# Patient Record
Sex: FEMALE | Race: WHITE | NOT HISPANIC OR LATINO | Employment: OTHER | URBAN - METROPOLITAN AREA
[De-identification: names, ages, dates, MRNs, and addresses within clinical notes are randomized per-mention and may not be internally consistent; named-entity substitution may affect disease eponyms.]

---

## 2017-02-03 ENCOUNTER — TELEPHONE (OUTPATIENT)
Dept: GASTROENTEROLOGY | Facility: CLINIC | Age: 82
End: 2017-02-03

## 2017-02-03 NOTE — TELEPHONE ENCOUNTER
Referral received from [facility] for Dr. Phillip.  Call placed to them to request records to be faxed to me.  [facility] representative verified that they do have 2 documented C Diff tests.  She will fax these and all other pertinent records for C Diff to me.  Will watch for these records.     Carol Westholter

## 2017-02-06 NOTE — TELEPHONE ENCOUNTER
"Records received from [facility].  Two documented positive C Diff results included.  Contacted to patient to schedule an appointment.  She wasn't aware that the referral had been sent to us.  Patient states that the medicine \"seems to be helping\".  Patient states that  recently had a stroke and she would not be able to travel this far to be seen.  She will contact her referring provider from [facility] to discuss.  Advised patient that I will hang on to her records for approx. One year in the case that she should change her mind or her situation should change.  Patient stated understanding.      Carol Westholter        "

## 2017-10-18 ENCOUNTER — RECORDS - HEALTHEAST (OUTPATIENT)
Dept: LAB | Facility: CLINIC | Age: 82
End: 2017-10-18

## 2017-10-18 LAB
CHOLEST SERPL-MCNC: 250 MG/DL
FASTING STATUS PATIENT QL REPORTED: ABNORMAL
HDLC SERPL-MCNC: 66 MG/DL
LDLC SERPL CALC-MCNC: 151 MG/DL
TRIGL SERPL-MCNC: 165 MG/DL

## 2018-06-04 ENCOUNTER — RECORDS - HEALTHEAST (OUTPATIENT)
Dept: LAB | Facility: CLINIC | Age: 83
End: 2018-06-04

## 2018-06-04 LAB
ANION GAP SERPL CALCULATED.3IONS-SCNC: 10 MMOL/L (ref 5–18)
BUN SERPL-MCNC: 21 MG/DL (ref 8–28)
CALCIUM SERPL-MCNC: 10 MG/DL (ref 8.5–10.5)
CHLORIDE BLD-SCNC: 100 MMOL/L (ref 98–107)
CO2 SERPL-SCNC: 24 MMOL/L (ref 22–31)
CREAT SERPL-MCNC: 0.98 MG/DL (ref 0.6–1.1)
ERYTHROCYTE [DISTWIDTH] IN BLOOD BY AUTOMATED COUNT: 12.7 % (ref 11–14.5)
GFR SERPL CREATININE-BSD FRML MDRD: 54 ML/MIN/1.73M2
GLUCOSE BLD-MCNC: 80 MG/DL (ref 70–125)
HCT VFR BLD AUTO: 35.4 % (ref 35–47)
HGB BLD-MCNC: 11.7 G/DL (ref 12–16)
MCH RBC QN AUTO: 29.3 PG (ref 27–34)
MCHC RBC AUTO-ENTMCNC: 33.1 G/DL (ref 32–36)
MCV RBC AUTO: 89 FL (ref 80–100)
PLATELET # BLD AUTO: 324 THOU/UL (ref 140–440)
PMV BLD AUTO: 9.7 FL (ref 8.5–12.5)
POTASSIUM BLD-SCNC: 5.5 MMOL/L (ref 3.5–5)
RBC # BLD AUTO: 4 MILL/UL (ref 3.8–5.4)
SODIUM SERPL-SCNC: 134 MMOL/L (ref 136–145)
WBC: 5.6 THOU/UL (ref 4–11)

## 2018-07-02 ENCOUNTER — RECORDS - HEALTHEAST (OUTPATIENT)
Dept: LAB | Facility: CLINIC | Age: 83
End: 2018-07-02

## 2018-07-02 LAB — POTASSIUM BLD-SCNC: 4.7 MMOL/L (ref 3.5–5)

## 2018-07-30 ENCOUNTER — RECORDS - HEALTHEAST (OUTPATIENT)
Dept: LAB | Facility: CLINIC | Age: 83
End: 2018-07-30

## 2018-07-30 LAB
T4 FREE SERPL-MCNC: 1 NG/DL (ref 0.7–1.8)
TSH SERPL DL<=0.005 MIU/L-ACNC: 2.17 UIU/ML (ref 0.3–5)

## 2018-08-24 ENCOUNTER — AMBULATORY - HEALTHEAST (OUTPATIENT)
Dept: CARDIOLOGY | Facility: CLINIC | Age: 83
End: 2018-08-24

## 2018-08-24 ENCOUNTER — RECORDS - HEALTHEAST (OUTPATIENT)
Dept: ADMINISTRATIVE | Facility: OTHER | Age: 83
End: 2018-08-24

## 2018-08-30 ENCOUNTER — OFFICE VISIT - HEALTHEAST (OUTPATIENT)
Dept: CARDIOLOGY | Facility: CLINIC | Age: 83
End: 2018-08-30

## 2018-08-30 DIAGNOSIS — R00.2 PALPITATIONS: ICD-10-CM

## 2019-05-02 ENCOUNTER — RECORDS - HEALTHEAST (OUTPATIENT)
Dept: LAB | Facility: CLINIC | Age: 84
End: 2019-05-02

## 2019-05-02 LAB
ALBUMIN SERPL-MCNC: 3.9 G/DL (ref 3.5–5)
ALP SERPL-CCNC: 56 U/L (ref 45–120)
ALT SERPL W P-5'-P-CCNC: 12 U/L (ref 0–45)
ANION GAP SERPL CALCULATED.3IONS-SCNC: 9 MMOL/L (ref 5–18)
AST SERPL W P-5'-P-CCNC: 21 U/L (ref 0–40)
BILIRUB SERPL-MCNC: 0.4 MG/DL (ref 0–1)
BUN SERPL-MCNC: 30 MG/DL (ref 8–28)
CALCIUM SERPL-MCNC: 10.3 MG/DL (ref 8.5–10.5)
CHLORIDE BLD-SCNC: 101 MMOL/L (ref 98–107)
CHOLEST SERPL-MCNC: 266 MG/DL
CO2 SERPL-SCNC: 25 MMOL/L (ref 22–31)
CREAT SERPL-MCNC: 1.15 MG/DL (ref 0.6–1.1)
ERYTHROCYTE [DISTWIDTH] IN BLOOD BY AUTOMATED COUNT: 12.8 % (ref 11–14.5)
FASTING STATUS PATIENT QL REPORTED: ABNORMAL
GFR SERPL CREATININE-BSD FRML MDRD: 45 ML/MIN/1.73M2
GLUCOSE BLD-MCNC: 83 MG/DL (ref 70–125)
HCT VFR BLD AUTO: 36.1 % (ref 35–47)
HDLC SERPL-MCNC: 66 MG/DL
HGB BLD-MCNC: 11.6 G/DL (ref 12–16)
LDLC SERPL CALC-MCNC: 157 MG/DL
LIPASE SERPL-CCNC: 34 U/L (ref 0–52)
MCH RBC QN AUTO: 28.7 PG (ref 27–34)
MCHC RBC AUTO-ENTMCNC: 32.1 G/DL (ref 32–36)
MCV RBC AUTO: 89 FL (ref 80–100)
PLATELET # BLD AUTO: 323 THOU/UL (ref 140–440)
PMV BLD AUTO: 9.7 FL (ref 8.5–12.5)
POTASSIUM BLD-SCNC: 5.4 MMOL/L (ref 3.5–5)
PROT SERPL-MCNC: 7.4 G/DL (ref 6–8)
RBC # BLD AUTO: 4.04 MILL/UL (ref 3.8–5.4)
SODIUM SERPL-SCNC: 135 MMOL/L (ref 136–145)
TRIGL SERPL-MCNC: 215 MG/DL
WBC: 5.7 THOU/UL (ref 4–11)

## 2019-11-16 ENCOUNTER — RECORDS - HEALTHEAST (OUTPATIENT)
Dept: LAB | Facility: CLINIC | Age: 84
End: 2019-11-16

## 2019-11-16 LAB — BNP SERPL-MCNC: 123 PG/ML (ref 0–163)

## 2019-12-06 ENCOUNTER — RECORDS - HEALTHEAST (OUTPATIENT)
Dept: LAB | Facility: CLINIC | Age: 84
End: 2019-12-06

## 2019-12-06 LAB
ALBUMIN SERPL-MCNC: 3.6 G/DL (ref 3.5–5)
ALP SERPL-CCNC: 86 U/L (ref 45–120)
ALT SERPL W P-5'-P-CCNC: 14 U/L (ref 0–45)
ANION GAP SERPL CALCULATED.3IONS-SCNC: 8 MMOL/L (ref 5–18)
AST SERPL W P-5'-P-CCNC: 22 U/L (ref 0–40)
BILIRUB SERPL-MCNC: 0.3 MG/DL (ref 0–1)
BUN SERPL-MCNC: 31 MG/DL (ref 8–28)
CALCIUM SERPL-MCNC: 9.9 MG/DL (ref 8.5–10.5)
CHLORIDE BLD-SCNC: 104 MMOL/L (ref 98–107)
CHOLEST SERPL-MCNC: 272 MG/DL
CO2 SERPL-SCNC: 26 MMOL/L (ref 22–31)
CREAT SERPL-MCNC: 1.12 MG/DL (ref 0.6–1.1)
FASTING STATUS PATIENT QL REPORTED: YES
GFR SERPL CREATININE-BSD FRML MDRD: 47 ML/MIN/1.73M2
GLUCOSE BLD-MCNC: 97 MG/DL (ref 70–125)
HDLC SERPL-MCNC: 63 MG/DL
LDLC SERPL CALC-MCNC: 174 MG/DL
POTASSIUM BLD-SCNC: 5.1 MMOL/L (ref 3.5–5)
PROT SERPL-MCNC: 7.2 G/DL (ref 6–8)
SODIUM SERPL-SCNC: 138 MMOL/L (ref 136–145)
T4 FREE SERPL-MCNC: 0.9 NG/DL (ref 0.7–1.8)
TRIGL SERPL-MCNC: 173 MG/DL
TSH SERPL DL<=0.005 MIU/L-ACNC: 2.01 UIU/ML (ref 0.3–5)

## 2020-05-07 ENCOUNTER — RECORDS - HEALTHEAST (OUTPATIENT)
Dept: LAB | Facility: CLINIC | Age: 85
End: 2020-05-07

## 2020-05-07 LAB
ALBUMIN SERPL-MCNC: 3.4 G/DL (ref 3.5–5)
ALP SERPL-CCNC: 65 U/L (ref 45–120)
ALT SERPL W P-5'-P-CCNC: <9 U/L (ref 0–45)
ANION GAP SERPL CALCULATED.3IONS-SCNC: 7 MMOL/L (ref 5–18)
AST SERPL W P-5'-P-CCNC: 16 U/L (ref 0–40)
BILIRUB SERPL-MCNC: 0.2 MG/DL (ref 0–1)
BUN SERPL-MCNC: 30 MG/DL (ref 8–28)
CALCIUM SERPL-MCNC: 9.2 MG/DL (ref 8.5–10.5)
CHLORIDE BLD-SCNC: 100 MMOL/L (ref 98–107)
CHOLEST SERPL-MCNC: 252 MG/DL
CO2 SERPL-SCNC: 26 MMOL/L (ref 22–31)
CREAT SERPL-MCNC: 1.2 MG/DL (ref 0.6–1.1)
FASTING STATUS PATIENT QL REPORTED: ABNORMAL
GFR SERPL CREATININE-BSD FRML MDRD: 43 ML/MIN/1.73M2
GLUCOSE BLD-MCNC: 88 MG/DL (ref 70–125)
HDLC SERPL-MCNC: 65 MG/DL
LDLC SERPL CALC-MCNC: 165 MG/DL
POTASSIUM BLD-SCNC: 5 MMOL/L (ref 3.5–5)
PROT SERPL-MCNC: 7.1 G/DL (ref 6–8)
SODIUM SERPL-SCNC: 133 MMOL/L (ref 136–145)
TRIGL SERPL-MCNC: 109 MG/DL

## 2020-05-08 LAB — 25(OH)D3 SERPL-MCNC: 20.3 NG/ML (ref 30–80)

## 2020-07-16 ENCOUNTER — RECORDS - HEALTHEAST (OUTPATIENT)
Dept: LAB | Facility: CLINIC | Age: 85
End: 2020-07-16

## 2020-07-16 LAB
ALBUMIN SERPL-MCNC: 3.5 G/DL (ref 3.5–5)
ALP SERPL-CCNC: 74 U/L (ref 45–120)
ALT SERPL W P-5'-P-CCNC: 9 U/L (ref 0–45)
ANION GAP SERPL CALCULATED.3IONS-SCNC: 10 MMOL/L (ref 5–18)
AST SERPL W P-5'-P-CCNC: 17 U/L (ref 0–40)
BILIRUB SERPL-MCNC: 0.2 MG/DL (ref 0–1)
BUN SERPL-MCNC: 35 MG/DL (ref 8–28)
CALCIUM SERPL-MCNC: 9.6 MG/DL (ref 8.5–10.5)
CHLORIDE BLD-SCNC: 99 MMOL/L (ref 98–107)
CO2 SERPL-SCNC: 24 MMOL/L (ref 22–31)
CREAT SERPL-MCNC: 1.27 MG/DL (ref 0.6–1.1)
GFR SERPL CREATININE-BSD FRML MDRD: 40 ML/MIN/1.73M2
GLUCOSE BLD-MCNC: 104 MG/DL (ref 70–125)
IRON SATN MFR SERPL: 13 % (ref 20–50)
IRON SERPL-MCNC: 47 UG/DL (ref 42–175)
POTASSIUM BLD-SCNC: 4.8 MMOL/L (ref 3.5–5)
PROT SERPL-MCNC: 7.2 G/DL (ref 6–8)
SODIUM SERPL-SCNC: 133 MMOL/L (ref 136–145)
TIBC SERPL-MCNC: 359 UG/DL (ref 313–563)
TRANSFERRIN SERPL-MCNC: 287 MG/DL (ref 212–360)
TSH SERPL DL<=0.005 MIU/L-ACNC: 2 UIU/ML (ref 0.3–5)
VIT B12 SERPL-MCNC: 547 PG/ML (ref 213–816)

## 2020-07-17 LAB
BASOPHILS # BLD AUTO: 0 THOU/UL (ref 0–0.2)
BASOPHILS NFR BLD AUTO: 1 % (ref 0–2)
EOSINOPHIL # BLD AUTO: 0.1 THOU/UL (ref 0–0.4)
EOSINOPHIL NFR BLD AUTO: 2 % (ref 0–6)
ERYTHROCYTE [DISTWIDTH] IN BLOOD BY AUTOMATED COUNT: 12.9 % (ref 11–14.5)
HCT VFR BLD AUTO: 30.6 % (ref 35–47)
HGB BLD-MCNC: 9.7 G/DL (ref 12–16)
LAB AP CHARGES (HE HISTORICAL CONVERSION): NORMAL
LYMPHOCYTES # BLD AUTO: 1.4 THOU/UL (ref 0.8–4.4)
LYMPHOCYTES NFR BLD AUTO: 26 % (ref 20–40)
MCH RBC QN AUTO: 29 PG (ref 27–34)
MCHC RBC AUTO-ENTMCNC: 31.7 G/DL (ref 32–36)
MCV RBC AUTO: 92 FL (ref 80–100)
MONOCYTES # BLD AUTO: 0.7 THOU/UL (ref 0–0.9)
MONOCYTES NFR BLD AUTO: 13 % (ref 2–10)
NEUTROPHILS # BLD AUTO: 3.1 THOU/UL (ref 2–7.7)
NEUTROPHILS NFR BLD AUTO: 58 % (ref 50–70)
PATH REPORT.COMMENTS IMP SPEC: NORMAL
PATH REPORT.COMMENTS IMP SPEC: NORMAL
PATH REPORT.FINAL DX SPEC: NORMAL
PATH REPORT.MICROSCOPIC SPEC OTHER STN: ABNORMAL
PATH REPORT.MICROSCOPIC SPEC OTHER STN: NORMAL
PATH REPORT.RELEVANT HX SPEC: NORMAL
PLATELET # BLD AUTO: 389 THOU/UL (ref 140–440)
PMV BLD AUTO: 9.3 FL (ref 8.5–12.5)
RBC # BLD AUTO: 3.34 MILL/UL (ref 3.8–5.4)
WBC: 5.4 THOU/UL (ref 4–11)

## 2020-08-31 ENCOUNTER — AMBULATORY - HEALTHEAST (OUTPATIENT)
Dept: SURGERY | Facility: HOSPITAL | Age: 85
End: 2020-08-31

## 2020-08-31 DIAGNOSIS — Z11.59 ENCOUNTER FOR SCREENING FOR OTHER VIRAL DISEASES: ICD-10-CM

## 2020-09-03 ENCOUNTER — AMBULATORY - HEALTHEAST (OUTPATIENT)
Dept: OTHER | Facility: CLINIC | Age: 85
End: 2020-09-03

## 2020-09-03 ENCOUNTER — DOCUMENTATION ONLY (OUTPATIENT)
Dept: OTHER | Facility: CLINIC | Age: 85
End: 2020-09-03

## 2020-09-04 ENCOUNTER — RECORDS - HEALTHEAST (OUTPATIENT)
Dept: LAB | Facility: CLINIC | Age: 85
End: 2020-09-04

## 2020-09-04 LAB
ANION GAP SERPL CALCULATED.3IONS-SCNC: 10 MMOL/L (ref 5–18)
BUN SERPL-MCNC: 29 MG/DL (ref 8–28)
CALCIUM SERPL-MCNC: 9.7 MG/DL (ref 8.5–10.5)
CHLORIDE BLD-SCNC: 99 MMOL/L (ref 98–107)
CO2 SERPL-SCNC: 24 MMOL/L (ref 22–31)
CREAT SERPL-MCNC: 1.08 MG/DL (ref 0.6–1.1)
GFR SERPL CREATININE-BSD FRML MDRD: 48 ML/MIN/1.73M2
GLUCOSE BLD-MCNC: 80 MG/DL (ref 70–125)
POTASSIUM BLD-SCNC: 4.8 MMOL/L (ref 3.5–5)
SODIUM SERPL-SCNC: 133 MMOL/L (ref 136–145)

## 2020-09-08 ENCOUNTER — AMBULATORY - HEALTHEAST (OUTPATIENT)
Dept: FAMILY MEDICINE | Facility: CLINIC | Age: 85
End: 2020-09-08

## 2020-09-08 DIAGNOSIS — Z11.59 ENCOUNTER FOR SCREENING FOR OTHER VIRAL DISEASES: ICD-10-CM

## 2020-09-10 ENCOUNTER — COMMUNICATION - HEALTHEAST (OUTPATIENT)
Dept: SCHEDULING | Facility: CLINIC | Age: 85
End: 2020-09-10

## 2020-09-10 ASSESSMENT — MIFFLIN-ST. JEOR: SCORE: 1199.88

## 2020-09-11 ENCOUNTER — AMBULATORY - HEALTHEAST (OUTPATIENT)
Dept: OTHER | Facility: CLINIC | Age: 85
End: 2020-09-11

## 2020-09-11 ENCOUNTER — SURGERY - HEALTHEAST (OUTPATIENT)
Dept: SURGERY | Facility: HOSPITAL | Age: 85
End: 2020-09-11

## 2020-09-11 ENCOUNTER — DOCUMENTATION ONLY (OUTPATIENT)
Dept: OTHER | Facility: CLINIC | Age: 85
End: 2020-09-11

## 2020-09-11 ENCOUNTER — ANESTHESIA - HEALTHEAST (OUTPATIENT)
Dept: SURGERY | Facility: HOSPITAL | Age: 85
End: 2020-09-11

## 2020-09-25 ENCOUNTER — RECORDS - HEALTHEAST (OUTPATIENT)
Dept: LAB | Facility: CLINIC | Age: 85
End: 2020-09-25

## 2020-09-25 LAB
ALBUMIN SERPL-MCNC: 3.8 G/DL (ref 3.5–5)
ALP SERPL-CCNC: 67 U/L (ref 45–120)
ALT SERPL W P-5'-P-CCNC: 10 U/L (ref 0–45)
ANION GAP SERPL CALCULATED.3IONS-SCNC: 8 MMOL/L (ref 5–18)
AST SERPL W P-5'-P-CCNC: 16 U/L (ref 0–40)
BILIRUB SERPL-MCNC: 0.3 MG/DL (ref 0–1)
BUN SERPL-MCNC: 26 MG/DL (ref 8–28)
CALCIUM SERPL-MCNC: 10 MG/DL (ref 8.5–10.5)
CHLORIDE BLD-SCNC: 101 MMOL/L (ref 98–107)
CHOLEST SERPL-MCNC: 279 MG/DL
CO2 SERPL-SCNC: 27 MMOL/L (ref 22–31)
CREAT SERPL-MCNC: 1.15 MG/DL (ref 0.6–1.1)
FASTING STATUS PATIENT QL REPORTED: YES
GFR SERPL CREATININE-BSD FRML MDRD: 45 ML/MIN/1.73M2
GLUCOSE BLD-MCNC: 94 MG/DL (ref 70–125)
HDLC SERPL-MCNC: 69 MG/DL
LDLC SERPL CALC-MCNC: 179 MG/DL
POTASSIUM BLD-SCNC: 4.8 MMOL/L (ref 3.5–5)
PROT SERPL-MCNC: 7.5 G/DL (ref 6–8)
SODIUM SERPL-SCNC: 136 MMOL/L (ref 136–145)
TRIGL SERPL-MCNC: 153 MG/DL

## 2020-10-05 ENCOUNTER — RECORDS - HEALTHEAST (OUTPATIENT)
Dept: ADMINISTRATIVE | Facility: OTHER | Age: 85
End: 2020-10-05

## 2020-10-05 ENCOUNTER — AMBULATORY - HEALTHEAST (OUTPATIENT)
Dept: CARDIOLOGY | Facility: CLINIC | Age: 85
End: 2020-10-05

## 2021-01-04 ENCOUNTER — RECORDS - HEALTHEAST (OUTPATIENT)
Dept: ADMINISTRATIVE | Facility: OTHER | Age: 86
End: 2021-01-04

## 2021-01-04 ENCOUNTER — TRANSFERRED RECORDS (OUTPATIENT)
Dept: HEALTH INFORMATION MANAGEMENT | Facility: CLINIC | Age: 86
End: 2021-01-04

## 2021-01-06 ENCOUNTER — RECORDS - HEALTHEAST (OUTPATIENT)
Dept: LAB | Facility: CLINIC | Age: 86
End: 2021-01-06

## 2021-01-06 LAB
ALBUMIN SERPL-MCNC: 4.1 G/DL (ref 3.5–5)
ALP SERPL-CCNC: 67 U/L (ref 45–120)
ALT SERPL W P-5'-P-CCNC: <9 U/L (ref 0–45)
ANION GAP SERPL CALCULATED.3IONS-SCNC: 12 MMOL/L (ref 5–18)
AST SERPL W P-5'-P-CCNC: 17 U/L (ref 0–40)
BILIRUB SERPL-MCNC: 0.3 MG/DL (ref 0–1)
BUN SERPL-MCNC: 32 MG/DL (ref 8–28)
CALCIUM SERPL-MCNC: 9.9 MG/DL (ref 8.5–10.5)
CHLORIDE BLD-SCNC: 102 MMOL/L (ref 98–107)
CHOLEST SERPL-MCNC: 276 MG/DL
CO2 SERPL-SCNC: 23 MMOL/L (ref 22–31)
CREAT SERPL-MCNC: 1.2 MG/DL (ref 0.6–1.1)
ERYTHROCYTE [DISTWIDTH] IN BLOOD BY AUTOMATED COUNT: 13.1 % (ref 11–14.5)
FASTING STATUS PATIENT QL REPORTED: YES
GFR SERPL CREATININE-BSD FRML MDRD: 43 ML/MIN/1.73M2
GLUCOSE BLD-MCNC: 96 MG/DL (ref 70–125)
HCT VFR BLD AUTO: 33.9 % (ref 35–47)
HDLC SERPL-MCNC: 76 MG/DL
HGB BLD-MCNC: 10.8 G/DL (ref 12–16)
LDLC SERPL CALC-MCNC: 172 MG/DL
MCH RBC QN AUTO: 28.6 PG (ref 27–34)
MCHC RBC AUTO-ENTMCNC: 31.9 G/DL (ref 32–36)
MCV RBC AUTO: 90 FL (ref 80–100)
PLATELET # BLD AUTO: 354 THOU/UL (ref 140–440)
PMV BLD AUTO: 9.8 FL (ref 8.5–12.5)
POTASSIUM BLD-SCNC: 4.8 MMOL/L (ref 3.5–5)
PROT SERPL-MCNC: 7.8 G/DL (ref 6–8)
RBC # BLD AUTO: 3.78 MILL/UL (ref 3.8–5.4)
SODIUM SERPL-SCNC: 137 MMOL/L (ref 136–145)
TRIGL SERPL-MCNC: 138 MG/DL
TSH SERPL DL<=0.005 MIU/L-ACNC: 2.07 UIU/ML (ref 0.3–5)
WBC: 5.9 THOU/UL (ref 4–11)

## 2021-01-07 LAB — 25(OH)D3 SERPL-MCNC: 19.9 NG/ML (ref 30–80)

## 2021-01-15 ENCOUNTER — AMBULATORY - HEALTHEAST (OUTPATIENT)
Dept: ADMINISTRATIVE | Facility: CLINIC | Age: 86
End: 2021-01-15

## 2021-01-15 DIAGNOSIS — H93.8X2 POUNDING NOISE IN EAR, LEFT: ICD-10-CM

## 2021-02-04 ENCOUNTER — AMBULATORY - HEALTHEAST (OUTPATIENT)
Dept: OTOLARYNGOLOGY | Facility: CLINIC | Age: 86
End: 2021-02-04

## 2021-02-05 ENCOUNTER — OFFICE VISIT - HEALTHEAST (OUTPATIENT)
Dept: OTOLARYNGOLOGY | Facility: CLINIC | Age: 86
End: 2021-02-05

## 2021-02-05 ENCOUNTER — RECORDS - HEALTHEAST (OUTPATIENT)
Dept: ADMINISTRATIVE | Facility: OTHER | Age: 86
End: 2021-02-05

## 2021-02-05 ENCOUNTER — OFFICE VISIT - HEALTHEAST (OUTPATIENT)
Dept: AUDIOLOGY | Facility: CLINIC | Age: 86
End: 2021-02-05

## 2021-02-05 DIAGNOSIS — H90.3 SENSORINEURAL HEARING LOSS (SNHL) OF BOTH EARS: ICD-10-CM

## 2021-02-05 DIAGNOSIS — H93.A3 PULSATILE TINNITUS OF BOTH EARS: ICD-10-CM

## 2021-02-05 DIAGNOSIS — H90.3 SENSORINEURAL HEARING LOSS, BILATERAL: ICD-10-CM

## 2021-02-10 ENCOUNTER — AMBULATORY - HEALTHEAST (OUTPATIENT)
Dept: OTHER | Facility: CLINIC | Age: 86
End: 2021-02-10

## 2021-02-10 ENCOUNTER — DOCUMENTATION ONLY (OUTPATIENT)
Dept: OTHER | Facility: CLINIC | Age: 86
End: 2021-02-10

## 2021-02-18 ENCOUNTER — HOSPITAL ENCOUNTER (OUTPATIENT)
Dept: MRI IMAGING | Facility: HOSPITAL | Age: 86
Discharge: HOME | End: 2021-02-18
Attending: OTOLARYNGOLOGY

## 2021-02-18 DIAGNOSIS — H93.A3 PULSATILE TINNITUS OF BOTH EARS: ICD-10-CM

## 2021-02-18 LAB
CREAT BLD-MCNC: 1.2 MG/DL (ref 0.6–1.1)
GFR SERPL CREATININE-BSD FRML MDRD: 43 ML/MIN/1.73M2

## 2021-02-19 ENCOUNTER — COMMUNICATION - HEALTHEAST (OUTPATIENT)
Dept: OTOLARYNGOLOGY | Facility: CLINIC | Age: 86
End: 2021-02-19

## 2021-02-19 ENCOUNTER — RECORDS - HEALTHEAST (OUTPATIENT)
Dept: LAB | Facility: CLINIC | Age: 86
End: 2021-02-19

## 2021-02-19 DIAGNOSIS — H93.A3 PULSATILE TINNITUS OF BOTH EARS: ICD-10-CM

## 2021-02-19 LAB
BASOPHILS # BLD AUTO: 0 THOU/UL (ref 0–0.2)
BASOPHILS NFR BLD AUTO: 1 % (ref 0–2)
EOSINOPHIL # BLD AUTO: 0.2 THOU/UL (ref 0–0.4)
EOSINOPHIL NFR BLD AUTO: 3 % (ref 0–6)
ERYTHROCYTE [DISTWIDTH] IN BLOOD BY AUTOMATED COUNT: 12.7 % (ref 11–14.5)
HCT VFR BLD AUTO: 33.5 % (ref 35–47)
HGB BLD-MCNC: 10.7 G/DL (ref 12–16)
IMM GRANULOCYTES # BLD: 0 THOU/UL
IMM GRANULOCYTES NFR BLD: 0 %
LYMPHOCYTES # BLD AUTO: 2.2 THOU/UL (ref 0.8–4.4)
LYMPHOCYTES NFR BLD AUTO: 31 % (ref 20–40)
MCH RBC QN AUTO: 28.4 PG (ref 27–34)
MCHC RBC AUTO-ENTMCNC: 31.9 G/DL (ref 32–36)
MCV RBC AUTO: 89 FL (ref 80–100)
MONOCYTES # BLD AUTO: 0.7 THOU/UL (ref 0–0.9)
MONOCYTES NFR BLD AUTO: 10 % (ref 2–10)
NEUTROPHILS # BLD AUTO: 4 THOU/UL (ref 2–7.7)
NEUTROPHILS NFR BLD AUTO: 55 % (ref 50–70)
PLATELET # BLD AUTO: 377 THOU/UL (ref 140–440)
PMV BLD AUTO: 9.7 FL (ref 8.5–12.5)
RBC # BLD AUTO: 3.77 MILL/UL (ref 3.8–5.4)
WBC: 7.2 THOU/UL (ref 4–11)

## 2021-02-22 LAB — 25(OH)D3 SERPL-MCNC: 29.2 NG/ML (ref 30–80)

## 2021-02-23 ENCOUNTER — TRANSFERRED RECORDS (OUTPATIENT)
Dept: HEALTH INFORMATION MANAGEMENT | Facility: CLINIC | Age: 86
End: 2021-02-23

## 2021-03-03 PROBLEM — R00.2 PALPITATIONS: Status: ACTIVE | Noted: 2018-08-31

## 2021-03-03 PROBLEM — M81.0 SENILE OSTEOPOROSIS: Status: ACTIVE | Noted: 2021-03-03

## 2021-03-03 PROBLEM — M19.019 LOCALIZED, PRIMARY OSTEOARTHRITIS OF SHOULDER REGION: Status: ACTIVE | Noted: 2021-03-03

## 2021-03-03 PROBLEM — F43.21 ADJUSTMENT DISORDER WITH DEPRESSED MOOD: Status: ACTIVE | Noted: 2021-03-03

## 2021-03-03 PROBLEM — N18.31 STAGE 3A CHRONIC KIDNEY DISEASE (H): Status: ACTIVE | Noted: 2021-03-03

## 2021-03-03 PROBLEM — M77.9 TENDONITIS: Status: ACTIVE | Noted: 2021-03-03

## 2021-03-03 PROBLEM — I12.9 CHRONIC KIDNEY DISEASE DUE TO HYPERTENSION: Status: ACTIVE | Noted: 2021-03-03

## 2021-03-03 PROBLEM — E78.5 HYPERLIPIDEMIA: Status: ACTIVE | Noted: 2021-03-03

## 2021-03-03 PROBLEM — H93.8X2: Status: ACTIVE | Noted: 2021-03-03

## 2021-03-03 PROBLEM — M17.9 OSTEOARTHRITIS OF KNEE: Status: ACTIVE | Noted: 2021-03-03

## 2021-03-03 PROBLEM — I51.9 HEART DISEASE: Status: ACTIVE | Noted: 2021-03-03

## 2021-03-03 PROBLEM — F41.1 GENERALIZED ANXIETY DISORDER: Status: ACTIVE | Noted: 2021-03-03

## 2021-03-03 PROBLEM — I13.0 HYPERTENSIVE HEART AND RENAL DISEASE WITH (CONGESTIVE) HEART FAILURE (H): Status: ACTIVE | Noted: 2021-03-03

## 2021-03-03 PROBLEM — D64.9 ANEMIA: Status: ACTIVE | Noted: 2021-03-03

## 2021-03-03 PROBLEM — Z86.19 HISTORY OF INFECTIOUS DISEASE: Status: ACTIVE | Noted: 2021-03-03

## 2021-03-03 PROBLEM — G56.00 CARPAL TUNNEL SYNDROME: Status: ACTIVE | Noted: 2021-03-03

## 2021-03-03 PROBLEM — E55.9 VITAMIN D DEFICIENCY: Status: ACTIVE | Noted: 2021-03-03

## 2021-03-03 PROBLEM — R60.0 PERIPHERAL EDEMA: Status: ACTIVE | Noted: 2021-03-03

## 2021-03-03 PROBLEM — K29.30 CHRONIC SUPERFICIAL GASTRITIS: Status: ACTIVE | Noted: 2021-03-03

## 2021-03-03 PROBLEM — K21.9 GASTROESOPHAGEAL REFLUX DISEASE: Status: ACTIVE | Noted: 2021-03-03

## 2021-03-03 PROBLEM — I49.1 ATRIAL PREMATURE CONTRACTIONS: Status: ACTIVE | Noted: 2021-03-03

## 2021-03-03 PROBLEM — J45.31 MILD PERSISTENT ASTHMA WITH EXACERBATION: Status: ACTIVE | Noted: 2021-03-03

## 2021-03-05 ENCOUNTER — OFFICE VISIT (OUTPATIENT)
Dept: NEUROLOGY | Facility: CLINIC | Age: 86
End: 2021-03-05
Payer: MEDICARE

## 2021-03-05 VITALS
RESPIRATION RATE: 16 BRPM | DIASTOLIC BLOOD PRESSURE: 65 MMHG | HEIGHT: 61 IN | BODY MASS INDEX: 34.08 KG/M2 | SYSTOLIC BLOOD PRESSURE: 145 MMHG | HEART RATE: 67 BPM | WEIGHT: 180.5 LBS

## 2021-03-05 DIAGNOSIS — I65.22 STENOSIS OF INTRACRANIAL PORTIONS OF LEFT INTERNAL CAROTID ARTERY: ICD-10-CM

## 2021-03-05 DIAGNOSIS — G56.01 CARPAL TUNNEL SYNDROME OF RIGHT WRIST: Primary | ICD-10-CM

## 2021-03-05 DIAGNOSIS — E78.2 MIXED HYPERLIPIDEMIA: ICD-10-CM

## 2021-03-05 PROCEDURE — 99204 OFFICE O/P NEW MOD 45 MIN: CPT | Performed by: PSYCHIATRY & NEUROLOGY

## 2021-03-05 RX ORDER — ACETAMINOPHEN 500 MG
1000 TABLET ORAL 3 TIMES DAILY
COMMUNITY

## 2021-03-05 RX ORDER — EZETIMIBE 10 MG/1
5 TABLET ORAL DAILY
Qty: 90 TABLET | Refills: 3 | Status: SHIPPED | OUTPATIENT
Start: 2021-03-05 | End: 2022-07-05

## 2021-03-05 RX ORDER — ASPIRIN 81 MG/1
81 TABLET ORAL DAILY
COMMUNITY

## 2021-03-05 RX ORDER — CALCIUM CARBONATE 750 MG/1
1 TABLET, CHEWABLE ORAL DAILY
COMMUNITY
End: 2023-08-10

## 2021-03-05 RX ORDER — LOSARTAN POTASSIUM 50 MG/1
50 TABLET ORAL 2 TIMES DAILY
COMMUNITY
Start: 2020-08-22

## 2021-03-05 RX ORDER — CLONAZEPAM 0.5 MG/1
0.5 TABLET ORAL 3 TIMES DAILY
COMMUNITY

## 2021-03-05 RX ORDER — CLOPIDOGREL BISULFATE 75 MG/1
75 TABLET ORAL DAILY
Qty: 90 TABLET | Refills: 3 | Status: SHIPPED | OUTPATIENT
Start: 2021-03-05 | End: 2022-02-22

## 2021-03-05 RX ORDER — METOPROLOL SUCCINATE 25 MG/1
1 TABLET, EXTENDED RELEASE ORAL AT BEDTIME
Status: ON HOLD | COMMUNITY
Start: 2021-01-12 | End: 2023-08-15

## 2021-03-05 ASSESSMENT — MIFFLIN-ST. JEOR: SCORE: 1207.15

## 2021-03-05 NOTE — LETTER
"    3/5/2021         RE: Kerry Hoffmann  [address]  [address]  [place] [address]        Dear Colleague,    Thank you for referring your patient, Kerry Hoffmann, to the [facility] NEUR[facility] [place]. Please see a copy of my visit note below.    [facility]  [place]    Kerry Hoffmann MRN# [id number]   Age: 85 year old YOB: 1935               Assessment and Plan:   Assessment:   Cervical and intracranial atherosclerotic stenoses    Likely carpal tunnel syndrome    Clinically she has not had symptoms from this.  We will have to see if MRI of the brain was done along with MRA, this would tell us if there have been any silent strokes attributable to these stenoses.         Plan:   Orders Placed This Encounter   Procedures     MR Brain w/o Contrast     Obtain medical records     EMG     I will see her when she comes back for the EMG. If MRI was not done we can send her back to [facility] for that.  In terms of managing risk factors, she will take clopidogrel in addition to her low-dose daily aspirin.  LDL was 172 in January of this year.  She has not tolerated statin medications in the past.  Instead we will try Zetia, starting with a low dose as she is nervous about side effects.             Chief Complaint/HPI:     This patient is an 85-year-old woman seen in neurologic consultation today in our [place] office.  She had an MRA of the neck as part of evaluation for pulsatile tinnitus.  This showed stenosis of the nondominant vertebral artery and also some stenosis intracranially, MRA of the head was then recommended for further evaluation of the the intracranial issues.  She subsequently had that done at [facility] and it showed \"extensive atherosclerotic narrowing in the anterior and posterior circulation.\"  This includes a moderate to severe stenosis in the distal left M1 segment.  The patient was very upset by th these findings and is anxious about what the future " will hold.  She has never had any strokelike symptoms.  She does complain of some carpal tunnel symptoms on the right hand.  She has not had EMG done for that.  Her   15 months ago.  He was ailing and the sold the house to move into a senior building.  She is more independent and feels like this is a intermediate, she wishes she could go back to her house but that is not possible.            Past Medical History:    has a past medical history of CKD (chronic kidney disease), CTS (carpal tunnel syndrome), Hyperlipidemia, and Hypertension.          Past Surgical History:    has no past surgical history on file.          Social History:     Social History     Tobacco Use     Smoking status: Former Smoker     Packs/day: 3.00     Years: 35.00     Pack years: 105.00     Types: Cigarettes     Smokeless tobacco: Never Used     Tobacco comment: quit in    Substance Use Topics     Alcohol use: Not Currently             Family History:     Family History   Problem Relation Age of Onset     Hypertension Mother      Cancer Mother      Myocardial Infarction Father      Hypertension Sister      Alzheimer Disease Sister      Heart Disease Sister      Cancer Brother      Brain Cancer Brother                 Allergies:     Allergies   Allergen Reactions     Buspirone Shortness Of Breath     Ciprofloxacin Hives and Shortness Of Breath     Other reaction(s): Unknown     Cortisone      Other reaction(s): Throat Swelling/Closing, Unknown  Reaction 94       Hydrocodone-Acetaminophen Shortness Of Breath     Other reaction(s): Unknown     Lansoprazole Shortness Of Breath     Metoprolol Shortness Of Breath     Nedocromil      Other reaction(s): Throat Swelling/Closing     Niacin Shortness Of Breath     Other reaction(s): Unknown     Advair Diskus Other (See Comments)     Elevated blood pressure       Albuterol Other (See Comments)     Inhaler had extreme effect on nervous system       Amlodipine      Other reaction(s): Erythema,  Unknown     Azithromycin      Other reaction(s): *Unknown, Unknown     Cefixime Diarrhea     Other reaction(s): Unknown     Cefprozil Nausea and Vomiting     Other reaction(s): Unknown     Cefuroxime      Other reaction(s): *Unknown     Cephalexin      Other reaction(s): *Unknown, Unknown     Contrast Dye      Other reaction(s): hives     Cyclobenzaprine      Other reaction(s): Sedation     Diltiazem      Other reaction(s): Erythema, Unknown  Ears face arms and chest red and on fire-body tremors       Erythromycin      Other reaction(s): Unknown     Esomeprazole      Other reaction(s): Headache     Fenofibrate Muscle Pain (Myalgia)     Other reaction(s): Unknown     Guaifenesin-Dm Cr Nausea     Other reaction(s): Headache     Levofloxacin Nausea     Other reaction(s): Headache, Unknown     Methylprednisolone      Moxifloxacin      Other reaction(s): Dizziness     Nifedipine      Other reaction(s): Edema  Took for 5-93 to 9-94 red and swollen leg       Nsaids      Other reaction(s): Unknown     Ofloxacin      Other reaction(s): *Unknown     Penicillins      Other reaction(s): *Unknown, Unknown     Pirbuterol Other (See Comments)     Immediate extreme effect on nervous system       Pravastatin      Other reaction(s): Dizziness, Unknown     Pseudoephedrine-Dm-Gg      Simvastatin Muscle Pain (Myalgia)     Spironolactone      Other reaction(s): stomach cramps, nausea     Sulfamethoxazole-Trimethoprim      Other reaction(s): Unknown     Triamterene Other (See Comments)     Greatly bothered with sun-took medication for three years  Greatly bothered with sun       Ultracet      Other reaction(s): Tachycardia, Unknown     Diatrizoate Rash     Telmisartan Palpitations             Medications:     Current Outpatient Medications:      acetaminophen (TYLENOL) 500 MG tablet, Take 500 mg by mouth as needed, Disp: , Rfl:      aspirin (ASA) 81 MG chewable tablet, Take 81 mg by mouth daily, Disp: , Rfl:      calcium carbonate 750 MG  "CHEW, Take 1 tablet by mouth daily, Disp: , Rfl:      clonazePAM (KLONOPIN) 0.5 MG tablet, Take 1 mg by mouth 3 times daily, Disp: , Rfl:      clopidogrel (PLAVIX) 75 MG tablet, Take 1 tablet (75 mg) by mouth daily, Disp: 90 tablet, Rfl: 3     ezetimibe (ZETIA) 10 MG tablet, Take 0.5 tablets (5 mg) by mouth daily, Disp: 90 tablet, Rfl: 3     losartan (COZAAR) 50 MG tablet, Take 50 mg by mouth 2 times daily, Disp: , Rfl:      metoprolol succinate ER (TOPROL-XL) 25 MG 24 hr tablet, Take 1 tablet by mouth At Bedtime, Disp: , Rfl:               Physical Exam:     BP (!) 145/65 (BP Location: Right arm, Patient Position: Sitting, Cuff Size: Adult Regular)   Pulse 67   Resp 16   Ht 1.543 m (5' 0.75\")   Wt 81.9 kg (180 lb 8 oz)   LMP  (LMP Unknown)   Breastfeeding No   BMI 34.39 kg/m     Awake, alert, no aphasia, no dysarthria  Oriented x3, attention is fine  Cranial nerves II - XII tested and intact, no nystagmus  There is no focal or generalized weakness in the extremities  Finger nose finger testing is normal  Rapid alternating movements are normal on both sides  Tone is normal on both sides  Sensation is normal  Gait is normal              Jordon Brady MD             Again, thank you for allowing me to participate in the care of your patient.        Sincerely,        Jordon Brady MD    "

## 2021-03-05 NOTE — NURSING NOTE
Chief Complaint   Patient presents with     Consult     New patient consult to discuss recent MRI's. Has a hx of cerebral vascular disease. Is supposed to follow with a neurosurgeon as well but has not heard from their scheduling dept yet      Brad Raza CMA on 3/5/2021 at 11:20 AM

## 2021-03-10 ENCOUNTER — RECORDS - HEALTHEAST (OUTPATIENT)
Dept: SCHEDULING | Facility: CLINIC | Age: 86
End: 2021-03-10

## 2021-03-10 ENCOUNTER — RECORDS - HEALTHEAST (OUTPATIENT)
Dept: ADMINISTRATIVE | Facility: OTHER | Age: 86
End: 2021-03-10

## 2021-03-10 DIAGNOSIS — E78.2 MIXED HYPERLIPIDEMIA: ICD-10-CM

## 2021-03-10 DIAGNOSIS — I65.22 STENOSIS OF INTRACRANIAL PORTIONS OF LEFT INTERNAL CAROTID ARTERY: ICD-10-CM

## 2021-03-12 ENCOUNTER — OFFICE VISIT (OUTPATIENT)
Dept: NEUROLOGY | Facility: CLINIC | Age: 86
End: 2021-03-12
Attending: PSYCHIATRY & NEUROLOGY
Payer: MEDICARE

## 2021-03-12 DIAGNOSIS — E78.2 MIXED HYPERLIPIDEMIA: ICD-10-CM

## 2021-03-12 DIAGNOSIS — I65.22 STENOSIS OF INTRACRANIAL PORTIONS OF LEFT INTERNAL CAROTID ARTERY: ICD-10-CM

## 2021-03-12 DIAGNOSIS — G56.01 CARPAL TUNNEL SYNDROME OF RIGHT WRIST: ICD-10-CM

## 2021-03-12 NOTE — PROCEDURES
[facility]     Formerly [facility]  [address] [place] [address], [address]  [place] [address]  Tel: [phone]  Fax: [phone]          Full Name: Kerry Hoffmann Gender: Female  Patient ID: [id number] YOB: 1935      Visit Date: 3/12/2021 12:32  Age: 85 Years 11 Months Old  Interpreted By: Jordon Brady M.D.   Ref Dr.: Jeb Graham MD  Tech:    Height: 5 feet 2 inch  Reason for referral: Evaluate right upper. c/o numbness in digits I-III > 7 years.       Motor NCS      Nerve / Sites Lat Amp Dist Ben    ms mV cm m/s   R Median - APB      Wrist 6.67 5.4 7       Elbow 11.51 4.7 23.5 49   R Ulnar - ADM      Wrist 2.45 11.2 7       B.Elbow 5.31 10.5 19 66      A.Elbow 6.88 10.3 10 64       F  Wave      Nerve Fmin    ms   R Ulnar - ADM 26.35       Sensory NCS      Nerve / Sites Onset Lat Pk Lat Amp.2-3 Dist Lat Diff    ms ms  V cm ms   R Median - II (Antidr)      Wrist 3.85 5.10 19.2 13    R Ulnar - V (Antidr)      Wrist 2.29 2.92 25.3 11    R Median, Ulnar - Transcarpal comparison      Median Palm NR NR NR 8       Ulnar Palm 1.56 1.93 26.4 8         NR       EMG Summary Table     Spontaneous MUAP Rcmt Note   Muscle Fib PSW Fasc IA # Amp Dur PPP Rate Type   R. Brachioradialis None None None N N N N N N N   R. Pronator teres None None None N N N N N N N   R. Biceps brachii None None None N N N N N N N   R. Triceps brachii None None None N N N N N N N   R. Flexor carpi ulnaris None None None N N N N N N N   R. First dorsal interosseous None None None N N N N N N N   R. Abductor pollicis brevis 1+ 1+ None N [employer] Incr N N N     History:  This patient is a 85-year-old woman with numbness in the arm first 3 digits on the right hand for several years.  This EMG is performed to evaluate for nerve or root impingement.    Findings:  Motor nerve conduction study of the right median nerve shows a severely prolonged distal motor latency, low  normal amplitude and slight proximal slowing.  Motor study of the right ulnar nerve is well within normal limits.    F-wave latency in the right ulnar nerve is normal.    Sensory study of the right median nerve shows slowing with borderline low amplitude.  The right ulnar sensory study is normal.  With palmar stimulation is the ulnar study is normal, but the median study showed no response.    Needle EMG of selected muscles throughout the right arm shows 1+ spontaneous activity in the abductor pollicis brevis.  There are marked chronic neurogenic changes of high amplitude long duration motor units seen also in the right abductor pollicis brevis.    Conclusion:  This study shows moderately severe  carpal tunnel syndrome on the right.

## 2021-03-12 NOTE — LETTER
3/12/2021         RE: Kerry Hoffmann  [address]  [address]  [place] [address]        Dear Colleague,    Thank you for referring your patient, Kerry Hoffmann, to the [facility]UROLOGY CL[facility] [place]. Please see a copy of my visit note below.    Epic requires a note here      Again, thank you for allowing me to participate in the care of your patient.        Sincerely,        Jordon Brady MD

## 2021-03-13 NOTE — PROGRESS NOTES
"[facility]  [place]    Kerry Hoffmann MRN# [id number]   Age: 85 year old YOB: 1935               Assessment and Plan:   Assessment:   Cervical and intracranial atherosclerotic stenoses    Likely carpal tunnel syndrome    Clinically she has not had symptoms from this.  We will have to see if MRI of the brain was done along with MRA, this would tell us if there have been any silent strokes attributable to these stenoses.         Plan:   Orders Placed This Encounter   Procedures     MR Brain w/o Contrast     Obtain medical records     EMG     I will see her when she comes back for the EMG. If MRI was not done we can send her back to [facility] for that.  In terms of managing risk factors, she will take clopidogrel in addition to her low-dose daily aspirin.  LDL was 172 in January of this year.  She has not tolerated statin medications in the past.  Instead we will try Zetia, starting with a low dose as she is nervous about side effects.             Chief Complaint/HPI:     This patient is an 85-year-old woman seen in neurologic consultation today in our [place] office.  She had an MRA of the neck as part of evaluation for pulsatile tinnitus.  This showed stenosis of the nondominant vertebral artery and also some stenosis intracranially, MRA of the head was then recommended for further evaluation of the the intracranial issues.  She subsequently had that done at [facility] and it showed \"extensive atherosclerotic narrowing in the anterior and posterior circulation.\"  This includes a moderate to severe stenosis in the distal left M1 segment.  The patient was very upset by th these findings and is anxious about what the future will hold.  She has never had any strokelike symptoms.  She does complain of some carpal tunnel symptoms on the right hand.  She has not had EMG done for that.  Her   15 months ago.  He was ailing and the sold the house to move into a senior building.  She is more " independent and feels like this is a half-way, she wishes she could go back to her house but that is not possible.            Past Medical History:    has a past medical history of CKD (chronic kidney disease), CTS (carpal tunnel syndrome), Hyperlipidemia, and Hypertension.          Past Surgical History:    has no past surgical history on file.          Social History:     Social History     Tobacco Use     Smoking status: Former Smoker     Packs/day: 3.00     Years: 35.00     Pack years: 105.00     Types: Cigarettes     Smokeless tobacco: Never Used     Tobacco comment: quit in 1968   Substance Use Topics     Alcohol use: Not Currently             Family History:     Family History   Problem Relation Age of Onset     Hypertension Mother      Cancer Mother      Myocardial Infarction Father      Hypertension Sister      Alzheimer Disease Sister      Heart Disease Sister      Cancer Brother      Brain Cancer Brother                 Allergies:     Allergies   Allergen Reactions     Buspirone Shortness Of Breath     Ciprofloxacin Hives and Shortness Of Breath     Other reaction(s): Unknown     Cortisone      Other reaction(s): Throat Swelling/Closing, Unknown  Reaction 9-5-94       Hydrocodone-Acetaminophen Shortness Of Breath     Other reaction(s): Unknown     Lansoprazole Shortness Of Breath     Metoprolol Shortness Of Breath     Nedocromil      Other reaction(s): Throat Swelling/Closing     Niacin Shortness Of Breath     Other reaction(s): Unknown     Advair Diskus Other (See Comments)     Elevated blood pressure       Albuterol Other (See Comments)     Inhaler had extreme effect on nervous system       Amlodipine      Other reaction(s): Erythema, Unknown     Azithromycin      Other reaction(s): *Unknown, Unknown     Cefixime Diarrhea     Other reaction(s): Unknown     Cefprozil Nausea and Vomiting     Other reaction(s): Unknown     Cefuroxime      Other reaction(s): *Unknown     Cephalexin      Other reaction(s):  *Unknown, Unknown     Contrast Dye      Other reaction(s): hives     Cyclobenzaprine      Other reaction(s): Sedation     Diltiazem      Other reaction(s): Erythema, Unknown  Ears face arms and chest red and on fire-body tremors       Erythromycin      Other reaction(s): Unknown     Esomeprazole      Other reaction(s): Headache     Fenofibrate Muscle Pain (Myalgia)     Other reaction(s): Unknown     Guaifenesin-Dm Cr Nausea     Other reaction(s): Headache     Levofloxacin Nausea     Other reaction(s): Headache, Unknown     Methylprednisolone      Moxifloxacin      Other reaction(s): Dizziness     Nifedipine      Other reaction(s): Edema  Took for 5-93 to 9-94 red and swollen leg       Nsaids      Other reaction(s): Unknown     Ofloxacin      Other reaction(s): *Unknown     Penicillins      Other reaction(s): *Unknown, Unknown     Pirbuterol Other (See Comments)     Immediate extreme effect on nervous system       Pravastatin      Other reaction(s): Dizziness, Unknown     Pseudoephedrine-Dm-Gg      Simvastatin Muscle Pain (Myalgia)     Spironolactone      Other reaction(s): stomach cramps, nausea     Sulfamethoxazole-Trimethoprim      Other reaction(s): Unknown     Triamterene Other (See Comments)     Greatly bothered with sun-took medication for three years  Greatly bothered with sun       Ultracet      Other reaction(s): Tachycardia, Unknown     Diatrizoate Rash     Telmisartan Palpitations             Medications:     Current Outpatient Medications:      acetaminophen (TYLENOL) 500 MG tablet, Take 500 mg by mouth as needed, Disp: , Rfl:      aspirin (ASA) 81 MG chewable tablet, Take 81 mg by mouth daily, Disp: , Rfl:      calcium carbonate 750 MG CHEW, Take 1 tablet by mouth daily, Disp: , Rfl:      clonazePAM (KLONOPIN) 0.5 MG tablet, Take 1 mg by mouth 3 times daily, Disp: , Rfl:      clopidogrel (PLAVIX) 75 MG tablet, Take 1 tablet (75 mg) by mouth daily, Disp: 90 tablet, Rfl: 3     ezetimibe (ZETIA) 10 MG  "tablet, Take 0.5 tablets (5 mg) by mouth daily, Disp: 90 tablet, Rfl: 3     losartan (COZAAR) 50 MG tablet, Take 50 mg by mouth 2 times daily, Disp: , Rfl:      metoprolol succinate ER (TOPROL-XL) 25 MG 24 hr tablet, Take 1 tablet by mouth At Bedtime, Disp: , Rfl:               Physical Exam:     BP (!) 145/65 (BP Location: Right arm, Patient Position: Sitting, Cuff Size: Adult Regular)   Pulse 67   Resp 16   Ht 1.543 m (5' 0.75\")   Wt 81.9 kg (180 lb 8 oz)   LMP  (LMP Unknown)   Breastfeeding No   BMI 34.39 kg/m     Awake, alert, no aphasia, no dysarthria  Oriented x3, attention is fine  Cranial nerves II - XII tested and intact, no nystagmus  There is no focal or generalized weakness in the extremities  Finger nose finger testing is normal  Rapid alternating movements are normal on both sides  Tone is normal on both sides  Sensation is normal  Gait is normal              Jordon Brady MD         "

## 2021-03-16 ENCOUNTER — RECORDS - HEALTHEAST (OUTPATIENT)
Dept: ADMINISTRATIVE | Facility: OTHER | Age: 86
End: 2021-03-16

## 2021-03-16 ENCOUNTER — RECORDS - HEALTHEAST (OUTPATIENT)
Dept: RADIOLOGY | Facility: CLINIC | Age: 86
End: 2021-03-16

## 2021-04-15 ENCOUNTER — OFFICE VISIT - HEALTHEAST (OUTPATIENT)
Dept: NEUROSURGERY | Facility: CLINIC | Age: 86
End: 2021-04-15

## 2021-04-15 DIAGNOSIS — I67.9 CEREBRAL ARTERY DISEASE: ICD-10-CM

## 2021-04-15 ASSESSMENT — MIFFLIN-ST. JEOR: SCORE: 1199.88

## 2021-05-25 ENCOUNTER — RECORDS - HEALTHEAST (OUTPATIENT)
Dept: ADMINISTRATIVE | Facility: CLINIC | Age: 86
End: 2021-05-25

## 2021-05-26 ENCOUNTER — RECORDS - HEALTHEAST (OUTPATIENT)
Dept: ADMINISTRATIVE | Facility: CLINIC | Age: 86
End: 2021-05-26

## 2021-05-27 ENCOUNTER — RECORDS - HEALTHEAST (OUTPATIENT)
Dept: ADMINISTRATIVE | Facility: CLINIC | Age: 86
End: 2021-05-27

## 2021-05-28 ENCOUNTER — RECORDS - HEALTHEAST (OUTPATIENT)
Dept: ADMINISTRATIVE | Facility: CLINIC | Age: 86
End: 2021-05-28

## 2021-05-29 ENCOUNTER — RECORDS - HEALTHEAST (OUTPATIENT)
Dept: ADMINISTRATIVE | Facility: CLINIC | Age: 86
End: 2021-05-29

## 2021-05-30 ENCOUNTER — RECORDS - HEALTHEAST (OUTPATIENT)
Dept: ADMINISTRATIVE | Facility: CLINIC | Age: 86
End: 2021-05-30

## 2021-05-31 ENCOUNTER — RECORDS - HEALTHEAST (OUTPATIENT)
Dept: ADMINISTRATIVE | Facility: CLINIC | Age: 86
End: 2021-05-31

## 2021-06-01 ENCOUNTER — RECORDS - HEALTHEAST (OUTPATIENT)
Dept: ADMINISTRATIVE | Facility: CLINIC | Age: 86
End: 2021-06-01

## 2021-06-01 VITALS — BODY MASS INDEX: 33.65 KG/M2 | HEIGHT: 62 IN

## 2021-06-02 ENCOUNTER — RECORDS - HEALTHEAST (OUTPATIENT)
Dept: ADMINISTRATIVE | Facility: CLINIC | Age: 86
End: 2021-06-02

## 2021-06-03 ENCOUNTER — RECORDS - HEALTHEAST (OUTPATIENT)
Dept: ADMINISTRATIVE | Facility: CLINIC | Age: 86
End: 2021-06-03

## 2021-06-04 VITALS — HEIGHT: 61 IN | BODY MASS INDEX: 33.99 KG/M2 | WEIGHT: 180 LBS

## 2021-06-05 VITALS
BODY MASS INDEX: 33.99 KG/M2 | SYSTOLIC BLOOD PRESSURE: 199 MMHG | WEIGHT: 180 LBS | DIASTOLIC BLOOD PRESSURE: 77 MMHG | HEART RATE: 74 BPM | HEIGHT: 61 IN | OXYGEN SATURATION: 98 %

## 2021-06-09 ENCOUNTER — RECORDS - HEALTHEAST (OUTPATIENT)
Dept: ADMINISTRATIVE | Facility: CLINIC | Age: 86
End: 2021-06-09

## 2021-06-11 NOTE — ANESTHESIA POSTPROCEDURE EVALUATION
Patient: Kerry Hoffmann  Procedure(s):  ESOPHAGOGASTRODUODENOSCOPY With gastric biopsies  Anesthesia type: MAC    Patient location: Phase II Recovery  Last vitals:   Vitals Value Taken Time   /81 9/11/2020  1:22 PM   Temp 37.1  C (98.7  F) 9/11/2020  1:00 PM   Pulse 71 9/11/2020  1:24 PM   Resp 18 9/11/2020  1:20 PM   SpO2 97 % 9/11/2020  1:24 PM   Vitals shown include unvalidated device data.  Post vital signs: stable  Level of consciousness: awake and responds to simple questions  Post-anesthesia pain: pain controlled  Post-anesthesia nausea and vomiting: no  Pulmonary: unassisted, return to baseline  Cardiovascular: stable and blood pressure at baseline  Hydration: adequate  Anesthetic events: no    QCDR Measures:  ASA# 11 - Wendy-op Cardiac Arrest: ASA11B - Patient did NOT experience unanticipated cardiac arrest  ASA# 12 - [place]-op Mortality Rate: ASA12B - Patient did NOT die  ASA# 13 - PACU Re-Intubation Rate: NA - No ETT / LMA used for case  ASA# 10 - Composite Anes Safety: ASA10A - No serious adverse event    Additional Notes:

## 2021-06-11 NOTE — ANESTHESIA PREPROCEDURE EVALUATION
Anesthesia Evaluation      No history of anesthetic complications     Airway    Pulmonary    (+) asthma                           Cardiovascular   Exercise tolerance: > or = 4 METS  (+) hypertension, , hypercholesterolemia,      Neuro/Psych      Comments: Spinal stenosis- minimal symptoms    Endo/Other    (+) obesity,      GI/Hepatic/Renal    (+) hiatal hernia (small, asymptomati), GERD ( s/f EGD),   chronic renal disease CRI,      Other findings: Drank a glass of apple juice @ 1030      Dental                         Anesthesia Plan  Planned anesthetic: MAC  Okay to proceed at 1230  Plan for propofol gtt  Discussed potential need to convert to GA w/ ETT vs LMA if not tolerating.  Explained that it is possible to remember certain aspects of the OR experience during MAC dependent on the depth of sedation and patient understands this.  Decadron and zofran for PONV ppx.    ASA 3     Anesthetic plan and risks discussed with: patient  Anesthesia plan special considerations: antiemetics,   Post-op plan: routine recovery

## 2021-06-11 NOTE — ANESTHESIA CARE TRANSFER NOTE
Last vitals:   Vitals:    09/11/20 1248   BP: 171/71   Pulse: 67   Resp: 14   Temp: 37.1  C (98.7  F)   SpO2: 100%     Patient's level of consciousness is drowsy  Spontaneous respirations: yes  Maintains airway independently: yes  Dentition unchanged: yes  Oropharynx: oropharynx clear of all foreign objects    QCDR Measures:  ASA# 20 - Surgical Safety Checklist: WHO surgical safety checklist completed prior to induction    PQRS# 430 - Adult PONV Prevention: 4558F - Pt received => 2 anti-emetic agents (different classes) preop & intraop  ASA# 8 - Peds PONV Prevention: NA - Not pediatric patient, not GA or 2 or more risk factors NOT present  PQRS# 424 - Wendy-op Temp Management: 4559F - At least one body temp DOCUMENTED => 35.5C or 95.9F within required timeframe  PQRS# 426 - PACU Transfer Protocol: - Transfer of care checklist used  ASA# 14 - Acute Post-op Pain: ASA14B - Patient did NOT experience pain >= 7 out of 10

## 2021-06-15 NOTE — TELEPHONE ENCOUNTER
----- Message from Cher Bey MD sent at 2/22/2021  7:57 AM CST -----  Can you contact Dr. Mckeon's office this morning to make sure they received our faxed notes and this MRI report? I did communicate these results to Kerry on Friday, but the interpretation and management falls outside the ENT scope of practice. I want to make sure the loop is closed and that Dr. Mckeon, or representative, contact this patient today. Thanks.

## 2021-06-15 NOTE — TELEPHONE ENCOUNTER
Patient of Dr. Bey calling to get her MRI results from Yesterday.  She called [facility] and they told her Dr. Bey has the results.  She would really like to get the results before the weekend she is very anxious.    Please call Kerry.  Thanks!

## 2021-06-15 NOTE — TELEPHONE ENCOUNTER
I spoke to Ignacia (Dr. Mckeon's assistant) and explained the MRA results to need go to him. Faxed notes and MRA to [phone]. Ignacia will get the results to Dr. Mckeon and they will follow up with the patient.    Tatyana Paulino RN  [facility]  [phone]

## 2021-06-15 NOTE — TELEPHONE ENCOUNTER
Called Kerry today regarding her MRA results. There does not appear to be a clear cause for her hearing her own heartbeat.     MRA NECK:  IMPRESSION:   1. Moderate to severe stenosis of the nondominant right vertebral artery origin with an additional tandem severe stenosis of the V1 segment.    2. Moderate stenosis of the dominant left vertebral artery distal V1 segment distal to its variant direct aortic arch origin.   3. No measurable carotid stenosis.  4. Apparent severe stenosis of the distal left MCA M1 segment with poststenotic dilatation of the bifurcation. Recommend MRA head  for further characterization.    It appears that she may have some vessel blockages and will need further imaging as per Radiology. Will order that MRA of the head. Will also fax my notes and the imaging results to her physician, Dr. Mckeon, to discuss these results and management further.

## 2021-06-15 NOTE — PROGRESS NOTES
HPI: This patient is an 84yo F who presents for evaluation of pulsing sensation in her ear at the request of Dr. Mckeon. There has been a gradual loss of hearing over the past several years in both ears. Denies otalgia, otorrhea, vertigo, and other major medical issues. Has been around moderate noise and has no relevant family history. What brought her here today is that she hears her heartbeat in her ears when she lays down; this does not occur when she is on her back, but will occur in the dependent ear when she lays on one side or the other. She is sure that the sound is coming from inside her head and that she is not hearing her neck vessels pulse against her sheets. Had a CT of her head done a month ago, but this was performed without contrast.    Past medical history, surgical history, social history, family history, medications, and allergies have been reviewed with the patient and are documented above.    Review of Systems: a 10-system review was performed. Pertinent positives are noted in the HPI and on a separate scanned document in the chart.    PHYSICAL EXAMINATION:  GEN: no acute distress, normocephalic  EYES: extraocular movements are intact, pupils are equal and round. Sclera clear.   EARS: auricles are normally formed. The external auditory canals are clear with minimal to no cerumen. Tympanic membranes are intact bilaterally with no signs of infection, effusion, retractions, or perforations.  NOSE: anterior nares are patent.   OC/OP: clear, dentition is in good repair. The tongue and palate are fully mobile and symmetric. No masses or lesions.  NECK: soft and supple. No lymphadenopathy or masses. Airway is midline.  NEURO: CN VII and XII symmetric. alert and oriented. No spontaneous nystagmus. In a wheelchair (due to bad knees)  PULM: breathing comfortably on room air, normal chest expansion with respiration  CARDS: no cyanosis or clubbing, normal carotid pulses    AUDIOGRAM: sloping mild to  moderate SNHL, type A tymps, symmetric WRS    CT HEAD W/O CONTRAST 1/21: small vessel ischemic changes    MEDICAL DECISION-MAKING: This patient is an 86yo F with bilateral HF sensorineural hearing loss. Discussed hearing protection. Medically clear for hearing aids should the patient desire them. As far as the pulsatile tinnitus goes, her Head CT was not done with contrast, so evaluation of her vasculature is inadequate for this symptom. She has had a rash reaction to iodinated CT contrast in the past, so will opt for an MRA.

## 2021-06-16 NOTE — PROGRESS NOTES
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 MRI was obtained due to whooshing tinitus in the left ear. She has not had a stroke. Many allergies and cannot tolerate statins. She has already seen neurology who recommend she start zetia and plavix. She also has EMG positive right carpal tunnel. No neurologic symptoms.     Subjective: whooshing noise, can hear it even when she lays on a pillow    Exam: non focal, face symmetric, pupils equal and reactive, shoulder shrug equal, moving all extremities equally, recent and remote memory intact.     A/P:     We discussed at length her inability to take medications and the steps she can take that are not medication related. Kerry expresses difficulty due to various issues in access to food and exercise.     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 Eduardo, not interested in intervention at this time       Cheli King CNP  [facility]  O: [phone]  P: [phone]      Total time 30 minutes

## 2021-06-16 NOTE — PATIENT INSTRUCTIONS - HE
No surgery needed for the atherosclerotic narrowing in your brain, the best treatment is medical management with medication, dietary and exercise modification. No reason for neurosurgery follow up apart from the carpal tunnel.     Feel free to call and make an appointment with Dr. Eduardo if you would like to discuss having surgery on your wrist.

## 2021-06-20 NOTE — PROGRESS NOTES
CARDIOLOGY CLINIC CONSULT NOTE     Assessment/Plan:   1.  Palpitations.  With one episode and no recurrence, I would not favor further evaluation at this time.  Echocardiogram revealed normal function.  Her activities are unaffected.  She was reassured as to the generally benign nature of single PACs and PVCs.  She is going to resume full activities without limitation and if symptoms are recurrent, then a 24-hour Holter monitor would be recommended.  In the interim she is going to try to limit her caffeine intake.  2.  Hypertension well-controlled on her current medical regimen.  Given her multiple allergies she is resistant to try a new medication for more consistent control and it is difficult to argue given her general good control of her blood pressure.    Follow-up as needed     History of Present Illness:     It is my pleasure to see Kerry Hoffmann at the [facility] for evaluation of palpitations.    Kerry Hoffmann is a 83 y.o. female with a past medical history of osteoarthritis, hypertension, and hyperlipidemia.  She also has numerous drug allergies which make treatment difficult.  Recently her blood pressure has been well controlled.  She had one episode of severe palpitations which was associated with significant hypertension leading to a hospitalization.  Occasional PACs and PVCs were noted but no complex ectopy was demonstrated.  Blood pressure improved and she was discharged.  Outside echocardiogram revealed a normal left ventricular ejection fraction of 62% with aortic valve sclerosis, but no stenosis.  She has had no recurrent symptoms and otherwise feels well.    Past Medical History:     Patient Active Problem List   Diagnosis     Tendonitis       Past Surgical History:   History reviewed. No pertinent surgical history.    Family History:     Family History   Problem Relation Age of Onset     Pacemaker Mother      Coronary artery disease Father      Heart failure Sister   "        Social History:    reports that she has quit smoking. Her smoking use included Cigarettes. She smoked 3.00 packs per day. She has never used smokeless tobacco.        Meds:     Current Outpatient Prescriptions   Medication Sig Dispense Refill     acetaminophen (TYLENOL) 500 MG tablet 2 tab(s)       aspirin 81 mg chewable tablet Chew 81 mg daily.       clonazePAM (KLONOPIN) 0.5 MG tablet   1     losartan (COZAAR) 25 MG tablet Take 25 mg by mouth 4 (four) times a day.  0     No current facility-administered medications for this visit.        Allergies:   Albuterol; Amlodipine; Azithromycin; Buspirone; Cefixime; Cefprozil; Cefuroxime axetil; Cephalexin; Ciprofloxacin; Cortisone acetate; Cyclobenzaprine; Dextromethorphan-guaifenesin; Diltiazem hcl; Erythromycin base; Esomeprazole magnesium; Fenofibrate nanocrystallized; Fluticasone-salmeterol; Hydrocodone-acetaminophen; Lansoprazole; Levofloxacin; Methylprednisolone; Metoprolol tartrate; Moxifloxacin; Nedocromil; Niacin; Nifedipine; Ofloxacin; Penicillins; Pirbuterol; Pravastatin; Pseudoephedrine-dm-guaifenesin; Simvastatin; Telmisartan; Tramadol-acetaminophen; and Triamterene    Review of Systems:     General: Weight Gain  Eyes: WNL  Ears/Nose/Throat: WNL  Lungs: WNL  Heart: Arm Pain, Irregular Heartbeat  Stomach: Heartburn  Bladder: Frequent Urination at Night  Muscle/Joints: Joint Pain, Muscle Pain  Skin: WNL  Nervous System: WNL  Mental Health: Anxiety     Blood: WNL        Objective:      Physical Exam     5' 1.5\" (1.562 m)  There is no height or weight on file to calculate BMI.  /56 (Patient Site: Right Arm, Patient Position: Sitting, Cuff Size: Adult Regular)  Pulse 76  Resp 16  Ht 5' 1.5\" (1.562 m)      General Appearance : Awake, Alert, No acute distress  HEENT: No Scleral icterus; the mucous membranes were pink and moist.  Conjunctivae not injected  Neck:  No cervical bruits, jugular venous distention, or thyromegaly   Chest: The spine was " straight  Lungs: Respirations unlabored; the lungs are clear to auscultation.  No wheezing   Cardiovascular:  Normal point of maximal impulse.  Auscultation reveals normal first and second heart sounds with no murmurs, rubs, or gallops.  Carotid, radial, and dorsalis pedal pulses are intact and symmetric.  Abdomen: No organomegaly, masses, bruits, or tenderness. Bowels sounds are present  Extremities: No edema  Skin: No xanthelasma. Warm, Dry.  Musculoskeletal: No tenderness.  Neurologic: Alert and oriented ×3.    Lab Review   Lab Results   Component Value Date     (L) 07/26/2018    K 4.6 07/26/2018    CL 98 07/26/2018    CO2 24 07/26/2018    BUN 25 07/26/2018    CREATININE 1.04 07/26/2018    CALCIUM 10.3 07/26/2018     Lab Results   Component Value Date    WBC 5.7 07/26/2018    HGB 11.8 (L) 07/26/2018    HCT 34.5 (L) 07/26/2018    MCV 87 07/26/2018     07/26/2018     Lab Results   Component Value Date    CHOL 250 (H) 10/18/2017    TRIG 165 (H) 10/18/2017    HDL 66 10/18/2017    LDLCALC 151 (H) 10/18/2017     Lab Results   Component Value Date    TROPONINI <0.01 07/26/2018     No results found for: BNP  Lab Results   Component Value Date    TSH 2.17 07/30/2018       Vaughn Nunez MD On license of [facility]    His note created using Dragon voice recognition software. Sound alike errors may have escaped editing.

## 2021-06-21 NOTE — LETTER
Letter by Angeli Jo RN at      Author: Angeli Jo RN Service: -- Author Type: --    Filed:  Encounter Date: 2/10/2021 Status: (Other)       [facility]  [address]   [place] [address]        Kerry Hoffmann  [address]  [facility] [address]      2/10/2021    We were recently notified that you requested removal of a person from your emergency contact list. This person is named as a health care agent in your health care directive dated 07/23/2018. Because this is a legal document, we cannot remove this person as a contact.    In order to remove someone who is named as a health care agent you will need to either provide us with a more recent health care directive or create a new one.      We greatly value the opportunity to assist you in documenting your choices and to honor your wishes. We have several options to assist you in updating your document:       Health Care Directives and Advance Care Planning resources can be viewed and printed  for free at our web site:[email or web address].       Free group classes on Advance Care Planning and completing a Health Care Directive are available at multiple locations and times. These classes are led by trained staff who will provide information and guide you through a Health Care Directive. They can also review, notarize and add your Health Care Directive to your medical record. Palmer for a class at [email or web address] or by calling [phone].      COPIES of completed Health Care Directives can be brought or mailed to any of our locations, including the address listed below. You can also email a copy to [email or web address] .       For additional assistance or questions you can email us at [email or web address] or call [phone].      Sincerely,     [facility]  [address]    [place] [address]  [email or web address]  [phone]

## 2021-06-22 ENCOUNTER — RECORDS - HEALTHEAST (OUTPATIENT)
Dept: LAB | Facility: CLINIC | Age: 86
End: 2021-06-22

## 2021-06-22 LAB
ALBUMIN SERPL-MCNC: 3.7 G/DL (ref 3.5–5)
ALP SERPL-CCNC: 60 U/L (ref 45–120)
ALT SERPL W P-5'-P-CCNC: 10 U/L (ref 0–45)
ANION GAP SERPL CALCULATED.3IONS-SCNC: 11 MMOL/L (ref 5–18)
AST SERPL W P-5'-P-CCNC: 18 U/L (ref 0–40)
BASOPHILS # BLD AUTO: 0 THOU/UL (ref 0–0.2)
BASOPHILS NFR BLD AUTO: 1 % (ref 0–2)
BILIRUB SERPL-MCNC: 0.3 MG/DL (ref 0–1)
BUN SERPL-MCNC: 23 MG/DL (ref 8–28)
CALCIUM SERPL-MCNC: 9.6 MG/DL (ref 8.5–10.5)
CHLORIDE BLD-SCNC: 97 MMOL/L (ref 98–107)
CHOLEST SERPL-MCNC: 256 MG/DL
CO2 SERPL-SCNC: 21 MMOL/L (ref 22–31)
CREAT SERPL-MCNC: 1.12 MG/DL (ref 0.6–1.1)
EOSINOPHIL # BLD AUTO: 0.1 THOU/UL (ref 0–0.4)
EOSINOPHIL NFR BLD AUTO: 2 % (ref 0–6)
ERYTHROCYTE [DISTWIDTH] IN BLOOD BY AUTOMATED COUNT: 12.5 % (ref 11–14.5)
FASTING STATUS PATIENT QL REPORTED: ABNORMAL
GFR SERPL CREATININE-BSD FRML MDRD: 46 ML/MIN/1.73M2
GLUCOSE BLD-MCNC: 92 MG/DL (ref 70–125)
HCT VFR BLD AUTO: 31.6 % (ref 35–47)
HDLC SERPL-MCNC: 61 MG/DL
HGB BLD-MCNC: 10.2 G/DL (ref 12–16)
IMM GRANULOCYTES # BLD: 0 THOU/UL
IMM GRANULOCYTES NFR BLD: 0 %
LDLC SERPL CALC-MCNC: 163 MG/DL
LYMPHOCYTES # BLD AUTO: 1.2 THOU/UL (ref 0.8–4.4)
LYMPHOCYTES NFR BLD AUTO: 23 % (ref 20–40)
MCH RBC QN AUTO: 28.7 PG (ref 27–34)
MCHC RBC AUTO-ENTMCNC: 32.3 G/DL (ref 32–36)
MCV RBC AUTO: 89 FL (ref 80–100)
MONOCYTES # BLD AUTO: 0.6 THOU/UL (ref 0–0.9)
MONOCYTES NFR BLD AUTO: 12 % (ref 2–10)
NEUTROPHILS # BLD AUTO: 3.3 THOU/UL (ref 2–7.7)
NEUTROPHILS NFR BLD AUTO: 63 % (ref 50–70)
PLATELET # BLD AUTO: 327 THOU/UL (ref 140–440)
PMV BLD AUTO: 9.2 FL (ref 8.5–12.5)
POTASSIUM BLD-SCNC: 5.1 MMOL/L (ref 3.5–5)
PROT SERPL-MCNC: 7 G/DL (ref 6–8)
RBC # BLD AUTO: 3.56 MILL/UL (ref 3.8–5.4)
SODIUM SERPL-SCNC: 129 MMOL/L (ref 136–145)
TRIGL SERPL-MCNC: 161 MG/DL
TSH SERPL DL<=0.005 MIU/L-ACNC: 1.63 UIU/ML (ref 0.3–5)
WBC: 5.3 THOU/UL (ref 4–11)

## 2021-06-23 LAB — 25(OH)D3 SERPL-MCNC: 36.9 NG/ML (ref 30–80)

## 2021-06-30 NOTE — PROGRESS NOTES
"Progress Notes by Chelsie Alejandra AuD at 2/5/2021 12:40 PM     Author: Chelsie Alejandra AuD Service: -- Author Type: Audiologist    Filed: 2/5/2021  1:04 PM Encounter Date: 2/5/2021 Status: Signed    : Chelsie Alejandra AuD (Audiologist)       Audiology Report    Summary: Audiology visit completed. Please see audiogram below or in \"media\" tab for case history and results.     Plan:  The patient is returned to ENT for follow up. Return for retesting with ENT recommendation.     Tien David, [facility]  Clinical Audiologist  [place] #[id number]           "

## 2021-07-03 NOTE — ADDENDUM NOTE
Addendum Note by Sanju Rosales, RN at 8/31/2020 10:35 AM     Author: Sanju Rosales RN Service: -- Author Type: Registered Nurse    Filed: 9/1/2020 10:19 AM Encounter Date: 8/31/2020 Status: Signed    : Sanju Rosales RN (Registered Nurse)    Addended by: SANJU ROSALES on: 9/1/2020 10:19 AM        Modules accepted: Orders

## 2021-07-23 ENCOUNTER — RECORDS - HEALTHEAST (OUTPATIENT)
Dept: ADMINISTRATIVE | Facility: CLINIC | Age: 86
End: 2021-07-23

## 2021-08-13 ENCOUNTER — OFFICE VISIT (OUTPATIENT)
Dept: CARDIOLOGY | Facility: CLINIC | Age: 86
End: 2021-08-13
Payer: MEDICARE

## 2021-08-13 VITALS — DIASTOLIC BLOOD PRESSURE: 60 MMHG | HEART RATE: 76 BPM | RESPIRATION RATE: 16 BRPM | SYSTOLIC BLOOD PRESSURE: 156 MMHG

## 2021-08-13 DIAGNOSIS — I49.1 ATRIAL PREMATURE CONTRACTIONS: Primary | ICD-10-CM

## 2021-08-13 DIAGNOSIS — E78.2 MIXED HYPERLIPIDEMIA: ICD-10-CM

## 2021-08-13 DIAGNOSIS — R60.0 PERIPHERAL EDEMA: ICD-10-CM

## 2021-08-13 DIAGNOSIS — R53.83 FATIGUE, UNSPECIFIED TYPE: ICD-10-CM

## 2021-08-13 PROCEDURE — 99215 OFFICE O/P EST HI 40 MIN: CPT | Performed by: INTERNAL MEDICINE

## 2021-08-13 RX ORDER — ACETAMINOPHEN 160 MG
1 TABLET,DISINTEGRATING ORAL DAILY
COMMUNITY
Start: 2021-01-12

## 2021-08-13 RX ORDER — FERROUS GLUCONATE 324(37.5)
1 TABLET ORAL
COMMUNITY
End: 2023-07-03

## 2021-08-13 NOTE — PATIENT INSTRUCTIONS
1. Advise cutting sodium in diet to 2000 mg daily  2. Consider a PCSK9 inhibitor to lower cholesterol. The 2 available are Repatha or Praluent.  3. Cut metoprolol succinate in half to see if fatigue improves. May note an increase in palpitations.  4. Consider checking an echocardiogram thru Dr. Mckeon's office although your exam does not suggest significant worsening of minimal valve disease noted 4 years ago

## 2021-08-13 NOTE — PROGRESS NOTES
Thank you, Dr. Demarco Mckeon, for asking the [facility] team to see Ms. Kerry Hoffmann to evaluate lower extremity swelling, fatigue.    Assessment/Recommendations   Assessment:    1. Fatigue, likely multifactorial. She admits to frequent urination at night and thus likely does not get into prolonged dream state sleep. No history of loud snoring or witnessed apneic episodes. She has noted more fatigue during the daytime since starting on metoprolol succinate 25 mg daily for suppression of PACs. I suggested we try cutting the metoprolol succinate to 12.5 mg and see if her fatigue improves. Obviously we could entirely discontinue it although told her that we have no other good options to suppress her PACs as she reports intolerance/allergy to diltiazem.  2. Lower extremity edema, likely due to venous insufficiency from prolonged sitting and obesity. No history of orthopnea, PND. Exam reveals no prominent heart murmur but I did bring up the option of repeating an echocardiogram. This would be more easily done through her primary care clinic as she would be able to get there on her own. She has been tried on diuretics and leg cramps but has had issues with each 1. I told her the only thing she could do would be to limit sodium in her diet to less than 2000 mg daily.  3. Hyperlipidemia, intolerant of statins and Zetia. I told her the only treatment option would be a trial of a PCSK9 inhibitor such as Repatha or Praluent. I told her these are injected into the abdomen under the skin every 2 weeks. She told me she would likely not be able to do this herself and feels the cost may be prohibitive.      Plan:  1. Decrease metoprolol succinate to 12.5 mg or half tablet daily. Monitor for increased symptoms of palpitations.  2. Decrease dietary sodium to no more than 2000 mg daily. This would effectively limit her meals out at fast food establishments. We also talked about the option of mom's meals  which does provide low-sodium options but again she tells me she likely would not be able to afford this.  3. Did discuss option of leg wraps through physical therapy at her center (mentioned by her nephew who was present). Again she was resistant to considering this. Also brought up the option of referral to the vascular center although again she told me she cannot handle wraps of any kind on her legs.       History of Present Illness    Ms. Kerry Hoffmann is a 86 year old female with history of hypercholesterolemia, intolerant of all statins and more recently Zetia, PACs suppressed with metoprolol, urinary urgency, cerebrovascular disease, severe osteoarthritis of the knees who is referred by her primary clinic for assessment of lower extremity edema, fatigue and treatment options for her hypercholesterolemia. With regards to her fatigue, she states it really began after starting metoprolol succinate for PACs. She states the PVCs have significantly declined but she has become more fatigued during the day. She does report frequent urinary urgency having to go the bathroom every hour at nighttime and sometimes twice in 1 hour. She does not feel that this contributes to her daytime fatigue. Unfortunately, she has reported allergies or drug intolerances to 40 medications including diltiazem which I told her would be the other option to suppress PACs. Ultimately decided to try cutting her dose of metoprolol in half to see if her fatigue improved.  With regards to the leg edema, I suspect this may be due to venous insufficiency as she spends majority of the day sitting in a chair or on a scooter.  She did have an echocardiogram several years ago that showed minimal valvular insufficiency and I did bring up the option of repeating the echo although her exam does not suggest significant worsening.  Because of difficulty coming here, I suggested it could be done through her primary care clinic as it was 4 years ago.  We  also talked about the importance of limiting salt in her diet.  It appears that she gets most of her food from grocery stores or fast food places which I told her would be very high in sodium.  She told me she would not be able to cook things from scratch because she cannot stand for prolonged periods.  Did bring up the option of mom's meals which provides low-sodium options.  She told me she likely would not be able to afford it.  With regards to her high cholesterol, the only other option would be a PCSK9 inhibitor which would be injected under the skin every 2 weeks.  Again she told me she likely would not be able to afford that and could not give herself injections.    ECG (personally reviewed): No ECG today    Cardiac Imaging Studies (personally reviewed): No recent cardiac imaging     Physical Examination Review of Systems   BP (!) 156/60 (BP Location: Right arm, Patient Position: Sitting, Cuff Size: Adult Large)   Pulse 76   Resp 16   LMP  (LMP Unknown)   There is no height or weight on file to calculate BMI.  Wt Readings from Last 3 Encounters:   04/15/21 81.6 kg (180 lb)   03/05/21 81.9 kg (180 lb 8 oz)   09/10/20 81.6 kg (180 lb)     General Appearance:   Awake, Alert, No acute distress.   HEENT:  No scleral icterus; the mucous membranes were pink and moist.   Neck: No cervical bruits or jugular venous distention    Chest: The spine was straight. The chest was symmetric.   Lungs:   Respirations unlabored; the lungs are clear to auscultation. No wheezing   Cardiovascular:    Regular rate and rhythm.  S1, S2 normal.  No murmur or gallop heard.   Abdomen:  No organomegaly, masses, bruits, or tenderness. Bowels sounds are present   Extremities:  Trace ankle edema bilaterally.  Spider veins noted over both lower extremities.   Skin: No xanthelasma. Warm, Dry.   Musculoskeletal: No tenderness.   Neurologic: Mood and affect are appropriate.    Enc Vitals  BP: (!) 156/60  Pulse: 76  Resp: 16  Weight:  (Patient  refuses)  Height:  (Patient refused)                                         Medical History  Surgical History Family History Social History   Past Medical History:   Diagnosis Date     Anxiety     takes clonazepam     Asthma     [facility]      Chronic kidney disease     stage 3 per [facility]      CKD (chronic kidney disease)      Clostridium difficile infection     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     CTS (carpal tunnel syndrome)      GERD (gastroesophageal reflux disease)     per [facility]      Hiatal hernia     small per [facility]      Hyperlipidemia      Hyperlipidemia      Hyperlipidemia      Hypertension      Hypertension      Osteoarthritis of knee     per [facility]      Spinal stenosis     per [facility]      Vitamin D deficiency     per [facility]    Past Surgical History:   Procedure Laterality Date     BREAST SURGERY  1982    breast tumor per [facility]      CATARACT EXTRACTION  07/2005    per [facility]      ORIF TIBIA & FIBULA FRACTURES  1988    per [facility]      OTHER SURGICAL HISTORY  10/2004    hole in retinaper [facility]      OTHER SURGICAL HISTORY  05/03/2015    fecal transplantper [facility]      NY ESOPHAGOGASTRODUODENOSCOPY TRANSORAL DIAGNOSTIC N/A 9/11/2020    Procedure: ESOPHAGOGASTRODUODENOSCOPY With gastric biopsies;  Surgeon: David Reyes MD;  [facility];  Service: Gastroenterology     TONSILLECTOMY       TONSILLECTOMY & ADENOIDECTOMY  1963    Family History   Problem Relation Age of Onset     Hypertension Mother      Cancer Mother         gallbladder cancer     Myocardial Infarction Father      Hypertension Sister      Alzheimer Disease Sister      Heart Disease Sister      Cancer Brother      Brain Cancer Brother      Pacemaker Mother      Heart Disease Mother      Coronary Artery Disease Father      Heart Disease Father      Heart Failure Sister     Social History     Socioeconomic History     Marital status:      Spouse name: Not on file     Number of children: Not on file     Years of education: Not on  file     Highest education level: Not on file   Occupational History     Not on file   Tobacco Use     Smoking status: Former Smoker     Packs/day: 3.00     Years: 35.00     Pack years: 105.00     Types: Cigarettes, Cigarettes, Cigarettes     Smokeless tobacco: Never Used     Tobacco comment: quit 1968   Substance and Sexual Activity     Alcohol use: Not Currently     Drug use: Never     Sexual activity: Not Currently   Other Topics Concern     Parent/sibling w/ CABG, MI or angioplasty before 65F 55M? No   Social History Narrative     Not on file     Social Determinants of Health     Financial Resource Strain:      Difficulty of Paying Living Expenses:    Food Insecurity:      Worried About Running Out of Food in the Last Year:      Ran Out of Food in the Last Year:    Transportation Needs:      Lack of Transportation (Medical):      Lack of Transportation (Non-Medical):    Physical Activity:      Days of Exercise per Week:      Minutes of Exercise per Session:    Stress:      Feeling of Stress :    Social Connections:      Frequency of Communication with Friends and Family:      Frequency of Social Gatherings with Friends and Family:      Attends Yazidism Services:      Active Member of Clubs or Organizations:      Attends Club or Organization Meetings:      Marital Status:    Intimate Partner Violence:      Fear of Current or Ex-Partner:      Emotionally Abused:      Physically Abused:      Sexually Abused:           Medications  Allergies   Current Outpatient Medications   Medication Sig Dispense Refill     acetaminophen (TYLENOL) 500 MG tablet Take 500 mg by mouth as needed       aspirin (ASA) 81 MG chewable tablet Take 81 mg by mouth daily       calcium carbonate 750 MG CHEW Take 1 tablet by mouth daily       Cholecalciferol (VITAMIN D3) 50 MCG (2000 UT) CAPS 1 tablet by Oral or Feeding Tube route daily       clonazePAM (KLONOPIN) 0.5 MG tablet Take 1 mg by mouth 3 times daily       clopidogrel (PLAVIX) 75 MG  tablet Take 1 tablet (75 mg) by mouth daily 90 tablet 3     ezetimibe (ZETIA) 10 MG tablet Take 0.5 tablets (5 mg) by mouth daily 90 tablet 3     Ferrous Gluconate 324 (37.5 Fe) MG TABS Take 1 tablet by mouth Every Mon, Wed, Fri Morning       losartan (COZAAR) 50 MG tablet Take 50 mg by mouth 2 times daily       metoprolol succinate ER (TOPROL-XL) 25 MG 24 hr tablet Take 1 tablet by mouth At Bedtime        Allergies   Allergen Reactions     Buspirone Shortness Of Breath     Ciprofloxacin Hives and Shortness Of Breath     Other reaction(s): Unknown     Cortisone      Other reaction(s): Throat Swelling/Closing, Unknown  Reaction 9-5-94       Hydrocodone-Acetaminophen Shortness Of Breath     Other reaction(s): Unknown     Lansoprazole Shortness Of Breath     Metoprolol Shortness Of Breath     Nedocromil      Other reaction(s): Throat Swelling/Closing     Niacin Shortness Of Breath     Other reaction(s): Unknown     Advair Diskus Other (See Comments)     Elevated blood pressure       Albuterol Other (See Comments)     Inhaler had extreme effect on nervous system       Amlodipine      Other reaction(s): Erythema, Unknown     Azithromycin      Other reaction(s): *Unknown, Unknown     Cefixime Diarrhea     Other reaction(s): Unknown     Cefprozil Nausea and Vomiting     Other reaction(s): Unknown     Cefuroxime      Other reaction(s): *Unknown     Cephalexin      Other reaction(s): *Unknown, Unknown     Contrast Dye      Other reaction(s): hives     Cyclobenzaprine      Other reaction(s): Sedation     Diltiazem      Other reaction(s): Erythema, Unknown  Ears face arms and chest red and on fire-body tremors       Erythromycin      Other reaction(s): Unknown     Esomeprazole      Other reaction(s): Headache     Fenofibrate Muscle Pain (Myalgia)     Other reaction(s): Unknown     Guaifenesin-Dm Cr Nausea     Other reaction(s): Headache     Levofloxacin Nausea     Other reaction(s): Headache, Unknown     Methylprednisolone       Moxifloxacin      Other reaction(s): Dizziness     Nifedipine      Other reaction(s): Edema  Took for 5-93 to 9-94 red and swollen leg       Nsaids      Other reaction(s): Unknown     Ofloxacin      Other reaction(s): *Unknown     Penicillins      Other reaction(s): *Unknown, Unknown     Pirbuterol Other (See Comments)     Immediate extreme effect on nervous system       Pravastatin      Other reaction(s): Dizziness, Unknown     Pseudoephedrine-Dm-Gg      Simvastatin Muscle Pain (Myalgia)     Spironolactone      Other reaction(s): stomach cramps, nausea     Sulfamethoxazole-Trimethoprim      Other reaction(s): Unknown     Triamterene Other (See Comments)     Greatly bothered with sun-took medication for three years  Greatly bothered with sun       Ultracet      Other reaction(s): Tachycardia, Unknown     Diatrizoate Rash     Telmisartan Palpitations         Lab Results    Chemistry/lipid CBC Cardiac Enzymes/BNP/TSH/INR   Recent Labs   Lab Test 06/22/21  1040   CHOL 256*   HDL 61   *   TRIG 161*   BUN 23   *   CO2 21*    Recent Labs   Lab Test 06/22/21  1040   WBC 5.3   HGB 10.2*   HCT 31.6*   MCV 89       Recent Labs   Lab Test 06/22/21  1040 11/16/19  1513 07/26/18  1742   TROPONINI  --   --  <0.01   BNP  --  123  --    TSH 1.63  --   --         A total of 60 minutes was spent reviewing patient's medical records, obtaining history and performing examination, as well as discussing diagnoses/ recommendations with patient and answering all questions.

## 2021-08-13 NOTE — LETTER
8/13/2021    Demarco Mckeon MD  [facility] [address]  [place] [address]    RE: Kerry Hoffmann       Dear Colleague,    I had the pleasure of seeing Kerry Hoffmann in the [facility].        Thank you, Dr. Demarco Mckeon, for asking the [facility] team to see Ms. Kerry Hoffmann to evaluate lower extremity swelling, fatigue.    Assessment/Recommendations   Assessment:    1. Fatigue, likely multifactorial. She admits to frequent urination at night and thus likely does not get into prolonged dream state sleep. No history of loud snoring or witnessed apneic episodes. She has noted more fatigue during the daytime since starting on metoprolol succinate 25 mg daily for suppression of PACs. I suggested we try cutting the metoprolol succinate to 12.5 mg and see if her fatigue improves. Obviously we could entirely discontinue it although told her that we have no other good options to suppress her PACs as she reports intolerance/allergy to diltiazem.  2. Lower extremity edema, likely due to venous insufficiency from prolonged sitting and obesity. No history of orthopnea, PND. Exam reveals no prominent heart murmur but I did bring up the option of repeating an echocardiogram. This would be more easily done through her primary care clinic as she would be able to get there on her own. She has been tried on diuretics and leg cramps but has had issues with each 1. I told her the only thing she could do would be to limit sodium in her diet to less than 2000 mg daily.  3. Hyperlipidemia, intolerant of statins and Zetia. I told her the only treatment option would be a trial of a PCSK9 inhibitor such as Repatha or Praluent. I told her these are injected into the abdomen under the skin every 2 weeks. She told me she would likely not be able to do this herself and feels the cost may be prohibitive.      Plan:  1. Decrease  metoprolol succinate to 12.5 mg or half tablet daily. Monitor for increased symptoms of palpitations.  2. Decrease dietary sodium to no more than 2000 mg daily. This would effectively limit her meals out at fast food establishments. We also talked about the option of mom's meals which does provide low-sodium options but again she tells me she likely would not be able to afford this.  3. Did discuss option of leg wraps through physical therapy at her center (mentioned by her nephew who was present). Again she was resistant to considering this. Also brought up the option of referral to the vascular center although again she told me she cannot handle wraps of any kind on her legs.       History of Present Illness    Ms. Kerry Hoffmann is a 86 year old female with history of hypercholesterolemia, intolerant of all statins and more recently Zetia, PACs suppressed with metoprolol, urinary urgency, cerebrovascular disease, severe osteoarthritis of the knees who is referred by her primary clinic for assessment of lower extremity edema, fatigue and treatment options for her hypercholesterolemia. With regards to her fatigue, she states it really began after starting metoprolol succinate for PACs. She states the PVCs have significantly declined but she has become more fatigued during the day. She does report frequent urinary urgency having to go the bathroom every hour at nighttime and sometimes twice in 1 hour. She does not feel that this contributes to her daytime fatigue. Unfortunately, she has reported allergies or drug intolerances to 40 medications including diltiazem which I told her would be the other option to suppress PACs. Ultimately decided to try cutting her dose of metoprolol in half to see if her fatigue improved.  With regards to the leg edema, I suspect this may be due to venous insufficiency as she spends majority of the day sitting in a chair or on a scooter.  She did have an echocardiogram several years  ago that showed minimal valvular insufficiency and I did bring up the option of repeating the echo although her exam does not suggest significant worsening.  Because of difficulty coming here, I suggested it could be done through her primary care clinic as it was 4 years ago.  We also talked about the importance of limiting salt in her diet.  It appears that she gets most of her food from grocery stores or fast food places which I told her would be very high in sodium.  She told me she would not be able to cook things from scratch because she cannot stand for prolonged periods.  Did bring up the option of mom's meals which provides low-sodium options.  She told me she likely would not be able to afford it.  With regards to her high cholesterol, the only other option would be a PCSK9 inhibitor which would be injected under the skin every 2 weeks.  Again she told me she likely would not be able to afford that and could not give herself injections.    ECG (personally reviewed): No ECG today    Cardiac Imaging Studies (personally reviewed): No recent cardiac imaging     Physical Examination Review of Systems   BP (!) 156/60 (BP Location: Right arm, Patient Position: Sitting, Cuff Size: Adult Large)   Pulse 76   Resp 16   LMP  (LMP Unknown)   There is no height or weight on file to calculate BMI.  Wt Readings from Last 3 Encounters:   04/15/21 81.6 kg (180 lb)   03/05/21 81.9 kg (180 lb 8 oz)   09/10/20 81.6 kg (180 lb)     General Appearance:   Awake, Alert, No acute distress.   HEENT:  No scleral icterus; the mucous membranes were pink and moist.   Neck: No cervical bruits or jugular venous distention    Chest: The spine was straight. The chest was symmetric.   Lungs:   Respirations unlabored; the lungs are clear to auscultation. No wheezing   Cardiovascular:    Regular rate and rhythm.  S1, S2 normal.  No murmur or gallop heard.   Abdomen:  No organomegaly, masses, bruits, or tenderness. Bowels sounds are present    Extremities:  Trace ankle edema bilaterally.  Spider veins noted over both lower extremities.   Skin: No xanthelasma. Warm, Dry.   Musculoskeletal: No tenderness.   Neurologic: Mood and affect are appropriate.    Enc Vitals  BP: (!) 156/60  Pulse: 76  Resp: 16  Weight:  (Patient refuses)  Height:  (Patient refused)                                         Medical History  Surgical History Family History Social History   Past Medical History:   Diagnosis Date     Anxiety     takes clonazepam     Asthma     [facility]      Chronic kidney disease     stage 3 per [facility]      CKD (chronic kidney disease)      Clostridium difficile infection     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     CTS (carpal tunnel syndrome)      GERD (gastroesophageal reflux disease)     per [facility]      Hiatal hernia     small per [facility]      Hyperlipidemia      Hyperlipidemia      Hyperlipidemia      Hypertension      Hypertension      Osteoarthritis of knee     per [facility]      Spinal stenosis     per [facility]      Vitamin D deficiency     per [facility]    Past Surgical History:   Procedure Laterality Date     BREAST SURGERY  1982    breast tumor per [facility]      CATARACT EXTRACTION  07/2005    per [facility]      ORIF TIBIA & FIBULA FRACTURES  1988    per [facility]      OTHER SURGICAL HISTORY  10/2004    hole in retinaper [facility]      OTHER SURGICAL HISTORY  05/03/2015    fecal transplantper [facility]      MT ESOPHAGOGASTRODUODENOSCOPY TRANSORAL DIAGNOSTIC N/A 9/11/2020    Procedure: ESOPHAGOGASTRODUODENOSCOPY With gastric biopsies;  Surgeon: David Reyes MD;  Location: [facility] OR;  Service: Gastroenterology     TONSILLECTOMY       TONSILLECTOMY & ADENOIDECTOMY  1963    Family History   Problem Relation Age of Onset     Hypertension Mother      Cancer Mother         gallbladder cancer     Myocardial Infarction Father      Hypertension Sister      Alzheimer Disease Sister      Heart Disease Sister      Cancer Brother      Brain Cancer Brother      Pacemaker Mother       Heart Disease Mother      Coronary Artery Disease Father      Heart Disease Father      Heart Failure Sister     Social History     Socioeconomic History     Marital status:      Spouse name: Not on file     Number of children: Not on file     Years of education: Not on file     Highest education level: Not on file   Occupational History     Not on file   Tobacco Use     Smoking status: Former Smoker     Packs/day: 3.00     Years: 35.00     Pack years: 105.00     Types: Cigarettes, Cigarettes, Cigarettes     Smokeless tobacco: Never Used     Tobacco comment: quit 1968   Substance and Sexual Activity     Alcohol use: Not Currently     Drug use: Never     Sexual activity: Not Currently   Other Topics Concern     Parent/sibling w/ CABG, MI or angioplasty before 65F 55M? No   Social History Narrative     Not on file     Social Determinants of Health     Financial Resource Strain:      Difficulty of Paying Living Expenses:    Food Insecurity:      Worried About Running Out of Food in the Last Year:      Ran Out of Food in the Last Year:    Transportation Needs:      Lack of Transportation (Medical):      Lack of Transportation (Non-Medical):    Physical Activity:      Days of Exercise per Week:      Minutes of Exercise per Session:    Stress:      Feeling of Stress :    Social Connections:      Frequency of Communication with Friends and Family:      Frequency of Social Gatherings with Friends and Family:      Attends Confucianism Services:      Active Member of Clubs or Organizations:      Attends Club or Organization Meetings:      Marital Status:    Intimate Partner Violence:      Fear of Current or Ex-Partner:      Emotionally Abused:      Physically Abused:      Sexually Abused:           Medications  Allergies   Current Outpatient Medications   Medication Sig Dispense Refill     acetaminophen (TYLENOL) 500 MG tablet Take 500 mg by mouth as needed       aspirin (ASA) 81 MG chewable tablet Take 81 mg by mouth  daily       calcium carbonate 750 MG CHEW Take 1 tablet by mouth daily       Cholecalciferol (VITAMIN D3) 50 MCG (2000 UT) CAPS 1 tablet by Oral or Feeding Tube route daily       clonazePAM (KLONOPIN) 0.5 MG tablet Take 1 mg by mouth 3 times daily       clopidogrel (PLAVIX) 75 MG tablet Take 1 tablet (75 mg) by mouth daily 90 tablet 3     ezetimibe (ZETIA) 10 MG tablet Take 0.5 tablets (5 mg) by mouth daily 90 tablet 3     Ferrous Gluconate 324 (37.5 Fe) MG TABS Take 1 tablet by mouth Every Mon, Wed, Fri Morning       losartan (COZAAR) 50 MG tablet Take 50 mg by mouth 2 times daily       metoprolol succinate ER (TOPROL-XL) 25 MG 24 hr tablet Take 1 tablet by mouth At Bedtime        Allergies   Allergen Reactions     Buspirone Shortness Of Breath     Ciprofloxacin Hives and Shortness Of Breath     Other reaction(s): Unknown     Cortisone      Other reaction(s): Throat Swelling/Closing, Unknown  Reaction 9-5-94       Hydrocodone-Acetaminophen Shortness Of Breath     Other reaction(s): Unknown     Lansoprazole Shortness Of Breath     Metoprolol Shortness Of Breath     Nedocromil      Other reaction(s): Throat Swelling/Closing     Niacin Shortness Of Breath     Other reaction(s): Unknown     Advair Diskus Other (See Comments)     Elevated blood pressure       Albuterol Other (See Comments)     Inhaler had extreme effect on nervous system       Amlodipine      Other reaction(s): Erythema, Unknown     Azithromycin      Other reaction(s): *Unknown, Unknown     Cefixime Diarrhea     Other reaction(s): Unknown     Cefprozil Nausea and Vomiting     Other reaction(s): Unknown     Cefuroxime      Other reaction(s): *Unknown     Cephalexin      Other reaction(s): *Unknown, Unknown     Contrast Dye      Other reaction(s): hives     Cyclobenzaprine      Other reaction(s): Sedation     Diltiazem      Other reaction(s): Erythema, Unknown  Ears face arms and chest red and on fire-body tremors       Erythromycin      Other  reaction(s): Unknown     Esomeprazole      Other reaction(s): Headache     Fenofibrate Muscle Pain (Myalgia)     Other reaction(s): Unknown     Guaifenesin-Dm Cr Nausea     Other reaction(s): Headache     Levofloxacin Nausea     Other reaction(s): Headache, Unknown     Methylprednisolone      Moxifloxacin      Other reaction(s): Dizziness     Nifedipine      Other reaction(s): Edema  Took for 5-93 to 9-94 red and swollen leg       Nsaids      Other reaction(s): Unknown     Ofloxacin      Other reaction(s): *Unknown     Penicillins      Other reaction(s): *Unknown, Unknown     Pirbuterol Other (See Comments)     Immediate extreme effect on nervous system       Pravastatin      Other reaction(s): Dizziness, Unknown     Pseudoephedrine-Dm-Gg      Simvastatin Muscle Pain (Myalgia)     Spironolactone      Other reaction(s): stomach cramps, nausea     Sulfamethoxazole-Trimethoprim      Other reaction(s): Unknown     Triamterene Other (See Comments)     Greatly bothered with sun-took medication for three years  Greatly bothered with sun       Ultracet      Other reaction(s): Tachycardia, Unknown     Diatrizoate Rash     Telmisartan Palpitations         Lab Results    Chemistry/lipid CBC Cardiac Enzymes/BNP/TSH/INR   Recent Labs   Lab Test 06/22/21  1040   CHOL 256*   HDL 61   *   TRIG 161*   BUN 23   *   CO2 21*    Recent Labs   Lab Test 06/22/21  1040   WBC 5.3   HGB 10.2*   HCT 31.6*   MCV 89       Recent Labs   Lab Test 06/22/21  1040 11/16/19  1513 07/26/18  1742   TROPONINI  --   --  <0.01   BNP  --  123  --    TSH 1.63  --   --         A total of 60 minutes was spent reviewing patient's medical records, obtaining history and performing examination, as well as discussing diagnoses/ recommendations with patient and answering all questions.                          Thank you for allowing me to participate in the care of your patient.      Sincerely,     Shelby Reilly MD     [facility]  [facility]  cc:   No referring provider defined for this encounter.

## 2021-12-07 ENCOUNTER — HOSPITAL ENCOUNTER (EMERGENCY)
Facility: HOSPITAL | Age: 86
Discharge: HOME | End: 2021-12-07
Attending: EMERGENCY MEDICINE | Admitting: EMERGENCY MEDICINE
Payer: MEDICARE

## 2021-12-07 ENCOUNTER — APPOINTMENT (OUTPATIENT)
Dept: CT IMAGING | Facility: HOSPITAL | Age: 86
End: 2021-12-07
Attending: EMERGENCY MEDICINE
Payer: MEDICARE

## 2021-12-07 VITALS
WEIGHT: 180 LBS | HEART RATE: 74 BPM | RESPIRATION RATE: 18 BRPM | DIASTOLIC BLOOD PRESSURE: 76 MMHG | SYSTOLIC BLOOD PRESSURE: 176 MMHG | TEMPERATURE: 98.2 F | BODY MASS INDEX: 34.29 KG/M2 | OXYGEN SATURATION: 96 %

## 2021-12-07 DIAGNOSIS — R10.84 ABDOMINAL PAIN, GENERALIZED: ICD-10-CM

## 2021-12-07 DIAGNOSIS — R19.7 DIARRHEA, UNSPECIFIED TYPE: ICD-10-CM

## 2021-12-07 PROBLEM — G89.29 CHRONIC ABDOMINAL PAIN: Status: ACTIVE | Noted: 2021-12-07

## 2021-12-07 PROBLEM — R11.0 NAUSEA: Status: ACTIVE | Noted: 2021-12-07

## 2021-12-07 PROBLEM — R10.9 CHRONIC ABDOMINAL PAIN: Status: ACTIVE | Noted: 2021-12-07

## 2021-12-07 LAB
ALBUMIN SERPL-MCNC: 3.5 G/DL (ref 3.5–5)
ALP SERPL-CCNC: 59 U/L (ref 45–120)
ALT SERPL W P-5'-P-CCNC: 13 U/L (ref 0–45)
ANION GAP SERPL CALCULATED.3IONS-SCNC: 11 MMOL/L (ref 5–18)
AST SERPL W P-5'-P-CCNC: 17 U/L (ref 0–40)
BASOPHILS # BLD AUTO: 0 10E3/UL (ref 0–0.2)
BASOPHILS NFR BLD AUTO: 0 %
BILIRUB SERPL-MCNC: 0.3 MG/DL (ref 0–1)
BUN SERPL-MCNC: 33 MG/DL (ref 8–28)
CALCIUM SERPL-MCNC: 9.5 MG/DL (ref 8.5–10.5)
CHLORIDE BLD-SCNC: 103 MMOL/L (ref 98–107)
CO2 SERPL-SCNC: 21 MMOL/L (ref 22–31)
CREAT SERPL-MCNC: 1.24 MG/DL (ref 0.6–1.1)
EOSINOPHIL # BLD AUTO: 0.1 10E3/UL (ref 0–0.7)
EOSINOPHIL NFR BLD AUTO: 2 %
ERYTHROCYTE [DISTWIDTH] IN BLOOD BY AUTOMATED COUNT: 12.7 % (ref 10–15)
GFR SERPL CREATININE-BSD FRML MDRD: 40 ML/MIN/1.73M2
GLUCOSE BLD-MCNC: 112 MG/DL (ref 70–125)
HCT VFR BLD AUTO: 32 % (ref 35–47)
HGB BLD-MCNC: 10.5 G/DL (ref 11.7–15.7)
HOLD SPECIMEN: NORMAL
HOLD SPECIMEN: NORMAL
IMM GRANULOCYTES # BLD: 0 10E3/UL
IMM GRANULOCYTES NFR BLD: 0 %
LACTATE SERPL-SCNC: 1 MMOL/L (ref 0.7–2)
LYMPHOCYTES # BLD AUTO: 1.8 10E3/UL (ref 0.8–5.3)
LYMPHOCYTES NFR BLD AUTO: 24 %
MCH RBC QN AUTO: 29.7 PG (ref 26.5–33)
MCHC RBC AUTO-ENTMCNC: 32.8 G/DL (ref 31.5–36.5)
MCV RBC AUTO: 91 FL (ref 78–100)
MONOCYTES # BLD AUTO: 0.9 10E3/UL (ref 0–1.3)
MONOCYTES NFR BLD AUTO: 12 %
NEUTROPHILS # BLD AUTO: 4.8 10E3/UL (ref 1.6–8.3)
NEUTROPHILS NFR BLD AUTO: 62 %
NRBC # BLD AUTO: 0 10E3/UL
NRBC BLD AUTO-RTO: 0 /100
PLATELET # BLD AUTO: 338 10E3/UL (ref 150–450)
POTASSIUM BLD-SCNC: 4.1 MMOL/L (ref 3.5–5)
PROT SERPL-MCNC: 7.1 G/DL (ref 6–8)
RBC # BLD AUTO: 3.53 10E6/UL (ref 3.8–5.2)
SODIUM SERPL-SCNC: 135 MMOL/L (ref 136–145)
WBC # BLD AUTO: 7.6 10E3/UL (ref 4–11)

## 2021-12-07 PROCEDURE — 74176 CT ABD & PELVIS W/O CONTRAST: CPT

## 2021-12-07 PROCEDURE — 96374 THER/PROPH/DIAG INJ IV PUSH: CPT

## 2021-12-07 PROCEDURE — 99285 EMERGENCY DEPT VISIT HI MDM: CPT | Mod: 25

## 2021-12-07 PROCEDURE — 96361 HYDRATE IV INFUSION ADD-ON: CPT

## 2021-12-07 PROCEDURE — 258N000003 HC RX IP 258 OP 636: Performed by: EMERGENCY MEDICINE

## 2021-12-07 PROCEDURE — 83605 ASSAY OF LACTIC ACID: CPT | Performed by: EMERGENCY MEDICINE

## 2021-12-07 PROCEDURE — 36415 COLL VENOUS BLD VENIPUNCTURE: CPT | Performed by: EMERGENCY MEDICINE

## 2021-12-07 PROCEDURE — 250N000011 HC RX IP 250 OP 636: Performed by: EMERGENCY MEDICINE

## 2021-12-07 PROCEDURE — 84132 ASSAY OF SERUM POTASSIUM: CPT | Performed by: EMERGENCY MEDICINE

## 2021-12-07 PROCEDURE — 80053 COMPREHEN METABOLIC PANEL: CPT | Performed by: EMERGENCY MEDICINE

## 2021-12-07 PROCEDURE — 85025 COMPLETE CBC W/AUTO DIFF WBC: CPT | Performed by: EMERGENCY MEDICINE

## 2021-12-07 RX ORDER — ONDANSETRON 2 MG/ML
4 INJECTION INTRAMUSCULAR; INTRAVENOUS EVERY 30 MIN PRN
Status: DISCONTINUED | OUTPATIENT
Start: 2021-12-07 | End: 2021-12-07 | Stop reason: HOSPADM

## 2021-12-07 RX ORDER — SODIUM CHLORIDE 9 MG/ML
INJECTION, SOLUTION INTRAVENOUS CONTINUOUS
Status: DISCONTINUED | OUTPATIENT
Start: 2021-12-07 | End: 2021-12-07 | Stop reason: HOSPADM

## 2021-12-07 RX ADMIN — ONDANSETRON 4 MG: 2 INJECTION INTRAMUSCULAR; INTRAVENOUS at 03:00

## 2021-12-07 RX ADMIN — SODIUM CHLORIDE 1000 ML: 9 INJECTION, SOLUTION INTRAVENOUS at 02:56

## 2021-12-07 ASSESSMENT — ENCOUNTER SYMPTOMS
APPETITE CHANGE: 1
ABDOMINAL PAIN: 1
ACTIVITY CHANGE: 1
VOMITING: 0
NAUSEA: 1

## 2021-12-07 NOTE — ED PROVIDER NOTES
EMERGENCY DEPARTMENT ENCOUNTER      NAME: Kerry Hoffmann  AGE: 86 year old female  YOB: 1935  MRN: [id number]  EVALUATION DATE & TIME: 12/7/2021  2:34 AM    PCP: Demarco Mckeon    ED PROVIDER: Carlos Mcfarland M.D.      Chief Complaint   Patient presents with     Diarrhea         FINAL IMPRESSION:  1. Abdominal pain, generalized    2. Diarrhea, unspecified type          ED COURSE & MEDICAL DECISION MAKING:    Pertinent Labs & Imaging studies reviewed. (See chart for details)  86 year old female presents to the Emergency Department for evaluation of abdominal pain and diarrhea.  Patient has chronic abdominal pain.  Was recently started on MiraLAX this could be causing her diarrhea.  Did get a CT scan.  This does not show any cause of her abdominal pain.  Her white count is normal.  Hemoglobin is 10.5.  Lactic acid is normal.  I see no signs of ischemic bowel.  I see no signs of other intra-abdominal pathology.  I think this is her chronic pain.  She also was recently started on famotidine for reflux which could be causing some of the symptoms as well.  I do think she safe for discharge home.  Will follow up with her primary.  Hold the MiraLAX that she is having diarrhea.  She will discuss further treatment with her gastroenterologist.    2:39 AM I met with the patient to gather history and to perform my initial exam. I discussed the plan for care while in the Emergency Department. PPE: Provider wore gloves, N95 mask, eye protection, and paper mask.      At the conclusion of the encounter I discussed the results of all of the tests and the disposition. The questions were answered. The patient or family acknowledged understanding and was agreeable with the care plan.            MEDICATIONS GIVEN IN THE EMERGENCY:  Medications   0.9% sodium chloride BOLUS (0 mLs Intravenous Stopped 12/7/21 0413)     Followed by   sodium chloride 0.9% infusion (has no administration in time range)   ondansetron  "(ZOFRAN) injection 4 mg (4 mg Intravenous Given 12/7/21 0300)       NEW PRESCRIPTIONS STARTED AT TODAY'S ER VISIT  New Prescriptions    No medications on file          =================================================================    HPI    Patient information was obtained from: The patient    Use of : N/A       Kerry Hoffmann is a 86 year old female with a pertinent history of IBS, and hyperlipidemia, Chronic Kidney Disease, reflux, and hypertension who presents to this ED via EMS for evaluation of abdominal pain and diarrhea.    The patient reports that for the past ~2 months she has had a \"terrible\" abdominal pain and she was recently evaluated for this. She began Miralax and pepsid yesterday (12/6) morning. She notes that she previously was having bowel movements every other day but she has had several in the past 24 hours. She was given pepsid because she has a history of reflux and has a burning in her chest. Additionally, the patient endorses nausea that has worsened tonight and a recent loss of appetite because of how sick she feels. The patient denies vomiting, and any other symptoms or complaints at this time.       REVIEW OF SYSTEMS   Review of Systems   Constitutional: Positive for activity change and appetite change.   Gastrointestinal: Positive for abdominal pain and nausea. Negative for vomiting.   All other systems reviewed and are negative.       PAST MEDICAL HISTORY:  Past Medical History:   Diagnosis Date     Anxiety     takes clonazepam     Asthma     [facility]      Chronic kidney disease     stage 3 per [facility]      CKD (chronic kidney disease)      Clostridium difficile infection     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     CTS (carpal tunnel syndrome)      GERD (gastroesophageal reflux disease)     per [facility]      Hiatal hernia     small per [facility]      Hyperlipidemia      Hyperlipidemia      Hyperlipidemia      Hypertension      Hypertension      Osteoarthritis of knee     per [facility]  "     Spinal stenosis     per [facility]      Vitamin D deficiency     per [facility]       PAST SURGICAL HISTORY:  Past Surgical History:   Procedure Laterality Date     BREAST SURGERY  1982    breast tumor per [facility]      CATARACT EXTRACTION  07/2005    per [facility]      ORIF TIBIA & FIBULA FRACTURES  1988    per [facility]      OTHER SURGICAL HISTORY  10/2004    hole in retinaper [facility]      OTHER SURGICAL HISTORY  05/03/2015    fecal transplantper [facility]      ND ESOPHAGOGASTRODUODENOSCOPY TRANSORAL DIAGNOSTIC N/A 9/11/2020    Procedure: ESOPHAGOGASTRODUODENOSCOPY With gastric biopsies;  Surgeon: David Reyes MD;  Location: [facility];  Service: Gastroenterology     TONSILLECTOMY       TONSILLECTOMY & ADENOIDECTOMY  1963           CURRENT MEDICATIONS:    Current Facility-Administered Medications   Medication     ondansetron (ZOFRAN) injection 4 mg     sodium chloride 0.9% infusion     Current Outpatient Medications   Medication     acetaminophen (TYLENOL) 500 MG tablet     aspirin (ASA) 81 MG chewable tablet     calcium carbonate 750 MG CHEW     Cholecalciferol (VITAMIN D3) 50 MCG (2000 UT) CAPS     clonazePAM (KLONOPIN) 0.5 MG tablet     clopidogrel (PLAVIX) 75 MG tablet     ezetimibe (ZETIA) 10 MG tablet     Ferrous Gluconate 324 (37.5 Fe) MG TABS     losartan (COZAAR) 50 MG tablet     metoprolol succinate ER (TOPROL-XL) 25 MG 24 hr tablet         ALLERGIES:  Allergies   Allergen Reactions     Buspirone Shortness Of Breath     Ciprofloxacin Hives and Shortness Of Breath     Other reaction(s): Unknown     Cortisone      Other reaction(s): Throat Swelling/Closing, Unknown  Reaction 9-5-94       Hydrocodone-Acetaminophen Shortness Of Breath     Other reaction(s): Unknown     Lansoprazole Shortness Of Breath     Metoprolol Shortness Of Breath     Nedocromil      Other reaction(s): Throat Swelling/Closing     Niacin Shortness Of Breath     Other reaction(s): Unknown     Advair Diskus Other (See Comments)     Elevated blood  pressure       Albuterol Other (See Comments)     Inhaler had extreme effect on nervous system       Amlodipine      Other reaction(s): Erythema, Unknown     Azithromycin      Other reaction(s): *Unknown, Unknown     Cefixime Diarrhea     Other reaction(s): Unknown     Cefprozil Nausea and Vomiting     Other reaction(s): Unknown     Cefuroxime      Other reaction(s): *Unknown     Cephalexin      Other reaction(s): *Unknown, Unknown     Contrast Dye      Other reaction(s): hives     Cyclobenzaprine      Other reaction(s): Sedation     Diltiazem      Other reaction(s): Erythema, Unknown  Ears face arms and chest red and on fire-body tremors       Erythromycin      Other reaction(s): Unknown     Esomeprazole      Other reaction(s): Headache     Fenofibrate Muscle Pain (Myalgia)     Other reaction(s): Unknown     Guaifenesin-Dm Cr Nausea     Other reaction(s): Headache     Levofloxacin Nausea     Other reaction(s): Headache, Unknown     Methylprednisolone      Moxifloxacin      Other reaction(s): Dizziness     Nifedipine      Other reaction(s): Edema  Took for 5-93 to 9-94 red and swollen leg       Nsaids      Other reaction(s): Unknown     Ofloxacin      Other reaction(s): *Unknown     Penicillins      Other reaction(s): *Unknown, Unknown     Pirbuterol Other (See Comments)     Immediate extreme effect on nervous system       Pravastatin      Other reaction(s): Dizziness, Unknown     Pseudoephedrine-Dm-Gg      Simvastatin Muscle Pain (Myalgia)     Spironolactone      Other reaction(s): stomach cramps, nausea     Sulfamethoxazole-Trimethoprim      Other reaction(s): Unknown     Triamterene Other (See Comments)     Greatly bothered with sun-took medication for three years  Greatly bothered with sun       Ultracet      Other reaction(s): Tachycardia, Unknown     Diatrizoate Rash     Telmisartan Palpitations       FAMILY HISTORY:  Family History   Problem Relation Age of Onset     Hypertension Mother      Cancer Mother          gallbladder cancer     Myocardial Infarction Father      Hypertension Sister      Alzheimer Disease Sister      Heart Disease Sister      Cancer Brother      Brain Cancer Brother      Pacemaker Mother      Heart Disease Mother      Coronary Artery Disease Father      Heart Disease Father      Heart Failure Sister        SOCIAL HISTORY:   Social History     Socioeconomic History     Marital status:      Spouse name: Not on file     Number of children: Not on file     Years of education: Not on file     Highest education level: Not on file   Occupational History     Not on file   Tobacco Use     Smoking status: Former Smoker     Packs/day: 3.00     Years: 35.00     Pack years: 105.00     Types: Cigarettes, Cigarettes, Cigarettes     Smokeless tobacco: Never Used     Tobacco comment: quit 1968   Substance and Sexual Activity     Alcohol use: Not Currently     Drug use: Never     Sexual activity: Not Currently   Other Topics Concern     Parent/sibling w/ CABG, MI or angioplasty before 65F 55M? No   Social History Narrative     Not on file     Social Determinants of Health     Financial Resource Strain: Not on file   Food Insecurity: Not on file   Transportation Needs: Not on file   Physical Activity: Not on file   Stress: Not on file   Social Connections: Not on file   Intimate Partner Violence: Not on file   Housing Stability: Not on file       VITALS:  BP (!) 176/76   Pulse 74   Temp 98.2  F (36.8  C) (Oral)   Resp 18   Wt 81.6 kg (180 lb)   LMP  (LMP Unknown)   SpO2 96%   BMI 34.29 kg/m      PHYSICAL EXAM    Physical Exam  Constitutional:       General: She is not in acute distress.     Appearance: She is not diaphoretic.   HENT:      Head: Atraumatic.      Mouth/Throat:      Pharynx: No oropharyngeal exudate.   Eyes:      General: No scleral icterus.     Pupils: Pupils are equal, round, and reactive to light.   Cardiovascular:      Heart sounds: Normal heart sounds.   Pulmonary:      Effort: No  respiratory distress.      Breath sounds: Normal breath sounds.   Abdominal:      Palpations: Abdomen is soft.      Tenderness: There is abdominal tenderness. There is no guarding or rebound.   Musculoskeletal:         General: No tenderness.   Skin:     General: Skin is warm.      Findings: No rash.   Neurological:      General: No focal deficit present.      Mental Status: She is alert.           LAB:  All pertinent labs reviewed and interpreted.  Labs Ordered and Resulted from Time of ED Arrival to Time of ED Departure   COMPREHENSIVE METABOLIC PANEL - Abnormal       Result Value    Sodium 135 (*)     Potassium 4.1      Chloride 103      Carbon Dioxide (CO2) 21 (*)     Anion Gap 11      Urea Nitrogen 33 (*)     Creatinine 1.24 (*)     Calcium 9.5      Glucose 112      Alkaline Phosphatase 59      AST 17      ALT 13      Protein Total 7.1      Albumin 3.5      Bilirubin Total 0.3      GFR Estimate 40 (*)    CBC WITH PLATELETS AND DIFFERENTIAL - Abnormal    WBC Count 7.6      RBC Count 3.53 (*)     Hemoglobin 10.5 (*)     Hematocrit 32.0 (*)     MCV 91      MCH 29.7      MCHC 32.8      RDW 12.7      Platelet Count 338      % Neutrophils 62      % Lymphocytes 24      % Monocytes 12      % Eosinophils 2      % Basophils 0      % Immature Granulocytes 0      NRBCs per 100 WBC 0      Absolute Neutrophils 4.8      Absolute Lymphocytes 1.8      Absolute Monocytes 0.9      Absolute Eosinophils 0.1      Absolute Basophils 0.0      Absolute Immature Granulocytes 0.0      Absolute NRBCs 0.0     LACTIC ACID WHOLE BLOOD - Normal    Lactic Acid 1.0         RADIOLOGY:  Reviewed all pertinent imaging. Please see official radiology report.  CT Abdomen Pelvis w/o Contrast   Final Result   IMPRESSION:    1.  Moderate amount of fluid within the colon which may be seen in setting of diarrheal illness.   2.  Diverticulosis without diverticulitis.               IMiller, am serving as a scribe to document services personally  performed by Dr. Carlos Mcfarland, based on my observation and the provider's statements to me. I, Carlos Mcfarland MD attest that Miller Martinez is acting in a scribe capacity, has observed my performance of the services and has documented them in accordance with my direction.    Carlos Mcfarland M.D.  Emergency Medicine  [facility] EMERGENCY DEPARTMENT  [address] [place] [id number] [phone]  Dept: [phone]       Carlos Mcfarland MD  12/07/21 0426

## 2021-12-07 NOTE — ED NOTES
Bed: [facility]-01  Expected date: 12/7/21  Expected time: 2:34 AM  Means of arrival: Ambulance  Comments:  [facility]

## 2021-12-07 NOTE — ED TRIAGE NOTES
"Patient arrives via [facility] from [facility]. Patient started on Miralax yesterday morning. Now reports diarrhea and stomach cramping. Patient states that she feels she is \"allergic\" to the Miralax.   "

## 2021-12-20 ENCOUNTER — LAB REQUISITION (OUTPATIENT)
Dept: LAB | Facility: CLINIC | Age: 86
End: 2021-12-20
Payer: MEDICARE

## 2021-12-20 DIAGNOSIS — E55.9 VITAMIN D DEFICIENCY, UNSPECIFIED: ICD-10-CM

## 2021-12-20 DIAGNOSIS — E78.2 MIXED HYPERLIPIDEMIA: ICD-10-CM

## 2021-12-20 LAB
ALBUMIN SERPL-MCNC: 3.7 G/DL (ref 3.5–5)
ALP SERPL-CCNC: 61 U/L (ref 45–120)
ALT SERPL W P-5'-P-CCNC: <9 U/L (ref 0–45)
ANION GAP SERPL CALCULATED.3IONS-SCNC: 12 MMOL/L (ref 5–18)
AST SERPL W P-5'-P-CCNC: 17 U/L (ref 0–40)
BASOPHILS # BLD AUTO: 0 10E3/UL (ref 0–0.2)
BASOPHILS NFR BLD AUTO: 1 %
BILIRUB SERPL-MCNC: 0.4 MG/DL (ref 0–1)
BUN SERPL-MCNC: 27 MG/DL (ref 8–28)
CALCIUM SERPL-MCNC: 9.9 MG/DL (ref 8.5–10.5)
CHLORIDE BLD-SCNC: 107 MMOL/L (ref 98–107)
CHOLEST SERPL-MCNC: 257 MG/DL
CO2 SERPL-SCNC: 21 MMOL/L (ref 22–31)
CREAT SERPL-MCNC: 1.21 MG/DL (ref 0.6–1.1)
EOSINOPHIL # BLD AUTO: 0.2 10E3/UL (ref 0–0.7)
EOSINOPHIL NFR BLD AUTO: 3 %
ERYTHROCYTE [DISTWIDTH] IN BLOOD BY AUTOMATED COUNT: 13 % (ref 10–15)
FASTING STATUS PATIENT QL REPORTED: ABNORMAL
GFR SERPL CREATININE-BSD FRML MDRD: 41 ML/MIN/1.73M2
GLUCOSE BLD-MCNC: 98 MG/DL (ref 70–125)
HCT VFR BLD AUTO: 34.2 % (ref 35–47)
HDLC SERPL-MCNC: 56 MG/DL
HGB BLD-MCNC: 10.7 G/DL (ref 11.7–15.7)
IMM GRANULOCYTES # BLD: 0 10E3/UL
IMM GRANULOCYTES NFR BLD: 0 %
LDLC SERPL CALC-MCNC: 168 MG/DL
LYMPHOCYTES # BLD AUTO: 1.7 10E3/UL (ref 0.8–5.3)
LYMPHOCYTES NFR BLD AUTO: 33 %
MCH RBC QN AUTO: 29.4 PG (ref 26.5–33)
MCHC RBC AUTO-ENTMCNC: 31.3 G/DL (ref 31.5–36.5)
MCV RBC AUTO: 94 FL (ref 78–100)
MONOCYTES # BLD AUTO: 0.6 10E3/UL (ref 0–1.3)
MONOCYTES NFR BLD AUTO: 12 %
NEUTROPHILS # BLD AUTO: 2.7 10E3/UL (ref 1.6–8.3)
NEUTROPHILS NFR BLD AUTO: 51 %
NRBC # BLD AUTO: 0 10E3/UL
NRBC BLD AUTO-RTO: 0 /100
PLATELET # BLD AUTO: 373 10E3/UL (ref 150–450)
POTASSIUM BLD-SCNC: 5 MMOL/L (ref 3.5–5)
PROT SERPL-MCNC: 7.4 G/DL (ref 6–8)
RBC # BLD AUTO: 3.64 10E6/UL (ref 3.8–5.2)
SODIUM SERPL-SCNC: 140 MMOL/L (ref 136–145)
TRIGL SERPL-MCNC: 163 MG/DL
TSH SERPL DL<=0.005 MIU/L-ACNC: 2.03 UIU/ML (ref 0.3–5)
WBC # BLD AUTO: 5.2 10E3/UL (ref 4–11)

## 2021-12-20 PROCEDURE — 80053 COMPREHEN METABOLIC PANEL: CPT | Mod: ORL | Performed by: FAMILY MEDICINE

## 2021-12-20 PROCEDURE — 85025 COMPLETE CBC W/AUTO DIFF WBC: CPT | Mod: ORL | Performed by: FAMILY MEDICINE

## 2021-12-20 PROCEDURE — 82306 VITAMIN D 25 HYDROXY: CPT | Mod: ORL | Performed by: FAMILY MEDICINE

## 2021-12-20 PROCEDURE — 84443 ASSAY THYROID STIM HORMONE: CPT | Mod: ORL | Performed by: FAMILY MEDICINE

## 2021-12-20 PROCEDURE — 80061 LIPID PANEL: CPT | Mod: ORL | Performed by: FAMILY MEDICINE

## 2021-12-21 LAB — DEPRECATED CALCIDIOL+CALCIFEROL SERPL-MC: 40 UG/L (ref 30–80)

## 2021-12-23 ENCOUNTER — TRANSFERRED RECORDS (OUTPATIENT)
Dept: HEALTH INFORMATION MANAGEMENT | Facility: CLINIC | Age: 86
End: 2021-12-23

## 2021-12-23 LAB — EJECTION FRACTION: NORMAL %

## 2022-02-22 ENCOUNTER — TELEPHONE (OUTPATIENT)
Dept: NEUROLOGY | Facility: CLINIC | Age: 87
End: 2022-02-22
Payer: MEDICARE

## 2022-02-22 DIAGNOSIS — E78.2 MIXED HYPERLIPIDEMIA: ICD-10-CM

## 2022-02-22 DIAGNOSIS — I65.22 STENOSIS OF INTRACRANIAL PORTIONS OF LEFT INTERNAL CAROTID ARTERY: ICD-10-CM

## 2022-02-22 DIAGNOSIS — G56.01 CARPAL TUNNEL SYNDROME OF RIGHT WRIST: ICD-10-CM

## 2022-02-22 RX ORDER — CLOPIDOGREL BISULFATE 75 MG/1
75 TABLET ORAL DAILY
Qty: 30 TABLET | Refills: 0 | Status: SHIPPED | OUTPATIENT
Start: 2022-02-22 | End: 2022-03-16

## 2022-02-22 NOTE — TELEPHONE ENCOUNTER
Will deny [facility] request as pt needs to schedule appt.     Cheli Guajardo RN on 2/22/2022 at 11:47 AM

## 2022-02-22 NOTE — TELEPHONE ENCOUNTER
Reason for Call: Medication Refill Request Faxed from pharmacy  Has the patient contacted the pharmacy for the refill? yes  Name of medication being requested: CLOPIDOGREL 75MG TABS  Provider who prescribed the medication:   Pharmacy: [facility]  Date medication is needed: ASAP  Last visit: 03/24/21 w/ Dr. Brady

## 2022-02-22 NOTE — TELEPHONE ENCOUNTER
Refill request for Clopidogrel. Pt last seen 3/12/21 by Dr. Brady and is currently due for follow up. Will send 30 day supply with zero refills. Will also send letter regarding need for appt.     Cheli Guajardo RN on 2/22/2022 at 9:18 AM

## 2022-02-22 NOTE — LETTER
2/22/2022        RE: Kerry Hoffmann  [address]  [address]  [facility] [address]          Dear Ms. Hoffmann,       We recently provided you with medication refills.  Many medications require routine follow-up with your doctor. Unfortunately, Dr. Brady no longer works within the [facility] system, so you will need to establish care with another one of our providers here at the clinic.     Your prescription(s) have been refilled for 30 days so you may have time for the above noted follow-up. Please call to schedule soon so we can assure you have an appointment before your next refills are needed. If you have already made a follow up appointment, please disregard this letter.         Sincerely,      [facility]     (Formerly known as [facility])

## 2022-03-16 ENCOUNTER — TELEPHONE (OUTPATIENT)
Dept: NEUROLOGY | Facility: CLINIC | Age: 87
End: 2022-03-16
Payer: MEDICARE

## 2022-03-16 DIAGNOSIS — I65.22 STENOSIS OF INTRACRANIAL PORTIONS OF LEFT INTERNAL CAROTID ARTERY: ICD-10-CM

## 2022-03-16 DIAGNOSIS — E78.2 MIXED HYPERLIPIDEMIA: ICD-10-CM

## 2022-03-16 DIAGNOSIS — G56.01 CARPAL TUNNEL SYNDROME OF RIGHT WRIST: ICD-10-CM

## 2022-03-16 RX ORDER — CLOPIDOGREL BISULFATE 75 MG/1
75 TABLET ORAL DAILY
Qty: 30 TABLET | Refills: 4 | Status: SHIPPED | OUTPATIENT
Start: 2022-03-16 | End: 2022-07-05

## 2022-03-16 NOTE — TELEPHONE ENCOUNTER
[facility] Call Center    Phone Message    May a detailed message be left on voicemail: yes     Reason for Call: Medication Refill Request    Has the patient contacted the pharmacy for the refill? Yes   Name of medication being requested: Plavix 75 mg  Provider who prescribed the medication: JoseA ntonio  Pharmacy: [facility]  Date medication is needed: ASAP    Patient of Dr. Brady. Schedule with [facility] for 7/5 first available but will need her blood thinner medication Please refill.      Action Taken: Message routed to:  [facility] neurology    Travel Screening: Not Applicable

## 2022-03-16 NOTE — TELEPHONE ENCOUNTER
Refill request for Plavix. Pt last seen 3/12/21 by Dr. Brady and has follow up scheduled with Dr. Hummel on 7/5/22. Will send in refills.     Cheli Guajardo RN on 3/16/2022 at 1:07 PM

## 2022-04-29 VITALS
HEART RATE: 89 BPM | DIASTOLIC BLOOD PRESSURE: 61 MMHG | BODY MASS INDEX: 30.65 KG/M2 | RESPIRATION RATE: 18 BRPM | HEIGHT: 63 IN | SYSTOLIC BLOOD PRESSURE: 125 MMHG | OXYGEN SATURATION: 98 % | WEIGHT: 173 LBS | TEMPERATURE: 98.1 F

## 2022-04-29 PROCEDURE — 99283 EMERGENCY DEPT VISIT LOW MDM: CPT

## 2022-04-30 ENCOUNTER — APPOINTMENT (OUTPATIENT)
Dept: RADIOLOGY | Facility: HOSPITAL | Age: 87
End: 2022-04-30
Attending: EMERGENCY MEDICINE
Payer: MEDICARE

## 2022-04-30 ENCOUNTER — HOSPITAL ENCOUNTER (EMERGENCY)
Facility: HOSPITAL | Age: 87
Discharge: HOME | End: 2022-04-30
Attending: EMERGENCY MEDICINE | Admitting: EMERGENCY MEDICINE
Payer: MEDICARE

## 2022-04-30 DIAGNOSIS — S93.401A SPRAIN OF RIGHT ANKLE, UNSPECIFIED LIGAMENT, INITIAL ENCOUNTER: ICD-10-CM

## 2022-04-30 PROCEDURE — 73610 X-RAY EXAM OF ANKLE: CPT | Mod: RT

## 2022-04-30 ASSESSMENT — ENCOUNTER SYMPTOMS: ARTHRALGIAS: 1

## 2022-04-30 NOTE — ED PROVIDER NOTES
EMERGENCY DEPARTMENT ENCOUNTER      NAME: Kerry Hoffmann  AGE: 87 year old female  YOB: 1935  MRN: [id number]  EVALUATION DATE & TIME: 2022 12:44 AM    PCP: Demarco Mckeon    ED PROVIDER: Kristi Leone M.D.      Chief Complaint   Patient presents with     Ankle Pain         FINAL IMPRESSION:  1. Sprain of right ankle, unspecified ligament, initial encounter        MEDICAL DECISION MAKIN:25 AM I met with the patient, obtained history, performed an initial exam, and discussed options and plan for diagnostics and treatment here in the ED.   Pertinent Labs & Imaging studies reviewed. (See chart for details)     Kerry Hoffmann is a 87 year old female who presents with right ankle pain.  It is not warm, hot or red.  There is a small amount of soft tissue edema.  No instability in the ligaments.  She has some chronic knee issues and put extra stress on the ankles and feet yesterday when going to the ophthalmologist.  I will get x-rays to evaluate for a stress fracture.  She will get medications for pain (yook own tylenol), and Ace wrap for compression, ice pack and ankle brace for stability.    X-rays are negative.  She was feeling better after the foot was iced and elevated and the compression wrap applied.  She was fitted for an ankle stabilizer and was able to walk without severe pain.  She has a relationship with [facility] and I suggested that if she is not able to walk normally by Monday, she should follow-up with [facility] for additional evaluation.  We discussed warning signs and indications to return to the emergency department.  She understands these and all discharge instructions.         =================================================================    HPI    Patient information was obtained from: Patient    Use of : Use of : N/A      Kerry Hoffmann is a 87 year old female with a history of anxiety, hyperlipidemia,  hypertension, and osteoarthritis of knee who presents with ankle pain.    Patient reports that after having to walk up steps at [facility] on 4/27 (~2 days ago) she had onset of right dorsum foot pain. Pain is exacerbated with flexion and standing. Patient has tried elevation, Tylenol, and heat without relief. Patient has noticed some swelling. No known injury that could be contributing. However, patient does have bilateral osteoarthritis of knee. Patient denies additional medical concerns or complaints at this time.       Of note, patient is anticoagulated. She is allergic to Ibuprofen and cortisone.     REVIEW OF SYSTEMS   Review of Systems   Musculoskeletal: Positive for arthralgias (right dorsum foot pain).        PAST MEDICAL HISTORY:  Past Medical History:   Diagnosis Date     Anxiety     takes clonazepam     Asthma     [facility]      Chronic kidney disease     stage 3 per [facility]      CKD (chronic kidney disease)      Clostridium difficile infection     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     CTS (carpal tunnel syndrome)      GERD (gastroesophageal reflux disease)     per [facility]      Hiatal hernia     small per [facility]      Hyperlipidemia      Hyperlipidemia      Hyperlipidemia      Hypertension      Hypertension      Osteoarthritis of knee     per [facility]      Spinal stenosis     per [facility]      Vitamin D deficiency     per [facility]       PAST SURGICAL HISTORY:  Past Surgical History:   Procedure Laterality Date     BREAST SURGERY  1982    breast tumor per [facility]      CATARACT EXTRACTION  07/2005    per [facility]      ORIF TIBIA & FIBULA FRACTURES  1988    per [facility]      OTHER SURGICAL HISTORY  10/2004    hole in retinaper [facility]      OTHER SURGICAL HISTORY  05/03/2015    fecal transplant[facility]      ND ESOPHAGOGASTRODUODENOSCOPY TRANSORAL DIAGNOSTIC N/A 9/11/2020    Procedure: ESOPHAGOGASTRODUODENOSCOPY With gastric biopsies;  Surgeon: David Reyes MD;  Location: [facility];  Service: Gastroenterology      TONSILLECTOMY       TONSILLECTOMY & ADENOIDECTOMY  1963       CURRENT MEDICATIONS:    No current facility-administered medications for this encounter.    Current Outpatient Medications:      acetaminophen (TYLENOL) 500 MG tablet, Take 500 mg by mouth as needed, Disp: , Rfl:      aspirin (ASA) 81 MG chewable tablet, Take 81 mg by mouth daily, Disp: , Rfl:      calcium carbonate 750 MG CHEW, Take 1 tablet by mouth daily, Disp: , Rfl:      Cholecalciferol (VITAMIN D3) 50 MCG (2000 UT) CAPS, 1 tablet by Oral or Feeding Tube route daily, Disp: , Rfl:      clonazePAM (KLONOPIN) 0.5 MG tablet, Take 1 mg by mouth 3 times daily, Disp: , Rfl:      clopidogrel (PLAVIX) 75 MG tablet, Take 1 tablet (75 mg) by mouth daily, Disp: 30 tablet, Rfl: 4     ezetimibe (ZETIA) 10 MG tablet, Take 0.5 tablets (5 mg) by mouth daily, Disp: 90 tablet, Rfl: 3     Ferrous Gluconate 324 (37.5 Fe) MG TABS, Take 1 tablet by mouth Every Mon, Wed, Fri Morning, Disp: , Rfl:      losartan (COZAAR) 50 MG tablet, Take 50 mg by mouth 2 times daily, Disp: , Rfl:      metoprolol succinate ER (TOPROL-XL) 25 MG 24 hr tablet, Take 1 tablet by mouth At Bedtime, Disp: , Rfl:     ALLERGIES:  Allergies   Allergen Reactions     Buspirone Shortness Of Breath     Ciprofloxacin Hives and Shortness Of Breath     Other reaction(s): Unknown     Cortisone      Other reaction(s): Throat Swelling/Closing, Unknown  Reaction 9-5-94       Hydrocodone-Acetaminophen Shortness Of Breath     Other reaction(s): Unknown     Lansoprazole Shortness Of Breath     Metoprolol Shortness Of Breath     Nedocromil      Other reaction(s): Throat Swelling/Closing     Niacin Shortness Of Breath     Other reaction(s): Unknown     Advair Diskus Other (See Comments)     Elevated blood pressure       Albuterol Other (See Comments)     Inhaler had extreme effect on nervous system       Amlodipine      Other reaction(s): Erythema, Unknown     Azithromycin      Other reaction(s): *Unknown, Unknown      Cefixime Diarrhea     Other reaction(s): Unknown     Cefprozil Nausea and Vomiting     Other reaction(s): Unknown     Cefuroxime      Other reaction(s): *Unknown     Cephalexin      Other reaction(s): *Unknown, Unknown     Contrast Dye      Other reaction(s): hives     Cyclobenzaprine      Other reaction(s): Sedation     Diltiazem      Other reaction(s): Erythema, Unknown  Ears face arms and chest red and on fire-body tremors       Erythromycin      Other reaction(s): Unknown     Esomeprazole      Other reaction(s): Headache     Fenofibrate Muscle Pain (Myalgia)     Other reaction(s): Unknown     Guaifenesin-Dm Cr Nausea     Other reaction(s): Headache     Levofloxacin Nausea     Other reaction(s): Headache, Unknown     Methylprednisolone      Moxifloxacin      Other reaction(s): Dizziness     Nifedipine      Other reaction(s): Edema  Took for 5-93 to 9-94 red and swollen leg       Nsaids      Other reaction(s): Unknown     Ofloxacin      Other reaction(s): *Unknown     Penicillins      Other reaction(s): *Unknown, Unknown     Pirbuterol Other (See Comments)     Immediate extreme effect on nervous system       Pravastatin      Other reaction(s): Dizziness, Unknown     Pseudoephedrine-Dm-Gg      Simvastatin Muscle Pain (Myalgia)     Spironolactone      Other reaction(s): stomach cramps, nausea     Sulfamethoxazole-Trimethoprim      Other reaction(s): Unknown     Triamterene Other (See Comments)     Greatly bothered with sun-took medication for three years  Greatly bothered with sun       Ultracet      Other reaction(s): Tachycardia, Unknown     Diatrizoate Rash     Telmisartan Palpitations       FAMILY HISTORY:  Family History   Problem Relation Age of Onset     Hypertension Mother      Cancer Mother         gallbladder cancer     Myocardial Infarction Father      Hypertension Sister      Alzheimer Disease Sister      Heart Disease Sister      Cancer Brother      Brain Cancer Brother      Pacemaker Mother      Heart  "Disease Mother      Coronary Artery Disease Father      Heart Disease Father      Heart Failure Sister        SOCIAL HISTORY:   Social History     Tobacco Use     Smoking status: Former Smoker     Packs/day: 3.00     Years: 35.00     Pack years: 105.00     Types: Cigarettes, Cigarettes, Cigarettes     Smokeless tobacco: Never Used     Tobacco comment: quit 1968   Substance Use Topics     Alcohol use: Not Currently     Drug use: Never        PHYSICAL EXAM:    Vitals: /61   Pulse 89   Temp 98.1  F (36.7  C) (Temporal)   Resp 18   Ht 1.6 m (5' 3\")   Wt 78.5 kg (173 lb)   LMP  (LMP Unknown)   SpO2 98%   BMI 30.65 kg/m     General:. Alert and interactive, comfortable appearing.  HENT: Oropharynx without erythema or exudates. MMM.  TMs clear bilaterally.  Eyes: Pupils mid-sized and equally reactive.   Neck: Full AROM.  No midline tenderness to palpation.  Cardiovascular: Regular rate and rhythm. Peripheral pulses 2+ bilaterally.  Chest/Pulmonary: Normal work of breathing. Lung sounds clear and equal throughout, no wheezes or crackles. No chest wall tenderness or deformities.  Abdomen: Soft, nondistended. Nontender without guarding or rebound.  Back/Spine: No CVA or midline tenderness.  Extremities: Normal ROM of all major joints. No lower extremity edema. Mild soft tissue swelling over right ankle. Tenderness to palpation over dorsum of foot. Calf is non-tender and soft. CMS intact.   Skin: Warm and dry. Normal skin color.   Neuro: Speech clear. CNs grossly intact. Moves all extremities appropriately. Strength and sensation grossly intact to all extremities.   Psych: Normal affect/mood, cooperative, memory appropriate.     RADIOLOGY:  Ankle XR, G/E 3 views, right   Final Result   IMPRESSION: There are old traumatic changes seen involving the distal shafts of the tibia and fibula. Mild degenerative changes of the ankle joint. Prominent calcaneal spurring most marked at the Achilles insertion site. Mild soft " tissue swelling about    the ankle. The exam is otherwise negative with no acute bony finding such as fracture or dislocation identified.          I, Kristi Stinson, am serving as a scribe to document services personally performed by Dr. Kristi Leone  based on my observation and the provider's statements to me. I, Kristi Leone MD attest that Kristi Stinson is acting in a scribe capacity, has observed my performance of the services and has documented them in accordance with my direction.      Kristi Leone M.D.  Emergency Medicine  [facility] EMERGENCY DEPARTMENT  [address] [place] [id number]  [phone]  Dept: [phone]         Kristi Leone MD  04/30/22 0343

## 2022-04-30 NOTE — DISCHARGE INSTRUCTIONS
Please keep the foot elevated and apply ice when resting.  Use the Ace wrap to support the ankle and reduce swelling.  Please use the ankle brace to provide stability to the ankle while you walk.    Use Tylenol 1000 mg every 6 hours.  Do not exceed 4000 mg daily.    Follow-up with [facility] if you are unable to walk normally or you are still having pain by Monday.

## 2022-04-30 NOTE — ED TRIAGE NOTES
Patient here via EMS for right ankle pain x 2 days. Pt denies known injury. States she has bad knees but can't have surgery because she's allergic to metal. She has been using 2 canes to walk. Pt barely able to bear wt on right ankle. Pt only taking Tylenol for pain.     Triage Assessment     Row Name 04/29/22 [id number]       Triage Assessment (Adult)    Airway WDL WDL       Respiratory WDL    Respiratory WDL WDL       Skin Circulation/Temperature WDL    Skin Circulation/Temperature WDL WDL       Cardiac WDL    Cardiac WDL WDL       Peripheral/Neurovascular WDL    Peripheral Neurovascular WDL WDL       Cognitive/Neuro/Behavioral WDL    Cognitive/Neuro/Behavioral WDL WDL

## 2022-05-17 ENCOUNTER — LAB REQUISITION (OUTPATIENT)
Dept: LAB | Facility: CLINIC | Age: 87
End: 2022-05-17
Payer: MEDICARE

## 2022-05-17 DIAGNOSIS — R53.83 OTHER FATIGUE: ICD-10-CM

## 2022-05-17 DIAGNOSIS — I13.0 HYPERTENSIVE HEART AND CHRONIC KIDNEY DISEASE WITH HEART FAILURE AND STAGE 1 THROUGH STAGE 4 CHRONIC KIDNEY DISEASE, OR UNSPECIFIED CHRONIC KIDNEY DISEASE (H): ICD-10-CM

## 2022-05-17 DIAGNOSIS — E78.2 MIXED HYPERLIPIDEMIA: ICD-10-CM

## 2022-05-17 DIAGNOSIS — I51.9 HEART DISEASE, UNSPECIFIED: ICD-10-CM

## 2022-05-17 LAB
ALBUMIN SERPL-MCNC: 3.7 G/DL (ref 3.5–5)
ALP SERPL-CCNC: 68 U/L (ref 45–120)
ALT SERPL W P-5'-P-CCNC: <9 U/L (ref 0–45)
ANION GAP SERPL CALCULATED.3IONS-SCNC: 7 MMOL/L (ref 5–18)
AST SERPL W P-5'-P-CCNC: 15 U/L (ref 0–40)
BILIRUB SERPL-MCNC: 0.4 MG/DL (ref 0–1)
BUN SERPL-MCNC: 26 MG/DL (ref 8–28)
CALCIUM SERPL-MCNC: 9.6 MG/DL (ref 8.5–10.5)
CHLORIDE BLD-SCNC: 102 MMOL/L (ref 98–107)
CHOLEST SERPL-MCNC: 267 MG/DL
CO2 SERPL-SCNC: 27 MMOL/L (ref 22–31)
CREAT SERPL-MCNC: 1.16 MG/DL (ref 0.6–1.1)
GFR SERPL CREATININE-BSD FRML MDRD: 46 ML/MIN/1.73M2
GLUCOSE BLD-MCNC: 88 MG/DL (ref 70–125)
HDLC SERPL-MCNC: 53 MG/DL
LDLC SERPL CALC-MCNC: 178 MG/DL
POTASSIUM BLD-SCNC: 4.8 MMOL/L (ref 3.5–5)
PROT SERPL-MCNC: 7 G/DL (ref 6–8)
SODIUM SERPL-SCNC: 136 MMOL/L (ref 136–145)
TRIGL SERPL-MCNC: 178 MG/DL
TSH SERPL DL<=0.005 MIU/L-ACNC: 2.06 UIU/ML (ref 0.3–5)

## 2022-05-17 PROCEDURE — 80061 LIPID PANEL: CPT | Mod: ORL | Performed by: STUDENT IN AN ORGANIZED HEALTH CARE EDUCATION/TRAINING PROGRAM

## 2022-05-17 PROCEDURE — 84443 ASSAY THYROID STIM HORMONE: CPT | Mod: ORL | Performed by: STUDENT IN AN ORGANIZED HEALTH CARE EDUCATION/TRAINING PROGRAM

## 2022-05-17 PROCEDURE — 80053 COMPREHEN METABOLIC PANEL: CPT | Mod: ORL | Performed by: STUDENT IN AN ORGANIZED HEALTH CARE EDUCATION/TRAINING PROGRAM

## 2022-07-04 PROBLEM — I35.9 AORTIC VALVE DISORDER: Status: ACTIVE | Noted: 2022-07-04

## 2022-07-04 PROBLEM — R10.9 CHRONIC ABDOMINAL PAIN: Status: RESOLVED | Noted: 2021-12-07 | Resolved: 2022-07-04

## 2022-07-04 PROBLEM — R60.0 PERIPHERAL EDEMA: Status: RESOLVED | Noted: 2021-03-03 | Resolved: 2022-07-04

## 2022-07-04 PROBLEM — I67.2 INTRACRANIAL ATHEROSCLEROSIS: Status: ACTIVE | Noted: 2022-07-04

## 2022-07-04 PROBLEM — D64.9 ANEMIA: Status: RESOLVED | Noted: 2021-03-03 | Resolved: 2022-07-04

## 2022-07-04 PROBLEM — Z86.19 HISTORY OF INFECTIOUS DISEASE: Status: RESOLVED | Noted: 2021-03-03 | Resolved: 2022-07-04

## 2022-07-04 PROBLEM — M77.9 TENDONITIS: Status: RESOLVED | Noted: 2021-03-03 | Resolved: 2022-07-04

## 2022-07-04 PROBLEM — G89.29 CHRONIC ABDOMINAL PAIN: Status: RESOLVED | Noted: 2021-12-07 | Resolved: 2022-07-04

## 2022-07-04 NOTE — PROGRESS NOTES
"NEUROLOGY FOLLOW UP VISIT  NOTE       [facility] NEUROLOGY [place]  [address] [place] [address]  Tel: [phone]  Fax: [phone]  [email or web address]     Kerry Hoffmann,  1935, MRN [id number]  PCP: Demarco Mckeon  Date: 2022      ASSESSMENT & PLAN     Visit Diagnosis  1. Intracranial atherosclerosis  2. Carpal tunnel syndrome     Intracranial atherosclerotic disease  Pleasant 87-year-old female with history of HTN, HLD, GERD, HAWA, CKD stage III, carpal tunnel syndrome and diffuse intracranial atherosclerosis who returns for follow-up.  Currently she is on DAPT but in spite of significant intracranial atherosclerotic disease unable to tolerate any statin, niacin or Zetia.  Although Repatha was recommended in the past she cannot afford the cost.  She has at least 51 agents listed as possible allergies which is somewhat unusual and I suspect she is intolerant rather than allergic to all these agents.  She realizes she is a \"ticking bomb\" and is willing to give statin another trial.  I have recommended:    1.  Check CK and hepatic profile  2.  Start Lipitor 10 mg daily  3.  Continue baby aspirin and Plavix 75 mg daily  4.  Repeat lipid profile after 6 months and titrate the dose of Lipitor accordingly.  If she is unable to tolerate Lipitor I will consider Crestor.  5.  Follow-up will be in 1 year    Thank you again for this referral, please feel free to contact me if you have any questions.    Mark Hummel MD  [facility]  (Formerly, [facility]     HISTORY OF PRESENT ILLNESS     Patient is 87-year-old female with history of HTN, HLD, GERD, HAWA, CKD stage III, carpal tunnel syndrome and diffuse intracranial atherosclerosis previously followed in our clinic by Dr. Jordon Brady who returns for follow-up.  She is a new patient for me.    She was initially evaluated in our clinic on 3/5/2021 when she had " MRA of the head due to pulsatile tinnitus.  This showed intracranial atherosclerosis involving the anterior and posterior circulation with moderate to severe narrowing of the left clinoid ICA segment, right cavernous ICA, moderate left A3, moderate to severe left M1, mild to moderate bilateral M2/M3, severe right P1, moderate left P2 with a normal-appearing vertebrobasilar arteries.  She subsequently had MRI of the brain that showed age-related changes.  Due to her anxiety disorder these findings cause quite a bit of concern to her.  She was experiencing some numbness in her hand and had a EMG on 3/12/2021 that was consistent with moderately severe right carpal tunnel syndrome.  She is currently only on Zetia.  Most recent LDL checked on 5/17/2022 was fairly high at 178. she was started on Plavix in addition to aspirin .  She was started on Zetia but stopped taking that medicine also.  She has a long list of allergies and at least 51 agents are listed that I find extremely hard to believe and I suspect she is intolerant to these medicine rather than allergic.  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     Mild persistent asthma with exacerbation J45.31     Osteoarthritis of knee M17.10     Pounding noise in left ear H93.8X2     Senile osteoporosis M81.0     Stage 3a chronic kidney disease (H) N18.31     Vitamin D deficiency E55.9     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     Irritable bowel syndrome K58.9     Aortic valve disorder  I35.9     Intracranial atherosclerosis I67.2         PAST MEDICAL & SURGICAL HISTORY     Past Medical History:   Patient  has a past medical history of Anxiety, Asthma, Chronic kidney disease, CKD (chronic kidney disease), Clostridium difficile colitis (11/1/2016), Clostridium difficile infection, Clostridium enterocolitis (10/27/2016), CTS (carpal tunnel syndrome), GERD (gastroesophageal reflux disease), Hiatal hernia, Hyperlipidemia, Hyperlipidemia, Hyperlipidemia, Hypertension, Hypertension, Osteoarthritis of knee, Spinal stenosis, and Vitamin D deficiency.    Surgical History:  She  has a past surgical history that includes Tonsillectomy; tonsillectomy & adenoidectomy (1963); Breast surgery (1982); Orif Tibia & Fibula Fractures (1988); other surgical history (10/2004); Cataract Extraction (07/2005); other surgical history (05/03/2015); and Pr Esophagogastroduodenoscopy Transoral Diagnostic (N/A, 9/11/2020).     SOCIAL HISTORY     Reviewed, and she  reports that she has quit smoking. Her smoking use included cigarettes, cigarettes, and cigarettes. She has a 105.00 pack-year smoking history. She has never used smokeless tobacco. She reports previous alcohol use. She reports that she does not use drugs.     FAMILY HISTORY     Reviewed, and family history includes Alzheimer Disease in her sister; Brain Cancer in her brother; Cancer in her brother and mother; Coronary Artery Disease in her father; Heart Disease in her father, mother, and sister; Heart Failure in her sister; Hypertension in her mother and sister; Myocardial Infarction in her father; Pacemaker in her mother.     ALLERGIES     Allergies   Allergen Reactions     Buspirone Shortness Of Breath     Ciprofloxacin Hives and Shortness Of Breath     Other reaction(s): Unknown     Cortisone      Other reaction(s): Throat Swelling/Closing, Unknown  Reaction 9-5-94       Hydrocodone-Acetaminophen Shortness Of Breath     Other reaction(s): Unknown     Lansoprazole  Shortness Of Breath     Metoprolol Shortness Of Breath     Nedocromil      Other reaction(s): Throat Swelling/Closing     Niacin Shortness Of Breath     Other reaction(s): Unknown     Acetaminophen      Other reaction(s): breathing difficulties, red ears,high blood press.     Advair Diskus Other (See Comments)     Elevated blood pressure       Albuterol Other (See Comments)     Inhaler had extreme effect on nervous system       Amlodipine      Other reaction(s): Erythema, Unknown     Azithromycin      Other reaction(s): *Unknown, Unknown     Cefixime Diarrhea     Other reaction(s): Unknown     Cefprozil Nausea and Vomiting     Other reaction(s): Unknown     Cefuroxime      Other reaction(s): *Unknown     Cephalexin      Other reaction(s): *Unknown, Unknown     Contrast Dye      Other reaction(s): hives     Cyclobenzaprine      Other reaction(s): Sedation     Dextromethorphan      Other reaction(s): headache,nausea     Diltiazem      Other reaction(s): Erythema, Unknown  Ears face arms and chest red and on fire-body tremors       Erythromycin      Other reaction(s): Unknown     Esomeprazole      Other reaction(s): Headache     Ethanol      Other reaction(s): Adverse reaction     Fenofibrate Muscle Pain (Myalgia)     Other reaction(s): Unknown     Guaifenesin-Dm Cr Nausea     Other reaction(s): Headache     Hydrocodone      Other reaction(s): breathing difficulties     Levofloxacin Nausea     Other reaction(s): Headache, Unknown     Methylprednisolone      Metronidazole      Monosodium Glutamate      Moxifloxacin      Other reaction(s): Dizziness     Nifedipine      Other reaction(s): Edema  Took for 5-93 to 9-94 red and swollen leg       Nsaids      Other reaction(s): Unknown     Ofloxacin      Other reaction(s): *Unknown     Ondansetron      Other reaction(s): Constipation     Parsley Seed      Penicillins      Other reaction(s): *Unknown, Unknown     Piper      Pirbuterol Other (See Comments)     Immediate extreme  "effect on nervous system       Pravastatin      Other reaction(s): Dizziness, Unknown     Pseudoephedrine      Other reaction(s): headache,nausea     Pseudoephedrine-Dm-Gg      Simvastatin Muscle Pain (Myalgia)     Spironolactone      Other reaction(s): stomach cramps, nausea     Sulfamethoxazole-Trimethoprim      Other reaction(s): Unknown     Tramadol      Other reaction(s): red ears,high blood press.     Triamterene Other (See Comments)     Greatly bothered with sun-took medication for three years  Greatly bothered with sun       Ultracet      Other reaction(s): Tachycardia, Unknown     Diatrizoate Rash     Telmisartan Palpitations         REVIEW OF SYSTEMS     A 12 point review of system was performed and was negative except as outlined in the history of present illness.     HOME MEDICATIONS     Current Outpatient Rx   Medication Sig Dispense Refill     acetaminophen (TYLENOL) 500 MG tablet Take 500 mg by mouth as needed       aspirin (ASA) 81 MG chewable tablet Take 81 mg by mouth daily       atorvastatin (LIPITOR) 10 MG tablet Take 1 tablet (10 mg) by mouth daily 30 tablet 11     calcium carbonate 750 MG CHEW Take 1 tablet by mouth daily       Cholecalciferol (VITAMIN D3) 50 MCG (2000 UT) CAPS 1 tablet by Oral or Feeding Tube route daily       clonazePAM (KLONOPIN) 0.5 MG tablet Take 1 mg by mouth 3 times daily       clopidogrel (PLAVIX) 75 MG tablet Take 1 tablet (75 mg) by mouth daily 30 tablet 11     famotidine (PEPCID) 10 MG tablet Take 1 tablet by mouth 4 times daily       losartan (COZAAR) 50 MG tablet Take 50 mg by mouth 2 times daily       metoprolol succinate ER (TOPROL-XL) 25 MG 24 hr tablet Take 1 tablet by mouth At Bedtime       Ferrous Gluconate 324 (37.5 Fe) MG TABS Take 1 tablet by mouth Every Mon, Wed, Fri Morning (Patient not taking: Reported on 7/5/2022)           PHYSICAL EXAM     Vital signs  BP (!) 178/65 (BP Location: Left arm, Patient Position: Sitting)   Pulse 71   Ht 1.6 m (5' 3\")   " Wt 78.5 kg (173 lb)   LMP  (LMP Unknown)   BMI 30.65 kg/m      Weight:   173 lbs 0 oz    Pleasant elderly female who is alert and oriented x3 no acute distress.  Vital signs were reviewed and are documented in electronic medical record.  Neck supple.  Neurologically speech mentation and affect was normal cranial nerves II through XII are intact.  Motor strength 5/5 reflexes 1+ she walks with cane     PERTINENT DIAGNOSTIC STUDIES     Following studies were reviewed:     MRI BRAIN 3/11/2021  1. No acute intracranial abnormality identified with no hemorrhage, mass or cortical-based infarct.  2. Background of age-related cerebral volume loss and moderate white matter small vessel ischemic-type changes.    MRA BRAIN 2/23/2021  Anterior circulation: Tortuosity of the extracranial right distal cervical ICA segment at the skull base. Moderate to severe atherosclerotic narrowing in the left clinoid ICA segment (series 3 images 134-137). Mild atherosclerotic narrowing in the right cavernous ICA segment. Moderate focal stenosis in the left proximal A3 SCAR segment (series 3 image 204). Moderate to severe stenosis in the left distal M1 MCA segment (series 3 image 157). Scattered areas of mild to moderate stenosis in the M2/M3 MCA segments. ACOM and both PCOMs are present. No evidence of aneurysm.Posterior circulation: Severe atherosclerotic narrowing in the right proximal P1 segment, with scattered areas of mild to moderate stenosis more distally. Moderate focal narrowing in the left P2 PCA segment (series 3 image 148). Normal appearing vertebrobasilar arteries with dominant left vertebral artery.No obvious change from the prior recent MRA neck study.    CONCLUSION:  1. Extensive atherosclerotic narrowing in the anterior and posterior circulation as described above.  2. No evidence of aneurysm.    MRI LUMBAR SPINE 10/11/2005  1. There is a Grade II, 30-35%, chronic degenerative spondylolisthesis at L4-5 with advanced  hypertrophic facet arthropathy. There is no evidence of synovial cyst formation or facet effusion. Overall, there is moderate central and severe left subarticular stenosis with significant chronic left traversing L5 nerve sleeve compression.  2. Mild up-down left L4-5 foraminal stenosis without ganglion compression.  3. There are no acute fractures identified.  Note: Comparison is now made with the 09/21/04 study. The overall appearance at L4-5 is stable and unchanged when compared to the prior study. No new stenosis or disc herniation is identified. Also, left L4-5 subarticular stenosis is unchanged.     PERTINENT LABS  Following labs were reviewed:  Lab Requisition on 05/17/2022   Component Date Value     Sodium 05/17/2022 136      Potassium 05/17/2022 4.8      Chloride 05/17/2022 102      Carbon Dioxide (CO2) 05/17/2022 27      Anion Gap 05/17/2022 7      Urea Nitrogen 05/17/2022 26      Creatinine 05/17/2022 1.16 (A)     Calcium 05/17/2022 9.6      Glucose 05/17/2022 88      Alkaline Phosphatase 05/17/2022 68      AST 05/17/2022 15      ALT 05/17/2022 <9      Protein Total 05/17/2022 7.0      Albumin 05/17/2022 3.7      Bilirubin Total 05/17/2022 0.4      GFR Estimate 05/17/2022 46 (A)     Cholesterol 05/17/2022 267 (A)     Triglycerides 05/17/2022 178 (A)     Direct Measure HDL 05/17/2022 53      LDL Cholesterol Calculat* 05/17/2022 178 (A)     TSH 05/17/2022 2.06          Total time spent for face to face visit, reviewing labs/imaging studies, counseling and coordination of care was: 45 Minutes spent on the date of the encounter doing chart review, review of outside records, review of test results, interpretation of tests, patient visit and documentation       This note was dictated using voice recognition software.  Any grammatical or context distortions are unintentional and inherent to the software.    Orders Placed This Encounter   Procedures     CK total     Hepatic function panel      New Prescriptions     ATORVASTATIN (LIPITOR) 10 MG TABLET    Take 1 tablet (10 mg) by mouth daily     Modified Medications    Modified Medication Previous Medication    CLOPIDOGREL (PLAVIX) 75 MG TABLET clopidogrel (PLAVIX) 75 MG tablet       Take 1 tablet (75 mg) by mouth daily    Take 1 tablet (75 mg) by mouth daily

## 2022-07-05 ENCOUNTER — OFFICE VISIT (OUTPATIENT)
Dept: NEUROLOGY | Facility: CLINIC | Age: 87
End: 2022-07-05
Payer: MEDICARE

## 2022-07-05 VITALS
SYSTOLIC BLOOD PRESSURE: 178 MMHG | BODY MASS INDEX: 30.65 KG/M2 | HEIGHT: 63 IN | DIASTOLIC BLOOD PRESSURE: 65 MMHG | WEIGHT: 173 LBS | HEART RATE: 71 BPM

## 2022-07-05 DIAGNOSIS — G56.01 CARPAL TUNNEL SYNDROME OF RIGHT WRIST: ICD-10-CM

## 2022-07-05 DIAGNOSIS — I67.2 INTRACRANIAL ATHEROSCLEROSIS: ICD-10-CM

## 2022-07-05 PROCEDURE — 99215 OFFICE O/P EST HI 40 MIN: CPT | Performed by: PSYCHIATRY & NEUROLOGY

## 2022-07-05 RX ORDER — CLOPIDOGREL BISULFATE 75 MG/1
75 TABLET ORAL DAILY
Qty: 30 TABLET | Refills: 11 | Status: SHIPPED | OUTPATIENT
Start: 2022-07-05 | End: 2023-08-21

## 2022-07-05 RX ORDER — ATORVASTATIN CALCIUM 10 MG/1
10 TABLET, FILM COATED ORAL DAILY
Qty: 30 TABLET | Refills: 11 | Status: SHIPPED | OUTPATIENT
Start: 2022-07-05 | End: 2022-07-05

## 2022-07-05 RX ORDER — FAMOTIDINE 10 MG
10 TABLET ORAL DAILY
COMMUNITY
Start: 2021-12-03

## 2022-07-05 RX ORDER — ATORVASTATIN CALCIUM 10 MG/1
TABLET, FILM COATED ORAL
Qty: 90 TABLET | Refills: 3 | Status: SHIPPED | OUTPATIENT
Start: 2022-07-05 | End: 2023-07-03

## 2022-07-05 NOTE — LETTER
"    2022         RE: Kerry Hoffmann  [address]  [address]  [facility] [address]        Dear Colleague,    Thank you for referring your patient, Kerry Hoffmann, to the [facility] [place]. Please see a copy of my visit note below.    NEUROLOGY FOLLOW UP VISIT  NOTE       [facility] [place]  [address] [place] [address]  Tel: [phone]  Fax: [phone]  [email or web address]     Kerry Hoffmann,  1935, MRN [id number]  PCP: Demarco Mckeon  Date: 2022      ASSESSMENT & PLAN     Visit Diagnosis  1. Intracranial atherosclerosis  2. Carpal tunnel syndrome     Intracranial atherosclerotic disease  Pleasant 87-year-old female with history of HTN, HLD, GERD, HAWA, CKD stage III, carpal tunnel syndrome and diffuse intracranial atherosclerosis who returns for follow-up.  Currently she is on DAPT but in spite of significant intracranial atherosclerotic disease unable to tolerate any statin, niacin or Zetia.  Although Repatha was recommended in the past she cannot afford the cost.  She has at least 51 agents listed as possible allergies which is somewhat unusual and I suspect she is intolerant rather than allergic to all these agents.  She realizes she is a \"ticking bomb\" and is willing to give statin another trial.  I have recommended:    1.  Check CK and hepatic profile  2.  Start Lipitor 10 mg daily  3.  Continue baby aspirin and Plavix 75 mg daily  4.  Repeat lipid profile after 6 months and titrate the dose of Lipitor accordingly.  If she is unable to tolerate Lipitor I will consider Crestor.  5.  Follow-up will be in 1 year    Thank you again for this referral, please feel free to contact me if you have any questions.    Mark Hummel MD  [facility]  (Former[facility], [facility]     HISTORY OF PRESENT ILLNESS     Patient is 87-year-old female with history of HTN, HLD, " GERD, HAWA, CKD stage III, carpal tunnel syndrome and diffuse intracranial atherosclerosis previously followed in our clinic by Dr. Jordon Brady who returns for follow-up.  She is a new patient for me.    She was initially evaluated in our clinic on 3/5/2021 when she had MRA of the head due to pulsatile tinnitus.  This showed intracranial atherosclerosis involving the anterior and posterior circulation with moderate to severe narrowing of the left clinoid ICA segment, right cavernous ICA, moderate left A3, moderate to severe left M1, mild to moderate bilateral M2/M3, severe right P1, moderate left P2 with a normal-appearing vertebrobasilar arteries.  She subsequently had MRI of the brain that showed age-related changes.  Due to her anxiety disorder these findings cause quite a bit of concern to her.  She was experiencing some numbness in her hand and had a EMG on 3/12/2021 that was consistent with moderately severe right carpal tunnel syndrome.  She is currently only on Zetia.  Most recent LDL checked on 5/17/2022 was fairly high at 178. she was started on Plavix in addition to aspirin .  She was started on Zetia but stopped taking that medicine also.  She has a long list of allergies and at least 51 agents are listed that I find extremely hard to believe and I suspect she is intolerant to these medicine rather than allergic.  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     Mild persistent asthma with exacerbation J45.31      Osteoarthritis of knee M17.10     Pounding noise in left ear H93.8X2     Senile osteoporosis M81.0     Stage 3a chronic kidney disease (H) N18.31     Vitamin D deficiency E55.9     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     Irritable bowel syndrome K58.9     Aortic valve disorder I35.9     Intracranial atherosclerosis I67.2         PAST MEDICAL & SURGICAL HISTORY     Past Medical History:   Patient  has a past medical history of Anxiety, Asthma, Chronic kidney disease, CKD (chronic kidney disease), Clostridium difficile colitis (11/1/2016), Clostridium difficile infection, Clostridium enterocolitis (10/27/2016), CTS (carpal tunnel syndrome), GERD (gastroesophageal reflux disease), Hiatal hernia, Hyperlipidemia, Hyperlipidemia, Hyperlipidemia, Hypertension, Hypertension, Osteoarthritis of knee, Spinal stenosis, and Vitamin D deficiency.    Surgical History:  She  has a past surgical history that includes Tonsillectomy; tonsillectomy & adenoidectomy (1963); Breast surgery (1982); Orif Tibia & Fibula Fractures (1988); other surgical history (10/2004); Cataract Extraction (07/2005); other surgical history (05/03/2015); and Pr Esophagogastroduodenoscopy Transoral Diagnostic (N/A, 9/11/2020).     SOCIAL HISTORY     Reviewed, and she  reports that she has quit smoking. Her smoking use included cigarettes, cigarettes, and cigarettes. She has a 105.00 pack-year smoking history. She has never used smokeless tobacco. She reports previous alcohol use. She reports that she does not use drugs.     FAMILY HISTORY     Reviewed, and family history includes Alzheimer Disease in her sister; Brain Cancer in her brother; Cancer in her brother and mother; Coronary Artery Disease in her father; Heart Disease in her father, mother, and sister; Heart Failure in her sister; Hypertension in her mother and sister; Myocardial Infarction in her father; Pacemaker in her mother.     ALLERGIES     Allergies   Allergen Reactions     Buspirone  Shortness Of Breath     Ciprofloxacin Hives and Shortness Of Breath     Other reaction(s): Unknown     Cortisone      Other reaction(s): Throat Swelling/Closing, Unknown  Reaction 9-5-94       Hydrocodone-Acetaminophen Shortness Of Breath     Other reaction(s): Unknown     Lansoprazole Shortness Of Breath     Metoprolol Shortness Of Breath     Nedocromil      Other reaction(s): Throat Swelling/Closing     Niacin Shortness Of Breath     Other reaction(s): Unknown     Acetaminophen      Other reaction(s): breathing difficulties, red ears,high blood press.     Advair Diskus Other (See Comments)     Elevated blood pressure       Albuterol Other (See Comments)     Inhaler had extreme effect on nervous system       Amlodipine      Other reaction(s): Erythema, Unknown     Azithromycin      Other reaction(s): *Unknown, Unknown     Cefixime Diarrhea     Other reaction(s): Unknown     Cefprozil Nausea and Vomiting     Other reaction(s): Unknown     Cefuroxime      Other reaction(s): *Unknown     Cephalexin      Other reaction(s): *Unknown, Unknown     Contrast Dye      Other reaction(s): hives     Cyclobenzaprine      Other reaction(s): Sedation     Dextromethorphan      Other reaction(s): headache,nausea     Diltiazem      Other reaction(s): Erythema, Unknown  Ears face arms and chest red and on fire-body tremors       Erythromycin      Other reaction(s): Unknown     Esomeprazole      Other reaction(s): Headache     Ethanol      Other reaction(s): Adverse reaction     Fenofibrate Muscle Pain (Myalgia)     Other reaction(s): Unknown     Guaifenesin-Dm Cr Nausea     Other reaction(s): Headache     Hydrocodone      Other reaction(s): breathing difficulties     Levofloxacin Nausea     Other reaction(s): Headache, Unknown     Methylprednisolone      Metronidazole      Monosodium Glutamate      Moxifloxacin      Other reaction(s): Dizziness     Nifedipine      Other reaction(s): Edema  Took for 5-93 to 9-94 red and swollen leg        Nsaids      Other reaction(s): Unknown     Ofloxacin      Other reaction(s): *Unknown     Ondansetron      Other reaction(s): Constipation     Parsley Seed      Penicillins      Other reaction(s): *Unknown, Unknown     Piper      Pirbuterol Other (See Comments)     Immediate extreme effect on nervous system       Pravastatin      Other reaction(s): Dizziness, Unknown     Pseudoephedrine      Other reaction(s): headache,nausea     Pseudoephedrine-Dm-Gg      Simvastatin Muscle Pain (Myalgia)     Spironolactone      Other reaction(s): stomach cramps, nausea     Sulfamethoxazole-Trimethoprim      Other reaction(s): Unknown     Tramadol      Other reaction(s): red ears,high blood press.     Triamterene Other (See Comments)     Greatly bothered with sun-took medication for three years  Greatly bothered with sun       Ultracet      Other reaction(s): Tachycardia, Unknown     Diatrizoate Rash     Telmisartan Palpitations         REVIEW OF SYSTEMS     A 12 point review of system was performed and was negative except as outlined in the history of present illness.     HOME MEDICATIONS     Current Outpatient Rx   Medication Sig Dispense Refill     acetaminophen (TYLENOL) 500 MG tablet Take 500 mg by mouth as needed       aspirin (ASA) 81 MG chewable tablet Take 81 mg by mouth daily       atorvastatin (LIPITOR) 10 MG tablet Take 1 tablet (10 mg) by mouth daily 30 tablet 11     calcium carbonate 750 MG CHEW Take 1 tablet by mouth daily       Cholecalciferol (VITAMIN D3) 50 MCG (2000 UT) CAPS 1 tablet by Oral or Feeding Tube route daily       clonazePAM (KLONOPIN) 0.5 MG tablet Take 1 mg by mouth 3 times daily       clopidogrel (PLAVIX) 75 MG tablet Take 1 tablet (75 mg) by mouth daily 30 tablet 11     famotidine (PEPCID) 10 MG tablet Take 1 tablet by mouth 4 times daily       losartan (COZAAR) 50 MG tablet Take 50 mg by mouth 2 times daily       metoprolol succinate ER (TOPROL-XL) 25 MG 24 hr tablet Take 1 tablet by mouth At  "Bedtime       Ferrous Gluconate 324 (37.5 Fe) MG TABS Take 1 tablet by mouth Every Mon, Wed, Fri Morning (Patient not taking: Reported on 7/5/2022)           PHYSICAL EXAM     Vital signs  BP (!) 178/65 (BP Location: Left arm, Patient Position: Sitting)   Pulse 71   Ht 1.6 m (5' 3\")   Wt 78.5 kg (173 lb)   LMP  (LMP Unknown)   BMI 30.65 kg/m      Weight:   173 lbs 0 oz    Pleasant elderly female who is alert and oriented x3 no acute distress.  Vital signs were reviewed and are documented in electronic medical record.  Neck supple.  Neurologically speech mentation and affect was normal cranial nerves II through XII are intact.  Motor strength 5/5 reflexes 1+ she walks with cane     PERTINENT DIAGNOSTIC STUDIES     Following studies were reviewed:     MRI BRAIN 3/11/2021  1. No acute intracranial abnormality identified with no hemorrhage, mass or cortical-based infarct.  2. Background of age-related cerebral volume loss and moderate white matter small vessel ischemic-type changes.    MRA BRAIN 2/23/2021  Anterior circulation: Tortuosity of the extracranial right distal cervical ICA segment at the skull base. Moderate to severe atherosclerotic narrowing in the left clinoid ICA segment (series 3 images 134-137). Mild atherosclerotic narrowing in the right cavernous ICA segment. Moderate focal stenosis in the left proximal A3 SCAR segment (series 3 image 204). Moderate to severe stenosis in the left distal M1 MCA segment (series 3 image 157). Scattered areas of mild to moderate stenosis in the M2/M3 MCA segments. ACOM and both PCOMs are present. No evidence of aneurysm.Posterior circulation: Severe atherosclerotic narrowing in the right proximal P1 segment, with scattered areas of mild to moderate stenosis more distally. Moderate focal narrowing in the left P2 PCA segment (series 3 image 148). Normal appearing vertebrobasilar arteries with dominant left vertebral artery.No obvious change from the prior recent MRA " neck study.    CONCLUSION:  1. Extensive atherosclerotic narrowing in the anterior and posterior circulation as described above.  2. No evidence of aneurysm.    MRI LUMBAR SPINE 10/11/2005  1. There is a Grade II, 30-35%, chronic degenerative spondylolisthesis at L4-5 with advanced hypertrophic facet arthropathy. There is no evidence of synovial cyst formation or facet effusion. Overall, there is moderate central and severe left subarticular stenosis with significant chronic left traversing L5 nerve sleeve compression.  2. Mild up-down left L4-5 foraminal stenosis without ganglion compression.  3. There are no acute fractures identified.  Note: Comparison is now made with the 09/21/04 study. The overall appearance at L4-5 is stable and unchanged when compared to the prior study. No new stenosis or disc herniation is identified. Also, left L4-5 subarticular stenosis is unchanged.     PERTINENT LABS  Following labs were reviewed:  Lab Requisition on 05/17/2022   Component Date Value     Sodium 05/17/2022 136      Potassium 05/17/2022 4.8      Chloride 05/17/2022 102      Carbon Dioxide (CO2) 05/17/2022 27      Anion Gap 05/17/2022 7      Urea Nitrogen 05/17/2022 26      Creatinine 05/17/2022 1.16 (A)     Calcium 05/17/2022 9.6      Glucose 05/17/2022 88      Alkaline Phosphatase 05/17/2022 68      AST 05/17/2022 15      ALT 05/17/2022 <9      Protein Total 05/17/2022 7.0      Albumin 05/17/2022 3.7      Bilirubin Total 05/17/2022 0.4      GFR Estimate 05/17/2022 46 (A)     Cholesterol 05/17/2022 267 (A)     Triglycerides 05/17/2022 178 (A)     Direct Measure HDL 05/17/2022 53      LDL Cholesterol Calculat* 05/17/2022 178 (A)     TSH 05/17/2022 2.06          Total time spent for face to face visit, reviewing labs/imaging studies, counseling and coordination of care was: 45 Minutes spent on the date of the encounter doing chart review, review of outside records, review of test results, interpretation of tests, patient  visit and documentation       This note was dictated using voice recognition software.  Any grammatical or context distortions are unintentional and inherent to the software.    Orders Placed This Encounter   Procedures     CK total     Hepatic function panel      New Prescriptions    ATORVASTATIN (LIPITOR) 10 MG TABLET    Take 1 tablet (10 mg) by mouth daily     Modified Medications    Modified Medication Previous Medication    CLOPIDOGREL (PLAVIX) 75 MG TABLET clopidogrel (PLAVIX) 75 MG tablet       Take 1 tablet (75 mg) by mouth daily    Take 1 tablet (75 mg) by mouth daily                     Again, thank you for allowing me to participate in the care of your patient.        Sincerely,        Mark Hummel MD

## 2022-07-05 NOTE — TELEPHONE ENCOUNTER
Refill request for 90 day supply of Atorvastatin. Pt last seen today and pharmacy was sent a 30 day supply with 11 refills. Will update script to 90 day supply with 3 refills.     Cheli Guajardo RN on 7/5/2022 at 2:44 PM

## 2022-07-05 NOTE — NURSING NOTE
Chief Complaint   Patient presents with     Cervical and intracranial atherosclerotic stenoses     Would like to ask questions about the Cervical and intracranial atherosclerotic stenoses as well as the Plavix      Lulu Mendoza CMA on 7/5/2022 at 1:10 PM

## 2022-07-06 ENCOUNTER — TELEPHONE (OUTPATIENT)
Dept: NEUROLOGY | Facility: CLINIC | Age: 87
End: 2022-07-06

## 2022-07-06 NOTE — TELEPHONE ENCOUNTER
Faxed lab orders to [facility] per pt request. Called and informed pt of this as well.     Cheli Guajardo RN on 7/6/2022 at 10:00 AM     Protopic Pregnancy And Lactation Text: This medication is Pregnancy Category C. It is unknown if this medication is excreted in breast milk when applied topically.

## 2022-07-06 NOTE — TELEPHONE ENCOUNTER
Health Call Center    Phone Message    May a detailed message be left on voicemail: yes     Reason for Call: Order(s): Other:   Reason for requested: Send Lab orders to [facility] in [place]   Date needed: ASA  Provider name: Mark Hummel    Patient is requesting her lab orders to be sent over to be completed at [facility] in [place]. Please send these over and call [facility] with questions at # [phone].    Please call patient back to confirm these were sent over at # [phone]      Action Taken: Message routed to:  Other: [facility]     Travel Screening: Not Applicable

## 2022-07-20 ENCOUNTER — LAB REQUISITION (OUTPATIENT)
Dept: LAB | Facility: CLINIC | Age: 87
End: 2022-07-20
Payer: MEDICARE

## 2022-07-20 DIAGNOSIS — I67.2 CEREBRAL ATHEROSCLEROSIS: ICD-10-CM

## 2022-07-20 LAB
ALBUMIN SERPL BCG-MCNC: 4.2 G/DL (ref 3.5–5.2)
ALP SERPL-CCNC: 78 U/L (ref 35–104)
ALT SERPL W P-5'-P-CCNC: 9 U/L (ref 10–35)
AST SERPL W P-5'-P-CCNC: 17 U/L (ref 10–35)
BILIRUB DIRECT SERPL-MCNC: <0.2 MG/DL (ref 0–0.3)
BILIRUB SERPL-MCNC: 0.2 MG/DL
PROT SERPL-MCNC: 7.6 G/DL (ref 6.4–8.3)

## 2022-07-20 PROCEDURE — 82553 CREATINE MB FRACTION: CPT | Mod: ORL | Performed by: FAMILY MEDICINE

## 2022-07-20 PROCEDURE — 80076 HEPATIC FUNCTION PANEL: CPT | Mod: ORL | Performed by: FAMILY MEDICINE

## 2022-07-22 LAB — CK MB SERPL-MCNC: 1.5 NG/ML

## 2022-09-04 ENCOUNTER — HOSPITAL ENCOUNTER (EMERGENCY)
Facility: HOSPITAL | Age: 87
Discharge: HOME | End: 2022-09-04
Attending: STUDENT IN AN ORGANIZED HEALTH CARE EDUCATION/TRAINING PROGRAM | Admitting: STUDENT IN AN ORGANIZED HEALTH CARE EDUCATION/TRAINING PROGRAM
Payer: MEDICARE

## 2022-09-04 ENCOUNTER — APPOINTMENT (OUTPATIENT)
Dept: CT IMAGING | Facility: HOSPITAL | Age: 87
End: 2022-09-04
Attending: STUDENT IN AN ORGANIZED HEALTH CARE EDUCATION/TRAINING PROGRAM
Payer: MEDICARE

## 2022-09-04 ENCOUNTER — APPOINTMENT (OUTPATIENT)
Dept: RADIOLOGY | Facility: HOSPITAL | Age: 87
End: 2022-09-04
Attending: STUDENT IN AN ORGANIZED HEALTH CARE EDUCATION/TRAINING PROGRAM
Payer: MEDICARE

## 2022-09-04 VITALS
DIASTOLIC BLOOD PRESSURE: 70 MMHG | SYSTOLIC BLOOD PRESSURE: 162 MMHG | OXYGEN SATURATION: 96 % | RESPIRATION RATE: 22 BRPM | HEART RATE: 79 BPM | TEMPERATURE: 98.6 F

## 2022-09-04 DIAGNOSIS — R19.7 DIARRHEA, UNSPECIFIED TYPE: ICD-10-CM

## 2022-09-04 DIAGNOSIS — R07.9 CHEST PAIN, UNSPECIFIED TYPE: ICD-10-CM

## 2022-09-04 LAB
ALBUMIN SERPL-MCNC: 3.4 G/DL (ref 3.5–5)
ALBUMIN UR-MCNC: 10 MG/DL
ALP SERPL-CCNC: 50 U/L (ref 45–120)
ALT SERPL W P-5'-P-CCNC: <9 U/L (ref 0–45)
ANION GAP SERPL CALCULATED.3IONS-SCNC: 7 MMOL/L (ref 5–18)
APPEARANCE UR: CLEAR
AST SERPL W P-5'-P-CCNC: 14 U/L (ref 0–40)
BASOPHILS # BLD AUTO: 0 10E3/UL (ref 0–0.2)
BASOPHILS NFR BLD AUTO: 0 %
BILIRUB SERPL-MCNC: 0.4 MG/DL (ref 0–1)
BILIRUB UR QL STRIP: NEGATIVE
BUN SERPL-MCNC: 21 MG/DL (ref 8–28)
CALCIUM SERPL-MCNC: 9.3 MG/DL (ref 8.5–10.5)
CHLORIDE BLD-SCNC: 96 MMOL/L (ref 98–107)
CO2 SERPL-SCNC: 24 MMOL/L (ref 22–31)
COLOR UR AUTO: ABNORMAL
CREAT SERPL-MCNC: 1.17 MG/DL (ref 0.6–1.1)
EOSINOPHIL # BLD AUTO: 0.1 10E3/UL (ref 0–0.7)
EOSINOPHIL NFR BLD AUTO: 1 %
ERYTHROCYTE [DISTWIDTH] IN BLOOD BY AUTOMATED COUNT: 12.5 % (ref 10–15)
GFR SERPL CREATININE-BSD FRML MDRD: 46 ML/MIN/1.73M2
GLUCOSE BLD-MCNC: 95 MG/DL (ref 70–125)
GLUCOSE UR STRIP-MCNC: NEGATIVE MG/DL
HCT VFR BLD AUTO: 28.6 % (ref 35–47)
HGB BLD-MCNC: 9.6 G/DL (ref 11.7–15.7)
HGB UR QL STRIP: NEGATIVE
HYALINE CASTS: 7 /LPF
IMM GRANULOCYTES # BLD: 0 10E3/UL
IMM GRANULOCYTES NFR BLD: 0 %
KETONES UR STRIP-MCNC: NEGATIVE MG/DL
LEUKOCYTE ESTERASE UR QL STRIP: NEGATIVE
LIPASE SERPL-CCNC: 15 U/L (ref 0–52)
LYMPHOCYTES # BLD AUTO: 1.1 10E3/UL (ref 0.8–5.3)
LYMPHOCYTES NFR BLD AUTO: 12 %
MCH RBC QN AUTO: 29.7 PG (ref 26.5–33)
MCHC RBC AUTO-ENTMCNC: 33.6 G/DL (ref 31.5–36.5)
MCV RBC AUTO: 89 FL (ref 78–100)
MONOCYTES # BLD AUTO: 1.2 10E3/UL (ref 0–1.3)
MONOCYTES NFR BLD AUTO: 14 %
MUCOUS THREADS #/AREA URNS LPF: PRESENT /LPF
NEUTROPHILS # BLD AUTO: 6.6 10E3/UL (ref 1.6–8.3)
NEUTROPHILS NFR BLD AUTO: 73 %
NITRATE UR QL: NEGATIVE
NRBC # BLD AUTO: 0 10E3/UL
NRBC BLD AUTO-RTO: 0 /100
PH UR STRIP: 5 [PH] (ref 5–7)
PLATELET # BLD AUTO: 303 10E3/UL (ref 150–450)
POTASSIUM BLD-SCNC: 4.6 MMOL/L (ref 3.5–5)
PROT SERPL-MCNC: 6.9 G/DL (ref 6–8)
RBC # BLD AUTO: 3.23 10E6/UL (ref 3.8–5.2)
RBC URINE: 1 /HPF
SODIUM SERPL-SCNC: 127 MMOL/L (ref 136–145)
SP GR UR STRIP: 1.02 (ref 1–1.03)
SQUAMOUS EPITHELIAL: 2 /HPF
TROPONIN I SERPL-MCNC: <0.01 NG/ML (ref 0–0.29)
TROPONIN I SERPL-MCNC: <0.01 NG/ML (ref 0–0.29)
UROBILINOGEN UR STRIP-MCNC: <2 MG/DL
WBC # BLD AUTO: 9.1 10E3/UL (ref 4–11)
WBC URINE: 2 /HPF

## 2022-09-04 PROCEDURE — 83690 ASSAY OF LIPASE: CPT | Performed by: STUDENT IN AN ORGANIZED HEALTH CARE EDUCATION/TRAINING PROGRAM

## 2022-09-04 PROCEDURE — 81001 URINALYSIS AUTO W/SCOPE: CPT | Performed by: STUDENT IN AN ORGANIZED HEALTH CARE EDUCATION/TRAINING PROGRAM

## 2022-09-04 PROCEDURE — G1010 CDSM STANSON: HCPCS

## 2022-09-04 PROCEDURE — 250N000009 HC RX 250: Performed by: STUDENT IN AN ORGANIZED HEALTH CARE EDUCATION/TRAINING PROGRAM

## 2022-09-04 PROCEDURE — 84484 ASSAY OF TROPONIN QUANT: CPT | Performed by: STUDENT IN AN ORGANIZED HEALTH CARE EDUCATION/TRAINING PROGRAM

## 2022-09-04 PROCEDURE — 99285 EMERGENCY DEPT VISIT HI MDM: CPT | Mod: 25

## 2022-09-04 PROCEDURE — 80053 COMPREHEN METABOLIC PANEL: CPT | Performed by: STUDENT IN AN ORGANIZED HEALTH CARE EDUCATION/TRAINING PROGRAM

## 2022-09-04 PROCEDURE — 93005 ELECTROCARDIOGRAM TRACING: CPT | Performed by: STUDENT IN AN ORGANIZED HEALTH CARE EDUCATION/TRAINING PROGRAM

## 2022-09-04 PROCEDURE — 250N000013 HC RX MED GY IP 250 OP 250 PS 637: Performed by: STUDENT IN AN ORGANIZED HEALTH CARE EDUCATION/TRAINING PROGRAM

## 2022-09-04 PROCEDURE — 85025 COMPLETE CBC W/AUTO DIFF WBC: CPT | Performed by: STUDENT IN AN ORGANIZED HEALTH CARE EDUCATION/TRAINING PROGRAM

## 2022-09-04 PROCEDURE — 71046 X-RAY EXAM CHEST 2 VIEWS: CPT

## 2022-09-04 PROCEDURE — 36415 COLL VENOUS BLD VENIPUNCTURE: CPT | Performed by: STUDENT IN AN ORGANIZED HEALTH CARE EDUCATION/TRAINING PROGRAM

## 2022-09-04 RX ADMIN — LIDOCAINE HYDROCHLORIDE 30 ML: 20 SOLUTION ORAL; TOPICAL at 16:00

## 2022-09-04 ASSESSMENT — ENCOUNTER SYMPTOMS
DIZZINESS: 1
DIARRHEA: 1
ABDOMINAL PAIN: 1
SHORTNESS OF BREATH: 1

## 2022-09-04 ASSESSMENT — ACTIVITIES OF DAILY LIVING (ADL)
ADLS_ACUITY_SCORE: 35
ADLS_ACUITY_SCORE: 35

## 2022-09-04 NOTE — ED NOTES
Bed: JNED-15  Expected date: 9/4/22  Expected time: 1:00 PM  Means of arrival: Ambulance  Comments:  MPWS 87 F CP x3 days, aspirin and nitroglycerin given, NSR on EKG

## 2022-09-04 NOTE — ED PROVIDER NOTES
EMERGENCY DEPARTMENT ENCOUNTER      NAME: Kerry Hoffmann  AGE: 87 year old female  YOB: 1935  MRN: [id number]  EVALUATION DATE & TIME: 9/4/2022  1:06 PM    PCP: Demarco Mckeon    ED PROVIDER: Marco Hughes M.D.      Chief Complaint   Patient presents with     Chest Pain     Abdominal Pain         FINAL IMPRESSION:  1. Diarrhea, unspecified type    2. Chest pain, unspecified type          ED COURSE & MEDICAL DECISION MAKING:    Pertinent Labs & Imaging studies reviewed. (See chart for details)  87 year old female presents to the Emergency Department for evaluation of shortness of breath diarrhea and some abdominal discomfort.  Based on the description my concern for ACS was relatively low.  Troponin is negative x2 here and she has a nonacute EKG.  CAT scan shows no intra-abdominal pathology.  She had a slightly decreased sodium level here but no other significant lab abnormalities.  Patient was otherwise stable here in the ER and without any obvious cause for her acute symptoms I felt that discharge home with continued observation and return to the ER should she have any problems was the best course.    1:20 PM I met with patient for initial encounter.    At the conclusion of the encounter I discussed the results of all of the tests and the disposition. The questions were answered. The patient or family acknowledged understanding and was agreeable with the care plan.           MEDICATIONS GIVEN IN THE EMERGENCY:  Medications   lidocaine (viscous) (XYLOCAINE) 2 % 15 mL, alum & mag hydroxide-simethicone (MAALOX) 15 mL GI Cocktail (30 mLs Oral Given 9/4/22 1600)       NEW PRESCRIPTIONS STARTED AT TODAY'S ER VISIT  Discharge Medication List as of 9/4/2022  6:24 PM             =================================================================    HPI    Patient information was obtained from: Patient    Use of : NA        Kerry Hoffmann is a 87 year old female with a pertinent history  of CKD3, GERD, HTN, HLD, who presents for evaluation of chest pain, abdominal pain.    Patient endorses colorless diarrhea and abdominal pain with associated chest pressure and worse than usual shortness of breath since Wednesday (4 days ago,) which is worse today. . Patient endorses left shoulder pain which she says is baseline. Patient notes her symptoms were better on Friday after taking imodium on Thursday. She says her chest pain is not worse with exertion and she says it feels as if it is connected to her diarrhea.  This chest pain is quite vague and she does say that there is some pressure it is mostly in her epigastric area where the pressure resides.  Patient has a history of shortness of breath but says that this is somewhat worse.  Symptoms overall seemed relatively mild.     REVIEW OF SYSTEMS   Review of Systems   Respiratory: Positive for shortness of breath.    Cardiovascular: Positive for chest pain (Pressure).   Gastrointestinal: Positive for abdominal pain and diarrhea.   Neurological: Positive for dizziness.   All other systems reviewed and are negative.      PAST MEDICAL HISTORY:  Past Medical History:   Diagnosis Date     Anxiety     takes clonazepam     Asthma     per [facility]      Chronic kidney disease     stage 3 per [facility]      CKD (chronic kidney disease)      Clostridium difficile colitis 11/1/2016     Clostridium difficile infection     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     Clostridium enterocolitis 10/27/2016     CTS (carpal tunnel syndrome)      GERD (gastroesophageal reflux disease)     per [facility]      Hiatal hernia     small per [facility]      Hyperlipidemia      Hyperlipidemia      Hyperlipidemia      Hypertension      Hypertension      Osteoarthritis of knee     per [facility]      Spinal stenosis     per [facility]      Vitamin D deficiency     per [facility]       PAST SURGICAL HISTORY:  Past Surgical History:   Procedure Laterality Date     BREAST SURGERY  1982    breast tumor per [facility]      CATARACT  EXTRACTION  07/2005    per [facility]      ORIF TIBIA & FIBULA FRACTURES  1988    per [facility]      OTHER SURGICAL HISTORY  10/2004    hole in retinaper [facility]      OTHER SURGICAL HISTORY  05/03/2015    fecal transplantper [facility]      WA ESOPHAGOGASTRODUODENOSCOPY TRANSORAL DIAGNOSTIC N/A 9/11/2020    Procedure: ESOPHAGOGASTRODUODENOSCOPY With gastric biopsies;  Surgeon: David Reyes MD;  [place] [facility] [place]  Service: Gastroenterology     TONSILLECTOMY       TONSILLECTOMY & ADENOIDECTOMY  1963           CURRENT MEDICATIONS:    acetaminophen (TYLENOL) 500 MG tablet  aspirin (ASA) 81 MG chewable tablet  atorvastatin (LIPITOR) 10 MG tablet  calcium carbonate 750 MG CHEW  Cholecalciferol (VITAMIN D3) 50 MCG (2000 UT) CAPS  clonazePAM (KLONOPIN) 0.5 MG tablet  clopidogrel (PLAVIX) 75 MG tablet  famotidine (PEPCID) 10 MG tablet  Ferrous Gluconate 324 (37.5 Fe) MG TABS  losartan (COZAAR) 50 MG tablet  metoprolol succinate ER (TOPROL-XL) 25 MG 24 hr tablet        ALLERGIES:  Allergies   Allergen Reactions     Buspirone Shortness Of Breath     Ciprofloxacin Hives and Shortness Of Breath     Other reaction(s): Unknown     Cortisone      Other reaction(s): Throat Swelling/Closing, Unknown  Reaction 9-5-94       Hydrocodone-Acetaminophen Shortness Of Breath     Other reaction(s): Unknown     Lansoprazole Shortness Of Breath     Metoprolol Shortness Of Breath     Nedocromil      Other reaction(s): Throat Swelling/Closing     Niacin Shortness Of Breath     Other reaction(s): Unknown     Acetaminophen      Other reaction(s): breathing difficulties, red ears,high blood press.     Advair Diskus Other (See Comments)     Elevated blood pressure       Albuterol Other (See Comments)     Inhaler had extreme effect on nervous system       Amlodipine      Other reaction(s): Erythema, Unknown     Azithromycin      Other reaction(s): *Unknown, Unknown     Cefixime Diarrhea     Other reaction(s): Unknown     Cefprozil Nausea and Vomiting      Other reaction(s): Unknown     Cefuroxime      Other reaction(s): *Unknown     Cephalexin      Other reaction(s): *Unknown, Unknown     Contrast Dye      Other reaction(s): hives     Cyclobenzaprine      Other reaction(s): Sedation     Dextromethorphan      Other reaction(s): headache,nausea     Diltiazem      Other reaction(s): Erythema, Unknown  Ears face arms and chest red and on fire-body tremors       Erythromycin      Other reaction(s): Unknown     Esomeprazole      Other reaction(s): Headache     Ethanol      Other reaction(s): Adverse reaction     Fenofibrate Muscle Pain (Myalgia)     Other reaction(s): Unknown     Guaifenesin-Dm Cr Nausea     Other reaction(s): Headache     Hydrocodone      Other reaction(s): breathing difficulties     Levofloxacin Nausea     Other reaction(s): Headache, Unknown     Methylprednisolone      Metronidazole      Monosodium Glutamate      Moxifloxacin      Other reaction(s): Dizziness     Nifedipine      Other reaction(s): Edema  Took for 5-93 to 9-94 red and swollen leg       Nsaids      Other reaction(s): Unknown     Ofloxacin      Other reaction(s): *Unknown     Ondansetron      Other reaction(s): Constipation     Parsley Seed      Penicillins      Other reaction(s): *Unknown, Unknown     Piper      Pirbuterol Other (See Comments)     Immediate extreme effect on nervous system       Pravastatin      Other reaction(s): Dizziness, Unknown     Pseudoephedrine      Other reaction(s): headache,nausea     Pseudoephedrine-Dm-Gg      Simvastatin Muscle Pain (Myalgia)     Spironolactone      Other reaction(s): stomach cramps, nausea     Sulfamethoxazole-Trimethoprim      Other reaction(s): Unknown     Tramadol      Other reaction(s): red ears,high blood press.     Triamterene Other (See Comments)     Greatly bothered with sun-took medication for three years  Greatly bothered with sun       Ultracet      Other reaction(s): Tachycardia, Unknown     Diatrizoate Rash     Telmisartan  Palpitations       FAMILY HISTORY:  Family History   Problem Relation Age of Onset     Hypertension Mother      Cancer Mother         gallbladder cancer     Myocardial Infarction Father      Hypertension Sister      Alzheimer Disease Sister      Heart Disease Sister      Cancer Brother      Brain Cancer Brother      Pacemaker Mother      Heart Disease Mother      Coronary Artery Disease Father      Heart Disease Father      Heart Failure Sister        SOCIAL HISTORY:   Social History     Socioeconomic History     Marital status:    Tobacco Use     Smoking status: Former Smoker     Packs/day: 3.00     Years: 35.00     Pack years: 105.00     Types: Cigarettes, Cigarettes, Cigarettes     Smokeless tobacco: Never Used     Tobacco comment: quit 1968   Substance and Sexual Activity     Alcohol use: Not Currently     Drug use: Never     Sexual activity: Not Currently   Other Topics Concern     Parent/sibling w/ CABG, MI or angioplasty before 65F 55M? No       VITALS:  BP (!) 162/70   Pulse 79   Temp 98.6  F (37  C) (Oral)   Resp 22   LMP  (LMP Unknown)   SpO2 96%       PHYSICAL EXAM    Constitutional: Well developed, Well nourished, NAD, GCS 15  HENT: Normocephalic, Atraumatic, Bilateral external ears normal, Oropharynx normal, mucous membranes moist, Nose normal. Neck-  Normal range of motion, No tenderness, Supple, No stridor.  Eyes: PERRL, EOMI, Conjunctiva normal, No discharge.   Respiratory: Normal breath sounds, No respiratory distress, No wheezing, Speaks full sentences easily. No cough.  Cardiovascular: Normal heart rate, Regular rhythm, No murmurs, No rubs, No gallops. Chest wall nontender.  GI:Soft, No tenderness, No masses, No flank tenderness. No rebound or guarding.   Musculoskeletal: 2+ DP pulses. No edema.No cyanosis, No clubbing. Good range of motion in all major joints. No tenderness to palpation or major deformities noted.   Integument: Warm, Dry, No erythema, No rash. No petechiae.    Neurologic: Alert & oriented x 3,  CN 3-12 intact Normal motor function, Normal sensory function, No focal deficits noted. Normal gait. Normal finger to nose bilaterally  Psychiatric: Affect normal, Judgment normal, Mood normal. Cooperative.          LAB:  All pertinent labs reviewed and interpreted.  Labs Ordered and Resulted from Time of ED Arrival to Time of ED Departure   COMPREHENSIVE METABOLIC PANEL - Abnormal       Result Value    Sodium 127 (*)     Potassium 4.6      Chloride 96 (*)     Carbon Dioxide (CO2) 24      Anion Gap 7      Urea Nitrogen 21      Creatinine 1.17 (*)     Calcium 9.3      Glucose 95      Alkaline Phosphatase 50      AST 14      ALT <9      Protein Total 6.9      Albumin 3.4 (*)     Bilirubin Total 0.4      GFR Estimate 46 (*)    ROUTINE UA WITH MICROSCOPIC REFLEX TO CULTURE - Abnormal    Color Urine Light Yellow      Appearance Urine Clear      Glucose Urine Negative      Bilirubin Urine Negative      Ketones Urine Negative      Specific Gravity Urine 1.022      Blood Urine Negative      pH Urine 5.0      Protein Albumin Urine 10  (*)     Urobilinogen Urine <2.0      Nitrite Urine Negative      Leukocyte Esterase Urine Negative      Mucus Urine Present (*)     RBC Urine 1      WBC Urine 2      Squamous Epithelials Urine 2 (*)     Hyaline Casts Urine 7 (*)    CBC WITH PLATELETS AND DIFFERENTIAL - Abnormal    WBC Count 9.1      RBC Count 3.23 (*)     Hemoglobin 9.6 (*)     Hematocrit 28.6 (*)     MCV 89      MCH 29.7      MCHC 33.6      RDW 12.5      Platelet Count 303      % Neutrophils 73      % Lymphocytes 12      % Monocytes 14      % Eosinophils 1      % Basophils 0      % Immature Granulocytes 0      NRBCs per 100 WBC 0      Absolute Neutrophils 6.6      Absolute Lymphocytes 1.1      Absolute Monocytes 1.2      Absolute Eosinophils 0.1      Absolute Basophils 0.0      Absolute Immature Granulocytes 0.0      Absolute NRBCs 0.0     LIPASE - Normal    Lipase 15     TROPONIN I -  Normal    Troponin I <0.01     TROPONIN I - Normal    Troponin I <0.01         RADIOLOGY:  Reviewed all pertinent imaging. Please see official radiology report.  Chest XR,  PA & LAT   Final Result   IMPRESSION: Negative chest.      CT Abdomen Pelvis w/o Contrast   Final Result   IMPRESSION:    1.  No acute findings or other explanation for abdominal pain.             EKG:    Performed at: 9/4/22 1335    Impression: Sinus rhythm    Rate: 62 bpm  Rhythm: Sinus  Axis: -11  RI Interval: 202 ms  QRS Interval: 68 ms  QTc Interval: 379 ms  ST Changes: None  Comparison: When compared with ECG of 7/26/2018 there are no significant changes.    I have independently reviewed and interpreted the EKG(s) documented above.    PROCEDURES:         I, Yusuf Coates, am serving as a scribe to document services personally performed by Dr. Marco Hughes based on my observation and the provider's statements to me. I, Marco Hughes MD attest that Yusuf Coates is acting in a scribe capacity, has observed my performance of the services and has documented them in accordance with my direction.    Marco Hughes M.D.  Emergency Medicine  [facility] EMERGENCY DEPARTMENT  [address] [id number] [phone]  Dept: [phone]      Marco Hughes MD  09/12/22 9674

## 2022-09-04 NOTE — ED TRIAGE NOTES
"Patient arrives with [facility] from [facility] ([facility]) with lower GI pain that radiates to the chest. Symptoms started 3 days ago. Patient reports \"white slime\" diarrhea. Patient reports NVD started Wednesday, pt took Immodium, felt better on Friday. Pain and diarrhea started to return on Saturday, pain worse today.     Triage Assessment     Row Name 09/04/22 1300       Triage Assessment (Adult)    Airway WDL WDL       Respiratory WDL    Respiratory WDL WDL       Skin Circulation/Temperature WDL    Skin Circulation/Temperature WDL WDL       Cardiac WDL    Cardiac WDL WDL       Peripheral/Neurovascular WDL    Peripheral Neurovascular WDL WDL       Cognitive/Neuro/Behavioral WDL    Cognitive/Neuro/Behavioral WDL WDL              "

## 2022-09-05 LAB
ATRIAL RATE - MUSE: 62 BPM
DIASTOLIC BLOOD PRESSURE - MUSE: 70 MMHG
INTERPRETATION ECG - MUSE: NORMAL
P AXIS - MUSE: 57 DEGREES
PR INTERVAL - MUSE: 202 MS
QRS DURATION - MUSE: 68 MS
QT - MUSE: 374 MS
QTC - MUSE: 379 MS
R AXIS - MUSE: -11 DEGREES
SYSTOLIC BLOOD PRESSURE - MUSE: 166 MMHG
T AXIS - MUSE: 53 DEGREES
VENTRICULAR RATE- MUSE: 62 BPM

## 2022-09-12 ENCOUNTER — TRANSFERRED RECORDS (OUTPATIENT)
Dept: HEALTH INFORMATION MANAGEMENT | Facility: CLINIC | Age: 87
End: 2022-09-12

## 2022-09-12 ENCOUNTER — LAB REQUISITION (OUTPATIENT)
Dept: LAB | Facility: CLINIC | Age: 87
End: 2022-09-12
Payer: MEDICARE

## 2022-09-12 DIAGNOSIS — R10.84 GENERALIZED ABDOMINAL PAIN: ICD-10-CM

## 2022-09-12 LAB
BASOPHILS # BLD AUTO: 0.1 10E3/UL (ref 0–0.2)
BASOPHILS NFR BLD AUTO: 1 %
EOSINOPHIL # BLD AUTO: 0.2 10E3/UL (ref 0–0.7)
EOSINOPHIL NFR BLD AUTO: 2 %
ERYTHROCYTE [DISTWIDTH] IN BLOOD BY AUTOMATED COUNT: 13.2 % (ref 10–15)
HCT VFR BLD AUTO: 32.3 % (ref 35–47)
HGB BLD-MCNC: 10.2 G/DL (ref 11.7–15.7)
IMM GRANULOCYTES # BLD: 0.1 10E3/UL
IMM GRANULOCYTES NFR BLD: 1 %
LYMPHOCYTES # BLD AUTO: 2 10E3/UL (ref 0.8–5.3)
LYMPHOCYTES NFR BLD AUTO: 25 %
MCH RBC QN AUTO: 29 PG (ref 26.5–33)
MCHC RBC AUTO-ENTMCNC: 31.6 G/DL (ref 31.5–36.5)
MCV RBC AUTO: 92 FL (ref 78–100)
MONOCYTES # BLD AUTO: 0.8 10E3/UL (ref 0–1.3)
MONOCYTES NFR BLD AUTO: 11 %
NEUTROPHILS # BLD AUTO: 4.7 10E3/UL (ref 1.6–8.3)
NEUTROPHILS NFR BLD AUTO: 60 %
NRBC # BLD AUTO: 0 10E3/UL
NRBC BLD AUTO-RTO: 0 /100
PLATELET # BLD AUTO: 465 10E3/UL (ref 150–450)
RBC # BLD AUTO: 3.52 10E6/UL (ref 3.8–5.2)
WBC # BLD AUTO: 7.7 10E3/UL (ref 4–11)

## 2022-09-12 PROCEDURE — 85025 COMPLETE CBC W/AUTO DIFF WBC: CPT | Mod: ORL | Performed by: STUDENT IN AN ORGANIZED HEALTH CARE EDUCATION/TRAINING PROGRAM

## 2022-09-12 PROCEDURE — 80053 COMPREHEN METABOLIC PANEL: CPT | Mod: ORL | Performed by: STUDENT IN AN ORGANIZED HEALTH CARE EDUCATION/TRAINING PROGRAM

## 2022-09-13 LAB
ALBUMIN SERPL BCG-MCNC: 4 G/DL (ref 3.5–5.2)
ALP SERPL-CCNC: 72 U/L (ref 35–104)
ALT SERPL W P-5'-P-CCNC: 11 U/L (ref 10–35)
ANION GAP SERPL CALCULATED.3IONS-SCNC: 10 MMOL/L (ref 7–15)
AST SERPL W P-5'-P-CCNC: 19 U/L (ref 10–35)
BILIRUB SERPL-MCNC: 0.2 MG/DL
BUN SERPL-MCNC: 30.5 MG/DL (ref 8–23)
CALCIUM SERPL-MCNC: 10 MG/DL (ref 8.8–10.2)
CHLORIDE SERPL-SCNC: 99 MMOL/L (ref 98–107)
CREAT SERPL-MCNC: 1.33 MG/DL (ref 0.51–0.95)
DEPRECATED HCO3 PLAS-SCNC: 25 MMOL/L (ref 22–29)
GFR SERPL CREATININE-BSD FRML MDRD: 39 ML/MIN/1.73M2
GLUCOSE SERPL-MCNC: 94 MG/DL (ref 70–99)
POTASSIUM SERPL-SCNC: 5.2 MMOL/L (ref 3.4–5.3)
PROT SERPL-MCNC: 6.9 G/DL (ref 6.4–8.3)
SODIUM SERPL-SCNC: 134 MMOL/L (ref 136–145)

## 2022-09-26 ENCOUNTER — TRANSFERRED RECORDS (OUTPATIENT)
Dept: HEALTH INFORMATION MANAGEMENT | Facility: CLINIC | Age: 87
End: 2022-09-26

## 2023-04-10 ENCOUNTER — HOSPITAL ENCOUNTER (EMERGENCY)
Facility: HOSPITAL | Age: 88
Discharge: HOME | End: 2023-04-10
Attending: EMERGENCY MEDICINE | Admitting: EMERGENCY MEDICINE
Payer: MEDICARE

## 2023-04-10 ENCOUNTER — HOSPITAL ENCOUNTER (OUTPATIENT)
Dept: GENERAL RADIOLOGY | Facility: HOSPITAL | Age: 88
Discharge: HOME | End: 2023-04-10
Attending: PHYSICIAN ASSISTANT | Admitting: PHYSICIAN ASSISTANT
Payer: MEDICARE

## 2023-04-10 ENCOUNTER — OFFICE VISIT (OUTPATIENT)
Dept: FAMILY MEDICINE | Facility: CLINIC | Age: 88
End: 2023-04-10
Payer: MEDICARE

## 2023-04-10 VITALS
DIASTOLIC BLOOD PRESSURE: 66 MMHG | TEMPERATURE: 98.6 F | BODY MASS INDEX: 22.5 KG/M2 | OXYGEN SATURATION: 95 % | SYSTOLIC BLOOD PRESSURE: 186 MMHG | WEIGHT: 127 LBS | HEART RATE: 80 BPM | RESPIRATION RATE: 20 BRPM

## 2023-04-10 VITALS
RESPIRATION RATE: 18 BRPM | HEART RATE: 80 BPM | WEIGHT: 171 LBS | TEMPERATURE: 96.8 F | SYSTOLIC BLOOD PRESSURE: 189 MMHG | OXYGEN SATURATION: 97 % | HEIGHT: 61 IN | BODY MASS INDEX: 32.28 KG/M2 | DIASTOLIC BLOOD PRESSURE: 78 MMHG

## 2023-04-10 DIAGNOSIS — R06.09 DOE (DYSPNEA ON EXERTION): ICD-10-CM

## 2023-04-10 DIAGNOSIS — R03.0 ELEVATED BP WITHOUT DIAGNOSIS OF HYPERTENSION: ICD-10-CM

## 2023-04-10 DIAGNOSIS — R07.9 CHEST PAIN, UNSPECIFIED TYPE: ICD-10-CM

## 2023-04-10 DIAGNOSIS — R07.9 CHEST PAIN, UNSPECIFIED TYPE: Primary | ICD-10-CM

## 2023-04-10 DIAGNOSIS — J45.31 MILD PERSISTENT ASTHMA WITH EXACERBATION: ICD-10-CM

## 2023-04-10 DIAGNOSIS — R03.0 ELEVATED BLOOD PRESSURE READING: ICD-10-CM

## 2023-04-10 DIAGNOSIS — R06.00 DYSPNEA, UNSPECIFIED TYPE: ICD-10-CM

## 2023-04-10 LAB
ANION GAP SERPL CALCULATED.3IONS-SCNC: 10 MMOL/L (ref 7–15)
ATRIAL RATE - MUSE: 80 BPM
BASOPHILS # BLD AUTO: 0 10E3/UL (ref 0–0.2)
BASOPHILS # BLD AUTO: 0 10E3/UL (ref 0–0.2)
BASOPHILS NFR BLD AUTO: 0 %
BASOPHILS NFR BLD AUTO: 1 %
BUN SERPL-MCNC: 24.6 MG/DL (ref 8–23)
CALCIUM SERPL-MCNC: 10.2 MG/DL (ref 8.8–10.2)
CHLORIDE SERPL-SCNC: 100 MMOL/L (ref 98–107)
CREAT SERPL-MCNC: 1.07 MG/DL (ref 0.51–0.95)
D DIMER PPP FEU-MCNC: 0.73 UG/ML FEU (ref 0–0.5)
DEPRECATED HCO3 PLAS-SCNC: 26 MMOL/L (ref 22–29)
DIASTOLIC BLOOD PRESSURE - MUSE: NORMAL MMHG
EOSINOPHIL # BLD AUTO: 0.1 10E3/UL (ref 0–0.7)
EOSINOPHIL # BLD AUTO: 0.1 10E3/UL (ref 0–0.7)
EOSINOPHIL NFR BLD AUTO: 3 %
EOSINOPHIL NFR BLD AUTO: 3 %
ERYTHROCYTE [DISTWIDTH] IN BLOOD BY AUTOMATED COUNT: 13 % (ref 10–15)
ERYTHROCYTE [DISTWIDTH] IN BLOOD BY AUTOMATED COUNT: 13 % (ref 10–15)
FLUAV RNA SPEC QL NAA+PROBE: NEGATIVE
FLUBV RNA RESP QL NAA+PROBE: NEGATIVE
GFR SERPL CREATININE-BSD FRML MDRD: 50 ML/MIN/1.73M2
GLUCOSE SERPL-MCNC: 97 MG/DL (ref 70–99)
HCT VFR BLD AUTO: 34 % (ref 35–47)
HCT VFR BLD AUTO: 34.4 % (ref 35–47)
HGB BLD-MCNC: 11.1 G/DL (ref 11.7–15.7)
HGB BLD-MCNC: 11.2 G/DL (ref 11.7–15.7)
IMM GRANULOCYTES # BLD: 0 10E3/UL
IMM GRANULOCYTES # BLD: 0 10E3/UL
IMM GRANULOCYTES NFR BLD: 0 %
IMM GRANULOCYTES NFR BLD: 0 %
INTERPRETATION ECG - MUSE: NORMAL
LYMPHOCYTES # BLD AUTO: 1.1 10E3/UL (ref 0.8–5.3)
LYMPHOCYTES # BLD AUTO: 1.2 10E3/UL (ref 0.8–5.3)
LYMPHOCYTES NFR BLD AUTO: 25 %
LYMPHOCYTES NFR BLD AUTO: 26 %
MCH RBC QN AUTO: 28.8 PG (ref 26.5–33)
MCH RBC QN AUTO: 29.2 PG (ref 26.5–33)
MCHC RBC AUTO-ENTMCNC: 32.3 G/DL (ref 31.5–36.5)
MCHC RBC AUTO-ENTMCNC: 32.9 G/DL (ref 31.5–36.5)
MCV RBC AUTO: 89 FL (ref 78–100)
MCV RBC AUTO: 89 FL (ref 78–100)
MONOCYTES # BLD AUTO: 0.5 10E3/UL (ref 0–1.3)
MONOCYTES # BLD AUTO: 0.5 10E3/UL (ref 0–1.3)
MONOCYTES NFR BLD AUTO: 11 %
MONOCYTES NFR BLD AUTO: 12 %
NEUTROPHILS # BLD AUTO: 2.5 10E3/UL (ref 1.6–8.3)
NEUTROPHILS # BLD AUTO: 2.7 10E3/UL (ref 1.6–8.3)
NEUTROPHILS NFR BLD AUTO: 58 %
NEUTROPHILS NFR BLD AUTO: 60 %
NRBC # BLD AUTO: 0 10E3/UL
NRBC BLD AUTO-RTO: 0 /100
NT-PROBNP SERPL-MCNC: 837 PG/ML (ref 0–1800)
P AXIS - MUSE: 58 DEGREES
PLATELET # BLD AUTO: 397 10E3/UL (ref 150–450)
PLATELET # BLD AUTO: 408 10E3/UL (ref 150–450)
POTASSIUM SERPL-SCNC: 4.8 MMOL/L (ref 3.4–5.3)
PR INTERVAL - MUSE: 174 MS
QRS DURATION - MUSE: 68 MS
QT - MUSE: 350 MS
QTC - MUSE: 403 MS
R AXIS - MUSE: 27 DEGREES
RBC # BLD AUTO: 3.84 10E6/UL (ref 3.8–5.2)
RBC # BLD AUTO: 3.85 10E6/UL (ref 3.8–5.2)
RSV RNA SPEC NAA+PROBE: NEGATIVE
SARS-COV-2 RNA RESP QL NAA+PROBE: NEGATIVE
SODIUM SERPL-SCNC: 136 MMOL/L (ref 136–145)
SYSTOLIC BLOOD PRESSURE - MUSE: NORMAL MMHG
T AXIS - MUSE: 60 DEGREES
TROPONIN T SERPL HS-MCNC: 15 NG/L
TROPONIN T SERPL HS-MCNC: 31 NG/L
VENTRICULAR RATE- MUSE: 80 BPM
WBC # BLD AUTO: 4.4 10E3/UL (ref 4–11)
WBC # BLD AUTO: 4.5 10E3/UL (ref 4–11)

## 2023-04-10 PROCEDURE — 71046 X-RAY EXAM CHEST 2 VIEWS: CPT

## 2023-04-10 PROCEDURE — 93005 ELECTROCARDIOGRAM TRACING: CPT | Performed by: PHYSICIAN ASSISTANT

## 2023-04-10 PROCEDURE — 84484 ASSAY OF TROPONIN QUANT: CPT | Performed by: PHYSICIAN ASSISTANT

## 2023-04-10 PROCEDURE — 36415 COLL VENOUS BLD VENIPUNCTURE: CPT | Performed by: PHYSICIAN ASSISTANT

## 2023-04-10 PROCEDURE — 99214 OFFICE O/P EST MOD 30 MIN: CPT | Performed by: PHYSICIAN ASSISTANT

## 2023-04-10 PROCEDURE — 93005 ELECTROCARDIOGRAM TRACING: CPT | Performed by: EMERGENCY MEDICINE

## 2023-04-10 PROCEDURE — 85004 AUTOMATED DIFF WBC COUNT: CPT | Performed by: EMERGENCY MEDICINE

## 2023-04-10 PROCEDURE — 84484 ASSAY OF TROPONIN QUANT: CPT | Performed by: EMERGENCY MEDICINE

## 2023-04-10 PROCEDURE — 85379 FIBRIN DEGRADATION QUANT: CPT | Performed by: PHYSICIAN ASSISTANT

## 2023-04-10 PROCEDURE — 93010 ELECTROCARDIOGRAM REPORT: CPT | Mod: OFF | Performed by: INTERNAL MEDICINE

## 2023-04-10 PROCEDURE — 80048 BASIC METABOLIC PNL TOTAL CA: CPT | Performed by: EMERGENCY MEDICINE

## 2023-04-10 PROCEDURE — 99284 EMERGENCY DEPT VISIT MOD MDM: CPT | Mod: 25,CS

## 2023-04-10 PROCEDURE — 85025 COMPLETE CBC W/AUTO DIFF WBC: CPT | Performed by: PHYSICIAN ASSISTANT

## 2023-04-10 PROCEDURE — 36415 COLL VENOUS BLD VENIPUNCTURE: CPT | Performed by: EMERGENCY MEDICINE

## 2023-04-10 PROCEDURE — C9803 HOPD COVID-19 SPEC COLLECT: HCPCS

## 2023-04-10 PROCEDURE — 83880 ASSAY OF NATRIURETIC PEPTIDE: CPT | Performed by: PHYSICIAN ASSISTANT

## 2023-04-10 PROCEDURE — 87637 SARSCOV2&INF A&B&RSV AMP PRB: CPT | Performed by: EMERGENCY MEDICINE

## 2023-04-10 RX ORDER — HYDRALAZINE HYDROCHLORIDE 20 MG/ML
10 INJECTION INTRAMUSCULAR; INTRAVENOUS ONCE
Status: DISCONTINUED | OUTPATIENT
Start: 2023-04-10 | End: 2023-04-10

## 2023-04-10 RX ORDER — HYDRALAZINE HYDROCHLORIDE 25 MG/1
25 TABLET, FILM COATED ORAL 3 TIMES DAILY
Qty: 30 TABLET | Refills: 0 | Status: ON HOLD | OUTPATIENT
Start: 2023-04-10 | End: 2023-07-28

## 2023-04-10 RX ORDER — LEVALBUTEROL TARTRATE 45 UG/1
2 AEROSOL, METERED ORAL EVERY 4 HOURS PRN
Qty: 15 G | Refills: 0 | Status: SHIPPED | OUTPATIENT
Start: 2023-04-10 | End: 2023-08-10

## 2023-04-10 ASSESSMENT — ENCOUNTER SYMPTOMS
SHORTNESS OF BREATH: 1
COUGH: 1
SORE THROAT: 1
CHEST TIGHTNESS: 1

## 2023-04-10 NOTE — ED PROVIDER NOTES
EMERGENCY DEPARTMENT ENCOUNTER      NAME: Kerry Hoffmann  AGE: 88 year old female  YOB: 1935  MRN: [id number]  EVALUATION DATE & TIME: No admission date for patient encounter.    PCP: Demarco Mckeon    ED PROVIDER: Demarco Askew MD        Chief Complaint   Patient presents with     Shortness of Breath         FINAL IMPRESSION:  1. Dyspnea, unspecified type    2. Elevated blood pressure reading          ED COURSE & MEDICAL DECISION MAKIN:18 PM I introduced myself to the patient, obtained patient history, performed a physical exam, and discussed plan for ED workup including potential diagnostic laboratory/imaging studies and interventions.   1:30 PM Offered admission for cardiac workup, but patient declined.  3:18 PM I rechecked the patient and updated them on laboratory and imaging results.   3:48 PM I rechecked the patient and updated them.        Pertinent Labs & Imaging studies reviewed. (See chart for details)  88 year old female presents to the Emergency Department for evaluation of shortness of breath and cough that is persisted after developing URI like illness last week.  Breathing was significantly worse this morning and what her current symptoms are.  She went to urgent care and was told to come to the ER    Differential includes ACS, pulmonary emboli, pneumonia, aortic dissection, esophageal rupture, pneumothorax, pneumonia, malignancy, pleurisy, shingles, and GERD.  Based on history and examination and workup pulmonary emboli and aortic dissection unlikely.     Plan for EKG, troponin, COVID swab, BMP, CBC  ]    Elevated high sensitive troponin from urgent care, elevated D-dimer which is actually normal when age-adjusted, elevated BNP which can be normal in patient's age    Chest x-ray and exam does not show evidence of volume overload.  Patient has some mild chronic kidney disease    Discussed starting patient on Lasix but patient is hesitant based on how often she urinates  already which seems reasonable    Patient's blood pressure been elevated here in the ER but she says that is due to anxiety and when she is home her blood pressure is normal.  Will give prescription for hydralazine.  Refer her to rapid access heart clinic.      ED Course as of 04/10/23 1601   Mon Apr 10, 2023   1554 BP(!): 188/80  Patient reports she checks her blood pressure every day and yesterday was 144.  She thinks is likely her nerves making her blood pressure elevated.  No current chest pain or shortness of breath.   1555 We will prescribe hydralazine to take 3 times daily but if blood pressure below 120 to stop the hydralazine   1555 Offered admission for further cardiac work-up which patient is declining   1555 Rapid access heart clinic referral created   1556 I did review patient's chest x-ray, EKG, labs and note from urgent care visit today   1556 Patient had a normal age-adjusted D-dimer earlier therefore PE unlikely.  Does not have any wheezing or crackles or rhonchi.  Chest x-rays out evidence of pneumonia         Medical Decision Making    History:    Supplemental history from: Documented in chart, if applicable and Family Member/Significant Other    External Record(s) reviewed: Documented in chart, if applicable. and Other: [facility] Visit 4/10/2023     Work Up:    Chart documentation includes differential considered and any EKGs or imaging independently interpreted by provider, where specified.    In additional to work up documented, I considered the following work up: Documented in chart, if applicable.    External consultation:    Discussion of management with another provider: Documented in chart, if applicable    Complicating factors:    Care impacted by chronic illness: N/A    Care affected by social determinants of health: N/A    Disposition considerations: Discharge. I prescribed additional prescription strength medication(s) as charted. I considered admission, but ultimately  "discharged patient After shared decision making.          Voice recognition software was used in the creation of this note. Any grammatical or nonsensical errors are due to inherent errors with the software and are not the intention of the writer.         At the conclusion of the encounter I discussed the results of all of the tests and the disposition. The questions were answered. The patient or family acknowledged understanding and was agreeable with the care plan.             MEDICATIONS GIVEN IN THE EMERGENCY:  Medications - No data to display    NEW PRESCRIPTIONS STARTED AT TODAY'S ER VISIT  New Prescriptions    HYDRALAZINE (APRESOLINE) 25 MG TABLET    Take 1 tablet (25 mg) by mouth 3 times daily          =================================================================    HPI    Triage note  \"Patient presents here as advised by her primary physician due to shortness of breath and cough that has persisted over the past 24 hours. Became significantly worse this morning. She states that she was seen at her clinic today and labs were drawn. She was notified to come here due to abnormal labs. BNP was elevated and D-dimer were elevated.      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     Patient reports cold symptoms including chest tightness, cough, and sore throat that began a week ago. Her symptoms have since improved, but she began having shortness of breath last night. The shortness of breath got worse today and patient stated that she \"could hardly speak\" which made her go to urgent care. After having abnormal labs at urgent care patient was referred to the ED for further " evaluation. She is currently symptom free in the ED. She does have chronic shortness of breath at baseline when walking or moving, but states it's been getting worse. She has chronic ankle/feet swelling at her baseline as well. She is not on any water pills. Did see cardiology a year ago over a heart murmur.     Did offer admission for cardiac workup, but patient declined.     REVIEW OF SYSTEMS   Review of Systems   HENT: Positive for sore throat.    Respiratory: Positive for cough, chest tightness and shortness of breath.    Cardiovascular: Positive for leg swelling (Ankle and feet (chronic)).        PAST MEDICAL HISTORY:  Past Medical History:   Diagnosis Date     Anxiety     takes clonazepam     Asthma     per [facility]      Chronic kidney disease     stage 3 per [facility]      CKD (chronic kidney disease)      Clostridium difficile colitis 11/1/2016     Clostridium difficile infection     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     Clostridium enterocolitis 10/27/2016     CTS (carpal tunnel syndrome)      GERD (gastroesophageal reflux disease)     per [facility]      Hiatal hernia     small per [facility]      Hyperlipidemia      Hyperlipidemia      Hyperlipidemia      Hypertension      Hypertension      Osteoarthritis of knee     per [facility]      Spinal stenosis     per [facility]      Vitamin D deficiency     per [facility]       PAST SURGICAL HISTORY:  Past Surgical History:   Procedure Laterality Date     BREAST SURGERY  1982    breast tumor per [facility]      CATARACT EXTRACTION  07/2005    per [facility]      ORIF TIBIA & FIBULA FRACTURES  1988    per [facility]      OTHER SURGICAL HISTORY  10/2004    hole in retinaper [facility]      OTHER SURGICAL HISTORY  05/03/2015    fecal transplantper [facility]      IA ESOPHAGOGASTRODUODENOSCOPY TRANSORAL DIAGNOSTIC N/A 9/11/2020    Procedure: ESOPHAGOGASTRODUODENOSCOPY With gastric biopsies;  Surgeon: David Reyes MD;  [place] [facility] [place]  Service: Gastroenterology     TONSILLECTOMY       TONSILLECTOMY &  ADENOIDECTOMY  1963           CURRENT MEDICATIONS:    hydrALAZINE (APRESOLINE) 25 MG tablet  acetaminophen (TYLENOL) 500 MG tablet  aspirin (ASA) 81 MG chewable tablet  atorvastatin (LIPITOR) 10 MG tablet  calcium carbonate 750 MG CHEW  Cholecalciferol (VITAMIN D3) 50 MCG (2000 UT) CAPS  clonazePAM (KLONOPIN) 0.5 MG tablet  clopidogrel (PLAVIX) 75 MG tablet  famotidine (PEPCID) 10 MG tablet  Ferrous Gluconate 324 (37.5 Fe) MG TABS  levalbuterol (XOPENEX HFA) 45 MCG/ACT inhaler  losartan (COZAAR) 50 MG tablet  metoprolol succinate ER (TOPROL-XL) 25 MG 24 hr tablet        ALLERGIES:  Allergies   Allergen Reactions     Buspirone Shortness Of Breath     Ciprofloxacin Hives and Shortness Of Breath     Other reaction(s): Unknown     Cortisone      Other reaction(s): Throat Swelling/Closing, Unknown  Reaction 9-5-94       Hydrocodone-Acetaminophen Shortness Of Breath     Other reaction(s): Unknown     Lansoprazole Shortness Of Breath     Metoprolol Shortness Of Breath     Nedocromil      Other reaction(s): Throat Swelling/Closing     Niacin Shortness Of Breath     Other reaction(s): Unknown     Acetaminophen      Other reaction(s): breathing difficulties, red ears,high blood press.     Advair Diskus Other (See Comments)     Elevated blood pressure       Albuterol Other (See Comments)     Inhaler had extreme effect on nervous system       Amlodipine      Other reaction(s): Erythema, Unknown     Azithromycin      Other reaction(s): *Unknown, Unknown     Cefixime Diarrhea     Other reaction(s): Unknown     Cefprozil Nausea and Vomiting     Other reaction(s): Unknown     Cefuroxime      Other reaction(s): *Unknown     Cephalexin      Other reaction(s): *Unknown, Unknown     Contrast Dye      Other reaction(s): hives     Cyclobenzaprine      Other reaction(s): Sedation     Dextromethorphan      Other reaction(s): headache,nausea     Diltiazem      Other reaction(s): Erythema, Unknown  Ears face arms and chest red and on  fire-body tremors       Erythromycin      Other reaction(s): Unknown     Esomeprazole      Other reaction(s): Headache     Ethanol      Other reaction(s): Adverse reaction     Fenofibrate Muscle Pain (Myalgia)     Other reaction(s): Unknown     Guaifenesin-Dm Cr Nausea     Other reaction(s): Headache     Hydrocodone      Other reaction(s): breathing difficulties     Levofloxacin Nausea     Other reaction(s): Headache, Unknown     Methylprednisolone      Metronidazole      Monosodium Glutamate      Moxifloxacin      Other reaction(s): Dizziness     Nifedipine      Other reaction(s): Edema  Took for 5-93 to 9-94 red and swollen leg       Nsaids      Other reaction(s): Unknown     Ofloxacin      Other reaction(s): *Unknown     Ondansetron      Other reaction(s): Constipation     Parsley Seed      Penicillins      Other reaction(s): *Unknown, Unknown     Piper      Pirbuterol Other (See Comments)     Immediate extreme effect on nervous system       Pravastatin      Other reaction(s): Dizziness, Unknown     Pseudoephedrine      Other reaction(s): headache,nausea     Pseudoephedrine-Dm-Gg      Simvastatin Muscle Pain (Myalgia)     Spironolactone      Other reaction(s): stomach cramps, nausea     Sulfamethoxazole-Trimethoprim      Other reaction(s): Unknown     Tramadol      Other reaction(s): red ears,high blood press.     Triamterene Other (See Comments)     Greatly bothered with sun-took medication for three years  Greatly bothered with sun       Ultracet      Other reaction(s): Tachycardia, Unknown     Diatrizoate Rash     Telmisartan Palpitations       FAMILY HISTORY:  Family History   Problem Relation Age of Onset     Hypertension Mother      Cancer Mother         gallbladder cancer     Myocardial Infarction Father      Hypertension Sister      Alzheimer Disease Sister      Heart Disease Sister      Cancer Brother      Brain Cancer Brother      Pacemaker Mother      Heart Disease Mother      Coronary Artery Disease  "Father      Heart Disease Father      Heart Failure Sister        SOCIAL HISTORY:   Social History     Socioeconomic History     Marital status:    Tobacco Use     Smoking status: Former     Packs/day: 3.00     Years: 35.00     Pack years: 105.00     Types: Cigarettes     Smokeless tobacco: Never     Tobacco comments:     quit 1968   Substance and Sexual Activity     Alcohol use: Not Currently     Drug use: Never     Sexual activity: Not Currently   Other Topics Concern     Parent/sibling w/ CABG, MI or angioplasty before 65F 55M? No       VITALS:  BP (!) 188/80   Pulse 81   Temp 96.8  F (36  C) (Oral)   Resp 22   Ht 1.549 m (5' 1\")   Wt 77.6 kg (171 lb)   LMP  (LMP Unknown)   SpO2 95%   BMI 32.31 kg/m      PHYSICAL EXAM    Vitals: BP (!) 188/80   Pulse 81   Temp 96.8  F (36  C) (Oral)   Resp 22   Ht 1.549 m (5' 1\")   Wt 77.6 kg (171 lb)   LMP  (LMP Unknown)   SpO2 95%   BMI 32.31 kg/m    General: Appears in no acute distress, awake, alert, interactive.  Eyes: Conjunctivae non-injected. Sclera anicteric.  HENT: Atraumatic.  Neck: Supple.  Respiratory/Chest: Respiration unlabored. No wheezing or crackles.   Heart: Regular rate and rhythm  Abdomen: non distended  Musculoskeletal: Normal extremities. No edema or erythema. No pitting edema to the lower legs.   Skin: Normal color. No rash or diaphoresis.  Neurologic: Face symmetric, moves all extremities spontaneously. Speech clear.  Psychiatric: Oriented to person, place, and time. Affect appropriate.       LAB:  All pertinent labs reviewed and interpreted.  Results for orders placed or performed during the hospital encounter of 04/10/23   Basic metabolic panel   Result Value Ref Range    Sodium 136 136 - 145 mmol/L    Potassium 4.8 3.4 - 5.3 mmol/L    Chloride 100 98 - 107 mmol/L    Carbon Dioxide (CO2) 26 22 - 29 mmol/L    Anion Gap 10 7 - 15 mmol/L    Urea Nitrogen 24.6 (H) 8.0 - 23.0 mg/dL    Creatinine 1.07 (H) 0.51 - 0.95 mg/dL    Calcium " 10.2 8.8 - 10.2 mg/dL    Glucose 97 70 - 99 mg/dL    GFR Estimate 50 (L) >60 mL/min/1.73m2   Result Value Ref Range    Troponin T, High Sensitivity 15 (H) <=14 ng/L   CBC with platelets and differential   Result Value Ref Range    WBC Count 4.5 4.0 - 11.0 10e3/uL    RBC Count 3.85 3.80 - 5.20 10e6/uL    Hemoglobin 11.1 (L) 11.7 - 15.7 g/dL    Hematocrit 34.4 (L) 35.0 - 47.0 %    MCV 89 78 - 100 fL    MCH 28.8 26.5 - 33.0 pg    MCHC 32.3 31.5 - 36.5 g/dL    RDW 13.0 10.0 - 15.0 %    Platelet Count 408 150 - 450 10e3/uL    % Neutrophils 60 %    % Lymphocytes 26 %    % Monocytes 11 %    % Eosinophils 3 %    % Basophils 0 %    % Immature Granulocytes 0 %    NRBCs per 100 WBC 0 <1 /100    Absolute Neutrophils 2.7 1.6 - 8.3 10e3/uL    Absolute Lymphocytes 1.2 0.8 - 5.3 10e3/uL    Absolute Monocytes 0.5 0.0 - 1.3 10e3/uL    Absolute Eosinophils 0.1 0.0 - 0.7 10e3/uL    Absolute Basophils 0.0 0.0 - 0.2 10e3/uL    Absolute Immature Granulocytes 0.0 <=0.4 10e3/uL    Absolute NRBCs 0.0 10e3/uL   Symptomatic Influenza A/B, RSV, & SARS-CoV2 PCR (COVID-19) Nasopharyngeal    Specimen: Nasopharyngeal; Swab   Result Value Ref Range    Influenza A PCR Negative Negative    Influenza B PCR Negative Negative    RSV PCR Negative Negative    SARS CoV2 PCR Negative Negative       RADIOLOGY:  Reviewed all pertinent imaging. Please see official radiology report.  No orders to display       EKG:    Performed at: 4/10/2023 14:20     Impression: Sinus rhythm, cannot rule out anterior infarct, age undetermined. When compared with ECG of 4/10/2023 10:32, no significant change was found. No ST changes.     Rate: 79 BPM   Rhythm: Sinus   Axis: 51    IL Interval: 176 ms   QRS Interval: 70 ms   QTc Interval: 403 ms   ST Changes: No ST changes.   Comparison: 4/10/2023 10:32     I have independently reviewed and interpreted the EKG(s) documented above.        I, Thongxengzenghocha See, am serving as a scribe to document services personally performed by  Jeb Askew MD based on my observation and the provider's statements to me. I, Dr. Jeb Askew, attest that Norma Cui is acting in a scribe capacity, has observed my performance of the services and has documented them in accordance with my direction.    Jeb Askew MD  Emergency Medicine  [facility] EMERGENCY DEPARTMENT  [address] [place] [id number]  [phone]     Jeb Askew MD  04/10/23 5756       Jeb Askew MD  04/10/23 1451

## 2023-04-10 NOTE — PROGRESS NOTES
Assessment & Plan     There are no diagnoses linked to this encounter.   {2021 E&M time (Optional):398248}    {Provider  Link to [facility] Help Grid :844724}    No follow-ups on file.    [facility] CRISTIISAEL Payne is a 88 year old female who presents to clinic today for the following health issues:  Chief Complaint   Patient presents with     Chest Pain     X Wednesday. Worse today. SOB and wheezing. No dizziness or lightheaded. Weak. No nausea or vomiting. No fever.     HPI    ***  {UC Conditions (Optional):973148}    Review of Systems  {ROS COMP (Optional):235289}    Patient Active Problem List   Diagnosis     Adjustment disorder with depressed mood     Atrial premature contractions     Carpal tunnel syndrome     Chronic superficial gastritis     Gastroesophageal reflux disease     Generalized anxiety disorder     Hyperlipidemia     Hypertensive heart and renal disease with (congestive) heart failure (H)     Localized, primary osteoarthritis of shoulder region     Mild persistent asthma with exacerbation     Osteoarthritis of knee     Pounding noise in left ear     Senile osteoporosis     Stage 3a chronic kidney disease (H)     Vitamin D deficiency     Diverticular disease of colon     Irritable bowel syndrome     Aortic valve disorder     Intracranial atherosclerosis           Objective    LMP  (LMP Unknown)   Physical Exam   {Exam List (Optional):054469}    {Diagnostic Test Results (Optional):843487}

## 2023-04-10 NOTE — ED TRIAGE NOTES
Patient presents here as advised by her primary physician due to shortness of breath and cough that has persisted over the past 24 hours. Became significantly worse this morning. She states that she was seen at her clinic today and labs were drawn. She was notified to come here due to abnormal labs. BNP was elevated and D-dimer were elevated.

## 2023-04-10 NOTE — ED NOTES
Expected Patient Referral to ED  12:41 PM    Referring Clinic/Provider:  [place] [facility]    Reason for referral/Clinical facts:  87 F with URI sx last week, then developed CP/SOB. Resp sx went away but still feeling SOB.    CXR nl. D dimer age adjusted neg. Troponin 31.     Needs delta trop    Recommendations provided:  Send to ED for further evaluation    Caller was informed that this institution does possess the capabilities and/or resources to provide for patient and should be transferred to our facility.    Discussed that if direct admit is sought and any hurdles are encountered, this ED would be happy to see the patient and evaluate.    Informed caller that recommendations provided are recommendations based only on the facts provided and that they responsible to accept or reject the advice, or to seek a formal in person consultation as needed and that this ED will see/treat patient should they arrive.      Brittani Hanson MD  [facility] EMERGENCY DEPARTMENT  [address] [place] [id number]  [phone]       Brittani Hanson MD  04/10/23 1244

## 2023-04-10 NOTE — DISCHARGE INSTRUCTIONS
As we discussed recommend hydralazine 3 times a day for your blood pressure.  If you check your blood pressure is below 120 you can stop the hydralazine.  You been referred to rapid access heart clinic.  Return to the ER for worsening symptoms

## 2023-04-10 NOTE — PATIENT INSTRUCTIONS
Try the xopenex for wheezing/rattling/short of breath.    This should not cause jittering or heart racing like albuterol did.      Your chest xray does not show any pneumonia, fluid around the lungs or air around the lungs.  Heart size normal.      I supsect this is related to a virus and should slowly improve over the next week. If fevers, severe or worsening symptoms you should be rechecked.      I will call with results of the tests pending: troponin, BNP and d dimer. If the d dimer or troponin are elevated you will be directed to the ER.

## 2023-04-10 NOTE — PROGRESS NOTES
Assessment & Plan     Chest pain, unspecified type  No chest pain today, last time she had discomfort was 3 days ago, however ongoing MADISON.  ECG : NSR without ectopy, no sT elevation or depresison, no change from previous.    Obtained d dimer , CBC, BNP, and troponin.    Troponin returned elevated.  D dimer age appropriate and BNP WNL  CXR obtained reveals no acute process, no signs of CHF, infiltrate effusion, pna.    Given elevated troponin directed to the ED for further evaluation and management. Discussed with EDMD.        - EKG 12-lead, tracing only  - XR Chest 2 Views  - D dimer, quantitative  - CBC with platelets and differential  - BNP-N terminal pro    MADISON (dyspnea on exertion)  As above.  Will obtain further cardiac work up in the ED to exclude lifethreaening causes of her sx including ACS or recent cardiac event. Will need serial troponin.     IPrior to return of troponin I discussed with pt and nephew, I do think that sx likely due to viral uri as started with typical pharyngitis, rhinorrhea, congestion and cough.  The head sx improved but ongoing cough and sob.  She has a hx of RAD / asthma and has been off medication for many years.  I suspect she is having exacerbation due to viral illness. Will give trial of xopenex as she has not tolerated albuterol due to jitteriness and heart racing.  This was sen to the pharmarcy my understanding is that it requires PA and happy to assist with that.       - XR Chest 2 Views  - D dimer, quantitative  - CBC with platelets and differential  - BNP-N terminal pro     6}    Jimena Thorpe PA-C  [facility] 88 [facility] who presents to clinic today for the following health issues:  Chief Complaint   Patient presents with     Chest Pain     X Wednesday. Worse today. SOB and wheezing. No dizziness or lightheaded. Weak. No nausea or vomiting. No fever. Coughing, no headaches.   Pt is accompanied by nephew.   "  She presents to urgent care with concerns re: cough, congestion, sob, chest discomfort x 5 days.  She has a hx of asthma, allergies, chf, htn, hyperlipidemia, CKD, hiatal hernia, gerd, aortic  and anxiety.    Sx started  Wednesday night 4-5-23  with \"head cold\" consisting of rhinorrhea, congestion, ST.    No longer any rhinorrhea, congestion, ST resolved, but has progressed to chest.   Pressure on the chest Thursday 4-6-23, no longer pain or pressure but does have MADISON. Most exertion is chair/bed to bathroom. Also talking last night felt very sob.  This morning woke sob but after being upright and coughing her sob is much improved and no longer noting any sob with talking.    No hx of COPD.  [id number] took medication for asthma but stopped due to \"jittering\" no problems since.     The pressure went away.    Over the weekend progressive sob, cough, rattling and wheezing in the chest.    Hx of enviromental, food and medication allergies so so thought was one of the new items but no change with removing the new items.    Chronic yellow mucus in the morning which seems unchanged.    No fevers.    No sputum change. Swelling in the legs for a few months but told by cardiology to monitor sodium and sx are much better.  Today no increased edema.    Pt denies any current chest pain.  Her SOB is much better than had been yesterday.  No orthopnea, no PND, no change of edema.   Negative covid 19 on day 1 and 2 of illness.          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)     Localized, primary osteoarthritis of shoulder region     Mild persistent asthma with exacerbation     Osteoarthritis of knee     Pounding " noise in left ear     Senile osteoporosis     Stage 3a chronic kidney disease (H)     Vitamin D deficiency     Diverticular disease of colon     Irritable bowel syndrome     Aortic valve disorder     Intracranial atherosclerosis       Current Outpatient Medications:      acetaminophen (TYLENOL) 500 MG tablet, Take 500 mg by mouth as needed, Disp: , Rfl:      atorvastatin (LIPITOR) 10 MG tablet, TAKE 1 TABLET(10 MG) BY MOUTH DAILY, Disp: 90 tablet, Rfl: 3     Cholecalciferol (VITAMIN D3) 50 MCG (2000 UT) CAPS, 1 tablet by Oral or Feeding Tube route daily, Disp: , Rfl:      clonazePAM (KLONOPIN) 0.5 MG tablet, Take 1 mg by mouth 3 times daily, Disp: , Rfl:      clopidogrel (PLAVIX) 75 MG tablet, Take 1 tablet (75 mg) by mouth daily, Disp: 30 tablet, Rfl: 11     famotidine (PEPCID) 10 MG tablet, Take 1 tablet by mouth 4 times daily, Disp: , Rfl:      levalbuterol (XOPENEX HFA) 45 MCG/ACT inhaler, Inhale 2 puffs into the lungs every 4 hours as needed for shortness of breath or wheezing, Disp: 15 g, Rfl: 0     losartan (COZAAR) 50 MG tablet, Take 50 mg by mouth 2 times daily, Disp: , Rfl:      metoprolol succinate ER (TOPROL-XL) 25 MG 24 hr tablet, Take 1 tablet by mouth At Bedtime, Disp: , Rfl:      aspirin (ASA) 81 MG chewable tablet, Take 81 mg by mouth daily (Patient not taking: Reported on 4/10/2023), Disp: , Rfl:      calcium carbonate 750 MG CHEW, Take 1 tablet by mouth daily, Disp: , Rfl:      Ferrous Gluconate 324 (37.5 Fe) MG TABS, Take 1 tablet by mouth Every Mon, Wed, Fri Morning (Patient not taking: Reported on 7/5/2022), Disp: , Rfl:      hydrALAZINE (APRESOLINE) 25 MG tablet, Take 1 tablet (25 mg) by mouth 3 times daily, Disp: 30 tablet, Rfl: 0    Review of Systems  Constitutional, HEENT, cardiovascular, pulmonary, gi and gu systems are negative, except as otherwise noted.      Objective    BP (!) 186/66 (BP Location: Left arm, Patient Position: Sitting, Cuff Size: Adult Regular)   Pulse 80   Temp 98.6   F (37  C) (Oral)   Resp 20   Wt 57.6 kg (127 lb)   LMP  (LMP Unknown)   SpO2 95%   BMI 22.50 kg/m    Physical Exam   Pt is in no acute distress and appears well, able to talk in full sentances. No MADISON, no audible wheezing.  No tachypnea.    Ears patent B:  TM s intact, non-injected. All land marks easily visibile    Nasal mucosa is non-edematous, no discharge.    Pharynx: non erythematous, tonsils non hypertrophied, No exudate   Neck supple: no adenopathy  Lungs: CTA,   Heart: RRR, 2/6 VICKY L sternal boarder.    Ext: trace edema  Skin: no rashes    Results for orders placed or performed during the hospital encounter of 04/10/23   Basic metabolic panel     Status: Abnormal   Result Value Ref Range    Sodium 136 136 - 145 mmol/L    Potassium 4.8 3.4 - 5.3 mmol/L    Chloride 100 98 - 107 mmol/L    Carbon Dioxide (CO2) 26 22 - 29 mmol/L    Anion Gap 10 7 - 15 mmol/L    Urea Nitrogen 24.6 (H) 8.0 - 23.0 mg/dL    Creatinine 1.07 (H) 0.51 - 0.95 mg/dL    Calcium 10.2 8.8 - 10.2 mg/dL    Glucose 97 70 - 99 mg/dL    GFR Estimate 50 (L) >60 mL/min/1.73m2   Troponin T, High Sensitivity     Status: Abnormal   Result Value Ref Range    Troponin T, High Sensitivity 15 (H) <=14 ng/L   CBC with platelets and differential     Status: Abnormal   Result Value Ref Range    WBC Count 4.5 4.0 - 11.0 10e3/uL    RBC Count 3.85 3.80 - 5.20 10e6/uL    Hemoglobin 11.1 (L) 11.7 - 15.7 g/dL    Hematocrit 34.4 (L) 35.0 - 47.0 %    MCV 89 78 - 100 fL    MCH 28.8 26.5 - 33.0 pg    MCHC 32.3 31.5 - 36.5 g/dL    RDW 13.0 10.0 - 15.0 %    Platelet Count 408 150 - 450 10e3/uL    % Neutrophils 60 %    % Lymphocytes 26 %    % Monocytes 11 %    % Eosinophils 3 %    % Basophils 0 %    % Immature Granulocytes 0 %    NRBCs per 100 WBC 0 <1 /100    Absolute Neutrophils 2.7 1.6 - 8.3 10e3/uL    Absolute Lymphocytes 1.2 0.8 - 5.3 10e3/uL    Absolute Monocytes 0.5 0.0 - 1.3 10e3/uL    Absolute Eosinophils 0.1 0.0 - 0.7 10e3/uL    Absolute Basophils 0.0 0.0  - 0.2 10e3/uL    Absolute Immature Granulocytes 0.0 <=0.4 10e3/uL    Absolute NRBCs 0.0 10e3/uL   Symptomatic Influenza A/B, RSV, & SARS-CoV2 PCR (COVID-19) Nasopharyngeal     Status: Normal    Specimen: Nasopharyngeal; Swab   Result Value Ref Range    Influenza A PCR Negative Negative    Influenza B PCR Negative Negative    RSV PCR Negative Negative    SARS CoV2 PCR Negative Negative    Narrative    Testing was performed using the Xpert Xpress CoV2/Flu/RSV Assay on the Cepheid GeneXpert Instrument. This test should be ordered for the detection of SARS-CoV-2, influenza, and RSV viruses in individuals who meet clinical and/or epidemiological criteria. Test performance is unknown in asymptomatic patients. This test is for in vitro diagnostic use under the FDA EUA for laboratories certified under CLIA to perform high or moderate complexity testing. This test has not been FDA cleared or approved. A negative result does not rule out the presence of PCR inhibitors in the specimen or target RNA in concentration below the limit of detection for the assay. If only one viral target is positive but coinfection with multiple targets is suspected, the sample should be re-tested with another FDA cleared, approved, or authorized test, if coinfection would change clinical management. This test was validated by the [facility]. These laboratories are certified under the Clinical Laboratory Improvement Amendments of 1988 (CLIA-88) as qualified to perform high complexity laboratory testing.   CBC with Platelets & Differential     Status: Abnormal    Narrative    The following orders were created for panel order CBC with Platelets & Differential.  Procedure                               Abnormality         Status                     ---------                               -----------         ------                     CBC with platelets and d...[[id number]]  Abnormal            Final result                 Please view  results for these tests on the individual orders.   Results for orders placed or performed during the hospital encounter of 04/10/23   XR Chest 2 Views     Status: None    Narrative    EXAM: XR CHEST 2 VIEWS  LOCATION: [facility]  DATE/TIME: 4/10/2023 11:46 AM    INDICATION:  Chest pain, unspecified type, MADISON (dyspnea on exertion)  COMPARISON: 09/04/2022.      Impression    IMPRESSION: Heart size and pulmonary vascularity are normal. The lungs are clear. There is no pneumothorax or pleural effusion. There are advanced osteoarthritic changes in the right shoulder with narrowing of the subacromial interval and probable   rotator cuff degeneration or tear.   Results for orders placed or performed in visit on 04/10/23   D dimer, quantitative     Status: Abnormal   Result Value Ref Range    D-Dimer Quantitative 0.73 (H) 0.00 - 0.50 ug/mL FEU    Narrative    This D-dimer assay is intended for use in conjunction with a clinical pretest probability assessment model to exclude pulmonary embolism (PE) and deep venous thrombosis (DVT) in outpatients suspected of PE or DVT. The cut-off value is 0.50 ug/mL FEU.   BNP-N terminal pro     Status: Normal   Result Value Ref Range    N Terminal Pro BNP Outpatient 837 0 - 1,800 pg/mL   Troponin T, High Sensitivity     Status: Abnormal   Result Value Ref Range    Troponin T, High Sensitivity 31 (H) <=14 ng/L   CBC with platelets and differential     Status: Abnormal   Result Value Ref Range    WBC Count 4.4 4.0 - 11.0 10e3/uL    RBC Count 3.84 3.80 - 5.20 10e6/uL    Hemoglobin 11.2 (L) 11.7 - 15.7 g/dL    Hematocrit 34.0 (L) 35.0 - 47.0 %    MCV 89 78 - 100 fL    MCH 29.2 26.5 - 33.0 pg    MCHC 32.9 31.5 - 36.5 g/dL    RDW 13.0 10.0 - 15.0 %    Platelet Count 397 150 - 450 10e3/uL    % Neutrophils 58 %    % Lymphocytes 25 %    % Monocytes 12 %    % Eosinophils 3 %    % Basophils 1 %    % Immature Granulocytes 0 %    Absolute Neutrophils 2.5 1.6 - 8.3 10e3/uL     Absolute Lymphocytes 1.1 0.8 - 5.3 10e3/uL    Absolute Monocytes 0.5 0.0 - 1.3 10e3/uL    Absolute Eosinophils 0.1 0.0 - 0.7 10e3/uL    Absolute Basophils 0.0 0.0 - 0.2 10e3/uL    Absolute Immature Granulocytes 0.0 <=0.4 10e3/uL   EKG 12-lead, tracing only     Status: None   Result Value Ref Range    Systolic Blood Pressure  mmHg    Diastolic Blood Pressure  mmHg    Ventricular Rate 80 BPM    Atrial Rate 80 BPM    MT Interval 174 ms    QRS Duration 68 ms     ms    QTc 403 ms    P Axis 58 degrees    R AXIS 27 degrees    T Axis 60 degrees    Interpretation ECG       Sinus rhythm  Normal ECG  When compared with ECG of 04-SEP-2022 13:35,  No significant change was found  Confirmed by STAN OROURKE, LES LOC:JN ([id number]) on 4/10/2023 12:57:58 PM     CBC with platelets and differential     Status: Abnormal    Narrative    The following orders were created for panel order CBC with platelets and differential.  Procedure                               Abnormality         Status                     ---------                               -----------         ------                     CBC with platelets and d...[[id number]]  Abnormal            Final result                 Please view results for these tests on the individual orders.

## 2023-04-11 LAB
ATRIAL RATE - MUSE: 79 BPM
DIASTOLIC BLOOD PRESSURE - MUSE: NORMAL MMHG
INTERPRETATION ECG - MUSE: NORMAL
P AXIS - MUSE: 57 DEGREES
PR INTERVAL - MUSE: 176 MS
QRS DURATION - MUSE: 70 MS
QT - MUSE: 352 MS
QTC - MUSE: 403 MS
R AXIS - MUSE: 51 DEGREES
SYSTOLIC BLOOD PRESSURE - MUSE: NORMAL MMHG
T AXIS - MUSE: 65 DEGREES
VENTRICULAR RATE- MUSE: 79 BPM

## 2023-04-20 ENCOUNTER — OFFICE VISIT (OUTPATIENT)
Dept: CARDIOLOGY | Facility: CLINIC | Age: 88
End: 2023-04-20
Attending: EMERGENCY MEDICINE
Payer: MEDICARE

## 2023-04-20 VITALS
HEART RATE: 76 BPM | BODY MASS INDEX: 32.31 KG/M2 | RESPIRATION RATE: 16 BRPM | DIASTOLIC BLOOD PRESSURE: 56 MMHG | SYSTOLIC BLOOD PRESSURE: 180 MMHG | HEIGHT: 61 IN

## 2023-04-20 DIAGNOSIS — I67.2 INTRACRANIAL ATHEROSCLEROSIS: ICD-10-CM

## 2023-04-20 DIAGNOSIS — R07.9 CHEST PAIN, UNSPECIFIED TYPE: Primary | ICD-10-CM

## 2023-04-20 DIAGNOSIS — R06.00 DYSPNEA, UNSPECIFIED TYPE: ICD-10-CM

## 2023-04-20 DIAGNOSIS — R03.0 ELEVATED BLOOD PRESSURE READING: ICD-10-CM

## 2023-04-20 DIAGNOSIS — E78.5 HYPERLIPIDEMIA, UNSPECIFIED HYPERLIPIDEMIA TYPE: ICD-10-CM

## 2023-04-20 DIAGNOSIS — Z78.9 STATIN INTOLERANCE: ICD-10-CM

## 2023-04-20 PROCEDURE — 99215 OFFICE O/P EST HI 40 MIN: CPT | Performed by: GENERAL ACUTE CARE HOSPITAL

## 2023-04-20 NOTE — LETTER
4/20/2023    Shamir Neil MD  [address]  [place] [address]    RE: Kerrykelsie Hoffmann       Dear Colleague,     I had the pleasure of seeing Kerry Hoffmann in the [facility].  HEART CARE ENCOUNTER NOTE          Assessment/Recommendations   Assessment:    Chest pain and shortness of breath. This may have been due to a combination of a viral upper respiratory infection and uncontrolled hypertension. Her history, examination, and laboratory and radiographic data are not consistent with congestive heart failure. She is not reporting anginal symptoms.  Borderline elevated high-sensitivity troponin likely demand-mediated from uncontrolled hypertension.  Mild-to-moderate aortic regurgitation last noted to be stable on transthoracic echocardiography 12/23/2021.  Severe intracranial atherosclerosis last noted on MR angiography of the head and neck in 2021 without evidence of prior cerebrovascular accident.  Symptomatic premature ventricular contractions. These seem quiescent.  Essential hypertension. Uncontrolled.  Hyperlipidemia. Last  mg/dL.  Statin intolerance.    Plan:  No additional cardiac workup is necessary at this time.  I do not think she requires a loop diuretic but would benefit from a trial of a thiazide diuretic for management of hypertension. She was not interested in this.  Continue metoprolol succinate 25 mg daily and losartan 50 mg twice daily.  She is agreeable to again try a course of hydralazine, as it does not sound she had a true adverse reaction to this.  If hydralazine is not tolerated, low dose oral clonidine may also be an option.  She is on dual antiplatelet therapy with aspirin 81 mg and clopidogrel 75 mg daily per neurology.  She has declined all statin and non-statin lipid lowering therapies.  Follow-up with me as needed.        A total time of 40 minutes was spent on the date of this encounter.    History of Present Illness   Ms. Kerry FRANCO  Tahira is a 88 year old female with a significant past history of HTN, HLD, PACs, severe intracranial atherosclerosis, and statin intolerance presenting for urgent evaluation of chest pain and shortness of breath. She was seen in the ED for this on 4/10/2023.     For the past week, she had been experiencing viral URI symptoms with cough and shortness of breath. She then woke up one morning with new heaviness in her chest prompting her to come to urgent care. Her BP was 186/66. ECG showed no ischemia. CXR showed no evidence of infiltrate, edema, or effusions. Hs-troponin was borderline elevated at 31 -> 15. NT-proBNP was detectable but within normal limits at 837. D-diner was elevated at 0.73. These lab findings prompted recommendation for her to to go the ED. Inpatient workup was offered but she declined. Furosemide was suggested but she declined this. She was given a prescription for hydralazine.    She has felt much better since leaving the hospital. However, her BP continued to be elevated so she tried 2 doses of hydralazine. She felt this caused eyelid swelling and visual changes (although her friend told her that her eyes looked fine).     She currently denies chest pain/pressure/tightness, shortness of breath at rest or with exertion, light headedness/dizziness, pre-syncope, syncope, lower extremity swelling, palpitations, paroxysmal nocturnal dyspnea (PND), or orthopnea.    She has been hesitant to use diuretics as she feels she already urinates too much. She has declined any further attempts at statin or other lipid-lowering therapy. She has previously seen my cardiology colleagues, Dr. Reilly and Dr. Nunez. She has tolerated low-dose metoprolol with improvement in palpitations attributed to PVCs.      Cardiac Problems and Cardiac Diagnostics     Most Recent Cardiac testing:  ECG dated 4/10/2323 (personaly reviewed and interpreted): SR, normal ECG    CXR 4/10/2023 (report reviewed):  Heart size and pulmonary  vascularity are normal. The lungs are clear. There is no pneumothorax or pleural effusion. There are advanced osteoarthritic changes in the right shoulder with narrowing of the subacromial interval and probable rotator cuff degeneration or tear.    MRI/MRA head 2/23/2021 (report reviewed):  1. Extensive atherosclerotic narrowing in the anterior and posterior circulation.  2. No evidence of aneurysm.    MRA neck 2/18/2021 (report reviewed):  1. Moderate to severe stenosis of the nondominant right vertebral artery origin with an additional tandem severe stenosis of the V1 segment.   2. Moderate stenosis of the dominant left vertebral artery distal V1 segment distal to its variant direct aortic arch origin.   3. No measurable carotid stenosis.  4. Apparent severe stenosis of the distal left MCA M1 segment with poststenotic dilatation of the bifurcation. Recommend MRA head  for further characterization.    MRI head 5/10/2016 (report reviewed):  1.  No acute intracranial finding. No evidence for recent ischemia, intracranial hemorrhage, or mass.  2.  Mild burden of presumed chronic small vessel ischemic changes in the white matter. Background of mild generalized volume loss.    ECHO 12/23/2021 (report reviewed):   1. Normal resting left ventricular size, estimated EF 60-65%. No resting regional wall motion abnormalities.  2. Grade 1 diastolic dysfunction.  3. Mild left atrial dilation.  4. Trileaflet aortic valve. No evidence of aortic stenosis. Mild-to-moderate aortic insufficiency.  5. Trace tricuspid regurgitation. Estimated RA pressure 5-10 mm Hg based upon evaluation of the IVC. Calculated RV systolic pressure 18 mm Hg plus right atrial pressure (normal).  6. Patient incidentally noted to be in sinus rhythm during echocardiographic examination.    Cardiac cath 4/11/2003 (report reviewed):   1. Angiographically normal coronary arteries.  2. Mildly elevated left ventricular end diastolic pressure with normal left  ventricular systolic function.     Medications  Allergies   Current Outpatient Medications   Medication Sig Dispense Refill    acetaminophen (TYLENOL) 500 MG tablet Take 500 mg by mouth as needed      aspirin (ASA) 81 MG chewable tablet Take 81 mg by mouth daily      calcium carbonate 750 MG CHEW Take 1 tablet by mouth daily      Cholecalciferol (VITAMIN D3) 50 MCG (2000 UT) CAPS 1 tablet by Oral or Feeding Tube route daily      clonazePAM (KLONOPIN) 0.5 MG tablet Take 1 mg by mouth 3 times daily      clopidogrel (PLAVIX) 75 MG tablet Take 1 tablet (75 mg) by mouth daily 30 tablet 11    famotidine (PEPCID) 10 MG tablet Take 1 tablet by mouth 4 times daily      levalbuterol (XOPENEX HFA) 45 MCG/ACT inhaler Inhale 2 puffs into the lungs every 4 hours as needed for shortness of breath or wheezing 15 g 0    losartan (COZAAR) 50 MG tablet Take 50 mg by mouth 2 times daily      metoprolol succinate ER (TOPROL-XL) 25 MG 24 hr tablet Take 1 tablet by mouth At Bedtime      atorvastatin (LIPITOR) 10 MG tablet TAKE 1 TABLET(10 MG) BY MOUTH DAILY (Patient not taking: Reported on 4/20/2023) 90 tablet 3    Ferrous Gluconate 324 (37.5 Fe) MG TABS Take 1 tablet by mouth Every Mon, Wed, Fri Morning (Patient not taking: Reported on 7/5/2022)      hydrALAZINE (APRESOLINE) 25 MG tablet Take 1 tablet (25 mg) by mouth 3 times daily (Patient not taking: Reported on 4/20/2023) 30 tablet 0      Allergies   Allergen Reactions    Buspirone Shortness Of Breath    Ciprofloxacin Hives and Shortness Of Breath     Other reaction(s): Unknown    Cortisone      Other reaction(s): Throat Swelling/Closing, Unknown  Reaction 9-5-94      Hydrocodone-Acetaminophen Shortness Of Breath     Other reaction(s): Unknown    Lansoprazole Shortness Of Breath    Metoprolol Shortness Of Breath    Nedocromil      Other reaction(s): Throat Swelling/Closing    Niacin Shortness Of Breath     Other reaction(s): Unknown    Acetaminophen      Other reaction(s): breathing  difficulties, red ears,high blood press.    Advair Diskus Other (See Comments)     Elevated blood pressure      Albuterol Other (See Comments)     Inhaler had extreme effect on nervous system      Amlodipine      Other reaction(s): Erythema, Unknown    Azithromycin      Other reaction(s): *Unknown, Unknown    Cefixime Diarrhea     Other reaction(s): Unknown    Cefprozil Nausea and Vomiting     Other reaction(s): Unknown    Cefuroxime      Other reaction(s): *Unknown    Cephalexin      Other reaction(s): *Unknown, Unknown    Contrast Dye      Other reaction(s): hives    Cyclobenzaprine      Other reaction(s): Sedation    Dextromethorphan      Other reaction(s): headache,nausea    Diltiazem      Other reaction(s): Erythema, Unknown  Ears face arms and chest red and on fire-body tremors      Erythromycin      Other reaction(s): Unknown    Esomeprazole      Other reaction(s): Headache    Ethanol      Other reaction(s): Adverse reaction    Fenofibrate Muscle Pain (Myalgia)     Other reaction(s): Unknown    Guaifenesin-Dm Cr Nausea     Other reaction(s): Headache    Hydrocodone      Other reaction(s): breathing difficulties    Levofloxacin Nausea     Other reaction(s): Headache, Unknown    Methylprednisolone     Metronidazole     Monosodium Glutamate     Moxifloxacin      Other reaction(s): Dizziness    Nifedipine      Other reaction(s): Edema  Took for 5-93 to 9-94 red and swollen leg      Nsaids      Other reaction(s): Unknown    Ofloxacin      Other reaction(s): *Unknown    Ondansetron      Other reaction(s): Constipation    Parsley Seed     Penicillins      Other reaction(s): *Unknown, Unknown    Piper     Pirbuterol Other (See Comments)     Immediate extreme effect on nervous system      Pravastatin      Other reaction(s): Dizziness, Unknown    Pseudoephedrine      Other reaction(s): headache,nausea    Pseudoephedrine-Dm-Gg     Simvastatin Muscle Pain (Myalgia)    Spironolactone      Other reaction(s): stomach  "cramps, nausea    Sulfamethoxazole-Trimethoprim      Other reaction(s): Unknown    Tramadol      Other reaction(s): red ears,high blood press.    Triamterene Other (See Comments)     Greatly bothered with sun-took medication for three years  Greatly bothered with sun      Ultracet      Other reaction(s): Tachycardia, Unknown    Diatrizoate Rash    Telmisartan Palpitations        Physical Examination Review of Systems   BP (!) 180/56 (BP Location: Right arm, Patient Position: Sitting, Cuff Size: Adult Large)   Pulse 76   Resp 16   Ht 1.549 m (5' 1\")   LMP  (LMP Unknown)   BMI 32.31 kg/m    Body mass index is 32.31 kg/m .  Wt Readings from Last 3 Encounters:   04/10/23 57.6 kg (127 lb)   07/05/22 78.5 kg (173 lb)   04/29/22 78.5 kg (173 lb)       General Appearance:   Pleasant  female, appears  stated age. no acute distress, normal body habitus   ENT/Mouth: Facemask.      EYES:  no scleral icterus, normal conjunctivae   Neck: no carotid bruits. No anterior cervical lymphadenopaty   Respiratory:   lungs are clear to auscultation, no rales or wheezing, equal chest wall expansion    Cardiovascular:   Regular rhythm, normal rate. Normal first and second heart sounds with no murmurs, rubs, or gallops; the carotid, radial and posterior tibial pulses are intact, Jugular venous pressure normal, no edema bilaterally    Abdomen/GI:  no organomegaly, masses, bruits, or tenderness; bowel sounds are present   Extremities: no cyanosis or clubbing   Skin: no xanthelasma, warm.    Heme/lymph/ Immunology No apparent bleeding noted.   Neurologic: Alert and oriented. normal gait, no tremors     Psychiatric: Pleasant, calm, appropriate affect.    A complete 10 system review of systems was performed and is negative except as mentioned in the HPI/subjective.         Past History   Past Medical History:   Past Medical History:   Diagnosis Date    Anxiety     takes clonazepam    Asthma     per [facility]     Chronic kidney disease     stage " 3 per [facility]     CKD (chronic kidney disease)     Clostridium difficile colitis 11/1/2016    Clostridium difficile infection     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    Clostridium enterocolitis 10/27/2016    CTS (carpal tunnel syndrome)     GERD (gastroesophageal reflux disease)     per [facility]     Hiatal hernia     small per [facility]     Hyperlipidemia     Hyperlipidemia     Hyperlipidemia     Hypertension     Hypertension     Osteoarthritis of knee     per [facility]     Spinal stenosis     per [facility]     Vitamin D deficiency     per [facility]       Past Surgical History:   Past Surgical History:   Procedure Laterality Date    BREAST SURGERY  1982    breast tumor per [facility]     CATARACT EXTRACTION  07/2005    per [facility]     ORIF TIBIA & FIBULA FRACTURES  1988    per [facility]     OTHER SURGICAL HISTORY  10/2004    hole in retinaper [facility]     OTHER SURGICAL HISTORY  05/03/2015    fecal transplantper [facility]     NV ESOPHAGOGASTRODUODENOSCOPY TRANSORAL DIAGNOSTIC N/A 9/11/2020    Procedure: ESOPHAGOGASTRODUODENOSCOPY With gastric biopsies;  Surgeon: David Reyes MD;  Location: [facility];  Service: Gastroenterology    TONSILLECTOMY      TONSILLECTOMY & ADENOIDECTOMY  1963       Family History:   Family History   Problem Relation Age of Onset    Hypertension Mother     Cancer Mother         gallbladder cancer    Myocardial Infarction Father     Hypertension Sister     Alzheimer Disease Sister     Heart Disease Sister     Cancer Brother     Brain Cancer Brother     Pacemaker Mother     Heart Disease Mother     Coronary Artery Disease Father     Heart Disease Father     Heart Failure Sister        Social History:   Social History     Socioeconomic History    Marital status:      Spouse name: Not on file    Number of children: Not on file    Years of education: Not on file    Highest education level: Not on file   Occupational History    Not on file   Tobacco Use    Smoking status: Former     Packs/day: 3.00     Years: 35.00      Pack years: 105.00     Types: Cigarettes    Smokeless tobacco: Never    Tobacco comments:     quit 1968   Vaping Use    Vaping status: Not on file   Substance and Sexual Activity    Alcohol use: Not Currently    Drug use: Never    Sexual activity: Not Currently   Other Topics Concern    Parent/sibling w/ CABG, MI or angioplasty before 65F 55M? No   Social History Narrative    Not on file     Social Determinants of Health     Financial Resource Strain: Not on file   Food Insecurity: Not on file   Transportation Needs: Not on file   Physical Activity: Not on file   Stress: Not on file   Social Connections: Not on file   Intimate Partner Violence: Not on file   Housing Stability: Not on file              Lab Results    Chemistry/lipid CBC Cardiac Enzymes/BNP/TSH/INR   Lab Results   Component Value Date    CHOL 267 (H) 05/17/2022    HDL 53 05/17/2022     (H) 05/17/2022    TRIG 178 (H) 05/17/2022    CR 1.07 (H) 04/10/2023    BUN 24.6 (H) 04/10/2023    POTASSIUM 4.8 04/10/2023     04/10/2023    CO2 26 04/10/2023      Lab Results   Component Value Date    WBC 4.5 04/10/2023    HGB 11.1 (L) 04/10/2023    HCT 34.4 (L) 04/10/2023    MCV 89 04/10/2023     04/10/2023      Lab Results   Component Value Date    TROPONINI <0.01 09/04/2022     11/16/2019    NTBNP 837 04/10/2023    TSH 2.06 05/17/2022          Ga Gill MD [facility]  Non-Invasive Cardiologist  [facility]  Pager [phone]        Thank you for allowing me to participate in the care of your patient.      Sincerely,     Ga Gill MD      Heart Care  cc:   Jeb Askew MD  [address] [phone]

## 2023-04-20 NOTE — PROGRESS NOTES
HEART CARE ENCOUNTER NOTE          Assessment/Recommendations   Assessment:    1. Chest pain and shortness of breath. This may have been due to a combination of a viral upper respiratory infection and uncontrolled hypertension. Her history, examination, and laboratory and radiographic data are not consistent with congestive heart failure. She is not reporting anginal symptoms.  2. Borderline elevated high-sensitivity troponin likely demand-mediated from uncontrolled hypertension.  3. Mild-to-moderate aortic regurgitation last noted to be stable on transthoracic echocardiography 12/23/2021.  4. Severe intracranial atherosclerosis last noted on MR angiography of the head and neck in 2021 without evidence of prior cerebrovascular accident.  5. Symptomatic premature ventricular contractions. These seem quiescent.  6. Essential hypertension. Uncontrolled.  7. Hyperlipidemia. Last  mg/dL.  8. Statin intolerance.    Plan:  1. No additional cardiac workup is necessary at this time.  2. I do not think she requires a loop diuretic but would benefit from a trial of a thiazide diuretic for management of hypertension. She was not interested in this.  3. Continue metoprolol succinate 25 mg daily and losartan 50 mg twice daily.  4. She is agreeable to again try a course of hydralazine, as it does not sound she had a true adverse reaction to this.  5. If hydralazine is not tolerated, low dose oral clonidine may also be an option.  6. She is on dual antiplatelet therapy with aspirin 81 mg and clopidogrel 75 mg daily per neurology.  7. She has declined all statin and non-statin lipid lowering therapies.  8. Follow-up with me as needed.        A total time of 40 minutes was spent on the date of this encounter.    History of Present Illness   Ms. Kerry Hoffmann is a 88 year old female with a significant past history of HTN, HLD, PACs, severe intracranial atherosclerosis, and statin intolerance presenting for urgent evaluation  of chest pain and shortness of breath. She was seen in the ED for this on 4/10/2023.     For the past week, she had been experiencing viral URI symptoms with cough and shortness of breath. She then woke up one morning with new heaviness in her chest prompting her to come to urgent care. Her BP was 186/66. ECG showed no ischemia. CXR showed no evidence of infiltrate, edema, or effusions. Hs-troponin was borderline elevated at 31 -> 15. NT-proBNP was detectable but within normal limits at 837. D-diner was elevated at 0.73. These lab findings prompted recommendation for her to to go the ED. Inpatient workup was offered but she declined. Furosemide was suggested but she declined this. She was given a prescription for hydralazine.    She has felt much better since leaving the hospital. However, her BP continued to be elevated so she tried 2 doses of hydralazine. She felt this caused eyelid swelling and visual changes (although her friend told her that her eyes looked fine).     She currently denies chest pain/pressure/tightness, shortness of breath at rest or with exertion, light headedness/dizziness, pre-syncope, syncope, lower extremity swelling, palpitations, paroxysmal nocturnal dyspnea (PND), or orthopnea.    She has been hesitant to use diuretics as she feels she already urinates too much. She has declined any further attempts at statin or other lipid-lowering therapy. She has previously seen my cardiology colleagues, Dr. Reilly and Dr. Nunez. She has tolerated low-dose metoprolol with improvement in palpitations attributed to PVCs.      Cardiac Problems and Cardiac Diagnostics     Most Recent Cardiac testing:  ECG dated 4/10/2323 (personaly reviewed and interpreted): SR, normal ECG    CXR 4/10/2023 (report reviewed):  Heart size and pulmonary vascularity are normal. The lungs are clear. There is no pneumothorax or pleural effusion. There are advanced osteoarthritic changes in the right shoulder with narrowing of  the subacromial interval and probable rotator cuff degeneration or tear.    MRI/MRA head 2/23/2021 (report reviewed):  1. Extensive atherosclerotic narrowing in the anterior and posterior circulation.  2. No evidence of aneurysm.    MRA neck 2/18/2021 (report reviewed):  1. Moderate to severe stenosis of the nondominant right vertebral artery origin with an additional tandem severe stenosis of the V1 segment.   2. Moderate stenosis of the dominant left vertebral artery distal V1 segment distal to its variant direct aortic arch origin.   3. No measurable carotid stenosis.  4. Apparent severe stenosis of the distal left MCA M1 segment with poststenotic dilatation of the bifurcation. Recommend MRA head  for further characterization.    MRI head 5/10/2016 (report reviewed):  1.  No acute intracranial finding. No evidence for recent ischemia, intracranial hemorrhage, or mass.  2.  Mild burden of presumed chronic small vessel ischemic changes in the white matter. Background of mild generalized volume loss.    ECHO 12/23/2021 (report reviewed):   1. Normal resting left ventricular size, estimated EF 60-65%. No resting regional wall motion abnormalities.  2. Grade 1 diastolic dysfunction.  3. Mild left atrial dilation.  4. Trileaflet aortic valve. No evidence of aortic stenosis. Mild-to-moderate aortic insufficiency.  5. Trace tricuspid regurgitation. Estimated RA pressure 5-10 mm Hg based upon evaluation of the IVC. Calculated RV systolic pressure 18 mm Hg plus right atrial pressure (normal).  6. Patient incidentally noted to be in sinus rhythm during echocardiographic examination.    Cardiac cath 4/11/2003 (report reviewed):   1. Angiographically normal coronary arteries.  2. Mildly elevated left ventricular end diastolic pressure with normal left ventricular systolic function.     Medications  Allergies   Current Outpatient Medications   Medication Sig Dispense Refill     acetaminophen (TYLENOL) 500 MG tablet Take 500 mg  by mouth as needed       aspirin (ASA) 81 MG chewable tablet Take 81 mg by mouth daily       calcium carbonate 750 MG CHEW Take 1 tablet by mouth daily       Cholecalciferol (VITAMIN D3) 50 MCG (2000 UT) CAPS 1 tablet by Oral or Feeding Tube route daily       clonazePAM (KLONOPIN) 0.5 MG tablet Take 1 mg by mouth 3 times daily       clopidogrel (PLAVIX) 75 MG tablet Take 1 tablet (75 mg) by mouth daily 30 tablet 11     famotidine (PEPCID) 10 MG tablet Take 1 tablet by mouth 4 times daily       levalbuterol (XOPENEX HFA) 45 MCG/ACT inhaler Inhale 2 puffs into the lungs every 4 hours as needed for shortness of breath or wheezing 15 g 0     losartan (COZAAR) 50 MG tablet Take 50 mg by mouth 2 times daily       metoprolol succinate ER (TOPROL-XL) 25 MG 24 hr tablet Take 1 tablet by mouth At Bedtime       atorvastatin (LIPITOR) 10 MG tablet TAKE 1 TABLET(10 MG) BY MOUTH DAILY (Patient not taking: Reported on 4/20/2023) 90 tablet 3     Ferrous Gluconate 324 (37.5 Fe) MG TABS Take 1 tablet by mouth Every Mon, Wed, Fri Morning (Patient not taking: Reported on 7/5/2022)       hydrALAZINE (APRESOLINE) 25 MG tablet Take 1 tablet (25 mg) by mouth 3 times daily (Patient not taking: Reported on 4/20/2023) 30 tablet 0      Allergies   Allergen Reactions     Buspirone Shortness Of Breath     Ciprofloxacin Hives and Shortness Of Breath     Other reaction(s): Unknown     Cortisone      Other reaction(s): Throat Swelling/Closing, Unknown  Reaction 9-5-94       Hydrocodone-Acetaminophen Shortness Of Breath     Other reaction(s): Unknown     Lansoprazole Shortness Of Breath     Metoprolol Shortness Of Breath     Nedocromil      Other reaction(s): Throat Swelling/Closing     Niacin Shortness Of Breath     Other reaction(s): Unknown     Acetaminophen      Other reaction(s): breathing difficulties, red ears,high blood press.     Advair Diskus Other (See Comments)     Elevated blood pressure       Albuterol Other (See Comments)     Inhaler  had extreme effect on nervous system       Amlodipine      Other reaction(s): Erythema, Unknown     Azithromycin      Other reaction(s): *Unknown, Unknown     Cefixime Diarrhea     Other reaction(s): Unknown     Cefprozil Nausea and Vomiting     Other reaction(s): Unknown     Cefuroxime      Other reaction(s): *Unknown     Cephalexin      Other reaction(s): *Unknown, Unknown     Contrast Dye      Other reaction(s): hives     Cyclobenzaprine      Other reaction(s): Sedation     Dextromethorphan      Other reaction(s): headache,nausea     Diltiazem      Other reaction(s): Erythema, Unknown  Ears face arms and chest red and on fire-body tremors       Erythromycin      Other reaction(s): Unknown     Esomeprazole      Other reaction(s): Headache     Ethanol      Other reaction(s): Adverse reaction     Fenofibrate Muscle Pain (Myalgia)     Other reaction(s): Unknown     Guaifenesin-Dm Cr Nausea     Other reaction(s): Headache     Hydrocodone      Other reaction(s): breathing difficulties     Levofloxacin Nausea     Other reaction(s): Headache, Unknown     Methylprednisolone      Metronidazole      Monosodium Glutamate      Moxifloxacin      Other reaction(s): Dizziness     Nifedipine      Other reaction(s): Edema  Took for 5-93 to 9-94 red and swollen leg       Nsaids      Other reaction(s): Unknown     Ofloxacin      Other reaction(s): *Unknown     Ondansetron      Other reaction(s): Constipation     Parsley Seed      Penicillins      Other reaction(s): *Unknown, Unknown     Piper      Pirbuterol Other (See Comments)     Immediate extreme effect on nervous system       Pravastatin      Other reaction(s): Dizziness, Unknown     Pseudoephedrine      Other reaction(s): headache,nausea     Pseudoephedrine-Dm-Gg      Simvastatin Muscle Pain (Myalgia)     Spironolactone      Other reaction(s): stomach cramps, nausea     Sulfamethoxazole-Trimethoprim      Other reaction(s): Unknown     Tramadol      Other reaction(s): red  "ears,high blood press.     Triamterene Other (See Comments)     Greatly bothered with sun-took medication for three years  Greatly bothered with sun       Ultracet      Other reaction(s): Tachycardia, Unknown     Diatrizoate Rash     Telmisartan Palpitations        Physical Examination Review of Systems   BP (!) 180/56 (BP Location: Right arm, Patient Position: Sitting, Cuff Size: Adult Large)   Pulse 76   Resp 16   Ht 1.549 m (5' 1\")   LMP  (LMP Unknown)   BMI 32.31 kg/m    Body mass index is 32.31 kg/m .  Wt Readings from Last 3 Encounters:   04/10/23 57.6 kg (127 lb)   07/05/22 78.5 kg (173 lb)   04/29/22 78.5 kg (173 lb)       General Appearance:   Pleasant  female, appears  stated age. no acute distress, normal body habitus   ENT/Mouth: Facemask.      EYES:  no scleral icterus, normal conjunctivae   Neck: no carotid bruits. No anterior cervical lymphadenopaty   Respiratory:   lungs are clear to auscultation, no rales or wheezing, equal chest wall expansion    Cardiovascular:   Regular rhythm, normal rate. Normal first and second heart sounds with no murmurs, rubs, or gallops; the carotid, radial and posterior tibial pulses are intact, Jugular venous pressure normal, no edema bilaterally    Abdomen/GI:  no organomegaly, masses, bruits, or tenderness; bowel sounds are present   Extremities: no cyanosis or clubbing   Skin: no xanthelasma, warm.    Heme/lymph/ Immunology No apparent bleeding noted.   Neurologic: Alert and oriented. normal gait, no tremors     Psychiatric: Pleasant, calm, appropriate affect.    A complete 10 system review of systems was performed and is negative except as mentioned in the HPI/subjective.         Past History   Past Medical History:   Past Medical History:   Diagnosis Date     Anxiety     takes clonazepam     Asthma     per [facility]      Chronic kidney disease     stage 3 per [facility]      CKD (chronic kidney disease)      Clostridium difficile colitis 11/1/2016     Clostridium " difficile infection     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     Clostridium enterocolitis 10/27/2016     CTS (carpal tunnel syndrome)      GERD (gastroesophageal reflux disease)     per [facility]      Hiatal hernia     small per [facility]      Hyperlipidemia      Hyperlipidemia      Hyperlipidemia      Hypertension      Hypertension      Osteoarthritis of knee     per [facility]      Spinal stenosis     per [facility]      Vitamin D deficiency     per [facility]       Past Surgical History:   Past Surgical History:   Procedure Laterality Date     BREAST SURGERY  1982    breast tumor per [facility]      CATARACT EXTRACTION  07/2005    per [facility]      ORIF TIBIA & FIBULA FRACTURES  1988    per [facility]      OTHER SURGICAL HISTORY  10/2004    hole in retinaper [facility]      OTHER SURGICAL HISTORY  05/03/2015    fecal transplantper [facility]      MI ESOPHAGOGASTRODUODENOSCOPY TRANSORAL DIAGNOSTIC N/A 9/11/2020    Procedure: ESOPHAGOGASTRODUODENOSCOPY With gastric biopsies;  Surgeon: David Reyes MD;  Location: [facility];  Service: Gastroenterology     TONSILLECTOMY       TONSILLECTOMY & ADENOIDECTOMY  1963       Family History:   Family History   Problem Relation Age of Onset     Hypertension Mother      Cancer Mother         gallbladder cancer     Myocardial Infarction Father      Hypertension Sister      Alzheimer Disease Sister      Heart Disease Sister      Cancer Brother      Brain Cancer Brother      Pacemaker Mother      Heart Disease Mother      Coronary Artery Disease Father      Heart Disease Father      Heart Failure Sister        Social History:   Social History     Socioeconomic History     Marital status:      Spouse name: Not on file     Number of children: Not on file     Years of education: Not on file     Highest education level: Not on file   Occupational History     Not on file   Tobacco Use     Smoking status: Former     Packs/day: 3.00     Years: 35.00     Pack years: 105.00     Types: Cigarettes     Smokeless tobacco:  Never     Tobacco comments:     quit 1968   Vaping Use     Vaping status: Not on file   Substance and Sexual Activity     Alcohol use: Not Currently     Drug use: Never     Sexual activity: Not Currently   Other Topics Concern     Parent/sibling w/ CABG, MI or angioplasty before 65F 55M? No   Social History Narrative     Not on file     Social Determinants of Health     Financial Resource Strain: Not on file   Food Insecurity: Not on file   Transportation Needs: Not on file   Physical Activity: Not on file   Stress: Not on file   Social Connections: Not on file   Intimate Partner Violence: Not on file   Housing Stability: Not on file              Lab Results    Chemistry/lipid CBC Cardiac Enzymes/BNP/TSH/INR   Lab Results   Component Value Date    CHOL 267 (H) 05/17/2022    HDL 53 05/17/2022     (H) 05/17/2022    TRIG 178 (H) 05/17/2022    CR 1.07 (H) 04/10/2023    BUN 24.6 (H) 04/10/2023    POTASSIUM 4.8 04/10/2023     04/10/2023    CO2 26 04/10/2023      Lab Results   Component Value Date    WBC 4.5 04/10/2023    HGB 11.1 (L) 04/10/2023    HCT 34.4 (L) 04/10/2023    MCV 89 04/10/2023     04/10/2023      Lab Results   Component Value Date    TROPONINI <0.01 09/04/2022     11/16/2019    NTBNP 837 04/10/2023    TSH 2.06 05/17/2022          Ga Gill MD [facility]  Non-Invasive Cardiologist  [facility]  Pager [phone]

## 2023-04-20 NOTE — PATIENT INSTRUCTIONS
"Give the hydralazine another week to see if if works for you.  If not, we can try a different medicine called \"clonidine\".  See me back as needed.  "

## 2023-05-06 DIAGNOSIS — J45.31 MILD PERSISTENT ASTHMA WITH EXACERBATION: ICD-10-CM

## 2023-05-09 RX ORDER — LEVALBUTEROL TARTRATE 45 UG/1
2 AEROSOL, METERED ORAL EVERY 4 HOURS PRN
Qty: 15 G | Refills: 0 | OUTPATIENT
Start: 2023-05-09

## 2023-06-14 ENCOUNTER — LAB REQUISITION (OUTPATIENT)
Dept: LAB | Facility: CLINIC | Age: 88
End: 2023-06-14
Payer: MEDICARE

## 2023-06-14 DIAGNOSIS — I13.0 HYPERTENSIVE HEART AND CHRONIC KIDNEY DISEASE WITH HEART FAILURE AND STAGE 1 THROUGH STAGE 4 CHRONIC KIDNEY DISEASE, OR UNSPECIFIED CHRONIC KIDNEY DISEASE (H): ICD-10-CM

## 2023-06-14 DIAGNOSIS — D64.9 ANEMIA, UNSPECIFIED: ICD-10-CM

## 2023-06-14 LAB
ALBUMIN SERPL BCG-MCNC: 4 G/DL (ref 3.5–5.2)
ALP SERPL-CCNC: 78 U/L (ref 35–104)
ALT SERPL W P-5'-P-CCNC: 8 U/L (ref 0–50)
ANION GAP SERPL CALCULATED.3IONS-SCNC: 14 MMOL/L (ref 7–15)
AST SERPL W P-5'-P-CCNC: 18 U/L (ref 0–45)
BILIRUB SERPL-MCNC: <0.2 MG/DL
BUN SERPL-MCNC: 24.3 MG/DL (ref 8–23)
CALCIUM SERPL-MCNC: 9.5 MG/DL (ref 8.8–10.2)
CHLORIDE SERPL-SCNC: 103 MMOL/L (ref 98–107)
CREAT SERPL-MCNC: 1.19 MG/DL (ref 0.51–0.95)
DEPRECATED HCO3 PLAS-SCNC: 21 MMOL/L (ref 22–29)
ERYTHROCYTE [DISTWIDTH] IN BLOOD BY AUTOMATED COUNT: 14 % (ref 10–15)
GFR SERPL CREATININE-BSD FRML MDRD: 44 ML/MIN/1.73M2
GLUCOSE SERPL-MCNC: 89 MG/DL (ref 70–99)
HCT VFR BLD AUTO: 31.8 % (ref 35–47)
HGB BLD-MCNC: 9.7 G/DL (ref 11.7–15.7)
MCH RBC QN AUTO: 28 PG (ref 26.5–33)
MCHC RBC AUTO-ENTMCNC: 30.5 G/DL (ref 31.5–36.5)
MCV RBC AUTO: 92 FL (ref 78–100)
PLATELET # BLD AUTO: 380 10E3/UL (ref 150–450)
POTASSIUM SERPL-SCNC: 4.3 MMOL/L (ref 3.4–5.3)
PROT SERPL-MCNC: 6.8 G/DL (ref 6.4–8.3)
RBC # BLD AUTO: 3.47 10E6/UL (ref 3.8–5.2)
SODIUM SERPL-SCNC: 138 MMOL/L (ref 136–145)
TSH SERPL DL<=0.005 MIU/L-ACNC: 2.52 UIU/ML (ref 0.3–4.2)
WBC # BLD AUTO: 6.8 10E3/UL (ref 4–11)

## 2023-06-14 PROCEDURE — 85027 COMPLETE CBC AUTOMATED: CPT | Mod: ORL | Performed by: FAMILY MEDICINE

## 2023-06-14 PROCEDURE — 84443 ASSAY THYROID STIM HORMONE: CPT | Mod: ORL | Performed by: FAMILY MEDICINE

## 2023-06-14 PROCEDURE — 80053 COMPREHEN METABOLIC PANEL: CPT | Mod: ORL | Performed by: FAMILY MEDICINE

## 2023-07-03 ENCOUNTER — OFFICE VISIT (OUTPATIENT)
Dept: PHARMACY | Facility: PHYSICIAN GROUP | Age: 88
End: 2023-07-03
Payer: COMMERCIAL

## 2023-07-03 VITALS — HEART RATE: 68 BPM | DIASTOLIC BLOOD PRESSURE: 73 MMHG | SYSTOLIC BLOOD PRESSURE: 188 MMHG

## 2023-07-03 DIAGNOSIS — E78.2 MIXED HYPERLIPIDEMIA: ICD-10-CM

## 2023-07-03 DIAGNOSIS — K21.9 GASTROESOPHAGEAL REFLUX DISEASE, UNSPECIFIED WHETHER ESOPHAGITIS PRESENT: ICD-10-CM

## 2023-07-03 DIAGNOSIS — E55.9 VITAMIN D DEFICIENCY: ICD-10-CM

## 2023-07-03 DIAGNOSIS — J45.31 MILD PERSISTENT ASTHMA WITH EXACERBATION: ICD-10-CM

## 2023-07-03 DIAGNOSIS — I10 BENIGN ESSENTIAL HYPERTENSION: Primary | ICD-10-CM

## 2023-07-03 DIAGNOSIS — M17.9 OSTEOARTHRITIS OF KNEE, UNSPECIFIED LATERALITY, UNSPECIFIED OSTEOARTHRITIS TYPE: ICD-10-CM

## 2023-07-03 DIAGNOSIS — F41.1 GENERALIZED ANXIETY DISORDER: ICD-10-CM

## 2023-07-03 PROCEDURE — 99207 PR NO CHARGE LOS: CPT | Performed by: PHARMACIST

## 2023-07-03 NOTE — PROGRESS NOTES
Medication Therapy Management (MTM) Encounter    ASSESSMENT:                            Medication Adherence/Access: No issues identified    Hypertension/PVCs: Needs improvement. Patient seen in ED and follow up cardiology visit in April of this year. She had been taking hydralazine per cardiology, but did not refill this and BP was elevated during visit today. Recommend refilling hydralazine and having patient resume therapy.     Hyperlipidemia: Needs improvement, patient declines all statin and non statin lowering therapies at this time. Will discuss again at next visit.    GERD:  Stable.     Osteoarthritis: Needs improvement, recent visit with [facility] showed worsening arthritis in her knees. Patient is unable to get knee replacement due to metal allergy. Has been using topical Voltaren and oral tylenol for pain relief. States this is not ideal but manageable.     Asthma: Several allergies noted to various inhalers. Discuss options at next visit.     Anxiety: Stable.     Vitamin D Deficiency: Due for vitamin D level at next office visit.     PLAN:                            1. Continue taking your medications as prescribed. Today we refilled your hydralazine 25 mg by mouth three times daily.   2. We will recheck your vitamin D levels at your next office visit with Dr. Bolanos.   3. Continue to monitor your blood pressure at home.     Follow-up: Return in about 3 months (around 10/3/2023) for Follow up.    SUBJECTIVE/OBJECTIVE:                          Kerry Hoffmann is a 88 year old female coming in for an initial visit. She was referred to me from Dr. Bolanos.      Reason for visit: MTM.    Allergies/ADRs: Reviewed in chart  Past Medical History: Reviewed in chart  Tobacco: She reports that she has quit smoking. Her smoking use included cigarettes. She has a 105.00 pack-year smoking history. She has never used smokeless tobacco.  Alcohol: none      Medication Adherence/Access: no issues  "reported        Hypertension/PVCs:   Patient presented to the ED on 4/10 for shortness of breath and elevated blood pressure. Had follow up visit with cardiologist on 4/20/23. Patient is on DAPT per neurology, denies excess bruising or bleeding.    Per cardiology note:   1. \"I do not think she requires a loop diuretic but would benefit from a trial of a thiazide diuretic for management of hypertension. She was not interested in this.  2. Continue metoprolol succinate 25 mg daily and losartan 50 mg twice daily.  3. She is agreeable to again try a course of hydralazine, as it does not sound she had a true adverse reaction to this.  4. If hydralazine is not tolerated, low dose oral clonidine may also be an option\"    Losartan 50 mg by mouth twice daily   Metoprolol succinate 25 mg once daily  Aspirin 81 mg by mouth daily   Clopidogrel 75 mg tablet by mouth daily     Patient self-monitors blood pressure.  Home BP monitoring in range of 130-160/60-70 mm Hg.   Morning readings tend to be high and evenings are within normal range   Patient reports no current medication side effects.  BP Readings from Last 3 Encounters:   07/03/23 (!) 188/73   04/20/23 (!) 180/56   04/10/23 (!) 186/66     Pulse Readings from Last 3 Encounters:   07/03/23 68   04/20/23 76   04/10/23 80     Hyperlipidemia:   No current medications. Patient declines all statin and non statin lowering therapies as noted in cardiology note from 4/20/23.   Recent Labs   Lab Test 05/17/22  1151 12/20/21  0937   CHOL 267* 257*   HDL 53 56   * 168*   TRIG 178* 163*     GERD:   Famotidine 20 mg twice daily   Tums -- taking infrequently for breakthrough symptoms   Patient reports no current symptoms.  Patient feels that current regimen is effective.    Osteoarthritis:   Back, shoulder and knee pain, heating pads helps with back pain. History of chronic knee pain, allergic to metal and cortisone shots, followed with [facility], last visit was about a month ago " and states she is unable to get knee replacement due to an allergy to the metal that is used in the procedure.   Voltaren 1% gel applied as directed to affected areas  Tylenol 500 mg tablet by mouth - 2 tablets by mouth three times daily     Asthma:   Gets shaky after using any inhaler, Not currently using anything for breathing.     Anxiety:   Started by cardiology over 15 years ago, patient denies symptoms or side effects at this time.   clonazePAM 0.5 MG Tablet, one tablet by mouth three times daily     Vitamin D Deficiency:   Due for next level check, denies side effects at this time.   Vitamin D3 50 MCG (2000 UT) Capsule, one capsule by mouth daily   Vitamin D level: 12/20/21 (40 micrograms/L)    Today's Vitals: BP (!) 188/73   Pulse 68   LMP  (LMP Unknown)   ----------------    I spent 60 minutes with this patient today. All changes were made via collaborative practice agreement with Kaylyn Bolanos MD and I offer these suggestions for consideration by Dr. Bolanos. A copy of the visit note was provided to the patient's provider(s).    A summary of these recommendations was declined by the patient.    Chel Reese, PharmD  Medication Therapy Management Pharmacist     Medication Therapy Recommendations  Benign essential hypertension    Current Medication: hydrALAZINE (APRESOLINE) 25 MG tablet   Rationale: Does not understand instructions - Adherence - Adherence   Recommendation: Provide Adherence Intervention   Status: Accepted per Provider         Vitamin D deficiency    Current Medication: Cholecalciferol (VITAMIN D3) 50 MCG (2000 UT) CAPS   Rationale: Medication requires monitoring - Needs additional monitoring   Recommendation: Order Lab   Status: Contact Provider - Awaiting Response

## 2023-07-03 NOTE — PATIENT INSTRUCTIONS
"Recommendations from today's MTM visit:                                                    MTM (medication therapy management) is a service provided by a clinical pharmacist designed to help you get the most of out of your medicines.   Today we reviewed what your medicines are for, how to know if they are working, that your medicines are safe and how to make your medicine regimen as easy as possible.      Continue taking your medications as prescribed. Today we refilled your hydralazine 25 mg by mouth three times daily.   We will recheck your vitamin D levels at your next office visit with Dr. Bolanos.   Continue to monitor your blood pressure at home.     Follow-up: Return in about 3 months (around 10/3/2023) for Follow up.    It was great speaking with you today.  I value your experience and would be very thankful for your time in providing feedback in our clinic survey. In the next few days, you may receive an email or text message from [employer] with a link to a survey related to your  clinical pharmacist.\"     To schedule another MTM appointment, please call the clinic directly or you may call the MTM scheduling line at [phone] or toll-free at [phone].     My Clinical Pharmacist's contact information:                                                      Please feel free to contact me with any questions or concerns you have.      Chel Reese, PharmD  Medication Therapy Management Pharmacist     "

## 2023-07-18 ENCOUNTER — LAB REQUISITION (OUTPATIENT)
Dept: LAB | Facility: CLINIC | Age: 88
End: 2023-07-18
Payer: MEDICARE

## 2023-07-18 DIAGNOSIS — D64.9 ANEMIA, UNSPECIFIED: ICD-10-CM

## 2023-07-18 LAB
BASOPHILS # BLD AUTO: 0.1 10E3/UL (ref 0–0.2)
BASOPHILS NFR BLD AUTO: 1 %
EOSINOPHIL # BLD AUTO: 0.2 10E3/UL (ref 0–0.7)
EOSINOPHIL NFR BLD AUTO: 3 %
ERYTHROCYTE [DISTWIDTH] IN BLOOD BY AUTOMATED COUNT: 13.8 % (ref 10–15)
HCT VFR BLD AUTO: 32 % (ref 35–47)
HGB BLD-MCNC: 9.8 G/DL (ref 11.7–15.7)
IMM GRANULOCYTES # BLD: 0 10E3/UL
IMM GRANULOCYTES NFR BLD: 0 %
LYMPHOCYTES # BLD AUTO: 1.5 10E3/UL (ref 0.8–5.3)
LYMPHOCYTES NFR BLD AUTO: 21 %
MCH RBC QN AUTO: 28.5 PG (ref 26.5–33)
MCHC RBC AUTO-ENTMCNC: 30.6 G/DL (ref 31.5–36.5)
MCV RBC AUTO: 93 FL (ref 78–100)
MONOCYTES # BLD AUTO: 0.7 10E3/UL (ref 0–1.3)
MONOCYTES NFR BLD AUTO: 10 %
NEUTROPHILS # BLD AUTO: 4.6 10E3/UL (ref 1.6–8.3)
NEUTROPHILS NFR BLD AUTO: 65 %
NRBC # BLD AUTO: 0 10E3/UL
NRBC BLD AUTO-RTO: 0 /100
PLATELET # BLD AUTO: 371 10E3/UL (ref 150–450)
RBC # BLD AUTO: 3.44 10E6/UL (ref 3.8–5.2)
WBC # BLD AUTO: 7 10E3/UL (ref 4–11)

## 2023-07-18 PROCEDURE — 83550 IRON BINDING TEST: CPT | Mod: ORL | Performed by: FAMILY MEDICINE

## 2023-07-18 PROCEDURE — 85025 COMPLETE CBC W/AUTO DIFF WBC: CPT | Mod: ORL | Performed by: FAMILY MEDICINE

## 2023-07-18 PROCEDURE — 82607 VITAMIN B-12: CPT | Mod: ORL | Performed by: FAMILY MEDICINE

## 2023-07-19 LAB
IRON BINDING CAPACITY (ROCHE): 310 UG/DL (ref 240–430)
IRON SATN MFR SERPL: 20 % (ref 15–46)
IRON SERPL-MCNC: 63 UG/DL (ref 37–145)
VIT B12 SERPL-MCNC: 602 PG/ML (ref 232–1245)

## 2023-07-20 ENCOUNTER — APPOINTMENT (OUTPATIENT)
Dept: ULTRASOUND IMAGING | Facility: HOSPITAL | Age: 88
DRG: 683 | End: 2023-07-20
Attending: STUDENT IN AN ORGANIZED HEALTH CARE EDUCATION/TRAINING PROGRAM
Payer: MEDICARE

## 2023-07-20 ENCOUNTER — APPOINTMENT (OUTPATIENT)
Dept: RADIOLOGY | Facility: HOSPITAL | Age: 88
DRG: 683 | End: 2023-07-20
Attending: STUDENT IN AN ORGANIZED HEALTH CARE EDUCATION/TRAINING PROGRAM
Payer: MEDICARE

## 2023-07-20 ENCOUNTER — HOSPITAL ENCOUNTER (INPATIENT)
Facility: HOSPITAL | Age: 88
LOS: 5 days | Discharge: HOME HEALTH | DRG: 683 | End: 2023-07-28
Attending: STUDENT IN AN ORGANIZED HEALTH CARE EDUCATION/TRAINING PROGRAM | Admitting: INTERNAL MEDICINE
Payer: MEDICARE

## 2023-07-20 DIAGNOSIS — N17.9 AKI (ACUTE KIDNEY INJURY) (H): Primary | ICD-10-CM

## 2023-07-20 DIAGNOSIS — R06.09 DOE (DYSPNEA ON EXERTION): ICD-10-CM

## 2023-07-20 LAB
ALBUMIN SERPL BCG-MCNC: 4 G/DL (ref 3.5–5.2)
ALP SERPL-CCNC: 68 U/L (ref 35–104)
ALT SERPL W P-5'-P-CCNC: 8 U/L (ref 0–50)
ANION GAP SERPL CALCULATED.3IONS-SCNC: 10 MMOL/L (ref 7–15)
AST SERPL W P-5'-P-CCNC: 25 U/L (ref 0–45)
BASOPHILS # BLD AUTO: 0.1 10E3/UL (ref 0–0.2)
BASOPHILS NFR BLD AUTO: 1 %
BILIRUB SERPL-MCNC: 0.3 MG/DL
BUN SERPL-MCNC: 25.8 MG/DL (ref 8–23)
CALCIUM SERPL-MCNC: 10.1 MG/DL (ref 8.8–10.2)
CHLORIDE SERPL-SCNC: 99 MMOL/L (ref 98–107)
CREAT SERPL-MCNC: 1.25 MG/DL (ref 0.51–0.95)
DEPRECATED HCO3 PLAS-SCNC: 24 MMOL/L (ref 22–29)
EOSINOPHIL # BLD AUTO: 0.2 10E3/UL (ref 0–0.7)
EOSINOPHIL NFR BLD AUTO: 3 %
ERYTHROCYTE [DISTWIDTH] IN BLOOD BY AUTOMATED COUNT: 13.2 % (ref 10–15)
GFR SERPL CREATININE-BSD FRML MDRD: 42 ML/MIN/1.73M2
GLUCOSE SERPL-MCNC: 91 MG/DL (ref 70–99)
HCT VFR BLD AUTO: 31.4 % (ref 35–47)
HGB BLD-MCNC: 10 G/DL (ref 11.7–15.7)
HOLD SPECIMEN: NORMAL
IMM GRANULOCYTES # BLD: 0 10E3/UL
IMM GRANULOCYTES NFR BLD: 0 %
LYMPHOCYTES # BLD AUTO: 1.5 10E3/UL (ref 0.8–5.3)
LYMPHOCYTES NFR BLD AUTO: 26 %
MAGNESIUM SERPL-MCNC: 1.8 MG/DL (ref 1.7–2.3)
MCH RBC QN AUTO: 28.4 PG (ref 26.5–33)
MCHC RBC AUTO-ENTMCNC: 31.8 G/DL (ref 31.5–36.5)
MCV RBC AUTO: 89 FL (ref 78–100)
MONOCYTES # BLD AUTO: 0.7 10E3/UL (ref 0–1.3)
MONOCYTES NFR BLD AUTO: 12 %
NEUTROPHILS # BLD AUTO: 3.4 10E3/UL (ref 1.6–8.3)
NEUTROPHILS NFR BLD AUTO: 58 %
NRBC # BLD AUTO: 0 10E3/UL
NRBC BLD AUTO-RTO: 0 /100
NT-PROBNP SERPL-MCNC: 1423 PG/ML (ref 0–1800)
PLATELET # BLD AUTO: 360 10E3/UL (ref 150–450)
POTASSIUM SERPL-SCNC: 4.6 MMOL/L (ref 3.4–5.3)
PROT SERPL-MCNC: 7.4 G/DL (ref 6.4–8.3)
RBC # BLD AUTO: 3.52 10E6/UL (ref 3.8–5.2)
SODIUM SERPL-SCNC: 133 MMOL/L (ref 136–145)
TROPONIN T SERPL HS-MCNC: 16 NG/L
WBC # BLD AUTO: 5.9 10E3/UL (ref 4–11)

## 2023-07-20 PROCEDURE — 93005 ELECTROCARDIOGRAM TRACING: CPT | Performed by: STUDENT IN AN ORGANIZED HEALTH CARE EDUCATION/TRAINING PROGRAM

## 2023-07-20 PROCEDURE — 99285 EMERGENCY DEPT VISIT HI MDM: CPT | Mod: 25

## 2023-07-20 PROCEDURE — 250N000011 HC RX IP 250 OP 636: Mod: JZ | Performed by: INTERNAL MEDICINE

## 2023-07-20 PROCEDURE — 250N000013 HC RX MED GY IP 250 OP 250 PS 637: Performed by: INTERNAL MEDICINE

## 2023-07-20 PROCEDURE — 93971 EXTREMITY STUDY: CPT | Mod: RT

## 2023-07-20 PROCEDURE — 83880 ASSAY OF NATRIURETIC PEPTIDE: CPT | Performed by: STUDENT IN AN ORGANIZED HEALTH CARE EDUCATION/TRAINING PROGRAM

## 2023-07-20 PROCEDURE — 84484 ASSAY OF TROPONIN QUANT: CPT | Performed by: STUDENT IN AN ORGANIZED HEALTH CARE EDUCATION/TRAINING PROGRAM

## 2023-07-20 PROCEDURE — 85025 COMPLETE CBC W/AUTO DIFF WBC: CPT | Performed by: STUDENT IN AN ORGANIZED HEALTH CARE EDUCATION/TRAINING PROGRAM

## 2023-07-20 PROCEDURE — 96374 THER/PROPH/DIAG INJ IV PUSH: CPT

## 2023-07-20 PROCEDURE — 36415 COLL VENOUS BLD VENIPUNCTURE: CPT | Performed by: STUDENT IN AN ORGANIZED HEALTH CARE EDUCATION/TRAINING PROGRAM

## 2023-07-20 PROCEDURE — 99223 1ST HOSP IP/OBS HIGH 75: CPT | Performed by: INTERNAL MEDICINE

## 2023-07-20 PROCEDURE — 80053 COMPREHEN METABOLIC PANEL: CPT | Performed by: STUDENT IN AN ORGANIZED HEALTH CARE EDUCATION/TRAINING PROGRAM

## 2023-07-20 PROCEDURE — 83735 ASSAY OF MAGNESIUM: CPT | Performed by: STUDENT IN AN ORGANIZED HEALTH CARE EDUCATION/TRAINING PROGRAM

## 2023-07-20 PROCEDURE — G0378 HOSPITAL OBSERVATION PER HR: HCPCS

## 2023-07-20 PROCEDURE — 71046 X-RAY EXAM CHEST 2 VIEWS: CPT

## 2023-07-20 RX ORDER — PROCHLORPERAZINE 25 MG
12.5 SUPPOSITORY, RECTAL RECTAL EVERY 12 HOURS PRN
Status: DISCONTINUED | OUTPATIENT
Start: 2023-07-20 | End: 2023-07-28 | Stop reason: HOSPADM

## 2023-07-20 RX ORDER — FAMOTIDINE 20 MG/1
20 TABLET, FILM COATED ORAL 2 TIMES DAILY
Status: DISCONTINUED | OUTPATIENT
Start: 2023-07-20 | End: 2023-07-23

## 2023-07-20 RX ORDER — ASPIRIN 81 MG/1
81 TABLET ORAL DAILY
Status: DISCONTINUED | OUTPATIENT
Start: 2023-07-21 | End: 2023-07-28 | Stop reason: HOSPADM

## 2023-07-20 RX ORDER — LOSARTAN POTASSIUM 50 MG/1
50 TABLET ORAL 2 TIMES DAILY
Status: DISCONTINUED | OUTPATIENT
Start: 2023-07-20 | End: 2023-07-28 | Stop reason: HOSPADM

## 2023-07-20 RX ORDER — ACETAMINOPHEN 325 MG/1
650 TABLET ORAL EVERY 6 HOURS PRN
Status: DISCONTINUED | OUTPATIENT
Start: 2023-07-20 | End: 2023-07-28 | Stop reason: HOSPADM

## 2023-07-20 RX ORDER — PROCHLORPERAZINE MALEATE 5 MG
5 TABLET ORAL EVERY 6 HOURS PRN
Status: DISCONTINUED | OUTPATIENT
Start: 2023-07-20 | End: 2023-07-28 | Stop reason: HOSPADM

## 2023-07-20 RX ORDER — HYDRALAZINE HYDROCHLORIDE 20 MG/ML
10 INJECTION INTRAMUSCULAR; INTRAVENOUS EVERY 6 HOURS PRN
Status: DISCONTINUED | OUTPATIENT
Start: 2023-07-20 | End: 2023-07-22

## 2023-07-20 RX ORDER — HYDRALAZINE HYDROCHLORIDE 25 MG/1
25 TABLET, FILM COATED ORAL 3 TIMES DAILY
Status: DISCONTINUED | OUTPATIENT
Start: 2023-07-20 | End: 2023-07-22

## 2023-07-20 RX ORDER — METOPROLOL SUCCINATE 25 MG/1
25 TABLET, EXTENDED RELEASE ORAL AT BEDTIME
Status: DISCONTINUED | OUTPATIENT
Start: 2023-07-20 | End: 2023-07-28 | Stop reason: HOSPADM

## 2023-07-20 RX ORDER — ACETAMINOPHEN 650 MG/1
650 SUPPOSITORY RECTAL EVERY 6 HOURS PRN
Status: DISCONTINUED | OUTPATIENT
Start: 2023-07-20 | End: 2023-07-28 | Stop reason: HOSPADM

## 2023-07-20 RX ORDER — CLONAZEPAM 0.5 MG/1
0.5 TABLET ORAL 3 TIMES DAILY
Status: DISCONTINUED | OUTPATIENT
Start: 2023-07-20 | End: 2023-07-28 | Stop reason: HOSPADM

## 2023-07-20 RX ORDER — FUROSEMIDE 10 MG/ML
20 INJECTION INTRAMUSCULAR; INTRAVENOUS EVERY 8 HOURS
Status: DISCONTINUED | OUTPATIENT
Start: 2023-07-20 | End: 2023-07-24

## 2023-07-20 RX ORDER — CLOPIDOGREL BISULFATE 75 MG/1
75 TABLET ORAL DAILY
Status: DISCONTINUED | OUTPATIENT
Start: 2023-07-21 | End: 2023-07-28 | Stop reason: HOSPADM

## 2023-07-20 RX ADMIN — ACETAMINOPHEN 650 MG: 325 TABLET ORAL at 22:48

## 2023-07-20 RX ADMIN — HYDRALAZINE HYDROCHLORIDE 25 MG: 25 TABLET, FILM COATED ORAL at 22:49

## 2023-07-20 RX ADMIN — FAMOTIDINE 20 MG: 20 TABLET, FILM COATED ORAL at 22:48

## 2023-07-20 RX ADMIN — LOSARTAN POTASSIUM 50 MG: 50 TABLET, FILM COATED ORAL at 22:48

## 2023-07-20 RX ADMIN — FUROSEMIDE 20 MG: 10 INJECTION, SOLUTION INTRAMUSCULAR; INTRAVENOUS at 22:24

## 2023-07-20 RX ADMIN — METOPROLOL SUCCINATE 25 MG: 25 TABLET, EXTENDED RELEASE ORAL at 22:47

## 2023-07-20 RX ADMIN — CLONAZEPAM 0.5 MG: 0.5 TABLET ORAL at 22:48

## 2023-07-20 ASSESSMENT — ACTIVITIES OF DAILY LIVING (ADL)
ADLS_ACUITY_SCORE: 36
ADLS_ACUITY_SCORE: 32
ADLS_ACUITY_SCORE: 32

## 2023-07-20 NOTE — ED TRIAGE NOTES
Patient called her clinic with complaints of legs swelling, and shortness of breath and some tightness in the chest. Heart failure patient      Triage Assessment     Row Name 07/20/23 1321       Triage Assessment (Adult)    Airway WDL WDL       Respiratory WDL    Respiratory WDL WDL       Skin Circulation/Temperature WDL    Skin Circulation/Temperature WDL WDL       Cardiac WDL    Cardiac WDL WDL       Peripheral/Neurovascular WDL    Peripheral Neurovascular WDL X  bilateral swelling in both legs       Cognitive/Neuro/Behavioral WDL    Cognitive/Neuro/Behavioral WDL WDL

## 2023-07-20 NOTE — PHARMACY-ADMISSION MEDICATION HISTORY
Pharmacist Admission Medication History    Admission medication history is complete. The information provided in this note is only as accurate as the sources available at the time of the update.    Medication reconciliation/reorder completed by provider prior to medication history? No    Information Source(s): Patient, Clinic records and CareEverywhere/SureScripts via in-person    Pertinent Information: she does not wish to continue taking hydralazine due to side effects. Recommend alternative agent or increasing current metoprolol     Changes made to PTA medication list:    Added: None    Deleted: none    Changed: scheduled APAP     Medication Affordability:       Allergies reviewed with patient and updates made in EHR: yes    Medication History Completed By: Ej Saucedo [facility] 7/20/2023 4:49 PM    Prior to Admission medications    Medication Sig Last Dose Taking? Auth Provider Long Term End Date   acetaminophen (TYLENOL) 500 MG tablet Take 1,000 mg by mouth 2 times daily 7/20/2023 at am Yes Reported, Patient     aspirin 81 MG EC tablet Take 81 mg by mouth daily 7/19/2023 at HS Yes Reported, Patient     calcium carbonate 750 MG CHEW Take 1 tablet by mouth daily 7/20/2023 at am Yes Reported, Patient     Cholecalciferol (VITAMIN D3) 50 MCG (2000 UT) CAPS Take 1 tablet by mouth daily 7/20/2023 at am Yes Reported, Patient     clonazePAM (KLONOPIN) 0.5 MG tablet Take 0.5 mg by mouth 3 times daily 7/20/2023 at am Yes Reported, Patient Yes    clopidogrel (PLAVIX) 75 MG tablet Take 1 tablet (75 mg) by mouth daily 7/20/2023 at am Yes Mark Hummel MD Yes    diclofenac (VOLTAREN) 1 % topical gel Apply 4 g topically 4 times daily knee pain 7/20/2023 at am Yes Reported, Patient     famotidine (PEPCID) 20 MG tablet Take 20 mg by mouth 2 times daily 7/20/2023 at am Yes Reported, Patient     hydrALAZINE (APRESOLINE) 25 MG tablet Take 1 tablet (25 mg) by mouth 3 times daily Past Week Yes Jeb Askew MD Yes     levalbuterol (XOPENEX HFA) 45 MCG/ACT inhaler Inhale 2 puffs into the lungs every 4 hours as needed for shortness of breath or wheezing More than a month Yes Jimena Thorpe PA-C Yes    losartan (COZAAR) 50 MG tablet Take 50 mg by mouth 2 times daily 7/20/2023 at am Yes Reported, Patient Yes    metoprolol succinate ER (TOPROL-XL) 25 MG 24 hr tablet Take 1 tablet by mouth At Bedtime 7/19/2023 at hs Yes Reported, Patient Yes

## 2023-07-20 NOTE — ED PROVIDER NOTES
Emergency Department Encounter         FINAL IMPRESSION:  SOB        ED COURSE AND MEDICAL DECISION MAKING       ED Course as of 07/20/23 1604   Thu Jul 20, 2023   1407 Patient's 88-year-old female history of CHF, hypertension, hyperlipidemia, here with worsening breathing difficulties, leg swelling, breathing difficulties at night and chest pressure.  States she has been worked having worsening symptoms the last few weeks.  Leg swelling for the past few weeks.  Symptomatology at night.  States she is also waking up with cough and chest discomfort.  States however that her breathing does not get worse with walking.  Only gets worse with talking.  Concerned about her high blood pressures as well.  Requesting to change her hydralazine.  No fevers chills nausea vomiting.  Normal bowel movements.  Normal urination.  No abdominal pain.  No syncope.  Arrival here her vitals otherwise are stable with mild hypertension.  She is alert and oriented x4.  Has no signs of dyspnea.  Her legs are mildly swollen.  Abdomen is benign.  Her lungs are clear.  Her heart sounds are normal.  Plan for cardiopulmonary evaluation with disposition pending work-up.   1408 EKG sinus rhythm 67, no signs acute ischemia, no inversions no depressions QTc 390, unchanged from previous EKG which was a couple months ago.   -Because of patient's age and symptomatology, plan for admission for further cardiopulmonary testing and monitoring.    1:57 PM I met with the patient to obtain patient history and performed a physical exam. Discussed plan for ED work up including potential diagnostic studies and interventions.  3:58 PM Spoke with hospitalist Dr. Laws who accepts patient for admission.                Medical Decision Making    History:    Supplemental history from: Anticoagulated State, Chronic Kidney Disease, Heart Disease, Hyperlipidemia and Hypertension    External Record(s) reviewed: Documented in chart, if applicable.    Work Up:    Chart  documentation includes differential considered and any EKGs or imaging independently interpreted by provider, where specified.    In additional to work up documented, I considered the following work up: Documented in chart, if applicable.    External consultation:    Discussion of management with another provider: Hospitalist    Complicating factors:    Care impacted by chronic illness: Anticoagulated State, Chronic Kidney Disease, Heart Disease, Hyperlipidemia and Hypertension    Care affected by social determinants of health: N/A    Disposition considerations: Admit.                  EKG  EKG sinus rhythm 67, no signs acute ischemia, no inversions no depressions QTc 390, unchanged from previous EKG which was a couple months ago.      Critical Care     Performed by: Russ Childs or    Authorized by: Russ Childs  Total critical care time:  minutes  Critical care was necessary to treat or prevent imminent or life-threatening deterioration of the following conditions:   Critical care was time spent personally by me on the following activities: development of treatment plan with patient or surrogate, discussions with consultants, examination of patient, evaluation of patient's response to treatment, obtaining history from patient or surrogate, ordering and performing treatments and interventions, ordering and review of laboratory studies, ordering and review of radiographic studies, re-evaluation of patient's condition and monitoring for potential decompensation.  Critical care time was exclusive of separately billable procedures and treating other patients.'    At the conclusion of the encounter I discussed the results of all the tests and the disposition. The questions were answered. The patient or family acknowledged understanding and was agreeable with the care plan.                  MEDICATIONS GIVEN IN THE EMERGENCY DEPARTMENT:  Medications - No data to display    NEW PRESCRIPTIONS STARTED AT TODAY'S ED VISIT:  New  "Prescriptions    No medications on file       HPI     Patient information obtained from: patient    Use of Interpretor: N/A     Kerry Hoffmann is a 88 year old female with a pertinent history of anticoagulants (Plavix), asthma, HTN, HLD, aortic valve disorder, CKD, who presents to this ED via self walk-in for evaluation of shortness of breath, chest pain, and lower extremity edema.    Patient reports persistent gradually worsening shortness of breath for the past few weeks. She says the shortness of breath is worse with talking but is not worse with exertion. She notes the symptoms only get worse in the evenings and at night. She associates some intermittent chest pain for the past few weeks which she describes as a \"hard pressure\" that wakes her up at night about 1-1.5 hours after she's sleeping. She also endorsers some right calf pain and numbness and gradually worsening lower extremity edema for the past few weeks. Due to persistent symptoms, she chose to come into ED to be evaluated.    She also is concerned that about taking hydralazine due to her HTN not being controlled. She otherwise denies associating abdominal distention, increase in weight, change in urinary habits, blood in stool, melena, and change in appetite. She has some intermittent coughs with production for the past year which remains unchanged. Her last cardiogram was 2 months ago.    There were no other concerns/complaints at this time.      REVIEW OF SYSTEMS:  Review of Systems   Constitutional: Negative for fever, malaise, weight gain  HEENT: Negative runny nose, sore throat, ear pain, neck pain  Respiratory: Endorses shortness of breath. Negative for cough, congestion  Cardiovascular: Endorses chest pain and lower extremity edema.  Gastrointestinal: Negative for abdominal distention, abdominal pain, constipation, vomiting, nausea, diarrhea, blood in stool, melena  Genitourinary: Negative for dysuria and hematuria.   Integument: Negative for " rash, skin breakdown  Neurological: Negative for paresthesias, weakness, headache.  Musculoskeletal: Endorses right calf pain. Negative for joint pain, joint swelling      All other systems reviewed and are negative.          MEDICAL HISTORY     Past Medical History:   Diagnosis Date     Anxiety      Asthma      Chronic kidney disease      CKD (chronic kidney disease)      Clostridium difficile colitis 11/1/2016     Clostridium difficile infection      Clostridium enterocolitis 10/27/2016     CTS (carpal tunnel syndrome)      GERD (gastroesophageal reflux disease)      Hiatal hernia      Hyperlipidemia      Hyperlipidemia      Hyperlipidemia      Hypertension      Hypertension      Osteoarthritis of knee      Spinal stenosis      Vitamin D deficiency        Past Surgical History:   Procedure Laterality Date     BREAST SURGERY  1982    breast tumor per [facility]      CATARACT EXTRACTION  07/2005    per [facility]      ORIF TIBIA & FIBULA FRACTURES  1988    per [facility]      OTHER SURGICAL HISTORY  10/2004    hole in retinaper [facility]      OTHER SURGICAL HISTORY  05/03/2015    fecal transplantper [facility]      KY ESOPHAGOGASTRODUODENOSCOPY TRANSORAL DIAGNOSTIC N/A 9/11/2020    Procedure: ESOPHAGOGASTRODUODENOSCOPY With gastric biopsies;  Surgeon: David Reyes MD;  Location: [facility] [place];  Service: Gastroenterology     TONSILLECTOMY       TONSILLECTOMY & ADENOIDECTOMY  1963       Social History     Tobacco Use     Smoking status: Former     Packs/day: 3.00     Years: 35.00     Pack years: 105.00     Types: Cigarettes     Smokeless tobacco: Never     Tobacco comments:     quit 1968   Substance Use Topics     Alcohol use: Not Currently     Drug use: Never       acetaminophen (TYLENOL) 500 MG tablet  aspirin (ASA) 81 MG chewable tablet  calcium carbonate 750 MG CHEW  Cholecalciferol (VITAMIN D3) 50 MCG (2000 UT) CAPS  clonazePAM (KLONOPIN) 0.5 MG tablet  clopidogrel (PLAVIX) 75 MG tablet  diclofenac (VOLTAREN) 1 % topical  gel  famotidine (PEPCID) 20 MG tablet  hydrALAZINE (APRESOLINE) 25 MG tablet  levalbuterol (XOPENEX HFA) 45 MCG/ACT inhaler  losartan (COZAAR) 50 MG tablet  metoprolol succinate ER (TOPROL-XL) 25 MG 24 hr tablet            PHYSICAL EXAM     BP (!) 211/84   Pulse 69   Temp 97.7  F (36.5  C)   Resp 26   Ht 1.524 m (5')   Wt 80.7 kg (178 lb)   LMP  (LMP Unknown)   SpO2 97%   BMI 34.76 kg/m        PHYSICAL EXAM:     General: Patient appears well, nontoxic, comfortable  HEENT: Moist mucous membranes,  No head trauma.    Cardiovascular: Normal rate. Mild hypertension. Her legs are mildly swollen. No appreciable murmur.  Respiratory: No signs of respiratory distress, lungs are clear to auscultation bilaterally with no wheezes rhonchi or rales.  Abdominal: Soft, nontender, nondistended, no palpable masses, no guarding, no rebound  Musculoskeletal: Full range of motion of joints, no deformities appreciated.    Neurological: Alert and oriented, grossly neurologically intact.  Psychological: Normal affect and mood.  Integument: No rashes appreciated        RESULTS       Labs Ordered and Resulted from Time of ED Arrival to Time of ED Departure   COMPREHENSIVE METABOLIC PANEL - Abnormal       Result Value    Sodium 133 (*)     Potassium 4.6      Chloride 99      Carbon Dioxide (CO2) 24      Anion Gap 10      Urea Nitrogen 25.8 (*)     Creatinine 1.25 (*)     Calcium 10.1      Glucose 91      Alkaline Phosphatase 68      AST 25      ALT 8      Protein Total 7.4      Albumin 4.0      Bilirubin Total 0.3      GFR Estimate 42 (*)    TROPONIN T, HIGH SENSITIVITY - Abnormal    Troponin T, High Sensitivity 16 (*)    CBC WITH PLATELETS AND DIFFERENTIAL - Abnormal    WBC Count 5.9      RBC Count 3.52 (*)     Hemoglobin 10.0 (*)     Hematocrit 31.4 (*)     MCV 89      MCH 28.4      MCHC 31.8      RDW 13.2      Platelet Count 360      % Neutrophils 58      % Lymphocytes 26      % Monocytes 12      % Eosinophils 3      % Basophils 1       % Immature Granulocytes 0      NRBCs per 100 WBC 0      Absolute Neutrophils 3.4      Absolute Lymphocytes 1.5      Absolute Monocytes 0.7      Absolute Eosinophils 0.2      Absolute Basophils 0.1      Absolute Immature Granulocytes 0.0      Absolute NRBCs 0.0     MAGNESIUM - Normal    Magnesium 1.8     NT PROBNP INPATIENT - Normal    N terminal Pro BNP Inpatient 1,423         Chest XR,  PA & LAT   Final Result   IMPRESSION: Severe right and mild left shoulder arthropathy. Enlarged cardiac silhouette. Lungs appear clear.      US Lower Extremity Venous Duplex Right   Final Result   IMPRESSION:   1.  No deep venous thrombosis in the right lower extremity.                        PROCEDURES:  Procedures:  Procedures       I, Kaity Tsang am serving as a scribe to document services personally performed by Russ Childs DO, based on my observations and the provider's statements to me.  I, Russ Childs DO, attest that Kaity Tsang is acting in a scribe capacity, has observed my performance of the services and has documented them in accordance with my direction.    Russ Childs DO  Emergency Medicine  [facility] EMERGENCY DEPARTMENT     Russ Childs DO  07/20/23 1436

## 2023-07-20 NOTE — ED NOTES
[facility] ED Handoff Report    ED Chief Complaint: CHF    ED Diagnosis:  (R06.09) MADISON (dyspnea on exertion)  Comment: none  Plan: observation in hospital         PMH:    Past Medical History:   Diagnosis Date    Anxiety     takes clonazepam    Asthma     per [facility]     Chronic kidney disease     stage 3 per [facility]     CKD (chronic kidney disease)     Clostridium difficile colitis 11/1/2016    Clostridium difficile infection     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    Clostridium enterocolitis 10/27/2016    CTS (carpal tunnel syndrome)     GERD (gastroesophageal reflux disease)     per [facility]     Hiatal hernia     small per [facility]     Hyperlipidemia     Hyperlipidemia     Hyperlipidemia     Hypertension     Hypertension     Osteoarthritis of knee     per [facility]     Spinal stenosis     per [facility]     Vitamin D deficiency     per [facility]        Code Status:  No Order     Falls Risk: Yes Band: Applied    Current Living Situation/Residence: lives alone     Elimination Status: Continent: Yes     Activity Level: Independent    Patients Preferred Language:  English   Needed: No    Vital Signs:  BP (!) 187/83   Pulse 73   Temp 97.7  F (36.5  C)   Resp 26   Ht 1.524 m (5')   Wt 80.7 kg (178 lb)   LMP  (LMP Unknown)   SpO2 97%   BMI 34.76 kg/m       Cardiac Rhythm: NSR    Pain Score: 6/10    Is the Patient Confused:  No    Last Food or Drink: 07/20/23 at prior to arrival     Focused Assessment:  bilateral leg swelling and shortness of breath   Tests Performed: Done: Labs and Imaging    Treatments Provided:  medications    Family Dynamics/Concerns: No    Family Updated On Visitor Policy: Yes    Plan of Care Communicated to Family: Yes    Who Was Updated about Plan of Care: family    Belongings Checklist Done and Signed by Patient: Yes    Medications sent with patient: none     Covid: asymptomatic , not done     Additional Information: Patient uses a scooter at home, uses cane to go to bathroom.    RN: Mallory  KG Cornelius   7/20/2023 5:34 PM

## 2023-07-21 ENCOUNTER — APPOINTMENT (OUTPATIENT)
Dept: CARDIOLOGY | Facility: HOSPITAL | Age: 88
DRG: 683 | End: 2023-07-21
Attending: INTERNAL MEDICINE
Payer: MEDICARE

## 2023-07-21 LAB
ANION GAP SERPL CALCULATED.3IONS-SCNC: 10 MMOL/L (ref 7–15)
ANION GAP SERPL CALCULATED.3IONS-SCNC: 10 MMOL/L (ref 7–15)
ANION GAP SERPL CALCULATED.3IONS-SCNC: 12 MMOL/L (ref 7–15)
BUN SERPL-MCNC: 24.2 MG/DL (ref 8–23)
BUN SERPL-MCNC: 31.2 MG/DL (ref 8–23)
CALCIUM SERPL-MCNC: 10.5 MG/DL (ref 8.8–10.2)
CALCIUM SERPL-MCNC: 9.6 MG/DL (ref 8.8–10.2)
CHLORIDE SERPL-SCNC: 95 MMOL/L (ref 98–107)
CHLORIDE SERPL-SCNC: 98 MMOL/L (ref 98–107)
CHLORIDE SERPL-SCNC: 98 MMOL/L (ref 98–107)
CREAT SERPL-MCNC: 1.16 MG/DL (ref 0.51–0.95)
CREAT SERPL-MCNC: 1.31 MG/DL (ref 0.51–0.95)
DEPRECATED HCO3 PLAS-SCNC: 25 MMOL/L (ref 22–29)
DEPRECATED HCO3 PLAS-SCNC: 26 MMOL/L (ref 22–29)
DEPRECATED HCO3 PLAS-SCNC: 26 MMOL/L (ref 22–29)
GFR SERPL CREATININE-BSD FRML MDRD: 39 ML/MIN/1.73M2
GFR SERPL CREATININE-BSD FRML MDRD: 46 ML/MIN/1.73M2
GLUCOSE SERPL-MCNC: 149 MG/DL (ref 70–99)
GLUCOSE SERPL-MCNC: 92 MG/DL (ref 70–99)
HOLD SPECIMEN: NORMAL
MAGNESIUM SERPL-MCNC: 1.5 MG/DL (ref 1.7–2.3)
MAGNESIUM SERPL-MCNC: 1.6 MG/DL (ref 1.7–2.3)
MAGNESIUM SERPL-MCNC: 2 MG/DL (ref 1.7–2.3)
MAGNESIUM SERPL-MCNC: 3.1 MG/DL (ref 1.7–2.3)
PHOSPHATE SERPL-MCNC: 3 MG/DL (ref 2.5–4.5)
PHOSPHATE SERPL-MCNC: 4.2 MG/DL (ref 2.5–4.5)
POTASSIUM SERPL-SCNC: 4.1 MMOL/L (ref 3.4–5.3)
SODIUM SERPL-SCNC: 132 MMOL/L (ref 136–145)
SODIUM SERPL-SCNC: 134 MMOL/L (ref 136–145)
SODIUM SERPL-SCNC: 134 MMOL/L (ref 136–145)
TROPONIN T SERPL HS-MCNC: 19 NG/L

## 2023-07-21 PROCEDURE — 84100 ASSAY OF PHOSPHORUS: CPT | Performed by: INTERNAL MEDICINE

## 2023-07-21 PROCEDURE — 250N000011 HC RX IP 250 OP 636: Mod: JZ | Performed by: INTERNAL MEDICINE

## 2023-07-21 PROCEDURE — 250N000013 HC RX MED GY IP 250 OP 250 PS 637: Performed by: INTERNAL MEDICINE

## 2023-07-21 PROCEDURE — 96376 TX/PRO/DX INJ SAME DRUG ADON: CPT

## 2023-07-21 PROCEDURE — 84295 ASSAY OF SERUM SODIUM: CPT | Performed by: INTERNAL MEDICINE

## 2023-07-21 PROCEDURE — 80048 BASIC METABOLIC PNL TOTAL CA: CPT | Performed by: INTERNAL MEDICINE

## 2023-07-21 PROCEDURE — 36415 COLL VENOUS BLD VENIPUNCTURE: CPT | Performed by: INTERNAL MEDICINE

## 2023-07-21 PROCEDURE — 83735 ASSAY OF MAGNESIUM: CPT | Performed by: INTERNAL MEDICINE

## 2023-07-21 PROCEDURE — 99232 SBSQ HOSP IP/OBS MODERATE 35: CPT | Performed by: INTERNAL MEDICINE

## 2023-07-21 PROCEDURE — G0378 HOSPITAL OBSERVATION PER HR: HCPCS

## 2023-07-21 PROCEDURE — 999N000248 HC STATISTIC IV INSERT WITH US BY RN

## 2023-07-21 PROCEDURE — 84484 ASSAY OF TROPONIN QUANT: CPT | Performed by: INTERNAL MEDICINE

## 2023-07-21 PROCEDURE — 93306 TTE W/DOPPLER COMPLETE: CPT | Mod: 26 | Performed by: INTERNAL MEDICINE

## 2023-07-21 PROCEDURE — 96375 TX/PRO/DX INJ NEW DRUG ADDON: CPT

## 2023-07-21 PROCEDURE — 93306 TTE W/DOPPLER COMPLETE: CPT

## 2023-07-21 RX ORDER — MECLIZINE HCL 12.5 MG 12.5 MG/1
12.5 TABLET ORAL 3 TIMES DAILY PRN
Status: DISCONTINUED | OUTPATIENT
Start: 2023-07-21 | End: 2023-07-28 | Stop reason: HOSPADM

## 2023-07-21 RX ORDER — MAGNESIUM SULFATE 4 G/50ML
4 INJECTION INTRAVENOUS ONCE
Status: COMPLETED | OUTPATIENT
Start: 2023-07-21 | End: 2023-07-21

## 2023-07-21 RX ADMIN — FAMOTIDINE 20 MG: 20 TABLET, FILM COATED ORAL at 08:34

## 2023-07-21 RX ADMIN — LOSARTAN POTASSIUM 50 MG: 50 TABLET, FILM COATED ORAL at 21:36

## 2023-07-21 RX ADMIN — HYDRALAZINE HYDROCHLORIDE 25 MG: 25 TABLET, FILM COATED ORAL at 14:14

## 2023-07-21 RX ADMIN — ACETAMINOPHEN 650 MG: 325 TABLET ORAL at 13:04

## 2023-07-21 RX ADMIN — MAGNESIUM SULFATE HEPTAHYDRATE 4 G: 4 INJECTION, SOLUTION INTRAVENOUS at 13:04

## 2023-07-21 RX ADMIN — FAMOTIDINE 20 MG: 20 TABLET, FILM COATED ORAL at 21:37

## 2023-07-21 RX ADMIN — CLOPIDOGREL BISULFATE 75 MG: 75 TABLET, FILM COATED ORAL at 08:34

## 2023-07-21 RX ADMIN — Medication 81 MG: at 08:34

## 2023-07-21 RX ADMIN — FUROSEMIDE 20 MG: 10 INJECTION, SOLUTION INTRAMUSCULAR; INTRAVENOUS at 21:37

## 2023-07-21 RX ADMIN — FUROSEMIDE 20 MG: 10 INJECTION, SOLUTION INTRAMUSCULAR; INTRAVENOUS at 14:52

## 2023-07-21 RX ADMIN — LOSARTAN POTASSIUM 50 MG: 50 TABLET, FILM COATED ORAL at 08:34

## 2023-07-21 RX ADMIN — HYDRALAZINE HYDROCHLORIDE 25 MG: 25 TABLET, FILM COATED ORAL at 21:37

## 2023-07-21 RX ADMIN — CLONAZEPAM 0.5 MG: 0.5 TABLET ORAL at 14:14

## 2023-07-21 RX ADMIN — CLONAZEPAM 0.5 MG: 0.5 TABLET ORAL at 21:36

## 2023-07-21 RX ADMIN — CLONAZEPAM 0.5 MG: 0.5 TABLET ORAL at 08:34

## 2023-07-21 RX ADMIN — ACETAMINOPHEN 650 MG: 325 TABLET ORAL at 04:57

## 2023-07-21 RX ADMIN — HYDRALAZINE HYDROCHLORIDE 10 MG: 20 INJECTION INTRAMUSCULAR; INTRAVENOUS at 04:58

## 2023-07-21 RX ADMIN — MECLIZINE 12.5 MG: 12.5 TABLET ORAL at 16:11

## 2023-07-21 RX ADMIN — METOPROLOL SUCCINATE 25 MG: 25 TABLET, EXTENDED RELEASE ORAL at 21:36

## 2023-07-21 RX ADMIN — HYDRALAZINE HYDROCHLORIDE 25 MG: 25 TABLET, FILM COATED ORAL at 08:38

## 2023-07-21 RX ADMIN — FUROSEMIDE 20 MG: 10 INJECTION, SOLUTION INTRAMUSCULAR; INTRAVENOUS at 05:01

## 2023-07-21 ASSESSMENT — ACTIVITIES OF DAILY LIVING (ADL)
ADLS_ACUITY_SCORE: 36
ADLS_ACUITY_SCORE: 32
ADLS_ACUITY_SCORE: 36
ADLS_ACUITY_SCORE: 32
ADLS_ACUITY_SCORE: 36
ADLS_ACUITY_SCORE: 32
ADLS_ACUITY_SCORE: 36
ADLS_ACUITY_SCORE: 36
ADLS_ACUITY_SCORE: 32
DEPENDENT_IADLS:: CLEANING;MEAL PREPARATION
ADLS_ACUITY_SCORE: 40
ADLS_ACUITY_SCORE: 32
ADLS_ACUITY_SCORE: 36

## 2023-07-21 NOTE — PROGRESS NOTES
[facility]    Medicine Progress Note - Hospitalist Service    Date of Admission:  7/20/2023    Assessment & Plan     Kerry Hoffmann is a 88 year old female admitted on 7/20/2023. She has been admitted with b/l leg swelling, sob with activity. and some chest tightness since over a week. The chest discomfort occurs at night after an hour of sleep, non radiating with no relation to activity or breathing. She wakes up with her heart pounding. No CP currently. Patient subsequently came to the ER for further evaluation and management. She could have possible CHF, will do a trial of iv diuresis, r/o ACS, consult cardio.     A/p :      Acute onset sob, swelling in legs  Chest discomfort     She has been admitted with b/l leg swelling, sob with activity. and some chest tightness since over a week. The chest discomfort occurs at night after an hour of sleep, non radiating with no relation to activity or breathing. She wakes up with her heart pounding. No CP currently. Patient subsequently came to the ER for further evaluation and management. She could have possible CHF, will do a trial of iv diuresis, r/o ACS, consult cardio.  Echocardiogram is pending.  We will work further on blood pressure control, suspect symptoms related to hypertension.  Anticipate likely discharge home tomorrow           CKD stage 3a : monitor     Severe intracranial atherosclerosis : On plavix     HTN, Essential : on hydralazine 25 mg tid, losartan 50 mg bid, metoprolol 25 mg at bedtime - at home, on iv hydralazine prn     Anxiety : on clonazepam     Anemia, chronic : stable.       Diet: Low Saturated Fat Na <2400 mg    DVT Prophylaxis: Pneumatic Compression Devices  Wiley Catheter: Not present  Lines: None     Cardiac Monitoring: ACTIVE order. Indication: Acute decompensated heart failure (48 hours)  Code Status: Full Code      Clinically Significant Risk Factors Present on Admission            # Hypomagnesemia: Lowest Mg =  1.5 mg/dL in last 2 days, will replace as needed     # Drug Induced Platelet Defect: home medication list includes an antiplatelet medication   # Hypertension: Home medication list includes antihypertensive(s)      # Obesity: Estimated body mass index is 34.76 kg/m  as calculated from the following:    Height as of this encounter: 1.524 m (5').    Weight as of this encounter: 80.7 kg (178 lb).            Disposition Plan      Expected Discharge Date: 07/22/2023                  Lily Lopez MD  Hospitalist Service  [facility]  Securely message with cloudControl (more info)  Text page via [facility] Paging/Directory   ______________________________________________________________________    Interval History   Patient was seen and examined, overnight events were reviewed  Patient has no specific complaints.  Her main complaint this morning is lower extremity cramps from being in bed, reports her shortness of breath chest pain symptoms have resolved.  We did discuss that her symptoms were more present when her blood pressure was elevated    Physical Exam   Vital Signs: Temp: 97.7  F (36.5  C) Temp src: Oral BP: 118/56 Pulse: 84   Resp: 18 SpO2: 95 % O2 Device: None (Room air)    Weight: 178 lbs 0 oz    Constitutional: awake, alert, cooperative, no apparent distress, and appears stated age  Respiratory: no increased work of breathing, good air exchange, and clear to auscultation, no crackles or wheezing  Cardiovascular: regular rate and rhythm, normal S1 and S2, and no murmur noted  GI: normal bowel sounds, soft, non-distended, and non-tender  Neurologic: Awake, alert, oriented to name, place and time.  Cranial nerves II-XII are grossly intact.  Motor is 5 out of 5 bilaterally.  Cerebellar finger to nose, heel to shin intact.  Sensory is intact.  Babinski down going, Romberg negative, and gait is normal.    Medical Decision Making       35 MINUTES SPENT BY ME on the date of service doing chart review,  history, exam, documentation & further activities per the note.          Data     I have personally reviewed the following data over the past 24 hrs:    5.9  \   10.0 (L)   / 360     134 (L); 134 (L) 98; 98 24.2 (H) /  92   4.1; 4.1 26; 26 1.16 (H) \     ALT: 8 AST: 25 AP: 68 TBILI: 0.3   ALB: 4.0 TOT PROTEIN: 7.4 LIPASE: N/A     Trop: 19 (H) BNP: 1,423       Imaging results reviewed over the past 24 hrs:   Recent Results (from the past 24 hour(s))   US Lower Extremity Venous Duplex Right    Narrative    EXAM: US LOWER EXTREMITY VENOUS DUPLEX RIGHT  LOCATION: [facility]  DATE: 7/20/2023    INDICATION: pain calf  COMPARISON: None.  TECHNIQUE: Venous Duplex ultrasound of the right lower extremity with and without compression, augmentation and duplex. Color flow and spectral Doppler with waveform analysis performed.    FINDINGS: Exam includes the common femoral, femoral, popliteal, and contralateral common femoral veins as well as segmentally visualized deep calf veins and greater saphenous vein.     RIGHT: No deep vein thrombosis. No superficial thrombophlebitis. No popliteal cyst.      Impression    IMPRESSION:  1.  No deep venous thrombosis in the right lower extremity.   Chest XR,  PA & LAT    Narrative    EXAM: XR CHEST 2 VIEWS  LOCATION: [facility]  DATE: 7/20/2023    INDICATION: sob  COMPARISON: 04/10/2023      Impression    IMPRESSION: Severe right and mild left shoulder arthropathy. Enlarged cardiac silhouette. Lungs appear clear.

## 2023-07-21 NOTE — H&P
[facility]    History and Physical - Hospitalist Service       Date of Admission:  7/20/2023    Assessment & Plan           Kerrykit Hoffmann is a 88 year old female admitted on 7/20/2023. She has been admitted with b/l leg swelling, sob with activity. and some chest tightness since over a week. The chest discomfort occurs at night after an hour of sleep, non radiating with no relation to activity or breathing. She wakes up with her heart pounding. No CP currently. Patient subsequently came to the ER for further evaluation and management. She could have possible CHF, will do a trial of iv diuresis, r/o ACS, consult cardio.        A/p :           Acute onset sob, swelling in legs    Chest discomfort    She has been admitted with b/l leg swelling, sob with activity. and some chest tightness since over a week. The chest discomfort occurs at night after an hour of sleep, non radiating with no relation to activity or breathing. She wakes up with her heart pounding. No CP currently. Patient subsequently came to the ER for further evaluation and management. She could have possible CHF, will do a trial of iv diuresis, r/o ACS, consult cardio.          CKD stage 3a : monitor      Severe intracranial atherosclerosis : On plavix      HTN, Essential : on hydralazine 25 mg tid, losartan 50 mg bid, metoprolol 25 mg at bedtime - at home, on iv hydralazine prn      Anxiety : on clonazepam      Anemia, chronic : stable.               Diet: Low Saturated Fat Na <2400 mg    DVT Prophylaxis: Pneumatic Compression Devices  Wiley Catheter: Not present  Lines: None     Cardiac Monitoring: None  Code Status:   full    Clinically Significant Risk Factors Present on Admission                # Drug Induced Platelet Defect: home medication list includes an antiplatelet medication   # Hypertension: Home medication list includes antihypertensive(s)      # Obesity: Estimated body mass index is 34.76 kg/m  as calculated  from the following:    Height as of this encounter: 1.524 m (5').    Weight as of this encounter: 80.7 kg (178 lb).            Disposition Plan      Expected Discharge Date: 07/21/2023                  Isidro Laws MD  Hospitalist Service  [facility]  Securely message with [employer] (more info)  Text page via [employer] Paging/Directory     ______________________________________________________________________    Chief Complaint   Sob, swelling of legs    History is obtained from the patient    History of Present Illness       Kerry Hoffmann is a 88 year old female admitted on 7/20/2023. She has been admitted with b/l leg swelling, sob with activity. and some chest tightness since over a week. The chest discomfort occurs at night after an hour of sleep, non radiating with no relation to activity or breathing. She wakes up with her heart pounding. No CP currently. Patient subsequently came to the ER for further evaluation and management. She could have possible CHF, will do a trial of iv diuresis, r/o ACS, consult cardio.        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     Clostridium enterocolitis 10/27/2016     CTS (carpal tunnel syndrome)      GERD (gastroesophageal reflux disease)     per H & P      Hiatal hernia     small per H & P      Hyperlipidemia      Hyperlipidemia      Hyperlipidemia      Hypertension      Hypertension      Osteoarthritis of knee     per H & P      Spinal stenosis     per H & P      Vitamin D deficiency     per H & P       Past Surgical History   Past Surgical History:   Procedure Laterality Date     BREAST SURGERY  1982    breast tumor per H & P      CATARACT EXTRACTION  07/2005    per H & P       ORIF TIBIA & FIBULA FRACTURES  1988    per H & P      OTHER SURGICAL HISTORY  10/2004    hole in retinaper H & P      OTHER SURGICAL HISTORY  05/03/2015    fecal transplantper H & P      DC ESOPHAGOGASTRODUODENOSCOPY TRANSORAL DIAGNOSTIC N/A 9/11/2020    Procedure: ESOPHAGOGASTRODUODENOSCOPY With gastric biopsies;  Surgeon: David Reyes MD;  [place] [facility] [place]  Service: Gastroenterology     TONSILLECTOMY       TONSILLECTOMY & ADENOIDECTOMY  1963       Prior to Admission Medications   Prior to Admission Medications   Prescriptions Last Dose Informant Patient Reported? Taking?   Cholecalciferol (VITAMIN D3) 50 MCG (2000 UT) CAPS 7/20/2023 at am  Yes Yes   Sig: Take 1 tablet by mouth daily   acetaminophen (TYLENOL) 500 MG tablet 7/20/2023 at am  Yes Yes   Sig: Take 1,000 mg by mouth 2 times daily   aspirin 81 MG EC tablet 7/19/2023 at HS  Yes Yes   Sig: Take 81 mg by mouth daily   calcium carbonate 750 MG CHEW 7/20/2023 at am  Yes Yes   Sig: Take 1 tablet by mouth daily   clonazePAM (KLONOPIN) 0.5 MG tablet 7/20/2023 at am  Yes Yes   Sig: Take 0.5 mg by mouth 3 times daily   clopidogrel (PLAVIX) 75 MG tablet 7/20/2023 at am  No Yes   Sig: Take 1 tablet (75 mg) by mouth daily   diclofenac (VOLTAREN) 1 % topical gel 7/20/2023 at am  Yes Yes   Sig: Apply 4 g topically 4 times daily knee pain   famotidine (PEPCID) 20 MG tablet 7/20/2023 at am  Yes Yes   Sig: Take 20 mg by mouth 2 times daily   hydrALAZINE (APRESOLINE) 25 MG tablet Past Week  No Yes   Sig: Take 1 tablet (25 mg) by mouth 3 times daily   levalbuterol (XOPENEX HFA) 45 MCG/ACT inhaler More than a month  No Yes   Sig: Inhale 2 puffs into the lungs every 4 hours as needed for shortness of breath or wheezing   losartan (COZAAR) 50 MG tablet 7/20/2023 at am  Yes Yes   Sig: Take 50 mg by mouth 2 times daily   metoprolol succinate ER (TOPROL-XL) 25 MG 24 hr tablet 7/19/2023 at hs  Yes Yes   Sig: Take 1 tablet by mouth At Bedtime       Facility-Administered Medications: None        Review of Systems          No fever or chills  No cough or phlegm  No abdominal symptoms  No urinary symptoms  No neuro symptoms        Physical Exam   Vital Signs: Temp: 98.2  F (36.8  C) Temp src: Oral BP: (!) 187/79 Pulse: 66   Resp: 18 SpO2: 96 % O2 Device: None (Room air)    Weight: 178 lbs 0 oz       GENERAL: The patient is not in any acute distressed. Awake and alert.  HEENT: Nonicteric sclerae, PERRLA, EOMI. Oropharynx clear. Moist mucous membranes. Conjunctivae appear well perfused.  HEART: Regular rate and rhythm without murmurs.  LUNGS: Clear to auscultation bilaterally. No wheezing or crackles.  ABDOMEN: Soft, positive bowel sounds, nontender.  SKIN: No rash, no excessive bruising, petechiae, or purpura.  EXTREMITIES : no rashes, no swelling in legs.  NEUROLOGIC: conscious and oriented, follows commands, no obvious focal deficits.  ROS: All other systems negative       Medical Decision Making       76 MINUTES SPENT BY ME on the date of service doing chart review, history, exam, documentation & further activities per the note.  MANAGEMENT DISCUSSED with the following over the past 24 hours: rn, patient       Data     I have personally reviewed the following data over the past 24 hrs:    5.9  \   10.0 (L)   / 360     134 (L); 134 (L) 98; 98 24.2 (H) /  92   4.1; 4.1 26; 26 1.16 (H) \       ALT: 8 AST: 25 AP: 68 TBILI: 0.3   ALB: 4.0 TOT PROTEIN: 7.4 LIPASE: N/A       Trop: 19 (H) BNP: 1,423

## 2023-07-21 NOTE — CONSULTS
Care Management Initial Consult    General Information  Assessment completed with: Patient (facility Nurse Pedro), patient and Nurse Pedro from [facility]  Type of CM/SW Visit: Initial Assessment    Primary Care Provider verified and updated as needed: Yes   Readmission within the last 30 days: no previous admission in last 30 days      Reason for Consult: discharge planning  Advance Care Planning:            Communication Assessment  Patient's communication style: spoken language (English or Bilingual)    Hearing Difficulty or Deaf: no        Cognitive  Cognitive/Neuro/Behavioral: WDL                      Living Environment:   People in home: alone     Current living Arrangements: assisted living (under Independent, no services)  Name of Facility: [facility]   Able to return to prior arrangements:         Family/Social Support:  Care provided by: self  Provides care for: no one     Other (specify) (niece Brittani, nephew Riley, grandnephew García)          Description of Support System: Supportive         Current Resources:   Patient receiving home care services: No     Community Resources: None  Equipment currently used at home:    Supplies currently used at home: None    Employment/Financial:  Employment Status: retired        Financial Concerns: No concerns identified           Does the patient's insurance plan have a 3 day qualifying hospital stay waiver?  No    Lifestyle & Psychosocial Needs:  Social Determinants of Health     Tobacco Use: Medium Risk (7/20/2023)    Patient History      Smoking Tobacco Use: Former      Smokeless Tobacco Use: Never      Passive Exposure: Not on file   Alcohol Use: Not on file   Financial Resource Strain: Not on file   Food Insecurity: Not on file   Transportation Needs: Not on file   Physical Activity: Not on file   Stress: Not on file   Social Connections: Not on file   Intimate Partner Violence: Not on file   Depression: Not at risk (7/5/2022)    PHQ-2      PHQ-2 Score: 0    Housing Stability: Not on file       Functional Status:  Prior to admission patient needed assistance:   Dependent ADLs:: Ambulation-cane (Scooter)  Dependent IADLs:: Cleaning, Meal Preparation (1x/day meal via [facility], 1x/week cleaning via [facility])       Mental Health Status:          Chemical Dependency Status:                Values/Beliefs:  Spiritual, Cultural Beliefs, Judaism Practices, Values that affect care: no               Additional Information:  Assessed.  Pt lives at [facility], but under independent living with no services for ADL assist.  1x/day meal via [facility], 1x/week cleaning via [facility].  Pt independent with ADLs, uses a cane or a scooter, still drives.  No services, no homecare.  Pt hopeful to discharge back to [facility], requests to [facility] wheelchair transport at time of discharge (agreeable to [facility] wheelchair potential out of pocket cost).  Pt has her keys with her, LISSA has elevators.  Pt declines homecare services in advance.    Discussed observation status.  MCCOY signed by patient.    [facility] [phone]. Mon-Fri Nurse line [phone], Fax [phone].  Spoke to Nurse Vasquez who confirms pt's baseline as above.  Pt would have to be independent with walker before returning to [facility]. Pt uses [facility] (unsure if they deliver meds, [phone]).    CM will continue to follow care progression and aide in discharge planning as needed.       Michael Grimes RN

## 2023-07-21 NOTE — PLAN OF CARE
PRIMARY DIAGNOSIS: CONGESTIVE HEART FAILURE  OUTPATIENT/OBSERVATION GOALS TO BE MET BEFORE DISCHARGE:  Dyspnea improved and O2 sats >88% at RA or at prior home O2 therapy level: No        SpO2: 96 %, O2 Device: None (Room air)  Vitals:    07/20/23 1320   Weight: 80.7 kg (178 lb)        ECHO and other diagnostic testing complete (if applicable): No    Return to near baseline physical activity: No    Discharge Planner Nurse   Safe discharge environment identified: Yes  Barriers to discharge: Yes       Entered by: Essie Schwarz RN 07/21/2023 2:57 AM     Please review provider order for any additional goals.   Nurse to notify provider when observation goals have been met and patient is ready for discharge.

## 2023-07-21 NOTE — SIGNIFICANT EVENT
After receiving IV hydralazine and IV lasix patient reported palpitation and hot feet. On assessment heart rate was fine and had not been elevated and symptoms had subsided by the time the RN came into the room. Patient called again within a half an hour reporting that she felt SOB, hot feet, mild tingling in feet, nausea, and palpitations. Again on assessment patient reported that her symptoms except mild tingling in feet and SOB had subsided. BP did drop from 172/74 to 137/62. House officer notified and continue to monitor at this time.

## 2023-07-21 NOTE — PROGRESS NOTES
PRIMARY DIAGNOSIS: Dyspnea on exertions  OUTPATIENT/OBSERVATION GOALS TO BE MET BEFORE DISCHARGE:  1. ADLs back to baseline: Yes    2. Activity and level of assistance: Assist of 1 with gait belt and cane; pt reports mild weakness.    3. Pain status: Pain free.    4. Return to near baseline physical activity: Yes     Cardiac rhythm: NSR   BP have been quite elevate; up to the low 200's/80's denies sx.  Pt received PTA anti-hypertensive late this evening around 2250.   Discharge Planner Nurse   Safe discharge environment identified: Yes  Barriers to discharge: Yes consults, diuresing and BP management.        Entered by: Tabitha Muller RN 07/20/2023 10:31 PM     Please review provider order for any additional goals.   Nurse to notify provider when observation goals have been met and patient is ready for discharge.

## 2023-07-22 LAB
ANION GAP SERPL CALCULATED.3IONS-SCNC: 12 MMOL/L (ref 7–15)
BUN SERPL-MCNC: 36.2 MG/DL (ref 8–23)
CALCIUM SERPL-MCNC: 9.7 MG/DL (ref 8.8–10.2)
CHLORIDE SERPL-SCNC: 93 MMOL/L (ref 98–107)
CREAT SERPL-MCNC: 1.76 MG/DL (ref 0.51–0.95)
DEPRECATED HCO3 PLAS-SCNC: 25 MMOL/L (ref 22–29)
GFR SERPL CREATININE-BSD FRML MDRD: 28 ML/MIN/1.73M2
GLUCOSE SERPL-MCNC: 98 MG/DL (ref 70–99)
HOLD SPECIMEN: NORMAL
MAGNESIUM SERPL-MCNC: 2.7 MG/DL (ref 1.7–2.3)
POTASSIUM SERPL-SCNC: 3.9 MMOL/L (ref 3.4–5.3)
SODIUM SERPL-SCNC: 130 MMOL/L (ref 136–145)

## 2023-07-22 PROCEDURE — 80048 BASIC METABOLIC PNL TOTAL CA: CPT | Performed by: INTERNAL MEDICINE

## 2023-07-22 PROCEDURE — 36415 COLL VENOUS BLD VENIPUNCTURE: CPT | Performed by: INTERNAL MEDICINE

## 2023-07-22 PROCEDURE — 250N000011 HC RX IP 250 OP 636: Mod: JZ | Performed by: INTERNAL MEDICINE

## 2023-07-22 PROCEDURE — 96376 TX/PRO/DX INJ SAME DRUG ADON: CPT

## 2023-07-22 PROCEDURE — 83735 ASSAY OF MAGNESIUM: CPT | Performed by: INTERNAL MEDICINE

## 2023-07-22 PROCEDURE — 250N000013 HC RX MED GY IP 250 OP 250 PS 637: Performed by: INTERNAL MEDICINE

## 2023-07-22 PROCEDURE — G0378 HOSPITAL OBSERVATION PER HR: HCPCS

## 2023-07-22 PROCEDURE — 99232 SBSQ HOSP IP/OBS MODERATE 35: CPT | Performed by: INTERNAL MEDICINE

## 2023-07-22 RX ORDER — AMLODIPINE BESYLATE 2.5 MG/1
2.5 TABLET ORAL
Status: DISCONTINUED | OUTPATIENT
Start: 2023-07-22 | End: 2023-07-24

## 2023-07-22 RX ADMIN — METOPROLOL SUCCINATE 25 MG: 25 TABLET, EXTENDED RELEASE ORAL at 22:00

## 2023-07-22 RX ADMIN — LOSARTAN POTASSIUM 50 MG: 50 TABLET, FILM COATED ORAL at 09:26

## 2023-07-22 RX ADMIN — ACETAMINOPHEN 650 MG: 325 TABLET ORAL at 09:19

## 2023-07-22 RX ADMIN — Medication 81 MG: at 09:19

## 2023-07-22 RX ADMIN — CLOPIDOGREL BISULFATE 75 MG: 75 TABLET, FILM COATED ORAL at 09:19

## 2023-07-22 RX ADMIN — HYDRALAZINE HYDROCHLORIDE 25 MG: 25 TABLET, FILM COATED ORAL at 09:26

## 2023-07-22 RX ADMIN — FAMOTIDINE 20 MG: 20 TABLET, FILM COATED ORAL at 22:00

## 2023-07-22 RX ADMIN — CLONAZEPAM 0.5 MG: 0.5 TABLET ORAL at 14:58

## 2023-07-22 RX ADMIN — LOSARTAN POTASSIUM 50 MG: 50 TABLET, FILM COATED ORAL at 22:00

## 2023-07-22 RX ADMIN — CLONAZEPAM 0.5 MG: 0.5 TABLET ORAL at 09:19

## 2023-07-22 RX ADMIN — ACETAMINOPHEN 650 MG: 325 TABLET ORAL at 19:14

## 2023-07-22 RX ADMIN — AMLODIPINE BESYLATE 2.5 MG: 2.5 TABLET ORAL at 13:42

## 2023-07-22 RX ADMIN — CLONAZEPAM 0.5 MG: 0.5 TABLET ORAL at 22:00

## 2023-07-22 RX ADMIN — FUROSEMIDE 20 MG: 10 INJECTION, SOLUTION INTRAMUSCULAR; INTRAVENOUS at 05:50

## 2023-07-22 RX ADMIN — FAMOTIDINE 20 MG: 20 TABLET, FILM COATED ORAL at 09:19

## 2023-07-22 ASSESSMENT — ACTIVITIES OF DAILY LIVING (ADL)
ADLS_ACUITY_SCORE: 38

## 2023-07-22 NOTE — PLAN OF CARE
Problem: Plan of Care - These are the overarching goals to be used throughout the patient stay.    Goal: Absence of Hospital-Acquired Illness or Injury  Outcome: Met  Intervention: Prevent Skin Injury  Recent Flowsheet Documentation  Taken 7/22/2023 0030 by Berto Galvan RN  Body Position: position changed independently  Goal: Optimal Comfort and Wellbeing  Outcome: Met     Problem: Fall Injury Risk  Goal: Absence of Fall and Fall-Related Injury  Outcome: Met   Goal Outcome Evaluation:    Pt. Alert and oriented. On telemetry SR with 1st degree AVB. No complaints over the night. Sleeping in between cares. Vital signs have been stable. Purewick in place for voiding. Pt. Uses call light for all needs. Will continue to monitor.    Berto Galvan, KG

## 2023-07-22 NOTE — UTILIZATION REVIEW
Admission Status; Secondary Review Determination     Under the authority of the Utilization Management Committee, the utilization review process indicated a secondary review on the above patient.  The review outcome is based on review of the medical records, discussions with staff, and applying clinical experience noted on the date of the review.       (x) Observation Status Appropriate      RATIONALE FOR DETERMINATION/SUMMARY:  88 year old female admitted on 7/20/2023. She has been admitted with b/l leg swelling, sob with activity. and some chest tightness since over a week. The chest discomfort occurs at night after an hour of sleep, non radiating with no relation to activity or breathing. She wakes up with her heart pounding. No CP currently.  Felt to possibly have some CHF exacerbation and given diuresis.  Breathing improved.  Also BP improved and hypertension felt possibly contributing to her complaints prior.  She didn't discharge today as expected as her creatinine went up requiring further hospital evaluation.  She has no new complaints and per today's notes feels better.  If renal function stable to improved felt to likely d/c by tomorrow.  If further evaluation determines renal function worsening further extending care beyond tonight would recommend reconsideration for inpatient status.    The severity of illness, intensity of service provided, expected LOS and risk for adverse outcome make the care appropriate for observation.     The information on this document is developed by the utilization review team in order for the business office to ensure compliance.  This only denotes the appropriateness of proper admission status and does not reflect the quality of care rendered.     The definitions of Inpatient Status and Observation Status used in making the determination above are those provided in the CMS Coverage Manual, Chapter 1 and Chapter 6, section 70.4.     Sincerely,    Russ Quinn DO,  [facility]  Utilization Review  Physician Advisor

## 2023-07-22 NOTE — PROGRESS NOTES
[facility]    Medicine Progress Note - Hospitalist Service    Date of Admission:  7/20/2023    Assessment & Plan   Kerry Hoffmann is a 88 year old female admitted on 7/20/2023. She has been admitted with b/l leg swelling, sob with activity. and some chest tightness since over a week. The chest discomfort occurs at night after an hour of sleep, non radiating with no relation to activity or breathing. She wakes up with her heart pounding. No CP currently. Patient subsequently came to the ER for further evaluation and management. She could have possible CHF, will do a trial of iv diuresis, r/o ACS, consult cardio.     A/p :        Acute onset sob, swelling in legs    Chest discomfort     She has been admitted with b/l leg swelling, sob with activity. and some chest tightness since over a week. The chest discomfort occurs at night after an hour of sleep, non radiating with no relation to activity or breathing. She wakes up with her heart pounding. No CP currently. Patient subsequently came to the ER for further evaluation and management. She could have possible CHF, will do a trial of iv diuresis, r/o ACS, consult cardio.  Echocardiogram is pending.  We will work further on blood pressure control, suspect symptoms related to hypertension.  Anticipate likely discharge home tomorrow             Acute kidney injury with CKD stage 3a     Likely secondary to IV diuresis with Lasix    Lasix is now on hold, will repeat labs this evening    Patient has good urine output, if no improvement or worsening  creatinine we will continue with further work-up and consult nephrology as needed       Severe intracranial atherosclerosis : On plavix       HTN, Essential : on hydralazine 25 mg tid, losartan 50 mg bid, metoprolol 25 mg at bedtime - at home    IV hydralazine is discontinued, patient had her side effects where she felt hot and flushed.  This was not a true allergic response but patient was unable to  tolerate this medication    She is on hydralazine 25 3 times daily at home but reports she does not take it because it makes her feel uneasy and sick.  We discussed starting Norvasc and she was agreeable to this, will have Norvasc 2.5 mg at noon.  She has a prior allergy to Cardizem, on further investigation it is more of an intolerance as the patient reports feeling flushed and uneasy but has no signs or symptoms of true allergy.  We will monitor closely       Anxiety : on clonazepam       Anemia, chronic : stable.         Diet: Low Saturated Fat Na <2400 mg    DVT Prophylaxis: Pneumatic Compression Devices  Wiley Catheter: Not present  Lines: None     Cardiac Monitoring: ACTIVE order. Indication: Acute decompensated heart failure (48 hours)  Code Status: Full Code      Clinically Significant Risk Factors Present on Admission           # Hypercalcemia: Highest Ca = 10.5 mg/dL in last 2 days, will monitor as appropriate  # Hypomagnesemia: Lowest Mg = 1.5 mg/dL in last 2 days, will replace as needed     # Drug Induced Platelet Defect: home medication list includes an antiplatelet medication  # Acute Kidney Injury, unspecified: based on a >150% or 0.3 mg/dL increase in last creatinine compared to past 90 day average, will monitor renal function  # Hypertension: Home medication list includes antihypertensive(s)      # Obesity: Estimated body mass index is 34.19 kg/m  (pended) as calculated from the following:    Height as of this encounter: 1.524 m (5').    Weight as of this encounter: (P) 79.4 kg (175 lb 0.7 oz).            Disposition Plan     Expected Discharge Date: 07/22/2023      Destination: assisted living  Discharge Comments: NÉSTOR Lopez MD  Hospitalist Service  [facility]  Securely message with [employer] (more info)  Text page via [employer] Paging/Directory   ______________________________________________________________________    Interval History   Patient was seen and  examined, overnight events were reviewed  Patient has no specific complaints, reports feeling better and denies any shortness of breath.  She does not need for being in bed and would like to sit up in the chair today.  She does not ambulate well 02 bad knees, she was concerned about her kidneys after being notified by nursing staff that she had acute kidney injury.  We discussed what this meant she did feel reassured    Physical Exam   Vital Signs: Temp: 97.5  F (36.4  C) Temp src: Oral BP: 100/45 Pulse: 76   Resp: 18 SpO2: 96 % O2 Device: None (Room air)    Weight: 178 lbs 0 oz    Constitutional: awake, alert, cooperative, no apparent distress, and appears stated age  Respiratory: no increased work of breathing, good air exchange, and clear to auscultation, no crackles or wheezing  Cardiovascular: regular rate and rhythm, normal S1 and S2, and no murmur noted  GI: normal bowel sounds, soft, non-distended, and non-tender  Neurologic: Awake, alert, oriented to name, place and time.  Cranial nerves II-XII are grossly intact.  Motor is 5 out of 5 bilaterally.  Cerebellar finger to nose, heel to shin intact.  Sensory is intact.  Babinski down going, Romberg negative, and gait is normal.    Medical Decision Making       35 MINUTES SPENT BY ME on the date of service doing chart review, history, exam, documentation & further activities per the note.      Data     I have personally reviewed the following data over the past 24 hrs:    N/A  \   N/A   / N/A     130 (L) 93 (L) 36.2 (H) /  98   3.9 25 1.76 (H) \       Imaging results reviewed over the past 24 hrs:   No results found for this or any previous visit (from the past 24 hour(s)).

## 2023-07-22 NOTE — PLAN OF CARE
PRIMARY DIAGNOSIS: CONGESTIVE HEART FAILURE  OUTPATIENT/OBSERVATION GOALS TO BE MET BEFORE DISCHARGE:  Dyspnea improved and O2 sats >88% at RA or at prior home O2 therapy level: Yes        SpO2: 96 %, O2 Device: None (Room air)  Vitals:    07/20/23 1320   Weight: 80.7 kg (178 lb)        ECHO and other diagnostic testing complete (if applicable): Yes    Return to near baseline physical activity: No    Discharge Planner Nurse   Safe discharge environment identified: Yes  Barriers to discharge: No       Entered by: Dayday Portillo RN 07/21/2023 7:53 PM     Please review provider order for any additional goals.   Nurse to notify provider when observation goals have been met and patient is ready for discharge.   normal...

## 2023-07-22 NOTE — PROGRESS NOTES
Continued c/o intermittent nausea/vertigo -- partially relieved with meclizine. SBP trending up from 100s to 170s throughout shift. Resolved with scheduled losartan and hydralazine; no PRN needed.

## 2023-07-22 NOTE — PLAN OF CARE
Problem: Acute Kidney Injury/Impairment  Goal: Effective Renal Function  Outcome: Not Progressing   Goal Outcome Evaluation:    Creatinine 1.76 today, up from 1.31 yesterday. GFR down to 28, down from 39 yesterday and 46 the day before. Lasix held. Hydralazine discontinued and amlodipine oral ordered. She is feeling discouraged at times but mostly pleasant and calm.

## 2023-07-23 ENCOUNTER — APPOINTMENT (OUTPATIENT)
Dept: ULTRASOUND IMAGING | Facility: HOSPITAL | Age: 88
DRG: 683 | End: 2023-07-23
Attending: INTERNAL MEDICINE
Payer: MEDICARE

## 2023-07-23 PROBLEM — N17.9 AKI (ACUTE KIDNEY INJURY) (H): Status: ACTIVE | Noted: 2023-07-23

## 2023-07-23 LAB
ALBUMIN MFR UR ELPH: 7.9 MG/DL
ALBUMIN UR-MCNC: NEGATIVE MG/DL
ANION GAP SERPL CALCULATED.3IONS-SCNC: 15 MMOL/L (ref 7–15)
APPEARANCE UR: CLEAR
BACTERIA #/AREA URNS HPF: ABNORMAL /HPF
BILIRUB UR QL STRIP: NEGATIVE
BUN SERPL-MCNC: 49.8 MG/DL (ref 8–23)
CALCIUM SERPL-MCNC: 9.4 MG/DL (ref 8.8–10.2)
CHLORIDE SERPL-SCNC: 93 MMOL/L (ref 98–107)
COLOR UR AUTO: ABNORMAL
CREAT SERPL-MCNC: 2.5 MG/DL (ref 0.51–0.95)
CREAT UR-MCNC: 95.2 MG/DL
DEPRECATED HCO3 PLAS-SCNC: 23 MMOL/L (ref 22–29)
GFR SERPL CREATININE-BSD FRML MDRD: 18 ML/MIN/1.73M2
GLUCOSE SERPL-MCNC: 90 MG/DL (ref 70–99)
GLUCOSE UR STRIP-MCNC: NEGATIVE MG/DL
HGB UR QL STRIP: NEGATIVE
HOLD SPECIMEN: NORMAL
HYALINE CASTS: 1 /LPF
KETONES UR STRIP-MCNC: NEGATIVE MG/DL
LEUKOCYTE ESTERASE UR QL STRIP: NEGATIVE
MAGNESIUM SERPL-MCNC: 2.5 MG/DL (ref 1.7–2.3)
NITRATE UR QL: NEGATIVE
OSMOLALITY UR: 328 MMOL/KG (ref 100–1200)
PH UR STRIP: 5 [PH] (ref 5–7)
POTASSIUM SERPL-SCNC: 4 MMOL/L (ref 3.4–5.3)
PROT/CREAT 24H UR: 0.08 MG/MG CR (ref 0–0.2)
RBC URINE: 4 /HPF
SODIUM SERPL-SCNC: 131 MMOL/L (ref 136–145)
SODIUM UR-SCNC: 54 MMOL/L
SP GR UR STRIP: 1.01 (ref 1–1.03)
SQUAMOUS EPITHELIAL: 1 /HPF
UROBILINOGEN UR STRIP-MCNC: <2 MG/DL
WBC URINE: 1 /HPF

## 2023-07-23 PROCEDURE — 83935 ASSAY OF URINE OSMOLALITY: CPT | Performed by: INTERNAL MEDICINE

## 2023-07-23 PROCEDURE — 250N000013 HC RX MED GY IP 250 OP 250 PS 637: Performed by: INTERNAL MEDICINE

## 2023-07-23 PROCEDURE — 83735 ASSAY OF MAGNESIUM: CPT | Performed by: INTERNAL MEDICINE

## 2023-07-23 PROCEDURE — 258N000003 HC RX IP 258 OP 636: Performed by: INTERNAL MEDICINE

## 2023-07-23 PROCEDURE — 36415 COLL VENOUS BLD VENIPUNCTURE: CPT | Performed by: INTERNAL MEDICINE

## 2023-07-23 PROCEDURE — 99222 1ST HOSP IP/OBS MODERATE 55: CPT | Performed by: INTERNAL MEDICINE

## 2023-07-23 PROCEDURE — 80048 BASIC METABOLIC PNL TOTAL CA: CPT | Performed by: INTERNAL MEDICINE

## 2023-07-23 PROCEDURE — 84300 ASSAY OF URINE SODIUM: CPT | Performed by: INTERNAL MEDICINE

## 2023-07-23 PROCEDURE — 76770 US EXAM ABDO BACK WALL COMP: CPT

## 2023-07-23 PROCEDURE — 99232 SBSQ HOSP IP/OBS MODERATE 35: CPT | Performed by: INTERNAL MEDICINE

## 2023-07-23 PROCEDURE — 81001 URINALYSIS AUTO W/SCOPE: CPT | Performed by: INTERNAL MEDICINE

## 2023-07-23 PROCEDURE — G0378 HOSPITAL OBSERVATION PER HR: HCPCS

## 2023-07-23 PROCEDURE — 84156 ASSAY OF PROTEIN URINE: CPT | Performed by: INTERNAL MEDICINE

## 2023-07-23 PROCEDURE — 120N000004 HC R&B MS OVERFLOW

## 2023-07-23 RX ORDER — FAMOTIDINE 20 MG/1
20 TABLET, FILM COATED ORAL
Status: DISCONTINUED | OUTPATIENT
Start: 2023-07-25 | End: 2023-07-28 | Stop reason: HOSPADM

## 2023-07-23 RX ADMIN — CLOPIDOGREL BISULFATE 75 MG: 75 TABLET, FILM COATED ORAL at 09:28

## 2023-07-23 RX ADMIN — ACETAMINOPHEN 650 MG: 325 TABLET ORAL at 09:28

## 2023-07-23 RX ADMIN — SODIUM CHLORIDE 500 ML: 9 INJECTION, SOLUTION INTRAVENOUS at 09:28

## 2023-07-23 RX ADMIN — ACETAMINOPHEN 650 MG: 325 TABLET ORAL at 16:34

## 2023-07-23 RX ADMIN — Medication 81 MG: at 09:28

## 2023-07-23 RX ADMIN — CLONAZEPAM 0.5 MG: 0.5 TABLET ORAL at 09:28

## 2023-07-23 RX ADMIN — METOPROLOL SUCCINATE 25 MG: 25 TABLET, EXTENDED RELEASE ORAL at 21:47

## 2023-07-23 RX ADMIN — FAMOTIDINE 20 MG: 20 TABLET, FILM COATED ORAL at 09:28

## 2023-07-23 RX ADMIN — CLONAZEPAM 0.5 MG: 0.5 TABLET ORAL at 21:47

## 2023-07-23 RX ADMIN — CLONAZEPAM 0.5 MG: 0.5 TABLET ORAL at 14:21

## 2023-07-23 RX ADMIN — LOSARTAN POTASSIUM 50 MG: 50 TABLET, FILM COATED ORAL at 09:28

## 2023-07-23 ASSESSMENT — ACTIVITIES OF DAILY LIVING (ADL)
ADLS_ACUITY_SCORE: 39
ADLS_ACUITY_SCORE: 38
ADLS_ACUITY_SCORE: 39

## 2023-07-23 NOTE — PLAN OF CARE
PRIMARY DIAGNOSIS: CONGESTIVE HEART FAILURE  OUTPATIENT/OBSERVATION GOALS TO BE MET BEFORE DISCHARGE:  1. Dyspnea improved and O2 sats >88% at RA or at prior home O2 therapy level: Yes        SpO2: 95 %, O2 Device: None (Room air)  Vitals:    07/20/23 1320 07/22/23 0553 07/23/23 0424   Weight: 80.7 kg (178 lb) (P) 79.4 kg (175 lb 0.7 oz) 80.6 kg (177 lb 11.1 oz)        2. ECHO and other diagnostic testing complete (if applicable): Yes    3. Return to near baseline physical activity: Yes    Discharge Planner Nurse   Safe discharge environment identified: Yes  Barriers to discharge: Yes, TIMOTHY.       Entered by: Samra Grimes RN 07/23/2023 5:12 AM     Please review provider order for any additional goals.   Nurse to notify provider when observation goals have been met and patient is ready for discharge.

## 2023-07-23 NOTE — CONSULTS
NEPHROLOGY CONSULTATION      ASSESSMENT/PLAN:  88 year old female with a past medical history of hypertension, severe intracranial atherosclerotic disease on Plavix history of stage III CKD, HLD intolerance to statins, anxiety and multiple antibiotic and blood pressure medication allergies, presented with hypervolemia with acute onset hypertension.  Earlier in the course of admission was diuresed with Lasix with a robust response although complicated by rapidly increasing creatinine and the diuretics were held received some IV fluids with no improvement in kidney functions.  Nephrology consulted for TIMOTHY    TIMOTHY nonoliguric  CKD 3 baseline creatinine 1.1-1.3 with GFR around 40, no prior nephrology work-up  UA bland  Urine protein negative quantification 0.08 g/g  Renal imaging significant for left renal atrophy with about 7.8 cm left kidney and about 9.9 cm right kidney with multiple cortical cysts that have been seen in the past imaging as well, (CT in 2022 in 2021)  Revealing mild CKD likely secondary to renal atrophy as well has some acquired renal cysts in the light of renovascular disease since he does have evidence of significant atherosclerotic disease elsewhere  Current TIMOTHY is likely hemodynamic with aggressive diuresis on admission, with ACE inhibitor on board  Creatinine uptrending despite holding diuretics in the last 24 hours  Patient is clinically euvolemic to mild hypervolemic  --> Hold off any further diuretics as well as volume expansion, conservative management for the creatinine would likely plateau and improve after the  --> Strict DARLEEN Daily weights and renal panel daily  --> Extensive discussion of historical labs and will need to follow as outpatient we will arrange that at the time of discharge    Hyponatremia  Likely with hypervolemia  Chronic runs between 129-135  Mild hypochloremia likely with diuretics  Hypercalcemia noted in the labs with diuresis improved now with holding  diuretics    Hypomagnesemia improved    History of hypertension  Prior to admission on metoprolol losartan 50 mg twice a day and hydralazine 25 mg 3 times daily  Has multiple antihypertensive intolerance and allergies  Presented with accelerated hypertension with blood pressures elevated to 200+, in the setting of hypervolemia  Improved rapidly with diuresis  Patient reports no prior sodium restriction in her diet was actually eating [employer] every day for the past 2 weeks prior to presentation  --> Counseled on low-sodium diet, less than 2 g/day  Would like to get some options from a dietitian    Hypervolemia with edema on admission  Improved significantly with diuresis  No indication for scheduled diuretics at this time  No indication for IV fluids volume expansion unless hypotensive    Anemia mild likely inflammatory    Chest pressure  Improved with better blood pressure  Likely secondary to hypertensive urgency    History of GERD with reflux  Chronically on famotidine    History of severe atherosclerosis  Severe intracranial atherosclerosis on Plavix  Evidence of atherosclerosis on imaging  Likely has acquired bilateral small renal cystic disease secondary to renal artery disease  --> No intolerance to statins, can consider red yeast rice extract    Hepatic cyst around 4 cm  May need to consider evaluation with her prior GI team  Consult to follow as outpatient    Anxiety on clonazepam    Thank you for the consultation  We will follow  Shorty Bautista MD  [facility]  [phone]        CC: Hypertension TIMOTHY CKD 3    REASON FOR CONSULTATION: We are asked to see pt by Dr. Brooks    HISTORY OF PRESENT ILLNESS:88 year old female with a past medical history of hypertension, severe intracranial atherosclerotic disease on Plavix history of stage III CKD, HLD intolerance to statins, anxiety and multiple antibiotic and blood pressure medication allergies, presented with hypervolemia with acute onset  hypertension.  Earlier in the course of admission was diuresed with Lasix with a robust response although complicated by rapidly increasing creatinine and the diuretics were held received some IV fluids with no improvement in kidney functions.  Nephrology consulted for TIMOTHY  Patient is aware of chronic CKD 3,  has not been evaluated by nephrology before, was being managed by her primary care.   She lives in an assisted living and reports the food with her does not taste very well so she frequently goes for outside eating and would eat [employer].  She has been eating [employer] sandwich for the past 2 weeks prior to admission.  She reports starting to notice lower extremity swelling, although the blood pressure rise and tense lower extremity swelling started in the 24 hours before presentation.  She has not had a similar presentation before  Patient reports significant anxiety over declining kidney functions.  Since her friend was on hemodialysis and passed away recently  She has had vague abdominal pain recurrently with renal imaging with CT scan abdomen completed in the last 2 years which both showed bilateral simple renal cysts.  Has not had any prior issues with hypertension or kidney disease.  No NSAID use  Patient does monitor her blood pressure 3 times a day at home and usually is well controlled around 120s to 130s over 70s.  24-hour prior to presentation the patient started to notice blood pressures around 160s over 100s and was around 220/110 on admission  Patient was started on Lasix for diuresis with significant lower extremity edema had a robust response to diuretics with a rapidly rising creatinine 48 hours after admission.  Diuretics were appropriately stopped yesterday did receive some fluid for volume expansion and has an elevated creatinine despite that correction with stable hemodynamics.  Lisinopril had been held yesterday.  Patient received some amlodipine for blood pressure control  Reports no  other symptoms  She had some chest pressure on admission that has improved with better blood pressure control    REVIEW OF SYSTEMS:  ROS was completely reviewed and otherwise negative and non-contributory    Past Medical History:   Diagnosis Date     Anxiety     takes clonazepam     Asthma     per [facility]      Chronic kidney disease     stage 3 per [facility] & [facility]      CKD (chronic kidney disease)      Clostridium difficile colitis 11/1/2016     Clostridium difficile infection     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     Clostridium enterocolitis 10/27/2016     CTS (carpal tunnel syndrome)      GERD (gastroesophageal reflux disease)     per [facility]      Hiatal hernia     small per [facility]      Hyperlipidemia      Hyperlipidemia      Hyperlipidemia      Hypertension      Hypertension      Osteoarthritis of knee     per [facility]      Spinal stenosis     per [facility]      Vitamin D deficiency     per [facility]       Social History     Socioeconomic History     Marital status:      Spouse name: Not on file     Number of children: Not on file     Years of education: Not on file     Highest education level: Not on file   Occupational History     Not on file   Tobacco Use     Smoking status: Former     Packs/day: 3.00     Years: 35.00     Pack years: 105.00     Types: Cigarettes     Smokeless tobacco: Never     Tobacco comments:     quit 1968   Substance and Sexual Activity     Alcohol use: Not Currently     Drug use: Never     Sexual activity: Not Currently   Other Topics Concern     Parent/sibling w/ CABG, MI or angioplasty before 65F 55M? No   Social History Narrative     Not on file     Social Determinants of Health     Financial Resource Strain: Not on file   Food Insecurity: Not on file   Transportation Needs: Not on file   Physical Activity: Not on file   Stress: Not on file   Social Connections: Not on file   Intimate Partner Violence: Not on file   Housing Stability: Not on file       Family History   Problem Relation Age of Onset      Hypertension Mother      Cancer Mother         gallbladder cancer     Myocardial Infarction Father      Hypertension Sister      Alzheimer Disease Sister      Heart Disease Sister      Cancer Brother      Brain Cancer Brother      Pacemaker Mother      Heart Disease Mother      Coronary Artery Disease Father      Heart Disease Father      Heart Failure Sister        Allergies   Allergen Reactions     Buspirone Shortness Of Breath     Ciprofloxacin Hives and Shortness Of Breath     Other reaction(s): Unknown     Cortisone      Other reaction(s): Throat Swelling/Closing, Unknown  Reaction [id number]       Hydrocodone-Acetaminophen Shortness Of Breath     Other reaction(s): Unknown     Lansoprazole Shortness Of Breath     Metoprolol Shortness Of Breath     Nedocromil      Other reaction(s): Throat Swelling/Closing     Niacin Shortness Of Breath     Other reaction(s): Unknown     Acetaminophen      Other reaction(s): breathing difficulties, red ears,high blood press.     Albuterol Other (See Comments)     Inhaler had extreme effect on nervous system       Amlodipine      Other reaction(s): Erythema, Unknown     Azithromycin      Other reaction(s): *Unknown, Unknown     Cefixime Diarrhea     Other reaction(s): Unknown     Cefprozil Nausea and Vomiting     Other reaction(s): Unknown     Cefuroxime      Other reaction(s): *Unknown     Cephalexin      Other reaction(s): *Unknown, Unknown     Contrast Dye      Other reaction(s): hives     Cyclobenzaprine      Other reaction(s): Sedation     Dextromethorphan      Other reaction(s): headache,nausea     Dextromethorphan-Guaifenesin Nausea     Other reaction(s): Headache     Diltiazem      Other reaction(s): Erythema, Unknown  Ears face arms and chest red and on fire-body tremors       Erythromycin      Other reaction(s): Unknown     Esomeprazole      Other reaction(s): Headache     Ethanol      Other reaction(s): Adverse reaction     Fenofibrate Muscle Pain (Myalgia)     Other  reaction(s): Unknown     Fluticasone-Salmeterol Other (See Comments)     Elevated blood pressure       Hydrocodone      Other reaction(s): breathing difficulties     Levofloxacin Nausea     Other reaction(s): Headache, Unknown     Methylprednisolone      Metronidazole      Monosodium Glutamate      Moxifloxacin      Other reaction(s): Dizziness     Nifedipine      Other reaction(s): Edema  Took for 5-93 to 9-94 red and swollen leg       Nsaids      Other reaction(s): Unknown     Ofloxacin      Other reaction(s): *Unknown     Ondansetron      Other reaction(s): Constipation     Parsley Seed      Penicillins Unknown     She doesn't remember other than it occurred as a very young woman      Piper      Pirbuterol Other (See Comments)     Immediate extreme effect on nervous system       Pravastatin      Other reaction(s): Dizziness, Unknown     Pseudoephedrine      Other reaction(s): headache,nausea     Pseudoephedrine-Dm-Gg      Simvastatin Muscle Pain (Myalgia)     Spironolactone      Other reaction(s): stomach cramps, nausea     Sulfamethoxazole-Trimethoprim      Other reaction(s): Unknown     Tramadol      Other reaction(s): red ears,high blood press.     Tramadol-Acetaminophen      Other reaction(s): Tachycardia, Unknown     Triamterene Other (See Comments)     Greatly bothered with sun-took medication for three years  Greatly bothered with sun       Diatrizoate Rash     Telmisartan Palpitations       MEDICATIONS:    [Held by provider] amLODIPine  2.5 mg Oral Daily with lunch     aspirin  81 mg Oral Daily     clonazePAM  0.5 mg Oral TID     clopidogrel  75 mg Oral Daily     [START ON 7/25/2023] famotidine  20 mg Oral Q48H     [Held by provider] furosemide  20 mg Intravenous Q8H     [Held by provider] losartan  50 mg Oral BID     metoprolol succinate ER  25 mg Oral At Bedtime         PHYSICAL EXAM    /56 (BP Location: Left arm)   Pulse 81   Temp 98.4  F (36.9  C) (Oral)   Resp 16   Ht 1.524 m (5')   Wt  80.6 kg (177 lb 11.1 oz)   LMP  (LMP Unknown)   SpO2 97%   BMI 34.70 kg/m        Intake/Output Summary (Last 24 hours) at 7/23/2023 1511  Last data filed at 7/23/2023 1400  Gross per 24 hour   Intake 640 ml   Output 845 ml   Net -205 ml       Alert/oriented x 3; awake and NAD  HEENT NC/AT; perrla; OP clear without lesions; mmm  Neck supple without LAD, TM  CV; RRR without rub or murmur  Lung: clear and equal; no extra sounds  Ab: soft and NT; not distended; normal bs  Ext: +edema and well perfused  Skin; no rash  Neuro; grossly intact    LABORATORIES    Recent Labs   Lab 07/20/23  1342 07/18/23  1316   WBC 5.9 7.0   HGB 10.0* 9.8*   HCT 31.4* 32.0*    371     Recent Labs   Lab 07/23/23  0442 07/22/23  0522 07/21/23  1505 07/21/23  0445 07/20/23  1342   * 130* 132*   < > 133*   CO2 23 25 25   < > 24   BUN 49.8* 36.2* 31.2*   < > 25.8*   ALKPHOS  --   --   --   --  68   ALT  --   --   --   --  8   AST  --   --   --   --  25    < > = values in this interval not displayed.     No results for input(s): INR, PTT in the last 168 hours.    Invalid input(s): APTT  Invalid input(s): FERRITIN  Recent Labs   Lab 07/18/23  1316   IRON 63       I reviewed all labs and imaging    Thank you for the consultation we will follow    Shorty Bautista MD  [facility]  [phone]

## 2023-07-23 NOTE — PLAN OF CARE
PRIMARY DIAGNOSIS: CONGESTIVE HEART FAILURE  OUTPATIENT/OBSERVATION GOALS TO BE MET BEFORE DISCHARGE:  1. Dyspnea improved and O2 sats >88% at RA or at prior home O2 therapy level: Yes        SpO2: 95 %, O2 Device: None (Room air)  Vitals:    07/20/23 1320 07/22/23 0553 07/23/23 0424   Weight: 80.7 kg (178 lb) (P) 79.4 kg (175 lb 0.7 oz) 80.6 kg (177 lb 11.1 oz)        2. ECHO and other diagnostic testing complete (if applicable): Yes    3. Return to near baseline physical activity: Yes    Discharge Planner Nurse   Safe discharge environment identified: Yes  Barriers to discharge: Yes, TIMOTHY.       Entered by: Samra Grimes RN 07/23/2023 5:11 AM     Please review provider order for any additional goals.   Nurse to notify provider when observation goals have been met and patient is ready for discharge.

## 2023-07-23 NOTE — PROGRESS NOTES
[facility]    Medicine Progress Note - Hospitalist Service    Date of Admission:  7/20/2023    Assessment & Plan   Kerry Hoffmann is a 88 year old female admitted on 7/20/2023. She has been admitted with b/l leg swelling, sob with activity. and some chest tightness since over a week. The chest discomfort occurs at night after an hour of sleep, non radiating with no relation to activity or breathing. She wakes up with her heart pounding. No CP currently. Patient subsequently came to the ER for further evaluation and management. She could have possible CHF, will do a trial of iv diuresis, r/o ACS, consult cardio.     A/p :      Acute onset sob, swelling in legs: Resolved  Chest discomfort: Resolved      Acute kidney injury with CKD stage 3a   Likely secondary to IV diuresis with Lasix  Lasix is discontinued  Creatinine did increase today to 2.5 up from baseline of 1.1.  This is likely secondary to IV diuresis with Lasix and adverse effect of losartan.  Losartan is on hold, renal ultrasound was ordered and unremarkable no evidence of hydronephrosis.  Nephrology is consulted     Severe intracranial atherosclerosis : On plavix     HTN, Essential : on hydralazine 25 mg tid, losartan 50 mg bid (currently on hold), metoprolol 25 mg at bedtime - at home  IV hydralazine is discontinued, patient had her side effects where she felt hot and flushed.  This was not a true allergic response but patient was unable to tolerate this medication  She is on hydralazine 25 3 times daily at home but reports she does not take it because it makes her feel uneasy and sick.  We discussed starting Norvasc and she was agreeable to this, will have Norvasc 2.5 mg at noon.  She has a prior allergy to Cardizem, on further investigation it is more of an intolerance as the patient reports feeling flushed and uneasy but has no signs or symptoms of true allergy.  We will monitor closely.  She has tolerated oral Norvasc very  well with no adverse effects     Anxiety : on clonazepam     Anemia, chronic : stable.         Diet: Low Saturated Fat Na <2400 mg    DVT Prophylaxis: Pneumatic Compression Devices  Wiley Catheter: Not present  Lines: None     Cardiac Monitoring: ACTIVE order. Indication: Acute decompensated heart failure (48 hours)  Code Status: Full Code      Clinically Significant Risk Factors Present on Admission           # Hypercalcemia: Highest Ca = 10.5 mg/dL in last 2 days, will monitor as appropriate      # Drug Induced Platelet Defect: home medication list includes an antiplatelet medication  # Acute Kidney Injury, unspecified: based on a >150% or 0.3 mg/dL increase in last creatinine compared to past 90 day average, will monitor renal function  # Hypertension: Home medication list includes antihypertensive(s)      # Obesity: Estimated body mass index is 34.7 kg/m  as calculated from the following:    Height as of this encounter: 1.524 m (5').    Weight as of this encounter: 80.6 kg (177 lb 11.1 oz).            Disposition Plan     Expected Discharge Date: 07/23/2023      Destination: assisted living  Discharge Comments: NÉSTOR Lopez MD  Hospitalist Service  [facility]  Securely message with [employer] (more info)  Text page via [employer] Paging/Directory   ______________________________________________________________________    Interval History   Patient was seen and examined, overnight events were reviewed  Patient has no specific complaints,  cc output for about 200 of retention.  She was noted to be very anxious during evaluation which is not any different from her usual.  She denies any lightheadedness dizziness chest pain nausea or vomiting    Physical Exam   Vital Signs: Temp: 98.4  F (36.9  C) Temp src: Oral BP: 125/56 Pulse: 81   Resp: 16 SpO2: 97 % O2 Device: None (Room air)    Weight: 177 lbs 11.05 oz    Constitutional: awake, alert, cooperative, no apparent distress,  and appears stated age  Respiratory: no increased work of breathing, good air exchange, and clear to auscultation, no crackles or wheezing  Cardiovascular: regular rate and rhythm, normal S1 and S2, and no murmur noted  GI: normal bowel sounds, soft, non-distended, and non-tender  Neuropsychiatric: General: normal, calm, and normal eye contact  Level of consciousness: alert / normal  Affect: anxious  Orientation: oriented to self, place, time and situation    Medical Decision Making       35 MINUTES SPENT BY ME on the date of service doing chart review, history, exam, documentation & further activities per the note.      Data     I have personally reviewed the following data over the past 24 hrs:    N/A  \   N/A   / N/A     131 (L) 93 (L) 49.8 (H) /  90   4.0 23 2.50 (H) \       Imaging results reviewed over the past 24 hrs:   Recent Results (from the past 24 hour(s))   US Renal Complete Non-Vascular    Narrative    EXAM: US RENAL COMPLETE NON-VASCULAR  LOCATION: [facility]  DATE: 7/23/2023    INDICATION: mara  COMPARISON: CT AP 09/04/2022  TECHNIQUE: Routine Bilateral Renal and Bladder Ultrasound.    FINDINGS:    RIGHT KIDNEY: 9.9 x 3.6 x 4.1 cm. Normal without hydronephrosis or masses. Cortical medullary differentiation is preserved.    LEFT KIDNEY: 7.9 x 5.3 x 5.2 cm. Few liver cysts are again noted, the largest inferiorly measuring 4 cm. No hydronephrosis. Cortical medullary differentiation is preserved.    BLADDER: Normal    No ascites.        Impression    IMPRESSION:  1.  Kidneys are unremarkable without evidence of hydronephrosis.  2.  Incidental left renal cysts again noted.

## 2023-07-23 NOTE — PLAN OF CARE
Goal Outcome Evaluation:    Pt Aox4, calm and cooperative with cares. VSS, BP less labile -- SBP 110s this evening. C/o generalized back pain; denies CP, reports some SOB. Lung sounds diminished, sats >92% on RA.

## 2023-07-23 NOTE — UTILIZATION REVIEW
Admission Status; Secondary Review Determination   Under the authority of the Utilization Management Committee, the utilization review process indicated a secondary review on Kerry Hoffmann. The review outcome is based on review of the medical records, discussions with staff, and applying clinical experience noted on the date of the review.   (x) Inpatient Status Appropriate - This patient's medical care is consistent with medical management for inpatient care and reasonable inpatient medical practice.     RATIONALE FOR DETERMINATION   88 year old female admitted on 7/20/2023. She has been admitted with b/l leg swelling, sob with activity. and some chest tightness since over a week. The chest discomfort occurs at night after an hour of sleep, non radiating with no relation to activity or breathing. She wakes up with her heart pounding. No CP currently.  Felt to possibly have some CHF exacerbation and given diuresis.  Breathing improved.  Also BP improved and hypertension felt possibly contributing to her complaints prior.  She didn't discharge today as expected as her creatinine went up requiring further hospital evaluation.  This has continued to rise over the past 2 days.  Nephrology now consulted.  Not stable for discharge with baseline Cr 1.1 now up to 2.5.     At the time of admission with the information available to the attending physician more than 2 nights Hospital complex care was anticipated, based on patient risk of adverse outcome if treated as outpatient and complex care required. Inpatient admission is appropriate based on the Medicare guidelines.   The information on this document is developed by the utilization review team in order for the business office to ensure compliance. This only denotes the appropriateness of proper admission status and does not reflect the quality of care rendered.   The definitions of Inpatient Status and Observation Status used in making the determination above are  those provided in the CMS Coverage Manual, Chapter 1 and Chapter 6, section 70.4.   Sincerely,   Gayle Jacob MD  Utilization Review  Physician Advisor  [facility]

## 2023-07-23 NOTE — PLAN OF CARE
Problem: Heart Failure  Goal: Fluid and Electrolyte Balance  Outcome: Progressing   Goal Outcome Evaluation:      Creatinine at 2.50, up from 1.76 yesterday and 1.25 since admission. GFR at 18, down from 28 yesterday and 46 since admission. Received one time bolus of IV normal saline, 500mL. Nephrology consulted, renal ultrasound performed and urine sample sent to lab. She complained of not being able to urinate fully but has the urge. Bladder scan showed 560ml, got her up to toilet, urinated 300ml but then PVR was still 360mL. Performed straight catheterization and got 345mL out. Next bladder scan to be done at 1600.

## 2023-07-24 LAB
ANION GAP SERPL CALCULATED.3IONS-SCNC: 11 MMOL/L (ref 7–15)
BUN SERPL-MCNC: 50.6 MG/DL (ref 8–23)
CALCIUM SERPL-MCNC: 10 MG/DL (ref 8.8–10.2)
CHLORIDE SERPL-SCNC: 94 MMOL/L (ref 98–107)
CREAT SERPL-MCNC: 2.22 MG/DL (ref 0.51–0.95)
DEPRECATED HCO3 PLAS-SCNC: 26 MMOL/L (ref 22–29)
ERYTHROCYTE [DISTWIDTH] IN BLOOD BY AUTOMATED COUNT: 13.2 % (ref 10–15)
GFR SERPL CREATININE-BSD FRML MDRD: 21 ML/MIN/1.73M2
GLUCOSE SERPL-MCNC: 95 MG/DL (ref 70–99)
HCT VFR BLD AUTO: 30.6 % (ref 35–47)
HGB BLD-MCNC: 10.1 G/DL (ref 11.7–15.7)
MAGNESIUM SERPL-MCNC: 2.2 MG/DL (ref 1.7–2.3)
MCH RBC QN AUTO: 29 PG (ref 26.5–33)
MCHC RBC AUTO-ENTMCNC: 33 G/DL (ref 31.5–36.5)
MCV RBC AUTO: 88 FL (ref 78–100)
PLATELET # BLD AUTO: 353 10E3/UL (ref 150–450)
POTASSIUM SERPL-SCNC: 4.7 MMOL/L (ref 3.4–5.3)
RBC # BLD AUTO: 3.48 10E6/UL (ref 3.8–5.2)
SODIUM SERPL-SCNC: 131 MMOL/L (ref 136–145)
WBC # BLD AUTO: 8.1 10E3/UL (ref 4–11)

## 2023-07-24 PROCEDURE — 99232 SBSQ HOSP IP/OBS MODERATE 35: CPT | Performed by: INTERNAL MEDICINE

## 2023-07-24 PROCEDURE — 36415 COLL VENOUS BLD VENIPUNCTURE: CPT | Performed by: INTERNAL MEDICINE

## 2023-07-24 PROCEDURE — 120N000004 HC R&B MS OVERFLOW

## 2023-07-24 PROCEDURE — 82310 ASSAY OF CALCIUM: CPT | Performed by: INTERNAL MEDICINE

## 2023-07-24 PROCEDURE — 83735 ASSAY OF MAGNESIUM: CPT | Performed by: INTERNAL MEDICINE

## 2023-07-24 PROCEDURE — 85027 COMPLETE CBC AUTOMATED: CPT | Performed by: INTERNAL MEDICINE

## 2023-07-24 PROCEDURE — 250N000013 HC RX MED GY IP 250 OP 250 PS 637: Performed by: INTERNAL MEDICINE

## 2023-07-24 RX ADMIN — Medication 81 MG: at 09:04

## 2023-07-24 RX ADMIN — CLONAZEPAM 0.5 MG: 0.5 TABLET ORAL at 14:53

## 2023-07-24 RX ADMIN — ACETAMINOPHEN 650 MG: 325 TABLET ORAL at 23:43

## 2023-07-24 RX ADMIN — METOPROLOL SUCCINATE 25 MG: 25 TABLET, EXTENDED RELEASE ORAL at 20:42

## 2023-07-24 RX ADMIN — CLONAZEPAM 0.5 MG: 0.5 TABLET ORAL at 09:04

## 2023-07-24 RX ADMIN — CLOPIDOGREL BISULFATE 75 MG: 75 TABLET, FILM COATED ORAL at 09:04

## 2023-07-24 RX ADMIN — ACETAMINOPHEN 650 MG: 325 TABLET ORAL at 16:35

## 2023-07-24 RX ADMIN — CLONAZEPAM 0.5 MG: 0.5 TABLET ORAL at 20:42

## 2023-07-24 ASSESSMENT — ACTIVITIES OF DAILY LIVING (ADL)
ADLS_ACUITY_SCORE: 39

## 2023-07-24 NOTE — PLAN OF CARE
"  Problem: Heart Failure  Goal: Optimal Coping  Outcome: Not Progressing   Goal Outcome Evaluation:    Patient became very discouraged after gunter catheter placed this AM and stated she \"did not want to live like this\" when I asked her to clarify she said \"I would not commit suicide but I would stop eating and drinking, I would stop my medications\"  and appears to be considering a request for palliative care, however is unaware of their services. Urology, psych, and social work consulted. Alexis also visited with patient. Room air, alert and oriented x4, ambulation from bathroom and back with standby assist with cane.   "

## 2023-07-24 NOTE — CONSULTS
[facility] CONSULT - URINARY RETENTION     Type of Consult: inpatient   Place of Service:  [facility]   Reason for Consultation: Urinary retention   Consult Requested by: Dr. Lopez    History of present illness:  Kerry Hoffmann is a 88 year old female with hx of anxiety, asthma, GERD, HTN, HLD, spinal stenosis, C.diff s/p fecal transplant, CKD; Admitted to the hospital 7/20 for acute onset SOB/chest discomfort/LE edema. Urology was consulted for urinary retention. History is obtained from patient and chart review.     Pt voiding smaller quantities this hospitalization. Straight catheterized with 345 ml return on 7/23 and 375 ml return on 7/24. Wiley catheter placed 7/24 with initial return 300 ml. Renal US showed normal bladder, incidental left renal cysts, no hydronephrosis.     Pt denies burning with urination, any increase in urgency/frequency, gross hematuria, abdominal/bilateral flank pain, fever, chills.     UA 7/23: benign for infection.     Patient medications which may contribute to UR: Clonazepam, however, patient reports she has been taking Clonazepam for years. Pt has not been taking narcotic pain medication.     Last BM: 7/24, previous on 7/22.     No prior hx of UR or recurrent UTI.     Pt reports she has been minimally mobile this hospitalization and has not been out of bed today. At home, does walk some with a cane but scooters if going any distance.       Past medical history :  Past Medical History:   Diagnosis Date    Anxiety     takes clonazepam    Asthma     per [facility] & [facility]     Chronic kidney disease     stage 3 per [facility] & P     CKD (chronic kidney disease)     Clostridium difficile colitis 11/1/2016    Clostridium difficile infection     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    Clostridium enterocolitis 10/27/2016    CTS (carpal tunnel syndrome)     GERD (gastroesophageal reflux disease)     per [facility] & [facility]     Hiatal hernia     small per [facility] & [facility]     Hyperlipidemia     Hyperlipidemia      Hyperlipidemia     Hypertension     Hypertension     Osteoarthritis of knee     per [facility] & [facility]     Spinal stenosis     per [facility] & [facility]     Vitamin D deficiency     per [facility] & [facility]       Past surgical history:   Past Surgical History:   Procedure Laterality Date    BREAST SURGERY  1982    breast tumor per [facility] & [facility]     CATARACT EXTRACTION  07/2005    per [facility] & [facility]     ORIF TIBIA & FIBULA FRACTURES  1988    per [facility]     OTHER SURGICAL HISTORY  10/2004    hole in retinaper [facility]     OTHER SURGICAL HISTORY  05/03/2015    fecal transplantper [facility]     ME ESOPHAGOGASTRODUODENOSCOPY TRANSORAL DIAGNOSTIC N/A 9/11/2020    Procedure: ESOPHAGOGASTRODUODENOSCOPY With gastric biopsies;  Surgeon: David Reyes MD;  Location: [facility] [place];  Service: Gastroenterology    TONSILLECTOMY      TONSILLECTOMY & ADENOIDECTOMY  1963       Social history:  Social History     Socioeconomic History    Marital status:      Spouse name: Not on file    Number of children: Not on file    Years of education: Not on file    Highest education level: Not on file   Occupational History    Not on file   Tobacco Use    Smoking status: Former     Packs/day: 3.00     Years: 35.00     Pack years: 105.00     Types: Cigarettes    Smokeless tobacco: Never    Tobacco comments:     quit 1968   Substance and Sexual Activity    Alcohol use: Not Currently    Drug use: Never    Sexual activity: Not Currently   Other Topics Concern    Parent/sibling w/ CABG, MI or angioplasty before 65F 55M? No   Social History Narrative    Not on file     Social Determinants of Health     Financial Resource Strain: Not on file   Food Insecurity: Not on file   Transportation Needs: Not on file   Physical Activity: Not on file   Stress: Not on file   Social Connections: Not on file   Intimate Partner Violence: Not on file   Housing Stability: Not on file       Medications  Current Facility-Administered Medications   Medication    acetaminophen (TYLENOL) tablet 650 mg    Or    acetaminophen  (TYLENOL) Suppository 650 mg    aspirin EC tablet 81 mg    clonazePAM (klonoPIN) tablet 0.5 mg    clopidogrel (PLAVIX) tablet 75 mg    [START ON 7/25/2023] famotidine (PEPCID) tablet 20 mg    [Held by provider] losartan (COZAAR) tablet 50 mg    meclizine (ANTIVERT) tablet 12.5 mg    melatonin tablet 1 mg    metoprolol succinate ER (TOPROL XL) 24 hr tablet 25 mg    prochlorperazine (COMPAZINE) injection 5 mg    Or    prochlorperazine (COMPAZINE) tablet 5 mg    Or    prochlorperazine (COMPAZINE) suppository 12.5 mg       Allergies  Allergies   Allergen Reactions    Buspirone Shortness Of Breath    Ciprofloxacin Hives and Shortness Of Breath     Other reaction(s): Unknown    Cortisone      Other reaction(s): Throat Swelling/Closing, Unknown  Reaction 9-5-94      Hydrocodone-Acetaminophen Shortness Of Breath     Other reaction(s): Unknown    Lansoprazole Shortness Of Breath    Metoprolol Shortness Of Breath    Nedocromil      Other reaction(s): Throat Swelling/Closing    Niacin Shortness Of Breath     Other reaction(s): Unknown    Acetaminophen      Other reaction(s): breathing difficulties, red ears,high blood press.    Albuterol Other (See Comments)     Inhaler had extreme effect on nervous system      Amlodipine      Other reaction(s): Erythema, Unknown    Azithromycin      Other reaction(s): *Unknown, Unknown    Cefixime Diarrhea     Other reaction(s): Unknown    Cefprozil Nausea and Vomiting     Other reaction(s): Unknown    Cefuroxime      Other reaction(s): *Unknown    Cephalexin      Other reaction(s): *Unknown, Unknown    Contrast Dye      Other reaction(s): hives    Cyclobenzaprine      Other reaction(s): Sedation    Dextromethorphan      Other reaction(s): headache,nausea    Dextromethorphan-Guaifenesin Nausea     Other reaction(s): Headache    Diltiazem      Other reaction(s): Erythema, Unknown  Ears face arms and chest red and on fire-body tremors      Erythromycin      Other reaction(s): Unknown     Esomeprazole      Other reaction(s): Headache    Ethanol      Other reaction(s): Adverse reaction    Fenofibrate Muscle Pain (Myalgia)     Other reaction(s): Unknown    Fluticasone-Salmeterol Other (See Comments)     Elevated blood pressure      Hydrocodone      Other reaction(s): breathing difficulties    Levofloxacin Nausea     Other reaction(s): Headache, Unknown    Methylprednisolone     Metronidazole     Monosodium Glutamate     Moxifloxacin      Other reaction(s): Dizziness    Nifedipine      Other reaction(s): Edema  Took for 5-93 to 9-94 red and swollen leg      Nsaids      Other reaction(s): Unknown    Ofloxacin      Other reaction(s): *Unknown    Ondansetron      Other reaction(s): Constipation    Parsley Seed     Penicillins Unknown     She doesn't remember other than it occurred as a very young woman     Piper     Pirbuterol Other (See Comments)     Immediate extreme effect on nervous system      Pravastatin      Other reaction(s): Dizziness, Unknown    Pseudoephedrine      Other reaction(s): headache,nausea    Pseudoephedrine-Dm-Gg     Simvastatin Muscle Pain (Myalgia)    Spironolactone      Other reaction(s): stomach cramps, nausea    Sulfamethoxazole-Trimethoprim      Other reaction(s): Unknown    Tramadol      Other reaction(s): red ears,high blood press.    Tramadol-Acetaminophen      Other reaction(s): Tachycardia, Unknown    Triamterene Other (See Comments)     Greatly bothered with sun-took medication for three years  Greatly bothered with sun      Diatrizoate Rash    Telmisartan Palpitations       Review of systems   12 point review of systems is otherwise negative except what is stated in the HPI.     Physical examinations:  BP (!) 175/74 (BP Location: Left arm)   Pulse 67   Temp 97.6  F (36.4  C) (Oral)   Resp 20   Ht 1.524 m (5')   Wt 80.6 kg (177 lb 11.1 oz)   LMP  (LMP Unknown)   SpO2 93%   BMI 34.70 kg/m     GENERAL: NAD, alert, cooperative  HEAD: normocephalic/atraumatic    ABDOMEN: soft, non- tender, non- distended, no suprapubic fulness/tenderness noted, no bilateral CVA tenderness noted   : Wiley catheter draining clear light yellow urine, good output.   MUSCULOSKELETAL: moves all four extremities equally  PSYCHOLOGICAL: alert and oriented, answers questions appropriately      LABS:   Lab Results   Component Value Date    WBC 8.1 07/24/2023    HGB 10.1 (L) 07/24/2023    HCT 30.6 (L) 07/24/2023     07/24/2023    CHOL 267 (H) 05/17/2022    TRIG 178 (H) 05/17/2022    HDL 53 05/17/2022    ALT 8 07/20/2023    AST 25 07/20/2023     (L) 07/24/2023    BUN 50.6 (H) 07/24/2023    CO2 26 07/24/2023    TSH 2.52 06/14/2023     Creatinine   Date Value Ref Range Status   07/24/2023 2.22 (H) 0.51 - 0.95 mg/dL Final     UA RESULTS:  Recent Labs   Lab Test 07/23/23  1045   COLOR Light Yellow   APPEARANCE Clear   URINEGLC Negative   URINEBILI Negative   URINEKETONE Negative   SG 1.014   UBLD Negative   URINEPH 5.0   PROTEIN Negative   NITRITE Negative   LEUKEST Negative   RBCU 4*   WBCU 1       Urine culture: None    I have personally reviewed these labs.     IMAGING:  EXAM: US RENAL COMPLETE NON-VASCULAR  LOCATION: [facility]  DATE: 7/23/2023     INDICATION: mara  COMPARISON: CT AP 09/04/2022  TECHNIQUE: Routine Bilateral Renal and Bladder Ultrasound.     FINDINGS:     RIGHT KIDNEY: 9.9 x 3.6 x 4.1 cm. Normal without hydronephrosis or masses. Cortical medullary differentiation is preserved.     LEFT KIDNEY: 7.9 x 5.3 x 5.2 cm. Few liver cysts are again noted, the largest inferiorly measuring 4 cm. No hydronephrosis. Cortical medullary differentiation is preserved.     BLADDER: Normal     No ascites.                                                                        IMPRESSION:  1.  Kidneys are unremarkable without evidence of hydronephrosis.  2.  Incidental left renal cysts again noted.    I have personally reviewed the imaging reports above.      ASSESSMENT/PLAN:  Kerry Hoffmann is being seen by [facility] for urinary retention.     - Wiley catheter was placed 7/24 with 300 ml initial output, s/p CIC on 7/23 and 7/24.   - Creatinine 2.22 today (2.50 on 7/23), baseline 1.07-1.33. CT shows evidence of smaller left kidney, left kidney cysts, no bilateral hydro. Nephrology consulting, appreciate recs.   - UA 7/23 benign for infection.   - Recommend trying to decrease or stop medication that may be contributing to retention  - Ensure the patient is on a bowel regimen.   - Consider TOV when patient is more ambulatory and creatinine returns to baseline, possibly prior to discharge. Alternatively, TOV may be completed at TCU (if bladder scanner is available) or by scheduling with [facility] ([phone]) in 1-2 weeks. If fails TOV in hospital or TCU, then needs repeat TOV/clinic follow up with [facility] in 1-2 weeks.   - Will continue to follow remotely, please page with questions/concerns.   - [facility]    The patient and I discussed the signs/ symptoms and etiologies of urinary retention. We also discussed treatment options and their alternatives, including the risks and benefits of each. We discussed the importance of treatment and that left untreated urinary retention can lead to infection, leaking, renal dysfunction and bladder rupture. Patient demonstrated understanding. All questions and concerns were answered in detail.    Thank you for consulting [facility] regarding this patient's care. Please contact us with questions or concerns.     Brad Montero, APRN, CNP  [facility]  [phone]

## 2023-07-24 NOTE — CONSULTS
"Triage and Transition - Consult and Liaison     Kerry Hoffmann  July 24, 2023    Session start: 12:30 PM  Session end: 12:59 PM  Session duration in minutes: 29 minutes  CPT utilized: 80086 - Psychotherapy (with patient) - 30 (16-37*) min  Patient was seen virtually ([employer] cart or other teleconferencing device).    Diagnosis:   293.84 (F06.4) Anxiety Disorder Due to swollen leges, kidney disease (Medical Condition),  present ;    Plan/Recommendations:   Continue care coordination with care team.   Maintain current transition plan.  Patient can benefit from meeting with a  due to no resources or social support. Palliative care could also be consulted.  Patient request that all referrals and services should be consulted with patient prior to being ordered or place for consent and explained necessity of services.  Patient does not want psych services, reported she wants a better understanding of her medication condition, treatment plan, and what that might mean for her once she is out of the hospital due to no one able to take care of her.  Please enter another psychiatry if further visits are needed and if patient consent to services. Patients are not followed by Psychiatry C&L Service unless otherwise indicated.     Reason for consult: Psychiatry consult was requested due to \"severe anxiety, depression\". Patient was seen by Triage and Transition Consult & Liaison team.     Identifying information: Kerry is 88 year old White  female   followed related to hospital admission on 7/20/2023. She has been admitted with b/l leg swelling, sob with activity. and some chest tightness since over a week. The chest discomfort occurs at night after an hour of sleep, non radiating with no relation to activity or breathing. She wakes up with her heart pounding. No CP currently. Patient subsequently came to the ER for further evaluation and management. She could have possible CHF, will do a trial of iv diuresis, r/o " "ACS, consult cardio.     Brief Psychosocial History  Patient reported she lives by herself in an independent assisted living facility. She does everything independently and does not get services through the assisted living facility. 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 She is active by herself, very out going and active person.  Do not want to see a psychiatrist. Has a living will, has been a  25 years, her  past away 4 years, Nov 15 is the anniversary. Patient did mention that she does not want to live with catheter for rest of her life if this is going to be the case, does not want to live like that because she saw how much it's affected her . Anxiety medication are the same from 20 years ago, does not want anything, upset that the MD told her \"your best bet is a nursing home\", patient cannot afford a nursing home.     Summary of Patient Situation     Patient is oriented x4. Patient reported she is concern with her health as she has no one to take care of her, no siblings left. Due to independent living, she's almost done with her money, has her her own car, there are financial concerns. Patient allergic to metal, can't get new knees, can't travel far distance, which are things that she needs. \"I resent being told it's anxiety when I have not been clearly communicated to regarding my health condition. I just want them to explain it to me so I understand what this will look like moving forward and next steps.\"    Patient reported not getting any answer regarding medical condition or answers to why her blood pressure was so high and swollen legs. She understands that she has a kidney disease, stage 3, but wants someone to explain more of what that means and why she can't pass pee, and placed on catheter. Feels like no one is explaining everything to her and helping her understand her medical condition. Patient understands the " support of the psychiatry team and understands that we are just a consultation services, patient does not want to utilize the services as she wants more answers regarding her condition and nothing regarding her mental health or medication for mental health. Patient has the capacity to make her own mental health decisions and does not want psychiatry services. All referrals and services should be consulted with patient prior to being ordered or place for consent.    Significant Clinical History     Patient denies any mental health hist    Current Providers  Primary Care Provider: Yes unable to get information    Psychiatrist: No   Therapist: No   : No   [facility]: No   ACT Team: No   Other: No    Collateral information:   Reviewed chart and coordinated with RN. Per RN: Lost , living alone,  had a lot of medical issues, had to put a catheter in for urinary as well, discourage, can't go on, does not have the will to go on, set up urn, prepare for death. Fixated on catheter. Needs help to relax, take a breath, possible dementia, Update provided to care team including C&L Psych provider.      Mental Status Exam   Affect: Appropriate  Appearance: Appropriate   Attention Span/Concentration: Attentive    Eye Contact: Engaged  Fund of Knowledge: Appropriate   Language /Speech Content: Fluent  Language /Speech Volume: Normal   Language /Speech Rate/Productions: Normal   Recent Memory: Intact  Remote Memory: Intact  Mood: Normal   Orientation:   Person: Yes   Place: Yes  Time of Day: Yes   Date: Yes   Situation (Do they understand why they are here?): Yes   Psychomotor Behavior: Normal   Thought Content: Clear  Thought Form: Goal Directed    Current medications:   Current Facility-Administered Medications   Medication    acetaminophen (TYLENOL) tablet 650 mg    Or    acetaminophen (TYLENOL) Suppository 650 mg    aspirin EC tablet 81 mg    clonazePAM (klonoPIN) tablet 0.5 mg    clopidogrel (PLAVIX) tablet  75 mg    [START ON 7/25/2023] famotidine (PEPCID) tablet 20 mg    [Held by provider] losartan (COZAAR) tablet 50 mg    meclizine (ANTIVERT) tablet 12.5 mg    melatonin tablet 1 mg    metoprolol succinate ER (TOPROL XL) 24 hr tablet 25 mg    prochlorperazine (COMPAZINE) injection 5 mg    Or    prochlorperazine (COMPAZINE) tablet 5 mg    Or    prochlorperazine (COMPAZINE) suppository 12.5 mg        Therapeutic intervention and progress:  Therapeutic intervention consisted of building therapeutic rapport, active listening, validation, engaging in learning/practicing coping skills, and stress relief practices. Patient is making progress towards treatment goals as evidenced by engagement in session even though she has mentioned that she did not want services.      Shelbi Rodriguez, [facility]   Triage and Transition - Consult and Liaison   [phone]

## 2023-07-24 NOTE — PROGRESS NOTES
[facility]  [facility]   Follow up    Kerry Hoffmann   MRN: [id number]  : 1935   DOA: 2023   CC: ARF/CKD      Assessment and Plan:  88 year old female      1. ARF/CKD; CKD likely secondary to renal atrophy as well has some acquired renal cysts in the light of renovascular disease since he does have evidence of significant atherosclerotic disease elsewhere;  CR peaked at 2.5 and now improving today  2. HTN: Prior to admission on metoprolol losartan 50 mg twice a day and hydralazine 25 mg 3 times daily; improving with diuresis  3. hyperlipid  4. Anxiety  5. Hypervolemia: s/p diuresis  6. Hyponatremia; stable  7. Hypomagnesemia improved  8. Anemia     Subjective  Pt new to me; chart and meds reviewed  Pt very tangential; difficult to get her to answer specific questions; gives lots of details about her health over the years  Objective    Vital signs in last 24 hours  Temp:  [97.6  F (36.4  C)-98.4  F (36.9  C)] 97.6  F (36.4  C)  Pulse:  [67-81] 67  Resp:  [16-20] 20  BP: (124-157)/(56-65) 136/64  SpO2:  [93 %-98 %] 93 %      Intake/Output last 3 shifts  I/O last 3 completed shifts:  In: 640 [P.O.:640]  Out: 1295 [Urine:1295]  Intake/Output this shift:  I/O this shift:  In: -   Out: 850 [Urine:850]    Physical Exam  Alert/oriented x 3, awake, NAD  CV: RRR without murmur or rub  Lung: clear and equal; no extra sounds  Ab: soft and NT; not distended; normal bs  Ext: no edema and well perfused  Skin; no rash    Pertinent Labs     Last Renal Panel:  Sodium   Date Value Ref Range Status   2023 131 (L) 136 - 145 mmol/L Final     Potassium   Date Value Ref Range Status   2023 4.7 3.4 - 5.3 mmol/L Final   2022 4.6 3.5 - 5.0 mmol/L Final     Chloride   Date Value Ref Range Status   2023 94 (L) 98 - 107 mmol/L Final   2022 96 (L) 98 - 107 mmol/L Final     Carbon Dioxide (CO2)   Date Value Ref Range Status   2023 26 22  - 29 mmol/L Final   09/04/2022 24 22 - 31 mmol/L Final     Anion Gap   Date Value Ref Range Status   07/24/2023 11 7 - 15 mmol/L Final   09/04/2022 7 5 - 18 mmol/L Final     Glucose   Date Value Ref Range Status   07/24/2023 95 70 - 99 mg/dL Final   09/04/2022 95 70 - 125 mg/dL Final     Urea Nitrogen   Date Value Ref Range Status   07/24/2023 50.6 (H) 8.0 - 23.0 mg/dL Final   09/04/2022 21 8 - 28 mg/dL Final     Creatinine   Date Value Ref Range Status   07/24/2023 2.22 (H) 0.51 - 0.95 mg/dL Final     GFR Estimate   Date Value Ref Range Status   07/24/2023 21 (L) >60 mL/min/1.73m2 Final   06/22/2021 46 (L) >60 mL/min/1.73m2 Final     Calcium   Date Value Ref Range Status   07/24/2023 10.0 8.8 - 10.2 mg/dL Final     Phosphorus   Date Value Ref Range Status   07/21/2023 3.0 2.5 - 4.5 mg/dL Final     Albumin   Date Value Ref Range Status   07/20/2023 4.0 3.5 - 5.2 g/dL Final   09/04/2022 3.4 (L) 3.5 - 5.0 g/dL Final     Recent Labs   Lab 07/24/23  0432 07/20/23  1342 07/18/23  1316   HGB 10.1* 10.0* 9.8*          I reviewed all lab results  Petra Herrera MD

## 2023-07-24 NOTE — PLAN OF CARE
"Goal Outcome Evaluation:       Pt complains of \"upset stomach\" and new dry cough. Pt believes its due to taking Norvasc. Pt encouraged to discuss this with MD during rounds this AM.     Pt bladder scanned for 405ml and was straight catheterized at 0100 for 375ml. Pt did void 150ml at 0430.    Creatinine 2.22, down from 2.5 yesterday.               "

## 2023-07-24 NOTE — PROGRESS NOTES
SPIRITUAL HEALTH SERVICES Progress Note  [facility], P3    Saw pt Kerry Hoffmann per staff referral and admission screening request.    Patient/Family Understanding of Illness and Goals of Care - Kerry shared about her hospitalization and medical condition. She shared about the recent years and some of her ongoing issues with her knees. She expressed some concern about the duration of the catheter and her kidney function, not sure how long it would be needed.     Distress and Loss - She shared extensively about her late , Ovidio, who  about 4 years ago. She shared about the loss/grief associated with his death, and particularly related to living alone now. She expressed hesitancy to discuss anxiety with her providers, and I offered normalization of anxiety as it relates to life stress, loss, and medical needs.     Strengths, Coping, and Resources - She shared family/life history, particularly talking in great detail about her history with Ovidio. She has nieces/nephews who are sources of support and POA for her; she and Ovidio did not have children. She is a retired .     Meaning, Beliefs, and Spirituality - Patient comes from Restorationist beth background and derives meaning, purpose, and comfort from beth. She was raised Pentecostal and converted to Catholicism as a young woman. She describes her beth as extremely important to her and is comforted by beliefs that God has a purpose and meaning for everything. She has felt comfortable expressing anger to God when she feels it. She doesn't want to live with low quality of life and too much suffering, so she is comforted by her belief in heaven. This could have an impact on her medical decisions in the future.     Plan of Care - A  will continue to visit as able or per request by patient/family/staff.      Timoteo Grady MDiv, [facility]  /Manager Spiritual Health Services  [phone]     all other ROS negative except as per HPI

## 2023-07-24 NOTE — PROGRESS NOTES
[facility]    Medicine Progress Note - Hospitalist Service    Date of Admission:  7/20/2023    Assessment & Plan   Kerry Hoffmann is a 88 year old female admitted on 7/20/2023. She has been admitted with b/l leg swelling, sob with activity. and some chest tightness since over a week. The chest discomfort occurs at night after an hour of sleep, non radiating with no relation to activity or breathing. She wakes up with her heart pounding. No CP currently. Patient subsequently came to the ER for further evaluation and management. She could have possible CHF, will do a trial of iv diuresis, r/o ACS, consult cardio.     A/p :      Acute onset sob, swelling in legs: Resolved  Chest discomfort: Resolved      Acute kidney injury with CKD stage 3a   Likely secondary to IV diuresis with Lasix  Lasix is discontinued  Creatinine did peak at 2.5 up from baseline of 1.1, it is trending down..  This is likely secondary to IV diuresis with Lasix and adverse effect of losartan.  Losartan is on hold, renal ultrasound was ordered and unremarkable no evidence of hydronephrosis.  Nephrology is consulted, appreciate recommendations patient did have urinary retention, RN had about 300 cc postvoid residual and Wiley catheter was placed, patient is very distraught about this and very anxious.  Suspect this could be related to medication effect but urology will be consulted to rule out other etiologies and to reassure the patient.     Severe intracranial atherosclerosis : On plavix     HTN, Essential : on hydralazine 25 mg tid, losartan 50 mg bid (currently on hold), metoprolol 25 mg at bedtime - at home  IV hydralazine is discontinued, patient had her side effects where she felt hot and flushed.  This was not a true allergic response but patient was unable to tolerate this medication  She is on hydralazine 25 3 times daily at home but reports she does not take it because it makes her feel uneasy and sick.  We  discussed starting Norvasc and she was agreeable to this, will have Norvasc 2.5 mg at noon.  She has a prior allergy to Cardizem, on further investigation it is more of an intolerance as the patient reports feeling flushed and uneasy but has no signs or symptoms of true allergy.  We will monitor closely.  She has tolerated oral Norvasc very well with no adverse effects     Anxiety : on clonazepam, anxiety seems much worse today.  Psych consult was placed for further recommendations on management     Anemia, chronic : stable.         Diet: Low Saturated Fat Na <2400 mg    DVT Prophylaxis: Pneumatic Compression Devices  Wiley Catheter: PRESENT, indication: Retention  Lines: None     Cardiac Monitoring: None  Code Status: Full Code      Clinically Significant Risk Factors Present on Admission                # Drug Induced Platelet Defect: home medication list includes an antiplatelet medication  # Acute Kidney Injury, unspecified: based on a >150% or 0.3 mg/dL increase in last creatinine compared to past 90 day average, will monitor renal function  # Hypertension: Home medication list includes antihypertensive(s)      # Obesity: Estimated body mass index is 34.7 kg/m  as calculated from the following:    Height as of this encounter: 1.524 m (5').    Weight as of this encounter: 80.6 kg (177 lb 11.1 oz).            Disposition Plan     Expected Discharge Date: 07/25/2023      Destination: assisted living  Discharge Comments: NÉSTOR Lopez MD  Hospitalist Service  [facility]  Securely message with [employer] (more info)  Text page via [employer] Paging/Directory   ______________________________________________________________________    Interval History   Patient was seen and examined, overnight events were reviewed  Patient has no complaints but is concerned about many things and is noted to be upset.  He is very anxious, she is very worried about having a Wiley catheter she did repeat  the same questions multiple times verbalizes how worried she is about her condition and how she will be able to take care of self at home.    Physical Exam   Vital Signs: Temp: 97.6  F (36.4  C) Temp src: Oral BP: 136/64 Pulse: 67   Resp: 20 SpO2: 93 % O2 Device: None (Room air)    Weight: 177 lbs 11.05 oz    Constitutional: awake, alert, cooperative, no apparent distress, and appears stated age  Respiratory: no increased work of breathing, good air exchange, and clear to auscultation, no crackles or wheezing  Cardiovascular: regular rate and rhythm, normal S1 and S2, and no murmur noted  GI: normal bowel sounds, soft, non-distended, and non-tender  Neuropsychiatric: General: normal, calm, and normal eye contact  Level of consciousness: alert / normal  Affect: anxious, more so than usual  Orientation: oriented to self, place, time and situation    Medical Decision Making       35 MINUTES SPENT BY ME on the date of service doing chart review, history, exam, documentation & further activities per the note.      Data     I have personally reviewed the following data over the past 24 hrs:    8.1  \   10.1 (L)   / 353     131 (L) 94 (L) 50.6 (H) /  95   4.7 26 2.22 (H) \       Imaging results reviewed over the past 24 hrs:   No results found for this or any previous visit (from the past 24 hour(s)).

## 2023-07-24 NOTE — PLAN OF CARE
Goal Outcome Evaluation:  Pt Aox4, calm and cooperative with cares, pleasant affect. Tele orders discontinued. Lung sounds clear/diminished throughout. Maintaining sats >92% on RA. Small BM this afternoon; previous BM reported on 7/20. Bladder scan protocol continued -- 140mL at 1600, 290mL at 2000, Pt voided 150mL in toilet, Purewick replaced in event of incontinence overnight. Nursing staff will continue to monitor.

## 2023-07-25 LAB
ANION GAP SERPL CALCULATED.3IONS-SCNC: 10 MMOL/L (ref 7–15)
BUN SERPL-MCNC: 49 MG/DL (ref 8–23)
CALCIUM SERPL-MCNC: 9.1 MG/DL (ref 8.8–10.2)
CHLORIDE SERPL-SCNC: 98 MMOL/L (ref 98–107)
CREAT SERPL-MCNC: 1.69 MG/DL (ref 0.51–0.95)
DEPRECATED HCO3 PLAS-SCNC: 25 MMOL/L (ref 22–29)
ERYTHROCYTE [DISTWIDTH] IN BLOOD BY AUTOMATED COUNT: 13 % (ref 10–15)
GFR SERPL CREATININE-BSD FRML MDRD: 29 ML/MIN/1.73M2
GLUCOSE SERPL-MCNC: 90 MG/DL (ref 70–99)
HCT VFR BLD AUTO: 28.2 % (ref 35–47)
HGB BLD-MCNC: 9.2 G/DL (ref 11.7–15.7)
MAGNESIUM SERPL-MCNC: 2 MG/DL (ref 1.7–2.3)
MCH RBC QN AUTO: 29 PG (ref 26.5–33)
MCHC RBC AUTO-ENTMCNC: 32.6 G/DL (ref 31.5–36.5)
MCV RBC AUTO: 89 FL (ref 78–100)
PLATELET # BLD AUTO: 323 10E3/UL (ref 150–450)
POTASSIUM SERPL-SCNC: 5.1 MMOL/L (ref 3.4–5.3)
RBC # BLD AUTO: 3.17 10E6/UL (ref 3.8–5.2)
SODIUM SERPL-SCNC: 133 MMOL/L (ref 136–145)
WBC # BLD AUTO: 7.2 10E3/UL (ref 4–11)

## 2023-07-25 PROCEDURE — 83735 ASSAY OF MAGNESIUM: CPT | Performed by: INTERNAL MEDICINE

## 2023-07-25 PROCEDURE — 99232 SBSQ HOSP IP/OBS MODERATE 35: CPT | Performed by: INTERNAL MEDICINE

## 2023-07-25 PROCEDURE — 120N000004 HC R&B MS OVERFLOW

## 2023-07-25 PROCEDURE — 250N000013 HC RX MED GY IP 250 OP 250 PS 637: Performed by: INTERNAL MEDICINE

## 2023-07-25 PROCEDURE — 99231 SBSQ HOSP IP/OBS SF/LOW 25: CPT | Performed by: INTERNAL MEDICINE

## 2023-07-25 PROCEDURE — 85027 COMPLETE CBC AUTOMATED: CPT | Performed by: INTERNAL MEDICINE

## 2023-07-25 PROCEDURE — 80048 BASIC METABOLIC PNL TOTAL CA: CPT | Performed by: INTERNAL MEDICINE

## 2023-07-25 PROCEDURE — 36415 COLL VENOUS BLD VENIPUNCTURE: CPT | Performed by: INTERNAL MEDICINE

## 2023-07-25 RX ADMIN — CLOPIDOGREL BISULFATE 75 MG: 75 TABLET, FILM COATED ORAL at 08:01

## 2023-07-25 RX ADMIN — CLONAZEPAM 0.5 MG: 0.5 TABLET ORAL at 20:09

## 2023-07-25 RX ADMIN — METOPROLOL SUCCINATE 25 MG: 25 TABLET, EXTENDED RELEASE ORAL at 20:09

## 2023-07-25 RX ADMIN — FAMOTIDINE 20 MG: 20 TABLET ORAL at 08:01

## 2023-07-25 RX ADMIN — CLONAZEPAM 0.5 MG: 0.5 TABLET ORAL at 08:01

## 2023-07-25 RX ADMIN — CLONAZEPAM 0.5 MG: 0.5 TABLET ORAL at 13:43

## 2023-07-25 RX ADMIN — ACETAMINOPHEN 650 MG: 325 TABLET ORAL at 20:08

## 2023-07-25 RX ADMIN — Medication 81 MG: at 08:01

## 2023-07-25 ASSESSMENT — ACTIVITIES OF DAILY LIVING (ADL)
ADLS_ACUITY_SCORE: 39

## 2023-07-25 NOTE — CONSULTS
Care Management Follow Up    Length of Stay (days): 2    Expected Discharge Date: 07/25/2023     Concerns to be Addressed:     Discharge planning  Patient plan of care discussed at interdisciplinary rounds: Yes    Anticipated Discharge Disposition:  likely return to independent apartment if able     Anticipated Discharge Services:  likely home PT OT  Anticipated Discharge DME:  walker, cane    Patient/family educated on Medicare website which has current facility and service quality ratings:    Education Provided on the Discharge Plan:  yes  Patient/Family in Agreement with the Plan:  yes    Referrals Placed by CM/SW:    Private pay costs discussed: Not applicable    Additional Information:  SW consulted for discharge planning.   Patient lives alone in an independent senior apartment, she has no services. She uses a cane at baseline.   Patient reports today she feels weak, has not gotten out of bed.   Will request PT consult.  Patient reports her nephew Riley Padilla is her main family contact.  SW asked patient if she has thought about adding services in the future or talking to her nephew about assisting with planning for additional services.   Patient states she will likely have to apply for medical assistance in the future.   Plan is home with home care pending PT eval and recommendations when closer to discharge.      Dara Alston, ALEXANDERSW

## 2023-07-25 NOTE — PLAN OF CARE
Goal Outcome Evaluation:    Pt Aox4, somewhat reserved, although more positive disposition following urology consult. No tele orders -- regular palpable pulses. Sats >92% on RA. Tolerating new gunter catheter; outputting yellow urine. Pt reports poor appetite. No complaints of SOB, dizziness, and CP. General pain in back and knees constant; rated 2-5/10. Anticipate SW consult tomorrow. Nursing staff will continue to monitor.

## 2023-07-25 NOTE — PROGRESS NOTES
[facility]  [facility]   Follow up    Kerry Hoffmann   MRN: [id number]  : 1935   DOA: 2023   CC: ARF/CKD      Assessment and Plan:  88 year old female      ARF/CKD; CKD likely secondary to renal atrophy as well has some acquired renal cysts in the light of renovascular disease since he does have evidence of significant atherosclerotic disease elsewhere;  CR peaked at 2.5 and now improving  HTN: Prior to admission on metoprolol losartan 50 mg twice a day and hydralazine 25 mg 3 times daily; improving with diuresis  hyperlipid  Anxiety; at a baseline but exacerbated by medical illness and particularly gunter cath; gunter out now and doing voiding trial  Hypervolemia: s/p diuresis and edema improved  Hyponatremia; improving and at 133 today  Hypomagnesemia :improved  Anemia     Subjective  Had gunter out and feels much better; she is calmer and less anxious today  No nausea; no CP or SOB  Objective    Vital signs in last 24 hours  Temp:  [97.8  F (36.6  C)-98.5  F (36.9  C)] 98.5  F (36.9  C)  Pulse:  [70-74] 70  Resp:  [20] 20  BP: (114-175)/(54-74) 117/57  SpO2:  [94 %-96 %] 96 %      Intake/Output last 3 shifts  I/O last 3 completed shifts:  In: 350 [P.O.:350]  Out: 1650 [Urine:1650]  Intake/Output this shift:  No intake/output data recorded.    Physical Exam  Alert/oriented x 3, awake, NAD  CV: RRR without murmur or rub  Lung: clear and equal; no extra sounds  Ab: soft and NT; not distended; normal bs  Ext: no edema and well perfused  Skin; no rash    Pertinent Labs     Last Renal Panel:  Sodium   Date Value Ref Range Status   2023 133 (L) 136 - 145 mmol/L Final     Potassium   Date Value Ref Range Status   2023 5.1 3.4 - 5.3 mmol/L Final   2022 4.6 3.5 - 5.0 mmol/L Final     Chloride   Date Value Ref Range Status   2023 98 98 - 107 mmol/L Final   2022 96 (L) 98 - 107 mmol/L Final     Carbon Dioxide (CO2)   Date  Value Ref Range Status   07/25/2023 25 22 - 29 mmol/L Final   09/04/2022 24 22 - 31 mmol/L Final     Anion Gap   Date Value Ref Range Status   07/25/2023 10 7 - 15 mmol/L Final   09/04/2022 7 5 - 18 mmol/L Final     Glucose   Date Value Ref Range Status   07/25/2023 90 70 - 99 mg/dL Final   09/04/2022 95 70 - 125 mg/dL Final     Urea Nitrogen   Date Value Ref Range Status   07/25/2023 49.0 (H) 8.0 - 23.0 mg/dL Final   09/04/2022 21 8 - 28 mg/dL Final     Creatinine   Date Value Ref Range Status   07/25/2023 1.69 (H) 0.51 - 0.95 mg/dL Final     GFR Estimate   Date Value Ref Range Status   07/25/2023 29 (L) >60 mL/min/1.73m2 Final   06/22/2021 46 (L) >60 mL/min/1.73m2 Final     Calcium   Date Value Ref Range Status   07/25/2023 9.1 8.8 - 10.2 mg/dL Final     Phosphorus   Date Value Ref Range Status   07/21/2023 3.0 2.5 - 4.5 mg/dL Final     Albumin   Date Value Ref Range Status   07/20/2023 4.0 3.5 - 5.2 g/dL Final   09/04/2022 3.4 (L) 3.5 - 5.0 g/dL Final     Recent Labs   Lab 07/25/23  0437 07/24/23  0432 07/20/23  1342 07/18/23  1316   HGB 9.2* 10.1* 10.0* 9.8*            I reviewed all lab results  Petra Herrera MD

## 2023-07-25 NOTE — PROGRESS NOTES
[facility]    Medicine Progress Note - Hospitalist Service    Date of Admission:  7/20/2023    Assessment & Plan   Kerrykelsie Hoffmann is a 88 year old female admitted on 7/20/2023. She has been admitted with b/l leg swelling, sob with activity. and some chest tightness since over a week. The chest discomfort occurs at night after an hour of sleep, non radiating with no relation to activity or breathing. She wakes up with her heart pounding. No CP currently. Patient subsequently came to the ER for further evaluation and management. She could have possible CHF, will do a trial of iv diuresis, r/o ACS, consult cardio.     A/p :      Acute onset sob, swelling in legs: Resolved  Chest discomfort: Resolved      Acute kidney injury with CKD stage 3a   Likely secondary to IV diuresis with Lasix  Lasix is discontinued  Creatinine did peak at 2.5 up from baseline of 1.1, it is trending down as expected and is now down to 1.69 -  this is likely secondary to IV diuresis with Lasix and adverse effect of losartan.  Losartan is on hold, renal ultrasound was ordered and unremarkable no evidence of hydronephrosis.  Nephrology is consulted, appreciate recommendations patient did have urinary retention, RN had about 300 cc postvoid residual and Wiley catheter was placed - Suspect this could be related to medication effect but urology will be consulted to rule out other etiologies and to reassure the patient, patient urology recommendations     Severe intracranial atherosclerosis : On plavix     HTN, Essential : on hydralazine 25 mg tid, losartan 50 mg bid (currently on hold), metoprolol 25 mg at bedtime - at home  IV hydralazine is discontinued, patient had her side effects where she felt hot and flushed.  This was not a true allergic response but patient was unable to tolerate this medication  She is on hydralazine 25 3 times daily at home but reports she does not take it because it makes her feel uneasy and  sick.  We discussed starting Norvasc and she was agreeable to this, will have Norvasc 2.5 mg at noon.  She has a prior allergy to Cardizem, on further investigation it is more of an intolerance as the patient reports feeling flushed and uneasy but has no signs or symptoms of true allergy.  We will monitor closely.  She has tolerated oral Norvasc very well with no adverse effects     Anxiety : on clonazepam, anxiety seems much worse today.  Psych consult was placed for further recommendations on management     Anemia, chronic : stable.         Diet: Low Saturated Fat Na <2400 mg    DVT Prophylaxis: Pneumatic Compression Devices  Wiley Catheter: PRESENT, indication: Retention  Lines: None     Cardiac Monitoring: None  Code Status: Full Code      Clinically Significant Risk Factors                         # Obesity: Estimated body mass index is 37.89 kg/m  as calculated from the following:    Height as of this encounter: 1.524 m (5').    Weight as of this encounter: 88 kg (194 lb 0.1 oz)., PRESENT ON ADMISSION          Disposition Plan     Expected Discharge Date: 07/25/2023      Destination: assisted living  Discharge Comments: NÉSTOR Lopez MD  Hospitalist Service  [facility]  Securely message with [employer] (more info)  Text page via [employer] Paging/Directory   ______________________________________________________________________    Interval History   Patient was seen and examined, overnight events were reviewed  Patient has no specific complaints, she feels much more relaxed and less anxious today.  She felt reassured that her creatinine is improving, she would like her Wiley out and try to urinate on her own today    Physical Exam   Vital Signs: Temp: 98.5  F (36.9  C) Temp src: Oral BP: 117/57 Pulse: 70   Resp: 20 SpO2: 96 % O2 Device: None (Room air)    Weight: 194 lbs .08 oz    Constitutional: awake, alert, cooperative, no apparent distress, and appears stated  age  Respiratory: no increased work of breathing, good air exchange, and clear to auscultation, no crackles or wheezing  Cardiovascular: regular rate and rhythm, normal S1 and S2, and no murmur noted  GI: normal bowel sounds, soft, non-distended, and non-tender  Neuropsychiatric: General: normal, calm, and normal eye contact  Level of consciousness: alert / normal  Affect: anxious, more so than usual  Orientation: oriented to self, place, time and situation    Medical Decision Making       35 MINUTES SPENT BY ME on the date of service doing chart review, history, exam, documentation & further activities per the note.      Data     I have personally reviewed the following data over the past 24 hrs:    7.2  \   9.2 (L)   / 323     133 (L) 98 49.0 (H) /  90   5.1 25 1.69 (H) \       Imaging results reviewed over the past 24 hrs:   No results found for this or any previous visit (from the past 24 hour(s)).

## 2023-07-25 NOTE — PLAN OF CARE
Problem: Plan of Care - These are the overarching goals to be used throughout the patient stay.    Goal: Absence of Hospital-Acquired Illness or Injury  Intervention: Identify and Manage Fall Risk  Recent Flowsheet Documentation  Taken 7/24/2023 2345 by Cole Rodriguez, RN  Safety Promotion/Fall Prevention:   activity supervised   assistive device/personal items within reach   clutter free environment maintained   increased rounding and observation   mobility aid in reach   nonskid shoes/slippers when out of bed   patient and family education   room door open   room organization consistent   safety round/check completed   supervised activity  Intervention: Prevent and Manage VTE (Venous Thromboembolism) Risk  Recent Flowsheet Documentation  Taken 7/24/2023 2345 by Cole Rodriguez, RN  VTE Prevention/Management: SCDs (sequential compression devices) off  Goal: Optimal Comfort and Wellbeing  Intervention: Monitor Pain and Promote Comfort  Recent Flowsheet Documentation  Taken 7/24/2023 2343 by Cole Rodriguez RN  Pain Management Interventions: medication (see MAR)     Problem: Fall Injury Risk  Goal: Absence of Fall and Fall-Related Injury  Intervention: Promote Injury-Free Environment  Recent Flowsheet Documentation  Taken 7/24/2023 2345 by Cole Rodriguez, RN  Safety Promotion/Fall Prevention:   activity supervised   assistive device/personal items within reach   clutter free environment maintained   increased rounding and observation   mobility aid in reach   nonskid shoes/slippers when out of bed   patient and family education   room door open   room organization consistent   safety round/check completed   supervised activity     Problem: Heart Failure  Goal: Optimal Coping  Outcome: Progressing  Goal: Effective Oxygenation and Ventilation  Intervention: Promote Airway Secretion Clearance  Recent Flowsheet Documentation  Taken 7/24/2023 2345 by Cole Rodriguez RN  Cough And Deep Breathing: done independently  per patient     Problem: Risk for Delirium  Goal: Optimal Coping  Outcome: Progressing  Goal: Improved Behavioral Control  Outcome: Progressing  Intervention: Minimize Safety Risk  Recent Flowsheet Documentation  Taken 7/24/2023 2345 by Cole Rodriguez, RN  Enhanced Safety Measures: room near unit station  Goal: Improved Attention and Thought Clarity  Intervention: Maximize Cognitive Function  Recent Flowsheet Documentation  Taken 7/24/2023 2345 by Cole Rodriguez, RN  Sensory Stimulation Regulation:   care clustered   quiet environment promoted  Reorientation Measures:   calendar in view   clock in view  Goal: Improved Sleep  Outcome: Progressing   Goal Outcome Evaluation:    Patient is alert and oriented x 4. Pleasant and cooperative. Endorses pain in back. PRN APAP given for this. Turned and repositioned. Gunter remains in place and is patent. Patient does not like the gunter but questions were answered seeming to ease patient's anxiety. Turned and repositioned.

## 2023-07-25 NOTE — PROGRESS NOTES
Heart Failure Care Map  GOALS TO BE MET BEFORE DISCHARGE:    1. Decrease congestion and/or edema with diuretic therapy to achieve near optimal volume status.     Dyspnea improved: No, further care required to meet this goal. Please explain yes   Edema improved: Yes, satisfactory for discharge.        Last 24 hour I/O:   Intake/Output Summary (Last 24 hours) at 7/25/2023 1019  Last data filed at 7/25/2023 0624  Gross per 24 hour   Intake --   Output 1100 ml   Net -1100 ml           Net I/O and Weights since admission:   06/25 1500 - 07/25 1459  In: 2498 [P.O.:2498]  Out: 5745 [Urine:5745]  Net: -3247     Vitals:    07/20/23 1320 07/22/23 0553 07/23/23 0424 07/25/23 0625   Weight: 80.7 kg (178 lb) (P) 79.4 kg (175 lb 0.7 oz) 80.6 kg (177 lb 11.1 oz) 88 kg (194 lb 0.1 oz)       2.  O2 sats > 90% on room air, or at prior home O2 therapy level.      Able to wean O2 this shift to keep sats above 90%?: Yes, satisfactory for discharge.   Does patient use Home O2? No Bass         Current oxygenation status:   SpO2: 96 %     O2 Device: None (Room air),      3.  Tolerates ambulation and mobility near baseline.     Ambulation: No, further care required to meet this goal. Please explain Patient resting in bed.   Times patient ambulated with staff this shift: 1    Please review the Heart Failure Care Map for additional HF goal outcomes.    Patient alert and oriented x 4. She is pleasant and talkative. C/o tightness in chest when she breaths and she said it is her asthma. The chest tightness goes away when she repositions herself. Appetite good. Med surg patient. Call light in reach.     Esme Harris RN  7/25/2023

## 2023-07-25 NOTE — PROGRESS NOTES
Heart Failure Care Map  GOALS TO BE MET BEFORE DISCHARGE:    1. Decrease congestion and/or edema with diuretic therapy to achieve near optimal volume status.     Dyspnea improved: Yes, satisfactory for discharge.   Edema improved: Yes, satisfactory for discharge.        Last 24 hour I/O:   Intake/Output Summary (Last 24 hours) at 7/25/2023 1732  Last data filed at 7/25/2023 1300  Gross per 24 hour   Intake 820 ml   Output 1125 ml   Net -305 ml           Net I/O and Weights since admission:   06/25 2300 - 07/25 2259  In: 3318 [P.O.:3318]  Out: 6070 [Urine:6070]  Net: -2752     Vitals:    07/20/23 1320 07/22/23 0553 07/23/23 0424 07/25/23 0625   Weight: 80.7 kg (178 lb) (P) 79.4 kg (175 lb 0.7 oz) 80.6 kg (177 lb 11.1 oz) 88 kg (194 lb 0.1 oz)       2.  O2 sats > 90% on room air, or at prior home O2 therapy level.      Able to wean O2 this shift to keep sats above 90%?: Yes, satisfactory for discharge.   Does patient use Home O2? No          Current oxygenation status:   SpO2: 97 %     O2 Device: None (Room air),      3.  Tolerates ambulation and mobility near baseline.     Ambulation: Yes, satisfactory for discharge.   Times patient ambulated with staff this shift: 1    Please review the Heart Failure Care Map for additional HF goal outcomes.    Esme Harris RN  7/25/2023

## 2023-07-25 NOTE — PROGRESS NOTES
Patient has not voided sine gunter catheter removed at 12:30 PM. Bladder scan 280 ml at 1830. Will pass the information on to 7:00 pm night nurse to follow up.

## 2023-07-26 LAB
ANION GAP SERPL CALCULATED.3IONS-SCNC: 10 MMOL/L (ref 7–15)
ANION GAP SERPL CALCULATED.3IONS-SCNC: 10 MMOL/L (ref 7–15)
BUN SERPL-MCNC: 41.5 MG/DL (ref 8–23)
BUN SERPL-MCNC: 44.7 MG/DL (ref 8–23)
CALCIUM SERPL-MCNC: 9.4 MG/DL (ref 8.8–10.2)
CALCIUM SERPL-MCNC: 9.5 MG/DL (ref 8.8–10.2)
CHLORIDE SERPL-SCNC: 96 MMOL/L (ref 98–107)
CHLORIDE SERPL-SCNC: 97 MMOL/L (ref 98–107)
CREAT SERPL-MCNC: 1.48 MG/DL (ref 0.51–0.95)
CREAT SERPL-MCNC: 1.58 MG/DL (ref 0.51–0.95)
DEPRECATED HCO3 PLAS-SCNC: 22 MMOL/L (ref 22–29)
DEPRECATED HCO3 PLAS-SCNC: 22 MMOL/L (ref 22–29)
ERYTHROCYTE [DISTWIDTH] IN BLOOD BY AUTOMATED COUNT: 12.7 % (ref 10–15)
GFR SERPL CREATININE-BSD FRML MDRD: 32 ML/MIN/1.73M2
GFR SERPL CREATININE-BSD FRML MDRD: 34 ML/MIN/1.73M2
GLUCOSE SERPL-MCNC: 92 MG/DL (ref 70–99)
GLUCOSE SERPL-MCNC: 95 MG/DL (ref 70–99)
HCT VFR BLD AUTO: 29.8 % (ref 35–47)
HGB BLD-MCNC: 9.5 G/DL (ref 11.7–15.7)
MAGNESIUM SERPL-MCNC: 1.8 MG/DL (ref 1.7–2.3)
MCH RBC QN AUTO: 28.1 PG (ref 26.5–33)
MCHC RBC AUTO-ENTMCNC: 31.9 G/DL (ref 31.5–36.5)
MCV RBC AUTO: 88 FL (ref 78–100)
PLATELET # BLD AUTO: 349 10E3/UL (ref 150–450)
POTASSIUM SERPL-SCNC: 4.7 MMOL/L (ref 3.4–5.3)
POTASSIUM SERPL-SCNC: 4.8 MMOL/L (ref 3.4–5.3)
RBC # BLD AUTO: 3.38 10E6/UL (ref 3.8–5.2)
SODIUM SERPL-SCNC: 128 MMOL/L (ref 136–145)
SODIUM SERPL-SCNC: 129 MMOL/L (ref 136–145)
WBC # BLD AUTO: 8 10E3/UL (ref 4–11)

## 2023-07-26 PROCEDURE — 99232 SBSQ HOSP IP/OBS MODERATE 35: CPT | Performed by: INTERNAL MEDICINE

## 2023-07-26 PROCEDURE — 250N000013 HC RX MED GY IP 250 OP 250 PS 637: Performed by: INTERNAL MEDICINE

## 2023-07-26 PROCEDURE — 258N000003 HC RX IP 258 OP 636: Performed by: INTERNAL MEDICINE

## 2023-07-26 PROCEDURE — 85014 HEMATOCRIT: CPT | Performed by: INTERNAL MEDICINE

## 2023-07-26 PROCEDURE — 36415 COLL VENOUS BLD VENIPUNCTURE: CPT | Performed by: INTERNAL MEDICINE

## 2023-07-26 PROCEDURE — 83735 ASSAY OF MAGNESIUM: CPT | Performed by: INTERNAL MEDICINE

## 2023-07-26 PROCEDURE — 82310 ASSAY OF CALCIUM: CPT | Performed by: INTERNAL MEDICINE

## 2023-07-26 PROCEDURE — 120N000004 HC R&B MS OVERFLOW

## 2023-07-26 RX ORDER — SODIUM CHLORIDE 9 MG/ML
INJECTION, SOLUTION INTRAVENOUS CONTINUOUS
Status: DISCONTINUED | OUTPATIENT
Start: 2023-07-26 | End: 2023-07-27

## 2023-07-26 RX ORDER — CALCIUM CARBONATE 500 MG/1
500 TABLET, CHEWABLE ORAL DAILY PRN
Status: DISCONTINUED | OUTPATIENT
Start: 2023-07-26 | End: 2023-07-28 | Stop reason: HOSPADM

## 2023-07-26 RX ADMIN — CLONAZEPAM 0.5 MG: 0.5 TABLET ORAL at 13:25

## 2023-07-26 RX ADMIN — ACETAMINOPHEN 650 MG: 325 TABLET ORAL at 15:29

## 2023-07-26 RX ADMIN — SODIUM CHLORIDE: 9 INJECTION, SOLUTION INTRAVENOUS at 10:04

## 2023-07-26 RX ADMIN — SODIUM CHLORIDE: 9 INJECTION, SOLUTION INTRAVENOUS at 18:03

## 2023-07-26 RX ADMIN — ANTACID TABLETS 500 MG: 500 TABLET, CHEWABLE ORAL at 21:42

## 2023-07-26 RX ADMIN — METOPROLOL SUCCINATE 25 MG: 25 TABLET, EXTENDED RELEASE ORAL at 20:54

## 2023-07-26 RX ADMIN — MECLIZINE 12.5 MG: 12.5 TABLET ORAL at 20:54

## 2023-07-26 RX ADMIN — CLONAZEPAM 0.5 MG: 0.5 TABLET ORAL at 07:46

## 2023-07-26 RX ADMIN — Medication 81 MG: at 07:46

## 2023-07-26 RX ADMIN — CLONAZEPAM 0.5 MG: 0.5 TABLET ORAL at 20:54

## 2023-07-26 RX ADMIN — ACETAMINOPHEN 650 MG: 325 TABLET ORAL at 07:46

## 2023-07-26 RX ADMIN — CLOPIDOGREL BISULFATE 75 MG: 75 TABLET, FILM COATED ORAL at 07:46

## 2023-07-26 ASSESSMENT — ACTIVITIES OF DAILY LIVING (ADL)
ADLS_ACUITY_SCORE: 39

## 2023-07-26 NOTE — PROGRESS NOTES
[facility]  [facility]   Follow up    Kerry Hoffmann   MRN: [id number]  : 1935   DOA: 2023   CC: ARF/CKD      Assessment and Plan:  88 year old female      ARF/CKD; CKD likely secondary to renal atrophy as well has some acquired renal cysts in the light of renovascular disease since he does have evidence of significant atherosclerotic disease elsewhere;  CR peaked at 2.5 and now improving serially  HTN: Prior to admission on metoprolol losartan 50 mg twice a day and hydralazine 25 mg 3 times daily; now on BB and giving test dose of norvasc due to multiple allergies  hyperlipid  Anxiety; at a baseline but exacerbated by medical illness and particularly gunter cath; gunter out now and able to urinate  Hypervolemia: s/p diuresis and edema improved; giving some fluid now to see if can produce more urine  Hyponatremia;slightly worse today  Hypomagnesemia :improved  Anemia   Dispo; no objection to discharge; will enter as needed renal follow up    Subjective  Able to urinate; not eating much because she does not like the food here  No nausea; no CP or SOB  Objective    Vital signs in last 24 hours  Temp:  [97.2  F (36.2  C)-97.5  F (36.4  C)] 97.5  F (36.4  C)  Pulse:  [66-67] 67  Resp:  [16-18] 18  BP: (125-152)/(57-67) 152/67  SpO2:  [94 %-97 %] 94 %      Intake/Output last 3 shifts  I/O last 3 completed shifts:  In: 1400 [P.O.:1400]  Out: 675 [Urine:675]  Intake/Output this shift:  I/O this shift:  In: -   Out: 300 [Urine:300]    Physical Exam  Alert/oriented x 3, awake, NAD  CV: RRR without murmur or rub  Lung: clear and equal; no extra sounds  Ab: soft and NT; not distended; normal bs  Ext: no edema and well perfused  Skin; no rash    Pertinent Labs     Last Renal Panel:  Sodium   Date Value Ref Range Status   2023 128 (L) 136 - 145 mmol/L Final     Potassium   Date Value Ref Range Status   2023 4.7 3.4 - 5.3 mmol/L Final    09/04/2022 4.6 3.5 - 5.0 mmol/L Final     Chloride   Date Value Ref Range Status   07/26/2023 96 (L) 98 - 107 mmol/L Final   09/04/2022 96 (L) 98 - 107 mmol/L Final     Carbon Dioxide (CO2)   Date Value Ref Range Status   07/26/2023 22 22 - 29 mmol/L Final   09/04/2022 24 22 - 31 mmol/L Final     Anion Gap   Date Value Ref Range Status   07/26/2023 10 7 - 15 mmol/L Final   09/04/2022 7 5 - 18 mmol/L Final     Glucose   Date Value Ref Range Status   07/26/2023 92 70 - 99 mg/dL Final   09/04/2022 95 70 - 125 mg/dL Final     Urea Nitrogen   Date Value Ref Range Status   07/26/2023 44.7 (H) 8.0 - 23.0 mg/dL Final   09/04/2022 21 8 - 28 mg/dL Final     Creatinine   Date Value Ref Range Status   07/26/2023 1.58 (H) 0.51 - 0.95 mg/dL Final     GFR Estimate   Date Value Ref Range Status   07/26/2023 32 (L) >60 mL/min/1.73m2 Final   06/22/2021 46 (L) >60 mL/min/1.73m2 Final     Calcium   Date Value Ref Range Status   07/26/2023 9.4 8.8 - 10.2 mg/dL Final     Phosphorus   Date Value Ref Range Status   07/21/2023 3.0 2.5 - 4.5 mg/dL Final     Albumin   Date Value Ref Range Status   07/20/2023 4.0 3.5 - 5.2 g/dL Final   09/04/2022 3.4 (L) 3.5 - 5.0 g/dL Final     Recent Labs   Lab 07/26/23  0446 07/25/23  0437 07/24/23  0432 07/20/23  1342   HGB 9.5* 9.2* 10.1* 10.0*            I reviewed all lab results  Petra Herrera MD

## 2023-07-26 NOTE — PLAN OF CARE
Problem: Plan of Care - These are the overarching goals to be used throughout the patient stay.    Goal: Absence of Hospital-Acquired Illness or Injury  Intervention: Identify and Manage Fall Risk  Recent Flowsheet Documentation  Taken 7/26/2023 0000 by Cole Rodriguez RN  Safety Promotion/Fall Prevention:   activity supervised   clutter free environment maintained  Taken 7/25/2023 1930 by Cole Rodriguez RN  Safety Promotion/Fall Prevention:   activity supervised   clutter free environment maintained  Intervention: Prevent Skin Injury  Recent Flowsheet Documentation  Taken 7/26/2023 0000 by Cole Rodriguez RN  Body Position: position changed independently  Taken 7/25/2023 1930 by Cole Rodriguez RN  Body Position: position changed independently  Goal: Optimal Comfort and Wellbeing  Intervention: Monitor Pain and Promote Comfort  Recent Flowsheet Documentation  Taken 7/25/2023 2000 by Cole Rodriguez RN  Pain Management Interventions: medication (see MAR)  Goal: Readiness for Transition of Care  Outcome: Progressing     Problem: Fall Injury Risk  Goal: Absence of Fall and Fall-Related Injury  Intervention: Identify and Manage Contributors  Recent Flowsheet Documentation  Taken 7/26/2023 0000 by Cole Rodriguez RN  Medication Review/Management: medications reviewed  Taken 7/25/2023 1930 by Cole Rodriguez RN  Medication Review/Management: medications reviewed  Intervention: Promote Injury-Free Environment  Recent Flowsheet Documentation  Taken 7/26/2023 0000 by Cole Rodriguez RN  Safety Promotion/Fall Prevention:   activity supervised   clutter free environment maintained  Taken 7/25/2023 1930 by Cole Rodriguez RN  Safety Promotion/Fall Prevention:   activity supervised   clutter free environment maintained     Problem: Heart Failure  Goal: Optimal Coping  Outcome: Progressing  Goal: Optimal Functional Ability  Intervention: Optimize Functional Ability  Recent Flowsheet Documentation  Taken 7/26/2023  0000 by Cole Rodriguez RN  Activity Management:   activity adjusted per tolerance   ambulated to bathroom  Taken 7/25/2023 1930 by Cole Rodriguez RN  Activity Management: activity adjusted per tolerance  Goal: Effective Oxygenation and Ventilation  Intervention: Promote Airway Secretion Clearance  Recent Flowsheet Documentation  Taken 7/26/2023 0000 by Cole Rodriguez RN  Cough And Deep Breathing: done independently per patient  Activity Management:   activity adjusted per tolerance   ambulated to bathroom  Taken 7/25/2023 1930 by Cole Rodriguez RN  Cough And Deep Breathing: done independently per patient  Activity Management: activity adjusted per tolerance  Intervention: Optimize Oxygenation and Ventilation  Recent Flowsheet Documentation  Taken 7/26/2023 0000 by Cole Rodriguez RN  Head of Bed (HOB) Positioning: HOB at 20-30 degrees  Taken 7/25/2023 1930 by Cole Rodriguez RN  Head of Bed (HOB) Positioning: HOB at 20-30 degrees  Goal: Effective Breathing Pattern During Sleep  Intervention: Monitor and Manage Obstructive Sleep Apnea  Recent Flowsheet Documentation  Taken 7/26/2023 0000 by Cole Rodriguez RN  Medication Review/Management: medications reviewed  Taken 7/25/2023 1930 by Cole Rodriguez RN  Medication Review/Management: medications reviewed     Problem: Risk for Delirium  Goal: Optimal Coping  Outcome: Progressing  Goal: Improved Behavioral Control  Intervention: Minimize Safety Risk  Recent Flowsheet Documentation  Taken 7/26/2023 0000 by Cole Rodriguez RN  Enhanced Safety Measures: room near unit station  Taken 7/25/2023 1930 by Cole Rodriguez RN  Enhanced Safety Measures: room near unit station  Goal: Improved Sleep  Outcome: Progressing   Goal Outcome Evaluation:    Patient is alert and oriented x 4. Pleasant and cooperative. Endorsed pain x 1 and PRN APAP was given. Patient states she feels short of breath with ambulation. Up to bathroom with SBA and use of cane. Multiple voids  overnight. Bladder scanned for 249 PVR.

## 2023-07-26 NOTE — PROGRESS NOTES
[facility]    Medicine Progress Note - Hospitalist Service    Date of Admission:  7/20/2023    Assessment & Plan   Kerrykelsie Hoffmann is a 88 year old female admitted on 7/20/2023. She has been admitted with b/l leg swelling, sob with activity. and some chest tightness since over a week. The chest discomfort occurs at night after an hour of sleep, non radiating with no relation to activity or breathing. She wakes up with her heart pounding. No CP currently. Patient subsequently came to the ER for further evaluation and management. She could have possible CHF, will do a trial of iv diuresis, r/o ACS, consult cardio.     A/p :      Acute onset sob, swelling in legs: Resolved  Chest discomfort: Resolved      Acute kidney injury with CKD stage 3a   Likely secondary to IV diuresis with Lasix  Lasix is discontinued  Creatinine did peak at 2.5 up from baseline of 1.1, it is trending down as expected and is now down to 1.58 -  this is likely secondary to IV diuresis with Lasix and adverse effect of losartan.  Losartan is on hold, renal ultrasound was ordered and unremarkable no evidence of hydronephrosis.  Nephrology is consulted, appreciate recommendations patient did have urinary retention, RN had about 300 cc postvoid residual and Wiley catheter was placed - Suspect this could be related to medication effect but urology will be consulted to rule out other etiologies and to reassure the patient, patient urology recommendations     Severe intracranial atherosclerosis : On plavix     HTN, Essential : on hydralazine 25 mg tid, losartan 50 mg bid (currently on hold), metoprolol 25 mg at bedtime - at home  IV hydralazine is discontinued, patient had her side effects where she felt hot and flushed.  This was not a true allergic response but patient was unable to tolerate this medication  She is on hydralazine 25 3 times daily at home but reports she does not take it because it makes her feel uneasy and  sick.  We discussed starting Norvasc and she was agreeable to this, will have Norvasc 2.5 mg at noon.  She has a prior allergy to Cardizem, on further investigation it is more of an intolerance as the patient reports feeling flushed and uneasy but has no signs or symptoms of true allergy.  We will monitor closely.  She has tolerated oral Norvasc very well with no adverse effects     Anxiety : on clonazepam, anxiety seems much worse today.  Psych consult was placed for further recommendations on management     Anemia, chronic : stable.         Diet: Low Saturated Fat Na <2400 mg    DVT Prophylaxis: Pneumatic Compression Devices  Wiley Catheter: Not present  Lines: None     Cardiac Monitoring: None  Code Status: Full Code      Clinically Significant Risk Factors         # Hyponatremia: Lowest Na = 128 mmol/L in last 2 days, will monitor as appropriate                 # Obesity: Estimated body mass index is 38.71 kg/m  as calculated from the following:    Height as of this encounter: 1.524 m (5').    Weight as of this encounter: 89.9 kg (198 lb 3.1 oz)., PRESENT ON ADMISSION          Disposition Plan      Expected Discharge Date: 07/26/2023      Destination: assisted living  Discharge Comments: ambulating with sba.          Lily Lopez MD  Hospitalist Service  [facility]  Securely message with [employer] (more info)  Text page via [employer] Paging/Directory   ______________________________________________________________________    Interval History   Patient was seen and examined, overnight events were reviewed  Patient has no specific complaints, she is much less anxious this morning.  Wiley catheter was removed and she reports she had some urine output    Physical Exam   Vital Signs: Temp: 97.5  F (36.4  C) Temp src: Oral BP: (!) 152/67 (RN notified) Pulse: 67   Resp: 18 SpO2: 94 % O2 Device: None (Room air)    Weight: 198 lbs 3.1 oz    Constitutional: awake, alert, cooperative, no apparent  distress, and appears stated age  Respiratory: no increased work of breathing, good air exchange, and clear to auscultation, no crackles or wheezing  Cardiovascular: regular rate and rhythm, normal S1 and S2, and no murmur noted  GI: normal bowel sounds, soft, non-distended, and non-tender  Neuropsychiatric: General: normal, calm, and normal eye contact  Level of consciousness: alert / normal  Affect: anxious, more so than usual  Orientation: oriented to self, place, time and situation    Medical Decision Making       35 MINUTES SPENT BY ME on the date of service doing chart review, history, exam, documentation & further activities per the note.      Data     I have personally reviewed the following data over the past 24 hrs:    8.0  \   9.5 (L)   / 349     128 (L) 96 (L) 44.7 (H) /  92   4.7 22 1.58 (H) \       Imaging results reviewed over the past 24 hrs:   No results found for this or any previous visit (from the past 24 hour(s)).

## 2023-07-26 NOTE — PROGRESS NOTES
Heart Failure Care Map  GOALS TO BE MET BEFORE DISCHARGE:    1. Decrease congestion and/or edema with diuretic therapy to achieve near optimal volume status.     Dyspnea improved: Yes, satisfactory for discharge.   Edema improved: Yes, satisfactory for discharge.        Last 24 hour I/O:   Intake/Output Summary (Last 24 hours) at 7/26/2023 1816  Last data filed at 7/26/2023 1803  Gross per 24 hour   Intake 1839 ml   Output 1050 ml   Net 789 ml           Net I/O and Weights since admission:   06/26 2300 - 07/26 2259  In: 5737 [P.O.:4898; I.V.:839]  Out: 7120 [Urine:7120]  Net: -1383     Vitals:    07/20/23 1320 07/22/23 0553 07/23/23 0424 07/25/23 0625   Weight: 80.7 kg (178 lb) (P) 79.4 kg (175 lb 0.7 oz) 80.6 kg (177 lb 11.1 oz) 88 kg (194 lb 0.1 oz)    07/26/23 0003   Weight: 89.9 kg (198 lb 3.1 oz)       2.  O2 sats > 90% on room air, or at prior home O2 therapy level.      Able to wean O2 this shift to keep sats above 90%?: Yes, satisfactory for discharge.   Does patient use Home O2? No          Current oxygenation status:   SpO2: 96 %     O2 Device: None (Room air),      3.  Tolerates ambulation and mobility near baseline.     Ambulation: Yes, satisfactory for discharge.   Times patient ambulated with staff this shift: 1    Please review the Heart Failure Care Map for additional HF goal outcomes.    Patient said she felt some generalized discomfort and aches. Tylenol given for generalized aches. Patient does feel better. Ate well. NS at 100 ml/hr.Voided. Creatinine improved to1.48 today. Call light in reach.     Esme Harris RN  7/26/2023

## 2023-07-27 ENCOUNTER — APPOINTMENT (OUTPATIENT)
Dept: PHYSICAL THERAPY | Facility: HOSPITAL | Age: 88
DRG: 683 | End: 2023-07-27
Attending: FAMILY MEDICINE
Payer: MEDICARE

## 2023-07-27 LAB
ANION GAP SERPL CALCULATED.3IONS-SCNC: 10 MMOL/L (ref 7–15)
BUN SERPL-MCNC: 40.1 MG/DL (ref 8–23)
CALCIUM SERPL-MCNC: 9.4 MG/DL (ref 8.8–10.2)
CHLORIDE SERPL-SCNC: 100 MMOL/L (ref 98–107)
CREAT SERPL-MCNC: 1.25 MG/DL (ref 0.51–0.95)
DEPRECATED HCO3 PLAS-SCNC: 21 MMOL/L (ref 22–29)
ERYTHROCYTE [DISTWIDTH] IN BLOOD BY AUTOMATED COUNT: 12.5 % (ref 10–15)
GFR SERPL CREATININE-BSD FRML MDRD: 42 ML/MIN/1.73M2
GLUCOSE SERPL-MCNC: 90 MG/DL (ref 70–99)
HCT VFR BLD AUTO: 26.9 % (ref 35–47)
HGB BLD-MCNC: 8.6 G/DL (ref 11.7–15.7)
MAGNESIUM SERPL-MCNC: 1.7 MG/DL (ref 1.7–2.3)
MCH RBC QN AUTO: 28.5 PG (ref 26.5–33)
MCHC RBC AUTO-ENTMCNC: 32 G/DL (ref 31.5–36.5)
MCV RBC AUTO: 89 FL (ref 78–100)
PLATELET # BLD AUTO: 301 10E3/UL (ref 150–450)
POTASSIUM SERPL-SCNC: 4.7 MMOL/L (ref 3.4–5.3)
RBC # BLD AUTO: 3.02 10E6/UL (ref 3.8–5.2)
SODIUM SERPL-SCNC: 131 MMOL/L (ref 136–145)
WBC # BLD AUTO: 7.6 10E3/UL (ref 4–11)

## 2023-07-27 PROCEDURE — 80048 BASIC METABOLIC PNL TOTAL CA: CPT | Performed by: INTERNAL MEDICINE

## 2023-07-27 PROCEDURE — 97530 THERAPEUTIC ACTIVITIES: CPT | Mod: GP

## 2023-07-27 PROCEDURE — 36415 COLL VENOUS BLD VENIPUNCTURE: CPT | Performed by: INTERNAL MEDICINE

## 2023-07-27 PROCEDURE — 99232 SBSQ HOSP IP/OBS MODERATE 35: CPT | Performed by: FAMILY MEDICINE

## 2023-07-27 PROCEDURE — 97161 PT EVAL LOW COMPLEX 20 MIN: CPT | Mod: GP

## 2023-07-27 PROCEDURE — 250N000013 HC RX MED GY IP 250 OP 250 PS 637: Performed by: INTERNAL MEDICINE

## 2023-07-27 PROCEDURE — 120N000004 HC R&B MS OVERFLOW

## 2023-07-27 PROCEDURE — 250N000013 HC RX MED GY IP 250 OP 250 PS 637

## 2023-07-27 PROCEDURE — 83735 ASSAY OF MAGNESIUM: CPT | Performed by: INTERNAL MEDICINE

## 2023-07-27 PROCEDURE — 85027 COMPLETE CBC AUTOMATED: CPT | Performed by: INTERNAL MEDICINE

## 2023-07-27 PROCEDURE — 258N000003 HC RX IP 258 OP 636: Performed by: INTERNAL MEDICINE

## 2023-07-27 PROCEDURE — 99231 SBSQ HOSP IP/OBS SF/LOW 25: CPT | Performed by: INTERNAL MEDICINE

## 2023-07-27 RX ORDER — POLYETHYLENE GLYCOL 3350 17 G/17G
17 POWDER, FOR SOLUTION ORAL DAILY
Status: DISCONTINUED | OUTPATIENT
Start: 2023-07-27 | End: 2023-07-28 | Stop reason: HOSPADM

## 2023-07-27 RX ORDER — HYDROXYZINE HYDROCHLORIDE 50 MG/1
50 TABLET, FILM COATED ORAL ONCE
Status: COMPLETED | OUTPATIENT
Start: 2023-07-27 | End: 2023-07-27

## 2023-07-27 RX ORDER — HYDROXYZINE HYDROCHLORIDE 25 MG/1
25 TABLET, FILM COATED ORAL ONCE
Status: COMPLETED | OUTPATIENT
Start: 2023-07-27 | End: 2023-07-27

## 2023-07-27 RX ADMIN — FAMOTIDINE 20 MG: 20 TABLET ORAL at 08:59

## 2023-07-27 RX ADMIN — ACETAMINOPHEN 650 MG: 325 TABLET ORAL at 20:53

## 2023-07-27 RX ADMIN — CLONAZEPAM 0.5 MG: 0.5 TABLET ORAL at 20:49

## 2023-07-27 RX ADMIN — SODIUM CHLORIDE 100 ML/HR: 9 INJECTION, SOLUTION INTRAVENOUS at 00:54

## 2023-07-27 RX ADMIN — CLOPIDOGREL BISULFATE 75 MG: 75 TABLET, FILM COATED ORAL at 08:59

## 2023-07-27 RX ADMIN — CLONAZEPAM 0.5 MG: 0.5 TABLET ORAL at 08:59

## 2023-07-27 RX ADMIN — Medication 81 MG: at 08:59

## 2023-07-27 RX ADMIN — CLONAZEPAM 0.5 MG: 0.5 TABLET ORAL at 14:35

## 2023-07-27 RX ADMIN — METOPROLOL SUCCINATE 25 MG: 25 TABLET, EXTENDED RELEASE ORAL at 20:49

## 2023-07-27 RX ADMIN — ACETAMINOPHEN 650 MG: 325 TABLET ORAL at 14:38

## 2023-07-27 RX ADMIN — HYDROXYZINE HYDROCHLORIDE 50 MG: 25 TABLET, FILM COATED ORAL at 04:07

## 2023-07-27 RX ADMIN — SODIUM CHLORIDE 100 ML/HR: 9 INJECTION, SOLUTION INTRAVENOUS at 04:09

## 2023-07-27 ASSESSMENT — ACTIVITIES OF DAILY LIVING (ADL)
ADLS_ACUITY_SCORE: 37
DIFFICULTY_COMMUNICATING: NO
DIFFICULTY_EATING/SWALLOWING: NO
WALKING_OR_CLIMBING_STAIRS_DIFFICULTY: YES
CHANGE_IN_FUNCTIONAL_STATUS_SINCE_ONSET_OF_CURRENT_ILLNESS/INJURY: NO
FALL_HISTORY_WITHIN_LAST_SIX_MONTHS: NO
WEAR_GLASSES_OR_BLIND: NO
ADLS_ACUITY_SCORE: 37
HEARING_DIFFICULTY_OR_DEAF: NO
WALKING_OR_CLIMBING_STAIRS: AMBULATION DIFFICULTY, REQUIRES EQUIPMENT
DRESSING/BATHING_DIFFICULTY: NO
DOING_ERRANDS_INDEPENDENTLY_DIFFICULTY: YES
TRANSFERRING: 1-->ASSISTANCE (EQUIPMENT/PERSON) NEEDED
ADLS_ACUITY_SCORE: 36
ADLS_ACUITY_SCORE: 28
CONCENTRATING,_REMEMBERING_OR_MAKING_DECISIONS_DIFFICULTY: NO
ADLS_ACUITY_SCORE: 39
TOILETING_ISSUES: NO
ADLS_ACUITY_SCORE: 37
ADLS_ACUITY_SCORE: 36
ADLS_ACUITY_SCORE: 39
ADLS_ACUITY_SCORE: 39
TRANSFERRING: 1-->ASSISTANCE (EQUIPMENT/PERSON) NEEDED (NOT DEVELOPMENTALLY APPROPRIATE)
ADLS_ACUITY_SCORE: 39
ADLS_ACUITY_SCORE: 28
ADLS_ACUITY_SCORE: 28

## 2023-07-27 NOTE — PROGRESS NOTES
Heart Failure Care Map  GOALS TO BE MET BEFORE DISCHARGE:    1. Decrease congestion and/or edema with diuretic therapy to achieve near optimal volume status.     Dyspnea improved: Yes, satisfactory for discharge.   Edema improved: Yes, satisfactory for discharge.        Last 24 hour I/O:   Intake/Output Summary (Last 24 hours) at 7/27/2023 1422  Last data filed at 7/27/2023 1325  Gross per 24 hour   Intake 805 ml   Output 1850 ml   Net -1045 ml           Net I/O and Weights since admission:   06/27 1500 - 07/27 1459  In: 5737 [P.O.:4898; I.V.:839]  Out: 8970 [Urine:8970]  Net: -3233     Vitals:    07/20/23 1320 07/22/23 0553 07/23/23 0424 07/25/23 0625   Weight: 80.7 kg (178 lb) (P) 79.4 kg (175 lb 0.7 oz) 80.6 kg (177 lb 11.1 oz) 88 kg (194 lb 0.1 oz)    07/26/23 0003   Weight: 89.9 kg (198 lb 3.1 oz)       2.  O2 sats > 90% on room air, or at prior home O2 therapy level.      Able to wean O2 this shift to keep sats above 90%?: Yes, satisfactory for discharge.   Does patient use Home O2? No          Current oxygenation status:   SpO2: 96 %     O2 Device: None (Room air),      3.  Tolerates ambulation and mobility near baseline.     Ambulation: Yes, satisfactory for discharge.   Times patient ambulated with staff this shift: 3    Alert and oriented. Neph signed off. Hopefully home tomorrow    Please review the Heart Failure Care Map for additional HF goal outcomes.    Margo Hilton, RN  7/27/2023

## 2023-07-27 NOTE — PROGRESS NOTES
[facility]  [facility]   Follow up    Kerry Hoffmann   MRN: [id number]  : 1935   DOA: 2023   CC: ARF/CKD      Assessment and Plan:  88 year old female      ARF/CKD; CKD likely secondary to renal atrophy as well has some acquired renal cysts in the light of renovascular disease since he does have evidence of significant atherosclerotic disease elsewhere;  CR peaked at 2.5 and now improving serially; at 1.3 today; d/w pt that we will sign off case and plan to see as needed at the discretion of her PCP  HTN: Prior to admission on metoprolol losartan 50 mg twice a day and hydralazine 25 mg 3 times daily; now on BB and giving test dose of norvasc due to multiple allergies  hyperlipid  Anxiety; at a baseline but exacerbated by medical illness and particularly gunter cath; improved  Hypervolemia: s/p diuresis and edema improved; now some edema secondary to IVF given  Hyponatremia  Hypomagnesemia :improved  Anemia   Dispo; no objection to discharge; as needed renal follow up    Renal will sign off; please call with any questions or if new concerns arise      Subjective  Able to urinate--increase in urination due to IVF  No nausea; no CP or SOB  Objective    Vital signs in last 24 hours  Temp:  [97.4  F (36.3  C)-98.3  F (36.8  C)] 97.4  F (36.3  C)  Pulse:  [64-71] 68  Resp:  [14-18] 14  BP: (138-179)/() 154/69  SpO2:  [94 %-96 %] 96 %      Intake/Output last 3 shifts  I/O last 3 completed shifts:  In: 1839 [P.O.:1000; I.V.:839]  Out: 1800 [Urine:1800]  Intake/Output this shift:  I/O this shift:  In: -   Out: 450 [Urine:450]    Physical Exam  Alert/oriented x 3, awake, NAD  CV: RRR without murmur or rub  Lung: clear and equal; no extra sounds  Ab: soft and NT; not distended; normal bs  Ext: no edema and well perfused  Skin; no rash    Pertinent Labs     Last Renal Panel:  Sodium   Date Value Ref Range Status   2023 131 (L) 136 - 145  mmol/L Final     Potassium   Date Value Ref Range Status   07/27/2023 4.7 3.4 - 5.3 mmol/L Final   09/04/2022 4.6 3.5 - 5.0 mmol/L Final     Chloride   Date Value Ref Range Status   07/27/2023 100 98 - 107 mmol/L Final   09/04/2022 96 (L) 98 - 107 mmol/L Final     Carbon Dioxide (CO2)   Date Value Ref Range Status   07/27/2023 21 (L) 22 - 29 mmol/L Final   09/04/2022 24 22 - 31 mmol/L Final     Anion Gap   Date Value Ref Range Status   07/27/2023 10 7 - 15 mmol/L Final   09/04/2022 7 5 - 18 mmol/L Final     Glucose   Date Value Ref Range Status   07/27/2023 90 70 - 99 mg/dL Final   09/04/2022 95 70 - 125 mg/dL Final     Urea Nitrogen   Date Value Ref Range Status   07/27/2023 40.1 (H) 8.0 - 23.0 mg/dL Final   09/04/2022 21 8 - 28 mg/dL Final     Creatinine   Date Value Ref Range Status   07/27/2023 1.25 (H) 0.51 - 0.95 mg/dL Final     GFR Estimate   Date Value Ref Range Status   07/27/2023 42 (L) >60 mL/min/1.73m2 Final   06/22/2021 46 (L) >60 mL/min/1.73m2 Final     Calcium   Date Value Ref Range Status   07/27/2023 9.4 8.8 - 10.2 mg/dL Final     Phosphorus   Date Value Ref Range Status   07/21/2023 3.0 2.5 - 4.5 mg/dL Final     Albumin   Date Value Ref Range Status   07/20/2023 4.0 3.5 - 5.2 g/dL Final   09/04/2022 3.4 (L) 3.5 - 5.0 g/dL Final     Recent Labs   Lab 07/27/23  0449 07/26/23  0446 07/25/23  0437 07/24/23  0432 07/20/23  1342   HGB 8.6* 9.5* 9.2* 10.1* 10.0*            I reviewed all lab results  Petra Herrera MD

## 2023-07-27 NOTE — PROGRESS NOTES
Heart Failure Care Map  GOALS TO BE MET BEFORE DISCHARGE:    1. Decrease congestion and/or edema with diuretic therapy to achieve near optimal volume status.     Dyspnea improved: No, further care required to meet this goal. Please explain Pt did complained of sob during this shift.   Edema improved: No, further care required to meet this goal. Please explain Pt still have trace edema on LE, also weight gain of about  two Lbs.         Last 24 hour I/O:   Intake/Output Summary (Last 24 hours) at 7/27/2023 0302  Last data filed at 7/27/2023 0250  Gross per 24 hour   Intake 1839 ml   Output 1500 ml   Net 339 ml           Net I/O and Weights since admission:   06/27 0700 - 07/27 0659  In: 5737 [P.O.:4898; I.V.:839]  Out: 7820 [Urine:7820]  Net: -2083     Vitals:    07/20/23 1320 07/22/23 0553 07/23/23 0424 07/25/23 0625   Weight: 80.7 kg (178 lb) (P) 79.4 kg (175 lb 0.7 oz) 80.6 kg (177 lb 11.1 oz) 88 kg (194 lb 0.1 oz)    07/26/23 0003   Weight: 89.9 kg (198 lb 3.1 oz)       2.  O2 sats > 90% on room air, or at prior home O2 therapy level.      Able to wean O2 this shift to keep sats above 90%?: Yes, satisfactory for discharge.   Does patient use Home O2? No          Current oxygenation status:   SpO2: 96 %     O2 Device: None (Room air),      3.  Tolerates ambulation and mobility near baseline.     Ambulation: Yes, satisfactory for discharge.   Times patient ambulated with staff this shift: 2    Please review the Heart Failure Care Map for additional HF goal outcomes.  Pt is RA, complained of tightness in the chest that was relieved by Toms. Pt is an assist of one with a cane. Pt has been having frequent urination with good amounts of out put. BP in the 170s at midnight. Dr Infante was notified and he is not concern given the fact that she has NS running at a hundred ml per hour. Pt is pending pt eval prior to discharge. Will continue to monitor pt.   Tenzin Mclean RN  7/27/2023

## 2023-07-27 NOTE — PROGRESS NOTES
[facility]    Medicine Progress Note - Hospitalist Service    Date of Admission:  7/20/2023    Assessment & Plan      88-year-old female with history of hypertension and chronic kidney disease admitted to the hospital with concerns for acute CHF, was diuresed and developed TIMOTHY.    Acute kidney injury  --- Improved  --- Suspect related to diuresis   --- Appreciate nephrology management, signed off  --- Nephrology suspect CKD secondary to renal atrophy, renal cysts in light of renovascular disease  --- Stop IV fluids, creatinine down to baseline.  Peak at 2.5  --- Helio on hold  --- Renal ultrasound negative for hydronephrosis  ---PT rec home with Home care/home pt    Shortness of breath  --- Presenting complaint  --- 07/21 showed EF 65% with some LVH  --- Diuresed  --- Stop IV fluids    Hyponatremia  --- Roger to hypovolemia.  Improved with IV fluids.    Hypertension  --- Continue beta-blocker  --- Holding losartan  --didn't tolerate Trial of Norvasc  --- Stopping hydralazine as she was noncompliant to take it given some side effects    Urinary retention  --- Status post Wiley catheter placed 7/24  --- Removed 7/25  --- UA -7/23    Normocytic anemia  --- Suspect anemia of chronic disease.  --- SPECT hemoglobin lowered with IV fluids overnight  --- Continue to monitor    Generalized anxiety disorder  --- Continue clonazepam  --- Clear indication for psych consult     Diet: Low Saturated Fat Na <2400 mg    DVT Prophylaxis: Low Risk/Ambulatory with no VTE prophylaxis indicated  Wiley Catheter: Not present  Lines: None     Cardiac Monitoring: None  Code Status: Full Code      Clinically Significant Risk Factors         # Hyponatremia: Lowest Na = 128 mmol/L in last 2 days, will monitor as appropriate                 # Obesity: Estimated body mass index is 38.71 kg/m  as calculated from the following:    Height as of this encounter: 1.524 m (5').    Weight as of this encounter: 89.9 kg (198 lb  3.1 oz).           Disposition Plan      Expected Discharge Date: 07/28/2023      Destination: assisted living  Discharge Comments: ambulating with sba. Refusing to DC today.. Will go home.          Priscilla Ramos MD  Hospitalist Service  [facility]  Securely message with [employer] (more info)  Text page via [employer] Paging/Directory   ______________________________________________________________________    Interval History   --- Doing okay.  Feels she is having polyuria with IV fluids on overnight.  Feels fatigued and somewhat weak.    Physical Exam   Vital Signs: Temp: 97.4  F (36.3  C) Temp src: Oral BP: (!) 154/69 Pulse: 68   Resp: 14 SpO2: 96 % O2 Device: None (Room air)    Weight: 198 lbs 3.1 oz    General Appearance: Pleasant, no apparent distress  Respiratory: Clear to auscultation  Cardiovascular the rate and rhythm  GI: Soft and nontender without hepatosplenomegaly  Skin: No significant lower extremity edema  Other: neurologically grossly intact without focal deficits appreciated    Medical Decision Making             Data     I have personally reviewed the following data over the past 24 hrs:    7.6  \   8.6 (L)   / 301     131 (L) 100 40.1 (H) /  90   4.7 21 (L) 1.25 (H) \       Imaging results reviewed over the past 24 hrs:   No results found for this or any previous visit (from the past 24 hour(s)).

## 2023-07-27 NOTE — PROGRESS NOTES
07/27/23 1045   Appointment Info   Signing Clinician's Name / Credentials (PT) Beba MIGUEL Orourke   Living Environment   People in Home alone   Current Living Arrangements independent living facility   Home Accessibility no concerns;wheelchair accessible   Transportation Anticipated health plan transportation   Self-Care   Usual Activity Tolerance moderate   Current Activity Tolerance fair   Equipment Currently Used at Home cane, straight;wheelchair, power   Activity/Exercise/Self-Care Comment Pt notes only amb short distances in her Apt with SEC. Otherwise uses power wc for mobility.   General Information   Onset of Illness/Injury or Date of Surgery 07/20/23   Referring Physician Priscilla Ramos MD   Patient/Family Therapy Goals Statement (PT) return home   Pertinent History of Current Problem (include personal factors and/or comorbidities that impact the POC) 88 year old female admitted on 7/20/2023. She has been admitted with b/l leg swelling, sob with activity. and some chest tightness since over a week. The chest discomfort occurs at night after an hour of sleep, non radiating with no relation to activity or breathing. She wakes up with her heart pounding. No CP currently. Patient subsequently came to the ER for further evaluation and management   Strength (Manual Muscle Testing)   Strength (Manual Muscle Testing) Deficits observed during functional mobility   Bed Mobility   Bed Mobility sit-supine   Sit-Supine [place] (Bed Mobility) supervision;verbal cues   Assistive Device (Bed Mobility) bed rails   Transfers   Transfers sit-stand transfer   Sit-Stand Transfer   Sit-Stand [place] (Transfers) supervision   Assistive Device (Sit-Stand Transfers) cane, straight   Gait/Stairs (Locomotion)   [place] Level (Gait) contact guard   Assistive Device (Gait) cane, straight   Distance in Feet 15'   Pattern (Gait) step-through   Deviations/Abnormal Patterns (Gait) gait speed decreased   Clinical Impression    Criteria for Skilled Therapeutic Intervention Yes, treatment indicated   PT Diagnosis (PT) Impaired functional mobility   Influenced by the following impairments Weakness, fatigue   Functional limitations due to impairments Impaired strength, transfers, gait   Clinical Presentation (PT Evaluation Complexity) Stable/Uncomplicated   Clinical Presentation Rationale Presents as diagnosed   Clinical Decision Making (Complexity) low complexity   Planned Therapy Interventions (PT) bed mobility training;gait training;home exercise program;strengthening;transfer training   Anticipated Equipment Needs at Discharge (PT) cane, straight;walker, rolling;wheelchair   Risk & Benefits of therapy have been explained patient   PT Total Evaluation Time   PT Eval, Low Complexity Minutes (35246) 10   Physical Therapy Goals   PT Frequency Daily   PT Predicted Duration/Target Date for Goal Attainment 08/03/23   PT Goals Bed Mobility;Transfers;Gait   PT: Bed Mobility Independent;Supine to/from sit   PT: Transfers Modified independent;Sit to/from stand;Bed to/from chair;Assistive device   PT: Gait Supervision/stand-by assist;Assistive device;50 feet   Interventions   Interventions Quick Adds Therapeutic Activity   Therapeutic Activity   Therapeutic Activities: dynamic activities to improve functional performance Minutes (08582) 15   Symptoms Noted During/After Treatment Fatigue   Treatment Detail/Skilled Intervention Cues for hand placement and safety with transfers. Pt amb to restroom. Performed toilet transfer, managed brief and tee-cares with SBA. Amb back to bed additional 15' with SEC and CGA. Mild lateral sway with amb. Pt may benefit from use of FWW. Pt returned to supine at end of session. Bed alarm engaged and call light in hand.   PT Discharge Planning   PT Plan progress transfers, amb as jose (SEC vs. FWW), HEP   PT Discharge Recommendation (DC Rec) home with assist;home with home care physical therapy   PT Rationale for DC Rec  Recommend home PT to improve overall strength and mobility.   PT Brief overview of current status Amb 15'x2 with SEC and CGA.   Total Session Time   Timed Code Treatment Minutes 15   Total Session Time (sum of timed and untimed services) 25         Beba Orourke, PT, DPT  7/27/2023

## 2023-07-27 NOTE — PLAN OF CARE
Problem: Heart Failure  Goal: Optimal Functional Ability  Intervention: Optimize Functional Ability  Recent Flowsheet Documentation  Taken 7/26/2023 2100 by Tenzin Mclean RN  Activity Management: activity adjusted per tolerance  Goal: Effective Oxygenation and Ventilation  Intervention: Promote Airway Secretion Clearance  Recent Flowsheet Documentation  Taken 7/26/2023 2100 by Tenzin Mclean RN  Activity Management: activity adjusted per tolerance  Intervention: Optimize Oxygenation and Ventilation  Recent Flowsheet Documentation  Taken 7/26/2023 2100 by Tenzin Mclean RN  Head of Bed (HOB) Positioning: HOB at 30 degrees  Goal: Effective Breathing Pattern During Sleep  Intervention: Monitor and Manage Obstructive Sleep Apnea  Recent Flowsheet Documentation  Taken 7/26/2023 2100 by Tenzin Mclean RN  Medication Review/Management: medications reviewed   Goal Outcome Evaluation:

## 2023-07-28 VITALS
DIASTOLIC BLOOD PRESSURE: 72 MMHG | TEMPERATURE: 98.2 F | BODY MASS INDEX: 38.91 KG/M2 | HEART RATE: 74 BPM | SYSTOLIC BLOOD PRESSURE: 162 MMHG | OXYGEN SATURATION: 98 % | HEIGHT: 60 IN | RESPIRATION RATE: 18 BRPM | WEIGHT: 198.19 LBS

## 2023-07-28 PROCEDURE — 99239 HOSP IP/OBS DSCHRG MGMT >30: CPT | Performed by: FAMILY MEDICINE

## 2023-07-28 PROCEDURE — 250N000013 HC RX MED GY IP 250 OP 250 PS 637: Performed by: INTERNAL MEDICINE

## 2023-07-28 RX ADMIN — Medication 81 MG: at 08:01

## 2023-07-28 RX ADMIN — CLOPIDOGREL BISULFATE 75 MG: 75 TABLET, FILM COATED ORAL at 08:01

## 2023-07-28 RX ADMIN — CLONAZEPAM 0.5 MG: 0.5 TABLET ORAL at 08:01

## 2023-07-28 ASSESSMENT — ACTIVITIES OF DAILY LIVING (ADL)
ADLS_ACUITY_SCORE: 28

## 2023-07-28 NOTE — DISCHARGE SUMMARY
[facility]  Hospitalist Discharge Summary      Date of Admission:  7/20/2023  Date of Discharge:  7/28/2023 12:38 PM  Discharging Provider: Priscilla Ramos MD  Discharge Service: Hospitalist Service    Discharge Diagnoses   Acute kidney injury, resolved  Shortness of breath, suspected volume overload although no clear CHF.  Hyponatremia, improved  Hypertension  Urinary retention.  Resolved and passed trial of void  Anemia of chronic disease  Generalized anxiety disorder    Clinically Significant Risk Factors     # Obesity: Estimated body mass index is 38.71 kg/m  as calculated from the following:    Height as of this encounter: 1.524 m (5').    Weight as of this encounter: 89.9 kg (198 lb 3.1 oz).       Follow-ups Needed After Discharge   Follow-up Appointments     Follow-up and recommended labs and tests       Follow up with primary care provider, Kaylyn Bolanos, within 14 days to   evaluate medication change and for hospital follow- up.  The following   labs/tests are recommended: BMP to check kidney function.            Discharge Disposition   Discharged to home  Condition at discharge: Stable    Hospital Course   88-year-old female with history of hypertension and chronic kidney disease admitted to the hospital with concerns for acute CHF, was diuresed and developed TIMOTHY.  Eventually improved and was seen by physical therapy recommending home PT.  Agreeable to home PT and discharged home in good condition.    Acute kidney injury  --- Improved  --- Suspect related to diuresis   --- Appreciate nephrology management, signed off  --- Nephrology suspect CKD secondary to renal atrophy, renal cysts in light of renovascular disease  --- Stop IV fluids, creatinine down to baseline.  Peak at 2.5  --- Helio on hold  --- Renal ultrasound negative for hydronephrosis  ---PT rec home with Home care/home pt    Shortness of breath  --- No clear CHF  --- Presenting complaint  --- 07/21 showed EF 65% with some  LVH  --- Diuresed  --- Stop IV fluids    Hyponatremia  --- Roger to hypovolemia.  Improved with IV fluids.    Hypertension  --- Continue beta-blocker  --- Holding losartan  --didn't tolerate Trial of Norvasc  --- Stopping hydralazine as she was noncompliant to take it given some side effects    Urinary retention  --- Status post Wiley catheter placed 7/24  --- Removed 7/25  --- UA -7/23    Normocytic anemia  --- Suspect anemia of chronic disease.  --- SPECT hemoglobin lowered with IV fluids overnight  --- Continue to monitor    Generalized anxiety disorder  --- Continue clonazepam  --- Clear indication for psych consult    Consultations This Hospital Stay   CARE MANAGEMENT / SOCIAL WORK IP CONSULT  NEPHROLOGY IP CONSULT  UROLOGY IP CONSULT  PSYCHIATRY IP CONSULT  CARE MANAGEMENT / SOCIAL WORK IP CONSULT  PHYSICAL THERAPY ADULT IP CONSULT    Code Status   Prior    Time Spent on this Encounter   IPriscilla MD, personally saw the patient today and spent greater than 30 minutes discharging this patient.       Priscilla Ramos MD  [place] [phone]  Phone: [phone]  Fax: [phone]  ______________________________________________________________________    Physical Exam   Vital Signs: Temp: 98.2  F (36.8  C) Temp src: Oral BP: (!) 162/72 Pulse: 74   Resp: 18 SpO2: 98 % O2 Device: None (Room air)    Weight: 198 lbs 3.1 oz  General Appearance: Pleasant female no apparent distress  Respiratory: Lear to auscultation bilaterally without wheezes crackles or rhonchi  Cardiovascular: Regular rate and rhythm without murmurs rubs or gallops  GI: Obese soft and nontender without hepatosplenomegaly  Skin: Lower extremity edema  Other: Neuro grossly intact without focal deficits appreciated       Primary Care Physician   Kaylyn Bolanos    Discharge Orders      Home Care Referral      Follow Up (TCM)    Follow up in renal clinic ([facility]) as needed or at the discretion of primary MD  Call [phone] to make appt     Reason for your hospital stay    Shortness of breath - acute kidney injury     Follow-up and recommended labs and tests     Follow up with primary care provider, Kaylyn Bolanos, within 14 days to evaluate medication change and for hospital follow- up.  The following labs/tests are recommended: BMP to check kidney function.     Activity    Your activity upon discharge: activity as tolerated     Diet    Follow this diet upon discharge: Orders Placed This Encounter      Low Saturated Fat Na <2400 mg       Significant Results and Procedures   Most Recent 3 CBC's:  Recent Labs   Lab Test 07/27/23  0449 07/26/23  0446 07/25/23  0437   WBC 7.6 8.0 7.2   HGB 8.6* 9.5* 9.2*   MCV 89 88 89    349 323     Most Recent 3 BMP's:  Recent Labs   Lab Test 07/27/23  0449 07/26/23  1628 07/26/23  0446   * 129* 128*   POTASSIUM 4.7 4.8 4.7   CHLORIDE 100 97* 96*   CO2 21* 22 22   BUN 40.1* 41.5* 44.7*   CR 1.25* 1.48* 1.58*   ANIONGAP 10 10 10   ISAIAH 9.4 9.5 9.4   GLC 90 95 92     7-Day Micro Results       No results found for the last 168 hours.        ,   Results for orders placed or performed during the hospital encounter of 07/20/23   Chest XR,  PA & LAT    Narrative    EXAM: XR CHEST 2 VIEWS  LOCATION: [facility]  DATE: 7/20/2023    INDICATION: sob  COMPARISON: 04/10/2023      Impression    IMPRESSION: Severe right and mild left shoulder arthropathy. Enlarged cardiac silhouette. Lungs appear clear.   US Lower Extremity Venous Duplex Right    Narrative    EXAM: US LOWER EXTREMITY VENOUS DUPLEX RIGHT  LOCATION: [facility]  DATE: 7/20/2023    INDICATION: pain calf  COMPARISON: None.  TECHNIQUE: Venous Duplex ultrasound of the right lower extremity with and without compression, augmentation and duplex. Color flow and spectral Doppler with waveform analysis performed.    FINDINGS:  Exam includes the common femoral, femoral, popliteal, and contralateral common femoral veins as well as segmentally visualized deep calf veins and greater saphenous vein.     RIGHT: No deep vein thrombosis. No superficial thrombophlebitis. No popliteal cyst.      Impression    IMPRESSION:  1.  No deep venous thrombosis in the right lower extremity.   US Renal Complete Non-Vascular    Narrative    EXAM: US RENAL COMPLETE NON-VASCULAR  LOCATION: [facility]  DATE: 2023    INDICATION: mara  COMPARISON: CT AP 2022  TECHNIQUE: Routine Bilateral Renal and Bladder Ultrasound.    FINDINGS:    RIGHT KIDNEY: 9.9 x 3.6 x 4.1 cm. Normal without hydronephrosis or masses. Cortical medullary differentiation is preserved.    LEFT KIDNEY: 7.9 x 5.3 x 5.2 cm. Few liver cysts are again noted, the largest inferiorly measuring 4 cm. No hydronephrosis. Cortical medullary differentiation is preserved.    BLADDER: Normal    No ascites.        Impression    IMPRESSION:  1.  Kidneys are unremarkable without evidence of hydronephrosis.  2.  Incidental left renal cysts again noted.   Echocardiogram Complete    Narrative    [id number]  [id number]  [id number]  [id number]^FREDY^[place] [address]     Name: DEVON REESE  MRN: [id number]  : 1935  Study Date: 2023 10:55 AM  Age: 88 yrs  Gender: Female  Patient Location: [facility]  Reason For Study: CHF  Ordering Physician: MARCO DANIEL  Performed By: NIC     BSA: 1.8 m2  Height: 60 in  Weight: 178 lb  HR: 86  BP: 141/67 mmHg  ______________________________________________________________________________  Procedure  Complete Portable Echo Adult.  ______________________________________________________________________________  Interpretation Summary     1. Normal left ventricular size and systolic performance with a visually  estimated ejection fraction of 65%.  2. There is mild concentric increase in left  ventricular wall thickness.  3. There is mild aortic insufficiency.  4. There is mild, to perhaps mild-moderate, mitral insufficiency.  5. Normal right ventricular size and systolic performance.  6. There is mild left atrial enlargement.  ______________________________________________________________________________  Left ventricle:  Normal left ventricular size and systolic performance with a visually  estimated ejection fraction of 65%. There is normal regional wall motion.  There is mild concentric increase in left ventricular wall thickness.     Assessment of LV Diastolic Function: The cumulative findings are indeterminate  in the evaluation of diastolic function [The septal e' velocity is > 7 cm/s &  lateral e' velocity is < 10 cm/s. The average E/e' is < 14. The TR velocity  cannot be determined due to insufficient tricuspid insufficiency signal. Left  atrial volume index is greater than 34 mL/mÂ ].     Right ventricle:  Normal right ventricular size and systolic performance.     Left atrium:  There is mild left atrial enlargement.     Right atrium:  The right atrium is of normal size.     IVC:  The IVC is of normal caliber.     Aortic valve:  The aortic valve is comprised of three cusps. There is no significant aortic  stenosis. There is mild aortic insufficiency.     Mitral valve:  The mitral valve appears morphologically normal. There is mild, to perhaps  mild-moderate, mitral insufficiency.     Tricuspid valve:  The tricuspid valve is grossly morphologically normal. There is trace  tricuspid insufficiency.     Pulmonic valve:  The pulmonic valve is grossly morphologically normal. There is trace pulmonic  insufficiency.     Thoracic aorta:  The aortic root and proximal ascending aorta are of normal dimension.     Pericardium:  There is no significant pericardial  effusion.  ______________________________________________________________________________  ______________________________________________________________________________  MMode/2D Measurements & Calculations  RVDd: 3.1 cm  IVSd: 1.3 cm  LVIDd: 4.4 cm  LVIDs: 2.9 cm  LVPWd: 1.2 cm  FS: 33.6 %  LV mass(C)d: 199.0 grams  LV mass(C)dI: 112.0 grams/m2  Ao root diam: 3.1 cm  LA dimension: 3.1 cm  asc Aorta Diam: 3.2 cm  LA/Ao: 0.99  LVOT diam: 1.8 cm  LVOT area: 2.6 cm2  LA Volume Indexed (AL/bp): 37.5 ml/m2     RWT: 0.53  TAPSE: 2.2 cm     Time Measurements  MM HR: 109.0 BPM     Doppler Measurements & Calculations  MV E max ben: 83.9 cm/sec  MV A max ben: 132.9 cm/sec  MV E/A: 0.63  MV dec time: 0.17 sec  AI P1/2t: 430.2 msec  LV V1 max P.6 mmHg  LV V1 max: 118.8 cm/sec  LV V1 VTI: 24.6 cm  SV(LVOT): 64.0 ml  SI(LVOT): 36.0 ml/m2  PA acc time: 0.11 sec  PI end-d ben: 68.3 cm/sec  E/E' avg: 10.8  Lateral E/e': 11.2     Medial E/e': 10.5  RV S Ben: 18.2 cm/sec     ______________________________________________________________________________  Report approved by: Pavel Mobley 2023 01:16 PM             Discharge Medications   Discharge Medication List as of 2023 12:08 PM        CONTINUE these medications which have NOT CHANGED    Details   acetaminophen (TYLENOL) 500 MG tablet Take 1,000 mg by mouth 2 times daily, Historical      aspirin 81 MG EC tablet Take 81 mg by mouth daily, Historical      calcium carbonate 750 MG CHEW Take 1 tablet by mouth daily, Historical      Cholecalciferol (VITAMIN D3) 50 MCG (2000) CAPS Take 1 tablet by mouth daily, Historical      clonazePAM (KLONOPIN) 0.5 MG tablet Take 0.5 mg by mouth 3 times daily, Historical      clopidogrel (PLAVIX) 75 MG tablet Take 1 tablet (75 mg) by mouth daily, Disp-30 tablet, R-11, E-Prescribe      diclofenac (VOLTAREN) 1 % topical gel Apply 4 g topically 4 times daily knee pain, Historical      famotidine (PEPCID) 20 MG tablet Take 20 mg  by mouth 2 times daily, Historical      levalbuterol (XOPENEX HFA) 45 MCG/ACT inhaler Inhale 2 puffs into the lungs every 4 hours as needed for shortness of breath or wheezing, Disp-15 g, R-0, E-Prescribe      losartan (COZAAR) 50 MG tablet Take 50 mg by mouth 2 times daily, Historical      metoprolol succinate ER (TOPROL-XL) 25 MG 24 hr tablet Take 1 tablet by mouth At Bedtime, Historical           STOP taking these medications       hydrALAZINE (APRESOLINE) 25 MG tablet Comments:   Reason for Stopping:             Allergies   Allergies   Allergen Reactions    Buspirone Shortness Of Breath    Ciprofloxacin Hives and Shortness Of Breath     Other reaction(s): Unknown    Cortisone      Other reaction(s): Throat Swelling/Closing, Unknown  Reaction 9-5-94      Hydrocodone-Acetaminophen Shortness Of Breath     Other reaction(s): Unknown    Lansoprazole Shortness Of Breath    Metoprolol Shortness Of Breath    Nedocromil      Other reaction(s): Throat Swelling/Closing    Niacin Shortness Of Breath     Other reaction(s): Unknown    Acetaminophen      Other reaction(s): breathing difficulties, red ears,high blood press.    Albuterol Other (See Comments)     Inhaler had extreme effect on nervous system      Amlodipine      Other reaction(s): Erythema, Unknown    Azithromycin      Other reaction(s): *Unknown, Unknown    Cefixime Diarrhea     Other reaction(s): Unknown    Cefprozil Nausea and Vomiting     Other reaction(s): Unknown    Cefuroxime      Other reaction(s): *Unknown    Cephalexin      Other reaction(s): *Unknown, Unknown    Contrast Dye      Other reaction(s): hives    Cyclobenzaprine      Other reaction(s): Sedation    Dextromethorphan      Other reaction(s): headache,nausea    Dextromethorphan-Guaifenesin Nausea     Other reaction(s): Headache    Diltiazem      Other reaction(s): Erythema, Unknown  Ears face arms and chest red and on fire-body tremors      Erythromycin      Other reaction(s): Unknown     Esomeprazole      Other reaction(s): Headache    Ethanol      Other reaction(s): Adverse reaction    Fenofibrate Muscle Pain (Myalgia)     Other reaction(s): Unknown    Fluticasone-Salmeterol Other (See Comments)     Elevated blood pressure      Hydrocodone      Other reaction(s): breathing difficulties    Levofloxacin Nausea     Other reaction(s): Headache, Unknown    Methylprednisolone     Metronidazole     Monosodium Glutamate     Moxifloxacin      Other reaction(s): Dizziness    Nifedipine      Other reaction(s): Edema  Took for 5-93 to 9-94 red and swollen leg      Nsaids      Other reaction(s): Unknown    Ofloxacin      Other reaction(s): *Unknown    Ondansetron      Other reaction(s): Constipation    Parsley Seed     Penicillins Unknown     She doesn't remember other than it occurred as a very young woman     Piper     Pirbuterol Other (See Comments)     Immediate extreme effect on nervous system      Pravastatin      Other reaction(s): Dizziness, Unknown    Pseudoephedrine      Other reaction(s): headache,nausea    Pseudoephedrine-Dm-Gg     Simvastatin Muscle Pain (Myalgia)    Spironolactone      Other reaction(s): stomach cramps, nausea    Sulfamethoxazole-Trimethoprim      Other reaction(s): Unknown    Tramadol      Other reaction(s): red ears,high blood press.    Tramadol-Acetaminophen      Other reaction(s): Tachycardia, Unknown    Triamterene Other (See Comments)     Greatly bothered with sun-took medication for three years  Greatly bothered with sun      Diatrizoate Rash    Telmisartan Palpitations

## 2023-07-28 NOTE — PLAN OF CARE
Problem: Fall Injury Risk  Goal: Absence of Fall and Fall-Related Injury  Intervention: Identify and Manage Contributors  Recent Flowsheet Documentation  Taken 7/28/2023 0100 by Tenzin Mclean RN  Medication Review/Management: medications reviewed  Intervention: Promote Injury-Free Environment  Recent Flowsheet Documentation  Taken 7/28/2023 0100 by Tenzin Mclean RN  Safety Promotion/Fall Prevention:   assistive device/personal items within reach   nonskid shoes/slippers when out of bed   patient and family education   Goal Outcome Evaluation:         Pt is alert and oriented x 4, denied any pain at this time. Assist of one with a cane and a safety belt.  Trace edema on the lower extremity.  Nonskid slippers when out of bed. Med surge, no tele. No complains of shortness of breath this shift. Possible discharge today. Will continue to monitor pt.

## 2023-07-28 NOTE — PROGRESS NOTES
MD update, medically stable for discharge with home PT.     Referral made to [facility] for home PT.     Attempted to call nurse line [phone] x2, not available; will try again.    1230 Met with patient to introduce care management role and assist with discharge plan. Pt lives in independent living. Discussed MD recommendation of home PT.  Patient in agreement of home PT. Nephew plans to provide transport. [facility] reviewing referral.

## 2023-07-28 NOTE — PLAN OF CARE
Problem: Plan of Care - These are the overarching goals to be used throughout the patient stay.    Goal: Readiness for Transition of Care  Outcome: Adequate for Care Transition   Goal Outcome Evaluation:    Vital signs stable. C/o chronic pain in bilateral knees - declined tylenol. Up SBA.     Discharge instructions given and reviewed with patient. Patient verbalized understanding. Medication returned from pharmacy. Belongings packed. Discharging home with family to transport.                       Implemented All Universal Safety Interventions:  [place] to call system. Call bell, personal items and telephone within reach. Instruct patient to call for assistance. Room bathroom lighting operational. Non-slip footwear when patient is off stretcher. Physically safe environment: no spills, clutter or unnecessary equipment. Stretcher in lowest position, wheels locked, appropriate side rails in place.

## 2023-07-28 NOTE — PLAN OF CARE
Physical Therapy Discharge Summary    Reason for therapy discharge:    Discharged to home with home therapy.    Progress towards therapy goal(s). See goals on Care Plan in [facility] electronic health record for goal details.  Goals partially met.  Barriers to achieving goals:   discharge from facility.    Therapy recommendation(s):    Recommend continued therapies to improve overall strength, balance and mobility.       Beba Orourke, PT, DPT  7/28/2023

## 2023-07-28 NOTE — PROGRESS NOTES
Heart Failure Care Map  GOALS TO BE MET BEFORE DISCHARGE:    1. Decrease congestion and/or edema with diuretic therapy to achieve near optimal volume status.     Dyspnea improved: Yes, satisfactory for discharge.   Edema improved: Yes, satisfactory for discharge.        Last 24 hour I/O:   Intake/Output Summary (Last 24 hours) at 7/28/2023 0130  Last data filed at 7/28/2023 0113  Gross per 24 hour   Intake --   Output 2050 ml   Net -2050 ml           Net I/O and Weights since admission:   06/28 0700 - 07/28 0659  In: 5737 [P.O.:4898; I.V.:839]  Out: 9820 [Urine:9820]  Net: -4083     Vitals:    07/20/23 1320 07/22/23 0553 07/23/23 0424 07/25/23 0625   Weight: 80.7 kg (178 lb) (P) 79.4 kg (175 lb 0.7 oz) 80.6 kg (177 lb 11.1 oz) 88 kg (194 lb 0.1 oz)    07/26/23 0003   Weight: 89.9 kg (198 lb 3.1 oz)       2.  O2 sats > 90% on room air, or at prior home O2 therapy level.      Able to wean O2 this shift to keep sats above 90%?: Yes, satisfactory for discharge.   Does patient use Home O2? No          Current oxygenation status:   SpO2: 96 %     O2 Device: None (Room air),      3.  Tolerates ambulation and mobility near baseline.     Ambulation: Yes, satisfactory for discharge.   Times patient ambulated with staff this shift: 0    Please review the Heart Failure Care Map for additional HF goal outcomes.    Tenzin Mclean RN  7/28/2023

## 2023-08-07 ENCOUNTER — LAB REQUISITION (OUTPATIENT)
Dept: LAB | Facility: CLINIC | Age: 88
End: 2023-08-07
Payer: MEDICARE

## 2023-08-07 DIAGNOSIS — N18.32 CHRONIC KIDNEY DISEASE, STAGE 3B (H): ICD-10-CM

## 2023-08-07 DIAGNOSIS — R82.90 UNSPECIFIED ABNORMAL FINDINGS IN URINE: ICD-10-CM

## 2023-08-07 LAB
ERYTHROCYTE [DISTWIDTH] IN BLOOD BY AUTOMATED COUNT: 13.2 % (ref 10–15)
HCT VFR BLD AUTO: 29.6 % (ref 35–47)
HGB BLD-MCNC: 9.3 G/DL (ref 11.7–15.7)
MCH RBC QN AUTO: 28.7 PG (ref 26.5–33)
MCHC RBC AUTO-ENTMCNC: 31.4 G/DL (ref 31.5–36.5)
MCV RBC AUTO: 91 FL (ref 78–100)
PLATELET # BLD AUTO: 396 10E3/UL (ref 150–450)
RBC # BLD AUTO: 3.24 10E6/UL (ref 3.8–5.2)
WBC # BLD AUTO: 8.2 10E3/UL (ref 4–11)

## 2023-08-07 PROCEDURE — 80048 BASIC METABOLIC PNL TOTAL CA: CPT | Mod: ORL | Performed by: FAMILY MEDICINE

## 2023-08-07 PROCEDURE — 85027 COMPLETE CBC AUTOMATED: CPT | Mod: ORL | Performed by: FAMILY MEDICINE

## 2023-08-07 PROCEDURE — 87086 URINE CULTURE/COLONY COUNT: CPT | Mod: ORL | Performed by: FAMILY MEDICINE

## 2023-08-08 LAB
ANION GAP SERPL CALCULATED.3IONS-SCNC: 8 MMOL/L (ref 7–15)
BUN SERPL-MCNC: 30.6 MG/DL (ref 8–23)
CALCIUM SERPL-MCNC: 9.4 MG/DL (ref 8.8–10.2)
CHLORIDE SERPL-SCNC: 106 MMOL/L (ref 98–107)
CREAT SERPL-MCNC: 1.36 MG/DL (ref 0.51–0.95)
DEPRECATED HCO3 PLAS-SCNC: 22 MMOL/L (ref 22–29)
GFR SERPL CREATININE-BSD FRML MDRD: 38 ML/MIN/1.73M2
GLUCOSE SERPL-MCNC: 90 MG/DL (ref 70–99)
POTASSIUM SERPL-SCNC: 5 MMOL/L (ref 3.4–5.3)
SODIUM SERPL-SCNC: 136 MMOL/L (ref 136–145)

## 2023-08-09 ENCOUNTER — HOSPITAL ENCOUNTER (EMERGENCY)
Facility: HOSPITAL | Age: 88
Discharge: HOME | DRG: 871 | End: 2023-08-09
Attending: EMERGENCY MEDICINE | Admitting: EMERGENCY MEDICINE
Payer: MEDICARE

## 2023-08-09 ENCOUNTER — APPOINTMENT (OUTPATIENT)
Dept: RADIOLOGY | Facility: HOSPITAL | Age: 88
DRG: 871 | End: 2023-08-09
Attending: EMERGENCY MEDICINE
Payer: MEDICARE

## 2023-08-09 VITALS
HEART RATE: 89 BPM | TEMPERATURE: 99.7 F | SYSTOLIC BLOOD PRESSURE: 119 MMHG | BODY MASS INDEX: 35.67 KG/M2 | HEIGHT: 63 IN | OXYGEN SATURATION: 95 % | DIASTOLIC BLOOD PRESSURE: 60 MMHG | RESPIRATION RATE: 24 BRPM

## 2023-08-09 DIAGNOSIS — N30.00 ACUTE CYSTITIS WITHOUT HEMATURIA: ICD-10-CM

## 2023-08-09 LAB
ALBUMIN UR-MCNC: 30 MG/DL
ANION GAP SERPL CALCULATED.3IONS-SCNC: 11 MMOL/L (ref 7–15)
APPEARANCE UR: CLEAR
BACTERIA #/AREA URNS HPF: ABNORMAL /HPF
BACTERIA UR CULT: ABNORMAL
BILIRUB UR QL STRIP: NEGATIVE
BUN SERPL-MCNC: 29.6 MG/DL (ref 8–23)
CALCIUM SERPL-MCNC: 9.9 MG/DL (ref 8.8–10.2)
CHLORIDE SERPL-SCNC: 105 MMOL/L (ref 98–107)
COLOR UR AUTO: ABNORMAL
CREAT SERPL-MCNC: 1.43 MG/DL (ref 0.51–0.95)
DEPRECATED HCO3 PLAS-SCNC: 21 MMOL/L (ref 22–29)
ERYTHROCYTE [DISTWIDTH] IN BLOOD BY AUTOMATED COUNT: 13.2 % (ref 10–15)
GFR SERPL CREATININE-BSD FRML MDRD: 36 ML/MIN/1.73M2
GLUCOSE SERPL-MCNC: 108 MG/DL (ref 70–99)
GLUCOSE UR STRIP-MCNC: NEGATIVE MG/DL
GRANULAR CAST: 1 /LPF
HCT VFR BLD AUTO: 30 % (ref 35–47)
HGB BLD-MCNC: 9.8 G/DL (ref 11.7–15.7)
HGB UR QL STRIP: ABNORMAL
HOLD SPECIMEN: NORMAL
HOLD SPECIMEN: NORMAL
HYALINE CASTS: 1 /LPF
KETONES UR STRIP-MCNC: NEGATIVE MG/DL
LACTATE SERPL-SCNC: 1.2 MMOL/L (ref 0.7–2)
LEUKOCYTE ESTERASE UR QL STRIP: ABNORMAL
MCH RBC QN AUTO: 29 PG (ref 26.5–33)
MCHC RBC AUTO-ENTMCNC: 32.7 G/DL (ref 31.5–36.5)
MCV RBC AUTO: 89 FL (ref 78–100)
MUCOUS THREADS #/AREA URNS LPF: PRESENT /LPF
NITRATE UR QL: POSITIVE
PH UR STRIP: 5 [PH] (ref 5–7)
PLATELET # BLD AUTO: 322 10E3/UL (ref 150–450)
POTASSIUM SERPL-SCNC: 4.5 MMOL/L (ref 3.4–5.3)
RBC # BLD AUTO: 3.38 10E6/UL (ref 3.8–5.2)
RBC URINE: 4 /HPF
SODIUM SERPL-SCNC: 137 MMOL/L (ref 136–145)
SP GR UR STRIP: 1.01 (ref 1–1.03)
SQUAMOUS EPITHELIAL: 1 /HPF
UROBILINOGEN UR STRIP-MCNC: <2 MG/DL
WBC # BLD AUTO: 11.3 10E3/UL (ref 4–11)
WBC CLUMPS #/AREA URNS HPF: PRESENT /HPF
WBC URINE: 26 /HPF

## 2023-08-09 PROCEDURE — 99284 EMERGENCY DEPT VISIT MOD MDM: CPT | Mod: 25

## 2023-08-09 PROCEDURE — 81001 URINALYSIS AUTO W/SCOPE: CPT | Performed by: EMERGENCY MEDICINE

## 2023-08-09 PROCEDURE — 258N000003 HC RX IP 258 OP 636: Performed by: EMERGENCY MEDICINE

## 2023-08-09 PROCEDURE — 80048 BASIC METABOLIC PNL TOTAL CA: CPT | Performed by: EMERGENCY MEDICINE

## 2023-08-09 PROCEDURE — 96361 HYDRATE IV INFUSION ADD-ON: CPT

## 2023-08-09 PROCEDURE — 250N000013 HC RX MED GY IP 250 OP 250 PS 637: Performed by: EMERGENCY MEDICINE

## 2023-08-09 PROCEDURE — 96360 HYDRATION IV INFUSION INIT: CPT

## 2023-08-09 PROCEDURE — 87086 URINE CULTURE/COLONY COUNT: CPT | Performed by: EMERGENCY MEDICINE

## 2023-08-09 PROCEDURE — 71046 X-RAY EXAM CHEST 2 VIEWS: CPT

## 2023-08-09 PROCEDURE — 36415 COLL VENOUS BLD VENIPUNCTURE: CPT | Performed by: EMERGENCY MEDICINE

## 2023-08-09 PROCEDURE — 83605 ASSAY OF LACTIC ACID: CPT | Performed by: EMERGENCY MEDICINE

## 2023-08-09 PROCEDURE — 85027 COMPLETE CBC AUTOMATED: CPT | Performed by: EMERGENCY MEDICINE

## 2023-08-09 RX ORDER — ACETAMINOPHEN 325 MG/1
650 TABLET ORAL ONCE
Status: COMPLETED | OUTPATIENT
Start: 2023-08-09 | End: 2023-08-09

## 2023-08-09 RX ORDER — IBUPROFEN 600 MG/1
600 TABLET, FILM COATED ORAL ONCE
Status: COMPLETED | OUTPATIENT
Start: 2023-08-09 | End: 2023-08-09

## 2023-08-09 RX ORDER — AMOXICILLIN 500 MG/1
500 CAPSULE ORAL 3 TIMES DAILY
Qty: 21 CAPSULE | Refills: 0 | Status: ON HOLD | OUTPATIENT
Start: 2023-08-09 | End: 2023-08-15

## 2023-08-09 RX ADMIN — ACETAMINOPHEN 650 MG: 325 TABLET ORAL at 08:28

## 2023-08-09 RX ADMIN — SODIUM CHLORIDE 1000 ML: 9 INJECTION, SOLUTION INTRAVENOUS at 07:02

## 2023-08-09 ASSESSMENT — ACTIVITIES OF DAILY LIVING (ADL)
ADLS_ACUITY_SCORE: 35
ADLS_ACUITY_SCORE: 35

## 2023-08-09 NOTE — ED PROVIDER NOTES
EMERGENCY DEPARTMENT ENCOUnter      NAME: Kerry Hoffmann  AGE: 88 year old female  YOB: 1935  MRN: [id number]  EVALUATION DATE & TIME: 2023  6:38 AM    PCP: Kaylyn Bolanos    ED PROVIDER: Riley Carranza DO      Chief Complaint   Patient presents with    Fever    Chills         FINAL IMPRESSION:  1. Acute cystitis without hematuria          ED COURSE & MEDICAL DECISION MAKIN:23 AM I met the patient and performed my initial interview and exam.   9:45 AM We discussed the plan for discharge and the patient is agreeable. Reviewed supportive cares, symptomatic treatment, outpatient follow up, and reasons to return to the Emergency Department. All questions and concerns were addressed. Patient to be discharged by ED RN.        The patient presented to the emergency department today complaining of chills.  She was found to have a mild fever upon arrival.  Laboratory testing today has shown a mild leukocytosis but no other concerning findings.  Urinalysis shows signs of urinary tract infection which is likely the cause of her symptoms.  Given her well appearance here I feel that she can be safely discharged home with a prescription for oral antibiotics and instructions for outpatient follow-up.  She is comfortable with this plan.      Medical Decision Making    History:  Supplemental history from: Documented in chart, if applicable  External Record(s) reviewed: Documented in chart, if applicable.    Work Up:  Chart documentation includes differential considered and any EKGs or imaging independently interpreted by provider, where specified.  In additional to work up documented, I considered the following work up: Documented in chart, if applicable.    External consultation:  Discussion of management with another provider: Documented in chart, if applicable    Complicating factors:  Care impacted by chronic illness: Chronic Kidney Disease, Heart Disease, Hyperlipidemia, and Hypertension  Care  affected by social determinants of health: N/A    Disposition considerations: Discharge. I prescribed additional prescription strength medication(s) as charted. N/A.      At the conclusion of the encounter I discussed the results of all of the tests and the disposition. The questions were answered. The patient or family acknowledged understanding and was agreeable with the care plan.       MEDICATIONS GIVEN IN THE EMERGENCY:  Medications   ibuprofen (ADVIL/MOTRIN) tablet 600 mg (has no administration in time range)   0.9% sodium chloride BOLUS (0 mLs Intravenous Stopped 8/9/23 0958)   ibuprofen (ADVIL/MOTRIN) tablet 600 mg (600 mg Oral Not Given 8/9/23 0745)   acetaminophen (TYLENOL) tablet 650 mg (650 mg Oral $Given 8/9/23 0828)       NEW PRESCRIPTIONS STARTED AT TODAY'S ER VISIT  New Prescriptions    AMOXICILLIN (AMOXIL) 500 MG CAPSULE    Take 1 capsule (500 mg) by mouth 3 times daily for 7 days          =================================================================    HPI        Kerry Hoffmann is a 88 year old female with a pertinent history of CKD, hypertension, HLD, CHF who presents to this ED via EMS for evaluation of chills.     Per chart review: patient was recently admitted to [facility] on 7/20/23 for CHF. The patient was diuresed and developed TIMOTHY. Eventually improved and was seen by physical therapy recommending home PT. Agreeable to home PT and discharged home in good condition.     Yesterday the patient reports she felt normal and went to see her PCP for a follow up visit. Last night, the patient woke up suddenly with severe chills. She states she was unable to hold still and that she couldn't catch her breath. The patient also developed nausea and palpitations. No vomiting. The patient took some tylenol at 1am this morning. Currently, the patient reports she is feeling better. She denies any abdominal pain, dysuria, or any other complaints at this time.     Off note, at the patient's PCP  visit yesterday she noted her urine has been more cloudy lately. She left a urine sample which has not yet resulted.     PAST MEDICAL HISTORY:  Past Medical History:   Diagnosis Date    Anxiety     takes clonazepam    Asthma     per [facility]     Chronic kidney disease     stage 3 per [facility]     CKD (chronic kidney disease)     Clostridium difficile colitis 11/1/2016    Clostridium difficile infection     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    Clostridium enterocolitis 10/27/2016    CTS (carpal tunnel syndrome)     GERD (gastroesophageal reflux disease)     per [facility]     Hiatal hernia     small per [facility]     Hyperlipidemia     Hyperlipidemia     Hyperlipidemia     Hypertension     Hypertension     Osteoarthritis of knee     per [facility]     Spinal stenosis     per [facility]     Vitamin D deficiency     per [facility]       PAST SURGICAL HISTORY:  Past Surgical History:   Procedure Laterality Date    BREAST SURGERY  1982    breast tumor per [facility]     CATARACT EXTRACTION  07/2005    per [facility]     ORIF TIBIA & FIBULA FRACTURES  1988    per [facility]     OTHER SURGICAL HISTORY  10/2004    hole in retinaper [facility]     OTHER SURGICAL HISTORY  05/03/2015    fecal transplantper [facility]     MI ESOPHAGOGASTRODUODENOSCOPY TRANSORAL DIAGNOSTIC N/A 9/11/2020    Procedure: ESOPHAGOGASTRODUODENOSCOPY With gastric biopsies;  Surgeon: David Reyes MD;  Location: [facility];  Service: Gastroenterology    TONSILLECTOMY      TONSILLECTOMY & ADENOIDECTOMY  1963           CURRENT MEDICATIONS:    amoxicillin (AMOXIL) 500 MG capsule  acetaminophen (TYLENOL) 500 MG tablet  aspirin 81 MG EC tablet  calcium carbonate 750 MG CHEW  Cholecalciferol (VITAMIN D3) 50 MCG (2000 UT) CAPS  clonazePAM (KLONOPIN) 0.5 MG tablet  clopidogrel (PLAVIX) 75 MG tablet  diclofenac (VOLTAREN) 1 % topical gel  famotidine (PEPCID) 20 MG tablet  levalbuterol (XOPENEX HFA) 45 MCG/ACT inhaler  losartan (COZAAR) 50 MG tablet  metoprolol succinate ER (TOPROL-XL) 25 MG 24 hr  tablet        ALLERGIES:  Allergies   Allergen Reactions    Buspirone Shortness Of Breath    Ciprofloxacin Hives and Shortness Of Breath     Other reaction(s): Unknown    Cortisone      Other reaction(s): Throat Swelling/Closing, Unknown  Reaction 9-5-94      Hydrocodone-Acetaminophen Shortness Of Breath     Other reaction(s): Unknown    Lansoprazole Shortness Of Breath    Metoprolol Shortness Of Breath    Nedocromil      Other reaction(s): Throat Swelling/Closing    Niacin Shortness Of Breath     Other reaction(s): Unknown    Acetaminophen      Other reaction(s): breathing difficulties, red ears,high blood press.    Albuterol Other (See Comments)     Inhaler had extreme effect on nervous system      Amlodipine      Other reaction(s): Erythema, Unknown    Azithromycin      Other reaction(s): *Unknown, Unknown    Cefixime Diarrhea     Other reaction(s): Unknown    Cefprozil Nausea and Vomiting     Other reaction(s): Unknown    Cefuroxime      Other reaction(s): *Unknown    Cephalexin      Other reaction(s): *Unknown, Unknown    Contrast Dye      Other reaction(s): hives    Cyclobenzaprine      Other reaction(s): Sedation    Dextromethorphan      Other reaction(s): headache,nausea    Dextromethorphan-Guaifenesin Nausea     Other reaction(s): Headache    Diltiazem      Other reaction(s): Erythema, Unknown  Ears face arms and chest red and on fire-body tremors      Erythromycin      Other reaction(s): Unknown    Esomeprazole      Other reaction(s): Headache    Ethanol      Other reaction(s): Adverse reaction    Fenofibrate Muscle Pain (Myalgia)     Other reaction(s): Unknown    Fluticasone-Salmeterol Other (See Comments)     Elevated blood pressure      Hydrocodone      Other reaction(s): breathing difficulties    Levofloxacin Nausea     Other reaction(s): Headache, Unknown    Methylprednisolone     Metronidazole     Monosodium Glutamate     Moxifloxacin      Other reaction(s): Dizziness    Nifedipine      Other  reaction(s): Edema  Took for 5-93 to 9-94 red and swollen leg      Nsaids      Other reaction(s): Unknown    Ofloxacin      Other reaction(s): *Unknown    Ondansetron      Other reaction(s): Constipation    Parsley Seed     Penicillins Unknown     She doesn't remember other than it occurred as a very young woman     Piper     Pirbuterol Other (See Comments)     Immediate extreme effect on nervous system      Pravastatin      Other reaction(s): Dizziness, Unknown    Pseudoephedrine      Other reaction(s): headache,nausea    Pseudoephedrine-Dm-Gg     Simvastatin Muscle Pain (Myalgia)    Spironolactone      Other reaction(s): stomach cramps, nausea    Sulfamethoxazole-Trimethoprim      Other reaction(s): Unknown    Tramadol      Other reaction(s): red ears,high blood press.    Tramadol-Acetaminophen      Other reaction(s): Tachycardia, Unknown    Triamterene Other (See Comments)     Greatly bothered with sun-took medication for three years  Greatly bothered with sun      Diatrizoate Rash    Telmisartan Palpitations       FAMILY HISTORY:  Family History   Problem Relation Age of Onset    Hypertension Mother     Cancer Mother         gallbladder cancer    Myocardial Infarction Father     Hypertension Sister     Alzheimer Disease Sister     Heart Disease Sister     Cancer Brother     Brain Cancer Brother     Pacemaker Mother     Heart Disease Mother     Coronary Artery Disease Father     Heart Disease Father     Heart Failure Sister        SOCIAL HISTORY:   Social History     Socioeconomic History    Marital status:      Spouse name: None    Number of children: None    Years of education: None    Highest education level: None   Tobacco Use    Smoking status: Former     Packs/day: 3.00     Years: 35.00     Pack years: 105.00     Types: Cigarettes    Smokeless tobacco: Never    Tobacco comments:     quit 1968   Substance and Sexual Activity    Alcohol use: Not Currently    Drug use: Never    Sexual activity: Not  "Currently   Other Topics Concern    Parent/sibling w/ CABG, MI or angioplasty before 65F 55M? No       VITALS:  Patient Vitals for the past 24 hrs:   BP Temp Temp src Pulse Resp SpO2 Height   08/09/23 0854 -- 99.7  F (37.6  C) Oral -- -- -- --   08/09/23 0830 (!) 159/76 -- -- 94 26 93 % --   08/09/23 0800 (!) 161/71 -- -- 93 (!) 32 90 % --   08/09/23 0649 (!) 167/77 (!) 100.8  F (38.2  C) Oral 105 18 -- 1.588 m (5' 2.5\")       PHYSICAL EXAM    Constitutional:  Well developed, Well nourished,  HENT:  Normocephalic, Atraumatic, Oropharynx moist, Nose normal.   Eyes:  EOMI, Conjunctiva normal, No discharge.   Respiratory:  Normal breath sounds, No respiratory distress, No wheezing, No chest tenderness.   Cardiovascular:  Normal heart rate, Normal rhythm, No murmurs  GI:  Soft, No tenderness, No guarding, No CVA tenderness.   Musculoskeletal:  No tenderness to palpation or major deformities noted.   Extremities: No lower extremity edema.  Neurologic:  Alert & oriented x 3, No focal deficits noted.   Psychiatric:  Affect normal, Judgment normal, Mood normal.        LAB:  All pertinent labs reviewed and interpreted.  Results for orders placed or performed during the hospital encounter of 08/09/23              CBC with platelets   Result Value Ref Range    WBC Count 11.3 (H) 4.0 - 11.0 10e3/uL    RBC Count 3.38 (L) 3.80 - 5.20 10e6/uL    Hemoglobin 9.8 (L) 11.7 - 15.7 g/dL    Hematocrit 30.0 (L) 35.0 - 47.0 %    MCV 89 78 - 100 fL    MCH 29.0 26.5 - 33.0 pg    MCHC 32.7 31.5 - 36.5 g/dL    RDW 13.2 10.0 - 15.0 %    Platelet Count 322 150 - 450 10e3/uL   Basic metabolic panel   Result Value Ref Range    Sodium 137 136 - 145 mmol/L    Potassium 4.5 3.4 - 5.3 mmol/L    Chloride 105 98 - 107 mmol/L    Carbon Dioxide (CO2) 21 (L) 22 - 29 mmol/L    Anion Gap 11 7 - 15 mmol/L    Urea Nitrogen 29.6 (H) 8.0 - 23.0 mg/dL    Creatinine 1.43 (H) 0.51 - 0.95 mg/dL    Calcium 9.9 8.8 - 10.2 mg/dL    Glucose 108 (H) 70 - 99 mg/dL    GFR " Estimate 36 (L) >60 mL/min/1.73m2   UA with Microscopic reflex to Culture    Specimen: Urine, Clean Catch   Result Value Ref Range    Color Urine Light Yellow Colorless, Straw, Light Yellow, Yellow    Appearance Urine Clear Clear    Glucose Urine Negative Negative mg/dL    Bilirubin Urine Negative Negative    Ketones Urine Negative Negative mg/dL    Specific Gravity Urine 1.015 1.001 - 1.030    Blood Urine 0.03 mg/dL (A) Negative    pH Urine 5.0 5.0 - 7.0    Protein Albumin Urine 30 (A) Negative mg/dL    Urobilinogen Urine <2.0 <2.0 mg/dL    Nitrite Urine Positive (A) Negative    Leukocyte Esterase Urine 75 Linda/uL (A) Negative    Bacteria Urine Few (A) None Seen /HPF    WBC Clumps Urine Present (A) None Seen /HPF    Mucus Urine Present (A) None Seen /LPF    RBC Urine 4 (H) <=2 /HPF    WBC Urine 26 (H) <=5 /HPF    Squamous Epithelials Urine 1 <=1 /HPF    Hyaline Casts Urine 1 <=2 /LPF    Granular Casts Urine 1 (H) None Seen /LPF   Lactic acid whole blood   Result Value Ref Range    Lactic Acid 1.2 0.7 - 2.0 mmol/L   Extra Blue Top Tube   Result Value Ref Range    Hold Specimen JIC    Extra Red Top Tube   Result Value Ref Range    Hold Specimen JIC        RADIOLOGY:  I have independently reviewed and interpreted the above imaging, pending the final radiology read.  XR Chest 2 Views   Final Result   IMPRESSION:    Overall, allowing for differences in technique, no significant interval change.      Cardiac enlargement. Mild prominence of the pulmonary interstitium may reflect a mild component of fluid overload. There is no discrete focal consolidative pulmonary infiltrate or pleural effusion. Advanced degenerative change involving the right    shoulder. Aortic calcification.            I, Yarely Bahena, am serving as a scribe to document services personally performed by Dr. Carranza based on my observation and the provider's statements to me. I, Riley Carranza, DO attest that Yarely Bahena is acting in a scribe capacity, has  observed my performance of the services and has documented them in accordance with my direction.    Riley Carranza,   Emergency Medicine  [facility] EMERGENCY DEPARTMENT  [address] [place] [id number] [phone]  Dept: [phone]     Riley Carranza MD  08/09/23 1007

## 2023-08-09 NOTE — ED NOTES
Pt up to bedside commode. Weakness in right knee, pt reports this is typical for her. Urine collected and sent to lab. Pt A&Ox4.

## 2023-08-09 NOTE — ED TRIAGE NOTES
Pt arrives via [facility] from [facility]. Pt presents with a fever of 100.8F, chills, nausea, and fatigue. Pt reports that she has had the chills all morning. Hx of CHF. Takes blood thinners.      Triage Assessment       Row Name 08/09/23 0651       Triage Assessment (Adult)    Airway WDL WDL       Respiratory WDL    Respiratory WDL WDL       Skin Circulation/Temperature WDL    Skin Circulation/Temperature WDL X    Skin Temperature warm       Cardiac WDL    Cardiac WDL WDL       Peripheral/Neurovascular WDL    Peripheral Neurovascular WDL capillary refill;WDL    Capillary Refill, General less than/equal to 3 secs       Cognitive/Neuro/Behavioral WDL    Cognitive/Neuro/Behavioral WDL WDL

## 2023-08-09 NOTE — DISCHARGE INSTRUCTIONS
Your symptoms today appear to be due to a urinary tract infection.  Take the prescribed antibiotics and follow-up closely with your primary care doctor.  Return to the ER for any worsening symptoms or other concerns.

## 2023-08-09 NOTE — ED NOTES
Pt reports headache, states she cannot take NSAIDs however states she takes 3000mg Tylenol daily. Acetaminophen listed on pt's allergies, discussed with Dr Carranza.

## 2023-08-09 NOTE — ED NOTES
Bed: JNED-02  Expected date: 8/9/23  Expected time: 6:28 AM  Means of arrival: Ambulance  Comments:  Toribio  88 F--weak, fever

## 2023-08-10 ENCOUNTER — APPOINTMENT (OUTPATIENT)
Dept: RADIOLOGY | Facility: HOSPITAL | Age: 88
DRG: 871 | End: 2023-08-10
Attending: STUDENT IN AN ORGANIZED HEALTH CARE EDUCATION/TRAINING PROGRAM
Payer: MEDICARE

## 2023-08-10 ENCOUNTER — HOSPITAL ENCOUNTER (INPATIENT)
Facility: HOSPITAL | Age: 88
LOS: 4 days | Discharge: HOME | DRG: 871 | End: 2023-08-15
Attending: STUDENT IN AN ORGANIZED HEALTH CARE EDUCATION/TRAINING PROGRAM | Admitting: STUDENT IN AN ORGANIZED HEALTH CARE EDUCATION/TRAINING PROGRAM
Payer: MEDICARE

## 2023-08-10 ENCOUNTER — APPOINTMENT (OUTPATIENT)
Dept: CT IMAGING | Facility: HOSPITAL | Age: 88
DRG: 871 | End: 2023-08-10
Attending: STUDENT IN AN ORGANIZED HEALTH CARE EDUCATION/TRAINING PROGRAM
Payer: MEDICARE

## 2023-08-10 DIAGNOSIS — N12 PYELONEPHRITIS: ICD-10-CM

## 2023-08-10 DIAGNOSIS — I21.4 NSTEMI (NON-ST ELEVATED MYOCARDIAL INFARCTION) (H): ICD-10-CM

## 2023-08-10 DIAGNOSIS — R53.1 GENERALIZED WEAKNESS: ICD-10-CM

## 2023-08-10 DIAGNOSIS — R06.02 SHORTNESS OF BREATH: ICD-10-CM

## 2023-08-10 DIAGNOSIS — I13.0 HYPERTENSIVE HEART AND RENAL DISEASE WITH (CONGESTIVE) HEART FAILURE (H): Primary | ICD-10-CM

## 2023-08-10 LAB
ALBUMIN SERPL BCG-MCNC: 3.3 G/DL (ref 3.5–5.2)
ALP SERPL-CCNC: 66 U/L (ref 35–104)
ALT SERPL W P-5'-P-CCNC: 9 U/L (ref 0–50)
ANION GAP SERPL CALCULATED.3IONS-SCNC: 13 MMOL/L (ref 7–15)
AST SERPL W P-5'-P-CCNC: 24 U/L (ref 0–45)
BACTERIA UR CULT: ABNORMAL
BASOPHILS # BLD AUTO: 0 10E3/UL (ref 0–0.2)
BASOPHILS NFR BLD AUTO: 0 %
BILIRUB SERPL-MCNC: 0.2 MG/DL
BUN SERPL-MCNC: 27.2 MG/DL (ref 8–23)
CALCIUM SERPL-MCNC: 9.5 MG/DL (ref 8.8–10.2)
CHLORIDE SERPL-SCNC: 104 MMOL/L (ref 98–107)
CREAT SERPL-MCNC: 1.33 MG/DL (ref 0.51–0.95)
DEPRECATED HCO3 PLAS-SCNC: 18 MMOL/L (ref 22–29)
EOSINOPHIL # BLD AUTO: 0 10E3/UL (ref 0–0.7)
EOSINOPHIL NFR BLD AUTO: 0 %
ERYTHROCYTE [DISTWIDTH] IN BLOOD BY AUTOMATED COUNT: 13.2 % (ref 10–15)
GFR SERPL CREATININE-BSD FRML MDRD: 39 ML/MIN/1.73M2
GLUCOSE SERPL-MCNC: 114 MG/DL (ref 70–99)
HCT VFR BLD AUTO: 26.1 % (ref 35–47)
HGB BLD-MCNC: 8.7 G/DL (ref 11.7–15.7)
IMM GRANULOCYTES # BLD: 0 10E3/UL
IMM GRANULOCYTES NFR BLD: 0 %
LACTATE SERPL-SCNC: 0.6 MMOL/L (ref 0.7–2)
LIPASE SERPL-CCNC: 19 U/L (ref 13–60)
LYMPHOCYTES # BLD AUTO: 0.8 10E3/UL (ref 0.8–5.3)
LYMPHOCYTES NFR BLD AUTO: 7 %
MCH RBC QN AUTO: 28.9 PG (ref 26.5–33)
MCHC RBC AUTO-ENTMCNC: 33.3 G/DL (ref 31.5–36.5)
MCV RBC AUTO: 87 FL (ref 78–100)
MONOCYTES # BLD AUTO: 1.7 10E3/UL (ref 0–1.3)
MONOCYTES NFR BLD AUTO: 14 %
NEUTROPHILS # BLD AUTO: 9.6 10E3/UL (ref 1.6–8.3)
NEUTROPHILS NFR BLD AUTO: 79 %
NRBC # BLD AUTO: 0 10E3/UL
NRBC BLD AUTO-RTO: 0 /100
NT-PROBNP SERPL-MCNC: 8333 PG/ML (ref 0–1800)
PLATELET # BLD AUTO: 281 10E3/UL (ref 150–450)
POTASSIUM SERPL-SCNC: 4 MMOL/L (ref 3.4–5.3)
PROT SERPL-MCNC: 6.7 G/DL (ref 6.4–8.3)
RBC # BLD AUTO: 3.01 10E6/UL (ref 3.8–5.2)
SODIUM SERPL-SCNC: 135 MMOL/L (ref 136–145)
TROPONIN T SERPL HS-MCNC: 188 NG/L
WBC # BLD AUTO: 12.1 10E3/UL (ref 4–11)

## 2023-08-10 PROCEDURE — 96374 THER/PROPH/DIAG INJ IV PUSH: CPT

## 2023-08-10 PROCEDURE — 36415 COLL VENOUS BLD VENIPUNCTURE: CPT | Performed by: STUDENT IN AN ORGANIZED HEALTH CARE EDUCATION/TRAINING PROGRAM

## 2023-08-10 PROCEDURE — G1010 CDSM STANSON: HCPCS

## 2023-08-10 PROCEDURE — 83605 ASSAY OF LACTIC ACID: CPT | Performed by: STUDENT IN AN ORGANIZED HEALTH CARE EDUCATION/TRAINING PROGRAM

## 2023-08-10 PROCEDURE — 71046 X-RAY EXAM CHEST 2 VIEWS: CPT

## 2023-08-10 PROCEDURE — 85025 COMPLETE CBC W/AUTO DIFF WBC: CPT | Performed by: STUDENT IN AN ORGANIZED HEALTH CARE EDUCATION/TRAINING PROGRAM

## 2023-08-10 PROCEDURE — 80053 COMPREHEN METABOLIC PANEL: CPT | Performed by: STUDENT IN AN ORGANIZED HEALTH CARE EDUCATION/TRAINING PROGRAM

## 2023-08-10 PROCEDURE — 84484 ASSAY OF TROPONIN QUANT: CPT | Performed by: STUDENT IN AN ORGANIZED HEALTH CARE EDUCATION/TRAINING PROGRAM

## 2023-08-10 PROCEDURE — 83690 ASSAY OF LIPASE: CPT | Performed by: STUDENT IN AN ORGANIZED HEALTH CARE EDUCATION/TRAINING PROGRAM

## 2023-08-10 PROCEDURE — 93005 ELECTROCARDIOGRAM TRACING: CPT | Performed by: STUDENT IN AN ORGANIZED HEALTH CARE EDUCATION/TRAINING PROGRAM

## 2023-08-10 PROCEDURE — 83880 ASSAY OF NATRIURETIC PEPTIDE: CPT | Performed by: STUDENT IN AN ORGANIZED HEALTH CARE EDUCATION/TRAINING PROGRAM

## 2023-08-10 PROCEDURE — 87040 BLOOD CULTURE FOR BACTERIA: CPT | Performed by: STUDENT IN AN ORGANIZED HEALTH CARE EDUCATION/TRAINING PROGRAM

## 2023-08-10 PROCEDURE — 99291 CRITICAL CARE FIRST HOUR: CPT | Mod: 25

## 2023-08-10 PROCEDURE — 250N000011 HC RX IP 250 OP 636: Mod: JZ | Performed by: STUDENT IN AN ORGANIZED HEALTH CARE EDUCATION/TRAINING PROGRAM

## 2023-08-10 RX ORDER — ONDANSETRON 2 MG/ML
4 INJECTION INTRAMUSCULAR; INTRAVENOUS ONCE
Status: COMPLETED | OUTPATIENT
Start: 2023-08-10 | End: 2023-08-10

## 2023-08-10 RX ADMIN — ONDANSETRON 4 MG: 2 INJECTION INTRAMUSCULAR; INTRAVENOUS at 23:35

## 2023-08-10 ASSESSMENT — ACTIVITIES OF DAILY LIVING (ADL)
ADLS_ACUITY_SCORE: 35
ADLS_ACUITY_SCORE: 35

## 2023-08-11 PROBLEM — R53.1 GENERALIZED WEAKNESS: Status: ACTIVE | Noted: 2023-08-11

## 2023-08-11 LAB
ABO/RH(D): NORMAL
ANION GAP SERPL CALCULATED.3IONS-SCNC: 12 MMOL/L (ref 7–15)
ANTIBODY SCREEN: NEGATIVE
ATRIAL RATE - MUSE: 86 BPM
BUN SERPL-MCNC: 28.2 MG/DL (ref 8–23)
CALCIUM SERPL-MCNC: 9.2 MG/DL (ref 8.8–10.2)
CHLORIDE SERPL-SCNC: 106 MMOL/L (ref 98–107)
CREAT SERPL-MCNC: 1.37 MG/DL (ref 0.51–0.95)
DEPRECATED HCO3 PLAS-SCNC: 19 MMOL/L (ref 22–29)
DIASTOLIC BLOOD PRESSURE - MUSE: 99 MMHG
ERYTHROCYTE [DISTWIDTH] IN BLOOD BY AUTOMATED COUNT: 13.2 % (ref 10–15)
ERYTHROCYTE [DISTWIDTH] IN BLOOD BY AUTOMATED COUNT: 13.2 % (ref 10–15)
GFR SERPL CREATININE-BSD FRML MDRD: 37 ML/MIN/1.73M2
GLUCOSE SERPL-MCNC: 96 MG/DL (ref 70–99)
HCT VFR BLD AUTO: 25.4 % (ref 35–47)
HCT VFR BLD AUTO: 25.5 % (ref 35–47)
HGB BLD-MCNC: 8.1 G/DL (ref 11.7–15.7)
HGB BLD-MCNC: 8.4 G/DL (ref 11.7–15.7)
INTERPRETATION ECG - MUSE: NORMAL
MCH RBC QN AUTO: 28.4 PG (ref 26.5–33)
MCH RBC QN AUTO: 29 PG (ref 26.5–33)
MCHC RBC AUTO-ENTMCNC: 31.9 G/DL (ref 31.5–36.5)
MCHC RBC AUTO-ENTMCNC: 32.9 G/DL (ref 31.5–36.5)
MCV RBC AUTO: 88 FL (ref 78–100)
MCV RBC AUTO: 89 FL (ref 78–100)
P AXIS - MUSE: 58 DEGREES
PLATELET # BLD AUTO: 273 10E3/UL (ref 150–450)
PLATELET # BLD AUTO: 280 10E3/UL (ref 150–450)
POTASSIUM SERPL-SCNC: 4 MMOL/L (ref 3.4–5.3)
PR INTERVAL - MUSE: 162 MS
QRS DURATION - MUSE: 62 MS
QT - MUSE: 346 MS
QTC - MUSE: 414 MS
R AXIS - MUSE: 22 DEGREES
RBC # BLD AUTO: 2.85 10E6/UL (ref 3.8–5.2)
RBC # BLD AUTO: 2.9 10E6/UL (ref 3.8–5.2)
SODIUM SERPL-SCNC: 137 MMOL/L (ref 136–145)
SPECIMEN EXPIRATION DATE: NORMAL
SYSTOLIC BLOOD PRESSURE - MUSE: 136 MMHG
T AXIS - MUSE: 29 DEGREES
TROPONIN T SERPL HS-MCNC: 154 NG/L
TROPONIN T SERPL HS-MCNC: 154 NG/L
TROPONIN T SERPL HS-MCNC: 173 NG/L
UFH PPP CHRO-ACNC: 0.37 IU/ML
UFH PPP CHRO-ACNC: <0.1 IU/ML
VENTRICULAR RATE- MUSE: 86 BPM
WBC # BLD AUTO: 10 10E3/UL (ref 4–11)
WBC # BLD AUTO: 12.6 10E3/UL (ref 4–11)

## 2023-08-11 PROCEDURE — 250N000013 HC RX MED GY IP 250 OP 250 PS 637: Performed by: INTERNAL MEDICINE

## 2023-08-11 PROCEDURE — 36415 COLL VENOUS BLD VENIPUNCTURE: CPT | Performed by: STUDENT IN AN ORGANIZED HEALTH CARE EDUCATION/TRAINING PROGRAM

## 2023-08-11 PROCEDURE — 36415 COLL VENOUS BLD VENIPUNCTURE: CPT | Performed by: INTERNAL MEDICINE

## 2023-08-11 PROCEDURE — C1894 INTRO/SHEATH, NON-LASER: HCPCS | Performed by: INTERNAL MEDICINE

## 2023-08-11 PROCEDURE — 85027 COMPLETE CBC AUTOMATED: CPT | Performed by: STUDENT IN AN ORGANIZED HEALTH CARE EDUCATION/TRAINING PROGRAM

## 2023-08-11 PROCEDURE — 210N000001 HC R&B IMCU HEART CARE

## 2023-08-11 PROCEDURE — B211YZZ FLUOROSCOPY OF MULTIPLE CORONARY ARTERIES USING OTHER CONTRAST: ICD-10-PCS | Performed by: INTERNAL MEDICINE

## 2023-08-11 PROCEDURE — 85520 HEPARIN ASSAY: CPT | Performed by: STUDENT IN AN ORGANIZED HEALTH CARE EDUCATION/TRAINING PROGRAM

## 2023-08-11 PROCEDURE — 4A023N7 MEASUREMENT OF CARDIAC SAMPLING AND PRESSURE, LEFT HEART, PERCUTANEOUS APPROACH: ICD-10-PCS | Performed by: INTERNAL MEDICINE

## 2023-08-11 PROCEDURE — 250N000009 HC RX 250: Performed by: INTERNAL MEDICINE

## 2023-08-11 PROCEDURE — 250N000011 HC RX IP 250 OP 636: Performed by: INTERNAL MEDICINE

## 2023-08-11 PROCEDURE — 99222 1ST HOSP IP/OBS MODERATE 55: CPT | Performed by: INTERNAL MEDICINE

## 2023-08-11 PROCEDURE — 86901 BLOOD TYPING SEROLOGIC RH(D): CPT | Performed by: NURSE PRACTITIONER

## 2023-08-11 PROCEDURE — C1769 GUIDE WIRE: HCPCS | Performed by: INTERNAL MEDICINE

## 2023-08-11 PROCEDURE — C1887 CATHETER, GUIDING: HCPCS | Performed by: INTERNAL MEDICINE

## 2023-08-11 PROCEDURE — 93458 L HRT ARTERY/VENTRICLE ANGIO: CPT | Mod: 26 | Performed by: INTERNAL MEDICINE

## 2023-08-11 PROCEDURE — 250N000013 HC RX MED GY IP 250 OP 250 PS 637: Performed by: STUDENT IN AN ORGANIZED HEALTH CARE EDUCATION/TRAINING PROGRAM

## 2023-08-11 PROCEDURE — 99207 PR NO BILLABLE SERVICE THIS VISIT: CPT | Performed by: INTERNAL MEDICINE

## 2023-08-11 PROCEDURE — 93458 L HRT ARTERY/VENTRICLE ANGIO: CPT | Performed by: INTERNAL MEDICINE

## 2023-08-11 PROCEDURE — 255N000002 HC RX 255 OP 636: Performed by: INTERNAL MEDICINE

## 2023-08-11 PROCEDURE — 86850 RBC ANTIBODY SCREEN: CPT | Performed by: NURSE PRACTITIONER

## 2023-08-11 PROCEDURE — 250N000011 HC RX IP 250 OP 636: Mod: JZ | Performed by: STUDENT IN AN ORGANIZED HEALTH CARE EDUCATION/TRAINING PROGRAM

## 2023-08-11 PROCEDURE — 250N000011 HC RX IP 250 OP 636: Mod: JZ | Performed by: NURSE PRACTITIONER

## 2023-08-11 PROCEDURE — 258N000003 HC RX IP 258 OP 636: Performed by: INTERNAL MEDICINE

## 2023-08-11 PROCEDURE — 85520 HEPARIN ASSAY: CPT | Performed by: INTERNAL MEDICINE

## 2023-08-11 PROCEDURE — 999N000248 HC STATISTIC IV INSERT WITH US BY RN

## 2023-08-11 PROCEDURE — 250N000013 HC RX MED GY IP 250 OP 250 PS 637: Performed by: NURSE PRACTITIONER

## 2023-08-11 PROCEDURE — 84484 ASSAY OF TROPONIN QUANT: CPT | Performed by: STUDENT IN AN ORGANIZED HEALTH CARE EDUCATION/TRAINING PROGRAM

## 2023-08-11 PROCEDURE — 99223 1ST HOSP IP/OBS HIGH 75: CPT | Mod: AI | Performed by: STUDENT IN AN ORGANIZED HEALTH CARE EDUCATION/TRAINING PROGRAM

## 2023-08-11 PROCEDURE — 80048 BASIC METABOLIC PNL TOTAL CA: CPT | Performed by: STUDENT IN AN ORGANIZED HEALTH CARE EDUCATION/TRAINING PROGRAM

## 2023-08-11 PROCEDURE — 272N000001 HC OR GENERAL SUPPLY STERILE: Performed by: INTERNAL MEDICINE

## 2023-08-11 RX ORDER — FLUMAZENIL 0.1 MG/ML
0.2 INJECTION, SOLUTION INTRAVENOUS
Status: ACTIVE | OUTPATIENT
Start: 2023-08-11 | End: 2023-08-11

## 2023-08-11 RX ORDER — OXYCODONE HYDROCHLORIDE 5 MG/1
10 TABLET ORAL EVERY 4 HOURS PRN
Status: DISCONTINUED | OUTPATIENT
Start: 2023-08-11 | End: 2023-08-15 | Stop reason: HOSPADM

## 2023-08-11 RX ORDER — NALOXONE HYDROCHLORIDE 0.4 MG/ML
0.2 INJECTION, SOLUTION INTRAMUSCULAR; INTRAVENOUS; SUBCUTANEOUS
Status: ACTIVE | OUTPATIENT
Start: 2023-08-11 | End: 2023-08-11

## 2023-08-11 RX ORDER — DIPHENHYDRAMINE HYDROCHLORIDE 50 MG/ML
50 INJECTION INTRAMUSCULAR; INTRAVENOUS ONCE
Status: COMPLETED | OUTPATIENT
Start: 2023-08-11 | End: 2023-08-11

## 2023-08-11 RX ORDER — NALOXONE HYDROCHLORIDE 0.4 MG/ML
0.4 INJECTION, SOLUTION INTRAMUSCULAR; INTRAVENOUS; SUBCUTANEOUS
Status: ACTIVE | OUTPATIENT
Start: 2023-08-11 | End: 2023-08-11

## 2023-08-11 RX ORDER — ATROPINE SULFATE 0.1 MG/ML
0.5 INJECTION INTRAVENOUS
Status: ACTIVE | OUTPATIENT
Start: 2023-08-11 | End: 2023-08-11

## 2023-08-11 RX ORDER — METOPROLOL SUCCINATE 25 MG/1
25 TABLET, EXTENDED RELEASE ORAL AT BEDTIME
Status: DISCONTINUED | OUTPATIENT
Start: 2023-08-11 | End: 2023-08-12

## 2023-08-11 RX ORDER — DEXAMETHASONE SODIUM PHOSPHATE 10 MG/ML
10 INJECTION, SOLUTION INTRAMUSCULAR; INTRAVENOUS ONCE
Status: COMPLETED | OUTPATIENT
Start: 2023-08-11 | End: 2023-08-11

## 2023-08-11 RX ORDER — CLONAZEPAM 0.5 MG/1
0.5 TABLET ORAL 3 TIMES DAILY
Status: DISCONTINUED | OUTPATIENT
Start: 2023-08-11 | End: 2023-08-15 | Stop reason: HOSPADM

## 2023-08-11 RX ORDER — FENTANYL CITRATE 50 UG/ML
INJECTION, SOLUTION INTRAMUSCULAR; INTRAVENOUS
Status: DISCONTINUED | OUTPATIENT
Start: 2023-08-11 | End: 2023-08-11

## 2023-08-11 RX ORDER — HEPARIN SODIUM 10000 [USP'U]/100ML
0-5000 INJECTION, SOLUTION INTRAVENOUS CONTINUOUS
Status: DISCONTINUED | OUTPATIENT
Start: 2023-08-11 | End: 2023-08-11

## 2023-08-11 RX ORDER — LIDOCAINE 40 MG/G
CREAM TOPICAL
Status: DISCONTINUED | OUTPATIENT
Start: 2023-08-11 | End: 2023-08-15 | Stop reason: HOSPADM

## 2023-08-11 RX ORDER — SODIUM CHLORIDE 9 MG/ML
75 INJECTION, SOLUTION INTRAVENOUS CONTINUOUS
Status: ACTIVE | OUTPATIENT
Start: 2023-08-11 | End: 2023-08-11

## 2023-08-11 RX ORDER — FENTANYL CITRATE 50 UG/ML
25 INJECTION, SOLUTION INTRAMUSCULAR; INTRAVENOUS
Status: DISCONTINUED | OUTPATIENT
Start: 2023-08-11 | End: 2023-08-15 | Stop reason: HOSPADM

## 2023-08-11 RX ORDER — SODIUM CHLORIDE 9 MG/ML
INJECTION, SOLUTION INTRAVENOUS CONTINUOUS
Status: DISCONTINUED | OUTPATIENT
Start: 2023-08-11 | End: 2023-08-11 | Stop reason: HOSPADM

## 2023-08-11 RX ORDER — ACETAMINOPHEN 500 MG
1000 TABLET ORAL 3 TIMES DAILY
Status: DISCONTINUED | OUTPATIENT
Start: 2023-08-11 | End: 2023-08-15 | Stop reason: HOSPADM

## 2023-08-11 RX ORDER — IODIXANOL 320 MG/ML
INJECTION, SOLUTION INTRAVASCULAR
Status: DISCONTINUED | OUTPATIENT
Start: 2023-08-11 | End: 2023-08-11

## 2023-08-11 RX ORDER — OXYCODONE HYDROCHLORIDE 5 MG/1
5 TABLET ORAL EVERY 4 HOURS PRN
Status: DISCONTINUED | OUTPATIENT
Start: 2023-08-11 | End: 2023-08-15 | Stop reason: HOSPADM

## 2023-08-11 RX ORDER — CLOPIDOGREL BISULFATE 75 MG/1
75 TABLET ORAL DAILY
Status: DISCONTINUED | OUTPATIENT
Start: 2023-08-11 | End: 2023-08-15 | Stop reason: HOSPADM

## 2023-08-11 RX ORDER — ASPIRIN 81 MG/1
243 TABLET, CHEWABLE ORAL ONCE
Status: COMPLETED | OUTPATIENT
Start: 2023-08-11 | End: 2023-08-11

## 2023-08-11 RX ORDER — ASPIRIN 81 MG/1
81 TABLET ORAL DAILY
Status: DISCONTINUED | OUTPATIENT
Start: 2023-08-11 | End: 2023-08-15 | Stop reason: HOSPADM

## 2023-08-11 RX ORDER — FENTANYL CITRATE 50 UG/ML
25 INJECTION, SOLUTION INTRAMUSCULAR; INTRAVENOUS
Status: DISCONTINUED | OUTPATIENT
Start: 2023-08-11 | End: 2023-08-11 | Stop reason: HOSPADM

## 2023-08-11 RX ORDER — ACETAMINOPHEN 325 MG/1
650 TABLET ORAL EVERY 4 HOURS PRN
Status: DISCONTINUED | OUTPATIENT
Start: 2023-08-11 | End: 2023-08-15 | Stop reason: HOSPADM

## 2023-08-11 RX ORDER — HYDRALAZINE HYDROCHLORIDE 20 MG/ML
10 INJECTION INTRAMUSCULAR; INTRAVENOUS
Status: DISCONTINUED | OUTPATIENT
Start: 2023-08-11 | End: 2023-08-15 | Stop reason: HOSPADM

## 2023-08-11 RX ORDER — FAMOTIDINE 20 MG/1
20 TABLET, FILM COATED ORAL
Status: DISCONTINUED | OUTPATIENT
Start: 2023-08-12 | End: 2023-08-15 | Stop reason: HOSPADM

## 2023-08-11 RX ORDER — ASPIRIN 325 MG
325 TABLET ORAL ONCE
Status: COMPLETED | OUTPATIENT
Start: 2023-08-11 | End: 2023-08-11

## 2023-08-11 RX ORDER — LIDOCAINE 40 MG/G
CREAM TOPICAL
Status: DISCONTINUED | OUTPATIENT
Start: 2023-08-11 | End: 2023-08-11 | Stop reason: HOSPADM

## 2023-08-11 RX ORDER — LOSARTAN POTASSIUM 50 MG/1
50 TABLET ORAL 2 TIMES DAILY
Status: DISCONTINUED | OUTPATIENT
Start: 2023-08-11 | End: 2023-08-15 | Stop reason: HOSPADM

## 2023-08-11 RX ORDER — POLYETHYLENE GLYCOL 3350 17 G/17G
17 POWDER, FOR SOLUTION ORAL DAILY PRN
Status: DISCONTINUED | OUTPATIENT
Start: 2023-08-11 | End: 2023-08-15 | Stop reason: HOSPADM

## 2023-08-11 RX ORDER — CEFTRIAXONE 1 G/1
1 INJECTION, POWDER, FOR SOLUTION INTRAMUSCULAR; INTRAVENOUS EVERY 24 HOURS
Status: DISCONTINUED | OUTPATIENT
Start: 2023-08-11 | End: 2023-08-15 | Stop reason: HOSPADM

## 2023-08-11 RX ADMIN — DICLOFENAC 4 G: 10 GEL TOPICAL at 15:24

## 2023-08-11 RX ADMIN — LOSARTAN POTASSIUM 50 MG: 50 TABLET, FILM COATED ORAL at 20:34

## 2023-08-11 RX ADMIN — DICLOFENAC 4 G: 10 GEL TOPICAL at 20:35

## 2023-08-11 RX ADMIN — CEFTRIAXONE SODIUM 1 G: 1 INJECTION, POWDER, FOR SOLUTION INTRAMUSCULAR; INTRAVENOUS at 02:13

## 2023-08-11 RX ADMIN — CLONAZEPAM 0.5 MG: 0.5 TABLET ORAL at 07:54

## 2023-08-11 RX ADMIN — Medication 81 MG: at 07:54

## 2023-08-11 RX ADMIN — ASPIRIN 81 MG CHEWABLE TABLET 243 MG: 81 TABLET CHEWABLE at 13:21

## 2023-08-11 RX ADMIN — SODIUM CHLORIDE 75 ML/HR: 9 INJECTION, SOLUTION INTRAVENOUS at 15:21

## 2023-08-11 RX ADMIN — ACETAMINOPHEN 1000 MG: 500 TABLET ORAL at 07:54

## 2023-08-11 RX ADMIN — CLONAZEPAM 0.5 MG: 0.5 TABLET ORAL at 15:19

## 2023-08-11 RX ADMIN — DIPHENHYDRAMINE HYDROCHLORIDE 50 MG: 50 INJECTION, SOLUTION INTRAMUSCULAR; INTRAVENOUS at 13:40

## 2023-08-11 RX ADMIN — DEXAMETHASONE SODIUM PHOSPHATE 10 MG: 10 INJECTION, SOLUTION INTRAMUSCULAR; INTRAVENOUS at 13:46

## 2023-08-11 RX ADMIN — DICLOFENAC 4 G: 10 GEL TOPICAL at 07:56

## 2023-08-11 RX ADMIN — LOSARTAN POTASSIUM 50 MG: 50 TABLET, FILM COATED ORAL at 07:54

## 2023-08-11 RX ADMIN — METOPROLOL SUCCINATE 25 MG: 25 TABLET, EXTENDED RELEASE ORAL at 02:15

## 2023-08-11 RX ADMIN — HEPARIN SODIUM AND DEXTROSE 950 UNITS/HR: 10000; 5 INJECTION INTRAVENOUS at 00:42

## 2023-08-11 RX ADMIN — CLOPIDOGREL BISULFATE 75 MG: 75 TABLET ORAL at 07:54

## 2023-08-11 RX ADMIN — ACETAMINOPHEN 1000 MG: 500 TABLET ORAL at 15:19

## 2023-08-11 RX ADMIN — METOPROLOL SUCCINATE 25 MG: 25 TABLET, EXTENDED RELEASE ORAL at 20:34

## 2023-08-11 RX ADMIN — CLONAZEPAM 0.5 MG: 0.5 TABLET ORAL at 20:34

## 2023-08-11 RX ADMIN — ACETAMINOPHEN 1000 MG: 500 TABLET ORAL at 20:34

## 2023-08-11 ASSESSMENT — ACTIVITIES OF DAILY LIVING (ADL)
WALKING_OR_CLIMBING_STAIRS_DIFFICULTY: YES
ADLS_ACUITY_SCORE: 24
DIFFICULTY_EATING/SWALLOWING: NO
ADLS_ACUITY_SCORE: 24
TRANSFERRING: 0-->ASSISTANCE NEEDED (DEVELOPMENTALLY APPROPRIATE)
DRESSING/BATHING_DIFFICULTY: NO
TOILETING_ISSUES: NO
CHANGE_IN_FUNCTIONAL_STATUS_SINCE_ONSET_OF_CURRENT_ILLNESS/INJURY: NO
ADLS_ACUITY_SCORE: 24
TRANSFERRING: 1-->ASSISTANCE (EQUIPMENT/PERSON) NEEDED
ADLS_ACUITY_SCORE: 24
ADLS_ACUITY_SCORE: 25
ADLS_ACUITY_SCORE: 24
DIFFICULTY_COMMUNICATING: NO
ADLS_ACUITY_SCORE: 35
ADLS_ACUITY_SCORE: 24
HEARING_DIFFICULTY_OR_DEAF: NO
WEAR_GLASSES_OR_BLIND: NO
CONCENTRATING,_REMEMBERING_OR_MAKING_DECISIONS_DIFFICULTY: NO
DEPENDENT_IADLS:: INDEPENDENT
DOING_ERRANDS_INDEPENDENTLY_DIFFICULTY: YES
ADLS_ACUITY_SCORE: 24
WALKING_OR_CLIMBING_STAIRS: STAIR CLIMBING DIFFICULTY, DEPENDENT;AMBULATION DIFFICULTY, REQUIRES EQUIPMENT
FALL_HISTORY_WITHIN_LAST_SIX_MONTHS: NO
ADLS_ACUITY_SCORE: 24

## 2023-08-11 ASSESSMENT — EJECTION FRACTION: EF_VALUE: .34

## 2023-08-11 NOTE — ED TRIAGE NOTES
Pt to ER via medics from home where she lives alone.  Pt c/o weakness, SOB at rest, upper abd pain and bilat flank pain and fever. Temp max 102.4.Now 98.7. Here yesterday and diagnosed with UTI.  States she thinks her antibiotics are making her stomach ache.  Has not eaten/drank today                                           ,      Triage Assessment       Row Name 08/10/23 2054       Triage Assessment (Adult)    Airway WDL WDL       Respiratory WDL    Respiratory WDL X  c/o SOB at rest and worse with waLKING       Breath Sounds    Breath Sounds All Fields    All Lung Fields Breath Sounds clear       Skin Circulation/Temperature WDL    Skin Circulation/Temperature WDL WDL       Cardiac WDL    Cardiac WDL WDL       Peripheral/Neurovascular WDL    Peripheral Neurovascular WDL WDL       Cognitive/Neuro/Behavioral WDL    Cognitive/Neuro/Behavioral WDL WDL       [place] Coma Scale    Best Eye Response 4-->(E4) spontaneous    Best Motor Response 6-->(M6) obeys commands    Best Verbal Response 5-->(V5) oriented    [place] Coma Scale Score 15

## 2023-08-11 NOTE — H&P
[facility]    History and Physical - Hospitalist Service       Date of Admission:  8/10/2023    Assessment & Plan      Kerry Hoffmann is a 88F presenting with acute fever, generalised weakness; pmhx includes HLD/HTN, CKD3a, anxiety, Intracranial atherosclerosis (no CVA); found to have elevated troponin and radiologic evidence suggestive of pyelonephritis; admitted for sepsis by pyelonephritis and rule out of NSTEMI.    #dyspnea on exertion  #elevated troponin  #NSTEMI, suspected  Denies chest pain, chest pain equivalent is dyspnea on exertion, fatigue on exertion. Troponin elevated to 188 in setting of CKD3a (previous baseline estimated around 20). EKG has 1-2mm ST depressions in lateral leads withOUT additional T wave abnormalities. Cardiology discussed with EDP, recommended trend trops and heparin, consideration for LHC.  -telemetry monitor  -troponin trend x2 (q2h)  -CBC trend daily  -BMP trend daily  -heparin gtt  -continue ASA 81mg PO every day  -continue plavix 75mg PO every day  -continue toprol 25mg PO every day  -continue losartan 50mg PO BID  -cardiology consulted, made aware by EDP  -NPO now, sips with meds    #sepsis  #pyelonephritis  Sepsis by source (urinary) SIRS 2/4 (HR >90, WBC >12). Hypertensive and NOT requiring pressors. Complex reactions to antibiotics history. Chart documented reactions to cephalosporins consistent with expected medication side effects.  -lactic trending  -BMP trend  -blood cultures collected in ED, pending  -NO urine cultures given amoxicillin only recent antibiotic  -ceftriaxone 1g IV every day (anticipated end date of 8/16)    #metabolic acidosis  Acidosis withOUT lactic elevation, in setting of suspected sepsis, NSTEMI. Euvolemic by exam.  -BMP trending    #elevated BNP  Elevated BNP, withOUT clinical indications of heart failure. TTE on 7/21/2023 shows normal EF (65%) and normal function/motion, no significant valvular disease. Possible  "indication of demand ischemia in setting of CKD3a. Do not suspect acute heart failure.  -Cardiology consulted    #CKD3a  Baseline BUN/Cr estimated at 24/1.3. Currently AT baseline.  -BMP trending  -renally dose meds  -avoid nephrotoxins as able    #aneamia, chronic, normocytic  Estimated baseline Hgb of 9.5, near baseline. No bleeding sources identified.  -CBC trend    #hyponatremia  Borderline hypo at 135, similar to presumptive baseline. No current home diuretics, reports reduced oral intake over past 2 days.  -BMP trend    #anxiety  -continue klonopin 0.5mg PO TID/PRN    #GERD  -continue pepcid 20mg PO BID    #osteoarthritis  Chronic osteoarthritis.  -continue diclofenac gel  -tylenol 1000mg PO TID    #hypertension  -losartan 50mg PO BID    #code status  Discussed code status and treatment preferences during admission intake for 10 minutes. She confirms DNR/DNI as her preferred status, and informs me she has sent in a POLST to be scanned into her medical records previously.       Diet: NPO per Anesthesia Guidelines for Procedure/Surgery Except for: Meds  DVT Prophylaxis: therapeutic heparin  Wiley Catheter: Not present  Lines: None     Cardiac Monitoring: ACTIVE order. Indication: Chest pain/ ACS rule out (24 hours)  Code Status: No CPR- Do NOT Intubate    Clinically Significant Risk Factors Present on Admission           # Hypercalcemia: corrected calcium is >10.1, will monitor as appropriate    # Hypoalbuminemia: Lowest albumin = 3.3 g/dL at 8/10/2023 10:05 PM, will monitor as appropriate     # Drug Induced Platelet Defect: home medication list includes an antiplatelet medication     # Hypertension: Home medication list includes antihypertensive(s)      # Obesity: Estimated body mass index is 33.07 kg/m  as calculated from the following:    Height as of this encounter: 1.549 m (5' 1\").    Weight as of this encounter: 79.4 kg (175 lb).            Disposition Plan      Expected Discharge Date: 08/13/2023          "         Dennis Mo MD  Hospitalist Service  [facility]  Securely message with Viss (more info)  Text page via [facility] Paging/Directory     ______________________________________________________________________    Chief Complaint   Dysnpea on exertion, generalised weakness    History is obtained from the patient    History of Present Illness   Kerry Hoffmann is a 88F presenting with acute fever, generalised weakness; pmhx includes HLD/HTN, CKD3a, anxiety, Intracranial atherosclerosis (no CVA); found to have elevated troponin and radiologic evidence suggestive of pyelonephritis; admitted for sepsis by pyelonephritis and rule out of NSTEMI.    Presents after exertional dyspnea progressive over 3 to 4 days.  Was recently seen in emergency room for concerns of urinary tract infection after few days of weakness.  Had been discharged with amoxicillin and had been taking for the last 24 hours, and started to feel even worse.  This evening she was feeling more progressively fatigued, weak, and shortness of breath.  She has not had any chest pain nor lightheadedness or dizziness.  She does have low back pain which is somewhat worse to similar to her previous discomfort.  She also did have a temperature of 102 Fahrenheit at home.      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    Clostridium enterocolitis 10/27/2016    CTS (carpal tunnel syndrome)     GERD (gastroesophageal reflux disease)     per H & P     Hiatal hernia     small per H & P     Hyperlipidemia     Hyperlipidemia     Hyperlipidemia     Hypertension     Hypertension     Osteoarthritis of knee     per H & P     Spinal stenosis     per H & P     Vitamin D  deficiency     per H & P       Past Surgical History   Past Surgical History:   Procedure Laterality Date    BREAST SURGERY  1982    breast tumor per H & P     CATARACT EXTRACTION  07/2005    per H & P     ORIF TIBIA & FIBULA FRACTURES  1988    per H & P     OTHER SURGICAL HISTORY  10/2004    hole in retinaper H & P     OTHER SURGICAL HISTORY  05/03/2015    fecal transplantper H & P     WI ESOPHAGOGASTRODUODENOSCOPY TRANSORAL DIAGNOSTIC N/A 9/11/2020    Procedure: ESOPHAGOGASTRODUODENOSCOPY With gastric biopsies;  Surgeon: David Reyes MD;  Location: [facility];  Service: Gastroenterology    TONSILLECTOMY      TONSILLECTOMY & ADENOIDECTOMY  1963       Prior to Admission Medications   Prior to Admission Medications   Prescriptions Last Dose Informant Patient Reported? Taking?   Cholecalciferol (VITAMIN D3) 50 MCG (2000 UT) CAPS 8/10/2023 at am  Yes Yes   Sig: Take 1 tablet by mouth daily   acetaminophen (TYLENOL) 500 MG tablet 8/10/2023 at 2 doses today.  Yes Yes   Sig: Take 1,000 mg by mouth 3 times daily   amoxicillin (AMOXIL) 500 MG capsule 8/10/2023 at 2 doses taken today - patient thinks this is making her sick  No Yes   Sig: Take 1 capsule (500 mg) by mouth 3 times daily for 7 days   aspirin 81 MG EC tablet 8/10/2023 at am  Yes Yes   Sig: Take 81 mg by mouth daily   clonazePAM (KLONOPIN) 0.5 MG tablet 8/10/2023 at 2 doses  Yes Yes   Sig: Take 0.5 mg by mouth 3 times daily   clopidogrel (PLAVIX) 75 MG tablet 8/10/2023 at am  No Yes   Sig: Take 1 tablet (75 mg) by mouth daily   diclofenac (VOLTAREN) 1 % topical gel 8/9/2023 at pm  Yes Yes   Sig: Apply 4 g topically 4 times daily knee pain   famotidine (PEPCID) 20 MG tablet 8/10/2023 at am  Yes Yes   Sig: Take 20 mg by mouth 2 times daily   losartan (COZAAR) 50 MG tablet 8/10/2023 at am  Yes Yes   Sig: Take 50 mg by mouth 2 times daily   metoprolol succinate ER (TOPROL-XL) 25 MG 24 hr tablet 8/10/2023 at am  Yes Yes   Sig: Take 1 tablet by mouth At  Bedtime      Facility-Administered Medications: None        Review of Systems    The 10 point Review of Systems is negative other than noted in the HPI or here.     Social History   I have reviewed this patient's social history and updated it with pertinent information if needed.  Social History     Tobacco Use    Smoking status: Former     Packs/day: 3.00     Years: 35.00     Pack years: 105.00     Types: Cigarettes    Smokeless tobacco: Never    Tobacco comments:     quit 1968   Substance Use Topics    Alcohol use: Not Currently    Drug use: Never        Physical Exam   Vital Signs: Temp: 98.7  F (37.1  C) Temp src: Oral BP: (!) 148/65 Pulse: 76   Resp: 22 SpO2: 94 % O2 Device: None (Room air)    Weight: 175 lbs 0 oz    Constitutional: awake, alert, cooperative, no apparent distress, and appears stated age  Respiratory: CTAB  Cardiovascular: RRR no m/g/r  GI: soft, nontender, nondistended  Genitounirinary: no costoveterbral angle tenderness  Musculoskeletal: shoulder/elbow flexion/extension 5/5 bilaterally and symmetric  Neurologic: AOx4, no focal deficits.    Medical Decision Making       60 MINUTES SPENT BY ME on the date of service doing chart review, history, exam, documentation & further activities per the note.  Tests ORDERED & REVIEWED in the past 24 hours:  - See lab/imaging results included in the data section of the note  Tests personally interpreted in the past 24 hours:  - EKG tracing showing Sinus rhythm, 1-2mm ST depressions in lateral leads, no ST elevations   - CHEST XRAY showing no acute intrathoracic pathologic proces       Data     I have personally reviewed the following data over the past 24 hrs:    12.1 (H)  \   8.7 (L)   / 281     135 (L) 104 27.2 (H) /  114 (H)   4.0 18 (L) 1.33 (H) \     ALT: 9 AST: 24 AP: 66 TBILI: 0.2   ALB: 3.3 (L) TOT PROTEIN: 6.7 LIPASE: 19     Trop: 173 (HH) BNP: 8,333 (H)     Procal: N/A CRP: N/A Lactic Acid: 0.6 (L)         Imaging results reviewed over the past 24  hrs:   Recent Results (from the past 24 hour(s))   CT Abdomen Pelvis w/o Contrast    Narrative    EXAM: CT ABDOMEN PELVIS W/O CONTRAST  LOCATION: [facility]  DATE: 8/10/2023    INDICATION: urinary infection, flank pain, upper abdominal pain  COMPARISON: CT from 10/06/2016. Renal ultrasound from 07/23/2023.  TECHNIQUE: CT scan of the abdomen and pelvis was performed without IV contrast. Multiplanar reformats were obtained. Dose reduction techniques were used.  CONTRAST: None.    FINDINGS:   LOWER CHEST: Small hiatal hernia.    HEPATOBILIARY: Normal.    PANCREAS: Normal.    SPLEEN: Normal.    ADRENAL GLANDS: Normal.    KIDNEYS/BLADDER: Benign left renal cysts requiring no follow-up. Asymmetric right perinephric stranding compared to the left. No hydronephrosis. No calcified ureteral or bladder stone.    BOWEL: Colonic diverticulosis. Prominent duodenal diverticula. Fluid-filled loops of nondistended small bowel. No appendicitis. No free air.    LYMPH NODES: Normal.    VASCULATURE: Aortoiliac atherosclerotic calcification without aneurysm.    PELVIC ORGANS: Small amount of free fluid.    MUSCULOSKELETAL: Severe degenerative disc change of the lumbar spine.      Impression    IMPRESSION:   1.  While there is no hydronephrosis, there is asymmetric perinephric stranding on the right which could reflect an acute inflammatory process. Recommend correlation with urinalysis. Please note that noncontrast CT is insensitive for changes of   pyelonephritis.    2.  Colonic diverticulosis.     Chest XR,  PA & LAT    Narrative    EXAM: XR CHEST 2 VIEWS  LOCATION: [facility]  DATE: 8/10/2023    INDICATION: Shortness of breath.  COMPARISON: None.      Impression    IMPRESSION: Single lateral view of the chest. No focal lung infiltrates. Mild cardiomegaly. Mild interstitial prominence throughout the lungs appears similar to prior exam. Degenerative changes thoracic spine unchanged.

## 2023-08-11 NOTE — PLAN OF CARE
Goal Outcome Evaluation:             Pt transferred from ED to [facility]. Pt admitted for CA left heart catheterization. Pt prepped and ready for procedure.     Denisse Gorman RN

## 2023-08-11 NOTE — ED PROVIDER NOTES
NAME: Kerry Hoffmann  AGE: 88 year old female  YOB: 1935  MRN: [id number]  EVALUATION DATE & TIME: 8/10/2023  8:42 PM    PCP: Kaylyn Bolanos  ED PROVIDER: Gardenia Medina MD.    Chief Complaint   Patient presents with    Fever    Generalized Weakness       FINAL IMPRESSION:  1. Generalized weakness    2. Shortness of breath    3. Pyelonephritis    4. NSTEMI (non-ST elevated myocardial infarction) (H)        MEDICAL DECISION MAKIN:49 PM I met with the patient, obtained history, performed an initial exam, and discussed options and plan for diagnostics and treatment here in the ED.   ED Course as of 23 0226   Thu Aug 10, 2023   2346 Updated patient   Fri Aug 11, 2023   0000 Discussed with Dr. Nunez cardiology   0017 Discussed with hospitalist, Dr. Mo       89 y/o F who presents with generalized weakness, fever and shortness of breath. Recently admitted for ?CHF (ECHO with EF 65%), TIMOTHY that was suspected to be related to diuresis.  She was seen in the ED yesterday and diagnosed with UTI and stated on amoxillin- culture grew ecoli, pan sensitive.    Considered sepsis, lactic acid is normal, blood cultures pending. CT abd pelvis WO shows possible findings of pyelonephritis. She has 50 medication allergies, discussed wit the hospitalist and he will order antibiotics.     She also feels more short of breath for the last several days. CXR shows mild interstitial prominence, similar to priors BNP is now elevated at 8,333 (from 1423 during admission) and troponin is 188 (from 19). She reports no chest pain, only some upper abdominal discomfort. EKG with nonspecific T changes and some lateral depressions. Considered ACS and talked to cardiology, they agree with plan to admit, trend troponin and start heparin. Considered PE, unable to get CTA right now given contrast allergy dye, but has no hypoxia or tachycardia, and negative DVT US during admission. Concern about diuresis given developed  TIMOTHY during last admission, will defer to inpatient team.     Medical Decision Making    History:  Supplemental history from: Documented in chart, if applicable  External Record(s) reviewed: Documented in chart, if applicable. and Inpatient Record: [facility] ED on 8/9/23 and 7/20/23     Work Up:  Chart documentation includes differential considered and any EKGs or imaging interpreted by provider.  In additional to work up documented, I considered the following work up: Documented in chart, if applicable.    External consultation:  Discussion of management with another provider: Documented in chart, if applicable    Complicating factors:  Care impacted by chronic illness: Chronic Kidney Disease, Hyperlipidemia, and Hypertension  Care affected by social determinants of health: N/A    Disposition considerations: Admit.    Critical Care  Performed by: Gardenia Medina MD  Authorized by: Gardenia Medina MD     Total critical care time: 30 minutes   Critical care time was exclusive of separately billable procedures and treating other patients.  Critical care was necessary to treat or prevent imminent or life-threatening deterioration of the following conditions: NSTEMI requiring heparin gtt  Critical care was time spent personally by me on the following activities: development of treatment plan with patient or surrogate, discussions with consultants, examination of patient, evaluation of patient's response to treatment, obtaining history from patient or surrogate, ordering and performing treatments and interventions, ordering and review of laboratory studies, ordering and review of radiographic studies and re-evaluation of patient's condition, this excludes any separately billable procedures.      MEDICATIONS GIVEN IN THE EMERGENCY:  Medications   heparin infusion 25,000 units in D5W 250 mL ANTICOAGULANT (950 Units/hr Intravenous Rate/Dose Verify 8/11/23 0134)   lidocaine 1 % 0.1-1 mL (has no administration in time range)    lidocaine (LMX4) cream (has no administration in time range)   sodium chloride (PF) 0.9% PF flush 3 mL (3 mLs Intracatheter $Given 8/11/23 0215)   sodium chloride (PF) 0.9% PF flush 3 mL (has no administration in time range)   melatonin tablet 1 mg (has no administration in time range)   Patient is already receiving anticoagulation with heparin, enoxaparin (LOVENOX), warfarin (COUMADIN)  or other anticoagulant medication (has no administration in time range)   polyethylene glycol (MIRALAX) Packet 17 g (has no administration in time range)   acetaminophen (TYLENOL) tablet 1,000 mg (has no administration in time range)   aspirin EC tablet 81 mg (has no administration in time range)   clonazePAM (klonoPIN) tablet 0.5 mg (has no administration in time range)   clopidogrel (PLAVIX) tablet 75 mg (has no administration in time range)   diclofenac (VOLTAREN) 1 % topical gel 4 g (has no administration in time range)   famotidine (PEPCID) tablet 20 mg (has no administration in time range)   losartan (COZAAR) tablet 50 mg (has no administration in time range)   metoprolol succinate ER (TOPROL XL) 24 hr tablet 25 mg (25 mg Oral $Given 8/11/23 0215)   cefTRIAXone (ROCEPHIN) 1 g vial to attach to  mL bag for ADULTS or NS 50 mL bag for PEDS (1 g Intravenous $New Bag 8/11/23 0213)   ondansetron (ZOFRAN) injection 4 mg (4 mg Intravenous $Given 8/10/23 2335)   heparin loading dose for LOW INTENSITY TREATMENT * Give BEFORE starting heparin infusion (4,750 Units Intravenous $Given 8/11/23 0041)       NEW PRESCRIPTIONS STARTED AT TODAY'S ER VISIT:  New Prescriptions    No medications on file        =================================================================  HPI    Patient information was obtained from: patient  Use of : N/A       Kerry Hoffmann is a 88 year old female with a past medical history of CKD, HTN, anemia among others who presents with generalized weakness and fever.  Patient was seen at the  hospital yesterday and diagnosed with a UTI and discharged on amoxicillin.  She reports since then she has had nausea and decreased appetite, fever up to 102.4, upper abdominal pain, bilateral flank pain and increased generalized weakness.  States she has been taking the antibiotic as prescribed.  She does note some shortness of breath as well.  No cough, vomiting, diarrhea, chest pain, leg swelling or other medical complaints.    Per chart review, patient was seen yesterday (8/9/23) at [facility] ED for fever and chills. Lab tests found mild leukocytosis and UA showed signs of a UTI. Was discharged with oral antibiotics (Amoxicillin) and instructions for outpatient follow-up.     Per chart review, patient was admitted to [facility] on 7/20/23-7/28/23 for dyspnea on exertion with some bilateral swelling and chest tightness. She was discharged with a resolved TIMOTHY and possible CHF (this was not clear). Recommended to follow-up with PCP within 14 days.     REVIEW OF SYSTEMS   All systems reviewed, please see HPI for pertinent findings    PAST MEDICAL HISTORY:  Past Medical History:   Diagnosis Date    Anxiety     takes clonazepam    Asthma     per [facility]     Chronic kidney disease     stage 3 per [facility]     CKD (chronic kidney disease)     Clostridium difficile colitis 11/1/2016    Clostridium difficile infection     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    Clostridium enterocolitis 10/27/2016    CTS (carpal tunnel syndrome)     GERD (gastroesophageal reflux disease)     per [facility]     Hiatal hernia     small per [facility]     Hyperlipidemia     Hyperlipidemia     Hyperlipidemia     Hypertension     Hypertension     Osteoarthritis of knee     per [facility]     Spinal stenosis     per [facility]     Vitamin D deficiency     per [facility]       PAST SURGICAL HISTORY:  Past Surgical History:   Procedure Laterality Date    BREAST SURGERY  1982    breast tumor per [facility]     CATARACT EXTRACTION  07/2005    per [facility]     ORIF TIBIA & FIBULA  FRACTURES  1988    per [facility]     OTHER SURGICAL HISTORY  10/2004    hole in retinaper [facility]     OTHER SURGICAL HISTORY  05/03/2015    fecal transplantper [facility]     TX ESOPHAGOGASTRODUODENOSCOPY TRANSORAL DIAGNOSTIC N/A 9/11/2020    Procedure: ESOPHAGOGASTRODUODENOSCOPY With gastric biopsies;  Surgeon: David Reyes MD;  Location: [facility] [place];  Service: Gastroenterology    TONSILLECTOMY      TONSILLECTOMY & ADENOIDECTOMY  1963       CURRENT MEDICATIONS:      Current Facility-Administered Medications:     acetaminophen (TYLENOL) tablet 1,000 mg, 1,000 mg, Oral, TID, Dennis Mo MD    aspirin EC tablet 81 mg, 81 mg, Oral, Daily, Dennis Mo MD    cefTRIAXone (ROCEPHIN) 1 g vial to attach to  mL bag for ADULTS or NS 50 mL bag for PEDS, 1 g, Intravenous, Q24H, Dennis Mo MD, 1 g at 08/11/23 0213    clonazePAM (klonoPIN) tablet 0.5 mg, 0.5 mg, Oral, TID, Dennis Mo MD    clopidogrel (PLAVIX) tablet 75 mg, 75 mg, Oral, Daily, Dennis Mo MD    diclofenac (VOLTAREN) 1 % topical gel 4 g, 4 g, Topical, 4x Daily, Dennis oM MD    [START ON 8/12/2023] famotidine (PEPCID) tablet 20 mg, 20 mg, Oral, Q48H, Dennis Mo MD    heparin infusion 25,000 units in D5W 250 mL ANTICOAGULANT, 0-5,000 Units/hr, Intravenous, Continuous, Gardenia Medina MD, Last Rate: 9.5 mL/hr at 08/11/23 0134, 950 Units/hr at 08/11/23 0134    lidocaine (LMX4) cream, , Topical, Q1H PRN, Dennis Mo MD    lidocaine 1 % 0.1-1 mL, 0.1-1 mL, Other, Q1H PRN, Dennis Mo MD    losartan (COZAAR) tablet 50 mg, 50 mg, Oral, BID, Dennis Mo MD    melatonin tablet 1 mg, 1 mg, Oral, At Bedtime PRN, Dennis Mo MD    metoprolol succinate ER (TOPROL XL) 24 hr tablet 25 mg, 25 mg, Oral, At Bedtime, Dennis Mo MD, 25 mg at 08/11/23 0215    Patient is already receiving anticoagulation with heparin, enoxaparin (LOVENOX), warfarin (COUMADIN)  or other  anticoagulant medication, , Does not apply, Continuous PRN, Dennis Mo MD    polyethylene glycol (MIRALAX) Packet 17 g, 17 g, Oral, Daily PRN, Dennis Mo MD    sodium chloride (PF) 0.9% PF flush 3 mL, 3 mL, Intracatheter, Q8H, Dennis Mo MD, 3 mL at 08/11/23 0215    sodium chloride (PF) 0.9% PF flush 3 mL, 3 mL, Intracatheter, q1 min prn, Dennis Mo MD    Current Outpatient Medications:     acetaminophen (TYLENOL) 500 MG tablet, Take 1,000 mg by mouth 3 times daily, Disp: , Rfl:     amoxicillin (AMOXIL) 500 MG capsule, Take 1 capsule (500 mg) by mouth 3 times daily for 7 days, Disp: 21 capsule, Rfl: 0    aspirin 81 MG EC tablet, Take 81 mg by mouth daily, Disp: , Rfl:     Cholecalciferol (VITAMIN D3) 50 MCG (2000 UT) CAPS, Take 1 tablet by mouth daily, Disp: , Rfl:     clonazePAM (KLONOPIN) 0.5 MG tablet, Take 0.5 mg by mouth 3 times daily, Disp: , Rfl:     clopidogrel (PLAVIX) 75 MG tablet, Take 1 tablet (75 mg) by mouth daily, Disp: 30 tablet, Rfl: 11    diclofenac (VOLTAREN) 1 % topical gel, Apply 4 g topically 4 times daily knee pain, Disp: , Rfl:     famotidine (PEPCID) 20 MG tablet, Take 20 mg by mouth 2 times daily, Disp: , Rfl:     losartan (COZAAR) 50 MG tablet, Take 50 mg by mouth 2 times daily, Disp: , Rfl:     metoprolol succinate ER (TOPROL-XL) 25 MG 24 hr tablet, Take 1 tablet by mouth At Bedtime, Disp: , Rfl:     ALLERGIES:  Allergies   Allergen Reactions    Buspirone Shortness Of Breath    Ciprofloxacin Hives and Shortness Of Breath     Other reaction(s): Unknown    Cortisone      Other reaction(s): Throat Swelling/Closing, Unknown  Reaction 9-5-94      Hydrocodone-Acetaminophen Shortness Of Breath     Other reaction(s): Unknown    Lansoprazole Shortness Of Breath    Metoprolol Shortness Of Breath    Nedocromil      Other reaction(s): Throat Swelling/Closing    Niacin Shortness Of Breath     Other reaction(s): Unknown    Albuterol Other (See Comments)      Inhaler had extreme effect on nervous system      Amlodipine      Other reaction(s): Erythema, Unknown    Azithromycin      Other reaction(s): *Unknown, Unknown    Cefixime Diarrhea     Other reaction(s): Unknown    Cefprozil Nausea and Vomiting     Other reaction(s): Unknown    Cefuroxime      Other reaction(s): *Unknown    Cephalexin      Other reaction(s): *Unknown, Unknown    Contrast Dye      Other reaction(s): hives    Cyclobenzaprine      Other reaction(s): Sedation    Dextromethorphan      Other reaction(s): headache,nausea    Dextromethorphan-Guaifenesin Nausea     Other reaction(s): Headache    Diltiazem      Other reaction(s): Erythema, Unknown  Ears face arms and chest red and on fire-body tremors      Erythromycin      Other reaction(s): Unknown    Esomeprazole      Other reaction(s): Headache    Ethanol      Other reaction(s): Adverse reaction    Fenofibrate Muscle Pain (Myalgia)     Other reaction(s): Unknown    Fluticasone-Salmeterol Other (See Comments)     Elevated blood pressure      Hydrocodone      Other reaction(s): breathing difficulties    Levofloxacin Nausea     Other reaction(s): Headache, Unknown    Methylprednisolone     Metronidazole     Monosodium Glutamate     Moxifloxacin      Other reaction(s): Dizziness    Nifedipine      Other reaction(s): Edema  Took for 5-93 to 9-94 red and swollen leg      Nsaids      Other reaction(s): Unknown    Ofloxacin      Other reaction(s): *Unknown    Ondansetron      Other reaction(s): Constipation    Parsley Seed     Penicillins Unknown     She doesn't remember other than it occurred as a very young woman     Piper     Pirbuterol Other (See Comments)     Immediate extreme effect on nervous system      Pravastatin      Other reaction(s): Dizziness, Unknown    Pseudoephedrine      Other reaction(s): headache,nausea    Pseudoephedrine-Dm-Gg     Simvastatin Muscle Pain (Myalgia)    Spironolactone      Other reaction(s): stomach cramps, nausea     "Sulfamethoxazole-Trimethoprim      Other reaction(s): Unknown    Tramadol      Other reaction(s): red ears,high blood press.    Tramadol-Acetaminophen      Other reaction(s): Tachycardia, Unknown    Triamterene Other (See Comments)     Greatly bothered with sun-took medication for three years  Greatly bothered with sun      Diatrizoate Rash    Telmisartan Palpitations       FAMILY HISTORY:  Family History   Problem Relation Age of Onset    Hypertension Mother     Cancer Mother         gallbladder cancer    Myocardial Infarction Father     Hypertension Sister     Alzheimer Disease Sister     Heart Disease Sister     Cancer Brother     Brain Cancer Brother     Pacemaker Mother     Heart Disease Mother     Coronary Artery Disease Father     Heart Disease Father     Heart Failure Sister        SOCIAL HISTORY:   Social History     Socioeconomic History    Marital status:    Tobacco Use    Smoking status: Former     Packs/day: 3.00     Years: 35.00     Pack years: 105.00     Types: Cigarettes    Smokeless tobacco: Never    Tobacco comments:     quit 1968   Substance and Sexual Activity    Alcohol use: Not Currently    Drug use: Never    Sexual activity: Not Currently   Other Topics Concern    Parent/sibling w/ CABG, MI or angioplasty before 65F 55M? No       PHYSICAL EXAM:    Vitals: BP (!) 144/65   Pulse 77   Temp 98.4  F (36.9  C) (Oral)   Resp 20   Ht 1.549 m (5' 1\")   Wt 79.4 kg (175 lb)   LMP  (LMP Unknown)   SpO2 92%   BMI 33.07 kg/m     Constitutional: Well developed, well nourished. No acute distress.  HENT: Normocephalic, atraumatic. Neck-gross ROM intact.   Eyes: Pupils mid-range, sclera white  Respiratory: CTAB, no respiratory distress, speaks full sentences easily.  Cardiovascular: Normal heart rate, regular rhythm. No lower extremity edema  GI: Soft, not distended, mild upper abdominal tenderness, no guarding  Musculoskeletal: Moving all 4 extremities intentionally and without pain. No obvious " deformity.  Skin: Warm, dry, no rash.  Neurologic: Alert & oriented, speech clear, Cns grossly intact, no focal deficits noted    LAB:  All pertinent labs reviewed and interpreted.  Labs Ordered and Resulted from Time of ED Arrival to Time of ED Departure   COMPREHENSIVE METABOLIC PANEL - Abnormal       Result Value    Sodium 135 (*)     Potassium 4.0      Chloride 104      Carbon Dioxide (CO2) 18 (*)     Anion Gap 13      Urea Nitrogen 27.2 (*)     Creatinine 1.33 (*)     Calcium 9.5      Glucose 114 (*)     Alkaline Phosphatase 66      AST 24      ALT 9      Protein Total 6.7      Albumin 3.3 (*)     Bilirubin Total 0.2      GFR Estimate 39 (*)    LACTIC ACID WHOLE BLOOD - Abnormal    Lactic Acid 0.6 (*)    TROPONIN T, HIGH SENSITIVITY - Abnormal    Troponin T, High Sensitivity 188 (*)    NT PROBNP INPATIENT - Abnormal    N terminal Pro BNP Inpatient 8,333 (*)    CBC WITH PLATELETS AND DIFFERENTIAL - Abnormal    WBC Count 12.1 (*)     RBC Count 3.01 (*)     Hemoglobin 8.7 (*)     Hematocrit 26.1 (*)     MCV 87      MCH 28.9      MCHC 33.3      RDW 13.2      Platelet Count 281      % Neutrophils 79      % Lymphocytes 7      % Monocytes 14      % Eosinophils 0      % Basophils 0      % Immature Granulocytes 0      NRBCs per 100 WBC 0      Absolute Neutrophils 9.6 (*)     Absolute Lymphocytes 0.8      Absolute Monocytes 1.7 (*)     Absolute Eosinophils 0.0      Absolute Basophils 0.0      Absolute Immature Granulocytes 0.0      Absolute NRBCs 0.0     TROPONIN T, HIGH SENSITIVITY - Abnormal    Troponin T, High Sensitivity 173 (*)    CBC WITH PLATELETS - Abnormal    WBC Count 12.6 (*)     RBC Count 2.90 (*)     Hemoglobin 8.4 (*)     Hematocrit 25.5 (*)     MCV 88      MCH 29.0      MCHC 32.9      RDW 13.2      Platelet Count 280     LIPASE - Normal    Lipase 19     HEPARIN UNFRACTIONATED ANTI XA LEVEL   BASIC METABOLIC PANEL   CBC WITH PLATELETS   TROPONIN T, HIGH SENSITIVITY   TROPONIN T, HIGH SENSITIVITY   BLOOD  CULTURE   BLOOD CULTURE       RADIOLOGY:  Chest XR,  PA & LAT   Final Result   IMPRESSION: Single lateral view of the chest. No focal lung infiltrates. Mild cardiomegaly. Mild interstitial prominence throughout the lungs appears similar to prior exam. Degenerative changes thoracic spine unchanged.      CT Abdomen Pelvis w/o Contrast   Final Result   IMPRESSION:    1.  While there is no hydronephrosis, there is asymmetric perinephric stranding on the right which could reflect an acute inflammatory process. Recommend correlation with urinalysis. Please note that noncontrast CT is insensitive for changes of    pyelonephritis.      2.  Colonic diverticulosis.             EKG:   Performed at: 10-AUG-2023 21:19  Impression: Sinus rhythm. Nonspecific ST abnormality.   Rate: 86  Rhythm: Sinus rhythm  QRS Interval: 62  QTc Interval: 414  Comparison: When compared with 20-JUL-2023 13:48, T wave amplitude has decreased in lateral leads.   I have independently reviewed and interpreted the EKG(s) documented above.     PROCEDURES:   Procedures     Gardenia Medina M.D.  Emergency Medicine  [facility] EMERGENCY DEPARTMENT  [address] [place] [id number] [phone]  Dept: [phone]     Gardenia Medina MD  08/11/23 0225

## 2023-08-11 NOTE — PLAN OF CARE
1500 - Pt. A&O x4 and on room air. Denies numbness, tingling or tenderness. Right radial angiogram site is soft with no sign of swelling, bleeding or hematoma. Pt. Does have pain in knees and hips that are constant. Gave Tylenol and applied Voltaren (see MAR).     1730 - TR band is off. Site is soft with no sign of bleeding, swelling or hematoma. Reverse Barbeau was normal.     Problem: Plan of Care - These are the overarching goals to be used throughout the patient stay.    Goal: Plan of Care Review  Description: The Plan of Care Review/Shift note should be completed every shift.  The Outcome Evaluation is a brief statement about your assessment that the patient is improving, declining, or no change.  This information will be displayed automatically on your shift note.  Outcome: Progressing  Flowsheets (Taken 8/11/2023 1554)  Plan of Care Reviewed With: patient  Overall Patient Progress: improving  Goal: Absence of Hospital-Acquired Illness or Injury  Intervention: Identify and Manage Fall Risk  Recent Flowsheet Documentation  Taken 8/11/2023 1515 by Bruce Kuhn RN  Safety Promotion/Fall Prevention:   nonskid shoes/slippers when out of bed   room door open   room near nurse's station  Goal: Optimal Comfort and Wellbeing  Intervention: Monitor Pain and Promote Comfort  Recent Flowsheet Documentation  Taken 8/11/2023 1515 by Bruce Kuhn RN  Pain Management Interventions: medication (see MAR)     Problem: Cardiac Catheterization (Diagnostic/Interventional)  Goal: Anesthesia/Sedation Recovery  Intervention: Optimize Anesthesia Recovery  Recent Flowsheet Documentation  Taken 8/11/2023 1515 by Bruce Kuhn RN  Safety Promotion/Fall Prevention:   nonskid shoes/slippers when out of bed   room door open   room near nurse's station  Goal: Acceptable Pain Control  Intervention: Prevent or Manage Pain  Recent Flowsheet Documentation  Taken 8/11/2023 1515 by Bruce Kuhn, RN  Pain Management Interventions: medication (see  MAR)     Problem: Fall Injury Risk  Goal: Absence of Fall and Fall-Related Injury  Intervention: Promote Injury-Free Environment  Recent Flowsheet Documentation  Taken 8/11/2023 1515 by Bruce Kuhn, RN  Safety Promotion/Fall Prevention:   nonskid shoes/slippers when out of bed   room door open   room near nurse's station     Problem: Pain Chronic (Persistent) (Comorbidity Management)  Goal: Acceptable Pain Control and Functional Ability  Intervention: Develop Pain Management Plan  Recent Flowsheet Documentation  Taken 8/11/2023 1515 by Bruce Kuhn, RN  Pain Management Interventions: medication (see MAR)     Goal Outcome Evaluation:      Plan of Care Reviewed With: patient    Overall Patient Progress: improvingOverall Patient Progress: improving

## 2023-08-11 NOTE — PROGRESS NOTES
[facility]    Medicine Progress Note - Hospitalist Service    Date of Admission:  8/10/2023    Assessment & Plan    Kerry Hoffmann is 88 year old woman with history of anxiety disorder, depression, GERD, hyperlipidemia, and stage III CKD admitted on 8/10/2023 with fever, and generalised weakness and found to have NSTEMI and sepsis secondary to pyelonephritis.    She was placed on ceftriaxone on admission.  Scheduled for coronary angiogram today by cardiology team.    #Dyspnea on exertion  #Elevated troponin  #NSTEMI, suspected  Received heparin drip briefly  -Coronary angiogram today as planned  -continue ASA 81mg PO every day  -continue plavix 75mg PO every day  -continue toprol 25mg PO every day  -Cardiology [id number] assistance    #Sepsis  #Pyelonephritis  Asymmetric perinephric stranding on the right   Allergic to several antibiotics.  Urine culture 8/9/2023 showed pansensitive E. coli.  -Ceftriaxone 1 g daily  -Follow-up blood culture    #Elevated BNP  TTE on 7/21/2023 shows normal EF (65%) and normal function/motion, no significant valvular disease. Possible indication of demand ischemia in setting of CKD3a. Do not suspect acute heart failure.  -Follow-up coronary angiogram report  -Follow-up echocardiogram  -Will consider initiating diuretics if there is evidence of fluid overload after procedures    #CKD3a  Baseline BUN/Cr estimated at 24/1.3. Currently AT baseline.  -BMP trending  -Avoid nephrotoxins     #Aneamia, chronic, normocytic  Estimated baseline Hgb of 9.5, near baseline. No bleeding sources identified.  -CBC trend    #Hyponatremia  Borderline hypo at 135, similar to presumptive baseline. No current home diuretics, reports reduced oral intake over past 2 days.  -Resolved     #Anxiety  -continue klonopin 0.5mg PO TID/PRN    #GERD  -continue pepcid 20mg PO BID    #Osteoarthritis  Chronic osteoarthritis.  -continue diclofenac gel  -tylenol 1000mg PO  TID    #Hypertension  -Losartan 50mg PO BID       Diet: Advance Diet as Tolerated: Clear Liquid Diet    DVT Prophylaxis: Pneumatic Compression Devices  Wiley Catheter: Not present  Lines: None     Cardiac Monitoring: ACTIVE order. Indication: Post- PCI/Angiogram (24 hours)  Code Status: Full Code      Clinically Significant Risk Factors Present on Admission              # Hypoalbuminemia: Lowest albumin = 3.3 g/dL at 8/10/2023 10:05 PM, will monitor as appropriate   # Drug Induced Platelet Defect: home medication list includes an antiplatelet medication   # Hypertension: Home medication list includes antihypertensive(s)   # Acute Respiratory Failure: Documented O2 saturation < 91%.  Continue supplemental oxygen as needed              Disposition Plan      Expected Discharge Date: 08/13/2023                  Se Sosa MD  Hospitalist Service  [facility]  Securely message with EGT (more info)  Text page via Cloud[employer] Paging/Directory   ______________________________________________________________________    Interval History   No new complaints today.  She states she sleeps with the head of her bed elevated at 5 inches up due to breathing difficulty when she lays flat.  She denies shortness of breath at this time.  She is not requiring supplemental oxygen.  She denies leg swelling    Physical Exam   Vital Signs: Temp: 97.1  F (36.2  C) Temp src: Oral BP: (!) 182/77 Pulse: 71   Resp: 15 SpO2: 97 % O2 Device: Nasal cannula Oxygen Delivery: 2 LPM  Weight: 105 lbs 0 oz    General appearance: Awake, Alert, Cooperative, not in any obvious distress and appears stated age   HEENT: Normocephalic, atraumatic, conjunctiva clear without icterus and ears without discharge  Lungs: Clear to auscultation bilaterally, no wheezing, good air exchange, normal work of breathing  Cardiovascular: Regular Rate and Rythm, normal apical impulse, normal S1 and S2, no lower extremity edema bilaterally  Abdomen:  Soft, non-tender and Non-distended, active bowel sounds  Skin: Skin color, texture normal and bruising or bleeding. No rashes or lesions over face, neck, arms and legs, turgor normal.  Musculoskeletal: No bony deformities or joint tenderness. Normal ROM upon flexion & extension.   Neurologic: Alert & Oriented X 3, Facial symmetry preserved and upper & lower extremities moving well with symmetry  Psychiatric: Calm, normal eye contact and normal affect      Medical Decision Making       40 MINUTES SPENT BY ME on the date of service doing chart review, history, exam, documentation & further activities per the note.      Data   Imaging results reviewed over the past 24 hrs:   Recent Results (from the past 24 hour(s))   CT Abdomen Pelvis w/o Contrast    Narrative    EXAM: CT ABDOMEN PELVIS W/O CONTRAST  LOCATION: [facility]  DATE: 8/10/2023    INDICATION: urinary infection, flank pain, upper abdominal pain  COMPARISON: CT from 10/06/2016. Renal ultrasound from 07/23/2023.  TECHNIQUE: CT scan of the abdomen and pelvis was performed without IV contrast. Multiplanar reformats were obtained. Dose reduction techniques were used.  CONTRAST: None.    FINDINGS:   LOWER CHEST: Small hiatal hernia.    HEPATOBILIARY: Normal.    PANCREAS: Normal.    SPLEEN: Normal.    ADRENAL GLANDS: Normal.    KIDNEYS/BLADDER: Benign left renal cysts requiring no follow-up. Asymmetric right perinephric stranding compared to the left. No hydronephrosis. No calcified ureteral or bladder stone.    BOWEL: Colonic diverticulosis. Prominent duodenal diverticula. Fluid-filled loops of nondistended small bowel. No appendicitis. No free air.    LYMPH NODES: Normal.    VASCULATURE: Aortoiliac atherosclerotic calcification without aneurysm.    PELVIC ORGANS: Small amount of free fluid.    MUSCULOSKELETAL: Severe degenerative disc change of the lumbar spine.      Impression    IMPRESSION:   1.  While there is no hydronephrosis, there  is asymmetric perinephric stranding on the right which could reflect an acute inflammatory process. Recommend correlation with urinalysis. Please note that noncontrast CT is insensitive for changes of   pyelonephritis.    2.  Colonic diverticulosis.     Chest XR,  PA & LAT    Narrative    EXAM: XR CHEST 2 VIEWS  LOCATION: [facility]  DATE: 8/10/2023    INDICATION: Shortness of breath.  COMPARISON: None.      Impression    IMPRESSION: Single lateral view of the chest. No focal lung infiltrates. Mild cardiomegaly. Mild interstitial prominence throughout the lungs appears similar to prior exam. Degenerative changes thoracic spine unchanged.   Cardiac Catheterization    Narrative    Images from the original result were not included.  Kerry Hoffmann is a 88 year old old female with HTN, HL, CKD who is here   for NSTEMI.    - non-obstructive CAD, mildly elevated L-sided filling pressures  - continue ASA 81mg daily indefinitely, consider continuing clopidogrel   statin  - continue aggressive risk factor modification          Findings:  LM:no obstruction  LAD:15-30% mid-vessel irregularity. Diagonal branch w/ ostial 50%  Lcx:long area of 40-50% large OM narrowing  RCA:dominant, mildly irregular    LVEDP:20    Access:  R Radial artery    Closure:   Vasc Band

## 2023-08-11 NOTE — ED NOTES
" ED Handoff Report    ED Chief Complaint: Fever, weakness, nausea , SOB    ED Diagnosis:  (R53.1) Generalized weakness  Comment: pt diagnosed with UTI yesterday, has been taking antbx., not eating, nauseated now. Fever at home, afebrile here x 2. Generalized weakness.  A/O x4. PT HAS MULTIPLE ALLERGIES !!! No family here, has a niece in [place].  Admitted recently here for \"fluid around my heart\" Lungs clear, no pitting edema.     Plan: Admit for tx of UTI. Pt has elevated BNP 8,000 and trop. Elevated.        PMH:    Past Medical History:   Diagnosis Date    Anxiety     takes clonazepam    Asthma     per [facility]     Chronic kidney disease     stage 3 per [facility] & [facility]     CKD (chronic kidney disease)     Clostridium difficile colitis 11/1/2016    Clostridium difficile infection     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    Clostridium enterocolitis 10/27/2016    CTS (carpal tunnel syndrome)     GERD (gastroesophageal reflux disease)     per [facility]     Hiatal hernia     small per [facility]     Hyperlipidemia     Hyperlipidemia     Hyperlipidemia     Hypertension     Hypertension     Osteoarthritis of knee     per [facility]     Spinal stenosis     per [facility]     Vitamin D deficiency     per [facility]        Code Status:  Prior     Falls Risk: Yes Band: Applied    Current Living Situation/Residence: lives alone     Elimination Status: Continent: Yes     Activity Level: Independent    Patients Preferred Language:  English     Needed: No    Vital Signs:  BP (!) 147/65   Pulse 82   Temp 98.7  F (37.1  C) (Oral)   Resp 22   Ht 1.549 m (5' 1\")   Wt 79.4 kg (175 lb)   LMP  (LMP Unknown)   SpO2 95%   BMI 33.07 kg/m       Cardiac Rhythm: SR    Pain Score: 3/10    Is the Patient Confused:  No    Last Food or Drink: 8/9/23 unable to eat/drink today at   focused Assessment: c/o abd pain lower abd. And epigastric - unable to eat or drink today. C/o nausea zofran to be given. No IVF given....BNP elevated. C/o SOB - lungs " clear. C/o generalized weakness. Denies urgency/frequency/pain with urination.     Tests Performed: Done: Labs and Imaging    Treatments Provided:  Abd CT, zofran, IV,     Family Dynamics/Concerns: No    Family Updated On Visitor Policy: No    Plan of Care Communicated to Family: No      Belongings Checklist Done and Signed by Patient: Yes    Medications sent with patient: done    Covid: asymptomatic , not done     Additional Information: pt A/O x 3. Has no family except a niece in [place] who was just here with her most recent hospitalization and just left to go back to [place].     RN: Fabricio Valera RN   8/10/2023 10:57 PM

## 2023-08-11 NOTE — PLAN OF CARE
Heparin gtt at 950 unit/hr, first antiXA to be drawn this AM.  Denies pain or discomfort.  Troponin trending down.  IV ABX given per order.  NSR on telemetry.    Tatyana Dickinson RN

## 2023-08-11 NOTE — CONSULTS
CARDIOLOGY CONSULT NOTE         Assessment:   1.  Positive troponin-has had mild elevation in the past but now at 288 diminishing to 133 followed by 154.  Essentially asymptomatic, echo pending.  However given this level over 150, concerned that she could have coronary ischemia and will arrange for angiography and possible intervention.  Very hesitant to place stent given questional history of metals, in addition, would like to decrease dye load.  Thus, plan would be for diagnostic angio and further therapy for that.  2.  Increased BNP-probably element of heart failure, will use some low-dose diuretic.  3.  Chronic kidney disease-creatinine stable about 1.37 but need to watch with IV contrast.  4.  Bladder infection-urinalysis positive for E. coli.  Antibiotics.  5.  Anemia-hemoglobin 8.1, very hesitant to place a stent if she is can be on dual antiplatelet therapy, uncertain reason for this and defer to primary care team.  6.  Benign essential hypertension-on losartan and metoprolol.     Plan:   1.  Coronary angiography today.  Diagnostic.  2.  Diuretics.  3.  Monitor renal function.  4.  She can follow with either me or Dr. Eddi Gaitan.  5.  Primary care team to address anemia.     Current History:   [facility] has been requested by Dr. Mathews to evaluate Kerry Hoffmann  who is a 88 year old year old white female for positive troponin.  Patient has had some dysuria and some fevers and chills and presented to the emergency department where she was noted to have a borderline elevated troponin but also E. coli in the urine.  She was placed on antibiotics.  She presents with shortness of breath and another positive troponin over 100 and cardiology consultation was requested.  She was shortness of breath on heavy activity for years, as well as palpitations off-and-on but no syncope or presyncope.  Denies any chest pains.    Past Medical History:     Past Medical History:   Diagnosis Date    Anxiety      takes clonazepam    Asthma     per [facility]     CKD (chronic kidney disease)     Clostridium difficile colitis 11/1/2016    CTS (carpal tunnel syndrome)     GERD (gastroesophageal reflux disease)     per [facility]     Hiatal hernia     small per [facility]     Hyperlipidemia     Hypertension     Osteoarthritis of knee     per [facility]     Spinal stenosis     per [facility]     Vitamin D deficiency     per [facility]      Past history is negative for cancer, tuberculosis, diabetes mellitus, myocardial infarction,  rheumatic fever, cerebrovascular accident, peptic ulcer disease, chronic obstructive pulmonary disease, or thyroid disorder.    Past Surgical History:     Past Surgical History:   Procedure Laterality Date    BREAST SURGERY  1982    breast tumor per [facility]     CATARACT EXTRACTION  07/2005    per [facility]     ORIF TIBIA & FIBULA FRACTURES  1988    per [facility]     OTHER SURGICAL HISTORY  10/2004    hole in retinaper [facility]     OTHER SURGICAL HISTORY  05/03/2015    fecal transplantper [facility]     CO ESOPHAGOGASTRODUODENOSCOPY TRANSORAL DIAGNOSTIC N/A 9/11/2020    Procedure: ESOPHAGOGASTRODUODENOSCOPY With gastric biopsies;  Surgeon: David Reyes MD;  Location: [facility];  Service: Gastroenterology    TONSILLECTOMY      TONSILLECTOMY & ADENOIDECTOMY  1963     Family History:   Reviewed, and MI in dad at age of 64.    Social History:   Reviewed, and she  reports that she has quit smoking about 50 years ago, smoked up to 4 packs a day for about 15 years..She has never used smokeless tobacco. She reports that she does not currently use alcohol. She reports that she does not use drugs.  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 Kaylyn Zapata.    Meds:      acetaminophen  1,000 mg Oral TID    aspirin  81 mg Oral Daily    cefTRIAXone  1 g Intravenous Q24H    clonazePAM  0.5 mg Oral TID    clopidogrel  75 mg Oral Daily    diclofenac  4 g Topical 4x Daily    [START ON  8/12/2023] famotidine  20 mg Oral Q48H    losartan  50 mg Oral BID    metoprolol succinate ER  25 mg Oral At Bedtime    sodium chloride (PF)  3 mL Intracatheter Q8H     Allergies:   Buspirone, Ciprofloxacin, Cortisone, Hydrocodone-acetaminophen, Lansoprazole, Metoprolol, Nedocromil, Niacin, Albuterol, Amlodipine, Azithromycin, Cefixime, Cefprozil, Cefuroxime, Cephalexin, Contrast dye, Cyclobenzaprine, Dextromethorphan-guaifenesin, Diltiazem, Erythromycin, Esomeprazole, Ethanol, Fenofibrate, Fluticasone-salmeterol, Levofloxacin, Methylprednisolone, Metronidazole, Monosodium glutamate, Moxifloxacin, Nifedipine, Nsaids, Ofloxacin, Ondansetron, Parsley seed, Penicillins, Piper, Pirbuterol, Pravastatin, Pseudoephedrine, Simvastatin, Spironolactone, Sulfamethoxazole-trimethoprim, Tramadol, Tramadol-acetaminophen, Triamterene, Diatrizoate, and Telmisartan    Review of Systems:   A 12 point comprehensive review of systems was  as follows:   General: Positive for fatigue, negative weight loss or weight gain  Constitutional: negative for anorexia, chills, fevers, malaise, night sweats   Eyes: negative for cataracts, color blindness, contacts/glasses, glaucoma, icterus, irritation, redness and visual disturbance  Ears, nose, mouth, throat, and face: negative for ear drainage, earaches, epistaxis, facial trauma, hearing loss, hoarseness, nasal congestion, snoring, sore mouth, sore throat, tinnitus and voice change  Respiratory: Positive dyspnea on exertion, negative hemoptysis, sputum production, pleurisy, stridor, wheezing, PND, orthopnea  Cardiovascular: negative for chest pain, chest pressure/discomfort, claudication, dyspnea, exertional chest pressure/discomfort, fatigue, irregular heart beat, lower extremity edema, near-syncope,  palpitations,  syncope   Gastrointestinal: negative for abdominal pain, change in bowel haibits, constipation, diarrhea, dyspepsia, dysphagia, jaundice, melena, nausea, odynophagia, reflux  "symptoms and vomiting  Genitourinary: negative for decreased stream  Hematologic/lymphatic: negative for bleeding  Musculoskeletal: negative for arthralgias, back pain, bone pain, muscle weakness, myalgias, neck pain and stiff joints  Neurological: negative for syncope, presyncope, coordination problems, dizziness, gait problems, headaches, memory problems, paresthesia, seizures, speech problems, tremors, vertigo and weakness    Objective:     Physical Exam:  BP (!) 156/67   Pulse 74   Temp 98.4  F (36.9  C) (Oral)   Resp 22   Ht 1.549 m (5' 1\")   Wt 79.4 kg (175 lb)   LMP  (LMP Unknown)   SpO2 96%   BMI 33.07 kg/m       Head:    Normocephalic, without obvious abnormality, atraumatic   Eyes:    PERRL, conjunctiva/corneas clear, EOM's intact,           Nose:   Nares normal, septum midline, mucosa normal, no drainage  or sinus tenderness   Throat:   Lips, mucosa, and tongue normal; teeth and gums normal   Neck:   Supple, symmetrical, trachea midline, no adenopathy;        thyroid:  No enlargement/tenderness/nodules; no carotid    bruit or JVD   Back:     Symmetric, no curvature, ROM normal, no CVA tenderness   Lungs:     Clear to auscultation bilaterally, respirations unlabored   Chest wall:    No tenderness or deformity   Heart:    Regular rate and rhythm, S1 and S2 normal, no murmur, rub   or gallop   Abdomen:     Soft, non-tender, bowel sounds active all four quadrants,     no masses, no organomegaly   Extremities:   Extremities normal, atraumatic, no cyanosis or edema   Pulses:   2+ and symmetric all extremities   Skin:   Skin color, texture, turgor normal, no rashes or lesions   Neurologic:   CNII-XII intact. Normal strength, sensation and reflexes       throughout     Cardiographics and Imaging  Radiology Results: Chest x-ray shows Single lateral view of the chest. No focal lung infiltrates. Mild cardiomegaly. Mild interstitial prominence throughout the lungs appears similar to prior exam. Degenerative " "changes thoracic spine unchanged.     EKG Results: personally reviewed sinus rhythm, nonspecific ST-T wave changes.    Lab Review   Pertinent Labs  Lab Results: personally reviewed       Lab Results   Component Value Date    TROPONINI <0.01 09/04/2022       Lab Results   Component Value Date    BUN 28.2 (H) 08/11/2023     08/11/2023    CO2 19 (L) 08/11/2023       Lab Results   Component Value Date    WBC 10.0 08/11/2023    HGB 8.1 (L) 08/11/2023    HCT 25.4 (L) 08/11/2023    MCV 89 08/11/2023     08/11/2023       Lab Results   Component Value Date     11/16/2019       Clinically Significant Risk Factors Present on Admission              # Hypoalbuminemia: Lowest albumin = 3.3 g/dL at 8/10/2023 10:05 PM, will monitor as appropriate   # Drug Induced Platelet Defect: home medication list includes an antiplatelet medication   # Hypertension: Home medication list includes antihypertensive(s)   # Acute Respiratory Failure: Documented O2 saturation < 91%.  Continue supplemental oxygen as needed     # Obesity: Estimated body mass index is 33.07 kg/m  as calculated from the following:    Height as of this encounter: 1.549 m (5' 1\").    Weight as of this encounter: 79.4 kg (175 lb).           Acidosis    CKD POA List: Stage 3b (GFR 30-44)              "

## 2023-08-11 NOTE — PHARMACY-ADMISSION MEDICATION HISTORY
Pharmacist Admission Medication History    Admission medication history is complete. The information provided in this note is only as accurate as the sources available at the time of the update.    Medication reconciliation/reorder completed by provider prior to medication history? No    Information Source(s): Patient via in-person    Pertinent Information: Patient stated that she took her home medications today, some of them two doses of the three that she takes in a day. Patient stated that she thinks Amoxicillin is making her sick - she was having a hard time even getting small sips of water in .     Changes made to PTA medication list:  Added: None  Deleted: Levalbuterol  Changed: Acetaminophen increased from 1000 mg BID to 1000 mg TID.    Medication Affordability:  Not including over the counter (OTC) medications, was there a time in the past 3 months when you did not take your medications as prescribed because of cost?: No    Allergies reviewed with patient and updates made in EHR: yes    Medication History Completed By: MAREK HEWITT RPH 8/10/2023 10:07 PM    Prior to Admission medications    Medication Sig Last Dose Taking? Auth Provider Long Term End Date   acetaminophen (TYLENOL) 500 MG tablet Take 1,000 mg by mouth 3 times daily 8/10/2023 at 2 doses today. Yes Reported, Patient     amoxicillin (AMOXIL) 500 MG capsule Take 1 capsule (500 mg) by mouth 3 times daily for 7 days 8/10/2023 at 2 doses taken today - patient thinks this is making her sick Yes Riley Carranza MD  8/16/23   aspirin 81 MG EC tablet Take 81 mg by mouth daily 8/10/2023 at am Yes Reported, Patient     Cholecalciferol (VITAMIN D3) 50 MCG (2000 UT) CAPS Take 1 tablet by mouth daily 8/10/2023 at am Yes Reported, Patient     clonazePAM (KLONOPIN) 0.5 MG tablet Take 0.5 mg by mouth 3 times daily 8/10/2023 at 2 doses Yes Reported, Patient Yes    clopidogrel (PLAVIX) 75 MG tablet Take 1 tablet (75 mg) by mouth daily 8/10/2023 at am  Yes Mark Hummle MD Yes    diclofenac (VOLTAREN) 1 % topical gel Apply 4 g topically 4 times daily knee pain 8/9/2023 at pm Yes Reported, Patient     famotidine (PEPCID) 20 MG tablet Take 20 mg by mouth 2 times daily 8/10/2023 at am Yes Reported, Patient     losartan (COZAAR) 50 MG tablet Take 50 mg by mouth 2 times daily 8/10/2023 at am Yes Reported, Patient Yes    metoprolol succinate ER (TOPROL-XL) 25 MG 24 hr tablet Take 1 tablet by mouth At Bedtime 8/10/2023 at am Yes Reported, Patient Yes

## 2023-08-11 NOTE — ED NOTES
Bed: [facility]NED[facility]-E  Expected date: 8/10/23  Expected time: 8:33 PM  Means of arrival: Ambulance  Comments:  Gama 89 yo F UTI, increased weak

## 2023-08-11 NOTE — PLAN OF CARE
Up with assist of 1. Chronic pain- worse in knees. NPO except medications. Falls and pressure ulcer prevention plans in place. Alert and oriented. Calls appropriately- declines teaching   Aiyana Brown RN    Problem: Risk for Delirium  Goal: Optimal Coping  Outcome: Progressing    Problem: Osteoarthritis Comorbidity  Goal: Maintenance of Osteoarthritis Symptom Control  Outcome: Progressing     Goal: Absence of Hospital-Acquired Illness or Injury  Intervention: Identify and Manage Fall Risk  Recent Flowsheet Documentation  Taken 8/11/2023 0800 by Aiyana Brown RN  Safety Promotion/Fall Prevention: (calls for assist)   nonskid shoes/slippers when out of bed   room door open   room near nurse's station     Problem: Self-Care Deficit  Goal: Improved Ability to Complete Activities of Daily Living  Intervention: Promote Activity and Functional [place]  Taken 8/11/2023 1130 by Aiyana Brown, RN  Activity Assistance Provided: assistance, 1 person

## 2023-08-11 NOTE — PROGRESS NOTES
Brief CV progress note:    Patient has multiple drug allergies which include cortisone, methylprednisolone and IV contrast. Spoke with Pharmacist Elvia regarding choice of pre-medication prior to angiogram using iodinated contrast. Best option was determined to be Dexamethasone 10mg IV x1 along with Benadryl 50mg IV x1. Will monitor closely post procedure.

## 2023-08-11 NOTE — PRE-PROCEDURE
GENERAL PRE-PROCEDURE:   Procedure:  Coronary angiogram, left heart catheterization  Date/Time:  8/11/2023 1:31 PM    Written consent obtained?: Yes    Risks and benefits: Risks, benefits and alternatives were discussed    Consent given by:  Patient  Patient states understanding of procedure being performed: Yes    Patient's understanding of procedure matches consent: Yes    Procedure consent matches procedure scheduled: Yes    Expected level of sedation:  Moderate  Appropriately NPO:  Yes  ASA Class:  4 (Elevated troponin, HTN, CKD stage III ,anemia)  Mallampati  :  Grade 4- soft palate obscured by base of tongue  Lungs:  Lungs clear with good breath sounds bilaterally  Heart:  Normal heart sounds and rate  History & Physical reviewed:  History and physical reviewed and no updates needed  Statement of review:  I have reviewed the lab findings, diagnostic data, medications, and the plan for sedation    Patient is a DNR/DNI and understands this will be placed on hold for today's procedure.    Patient has multiple allergies which include cortisone, methylprednisolone and contrast dye. Patient was pre-medicated prior to procedure with Dexamethasone and Benadryl under pharmacy guidance

## 2023-08-11 NOTE — CONSULTS
Care Management Initial Consult    General Information  Assessment completed with: Kerry Bear  Type of CM/SW Visit: Initial Assessment    Primary Care Provider verified and updated as needed: Yes   Readmission within the last 30 days: other (see comments)   Return Category: New Diagnosis  Reason for Consult: discharge planning  Advance Care Planning: Advance Care Planning Reviewed: present on chart     General Information Comments: Goal is home but would appreciate therapy evaluation due to weakness    Communication Assessment  Patient's communication style: spoken language (English or Bilingual)    Hearing Difficulty or Deaf: no   Wear Glasses or Blind: no    Cognitive  Cognitive/Neuro/Behavioral: WDL                      Living Environment:   People in home: alone     Current living Arrangements: independent living facility      Able to return to prior arrangements: yes  Living Arrangement Comments: pending response to treatment    Family/Social Support:  Care provided by: self  Provides care for: no one      (Family and friends)          Description of Support System: Supportive       Current Resources:   Patient receiving home care services: No (States was 'just about to start' but felt too weak so came to the hospital)     Community Resources: None  Equipment currently used at home: wheelchair, power, cane, straight  Supplies currently used at home:  (Cane, wheelchair)    Employment/Financial:  Employment Status: retired        Financial Concerns: No concerns identified   Referral to Financial Worker: No     Does the patient's insurance plan have a 3 day qualifying hospital stay waiver?  Yes   Will the waiver be used for post-acute placement? No    Lifestyle & Psychosocial Needs:  Social Determinants of Health     Tobacco Use: Medium Risk (8/9/2023)    Patient History     Smoking Tobacco Use: Former     Smokeless Tobacco Use: Never     Passive Exposure: Not on file   Alcohol Use: Not on file   Financial  Resource Strain: Not on file   Food Insecurity: Not on file   Transportation Needs: Not on file   Physical Activity: Not on file   Stress: Not on file   Social Connections: Not on file   Intimate Partner Violence: Not on file   Depression: Not at risk (7/5/2022)    PHQ-2     PHQ-2 Score: 0   Housing Stability: Not on file     Functional Status:  Prior to admission patient needed assistance:   Dependent ADLs:: Ambulation-cane, Wheelchair-independent  Dependent IADLs:: Independent     Mental Health Status:  Mental Health Status: No Current Concerns       Chemical Dependency Status:  Chemical Dependency Status: No Current Concerns           Values/Beliefs:  Spiritual, Cultural Beliefs, Scientology Practices, Values that affect care: no          Values/Beliefs Comment: Sikh    Additional Information:  Writer met with patient to review role of care management services, discuss goals of care and assess need for any possible services at discharge. Patient alert, answering questions appropriately and engaged in the conversation. Patient lives alone in a senior independent living apartment. She is independent with activities of daily living at baseline. She uses a cane or motorized wheelchair for mobility.   She states that she was just about to start home care when she felt too weak so came to the hospital. Would appreciate therapy evaluations and recommendations when medically appropriate.     Thania Clifton RN

## 2023-08-12 ENCOUNTER — APPOINTMENT (OUTPATIENT)
Dept: CARDIOLOGY | Facility: HOSPITAL | Age: 88
DRG: 871 | End: 2023-08-12
Attending: INTERNAL MEDICINE
Payer: MEDICARE

## 2023-08-12 LAB
ANION GAP SERPL CALCULATED.3IONS-SCNC: 13 MMOL/L (ref 7–15)
BUN SERPL-MCNC: 35.6 MG/DL (ref 8–23)
CALCIUM SERPL-MCNC: 9.4 MG/DL (ref 8.8–10.2)
CHLORIDE SERPL-SCNC: 103 MMOL/L (ref 98–107)
CREAT SERPL-MCNC: 1.36 MG/DL (ref 0.51–0.95)
DEPRECATED HCO3 PLAS-SCNC: 20 MMOL/L (ref 22–29)
GFR SERPL CREATININE-BSD FRML MDRD: 38 ML/MIN/1.73M2
GLUCOSE SERPL-MCNC: 141 MG/DL (ref 70–99)
LVEF ECHO: NORMAL
POTASSIUM SERPL-SCNC: 4.3 MMOL/L (ref 3.4–5.3)
SODIUM SERPL-SCNC: 136 MMOL/L (ref 136–145)

## 2023-08-12 PROCEDURE — 93306 TTE W/DOPPLER COMPLETE: CPT

## 2023-08-12 PROCEDURE — 250N000013 HC RX MED GY IP 250 OP 250 PS 637: Performed by: INTERNAL MEDICINE

## 2023-08-12 PROCEDURE — 99232 SBSQ HOSP IP/OBS MODERATE 35: CPT | Performed by: INTERNAL MEDICINE

## 2023-08-12 PROCEDURE — 82310 ASSAY OF CALCIUM: CPT | Performed by: INTERNAL MEDICINE

## 2023-08-12 PROCEDURE — 210N000001 HC R&B IMCU HEART CARE

## 2023-08-12 PROCEDURE — 36415 COLL VENOUS BLD VENIPUNCTURE: CPT | Performed by: INTERNAL MEDICINE

## 2023-08-12 PROCEDURE — 999N000248 HC STATISTIC IV INSERT WITH US BY RN

## 2023-08-12 PROCEDURE — 250N000011 HC RX IP 250 OP 636: Mod: JZ | Performed by: INTERNAL MEDICINE

## 2023-08-12 PROCEDURE — 93306 TTE W/DOPPLER COMPLETE: CPT | Mod: 26 | Performed by: INTERNAL MEDICINE

## 2023-08-12 RX ORDER — METOPROLOL SUCCINATE 50 MG/1
50 TABLET, EXTENDED RELEASE ORAL AT BEDTIME
Status: DISCONTINUED | OUTPATIENT
Start: 2023-08-12 | End: 2023-08-13

## 2023-08-12 RX ADMIN — ACETAMINOPHEN 1000 MG: 500 TABLET ORAL at 20:50

## 2023-08-12 RX ADMIN — CEFTRIAXONE SODIUM 1 G: 1 INJECTION, POWDER, FOR SOLUTION INTRAMUSCULAR; INTRAVENOUS at 02:19

## 2023-08-12 RX ADMIN — DICLOFENAC 4 G: 10 GEL TOPICAL at 08:24

## 2023-08-12 RX ADMIN — METOPROLOL SUCCINATE 50 MG: 50 TABLET, EXTENDED RELEASE ORAL at 21:58

## 2023-08-12 RX ADMIN — DICLOFENAC 4 G: 10 GEL TOPICAL at 20:43

## 2023-08-12 RX ADMIN — Medication 81 MG: at 08:22

## 2023-08-12 RX ADMIN — DICLOFENAC 4 G: 10 GEL TOPICAL at 13:27

## 2023-08-12 RX ADMIN — FAMOTIDINE 20 MG: 20 TABLET ORAL at 08:22

## 2023-08-12 RX ADMIN — LOSARTAN POTASSIUM 50 MG: 50 TABLET, FILM COATED ORAL at 08:22

## 2023-08-12 RX ADMIN — ACETAMINOPHEN 1000 MG: 500 TABLET ORAL at 08:22

## 2023-08-12 RX ADMIN — LOSARTAN POTASSIUM 50 MG: 50 TABLET, FILM COATED ORAL at 20:50

## 2023-08-12 RX ADMIN — CLONAZEPAM 0.5 MG: 0.5 TABLET ORAL at 20:50

## 2023-08-12 RX ADMIN — ACETAMINOPHEN 1000 MG: 500 TABLET ORAL at 13:25

## 2023-08-12 RX ADMIN — CLONAZEPAM 0.5 MG: 0.5 TABLET ORAL at 13:26

## 2023-08-12 RX ADMIN — CLONAZEPAM 0.5 MG: 0.5 TABLET ORAL at 08:22

## 2023-08-12 RX ADMIN — DICLOFENAC 4 G: 10 GEL TOPICAL at 17:13

## 2023-08-12 RX ADMIN — CLOPIDOGREL BISULFATE 75 MG: 75 TABLET ORAL at 08:22

## 2023-08-12 ASSESSMENT — ACTIVITIES OF DAILY LIVING (ADL)
ADLS_ACUITY_SCORE: 28

## 2023-08-12 NOTE — PROGRESS NOTES
"CLINICAL NUTRITION SERVICES - ASSESSMENT NOTE     Nutrition Prescription    RECOMMENDATIONS FOR MDs/PROVIDERS TO ORDER:      Malnutrition Status:    Not noted    Recommendations already ordered by Registered Dietitian (RD):  none    Future/Additional Recommendations:       REASON FOR ASSESSMENT  Kerry Hoffmann is a/an 88 year old female assessed by the dietitian for Admission Nutrition Risk Screen for \"unsure\" wt loss, yes for decreased po.    NUTRITION HISTORY  Patient lives in independent living and gets one meal/day in the dining room.  Patient relies on frozen and convenience meals for her other meals.  Patient reporting that she has been unable to order a pancake from the kitchen.  Digging into the ingredients shows that patient's alcohol allergy is preventing pancakes from being ordered.  Patient reports that she does not have an alcohol allergy though she does have a shellfish allergy.  Allergies updated.    CURRENT NUTRITION ORDERS  Diet: 2 g Sodium  Ordering small meals.   Food allergies:  parsley, shellfish    LABS  Creatinine 1.36 (H)  Glucose 141 (H)  Labs reviewed    MEDICATIONS  Medications reviewed    ANTHROPOMETRICS  Height: 154.9 cm (5' 1\")  Most Recent Weight: 81.8 kg (180 lb 4.8 oz)    BMI: Obesity Grade I BMI 30-34.9  Weight History:   Wt Readings from Last 10 Encounters:   08/12/23 81.8 kg (180 lb 4.8 oz)   07/26/23 89.9 kg (198 lb 3.1 oz)   04/10/23 57.6 kg (127 lb)   07/05/22 78.5 kg (173 lb)   04/29/22 78.5 kg (173 lb)   12/07/21 81.6 kg (180 lb)   04/15/21 81.6 kg (180 lb)   03/05/21 81.9 kg (180 lb 8 oz)   09/10/20 81.6 kg (180 lb)   No wt loss noted.  Suspect 198 lb on 7/26 is in error.  Usual wt 173-180 lbs.     Dosing Weight: 81.8 kg    ASSESSED NUTRITION NEEDS  Estimated Energy Needs: 1635+ kcals/day (20+kcals/kg)  Justification: Obese  Estimated Protein Needs: 65-82 grams protein/day (0.8 - 1 grams of pro/kg)  Justification: CKD  Estimated Fluid Needs: 1635+ mL/day (1 mL/kcal) "   Justification: Maintenance    PHYSICAL FINDINGS  See malnutrition section below.  Edema-diallo feet/ankles per chart      MALNUTRITION:  % Weight Loss:  None noted  % Intake:  Decreased intake does not meet criteria for malnutrition, for 2 days since admit  Subcutaneous Fat Loss:  None observed  Muscle Loss:  None observed  Fluid Retention:  None noted    Malnutrition Diagnosis: Patient does not meet two of the above criteria necessary for diagnosing malnutrition    NUTRITION DIAGNOSIS  Predicted inadequate nutrient intake related to hospitalization as evidenced by decreased intake.      INTERVENTIONS  Implementation  Nutrition Education: Per provider order if indicated   Pt declines need for supplements     Goals  Electrolytes WNL  Patient to consume % of nutritionally adequate meals three times per day, or the equivalent with supplements/snacks.     Monitoring/Evaluation  Progress toward goals will be monitored and evaluated per protocol.

## 2023-08-12 NOTE — PLAN OF CARE
"0800 - Pt. A&O x4 and on room air. Denies pain, numbness, tingling or tenderness.     1200 - VSS no change from AM. Denies pain, numbness, tingling or tenderness. Up in chair.     1530 - VSS no change from AM. Denies pain, numbness, tingling or tenderness.     Problem: Plan of Care - These are the overarching goals to be used throughout the patient stay.    Goal: Plan of Care Review  Description: The Plan of Care Review/Shift note should be completed every shift.  The Outcome Evaluation is a brief statement about your assessment that the patient is improving, declining, or no change.  This information will be displayed automatically on your shift note.  8/12/2023 0906 by Bruce Kuhn, KG  Outcome: Progressing  Flowsheets (Taken 8/12/2023 0906)  Plan of Care Reviewed With: patient  Overall Patient Progress: improving  8/12/2023 0906 by Bruce Kuhn, KG  Outcome: Progressing  Flowsheets (Taken 8/12/2023 0906)  Plan of Care Reviewed With: patient  Overall Patient Progress: improving  Goal: Patient-Specific Goal (Individualized)  Description: You can add care plan individualizations to a care plan. Examples of Individualization might be:  \"Parent requests to be called daily at 9am for status\", \"I have a hard time hearing out of my right ear\", or \"Do not touch me to wake me up as it startles me\".  Outcome: Progressing  Goal: Absence of Hospital-Acquired Illness or Injury  Outcome: Progressing  Goal: Optimal Comfort and Wellbeing  Outcome: Progressing  Goal: Readiness for Transition of Care  Outcome: Progressing     Problem: Risk for Delirium  Goal: Optimal Coping  Outcome: Progressing  Goal: Improved Behavioral Control  Outcome: Progressing  Goal: Improved Attention and Thought Clarity  Outcome: Progressing  Intervention: Maximize Cognitive Function  Recent Flowsheet Documentation  Taken 8/12/2023 0820 by Bruce Kuhn RN  Sensory Stimulation Regulation: television on  Reorientation Measures:   calendar in view   clock in " view  Goal: Improved Sleep  Outcome: Progressing     Problem: Cardiac Catheterization (Diagnostic/Interventional)  Goal: Stable Heart Rate and Rhythm  Outcome: Progressing  Goal: Absence of Bleeding  Outcome: Progressing  Goal: Absence of Contrast-Induced Injury  Outcome: Progressing  Goal: Absence of Embolism Signs and Symptoms  Outcome: Progressing  Goal: Anesthesia/Sedation Recovery  Outcome: Progressing  Intervention: Optimize Anesthesia Recovery  Recent Flowsheet Documentation  Taken 8/12/2023 0820 by Bruce Kuhn RN  Reorientation Measures:   calendar in view   clock in view  Goal: Acceptable Pain Control  Outcome: Progressing  Goal: Absence of Vascular Access Complication  Outcome: Progressing     Problem: Fall Injury Risk  Goal: Absence of Fall and Fall-Related Injury  Outcome: Progressing     Problem: Osteoarthritis Comorbidity  Goal: Maintenance of Osteoarthritis Symptom Control  Outcome: Progressing     Problem: Pain Chronic (Persistent) (Comorbidity Management)  Goal: Acceptable Pain Control and Functional Ability  Outcome: Progressing     Problem: Self-Care Deficit  Goal: Improved Ability to Complete Activities of Daily Living  Outcome: Progressing     Goal Outcome Evaluation:      Plan of Care Reviewed With: patient    Overall Patient Progress: improvingOverall Patient Progress: improving

## 2023-08-12 NOTE — PROGRESS NOTES
CARDIOLOGY PROGRESS NOTE      Assessment/Plan:  1.  Positive troponin-has had mild elevation in the past but now at 288 diminishing to 133 followed by 154.  Essentially asymptomatic, echo pending.  However given this level over 150, concerned that she could have coronary ischemia and had coronary angiography which was unremarkable.  2.  Coronary artery disease-insignificant obstructive disease with normal left main, 50 to 30% mid LAD lesion with the first diagonal ostial 50% lesion, circumflex with 40 to 50% obtuse marginal artery narrowing, right coronary artery with minimal disease.  Preventive therapy.  3.  Increased BNP-probably element of heart failure, LVEDP mildly up at 20 mmHg, did receive some IV diuretic, but this on hold and check renal function today.  4.  Chronic kidney disease-creatinine stable about 1.37 but given IV contrast will arrange for renal profile today.  5.  Bladder infection-urinalysis positive for E. coli.  Antibiotics.  Most likely pyelonephritis.  6.  Anemia-hemoglobin 8.1, uncertain reason for this and defer to primary care team.  7.  Benign essential hypertension-on losartan and metoprolol.  Pressures up today, will slowly increase meds, given kidney dysfunction do not want to leave very high.  8.  Numerous allergies-so noted.    Plan:  1.  Check renal profile today.  2.  Slightly increase metoprolol.  3.  No further diuretic.    Discharge Plannin.  Barrier to discharge is transition over to oral antibiotic.  2.  Can follow with me as primary cardiologist if she so desires.     LOS: 1 day     Subjective:  Interval History:    88-year-old white female being seen on second day of hospitalization.  Still having a little bit of shortness of breath possibly a wheeze.  Tells me she did not sleep well due to chills and being cold and hot.  Very concerned that she is not making urine.  No chest pains, palpitations, significant PND, orthopnea or peripheral edema.    Medications    "acetaminophen  1,000 mg Oral TID    aspirin  81 mg Oral Daily    cefTRIAXone  1 g Intravenous Q24H    clonazePAM  0.5 mg Oral TID    clopidogrel  75 mg Oral Daily    diclofenac  4 g Topical 4x Daily    famotidine  20 mg Oral Q48H    losartan  50 mg Oral BID    metoprolol succinate ER  25 mg Oral At Bedtime    sodium chloride (PF)  3 mL Intracatheter Q8H     Objective:   Vital signs in last 24 hours:  Temp:  [97.1  F (36.2  C)-98.4  F (36.9  C)] 97.8  F (36.6  C)  Pulse:  [60-73] 62  Resp:  [15-27] 18  BP: (129-185)/(57-82) 179/79  SpO2:  [91 %-97 %] 93 %    Physical Exam:  BP (!) 179/79 (BP Location: Right arm)   Pulse 62   Temp 97.8  F (36.6  C) (Oral)   Resp 18   Ht 1.549 m (5' 1\")   Wt 81.8 kg (180 lb 4.8 oz)   LMP  (LMP Unknown)   SpO2 93%   BMI 34.07 kg/m      General Appearance:    Alert, cooperative, no distress, appears stated age   Head:    Normocephalic, without obvious abnormality, atraumatic   Throat:   Lips, mucosa, and tongue normal; teeth and gums normal   Neck:   Supple, symmetrical, trachea midline, no adenopathy;        thyroid:  No enlargement/tenderness/nodules; no carotid    bruit or JVD   Back:     Symmetric, no curvature, ROM normal, no CVA tenderness   Lungs:     Clear to auscultation bilaterally, respirations unlabored   Chest wall:    No tenderness or deformity   Heart:    Regular rate and rhythm, S1 and S2 normal, no murmur, rub   or gallop   Abdomen:     Soft, non-tender, bowel sounds active all four quadrants,     no masses, no organomegaly   Extremities:   Normal, atraumatic, no cyanosis or edema   Pulses:   2+ and symmetric all extremities   Skin:   Skin color, texture, turgor normal, no rashes or lesions     Cardiographics:      ECG: Personally reviewed by myself shows sinus rhythm, nonspecific ST-T wave changes.    Echocardiogram: Pending      Lab Results:   Recent Labs   Lab 08/11/23 0456   WBC 10.0   HGB 8.1*   HCT 25.4*        Recent Labs   Lab 08/11/23 0456   NA " 137   CO2 19*   BUN 28.2*   .       Lab Results   Component Value Date    TROPONINI <0.01 09/04/2022

## 2023-08-12 NOTE — PROGRESS NOTES
[facility]    Medicine Progress Note - Hospitalist Service    Date of Admission:  8/10/2023    Assessment & Plan    Kerry Hoffmann is 88 year old woman with history of anxiety disorder, depression, GERD, hyperlipidemia, and stage III CKD admitted on 8/10/2023 with fever, and generalised weakness and found to have NSTEMI and sepsis secondary to pyelonephritis.    She was placed on ceftriaxone on admission.  Coronary angiogram 8/11/2023 revealed nonobstructive CAD with mildly elevated left-sided filling pressure.  Echocardiogram revealed mild LVH with mild to moderate mitral regurgitation, mild to moderate aortic regurgitation and mildly elevated left ventricular filling pressure. Cardiologist recommended aggressive risk factor modification and antiplatelet therapy and no plans for diuretic therapy.    #Dyspnea on exertion  #Elevated troponin  #NSTEMI, suspected   Coronary angiogram 8/11/2023 revealed nonobstructive CAD with mildly elevated left-sided filling pressure.  Cardiologist recommended aggressive risk factor modification and antiplatelet therapy.  -continue ASA 81mg PO every day  -continue plavix 75mg PO every day  -continue toprol 25mg PO every day  -Cardiology [id number] assistance    #Sepsis  #Pyelonephritis  Asymmetric perinephric stranding on the right   Allergic to several antibiotics.  Urine culture 8/9/2023 showed pansensitive E. coli.  -Ceftriaxone 1 g daily  -Follow-up blood culture    #Elevated BNP  TTE on 7/21/2023 shows normal EF (65%) and normal function/motion, no significant valvular disease. Possible indication of demand ischemia in setting of CKD3a. Do not suspect acute heart failure.  Coronary angiogram 8/11/2023 revealed nonobstructive CAD with mildly elevated left-sided filling pressure.  Cardiologist recommended aggressive risk factor modification and antiplatelet therapy.  No plans for diuretic therapy per cardiologist.  -Cardiology  "[id number] assistance    #CKD3a  Baseline BUN/Cr estimated at 24/1.3. Currently AT baseline.  -BMP trending  -Avoid nephrotoxins     #Aneamia, chronic, normocytic  Estimated baseline Hgb of 9.5, near baseline. No bleeding sources identified.  -CBC trend    #Hyponatremia  Borderline hypo at 135, similar to presumptive baseline. No current home diuretics, reports reduced oral intake over past 2 days.  -Resolved     #Anxiety  -continue klonopin 0.5mg PO TID/PRN    #GERD  -continue pepcid 20mg PO BID    #Osteoarthritis  Chronic osteoarthritis.  -continue diclofenac gel  -tylenol 1000mg PO TID    #Hypertension  -Losartan 50mg PO BID       Diet: 2 Gram Sodium Diet    DVT Prophylaxis: Pneumatic Compression Devices  Wiley Catheter: Not present  Lines: None     Cardiac Monitoring: ACTIVE order. Indication: Post- PCI/Angiogram (24 hours)  Code Status: Full Code      Clinically Significant Risk Factors              # Hypoalbuminemia: Lowest albumin = 3.3 g/dL at 8/10/2023 10:05 PM, will monitor as appropriate                # Obesity: Estimated body mass index is 34.07 kg/m  as calculated from the following:    Height as of this encounter: 1.549 m (5' 1\").    Weight as of this encounter: 81.8 kg (180 lb 4.8 oz)., PRESENT ON ADMISSION          Disposition Plan     Expected Discharge Date: 08/13/2023                  Se Sosa MD  Hospitalist Service  [facility]  Securely message with bright box (more info)  Text page via [employer] Paging/Directory   ______________________________________________________________________    Interval History   She was feeling short of breath, wheezing, and nauseous this morning but feels better now. She denies leg swelling. She denies fever or chills.  Coronary angiogram done yesterday did not suggest significant coronary artery occlusion.  Had echocardiogram this morning; result is pending.     Physical Exam   Vital Signs: Temp: 97.4  F (36.3  C) Temp src: " Oral BP: (!) 159/68 Pulse: 76   Resp: 20 SpO2: 97 % O2 Device: None (Room air)    Weight: 180 lbs 4.8 oz    General appearance: Awake, Alert, Cooperative, not in any obvious distress and appears stated age   HEENT: Normocephalic, atraumatic, conjunctiva clear without icterus and ears without discharge  Lungs: Clear to auscultation bilaterally, no wheezing, good air exchange, normal work of breathing  Cardiovascular: Regular Rate and Rythm, normal apical impulse, normal S1 and S2, no lower extremity edema bilaterally  Abdomen: Soft, non-tender and Non-distended, active bowel sounds  Skin: Skin color, texture normal and bruising or bleeding. No rashes or lesions over face, neck, arms and legs, turgor normal.  Musculoskeletal: No bony deformities or joint tenderness. Normal ROM upon flexion & extension.   Neurologic: Alert & Oriented X 3, Facial symmetry preserved and upper & lower extremities moving well with symmetry  Psychiatric: Calm, normal eye contact and normal affect      Medical Decision Making       40 MINUTES SPENT BY ME on the date of service doing chart review, history, exam, documentation & further activities per the note.      Data   Imaging results reviewed over the past 24 hrs:   Recent Results (from the past 24 hour(s))   Echocardiogram Complete   Result Value    LVEF  55-60%    Narrative    [id number]  [id number]  [id number]  [id number]^HENRI^[place] [address]     Name: DEVON REESE  MRN: [id number]  : 1935  Study Date: 2023 07:55 AM  Age: 88 yrs  Gender: Female  Patient Location: [facility]  Reason For Study: CAD  Ordering Physician: CHICHI ÁLVAREZ  Referring Physician: THA PIERCE  Performed By: NICKIE     BSA: 1.8 m2  Height: 61 in  Weight: 175 lb  HR: 65  ______________________________________________________________________________  Procedure  Complete Portable Echo Adult. Compared to the prior study dated 23, there  are changes  as noted.  ______________________________________________________________________________  Interpretation Summary     The left ventricle is normal in size.  Left ventricular function is normal.The ejection fraction is 55-60%.  There is mild concentric left ventricular hypertrophy.  Normal right ventricle size and systolic function.  The left atrium is mildly dilated.  There is mild to moderate (1-2+) mitral regurgitation.  There is mild to moderate (1-2+) aortic regurgitation.  Compared to the prior study dated 7/21/23, there are changes as noted.  Increased aortic regurgitation.  ______________________________________________________________________________  Left Ventricle  The left ventricle is normal in size. Left ventricular function is normal.The  ejection fraction is 55-60%. There is mild concentric left ventricular  hypertrophy. Left ventricular diastolic function is abnormal. Diastolic  Doppler findings (E/E' ratio and/or other parameters) suggest left ventricular  filling pressures are increased. No regional wall motion abnormalities noted.     Right Ventricle  Normal right ventricle size and systolic function.     Atria  The left atrium is mildly dilated. Right atrial size is normal. There is no  color Doppler evidence of an atrial shunt.     Mitral Valve  Mitral valve leaflets appear normal. There is mild to moderate (1-2+) mitral  regurgitation.     Tricuspid Valve  The tricuspid valve is not well visualized, but is grossly normal. There is  mild (1+) tricuspid regurgitation.     Aortic Valve  Aortic valve leaflets appear normal. There is mild to moderate (1-2+) aortic  regurgitation.     Pulmonic Valve  The pulmonic valve is not well seen, but is grossly normal. This degree of  valvular regurgitation is within normal limits.     Vessels  The aorta root is normal. Normal size ascending aorta. IVC diameter <2.1 cm  collapsing >50% with sniff suggests a normal RA pressure of 3 mmHg.     Pericardium  There  is no pericardial effusion.     ______________________________________________________________________________  MMode/2D Measurements & Calculations  IVSd: 1.4 cm  LVIDd: 4.5 cm  LVIDs: 3.4 cm  LVPWd: 0.98 cm  FS: 23.1 %     LV mass(C)d: 195.1 grams  LV mass(C)dI: 109.3 grams/m2  Ao root diam: 3.0 cm  LA dimension: 4.2 cm  asc Aorta Diam: 3.1 cm  LA/Ao: 1.4  LVOT diam: 1.9 cm  LVOT area: 2.8 cm2  LA Volume Indexed (AL/bp): 37.6 ml/m2  RV Base: 4.2 cm  RWT: 0.44  TAPSE: 2.1 cm     Doppler Measurements & Calculations  MV E max ben: 107.0 cm/sec  MV A max ben: 116.0 cm/sec  MV E/A: 0.92  MV dec slope: 633.0 cm/sec2  MV dec time: 0.17 sec  Ao V2 max: 160.0 cm/sec  Ao max PG: 10.0 mmHg  Ao V2 mean: 103.0 cm/sec  Ao mean P.0 mmHg  Ao V2 VTI: 41.0 cm  AGA(I,D): 2.1 cm2  AGA(V,D): 1.9 cm2  AI P1/2t: 429.0 msec  LV V1 max P.8 mmHg  LV V1 max: 109.0 cm/sec  LV V1 VTI: 30.0 cm  MR PISA: 2.7 cm2  MR ERO: 0.13 cm2  MR volume: 32.6 ml  SV(LVOT): 85.1 ml  SI(LVOT): 47.7 ml/m2  PA acc time: 0.10 sec  PI end-d ben: 120.0 cm/sec  TR max ben: 305.0 cm/sec  TR max P.2 mmHg  AV Ben Ratio (DI): 0.68  AGA Index (cm2/m2): 1.2  E/E' av.7  Lateral E/e': 17.3  Medial E/e': 16.1  RV S Ben: 12.1 cm/sec     ______________________________________________________________________________  Report approved by: Pavel Batres 2023 10:39 AM

## 2023-08-12 NOTE — PLAN OF CARE
Problem: Cardiac Catheterization (Diagnostic/Interventional)  Goal: Stable Heart Rate and Rhythm  Outcome: Progressing     Problem: Cardiac Catheterization (Diagnostic/Interventional)  Goal: Absence of Bleeding  Outcome: Progressing     Problem: Cardiac Catheterization (Diagnostic/Interventional)  Goal: Acceptable Pain Control  Outcome: Progressing     Problem: Pain Chronic (Persistent) (Comorbidity Management)  Goal: Acceptable Pain Control and Functional Ability  Outcome: Progressing  Intervention: Manage Persistent Pain  Recent Flowsheet Documentation  Taken 8/12/2023 0435 by Argelia Mcconnell RN  Medication Review/Management: medications reviewed  Taken 8/12/2023 0028 by Argelia Mcconnell RN  Medication Review/Management: medications reviewed    Pt's vital signs were stable. She was sinus yolis on telemetry. She denied pain throughout the night. Her angio site to her right radial has a band aid in place. The site is clean, dry and intact. No hematoma noted and her CMS to RUE is WDL. She is able to make needs known. Call light is within reach.

## 2023-08-13 LAB
ANION GAP SERPL CALCULATED.3IONS-SCNC: 10 MMOL/L (ref 7–15)
BUN SERPL-MCNC: 39.8 MG/DL (ref 8–23)
CALCIUM SERPL-MCNC: 9 MG/DL (ref 8.8–10.2)
CHLORIDE SERPL-SCNC: 105 MMOL/L (ref 98–107)
CREAT SERPL-MCNC: 1.35 MG/DL (ref 0.51–0.95)
DEPRECATED HCO3 PLAS-SCNC: 20 MMOL/L (ref 22–29)
GFR SERPL CREATININE-BSD FRML MDRD: 38 ML/MIN/1.73M2
GLUCOSE SERPL-MCNC: 103 MG/DL (ref 70–99)
HOLD SPECIMEN: NORMAL
POTASSIUM SERPL-SCNC: 4.4 MMOL/L (ref 3.4–5.3)
SODIUM SERPL-SCNC: 135 MMOL/L (ref 136–145)

## 2023-08-13 PROCEDURE — 250N000011 HC RX IP 250 OP 636: Mod: JZ | Performed by: INTERNAL MEDICINE

## 2023-08-13 PROCEDURE — 250N000013 HC RX MED GY IP 250 OP 250 PS 637: Performed by: INTERNAL MEDICINE

## 2023-08-13 PROCEDURE — 99232 SBSQ HOSP IP/OBS MODERATE 35: CPT | Performed by: INTERNAL MEDICINE

## 2023-08-13 PROCEDURE — 36415 COLL VENOUS BLD VENIPUNCTURE: CPT | Performed by: INTERNAL MEDICINE

## 2023-08-13 PROCEDURE — 210N000001 HC R&B IMCU HEART CARE

## 2023-08-13 PROCEDURE — 80048 BASIC METABOLIC PNL TOTAL CA: CPT | Performed by: INTERNAL MEDICINE

## 2023-08-13 RX ORDER — SENNOSIDES 8.6 MG
8.6 TABLET ORAL 2 TIMES DAILY PRN
Status: DISCONTINUED | OUTPATIENT
Start: 2023-08-13 | End: 2023-08-15 | Stop reason: HOSPADM

## 2023-08-13 RX ADMIN — DICLOFENAC 4 G: 10 GEL TOPICAL at 09:15

## 2023-08-13 RX ADMIN — CLONAZEPAM 0.5 MG: 0.5 TABLET ORAL at 21:02

## 2023-08-13 RX ADMIN — Medication 81 MG: at 09:18

## 2023-08-13 RX ADMIN — LOSARTAN POTASSIUM 50 MG: 50 TABLET, FILM COATED ORAL at 21:01

## 2023-08-13 RX ADMIN — CLONAZEPAM 0.5 MG: 0.5 TABLET ORAL at 09:17

## 2023-08-13 RX ADMIN — CLOPIDOGREL BISULFATE 75 MG: 75 TABLET ORAL at 09:18

## 2023-08-13 RX ADMIN — ACETAMINOPHEN 1000 MG: 500 TABLET ORAL at 09:17

## 2023-08-13 RX ADMIN — CLONAZEPAM 0.5 MG: 0.5 TABLET ORAL at 15:08

## 2023-08-13 RX ADMIN — CEFTRIAXONE SODIUM 1 G: 1 INJECTION, POWDER, FOR SOLUTION INTRAMUSCULAR; INTRAVENOUS at 01:13

## 2023-08-13 RX ADMIN — DICLOFENAC 4 G: 10 GEL TOPICAL at 21:02

## 2023-08-13 RX ADMIN — ACETAMINOPHEN 1000 MG: 500 TABLET ORAL at 15:08

## 2023-08-13 RX ADMIN — METOPROLOL SUCCINATE 75 MG: 50 TABLET, EXTENDED RELEASE ORAL at 21:01

## 2023-08-13 RX ADMIN — ACETAMINOPHEN 1000 MG: 500 TABLET ORAL at 21:01

## 2023-08-13 RX ADMIN — LOSARTAN POTASSIUM 50 MG: 50 TABLET, FILM COATED ORAL at 09:18

## 2023-08-13 RX ADMIN — DICLOFENAC 4 G: 10 GEL TOPICAL at 15:08

## 2023-08-13 ASSESSMENT — ACTIVITIES OF DAILY LIVING (ADL)
ADLS_ACUITY_SCORE: 28

## 2023-08-13 NOTE — PROGRESS NOTES
CARDIOLOGY PROGRESS NOTE      Assessment/Plan:  1.  Positive troponin-has had mild elevation in the past, on admission however 288 diminishing to 133 followed by 154.  Essentially asymptomatic.  However given this level over 150, concerned that she could have coronary ischemia and had coronary angiography which was unremarkable.  2.  Coronary artery disease-insignificant obstructive disease with normal left main, 50 to 30% mid LAD lesion with the first diagonal ostial 50% lesion, circumflex with 40 to 50% obtuse marginal artery narrowing, right coronary artery with minimal disease.  Preventive therapy.  3.  Increased BNP-probably element of heart failure, LVEDP mildly up at 20 mmHg, did receive some IV diuretic, and symptomatically improved.  4.  Chronic kidney disease-creatinine stable about 1.37 .  5.  Bladder infection-urinalysis positive for E. coli.  Antibiotics.  Most likely pyelonephritis.  6.  Anemia-hemoglobin 8.1, uncertain reason for this and defer to primary care team.  7.  Benign essential hypertension-on losartan and metoprolol.  Pressures up today, will slowly increase meds, given kidney dysfunction do not want to leave very high.  8.  Numerous allergies-so noted.    Plan:  1.  Slightly increase metoprolol.  2.  Cardiology does not have much further inpatient to add, we most likely will sign off tomorrow.    Discharge Plannin.  Barrier to discharge is transition over to oral antibiotic.  2.  Can follow with me as primary cardiologist if she so desires.     LOS: 2 day     Subjective:  Interval History:    88-year-old white female being seen on third day of hospitalization.  Tells me she did not sleep well due to abdominal discomfort and diarrhea, concerned that she now has IV in left antecubital fossa and she uses this arm to bend when she sleeps.  No chest pains, palpitations, significant PND, orthopnea or peripheral edema, or urinary complaints.    Medications   acetaminophen  1,000 mg Oral  "TID    aspirin  81 mg Oral Daily    cefTRIAXone  1 g Intravenous Q24H    clonazePAM  0.5 mg Oral TID    clopidogrel  75 mg Oral Daily    diclofenac  4 g Topical 4x Daily    famotidine  20 mg Oral Q48H    losartan  50 mg Oral BID    metoprolol succinate ER  50 mg Oral At Bedtime    sodium chloride (PF)  3 mL Intracatheter Q8H     Objective:   Vital signs in last 24 hours:  Temp:  [97.4  F (36.3  C)-97.6  F (36.4  C)] 97.5  F (36.4  C)  Pulse:  [61-76] 62  Resp:  [20] 20  BP: (141-159)/(64-70) 157/70  SpO2:  [96 %-97 %] 97 %    Physical Exam:  BP (!) 157/70 (BP Location: Right arm)   Pulse 62   Temp 97.5  F (36.4  C) (Oral)   Resp 20   Ht 1.549 m (5' 1\")   Wt 82.2 kg (181 lb 4.8 oz)   LMP  (LMP Unknown)   SpO2 97%   BMI 34.26 kg/m      General Appearance:    Alert, cooperative, no distress, appears stated age   Head:    Normocephalic, without obvious abnormality, atraumatic   Throat:   Lips, mucosa, and tongue normal; teeth and gums normal   Neck:   Supple, symmetrical, trachea midline, no adenopathy;        thyroid:  No enlargement/tenderness/nodules; no carotid    bruit or JVD   Back:     Symmetric, no curvature, ROM normal, no CVA tenderness   Lungs:     Clear to auscultation bilaterally, respirations unlabored   Chest wall:    No tenderness or deformity   Heart:    Regular rate and rhythm, S1 and S2 normal, no murmur, rub   or gallop   Abdomen:     Soft, non-tender, bowel sounds active all four quadrants,     no masses, no organomegaly   Extremities:   Normal, atraumatic, no cyanosis or edema   Pulses:   2+ and symmetric all extremities   Skin:   Skin color, texture, turgor normal, no rashes or lesions     Cardiographics:      ECG: Personally reviewed by myself shows sinus rhythm, nonspecific ST-T wave changes.    Echocardiogram:   The left ventricle is normal in size. Left ventricular function is normal.The ejection fraction is 55-60%.  There is mild concentric left ventricular hypertrophy.  Normal right " ventricle size and systolic function.  The left atrium is mildly dilated.  There is mild to moderate (1-2+) mitral regurgitation.  There is mild to moderate (1-2+) aortic regurgitation.  Compared to the prior study dated 7/21/23, there are changes as noted. Increased aortic regurgitation.      Lab Results:   Recent Labs   Lab 08/11/23  0456   WBC 10.0   HGB 8.1*   HCT 25.4*          Recent Labs   Lab 08/13/23  0821   *   CO2 20*   BUN 39.8*     .       Lab Results   Component Value Date    TROPONINI <0.01 09/04/2022

## 2023-08-13 NOTE — PROGRESS NOTES
[facility]    Medicine Progress Note - Hospitalist Service    Date of Admission:  8/10/2023    Assessment & Plan    Kerry Hoffmann is 88 year old woman with history of anxiety disorder, depression, GERD, hyperlipidemia, and stage III CKD admitted on 8/10/2023 with fever, and generalised weakness and found to have NSTEMI and sepsis secondary to pyelonephritis.    She was placed on ceftriaxone on admission.  Coronary angiogram 8/11/2023 revealed nonobstructive CAD with mildly elevated left-sided filling pressure.  Echocardiogram revealed mild LVH with mild to moderate mitral regurgitation, mild to moderate aortic regurgitation and mildly elevated left ventricular filling pressure. Cardiologist recommended aggressive risk factor modification and antiplatelet therapy and no plans for diuretic therapy.    #Dyspnea on exertion  #Elevated troponin  #NSTEMI, suspected   Coronary angiogram 8/11/2023 revealed nonobstructive CAD with mildly elevated left-sided filling pressure.  Cardiologist recommended aggressive risk factor modification and antiplatelet therapy.  -continue ASA 81mg PO every day  -continue plavix 75mg PO every day  -continue toprol 25mg PO every day  -Cardiology [id number] assistance    #Sepsis  #Pyelonephritis  Asymmetric perinephric stranding on the right   Allergic to several antibiotics.  Urine culture 8/9/2023 showed pansensitive E. coli.  -Ceftriaxone 1 g daily  -Follow-up blood culture    #Elevated BNP  Echocardiogram showed normal left ventricular systolic function with LVEF of 55 to 60%, mild concentric LVH, mild to moderate mitral regurgitation and mild to moderate aortic regurgitation  Coronary angiogram 8/11/2023 revealed nonobstructive CAD with mildly elevated left-sided filling pressure.  Cardiologist recommended aggressive risk factor modification and antiplatelet therapy.  No plans for diuretic therapy per cardiologist.  -Cardiology [id number]  "assistance    #CKD3a  Baseline BUN/Cr estimated at 24/1.3. Currently AT baseline.  -BMP trending  -Avoid nephrotoxins     #Aneamia, chronic, normocytic  Estimated baseline Hgb of 9.5, near baseline. No bleeding sources identified.  -CBC trend    #Hyponatremia  Similar to presumptive baseline. No current home diuretics  Borderline  Monitor sodium level    #Anxiety  -continue klonopin 0.5mg PO TID/PRN    #GERD  -continue pepcid 20mg PO BID    #Osteoarthritis  Chronic osteoarthritis.  -continue diclofenac gel  -tylenol 1000mg PO TID    #Hypertension  -Losartan 50mg PO BID       Diet: 2 Gram Sodium Diet    DVT Prophylaxis: Pneumatic Compression Devices  Wiley Catheter: Not present  Lines: None     Cardiac Monitoring: ACTIVE order. Indication: Post- PCI/Angiogram (24 hours)  Code Status: No CPR- Do NOT Intubate      Clinically Significant Risk Factors              # Hypoalbuminemia: Lowest albumin = 3.3 g/dL at 8/10/2023 10:05 PM, will monitor as appropriate                # Obesity: Estimated body mass index is 34.26 kg/m  as calculated from the following:    Height as of this encounter: 1.549 m (5' 1\").    Weight as of this encounter: 82.2 kg (181 lb 4.8 oz).  , PRESENT ON ADMISSION          Disposition Plan      Expected Discharge Date: 08/14/2023        Discharge Comments: IV ABX, TIMOTHY, holding diuretics.          Se Sosa MD  Hospitalist Service  [facility]  Securely message with [employer] (more info)  Text page via [employer] Paging/Directory   ______________________________________________________________________    Interval History   She was feeling short of breath and also felt nauseous earlier this morning.  Eating breakfast during evaluation.        Physical Exam   Vital Signs: Temp: 97.9  F (36.6  C) Temp src: Oral BP: 127/56 Pulse: 63   Resp: 20 SpO2: 97 % O2 Device: None (Room air)    Weight: 181 lbs 4.8 oz    General appearance: Awake, Alert, Cooperative, not in any obvious " distress and appears stated age   HEENT: Normocephalic, atraumatic, conjunctiva clear without icterus and ears without discharge  Lungs: Clear to auscultation bilaterally, no wheezing, good air exchange, normal work of breathing  Cardiovascular: Regular Rate and Rythm, normal apical impulse, normal S1 and S2, no lower extremity edema bilaterally  Abdomen: Soft, non-tender and Non-distended, active bowel sounds  Skin: Skin color, texture normal and bruising or bleeding. No rashes or lesions over face, neck, arms and legs, turgor normal.  Musculoskeletal: No bony deformities or joint tenderness. Normal ROM upon flexion & extension.   Neurologic: Alert & Oriented X 3, Facial symmetry preserved and upper & lower extremities moving well with symmetry  Psychiatric: Calm, normal eye contact and normal affect      Medical Decision Making       40 MINUTES SPENT BY ME on the date of service doing chart review, history, exam, documentation & further activities per the note.      Data   Imaging results reviewed over the past 24 hrs:   No results found for this or any previous visit (from the past 24 hour(s)).

## 2023-08-13 NOTE — PLAN OF CARE
Problem: Cardiac Catheterization (Diagnostic/Interventional)  Goal: Stable Heart Rate and Rhythm  Outcome: Progressing     Problem: Osteoarthritis Comorbidity  Goal: Maintenance of Osteoarthritis Symptom Control  Outcome: Progressing  Intervention: Maintain Osteoarthritis Symptom Control  Recent Flowsheet Documentation  Taken 8/13/2023 0055 by Argelia Mcconnell RN  Assistive Device Utilized: gait belt  Activity Management: activity adjusted per tolerance  Medication Review/Management: medications reviewed    Pt's vital signs were stable. She was NSR on telemetry. She denied pain throughout the night. She has a purewick in place that is draining appropriately. She stated that she didn't get much sleep overnight but when staff checked in on her she appeared to be sleeping. She continues to be on IV antibiotics. She is able to make needs known. Call light is with reach.

## 2023-08-13 NOTE — PLAN OF CARE
"0800- Pt. A&O x4 and on room air. Had knee and hip pain of 3. Gave scheduled Tylenol and applied Voltaren (see MAR).     1200- VSS no change from AM. Denies pain, numbness, tingling and tenderness.     1500 - VSS no change from AM. Denies pain, numbness, tingling and tenderness.     1715 - Was going to straight cath pt. But pt. Made urine out. Not enough to straight cath.     Problem: Plan of Care - These are the overarching goals to be used throughout the patient stay.    Goal: Plan of Care Review  Description: The Plan of Care Review/Shift note should be completed every shift.  The Outcome Evaluation is a brief statement about your assessment that the patient is improving, declining, or no change.  This information will be displayed automatically on your shift note.  Outcome: Progressing  Flowsheets (Taken 8/13/2023 1041)  Plan of Care Reviewed With: patient  Overall Patient Progress: improving  Goal: Patient-Specific Goal (Individualized)  Description: You can add care plan individualizations to a care plan. Examples of Individualization might be:  \"Parent requests to be called daily at 9am for status\", \"I have a hard time hearing out of my right ear\", or \"Do not touch me to wake me up as it startles me\".  Outcome: Progressing  Goal: Absence of Hospital-Acquired Illness or Injury  Outcome: Progressing  Intervention: Identify and Manage Fall Risk  Recent Flowsheet Documentation  Taken 8/13/2023 0920 by Bruce Kuhn, RN  Safety Promotion/Fall Prevention:   activity supervised   clutter free environment maintained   lighting adjusted   nonskid shoes/slippers when out of bed   patient and family education   room near nurse's station   room organization consistent   safety round/check completed  Goal: Optimal Comfort and Wellbeing  Outcome: Progressing  Intervention: Monitor Pain and Promote Comfort  Recent Flowsheet Documentation  Taken 8/13/2023 0920 by Bruce Kuhn, RN  Pain Management Interventions: medication (see " MAR)  Goal: Readiness for Transition of Care  Outcome: Progressing     Problem: Risk for Delirium  Goal: Optimal Coping  Outcome: Progressing  Goal: Improved Behavioral Control  Outcome: Progressing  Goal: Improved Attention and Thought Clarity  Outcome: Progressing  Intervention: Maximize Cognitive Function  Recent Flowsheet Documentation  Taken 8/13/2023 0920 by Bruce Kuhn RN  Sensory Stimulation Regulation: television on  Reorientation Measures:   calendar in view   clock in view  Goal: Improved Sleep  Outcome: Progressing     Problem: Cardiac Catheterization (Diagnostic/Interventional)  Goal: Stable Heart Rate and Rhythm  Outcome: Progressing  Goal: Absence of Bleeding  Outcome: Progressing  Goal: Absence of Contrast-Induced Injury  Outcome: Progressing  Goal: Absence of Embolism Signs and Symptoms  Outcome: Progressing  Goal: Anesthesia/Sedation Recovery  Outcome: Progressing  Intervention: Optimize Anesthesia Recovery  Recent Flowsheet Documentation  Taken 8/13/2023 0920 by Bruce Kuhn RN  Safety Promotion/Fall Prevention:   activity supervised   clutter free environment maintained   lighting adjusted   nonskid shoes/slippers when out of bed   patient and family education   room near nurse's station   room organization consistent   safety round/check completed  Reorientation Measures:   calendar in view   clock in view  Goal: Acceptable Pain Control  Outcome: Progressing  Intervention: Prevent or Manage Pain  Recent Flowsheet Documentation  Taken 8/13/2023 0920 by Bruce Kuhn RN  Pain Management Interventions: medication (see MAR)  Goal: Absence of Vascular Access Complication  Outcome: Progressing  Intervention: Prevent Access Site Complications  Recent Flowsheet Documentation  Taken 8/13/2023 0920 by Bruce Kuhn RN  Activity Management: activity adjusted per tolerance     Problem: Fall Injury Risk  Goal: Absence of Fall and Fall-Related Injury  Outcome: Progressing  Intervention: Identify and Manage  Contributors  Recent Flowsheet Documentation  Taken 8/13/2023 0920 by Bruce Kuhn RN  Medication Review/Management: medications reviewed  Intervention: Promote Injury-Free Environment  Recent Flowsheet Documentation  Taken 8/13/2023 0920 by Bruce Kuhn RN  Safety Promotion/Fall Prevention:   activity supervised   clutter free environment maintained   lighting adjusted   nonskid shoes/slippers when out of bed   patient and family education   room near nurse's station   room organization consistent   safety round/check completed     Problem: Osteoarthritis Comorbidity  Goal: Maintenance of Osteoarthritis Symptom Control  Outcome: Progressing  Intervention: Maintain Osteoarthritis Symptom Control  Recent Flowsheet Documentation  Taken 8/13/2023 0920 by Bruce Kuhn RN  Activity Management: activity adjusted per tolerance  Medication Review/Management: medications reviewed     Problem: Pain Chronic (Persistent) (Comorbidity Management)  Goal: Acceptable Pain Control and Functional Ability  Outcome: Progressing  Intervention: Develop Pain Management Plan  Recent Flowsheet Documentation  Taken 8/13/2023 0920 by Bruce Kuhn RN  Pain Management Interventions: medication (see MAR)  Intervention: Manage Persistent Pain  Recent Flowsheet Documentation  Taken 8/13/2023 0920 by Bruce Kuhn RN  Medication Review/Management: medications reviewed     Problem: Self-Care Deficit  Goal: Improved Ability to Complete Activities of Daily Living  Outcome: Progressing  Intervention: Promote Activity and Functional [place]  Recent Flowsheet Documentation  Taken 8/13/2023 0920 by Bruce Kuhn RN  Activity Assistance Provided: assistance, 1 person     Goal Outcome Evaluation:      Plan of Care Reviewed With: patient    Overall Patient Progress: improvingOverall Patient Progress: improving

## 2023-08-13 NOTE — PLAN OF CARE
Problem: Plan of Care - These are the overarching goals to be used throughout the patient stay.    Goal: Optimal Comfort and Wellbeing  Outcome: Progressing     Problem: Cardiac Catheterization (Diagnostic/Interventional)  Goal: Stable Heart Rate and Rhythm  Outcome: Progressing     Problem: Cardiac Catheterization (Diagnostic/Interventional)  Goal: Acceptable Pain Control  Outcome: Progressing     Patient is A&Ox 4. She has no c/o numbness and tingling in any extremity. She is an assist of 1 for all cares and transfers. She is continent of bowel and has a purewick in place.  VSS. Patient denied all c/o pain this shift. She remains in NSR throughout the shift. LS are clear bilaterally. Pt. denies all shortness of breath, chest pain, dizziness, and nausea. BS active in A4Q. Skin intact. LAC PIV was flushed, capped, and saline locked. Patient is lying in bed with call light in reach. Bed alarm on. Staff will continue to monitor.

## 2023-08-14 LAB
ERYTHROCYTE [DISTWIDTH] IN BLOOD BY AUTOMATED COUNT: 13.2 % (ref 10–15)
HCT VFR BLD AUTO: 27.4 % (ref 35–47)
HGB BLD-MCNC: 8.9 G/DL (ref 11.7–15.7)
MCH RBC QN AUTO: 28.2 PG (ref 26.5–33)
MCHC RBC AUTO-ENTMCNC: 32.5 G/DL (ref 31.5–36.5)
MCV RBC AUTO: 87 FL (ref 78–100)
PLATELET # BLD AUTO: 397 10E3/UL (ref 150–450)
RBC # BLD AUTO: 3.16 10E6/UL (ref 3.8–5.2)
WBC # BLD AUTO: 9.2 10E3/UL (ref 4–11)

## 2023-08-14 PROCEDURE — 99232 SBSQ HOSP IP/OBS MODERATE 35: CPT | Performed by: INTERNAL MEDICINE

## 2023-08-14 PROCEDURE — 250N000013 HC RX MED GY IP 250 OP 250 PS 637: Performed by: INTERNAL MEDICINE

## 2023-08-14 PROCEDURE — 250N000011 HC RX IP 250 OP 636: Mod: JZ | Performed by: INTERNAL MEDICINE

## 2023-08-14 PROCEDURE — 85027 COMPLETE CBC AUTOMATED: CPT | Performed by: INTERNAL MEDICINE

## 2023-08-14 PROCEDURE — 999N000248 HC STATISTIC IV INSERT WITH US BY RN

## 2023-08-14 PROCEDURE — 36415 COLL VENOUS BLD VENIPUNCTURE: CPT | Performed by: INTERNAL MEDICINE

## 2023-08-14 PROCEDURE — 120N000004 HC R&B MS OVERFLOW

## 2023-08-14 RX ADMIN — CLOPIDOGREL BISULFATE 75 MG: 75 TABLET ORAL at 07:47

## 2023-08-14 RX ADMIN — DICLOFENAC 4 G: 10 GEL TOPICAL at 07:53

## 2023-08-14 RX ADMIN — CLONAZEPAM 0.5 MG: 0.5 TABLET ORAL at 20:01

## 2023-08-14 RX ADMIN — ACETAMINOPHEN 1000 MG: 500 TABLET ORAL at 13:59

## 2023-08-14 RX ADMIN — CEFTRIAXONE SODIUM 1 G: 1 INJECTION, POWDER, FOR SOLUTION INTRAMUSCULAR; INTRAVENOUS at 02:16

## 2023-08-14 RX ADMIN — ACETAMINOPHEN 1000 MG: 500 TABLET ORAL at 20:00

## 2023-08-14 RX ADMIN — DICLOFENAC 4 G: 10 GEL TOPICAL at 12:15

## 2023-08-14 RX ADMIN — CLONAZEPAM 0.5 MG: 0.5 TABLET ORAL at 13:59

## 2023-08-14 RX ADMIN — METOPROLOL SUCCINATE 75 MG: 50 TABLET, EXTENDED RELEASE ORAL at 22:35

## 2023-08-14 RX ADMIN — Medication 81 MG: at 07:46

## 2023-08-14 RX ADMIN — ACETAMINOPHEN 1000 MG: 500 TABLET ORAL at 07:47

## 2023-08-14 RX ADMIN — CLONAZEPAM 0.5 MG: 0.5 TABLET ORAL at 07:47

## 2023-08-14 RX ADMIN — LOSARTAN POTASSIUM 50 MG: 50 TABLET, FILM COATED ORAL at 20:01

## 2023-08-14 RX ADMIN — LOSARTAN POTASSIUM 50 MG: 50 TABLET, FILM COATED ORAL at 07:47

## 2023-08-14 RX ADMIN — FAMOTIDINE 20 MG: 20 TABLET ORAL at 07:46

## 2023-08-14 RX ADMIN — DICLOFENAC 4 G: 10 GEL TOPICAL at 16:26

## 2023-08-14 RX ADMIN — DICLOFENAC 4 G: 10 GEL TOPICAL at 20:02

## 2023-08-14 ASSESSMENT — ACTIVITIES OF DAILY LIVING (ADL)
ADLS_ACUITY_SCORE: 32
ADLS_ACUITY_SCORE: 28
ADLS_ACUITY_SCORE: 32
ADLS_ACUITY_SCORE: 28
ADLS_ACUITY_SCORE: 32
ADLS_ACUITY_SCORE: 32
ADLS_ACUITY_SCORE: 28
ADLS_ACUITY_SCORE: 32

## 2023-08-14 NOTE — PROGRESS NOTES
[facility]    Medicine Progress Note - Hospitalist Service    Date of Admission:  8/10/2023    Assessment & Plan   Kerry Hoffmann is 88 year old woman with history of anxiety disorder, depression, GERD, hyperlipidemia, and stage III CKD admitted on 8/10/2023 with fever, and generalised weakness and found to have NSTEMI and sepsis secondary to pyelonephritis.    She was placed on ceftriaxone on admission.  Coronary angiogram 8/11/2023 revealed nonobstructive CAD with mildly elevated left-sided filling pressure.  Echocardiogram revealed mild LVH with mild to moderate mitral regurgitation, mild to moderate aortic regurgitation and mildly elevated left ventricular filling pressure. Cardiologist recommended aggressive risk factor modification and antiplatelet therapy and no plans for diuretic therapy.    #Dyspnea on exertion  #Elevated troponin  #NSTEMI, suspected   Coronary angiogram 8/11/2023 revealed nonobstructive CAD with mildly elevated left-sided filling pressure.  Cardiologist recommended aggressive risk factor modification and antiplatelet therapy.  -continue ASA 81mg PO every day  -continue plavix 75mg PO every day  -continue toprol 25mg PO every day  -Cardiology [id number] assistance    #Sepsis  #Pyelonephritis  Asymmetric perinephric stranding on the right   Allergic to several antibiotics.  Urine culture 8/9/2023 showed pansensitive E. coli.  -Ceftriaxone 1 g daily  -Repeat blood cx from 8/10 negative to date    #Elevated BNP  Echocardiogram showed normal left ventricular systolic function with LVEF of 55 to 60%, mild concentric LVH, mild to moderate mitral regurgitation and mild to moderate aortic regurgitation  Coronary angiogram 8/11/2023 revealed nonobstructive CAD with mildly elevated left-sided filling pressure.  Cardiologist recommended aggressive risk factor modification and antiplatelet therapy.  No plans for diuretic therapy per cardiologist.  -Cardiology  "[id number]-[id number] assistance    #CKD3a  Baseline BUN/Cr estimated at 24/1.3. Currently AT baseline.  -BMP trending  -Avoid nephrotoxins     #Aneamia, chronic, normocytic  Estimated baseline Hgb of 9.5, near baseline. No bleeding sources identified.  -CBC trend    #Hyponatremia  Similar to presumptive baseline. No current home diuretics  Borderline  Monitor sodium level    #Anxiety  -continue klonopin 0.5mg PO TID/PRN    #GERD  -continue pepcid 20mg PO BID    #Osteoarthritis  Chronic osteoarthritis.  -continue diclofenac gel  -tylenol 1000mg PO TID    #Hypertension  -Losartan 50mg PO BID       Diet: 2 Gram Sodium Diet    DVT Prophylaxis: Enoxaparin (Lovenox) SQ  Wiley Catheter: Not present  Lines: None     Cardiac Monitoring: ACTIVE order. Indication: Post- PCI/Angiogram (24 hours)  Code Status: No CPR- Do NOT Intubate      Clinically Significant Risk Factors              # Hypoalbuminemia: Lowest albumin = 3.3 g/dL at 8/10/2023 10:05 PM, will monitor as appropriate      # Chronic heart failure with preserved ejection fraction: heart failure noted on problem list and last echo with EF >50%       # Obesity: Estimated body mass index is 34.33 kg/m  as calculated from the following:    Height as of this encounter: 1.549 m (5' 1\").    Weight as of this encounter: 82.4 kg (181 lb 11.2 oz)., PRESENT ON ADMISSION     # Asthma: noted on problem list        Disposition Plan      Expected Discharge Date: 08/15/2023        Discharge Comments: IV ABX, TIMOTHY, holding diuretics.  PT & OT orders requested.          Aaron Mcconnell MD  Hospitalist Service  [facility]  Securely message with [employer] (more info)  Text page via [facility] Paging/Directory   ______________________________________________________________________    Interval History   Patient seen and examined.   Reports chills, fatigue and SOB this morning.  Denies chest pain. No cough. No objective fever reported.    Physical Exam   Vital Signs: " Temp: 97.6  F (36.4  C) Temp src: Oral BP: (!) 147/69 Pulse: 65   Resp: 24 SpO2: 96 % O2 Device: None (Room air)    Weight: 181 lbs 11.2 oz  General appearance: Awake, Alert, Cooperative, not in any obvious distress and appears stated age   HEENT: Normocephalic, atraumatic, conjunctiva clear without icterus and ears without discharge  Lungs: Clear to auscultation bilaterally, no wheezing, good air exchange, normal work of breathing  Cardiovascular: Regular Rate and Rythm, normal apical impulse, normal S1 and S2, no lower extremity edema bilaterally  Abdomen: Soft, non-tender and Non-distended, active bowel sounds  Skin: Skin color, texture normal and bruising or bleeding. No rashes or lesions over face, neck, arms and legs, turgor normal.  Musculoskeletal: No bony deformities or joint tenderness. Normal ROM upon flexion & extension.   Neurologic: Alert & Oriented X 3, Facial symmetry preserved and upper & lower extremities moving well with symmetry  Psychiatric: Calm, normal eye contact and normal affect    Medical Decision Making       46 MINUTES SPENT BY ME on the date of service doing chart review, history, exam, documentation & further activities per the note.  MANAGEMENT DISCUSSED with the following over the past 24 hours:         Data

## 2023-08-14 NOTE — PLAN OF CARE
Problem: Plan of Care - These are the overarching goals to be used throughout the patient stay.    Goal: Plan of Care Review  Description: The Plan of Care Review/Shift note should be completed every shift.  The Outcome Evaluation is a brief statement about your assessment that the patient is improving, declining, or no change.  This information will be displayed automatically on your shift note.  Outcome: Progressing   Goal Outcome Evaluation:         VSS, states she is feeling tired and weak this morning, MD ordered PT and well see this afternoon, applying Voltaren to knees for comfort

## 2023-08-14 NOTE — PROGRESS NOTES
"    Cardiology Progress Note    Assessment:  Pyelonephritis  Coronary artery disease, nonobstructive  Mild troponin elevation due to nonspecific myocardial injury  Acute heart failure with preserved ejection fraction, improved symptomatically  Hypertension, controlled  Chronic kidney disease    Plan:  Continue current cardiac medication  Multiple allergies including statin  Cardiology will sign off  Follow-up with Dr. Stein in 2 months    Subjective:   Feels better, less short of breath, denies chest pains    Objective:   BP (!) 147/69 (BP Location: Right arm)   Pulse 65   Temp 97.6  F (36.4  C) (Oral)   Resp 24   Ht 1.549 m (5' 1\")   Wt 82.4 kg (181 lb 11.2 oz)   LMP  (LMP Unknown)   SpO2 96%   BMI 34.33 kg/m      Intake/Output Summary (Last 24 hours) at 8/14/2023 1307  Last data filed at 8/14/2023 0900  Gross per 24 hour   Intake 385 ml   Output 1300 ml   Net -915 ml         Physical Exam:  GENERAL: no distress  NECK: No JVD  LUNGS: Clear to auscultation.  CARDIAC: regular  rhythm, S1 & S2 normal.  No heaves, thrills, gallops or murmurs.  ABDOMEN: flat, negative hepatosplenomegaly, soft and non-tender.  EXTREMITIES: No evidence of cyanosis, clubbing or edema.    Current Facility-Administered Medications Ordered in Epic   Medication Dose Route Frequency Provider Last Rate Last Admin    acetaminophen (TYLENOL) tablet 1,000 mg  1,000 mg Oral TID Ap Arroyo MD   1,000 mg at 08/14/23 0747    acetaminophen (TYLENOL) tablet 650 mg  650 mg Oral Q4H PRN Ap Arroyo MD        aspirin EC tablet 81 mg  81 mg Oral Daily Ap Arroyo MD   81 mg at 08/14/23 0746    cefTRIAXone (ROCEPHIN) 1 g vial to attach to  mL bag for ADULTS or NS 50 mL bag for PEDS  1 g Intravenous Q24H Ap Arroyo MD 0 mL/hr at 08/11/23 0800 1 g at 08/14/23 0216    clonazePAM (klonoPIN) tablet 0.5 mg  0.5 mg Oral TID Ap Arroyo MD   0.5 mg at 08/14/23 0747    clopidogrel (PLAVIX) tablet 75 mg  75 mg Oral " Daily Ap Arroyo MD   75 mg at 08/14/23 0747    diclofenac (VOLTAREN) 1 % topical gel 4 g  4 g Topical 4x Daily Ap Arroyo MD   4 g at 08/14/23 1215    famotidine (PEPCID) tablet 20 mg  20 mg Oral Q48H Ap Arroyo MD   20 mg at 08/14/23 0746    fentaNYL (PF) (SUBLIMAZE) injection 25 mcg  25 mcg Intravenous Q15 Min PRN Ap Arroyo MD        HOLD:  Metformin and metformin containing medications if patient received IV contrast with acute kidney injury or severe chronic kidney disease (stage IV or stage V; i.e., eGFR less than 30)   Does not apply HOLD Ap Arroyo MD        hydrALAZINE (APRESOLINE) injection 10 mg  10 mg Intravenous Once PRN Ap Arroyo MD        lidocaine (LMX4) cream   Topical Q1H PRN Ap Arroyo MD        lidocaine 1 % 0.1-1 mL  0.1-1 mL Other Q1H PRN Ap Arroyo MD        losartan (COZAAR) tablet 50 mg  50 mg Oral BID Ap Arroyo MD   50 mg at 08/14/23 0747    melatonin tablet 1 mg  1 mg Oral At Bedtime PRN Ap Arroyo MD        metoprolol succinate ER (TOPROL XL) 24 hr tablet 75 mg  75 mg Oral At Bedtime Nikita Stein MD   75 mg at 08/13/23 2101    midazolam (VERSED) injection 0.5 mg  0.5 mg Intravenous Q5 Min PRN Ap Arroyo MD        oxyCODONE (ROXICODONE) tablet 5 mg  5 mg Oral Q4H PRN Ap Arroyo MD        Or    oxyCODONE (ROXICODONE) tablet 10 mg  10 mg Oral Q4H PRN Ap Arroyo MD        Patient is already receiving anticoagulation with heparin, enoxaparin (LOVENOX), warfarin (COUMADIN)  or other anticoagulant medication   Does not apply Continuous PRN Ap Arroyo MD        polyethylene glycol (MIRALAX) Packet 17 g  17 g Oral Daily PRN Ap Arroyo MD        sennosides (SENOKOT) tablet 8.6 mg  8.6 mg Oral BID PRN Se Sosa MD        sodium chloride (PF) 0.9% PF flush 3 mL  3 mL Intracatheter Q8H Ap Arroyo MD   3 mL at 08/14/23 0904    sodium chloride (PF) 0.9% PF  flush 3 mL  3 mL Intracatheter q1 min Ap Justice MD         No current [facility]-ordered outpatient medications on file.       Cardiographics:    Telemetry: Normal sinus rhythm    Echocardiogram: Normal LVEF    Coronary angio: Nonobstructive coronary artery disease LVEDP 20     Lab Results    Chemistry/lipid CBC Cardiac Enzymes/BNP/TSH/INR   Recent Labs   Lab Test 05/17/22  1151   CHOL 267*   HDL 53   *   TRIG 178*     Recent Labs   Lab Test 05/17/22  1151 12/20/21  0937 06/22/21  1040   * 168* 163*     Recent Labs   Lab Test 08/13/23  0821   *   POTASSIUM 4.4   CHLORIDE 105   CO2 20*   *   BUN 39.8*   CR 1.35*   GFRESTIMATED 38*   ISAIAH 9.0     Recent Labs   Lab Test 08/13/23  0821 08/12/23  0925 08/11/23  0456   CR 1.35* 1.36* 1.37*     No results for input(s): A1C in the last 47722 hours.       Recent Labs   Lab Test 08/14/23  0430   WBC 9.2   HGB 8.9*   HCT 27.4*   MCV 87        Recent Labs   Lab Test 08/14/23  0430 08/11/23  0456 08/11/23  0034   HGB 8.9* 8.1* 8.4*    Recent Labs   Lab Test 09/04/22  1708 09/04/22  1352 07/26/18  1742   TROPONINI <0.01 <0.01 <0.01     Recent Labs   Lab Test 08/10/23  2205 07/20/23  1342 04/10/23  1132 11/16/19  1513   BNP  --   --   --  123   NTBNPI 8,333* 1,423  --   --    NTBNP  --   --  837  --      Recent Labs   Lab Test 06/14/23  1620   TSH 2.52     No results for input(s): INR in the last 02702 hours.

## 2023-08-15 VITALS
DIASTOLIC BLOOD PRESSURE: 68 MMHG | OXYGEN SATURATION: 95 % | SYSTOLIC BLOOD PRESSURE: 154 MMHG | BODY MASS INDEX: 34.46 KG/M2 | RESPIRATION RATE: 22 BRPM | TEMPERATURE: 98.7 F | HEART RATE: 68 BPM | WEIGHT: 182.5 LBS | HEIGHT: 61 IN

## 2023-08-15 PROCEDURE — 250N000013 HC RX MED GY IP 250 OP 250 PS 637: Performed by: INTERNAL MEDICINE

## 2023-08-15 PROCEDURE — 99239 HOSP IP/OBS DSCHRG MGMT >30: CPT | Performed by: INTERNAL MEDICINE

## 2023-08-15 PROCEDURE — 250N000011 HC RX IP 250 OP 636: Mod: JZ | Performed by: INTERNAL MEDICINE

## 2023-08-15 RX ORDER — LEVOFLOXACIN 750 MG/1
750 TABLET, FILM COATED ORAL DAILY
Qty: 7 TABLET | Refills: 0 | Status: SHIPPED | OUTPATIENT
Start: 2023-08-15 | End: 2023-08-22

## 2023-08-15 RX ORDER — METOPROLOL SUCCINATE 25 MG/1
75 TABLET, EXTENDED RELEASE ORAL DAILY
Qty: 90 TABLET | Refills: 3 | Status: SHIPPED | OUTPATIENT
Start: 2023-08-15 | End: 2023-08-15

## 2023-08-15 RX ORDER — METOPROLOL SUCCINATE 25 MG/1
75 TABLET, EXTENDED RELEASE ORAL DAILY
Qty: 90 TABLET | Refills: 3 | Status: SHIPPED | OUTPATIENT
Start: 2023-08-15 | End: 2023-11-07

## 2023-08-15 RX ADMIN — LOSARTAN POTASSIUM 50 MG: 50 TABLET, FILM COATED ORAL at 07:34

## 2023-08-15 RX ADMIN — Medication 81 MG: at 07:34

## 2023-08-15 RX ADMIN — CLOPIDOGREL BISULFATE 75 MG: 75 TABLET ORAL at 07:34

## 2023-08-15 RX ADMIN — CLONAZEPAM 0.5 MG: 0.5 TABLET ORAL at 07:34

## 2023-08-15 RX ADMIN — DICLOFENAC 4 G: 10 GEL TOPICAL at 07:34

## 2023-08-15 RX ADMIN — CEFTRIAXONE SODIUM 1 G: 1 INJECTION, POWDER, FOR SOLUTION INTRAMUSCULAR; INTRAVENOUS at 01:45

## 2023-08-15 RX ADMIN — ACETAMINOPHEN 1000 MG: 500 TABLET ORAL at 07:34

## 2023-08-15 ASSESSMENT — ACTIVITIES OF DAILY LIVING (ADL)
ADLS_ACUITY_SCORE: 28

## 2023-08-15 NOTE — PLAN OF CARE
"  Problem: Plan of Care - These are the overarching goals to be used throughout the patient stay.    Goal: Plan of Care Review  Description: The Plan of Care Review/Shift note should be completed every shift.  The Outcome Evaluation is a brief statement about your assessment that the patient is improving, declining, or no change.  This information will be displayed automatically on your shift note.  Outcome: Progressing  Goal: Patient-Specific Goal (Individualized)  Description: You can add care plan individualizations to a care plan. Examples of Individualization might be:  \"Parent requests to be called daily at 9am for status\", \"I have a hard time hearing out of my right ear\", or \"Do not touch me to wake me up as it startles me\".  Outcome: Progressing     Problem: Risk for Delirium  Goal: Optimal Coping  Outcome: Progressing  Goal: Improved Attention and Thought Clarity  Outcome: Progressing  Intervention: Maximize Cognitive Function  Recent Flowsheet Documentation  Taken 8/14/2023 2000 by Samina Bingham RN  Sensory Stimulation Regulation: television on  Reorientation Measures: clock in view     Problem: Cardiac Catheterization (Diagnostic/Interventional)  Goal: Stable Heart Rate and Rhythm  Outcome: Progressing  Goal: Absence of Bleeding  Outcome: Progressing     Problem: Fall Injury Risk  Goal: Absence of Fall and Fall-Related Injury  Outcome: Progressing  Intervention: Identify and Manage Contributors  Recent Flowsheet Documentation  Taken 8/14/2023 2000 by Samina Bingham, RN  Medication Review/Management: medications reviewed  Intervention: Promote Injury-Free Environment  Recent Flowsheet Documentation  Taken 8/14/2023 2000 by Samina Bingham, RN  Safety Promotion/Fall Prevention:   activity supervised   assistive device/personal items within reach   clutter free environment maintained   increased rounding and observation   lighting adjusted   mobility aid in reach   nonskid shoes/slippers when out of " bed   patient and family education   room door open   room organization consistent   supervised activity   safety round/check completed   increase visualization of patient     Problem: Osteoarthritis Comorbidity  Goal: Maintenance of Osteoarthritis Symptom Control  Outcome: Progressing  Intervention: Maintain Osteoarthritis Symptom Control  Recent Flowsheet Documentation  Taken 8/14/2023 2000 by Samina Bingham RN  Activity Management:   activity adjusted per tolerance   activity encouraged  Medication Review/Management: medications reviewed     Problem: Pain Chronic (Persistent) (Comorbidity Management)  Goal: Acceptable Pain Control and Functional Ability  Outcome: Progressing  Intervention: Manage Persistent Pain  Recent Flowsheet Documentation  Taken 8/14/2023 2000 by Samina Bingham RN  Medication Review/Management: medications reviewed   Goal Outcome Evaluation:         Patient alert and oriented. VSS. On room air. Arthritis pain controlled with tylenol and diclofenac cream. Pure wick remains in place.     Samina Bingham RN

## 2023-08-15 NOTE — DISCHARGE SUMMARY
"[facility]  Hospitalist Discharge Summary      Date of Admission:  8/10/2023  Date of Discharge:  8/15/2023 12:38 PM  Discharging Provider: Aaron Mcconnell MD  Discharge Service: Hospitalist Service    Discharge Diagnoses     #Dyspnea on exertion  #Elevated troponin  #NSTEMI, suspected  #Sepsis  #Pyelonephritis  #Elevated BNP  #CKD3a  #Aneamia, chronic, normocytic  #Hyponatremia  #Anxiety  #GERD  #Osteoarthritis  #Hypertension    Clinically Significant Risk Factors     # Obesity: Estimated body mass index is 34.48 kg/m  as calculated from the following:    Height as of this encounter: 1.549 m (5' 1\").    Weight as of this encounter: 82.8 kg (182 lb 8 oz).       Follow-ups Needed After Discharge   Follow-up Appointments     Follow-up and recommended labs and tests       Follow up with primary care provider, Kaylyn Bolanos, within 7 days for   hospital follow- up.  No follow up labs or test are needed.            Unresulted Labs Ordered in the Past 30 Days of this Admission       Date and Time Order Name Status Description    8/10/2023  9:05 PM Blood Culture Peripheral Blood Preliminary     8/10/2023  9:05 PM Blood Culture Line, venous Preliminary         These results will be followed up by PCP    Discharge Disposition   Discharged to assisted living  Condition at discharge: Stable    Hospital Course   Kerry Hoffmann is 88 year old woman with history of anxiety disorder, depression, GERD, hyperlipidemia, and stage III CKD admitted on 8/10/2023 with fever, and generalised weakness and found to have NSTEMI and sepsis secondary to pyelonephritis. She was placed on ceftriaxone on admission.  Coronary angiogram 8/11/2023 revealed nonobstructive CAD with mildly elevated left-sided filling pressure.  Echocardiogram revealed mild LVH with mild to moderate mitral regurgitation, mild to moderate aortic regurgitation and mildly elevated left ventricular filling pressure. Cardiologist recommended " aggressive risk factor modification and antiplatelet therapy and no plans for diuretic therapy. Patient was discharged in stable condition with plan to follow up with PCP.    #Dyspnea on exertion  #Elevated troponin  #NSTEMI, suspected   Coronary angiogram 8/11/2023 revealed nonobstructive CAD with mildly elevated left-sided filling pressure.  Cardiologist recommended aggressive risk factor modification and antiplatelet therapy.  -continue ASA 81mg PO every day  -continue plavix 75mg PO every day  -continue toprol 25mg PO every day  -Cardiology dzfxpt-ae-kkufdxxhom assistance    #Sepsis  #Pyelonephritis  Asymmetric perinephric stranding on the right   Allergic to several antibiotics.  Urine culture 8/9/2023 showed pansensitive E. coli.  -Ceftriaxone 1 g daily  -Repeat blood cx from 8/10 negative to date    #Elevated BNP  Echocardiogram showed normal left ventricular systolic function with LVEF of 55 to 60%, mild concentric LVH, mild to moderate mitral regurgitation and mild to moderate aortic regurgitation  Coronary angiogram 8/11/2023 revealed nonobstructive CAD with mildly elevated left-sided filling pressure.  Cardiologist recommended aggressive risk factor modification and antiplatelet therapy.  No plans for diuretic therapy per cardiologist.  -Cardiology iwlhji-cr-hkujiokrze assistance    #CKD3a  Baseline BUN/Cr estimated at 24/1.3. Currently AT baseline.  -BMP trending  -Avoid nephrotoxins     #Aneamia, chronic, normocytic  Estimated baseline Hgb of 9.5, near baseline. No bleeding sources identified.  -CBC trend    #Hyponatremia  Similar to presumptive baseline. No current home diuretics  Borderline  Monitor sodium level    #Anxiety  -continue klonopin 0.5mg PO TID/PRN    #GERD  -continue pepcid 20mg PO BID    #Osteoarthritis  Chronic osteoarthritis.  -continue diclofenac gel  -tylenol 1000mg PO TID    #Hypertension  -Losartan 50mg PO BID    Consultations This Hospital Stay   PHARMACY IP CONSULT  CARDIOLOGY IP  CONSULT  PHARMACY IP CONSULT  CARE MANAGEMENT / SOCIAL WORK IP CONSULT  PHARMACY IP CONSULT  PHARMACY IP CONSULT  PHYSICAL THERAPY ADULT IP CONSULT  SMOKING CESSATION PROGRAM IP CONSULT    Code Status   No CPR- Do NOT Intubate    Time Spent on this Encounter   I, Aaron Mcconnell MD, personally saw the patient today and spent greater than 30 minutes discharging this patient.       Aaron Mcconnell MD  [facility]  [address] [place] [id number]  Phone: [phone]  Fax: [phone]  ______________________________________________________________________    Physical Exam   Vital Signs: Temp: 98.7  F (37.1  C) Temp src: Oral BP: (!) 154/68 Pulse: 68   Resp: 22 SpO2: 95 % O2 Device: None (Room air)    Weight: 182 lbs 8 oz  General appearance: Awake, Alert, Cooperative, not in any obvious distress and appears stated age   HEENT: Normocephalic, atraumatic, conjunctiva clear without icterus and ears without discharge  Lungs: Clear to auscultation bilaterally, no wheezing, good air exchange, normal work of breathing  Cardiovascular: Regular Rate and Rythm, normal apical impulse, normal S1 and S2, no lower extremity edema bilaterally  Abdomen: Soft, non-tender and Non-distended, active bowel sounds  Skin: Skin color, texture normal and bruising or bleeding. No rashes or lesions over face, neck, arms and legs, turgor normal.  Musculoskeletal: No bony deformities or joint tenderness. Normal ROM upon flexion & extension.   Neurologic: Alert & Oriented X 3, Facial symmetry preserved and upper & lower extremities moving well with symmetry  Psychiatric: Calm, normal eye contact and normal affect       Primary Care Physician   Kaylyn Bolanos    Discharge Orders      Follow-Up with Cardiology      Reason for your hospital stay    Pyelonephritis, bacteremia     Follow-up and recommended labs and tests     Follow up with primary care provider, Kaylyn Bolanos, within 7 days for hospital  follow- up.  No follow up labs or test are needed.     Activity    Your activity upon discharge: activity as tolerated     Diet    Follow this diet upon discharge: Cardiac       Significant Results and Procedures   Most Recent 3 CBC's:  Recent Labs   Lab Test 08/14/23  0430 08/11/23  0456 08/11/23  0034   WBC 9.2 10.0 12.6*   HGB 8.9* 8.1* 8.4*   MCV 87 89 88    273 280     Most Recent 3 BMP's:  Recent Labs   Lab Test 08/13/23  0821 08/12/23  0925 08/11/23  0456   * 136 137   POTASSIUM 4.4 4.3 4.0   CHLORIDE 105 103 106   CO2 20* 20* 19*   BUN 39.8* 35.6* 28.2*   CR 1.35* 1.36* 1.37*   ANIONGAP 10 13 12   ISAIAH 9.0 9.4 9.2   * 141* 96     Most Recent 2 LFT's:  Recent Labs   Lab Test 08/10/23  2205 07/20/23  1342   AST 24 25   ALT 9 8   ALKPHOS 66 68   BILITOTAL 0.2 0.3     Most Recent 3 INR's:No lab results found.  Most Recent INR's and Anticoagulation Dosing History:  Anticoagulation Dose History           No data to display              Most Recent 3 Creatinines:  Recent Labs   Lab Test 08/13/23 0821 08/12/23  0925 08/11/23  0456   CR 1.35* 1.36* 1.37*     Most Recent 3 Hemoglobins:  Recent Labs   Lab Test 08/14/23  0430 08/11/23  0456 08/11/23  0034   HGB 8.9* 8.1* 8.4*     Most Recent 3 Troponin's:No lab results found.  Most Recent 3 BNP's:  Recent Labs   Lab Test 08/10/23  2205 07/20/23  1342 04/10/23  1132   NTBNPI 8,333* 1,423  --    NTBNP  --   --  837     Most Recent D-dimer:  Recent Labs   Lab Test 04/10/23  1132   DD 0.73*     Most Recent Cholesterol Panel:  Recent Labs   Lab Test 05/17/22  1151   CHOL 267*   *   HDL 53   TRIG 178*     7-Day Micro Results       Collected Updated Procedure Result Status      08/10/2023 2205 08/16/2023 0931 Blood Culture Peripheral Blood [[id number]]   Peripheral Blood    Final result Component Value   Culture No Growth               08/10/2023 2149 08/16/2023 0105 Blood Culture Line, venous [[id number]]   Blood from Line, venous    Final  result Component Value   Culture No Growth                     Most Recent TSH and T4:  Recent Labs   Lab Test 06/14/23  1620   TSH 2.52     Most Recent Hemoglobin A1c:No lab results found.  Most Recent 6 glucoses:  Recent Labs   Lab Test 08/13/23  0821 08/12/23  0925 08/11/23  0456 08/10/23  2205 08/09/23  0658 08/07/23  1208   * 141* 96 114* 108* 90     Most Recent Urinalysis:  Recent Labs   Lab Test 08/09/23  0851   COLOR Light Yellow   APPEARANCE Clear   URINEGLC Negative   URINEBILI Negative   URINEKETONE Negative   SG 1.015   UBLD 0.03 mg/dL*   URINEPH 5.0   PROTEIN 30*   NITRITE Positive*   LEUKEST 75 Linda/uL*   RBCU 4*   WBCU 26*     Most Recent ABG:No lab results found.  Most Recent ESR & CRP:No lab results found.  Most Recent Anemia Panel:  Recent Labs   Lab Test 08/14/23  0430 07/20/23  1342 07/18/23  1316   WBC 9.2   < > 7.0   HGB 8.9*   < > 9.8*   HCT 27.4*   < > 32.0*   MCV 87   < > 93      < > 371   IRON  --   --  63   IRONSAT  --   --  20   FEB  --   --  310   B12  --   --  602    < > = values in this interval not displayed.     Most Recent CPK:No lab results found.,   Results for orders placed or performed during the hospital encounter of 08/10/23   CT Abdomen Pelvis w/o Contrast    Narrative    EXAM: CT ABDOMEN PELVIS W/O CONTRAST  LOCATION: [facility]  DATE: 8/10/2023    INDICATION: urinary infection, flank pain, upper abdominal pain  COMPARISON: CT from 10/06/2016. Renal ultrasound from 07/23/2023.  TECHNIQUE: CT scan of the abdomen and pelvis was performed without IV contrast. Multiplanar reformats were obtained. Dose reduction techniques were used.  CONTRAST: None.    FINDINGS:   LOWER CHEST: Small hiatal hernia.    HEPATOBILIARY: Normal.    PANCREAS: Normal.    SPLEEN: Normal.    ADRENAL GLANDS: Normal.    KIDNEYS/BLADDER: Benign left renal cysts requiring no follow-up. Asymmetric right perinephric stranding compared to the left. No hydronephrosis. No  calcified ureteral or bladder stone.    BOWEL: Colonic diverticulosis. Prominent duodenal diverticula. Fluid-filled loops of nondistended small bowel. No appendicitis. No free air.    LYMPH NODES: Normal.    VASCULATURE: Aortoiliac atherosclerotic calcification without aneurysm.    PELVIC ORGANS: Small amount of free fluid.    MUSCULOSKELETAL: Severe degenerative disc change of the lumbar spine.      Impression    IMPRESSION:   1.  While there is no hydronephrosis, there is asymmetric perinephric stranding on the right which could reflect an acute inflammatory process. Recommend correlation with urinalysis. Please note that noncontrast CT is insensitive for changes of   pyelonephritis.    2.  Colonic diverticulosis.     Chest XR,  PA & LAT    Narrative    EXAM: XR CHEST 2 VIEWS  LOCATION: [facility]  DATE: 8/10/2023    INDICATION: Shortness of breath.  COMPARISON: None.      Impression    IMPRESSION: Single lateral view of the chest. No focal lung infiltrates. Mild cardiomegaly. Mild interstitial prominence throughout the lungs appears similar to prior exam. Degenerative changes thoracic spine unchanged.   Echocardiogram Complete     Value    LVEF  55-60%    Narrative    [id number]  [id number]  [id number]  [id number]^HENRI^[place] [address]     Name: DEVON REESE  MRN: [id number]  : 1935  Study Date: 2023 07:55 AM  Age: 88 yrs  Gender: Female  Patient Location: [facility]  Reason For Study: CAD  Ordering Physician: CHICHI ÁLVAREZ  Referring Physician: THA PIERCE  Performed By: NICKIE     BSA: 1.8 m2  Height: 61 in  Weight: 175 lb  HR: 65  ______________________________________________________________________________  Procedure  Complete Portable Echo Adult. Compared to the prior study dated 23, there  are changes as noted.  ______________________________________________________________________________  Interpretation  Summary     The left ventricle is normal in size.  Left ventricular function is normal.The ejection fraction is 55-60%.  There is mild concentric left ventricular hypertrophy.  Normal right ventricle size and systolic function.  The left atrium is mildly dilated.  There is mild to moderate (1-2+) mitral regurgitation.  There is mild to moderate (1-2+) aortic regurgitation.  Compared to the prior study dated 7/21/23, there are changes as noted.  Increased aortic regurgitation.  ______________________________________________________________________________  Left Ventricle  The left ventricle is normal in size. Left ventricular function is normal.The  ejection fraction is 55-60%. There is mild concentric left ventricular  hypertrophy. Left ventricular diastolic function is abnormal. Diastolic  Doppler findings (E/E' ratio and/or other parameters) suggest left ventricular  filling pressures are increased. No regional wall motion abnormalities noted.     Right Ventricle  Normal right ventricle size and systolic function.     Atria  The left atrium is mildly dilated. Right atrial size is normal. There is no  color Doppler evidence of an atrial shunt.     Mitral Valve  Mitral valve leaflets appear normal. There is mild to moderate (1-2+) mitral  regurgitation.     Tricuspid Valve  The tricuspid valve is not well visualized, but is grossly normal. There is  mild (1+) tricuspid regurgitation.     Aortic Valve  Aortic valve leaflets appear normal. There is mild to moderate (1-2+) aortic  regurgitation.     Pulmonic Valve  The pulmonic valve is not well seen, but is grossly normal. This degree of  valvular regurgitation is within normal limits.     Vessels  The aorta root is normal. Normal size ascending aorta. IVC diameter <2.1 cm  collapsing >50% with sniff suggests a normal RA pressure of 3 mmHg.     Pericardium  There is no pericardial effusion.      ______________________________________________________________________________  MMode/2D Measurements & Calculations  IVSd: 1.4 cm  LVIDd: 4.5 cm  LVIDs: 3.4 cm  LVPWd: 0.98 cm  FS: 23.1 %     LV mass(C)d: 195.1 grams  LV mass(C)dI: 109.3 grams/m2  Ao root diam: 3.0 cm  LA dimension: 4.2 cm  asc Aorta Diam: 3.1 cm  LA/Ao: 1.4  LVOT diam: 1.9 cm  LVOT area: 2.8 cm2  LA Volume Indexed (AL/bp): 37.6 ml/m2  RV Base: 4.2 cm  RWT: 0.44  TAPSE: 2.1 cm     Doppler Measurements & Calculations  MV E max ben: 107.0 cm/sec  MV A max ben: 116.0 cm/sec  MV E/A: 0.92  MV dec slope: 633.0 cm/sec2  MV dec time: 0.17 sec  Ao V2 max: 160.0 cm/sec  Ao max PG: 10.0 mmHg  Ao V2 mean: 103.0 cm/sec  Ao mean P.0 mmHg  Ao V2 VTI: 41.0 cm  AGA(I,D): 2.1 cm2  AGA(V,D): 1.9 cm2  AI P1/2t: 429.0 msec  LV V1 max P.8 mmHg  LV V1 max: 109.0 cm/sec  LV V1 VTI: 30.0 cm  MR PISA: 2.7 cm2  MR ERO: 0.13 cm2  MR volume: 32.6 ml  SV(LVOT): 85.1 ml  SI(LVOT): 47.7 ml/m2  PA acc time: 0.10 sec  PI end-d ben: 120.0 cm/sec  TR max ben: 305.0 cm/sec  TR max P.2 mmHg  AV Ben Ratio (DI): 0.68  AGA Index (cm2/m2): 1.2  E/E' av.7  Lateral E/e': 17.3  Medial E/e': 16.1  RV S Ben: 12.1 cm/sec     ______________________________________________________________________________  Report approved by: Pavel Batres 2023 10:39 AM         Cardiac Catheterization    Narrative    Images from the original result were not included.  Kerry Hoffmann is a 88 year old old female with HTN, HL, CKD who is here   for NSTEMI.    - non-obstructive CAD, mildly elevated L-sided filling pressures  - continue ASA 81mg daily indefinitely, consider continuing clopidogrel   statin  - continue aggressive risk factor modification          Findings:  LM:no obstruction  LAD:15-30% mid-vessel irregularity. Diagonal branch w/ ostial 50%  Lcx:long area of 40-50% large OM narrowing  RCA:dominant, mildly irregular    LVEDP:20    Access:  R Radial artery    Closure:    Vasc Band             Discharge Medications   Current Discharge Medication List        CONTINUE these medications which have NOT CHANGED    Details   acetaminophen (TYLENOL) 500 MG tablet Take 1,000 mg by mouth 3 times daily      aspirin 81 MG EC tablet Take 81 mg by mouth daily      Cholecalciferol (VITAMIN D3) 50 MCG (2000 UT) CAPS Take 1 tablet by mouth daily      clonazePAM (KLONOPIN) 0.5 MG tablet Take 0.5 mg by mouth 3 times daily      clopidogrel (PLAVIX) 75 MG tablet Take 1 tablet (75 mg) by mouth daily  Qty: 30 tablet, Refills: 11    Associated Diagnoses: Intracranial atherosclerosis      diclofenac (VOLTAREN) 1 % topical gel Apply 4 g topically 4 times daily knee pain      famotidine (PEPCID) 20 MG tablet Take 20 mg by mouth 2 times daily      losartan (COZAAR) 50 MG tablet Take 50 mg by mouth 2 times daily           STOP taking these medications       amoxicillin (AMOXIL) 500 MG capsule Comments:   Reason for Stopping:         metoprolol succinate ER (TOPROL-XL) 25 MG 24 hr tablet Comments:   Reason for Stopping:             Allergies   Allergies   Allergen Reactions    Buspirone Shortness Of Breath    Ciprofloxacin Hives and Shortness Of Breath     Other reaction(s): Unknown    Cortisone      Other reaction(s): Throat Swelling/Closing, Unknown  Reaction 9-5-94      Hydrocodone-Acetaminophen Shortness Of Breath     Other reaction(s): Unknown    Lansoprazole Shortness Of Breath    Metoprolol Shortness Of Breath     Tolerates metoprolol succinate at home    Nedocromil      Other reaction(s): Throat Swelling/Closing    Niacin Shortness Of Breath     Other reaction(s): Unknown    Albuterol Other (See Comments)     Inhaler had extreme effect on nervous system      Amlodipine      Other reaction(s): Erythema, Unknown    Azithromycin      Other reaction(s): *Unknown, Unknown    Cefixime Diarrhea     Other reaction(s): Unknown    Cefprozil Nausea and Vomiting     Other reaction(s): Unknown    Cefuroxime       Other reaction(s): *Unknown    Cephalexin      Other reaction(s): *Unknown, Unknown    Contrast Dye      Other reaction(s): hives    Cyclobenzaprine      Other reaction(s): Sedation    Dextromethorphan-Guaifenesin Nausea     Other reaction(s): Headache    Diltiazem      Other reaction(s): Erythema, Unknown  Ears face arms and chest red and on fire-body tremors      Erythromycin      Other reaction(s): Unknown    Esomeprazole      Other reaction(s): Headache    Fenofibrate Muscle Pain (Myalgia)     Other reaction(s): Unknown    Fluticasone-Salmeterol Other (See Comments)     Elevated blood pressure      Levofloxacin Nausea     Other reaction(s): Headache, Unknown    Methylprednisolone     Metronidazole     Monosodium Glutamate     Moxifloxacin      Other reaction(s): Dizziness    Nifedipine      Other reaction(s): Edema  Took for 5-93 to 9-94 red and swollen leg      Nsaids      Other reaction(s): Unknown    Ofloxacin      Other reaction(s): *Unknown    Ondansetron      Other reaction(s): Constipation    Parsley Seed     Penicillins Unknown     She doesn't remember other than it occurred as a very young woman     Piper     Pirbuterol Other (See Comments)     Immediate extreme effect on nervous system      Pravastatin      Other reaction(s): Dizziness, Unknown    Pseudoephedrine      Other reaction(s): headache,nausea    Shellfish-Derived Products      Per pt 8/12/23    Simvastatin Muscle Pain (Myalgia)    Spironolactone      Other reaction(s): stomach cramps, nausea    Sulfamethoxazole-Trimethoprim      Other reaction(s): Unknown    Tramadol      Other reaction(s): red ears,high blood press.    Tramadol-Acetaminophen      Other reaction(s): Tachycardia, Unknown    Triamterene Other (See Comments)     Greatly bothered with sun-took medication for three years  Greatly bothered with sun      Diatrizoate Rash    Telmisartan Palpitations

## 2023-08-16 LAB
BACTERIA BLD CULT: NO GROWTH
BACTERIA BLD CULT: NO GROWTH

## 2023-08-19 DIAGNOSIS — I67.2 INTRACRANIAL ATHEROSCLEROSIS: ICD-10-CM

## 2023-08-19 NOTE — LETTER
8/19/2023        RE: Kerry Hoffmann  [address]  [address]  [facility] [address]          Dear Kerry,      We recently provided you with medication refills.  Many medications require routine follow-up with your doctor.    Your prescription(s) have been refilled for 30 days so you may have time for the above noted follow-up. Please call to schedule soon so we can assure you have an appointment before your next refills are needed. If you have already made a follow up appointment, please disregard this letter.         Sincerely,        Mark Hummel MD  [facility]     (Formerly known as [facility])

## 2023-08-21 RX ORDER — CLOPIDOGREL BISULFATE 75 MG/1
75 TABLET ORAL DAILY
Qty: 30 TABLET | Refills: 0 | Status: SHIPPED | OUTPATIENT
Start: 2023-08-21 | End: 2023-09-18

## 2023-08-21 NOTE — TELEPHONE ENCOUNTER
Refill request for: clopidogrel (PLAVIX) 75 MG tablet    Directions: Take 1 tablet (75 mg) by mouth daily     LOV: 7/5/22  NOV: Not scheduled- Letter mailed to patient     30 day supply with 0 refills Medication T'd for review and signature  Lulu Mendoza CMA on 8/21/2023 at 1:28 PM

## 2023-08-25 ENCOUNTER — TRANSFERRED RECORDS (OUTPATIENT)
Dept: HEALTH INFORMATION MANAGEMENT | Facility: CLINIC | Age: 88
End: 2023-08-25
Payer: MEDICARE

## 2023-08-28 ENCOUNTER — MEDICAL CORRESPONDENCE (OUTPATIENT)
Dept: HEALTH INFORMATION MANAGEMENT | Facility: CLINIC | Age: 88
End: 2023-08-28
Payer: MEDICARE

## 2023-09-18 DIAGNOSIS — I67.2 INTRACRANIAL ATHEROSCLEROSIS: ICD-10-CM

## 2023-09-18 RX ORDER — CLOPIDOGREL BISULFATE 75 MG/1
75 TABLET ORAL DAILY
Qty: 90 TABLET | Refills: 1 | Status: SHIPPED | OUTPATIENT
Start: 2023-09-18 | End: 2024-03-25

## 2023-09-18 NOTE — TELEPHONE ENCOUNTER
Refill request for: clopidogrel (PLAVIX) 75 MG tablet              Directions: Take 1 tablet (75 mg) by mouth daily      LOV: 7/5/22  NOV: 2/26/24     90 day supply with 1 refills Medication T'd for review and signature  Lulu Mendoza CMA on 9/18/2023 at 1:37 PM

## 2023-10-12 ENCOUNTER — LAB REQUISITION (OUTPATIENT)
Dept: LAB | Facility: CLINIC | Age: 88
End: 2023-10-12
Payer: MEDICARE

## 2023-10-12 DIAGNOSIS — N18.32 CHRONIC KIDNEY DISEASE, STAGE 3B (H): ICD-10-CM

## 2023-10-12 LAB
ERYTHROCYTE [DISTWIDTH] IN BLOOD BY AUTOMATED COUNT: 14.2 % (ref 10–15)
HCT VFR BLD AUTO: 31.3 % (ref 35–47)
HGB BLD-MCNC: 9.9 G/DL (ref 11.7–15.7)
MCH RBC QN AUTO: 28.5 PG (ref 26.5–33)
MCHC RBC AUTO-ENTMCNC: 31.6 G/DL (ref 31.5–36.5)
MCV RBC AUTO: 90 FL (ref 78–100)
PLATELET # BLD AUTO: 338 10E3/UL (ref 150–450)
RBC # BLD AUTO: 3.47 10E6/UL (ref 3.8–5.2)
WBC # BLD AUTO: 6 10E3/UL (ref 4–11)

## 2023-10-12 PROCEDURE — 85027 COMPLETE CBC AUTOMATED: CPT | Mod: ORL | Performed by: FAMILY MEDICINE

## 2023-10-12 PROCEDURE — 80048 BASIC METABOLIC PNL TOTAL CA: CPT | Mod: ORL | Performed by: FAMILY MEDICINE

## 2023-10-13 LAB
ANION GAP SERPL CALCULATED.3IONS-SCNC: 14 MMOL/L (ref 7–15)
BUN SERPL-MCNC: 27.3 MG/DL (ref 8–23)
CALCIUM SERPL-MCNC: 9.6 MG/DL (ref 8.8–10.2)
CHLORIDE SERPL-SCNC: 102 MMOL/L (ref 98–107)
CREAT SERPL-MCNC: 1.26 MG/DL (ref 0.51–0.95)
DEPRECATED HCO3 PLAS-SCNC: 20 MMOL/L (ref 22–29)
EGFRCR SERPLBLD CKD-EPI 2021: 41 ML/MIN/1.73M2
GLUCOSE SERPL-MCNC: 108 MG/DL (ref 70–99)
POTASSIUM SERPL-SCNC: 4.8 MMOL/L (ref 3.4–5.3)
SODIUM SERPL-SCNC: 136 MMOL/L (ref 135–145)

## 2023-11-07 ENCOUNTER — HOSPITAL ENCOUNTER (EMERGENCY)
Facility: HOSPITAL | Age: 88
Discharge: HOME | End: 2023-11-07
Attending: EMERGENCY MEDICINE | Admitting: EMERGENCY MEDICINE
Payer: MEDICARE

## 2023-11-07 ENCOUNTER — APPOINTMENT (OUTPATIENT)
Dept: CT IMAGING | Facility: HOSPITAL | Age: 88
End: 2023-11-07
Attending: EMERGENCY MEDICINE
Payer: MEDICARE

## 2023-11-07 ENCOUNTER — APPOINTMENT (OUTPATIENT)
Dept: RADIOLOGY | Facility: HOSPITAL | Age: 88
End: 2023-11-07
Attending: EMERGENCY MEDICINE
Payer: MEDICARE

## 2023-11-07 VITALS
OXYGEN SATURATION: 98 % | WEIGHT: 182 LBS | SYSTOLIC BLOOD PRESSURE: 166 MMHG | DIASTOLIC BLOOD PRESSURE: 72 MMHG | RESPIRATION RATE: 24 BRPM | HEART RATE: 77 BPM | BODY MASS INDEX: 34.39 KG/M2 | TEMPERATURE: 98.4 F

## 2023-11-07 DIAGNOSIS — R19.7 DIARRHEA, UNSPECIFIED TYPE: ICD-10-CM

## 2023-11-07 DIAGNOSIS — K62.89 RECTAL PAIN: ICD-10-CM

## 2023-11-07 DIAGNOSIS — R07.9 CHEST PAIN, UNSPECIFIED TYPE: ICD-10-CM

## 2023-11-07 LAB
ALBUMIN SERPL BCG-MCNC: 4.1 G/DL (ref 3.5–5.2)
ALP SERPL-CCNC: 75 U/L (ref 35–104)
ALT SERPL W P-5'-P-CCNC: <5 U/L (ref 0–50)
ANION GAP SERPL CALCULATED.3IONS-SCNC: 8 MMOL/L (ref 7–15)
ANION GAP SERPL CALCULATED.3IONS-SCNC: 8 MMOL/L (ref 7–15)
AST SERPL W P-5'-P-CCNC: 14 U/L (ref 0–45)
BASOPHILS # BLD AUTO: 0 10E3/UL (ref 0–0.2)
BASOPHILS NFR BLD AUTO: 0 %
BILIRUB SERPL-MCNC: 0.2 MG/DL
BUN SERPL-MCNC: 26.7 MG/DL (ref 8–23)
BUN SERPL-MCNC: 26.7 MG/DL (ref 8–23)
CALCIUM SERPL-MCNC: 10.2 MG/DL (ref 8.8–10.2)
CALCIUM SERPL-MCNC: 10.2 MG/DL (ref 8.8–10.2)
CHLORIDE SERPL-SCNC: 105 MMOL/L (ref 98–107)
CHLORIDE SERPL-SCNC: 105 MMOL/L (ref 98–107)
CREAT SERPL-MCNC: 1.26 MG/DL (ref 0.51–0.95)
CREAT SERPL-MCNC: 1.26 MG/DL (ref 0.51–0.95)
D DIMER PPP FEU-MCNC: 0.99 UG/ML FEU (ref 0–0.5)
DEPRECATED HCO3 PLAS-SCNC: 22 MMOL/L (ref 22–29)
DEPRECATED HCO3 PLAS-SCNC: 22 MMOL/L (ref 22–29)
EGFRCR SERPLBLD CKD-EPI 2021: 41 ML/MIN/1.73M2
EGFRCR SERPLBLD CKD-EPI 2021: 41 ML/MIN/1.73M2
EOSINOPHIL # BLD AUTO: 0.2 10E3/UL (ref 0–0.7)
EOSINOPHIL NFR BLD AUTO: 2 %
ERYTHROCYTE [DISTWIDTH] IN BLOOD BY AUTOMATED COUNT: 14 % (ref 10–15)
FLUAV RNA SPEC QL NAA+PROBE: NEGATIVE
FLUBV RNA RESP QL NAA+PROBE: NEGATIVE
GLUCOSE SERPL-MCNC: 98 MG/DL (ref 70–99)
GLUCOSE SERPL-MCNC: 98 MG/DL (ref 70–99)
HCT VFR BLD AUTO: 31.6 % (ref 35–47)
HGB BLD-MCNC: 10.1 G/DL (ref 11.7–15.7)
HOLD SPECIMEN: NORMAL
HOLD SPECIMEN: NORMAL
IMM GRANULOCYTES # BLD: 0 10E3/UL
IMM GRANULOCYTES NFR BLD: 0 %
LIPASE SERPL-CCNC: 26 U/L (ref 13–60)
LYMPHOCYTES # BLD AUTO: 1.6 10E3/UL (ref 0.8–5.3)
LYMPHOCYTES NFR BLD AUTO: 22 %
MAGNESIUM SERPL-MCNC: 1.7 MG/DL (ref 1.7–2.3)
MCH RBC QN AUTO: 28.3 PG (ref 26.5–33)
MCHC RBC AUTO-ENTMCNC: 32 G/DL (ref 31.5–36.5)
MCV RBC AUTO: 89 FL (ref 78–100)
MONOCYTES # BLD AUTO: 0.8 10E3/UL (ref 0–1.3)
MONOCYTES NFR BLD AUTO: 11 %
NEUTROPHILS # BLD AUTO: 4.9 10E3/UL (ref 1.6–8.3)
NEUTROPHILS NFR BLD AUTO: 65 %
NRBC # BLD AUTO: 0 10E3/UL
NRBC BLD AUTO-RTO: 0 /100
NT-PROBNP SERPL-MCNC: 815 PG/ML (ref 0–1800)
PLATELET # BLD AUTO: 387 10E3/UL (ref 150–450)
POTASSIUM SERPL-SCNC: 4.2 MMOL/L (ref 3.4–5.3)
POTASSIUM SERPL-SCNC: 4.2 MMOL/L (ref 3.4–5.3)
PROT SERPL-MCNC: 7.4 G/DL (ref 6.4–8.3)
RBC # BLD AUTO: 3.57 10E6/UL (ref 3.8–5.2)
RSV RNA SPEC NAA+PROBE: NEGATIVE
SARS-COV-2 RNA RESP QL NAA+PROBE: NEGATIVE
SODIUM SERPL-SCNC: 135 MMOL/L (ref 135–145)
SODIUM SERPL-SCNC: 135 MMOL/L (ref 135–145)
TROPONIN T SERPL HS-MCNC: 15 NG/L
TROPONIN T SERPL HS-MCNC: 21 NG/L
WBC # BLD AUTO: 7.6 10E3/UL (ref 4–11)

## 2023-11-07 PROCEDURE — G1010 CDSM STANSON: HCPCS

## 2023-11-07 PROCEDURE — 71046 X-RAY EXAM CHEST 2 VIEWS: CPT

## 2023-11-07 PROCEDURE — 93005 ELECTROCARDIOGRAM TRACING: CPT | Performed by: STUDENT IN AN ORGANIZED HEALTH CARE EDUCATION/TRAINING PROGRAM

## 2023-11-07 PROCEDURE — 84484 ASSAY OF TROPONIN QUANT: CPT | Performed by: EMERGENCY MEDICINE

## 2023-11-07 PROCEDURE — 85379 FIBRIN DEGRADATION QUANT: CPT | Performed by: EMERGENCY MEDICINE

## 2023-11-07 PROCEDURE — 96360 HYDRATION IV INFUSION INIT: CPT | Mod: 59

## 2023-11-07 PROCEDURE — 80053 COMPREHEN METABOLIC PANEL: CPT | Performed by: STUDENT IN AN ORGANIZED HEALTH CARE EDUCATION/TRAINING PROGRAM

## 2023-11-07 PROCEDURE — 83735 ASSAY OF MAGNESIUM: CPT | Performed by: EMERGENCY MEDICINE

## 2023-11-07 PROCEDURE — 36415 COLL VENOUS BLD VENIPUNCTURE: CPT | Performed by: STUDENT IN AN ORGANIZED HEALTH CARE EDUCATION/TRAINING PROGRAM

## 2023-11-07 PROCEDURE — 84484 ASSAY OF TROPONIN QUANT: CPT | Performed by: STUDENT IN AN ORGANIZED HEALTH CARE EDUCATION/TRAINING PROGRAM

## 2023-11-07 PROCEDURE — 99285 EMERGENCY DEPT VISIT HI MDM: CPT | Mod: 25

## 2023-11-07 PROCEDURE — 83690 ASSAY OF LIPASE: CPT | Performed by: EMERGENCY MEDICINE

## 2023-11-07 PROCEDURE — 36415 COLL VENOUS BLD VENIPUNCTURE: CPT | Performed by: EMERGENCY MEDICINE

## 2023-11-07 PROCEDURE — 83880 ASSAY OF NATRIURETIC PEPTIDE: CPT | Performed by: EMERGENCY MEDICINE

## 2023-11-07 PROCEDURE — 85025 COMPLETE CBC W/AUTO DIFF WBC: CPT | Performed by: STUDENT IN AN ORGANIZED HEALTH CARE EDUCATION/TRAINING PROGRAM

## 2023-11-07 PROCEDURE — 87637 SARSCOV2&INF A&B&RSV AMP PRB: CPT | Performed by: EMERGENCY MEDICINE

## 2023-11-07 PROCEDURE — 258N000003 HC RX IP 258 OP 636: Performed by: EMERGENCY MEDICINE

## 2023-11-07 RX ORDER — METOPROLOL SUCCINATE 25 MG/1
50 TABLET, EXTENDED RELEASE ORAL AT BEDTIME
COMMUNITY

## 2023-11-07 RX ADMIN — SODIUM CHLORIDE 1000 ML: 9 INJECTION, SOLUTION INTRAVENOUS at 16:42

## 2023-11-07 ASSESSMENT — ACTIVITIES OF DAILY LIVING (ADL): ADLS_ACUITY_SCORE: 35

## 2023-11-07 NOTE — MEDICATION SCRIBE - ADMISSION MEDICATION HISTORY
Medication Scribe Admission Medication History    Admission medication history is complete. The information provided in this note is only as accurate as the sources available at the time of the update.    Information Source(s): Patient via in-person    Pertinent Information: Patient reports taking Metoprolol 25 mg at bedtime instead of 75 daily.    Patient reports no longer taking recent RX fills of Vitamin D, Ferrous Sulfate, Furosamide, Hydralazine     Changes made to PTA medication list:  Added: Tums  Deleted: None  Changed: Metoprolol 75 to 25    Medication Affordability:No       Allergies reviewed with patient and updates made in EHR: yes    Medication History Completed By: Nishant Abernathy 11/7/2023 5:29 PM    PTA Med List   Medication Sig Last Dose    acetaminophen (TYLENOL) 500 MG tablet Take 1,000 mg by mouth 3 times daily 11/7/2023 at 0130    aspirin 81 MG EC tablet Take 81 mg by mouth daily 11/7/2023 at am    Calcium Carbonate Antacid (TUMS ULTRA 1000 PO) Take 1 chew tab by mouth nightly as needed Past Week at prn    Cholecalciferol (VITAMIN D3) 50 MCG (2000 UT) CAPS Take 1 tablet by mouth daily 11/7/2023 at am    clonazePAM (KLONOPIN) 0.5 MG tablet Take 0.5 mg by mouth 3 times daily 11/6/2023 at pm    clopidogrel (PLAVIX) 75 MG tablet TAKE 1 TABLET(75 MG) BY MOUTH DAILY 11/7/2023 at am    diclofenac (VOLTAREN) 1 % topical gel Apply 4 g topically 4 times daily knee pain 11/7/2023 at pm    famotidine (PEPCID) 10 MG tablet Take 20 mg by mouth 2 times daily 11/7/2023 at am    losartan (COZAAR) 50 MG tablet Take 50 mg by mouth 2 times daily 11/7/2023 at am    metoprolol succinate ER (TOPROL XL) 25 MG 24 hr tablet Take 25 mg by mouth at bedtime 11/6/2023 at pm

## 2023-11-07 NOTE — ED PROVIDER NOTES
EMERGENCY DEPARTMENT ENCOUNTER     NAME: Kerry Hoffmann   AGE: 88 year old female   YOB: 1935   MRN: [id number]   EVALUATION DATE & TIME: No admission date for patient encounter.   PCP: Kaylyn Bolanos     Chief Complaint   Patient presents with    Chest Pain    Breathing Problem   :    FINAL IMPRESSION       1. Rectal pain    2. Diarrhea, unspecified type    3. Chest pain, unspecified type           ED COURSE & MEDICAL DECISION MAKING      Pertinent Labs & Imaging studies reviewed. (See chart for details)   88 year old female  presents to the Emergency Department for evaluation of multiple complaints including chest discomfort, diarrhea, abdominal and rectal discomfort. Initial Vitals Reviewed. Initial exam notable for generally well-appearing patient with clear lungs, generally normal vitals.  It is a little difficult to figure out what started first but it sounds like she had some chest pain in the middle of the night and then started with some diarrhea that caused rectal pain.  I am not sure exactly how these things could be related and less there was something like a viral syndrome causing some respiratory symptoms and also some GI symptoms.  I did consider ACS but her EKG is nonischemic and a troponin based on high-sensitivity protocol on arrival and 2 hours later are negative per the protocol and I feel this adequately rules out ACS.  I did initially get a D-dimer which is borderline based on her age-adjusted D-dimer cutoffs.  However, she has no hypoxia or tachycardia, she has a contrast allergy, and I think my clinical probability for PE is very low.  I am not concerned enough about this pathology to make her be admitted to the hospital for a VQ scan.  I did end up getting a chest x-ray which does not show effusion, pneumonia.  Labs do not show any signs of an acute congestive heart failure exacerbation.  I even did a CT scan of her abdomen and pelvis due to the diarrhea and rectal pain and  there are no abnormalities.  I discussed the results with patient and we do not know exactly what is causing all of her constitution of symptoms but I have not discovered anything life-threatening that would suggest a need for admission or acute treatment.  There is been no diarrheal episodes here in the ED to send for any type of stool testing.  At this time, I will discharge cautiously with an unknown diagnosis, close PCP follow-up and return precautions.        Additional ED course time stamps:  6:32 PM I met with patient for initial interview and encounter. We also discussed plan for treatment and diagnostic interventions.  8:25 PM Reevaluated and updated patient. We discussed plan for discharge as well as supportive cares at home and reasons for return to the ED including new or worsening symptoms. All questions and concerns addressed. Patient to be discharged by RN. They are agreeable and comfortable with plan.    At the conclusion of the encounter I discussed the results of all of the tests and the disposition. The questions were answered. The patient or family acknowledged understanding and was agreeable with the care plan.     0 minutes critical care time, see procedure note below for details if relevant    Medical Decision Making    History:  Supplemental history from: N/A  External Record(s) reviewed: Inpatient Record: [facility] admission from 8/10-8/15    Work Up:  Chart documentation includes differential considered and any EKGs or imaging interpreted by provider.  In additional to work up documented, I considered the following work up: Documented in chart, if applicable.    External consultation:  Discussion of management with another provider: Documented in chart, if applicable    Complicating factors:  Care impacted by chronic illness: Chronic Kidney Disease, Chronic Lung Disease, Hyperlipidemia, and Mental Health  Care affected by social determinants of health: Access to Medical Care    Disposition  "considerations: Discharge. No recommendations on prescription strength medication(s). I considered admission, but ultimately discharged patient with reassuring work-up.    MEDICATIONS GIVEN IN THE EMERGENCY:   Medications   sodium chloride 0.9% BOLUS 1,000 mL (0 mLs Intravenous Stopped 11/7/23 1721)      NEW PRESCRIPTIONS STARTED AT TODAY'S ER VISIT   New Prescriptions    No medications on file     ================================================================   HISTORY OF PRESENT ILLNESS     Patient information was obtained from: Patient  Use of Intrepreter: N/A     Kerry Hoffmann is a 88 year old female with history of anxiety disorder, depression, asthma, GERD, HLD, and stage III CKD who presents to the ED for evaluation of chest pain.    Per chart review, patient was admitted to this hospital from 8/10-8/15/23 with fever, generalized weakness. Found to have NSTEMI and sepsis secondary to pyelonephritis. Placed on ceftriaxone. Coronary angiogram 8/11/2023 revealed nonobstructive CAD with mildly elevated left-sided filling pressure.  Echocardiogram revealed mild LVH with mild to moderate mitral regurgitation, mild to moderate aortic regurgitation and mildly elevated LV filling pressure. Cardiologist recommended aggressive risk factor modification and antiplatelet therapy and no plans for diuretic therapy. Patient was discharged in stable condition with plan to follow up with PCP.     Patient reports that she developed chest pressure, shortness of breath, and diarrhea all beginning around 0230 earlier today. Also complains of an accompanying pain in her saddle area stating that \"it feels like a pitch fork is being shoved in my vagina\" and that there is a \"grenade in my rectum.\" No cough. Has not taken any Imodium today for her chronic diarrhea, however, she states that rectum pain is atypical with this.    ================================================================    PAST HISTORY     PAST MEDICAL HISTORY: "   Past Medical History:   Diagnosis Date    Anxiety     takes clonazepam    Asthma     per [facility]     Chronic kidney disease     stage 3 per [facility]     CKD (chronic kidney disease)     Clostridium difficile colitis 11/1/2016    Clostridium difficile infection     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    Clostridium enterocolitis 10/27/2016    CTS (carpal tunnel syndrome)     GERD (gastroesophageal reflux disease)     per [facility]     Hiatal hernia     small per [facility]     Hyperlipidemia     Hyperlipidemia     Hyperlipidemia     Hypertension     Hypertension     Osteoarthritis of knee     per [facility]     Spinal stenosis     per [facility]     Vitamin D deficiency     per [facility]      PAST SURGICAL HISTORY:   Past Surgical History:   Procedure Laterality Date    BREAST SURGERY  1982    breast tumor per [facility]     CATARACT EXTRACTION  07/2005    per [facility]     CV CORONARY ANGIOGRAM N/A 8/11/2023    Procedure: Coronary Angiogram;  Surgeon: Ap Arroyo MD;  Location: [facility] CATH LAB CV    CV LEFT HEART CATH N/A 8/11/2023    Procedure: Left Heart Catheterization;  Surgeon: Ap Arroyo MD;  Location: [facility] CATH LAB CV    ORIF TIBIA & FIBULA FRACTURES  1988    per [facility]     OTHER SURGICAL HISTORY  10/2004    hole in retinaper [facility]     OTHER SURGICAL HISTORY  05/03/2015    fecal transplantper [facility]     NH ESOPHAGOGASTRODUODENOSCOPY TRANSORAL DIAGNOSTIC N/A 9/11/2020    Procedure: ESOPHAGOGASTRODUODENOSCOPY With gastric biopsies;  Surgeon: David Reyes MD;  Location: [facility];  Service: Gastroenterology    TONSILLECTOMY      TONSILLECTOMY & ADENOIDECTOMY  1963      CURRENT MEDICATIONS:   acetaminophen (TYLENOL) 500 MG tablet  aspirin 81 MG EC tablet  Calcium Carbonate Antacid (TUMS ULTRA 1000 PO)  Cholecalciferol (VITAMIN D3) 50 MCG (2000 UT) CAPS  clonazePAM (KLONOPIN) 0.5 MG tablet  clopidogrel (PLAVIX) 75 MG tablet  diclofenac (VOLTAREN) 1 % topical gel  famotidine (PEPCID) 10 MG tablet  losartan (COZAAR) 50 MG  tablet  metoprolol succinate ER (TOPROL XL) 25 MG 24 hr tablet      ALLERGIES:   Allergies   Allergen Reactions    Buspirone Shortness Of Breath    Ciprofloxacin Hives and Shortness Of Breath     Other reaction(s): Unknown    Cortisone      Other reaction(s): Throat Swelling/Closing, Unknown  Reaction 9-5-94      Hydrocodone-Acetaminophen Shortness Of Breath     Other reaction(s): Unknown    Lansoprazole Shortness Of Breath    Metoprolol Shortness Of Breath     Tolerates metoprolol succinate at home    Nedocromil      Other reaction(s): Throat Swelling/Closing    Niacin Shortness Of Breath     Other reaction(s): Unknown    Albuterol Other (See Comments)     Inhaler had extreme effect on nervous system      Amlodipine      Other reaction(s): Erythema, Unknown    Azithromycin      Other reaction(s): *Unknown, Unknown    Cefixime Diarrhea     Other reaction(s): Unknown    Cefprozil Nausea and Vomiting     Other reaction(s): Unknown    Cefuroxime      Other reaction(s): *Unknown    Cephalexin      Other reaction(s): *Unknown, Unknown    Contrast Dye      Other reaction(s): hives    Cyclobenzaprine      Other reaction(s): Sedation    Dextromethorphan-Guaifenesin Nausea     Other reaction(s): Headache    Diltiazem      Other reaction(s): Erythema, Unknown  Ears face arms and chest red and on fire-body tremors      Erythromycin      Other reaction(s): Unknown    Esomeprazole      Other reaction(s): Headache    Fenofibrate Muscle Pain (Myalgia)     Other reaction(s): Unknown    Fluticasone-Salmeterol Other (See Comments)     Elevated blood pressure      Levofloxacin Nausea     Other reaction(s): Headache, Unknown    Methylprednisolone     Metronidazole     Monosodium Glutamate     Moxifloxacin      Other reaction(s): Dizziness    Nifedipine      Other reaction(s): Edema  Took for 5-93 to 9-94 red and swollen leg      Nsaids      Other reaction(s): Unknown    Ofloxacin      Other reaction(s): *Unknown    Ondansetron       Other reaction(s): Constipation    Parsley Seed     Penicillins Unknown     She doesn't remember other than it occurred as a very young woman     Piper     Pirbuterol Other (See Comments)     Immediate extreme effect on nervous system      Pravastatin      Other reaction(s): Dizziness, Unknown    Pseudoephedrine      Other reaction(s): headache,nausea    Shellfish-Derived Products      Per pt 8/12/23    Simvastatin Muscle Pain (Myalgia)    Spironolactone      Other reaction(s): stomach cramps, nausea    Sulfamethoxazole-Trimethoprim      Other reaction(s): Unknown    Tramadol      Other reaction(s): red ears,high blood press.    Tramadol-Acetaminophen      Other reaction(s): Tachycardia, Unknown    Triamterene Other (See Comments)     Greatly bothered with sun-took medication for three years  Greatly bothered with sun      Diatrizoate Rash    Telmisartan Palpitations      FAMILY HISTORY:   Family History   Problem Relation Age of Onset    Hypertension Mother     Cancer Mother         gallbladder cancer    Myocardial Infarction Father     Hypertension Sister     Alzheimer Disease Sister     Heart Disease Sister     Cancer Brother     Brain Cancer Brother     Pacemaker Mother     Heart Disease Mother     Coronary Artery Disease Father     Heart Disease Father     Heart Failure Sister       SOCIAL HISTORY:   Social History     Socioeconomic History    Marital status:    Tobacco Use    Smoking status: Former     Packs/day: 3.00     Years: 35.00     Additional pack years: 0.00     Total pack years: 105.00     Types: Cigarettes    Smokeless tobacco: Never    Tobacco comments:     quit 1968   Substance and Sexual Activity    Alcohol use: Not Currently    Drug use: Never    Sexual activity: Not Currently   Other Topics Concern    Parent/sibling w/ CABG, MI or angioplasty before 65F 55M? No        VITALS  Patient Vitals for the past 24 hrs:   BP Temp Temp src Pulse Resp SpO2 Weight   11/07/23 2005 (!) 166/72 -- -- 77  24 98 % --   11/07/23 1930 (!) 178/72 -- -- 74 17 96 % --   11/07/23 1845 (!) 170/80 -- -- 75 18 97 % --   11/07/23 1745 (!) 175/77 -- -- 69 19 97 % --   11/07/23 1719 (!) 153/72 -- -- 84 -- 98 % --   11/07/23 1632 -- -- -- -- -- -- 82.6 kg (182 lb)   11/07/23 1631 129/65 98.4  F (36.9  C) Oral 80 18 97 % --        ================================================================    PHYSICAL EXAM     VITAL SIGNS: BP (!) 166/72   Pulse 77   Temp 98.4  F (36.9  C) (Oral)   Resp 24   Wt 82.6 kg (182 lb)   LMP  (LMP Unknown)   SpO2 98%   BMI 34.39 kg/m     Constitutional:  Awake, no acute distress   HENT:  Atraumatic, oropharynx without exudate or erythema, membranes moist  Lymph:  No adenopathy  Eyes: EOM intact, PERRL, no injection  Neck: Supple  Respiratory:  Clear to auscultation bilaterally, no wheezes or crackles   Cardiovascular:  Regular rate and rhythm, single S1 and S2   GI:  Soft, nontender, nondistended, no rebound or guarding   Musculoskeletal:  Moves all extremities, no lower extremity edema, no deformities    Skin:  Warm, dry  Neurologic:  Alert and oriented x3, no focal deficits noted     ================================================================  LAB     All pertinent labs reviewed and interpreted.   Labs Ordered and Resulted from Time of ED Arrival to Time of ED Departure   BASIC METABOLIC PANEL - Abnormal       Result Value    Sodium 135      Potassium 4.2      Chloride 105      Carbon Dioxide (CO2) 22      Anion Gap 8      Urea Nitrogen 26.7 (*)     Creatinine 1.26 (*)     GFR Estimate 41 (*)     Calcium 10.2      Glucose 98     TROPONIN T, HIGH SENSITIVITY - Abnormal    Troponin T, High Sensitivity 21 (*)    CBC WITH PLATELETS AND DIFFERENTIAL - Abnormal    WBC Count 7.6      RBC Count 3.57 (*)     Hemoglobin 10.1 (*)     Hematocrit 31.6 (*)     MCV 89      MCH 28.3      MCHC 32.0      RDW 14.0      Platelet Count 387      % Neutrophils 65      % Lymphocytes 22      % Monocytes 11      %  Eosinophils 2      % Basophils 0      % Immature Granulocytes 0      NRBCs per 100 WBC 0      Absolute Neutrophils 4.9      Absolute Lymphocytes 1.6      Absolute Monocytes 0.8      Absolute Eosinophils 0.2      Absolute Basophils 0.0      Absolute Immature Granulocytes 0.0      Absolute NRBCs 0.0     COMPREHENSIVE METABOLIC PANEL - Abnormal    Sodium 135      Potassium 4.2      Carbon Dioxide (CO2) 22      Anion Gap 8      Urea Nitrogen 26.7 (*)     Creatinine 1.26 (*)     GFR Estimate 41 (*)     Calcium 10.2      Chloride 105      Glucose 98      Alkaline Phosphatase 75      AST 14      ALT <5      Protein Total 7.4      Albumin 4.1      Bilirubin Total 0.2     D DIMER QUANTITATIVE - Abnormal    D-Dimer Quantitative 0.99 (*)    TROPONIN T, HIGH SENSITIVITY - Abnormal    Troponin T, High Sensitivity 15 (*)    LIPASE - Normal    Lipase 26     NT PROBNP INPATIENT - Normal    N terminal Pro BNP Inpatient 815     INFLUENZA A/B, RSV, & SARS-COV2 PCR - Normal    Influenza A PCR Negative      Influenza B PCR Negative      RSV PCR Negative      SARS CoV2 PCR Negative     MAGNESIUM - Normal    Magnesium 1.7          ===============================================================  RADIOLOGY     Reviewed all pertinent imaging. Please see official radiology report.   Abd/pelvis CT no contrast - Stone Protocol   Final Result   IMPRESSION:    No acute findings or CT evidence for source of symptoms.         Chest XR,  PA & LAT   Final Result   IMPRESSION: Lungs are clear. No pleural effusion or pneumothorax. Stable prominent heart size. No pulmonary edema. Aortic atherosclerosis.             ================================================================  EKG     EKG reviewed and interpreted by me shows sinus rhythm with PACs, rate of 79, normal axis, QTc 401 with no acute ST or T wave changes since 10 August      I have independently reviewed and interpreted the EKG(s) documented  above.    ================================================================  PROCEDURES       I, Romero Daphney, am serving as a scribe to document services personally performed by Dr. Hanson based on my observation and the provider's statements to me. I, Brittani Hanson MD attest that Romero Shelley is acting in a scribe capacity, has observed my performance of the services and has documented them in accordance with my direction.     Brittani Hanson M.D.   Emergency Medicine   [facility] EMERGENCY DEPARTMENT  [address] [place] [id number]  [phone]  Dept: [phone]      Brittani Hanson MD  11/07/23 8879

## 2023-11-07 NOTE — ED TRIAGE NOTES
"The pt presents for evalaution of CP and SOB which began at 0230 this morning. She reports it woke her from sleeping. As the day has progressed, she has developed diarrhea and the sensation that there is a \"grenade in my rectum.\" STAT EKG done in triage.         "

## 2023-11-08 NOTE — DISCHARGE INSTRUCTIONS
As we discussed, all of the tests in the emergency department have been reassuring.  There are no abnormalities on the CT scan of your abdomen, no signs of fluid in your lungs, pneumonia, any other obvious scary cause like a heart attack.  We still do not know exactly what caused all of your symptoms but it appears safe to go home and monitor your symptoms.  If anything changes or you notice things like blood from your rectum, this would be a reason to come back to the ER for reevaluation.

## 2023-11-08 NOTE — ED NOTES
Pt discharged from ED to home via wheelchair with nephew. Patient verbalized understanding of discharge instructions and recommended follow up care as noted on discharge instructions.  Written discharge instructions given, denies any further questions. Pt agreeable to discharge plan.

## 2023-11-11 ENCOUNTER — HOSPITAL ENCOUNTER (EMERGENCY)
Facility: HOSPITAL | Age: 88
Discharge: HOME | End: 2023-11-11
Attending: EMERGENCY MEDICINE | Admitting: EMERGENCY MEDICINE
Payer: MEDICARE

## 2023-11-11 VITALS
RESPIRATION RATE: 15 BRPM | TEMPERATURE: 98.3 F | SYSTOLIC BLOOD PRESSURE: 164 MMHG | BODY MASS INDEX: 33.69 KG/M2 | HEIGHT: 60 IN | DIASTOLIC BLOOD PRESSURE: 74 MMHG | OXYGEN SATURATION: 96 % | HEART RATE: 75 BPM | WEIGHT: 171.6 LBS

## 2023-11-11 DIAGNOSIS — R10.84 GENERALIZED ABDOMINAL PAIN: ICD-10-CM

## 2023-11-11 DIAGNOSIS — R19.7 DIARRHEA, UNSPECIFIED TYPE: ICD-10-CM

## 2023-11-11 LAB
ALBUMIN SERPL BCG-MCNC: 3.8 G/DL (ref 3.5–5.2)
ALP SERPL-CCNC: 66 U/L (ref 35–104)
ALT SERPL W P-5'-P-CCNC: 6 U/L (ref 0–50)
ANION GAP SERPL CALCULATED.3IONS-SCNC: 10 MMOL/L (ref 7–15)
AST SERPL W P-5'-P-CCNC: 14 U/L (ref 0–45)
BASOPHILS # BLD AUTO: 0 10E3/UL (ref 0–0.2)
BASOPHILS NFR BLD AUTO: 0 %
BILIRUB DIRECT SERPL-MCNC: <0.2 MG/DL (ref 0–0.3)
BILIRUB SERPL-MCNC: 0.2 MG/DL
BUN SERPL-MCNC: 26.9 MG/DL (ref 8–23)
C DIFF TOX B STL QL: NEGATIVE
CALCIUM SERPL-MCNC: 10 MG/DL (ref 8.8–10.2)
CHLORIDE SERPL-SCNC: 107 MMOL/L (ref 98–107)
CREAT SERPL-MCNC: 1.41 MG/DL (ref 0.51–0.95)
DEPRECATED HCO3 PLAS-SCNC: 22 MMOL/L (ref 22–29)
EGFRCR SERPLBLD CKD-EPI 2021: 36 ML/MIN/1.73M2
EOSINOPHIL # BLD AUTO: 0.2 10E3/UL (ref 0–0.7)
EOSINOPHIL NFR BLD AUTO: 2 %
ERYTHROCYTE [DISTWIDTH] IN BLOOD BY AUTOMATED COUNT: 14.4 % (ref 10–15)
GLUCOSE SERPL-MCNC: 116 MG/DL (ref 70–99)
HCT VFR BLD AUTO: 30.7 % (ref 35–47)
HGB BLD-MCNC: 10 G/DL (ref 11.7–15.7)
HOLD SPECIMEN: NORMAL
IMM GRANULOCYTES # BLD: 0 10E3/UL
IMM GRANULOCYTES NFR BLD: 1 %
LACTATE SERPL-SCNC: 1 MMOL/L (ref 0.7–2)
LIPASE SERPL-CCNC: 24 U/L (ref 13–60)
LYMPHOCYTES # BLD AUTO: 1.3 10E3/UL (ref 0.8–5.3)
LYMPHOCYTES NFR BLD AUTO: 17 %
MAGNESIUM SERPL-MCNC: 1.7 MG/DL (ref 1.7–2.3)
MCH RBC QN AUTO: 29.1 PG (ref 26.5–33)
MCHC RBC AUTO-ENTMCNC: 32.6 G/DL (ref 31.5–36.5)
MCV RBC AUTO: 89 FL (ref 78–100)
MONOCYTES # BLD AUTO: 1 10E3/UL (ref 0–1.3)
MONOCYTES NFR BLD AUTO: 12 %
NEUTROPHILS # BLD AUTO: 5.3 10E3/UL (ref 1.6–8.3)
NEUTROPHILS NFR BLD AUTO: 68 %
NRBC # BLD AUTO: 0 10E3/UL
NRBC BLD AUTO-RTO: 0 /100
PLATELET # BLD AUTO: 377 10E3/UL (ref 150–450)
POTASSIUM SERPL-SCNC: 4.2 MMOL/L (ref 3.4–5.3)
PROT SERPL-MCNC: 7 G/DL (ref 6.4–8.3)
RBC # BLD AUTO: 3.44 10E6/UL (ref 3.8–5.2)
SODIUM SERPL-SCNC: 139 MMOL/L (ref 135–145)
WBC # BLD AUTO: 7.9 10E3/UL (ref 4–11)

## 2023-11-11 PROCEDURE — 87507 IADNA-DNA/RNA PROBE TQ 12-25: CPT | Performed by: EMERGENCY MEDICINE

## 2023-11-11 PROCEDURE — 36415 COLL VENOUS BLD VENIPUNCTURE: CPT | Performed by: EMERGENCY MEDICINE

## 2023-11-11 PROCEDURE — 258N000003 HC RX IP 258 OP 636: Performed by: EMERGENCY MEDICINE

## 2023-11-11 PROCEDURE — 99284 EMERGENCY DEPT VISIT MOD MDM: CPT | Mod: 25

## 2023-11-11 PROCEDURE — 83605 ASSAY OF LACTIC ACID: CPT | Performed by: EMERGENCY MEDICINE

## 2023-11-11 PROCEDURE — 87493 C DIFF AMPLIFIED PROBE: CPT | Mod: XU | Performed by: EMERGENCY MEDICINE

## 2023-11-11 PROCEDURE — 96374 THER/PROPH/DIAG INJ IV PUSH: CPT

## 2023-11-11 PROCEDURE — 250N000013 HC RX MED GY IP 250 OP 250 PS 637: Performed by: EMERGENCY MEDICINE

## 2023-11-11 PROCEDURE — 96375 TX/PRO/DX INJ NEW DRUG ADDON: CPT

## 2023-11-11 PROCEDURE — 83690 ASSAY OF LIPASE: CPT | Performed by: EMERGENCY MEDICINE

## 2023-11-11 PROCEDURE — 80053 COMPREHEN METABOLIC PANEL: CPT | Performed by: EMERGENCY MEDICINE

## 2023-11-11 PROCEDURE — 93005 ELECTROCARDIOGRAM TRACING: CPT | Performed by: EMERGENCY MEDICINE

## 2023-11-11 PROCEDURE — 82248 BILIRUBIN DIRECT: CPT | Performed by: EMERGENCY MEDICINE

## 2023-11-11 PROCEDURE — 96376 TX/PRO/DX INJ SAME DRUG ADON: CPT

## 2023-11-11 PROCEDURE — 83735 ASSAY OF MAGNESIUM: CPT | Performed by: EMERGENCY MEDICINE

## 2023-11-11 PROCEDURE — 96361 HYDRATE IV INFUSION ADD-ON: CPT

## 2023-11-11 PROCEDURE — 85025 COMPLETE CBC W/AUTO DIFF WBC: CPT | Performed by: EMERGENCY MEDICINE

## 2023-11-11 PROCEDURE — 250N000011 HC RX IP 250 OP 636: Mod: JZ | Performed by: EMERGENCY MEDICINE

## 2023-11-11 RX ORDER — SUCRALFATE 1 G/1
1 TABLET ORAL ONCE
Status: COMPLETED | OUTPATIENT
Start: 2023-11-11 | End: 2023-11-11

## 2023-11-11 RX ORDER — MORPHINE SULFATE 2 MG/ML
2 INJECTION, SOLUTION INTRAMUSCULAR; INTRAVENOUS ONCE
Status: COMPLETED | OUTPATIENT
Start: 2023-11-11 | End: 2023-11-11

## 2023-11-11 RX ORDER — SUCRALFATE 1 G/1
1 TABLET ORAL 4 TIMES DAILY
Qty: 56 TABLET | Refills: 0 | Status: SHIPPED | OUTPATIENT
Start: 2023-11-11 | End: 2023-11-16

## 2023-11-11 RX ADMIN — SUCRALFATE 1 G: 1 TABLET ORAL at 21:41

## 2023-11-11 RX ADMIN — FAMOTIDINE 20 MG: 10 INJECTION, SOLUTION INTRAVENOUS at 21:41

## 2023-11-11 RX ADMIN — MORPHINE SULFATE 2 MG: 2 INJECTION, SOLUTION INTRAMUSCULAR; INTRAVENOUS at 16:34

## 2023-11-11 RX ADMIN — SODIUM CHLORIDE 1000 ML: 9 INJECTION, SOLUTION INTRAVENOUS at 16:31

## 2023-11-11 RX ADMIN — MORPHINE SULFATE 2 MG: 2 INJECTION, SOLUTION INTRAMUSCULAR; INTRAVENOUS at 19:24

## 2023-11-11 ASSESSMENT — ACTIVITIES OF DAILY LIVING (ADL)
ADLS_ACUITY_SCORE: 35

## 2023-11-11 NOTE — ED TRIAGE NOTES
Pt BIBA f/home with c/o diarrhea for 1.5 months. Denies blood in stool. LBM today. Pt was previously here last Thursday for same issue. 12 lead via EMS was .  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      Triage Assessment (Adult)       Row Name 11/11/23 1452          Triage Assessment    Airway WDL WDL        Respiratory WDL    Respiratory WDL WDL        Skin Circulation/Temperature WDL    Skin Circulation/Temperature WDL WDL        Cardiac WDL    Cardiac WDL WDL        Peripheral/Neurovascular WDL    Peripheral Neurovascular WDL WDL        Cognitive/Neuro/Behavioral WDL    Cognitive/Neuro/Behavioral WDL WDL

## 2023-11-11 NOTE — ED PROVIDER NOTES
EMERGENCY DEPARTMENT ENCOUNTER      NAME: Kerry Hoffmann  AGE: 88 year old female  YOB: 1935  MRN: [id number]  EVALUATION DATE & TIME: 11/11/2023  2:44 PM    PCP: Kaylyn Bolanos    ED PROVIDER: Tila Dowd M.D.      Chief Complaint   Patient presents with    Abdominal Pain    Diarrhea     FINAL IMPRESSION:  1. Diarrhea, unspecified type    2. Generalized abdominal pain      ED COURSE & MEDICAL DECISION MAKING:    Pertinent Labs & Imaging studies reviewed. (See chart for details)  ED Course as of 11/11/23 2301   Sat Nov 11, 2023   1520 Patient is a pleasant 88-year-old female who comes in today for evaluation of diarrhea that is been going on since September 29.  Its been on and off.  There is no blood in the stool.  She complains of pain in her abdomen and rectum and vagina that is been going on for about 2 weeks.  She has severe pain when she has the diarrhea.  She describes it as a burning pain.  She called her primary care doctor within [facility] on Wednesday and the plan was to test for C. difficile.  That was never done as she never got a good sample.  In the past patient has a history of C. 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  She says this pain feels kind of like when she had C. difficile.  She denies any fevers or vomiting.  She does have nausea.  She reports severe abdominal pain although has no tenderness on my exam.  She is in no acute distress.  She is not been eating or drinking well.  She would like something for the pain.  I will order her some morphine after I looked through her medication allergies.  I will order some IV fluids.  We will get stool studies and a C. difficile.  We will check basic labs.  I was going to order a CT of her belly but it looks like she just had 1 sent in a review that and see if we need to actually get another one or not.  Patient has a allergy to contrast so it was a noncontrasted study performed.  The  pain that she reports has been going on for 2 weeks now so there is not a new change in the last few days since she had the CT.   1701 Lab work really looks quite good.  I just did a quick external vaginal and rectal exam and do not see any rash or lesions that would be contributing to her pain.  I updated her that CT looked okay few days ago and there is nothing in the labs that would recommend repeating it.  Were still the note try to get a stool study on her.  She says that she wonders if switching stomach medications might help and she is currently on Pepcid but I wonder if we switched her to Protonix if that would make any difference.  She was comfortable with trying it.   1913 I checked back in on the patient.  I discussed the results with her.  We are still waiting to get stool samples on her.  She drank some water and had a lot of belly pain.  She like a little more morphine so I ordered that for her.   2123 Patient still having some pain.  She complains of burning sensation in her upper abdomen.  I will give her a dose of Protonix and some Pepcid and then we will get her discharged.  I will switch her to Protonix at home.  I will put in a referral for GI.  She feels like they might be helpful.  I think that is reasonable.   2125 Patient has an allergy to esomeprazole so I will not do the Protonix.  We will try little sucralfate and see if that helps.   2126 Patient had asked about pain medication.  She tried tramadol in the past and did not tolerate it well.  We talked about oxycodone but that is awfully strong and she does not feel like that is a good idea.  We will work in getting her discharged.   2258 Came back negative.       Medical Decision Making    History:  Supplemental history from: N/A  External Record(s) reviewed: Reviewed the patient's hospitalization on 8/10/2023.  At that time she had fever and weakness and had sepsis secondary to pyelonephritis and was placed on ceftriaxone during  admission.    Work Up:  Emergent/Severe conditions considered and evaluated for: C. difficile, electrolyte abnormality, renal failure  I independently reviewed and interpreted EKG  In additional to work up documented, I considered the following work up: Considered CT scan but patient just had one 4 days ago and has a non peritoneal belly exam  Medications given that require intensive monitoring for toxicity: IV morphine    External consultation:  Discussion of management with another provider: None    Complicating factors:  Care impacted by chronic illness: Hyperlipidemia, CHF, irritable bowel syndrome  Care affected by social determinants of health:N/A     Disposition considerations: Discharge  Prescriptions considered/prescribed: Sucralfate    3:10  I met with the patient to gather history and perform an initial exam.  4:55 PM I rechecked on the patient and performed  exam with female RN chaperone.   6:51 PM I rechecked on the patient. Waiting on stool sample.   9:25 PM I rechecked on the patient and updated them on results. We discussed plans for discharge including supportive cares, symptomatic treatment, outpatient follow up, and reasons to return to the emergency department.    At the conclusion of the encounter I discussed  the results of all of the tests and the disposition with patient.   All questions were answered.  The patient acknowledged understanding and was involved in the decision making regarding the overall care plan.      I discussed with patient the utility, limitations and findings of the exam/interventions/studies done during this visit as well as the list of differential diagnosis and symptoms to monitor/return to ER for.  Additional verbal discharge instructions were provided.     MEDICATIONS GIVEN IN THE EMERGENCY:  Medications   sodium chloride 0.9% BOLUS 1,000 mL (0 mLs Intravenous Stopped 11/11/23 1925)   morphine (PF) injection 2 mg (2 mg Intravenous $Given 11/11/23 1634)   morphine  "(PF) injection 2 mg (2 mg Intravenous $Given 11/11/23 1924)   sucralfate (CARAFATE) tablet 1 g (1 g Oral $Given 11/11/23 2141)   famotidine (PEPCID) injection 20 mg (20 mg Intravenous $Given 11/11/23 2141)       NEW PRESCRIPTIONS STARTED AT TODAY'S ER VISIT  Discharge Medication List as of 11/11/2023  9:56 PM        START taking these medications    Details   sucralfate (CARAFATE) 1 GM tablet Take 1 tablet (1 g) by mouth 4 times daily for 14 days, Disp-56 tablet, R-0, E-Prescribe                =================================================================    HPI    Triage Note: Pt BIBA f/home with c/o diarrhea for 1.5 months. Denies blood in stool. LBM today. Pt was previously here last Thursday for same issue. 12 lead via EMS was SR.  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      Triage Assessment (Adult)       Row Name 11/11/23 1452          Triage Assessment    Airway WDL WDL        Respiratory WDL    Respiratory WDL WDL        Skin Circulation/Temperature WDL    Skin Circulation/Temperature WDL WDL        Cardiac WDL    Cardiac WDL WDL        Peripheral/Neurovascular WDL    Peripheral Neurovascular WDL WDL        Cognitive/Neuro/Behavioral WDL    Cognitive/Neuro/Behavioral WDL WDL                   Patient information was obtained from: Patient    Use of : N/A       Kerry JOAN Hoffmann is a 88 year old female who presents to the ED by walk in for evaluation of abdominal pain and diarrhea.    The patient reports abdominal pain, rectum pain, and diarrhea that has intermittently been happening since September 29th. The patient describes her diarrhea as \"orange slime and pebble like.\" She says that her diarrhea has resulted in pain when passing stools, stating that \"it feels like I'm passing a baby\" for the past two weeks. The pain from the diarrhea has resulted in her waking up from her sleep in the middle of the night. The patient is also nauseous reporting that when she bends down she feels vomit in her " throat, however no other vomiting reported. She is lightheaded and dizzy but attributes this to her being dehydrated and not eating enough.    The patient reports calling her doctor for this matter, who told her to visit the ER. She was sent a kit to get a stool sample, however was unable to send them in because at the time she had no water present in her stools. The patient does report a history of C. Diff in 2015. 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    Of note, the patient does have a history of heart problems including CHF. She was in the hospital for 18 days in August for this problem.    The patient denies fever or any other complaints at this time.    Per chart review:   Patient seen at [place] [facility] on 11/7/2023 for chest discomfort, diarrhea, and abdominal/rectal discomfort. EKG was nonischemic. Chest x-ray did not show effusion or pneumonia. D dimer was borderline for her age but patient had no hypoxia or was not tachycardic. Other labs were unremarkable. CT scan of abdomen and pelvis were unremarkable. She was given IV fluids. There were no diarrheal episodes so no stool testing was done. Patient discharged in stable condition with unknown cause of her symptoms and was told to follow up with PCP.    PAST MEDICAL HISTORY:  Past Medical History:   Diagnosis Date    Anxiety     takes clonazepam    Asthma     [facility]     Chronic kidney disease     stage 3 per [facility]     CKD (chronic kidney disease)     Clostridium difficile colitis 11/1/2016    Clostridium difficile infection     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    Clostridium enterocolitis 10/27/2016    CTS (carpal tunnel syndrome)     GERD (gastroesophageal reflux disease)     per [facility]     Hiatal hernia     small per [facility]     Hyperlipidemia     Hyperlipidemia     Hyperlipidemia     Hypertension     Hypertension     Osteoarthritis of knee     per [facility]     Spinal stenosis     per [facility]     Vitamin D deficiency     per [facility]       PAST SURGICAL HISTORY:  Past  Surgical History:   Procedure Laterality Date    BREAST SURGERY  1982    breast tumor per [facility]     CATARACT EXTRACTION  07/2005    per [facility]     CV CORONARY ANGIOGRAM N/A 8/11/2023    Procedure: Coronary Angiogram;  Surgeon: Ap Arroyo MD;  [facility] LAB CV    CV LEFT HEART CATH N/A 8/11/2023    Procedure: Left Heart Catheterization;  Surgeon: Ap Arroyo MD;  Location: [facility] CATH LAB CV    ORIF TIBIA & FIBULA FRACTURES  1988    per [facility]     OTHER SURGICAL HISTORY  10/2004    hole in retinaper [facility]     OTHER SURGICAL HISTORY  05/03/2015    fecal transplantper [facility]     RI ESOPHAGOGASTRODUODENOSCOPY TRANSORAL DIAGNOSTIC N/A 9/11/2020    Procedure: ESOPHAGOGASTRODUODENOSCOPY With gastric biopsies;  Surgeon: David Reyes MD;  Location: [facility];  Service: Gastroenterology    TONSILLECTOMY      TONSILLECTOMY & ADENOIDECTOMY  1963       CURRENT MEDICATIONS:    No current facility-administered medications for this encounter.    Current Outpatient Medications:     sucralfate (CARAFATE) 1 GM tablet, Take 1 tablet (1 g) by mouth 4 times daily for 14 days, Disp: 56 tablet, Rfl: 0    acetaminophen (TYLENOL) 500 MG tablet, Take 1,000 mg by mouth 3 times daily, Disp: , Rfl:     aspirin 81 MG EC tablet, Take 81 mg by mouth daily, Disp: , Rfl:     Calcium Carbonate Antacid (TUMS ULTRA 1000 PO), Take 1 chew tab by mouth nightly as needed, Disp: , Rfl:     Cholecalciferol (VITAMIN D3) 50 MCG (2000 UT) CAPS, Take 1 tablet by mouth daily, Disp: , Rfl:     clonazePAM (KLONOPIN) 0.5 MG tablet, Take 0.5 mg by mouth 3 times daily, Disp: , Rfl:     clopidogrel (PLAVIX) 75 MG tablet, TAKE 1 TABLET(75 MG) BY MOUTH DAILY, Disp: 90 tablet, Rfl: 1    diclofenac (VOLTAREN) 1 % topical gel, Apply 4 g topically 4 times daily knee pain, Disp: , Rfl:     famotidine (PEPCID) 10 MG tablet, Take 20 mg by mouth 2 times daily, Disp: , Rfl:     losartan (COZAAR) 50 MG tablet, Take 50 mg by mouth 2 times  daily, Disp: , Rfl:     metoprolol succinate ER (TOPROL XL) 25 MG 24 hr tablet, Take 25 mg by mouth at bedtime, Disp: , Rfl:     ALLERGIES:  Allergies   Allergen Reactions    Buspirone Shortness Of Breath    Ciprofloxacin Hives and Shortness Of Breath     Other reaction(s): Unknown    Cortisone      Other reaction(s): Throat Swelling/Closing, Unknown  Reaction 9-5-94      Hydrocodone-Acetaminophen Shortness Of Breath     Other reaction(s): Unknown    Lansoprazole Shortness Of Breath    Metoprolol Shortness Of Breath     Tolerates metoprolol succinate at home    Nedocromil      Other reaction(s): Throat Swelling/Closing    Niacin Shortness Of Breath     Other reaction(s): Unknown    Albuterol Other (See Comments)     Inhaler had extreme effect on nervous system      Amlodipine      Other reaction(s): Erythema, Unknown    Azithromycin      Other reaction(s): *Unknown, Unknown    Cefixime Diarrhea     Other reaction(s): Unknown    Cefprozil Nausea and Vomiting     Other reaction(s): Unknown    Cefuroxime      Other reaction(s): *Unknown    Cephalexin      Other reaction(s): *Unknown, Unknown    Contrast Dye      Other reaction(s): hives    Cyclobenzaprine      Other reaction(s): Sedation    Dextromethorphan-Guaifenesin Nausea     Other reaction(s): Headache    Diltiazem      Other reaction(s): Erythema, Unknown  Ears face arms and chest red and on fire-body tremors      Erythromycin      Other reaction(s): Unknown    Esomeprazole      Other reaction(s): Headache    Fenofibrate Muscle Pain (Myalgia)     Other reaction(s): Unknown    Fluticasone-Salmeterol Other (See Comments)     Elevated blood pressure      Levofloxacin Nausea     Other reaction(s): Headache, Unknown    Methylprednisolone     Metronidazole     Monosodium Glutamate     Moxifloxacin      Other reaction(s): Dizziness    Nifedipine      Other reaction(s): Edema  Took for 5-93 to 9-94 red and swollen leg      Nsaids      Other reaction(s): Unknown     Ofloxacin      Other reaction(s): *Unknown    Ondansetron      Other reaction(s): Constipation    Parsley Seed     Penicillins Unknown     She doesn't remember other than it occurred as a very young woman     Piper     Pirbuterol Other (See Comments)     Immediate extreme effect on nervous system      Pravastatin      Other reaction(s): Dizziness, Unknown    Pseudoephedrine      Other reaction(s): headache,nausea    Shellfish-Derived Products      Per pt 8/12/23    Simvastatin Muscle Pain (Myalgia)    Spironolactone      Other reaction(s): stomach cramps, nausea    Sulfamethoxazole-Trimethoprim      Other reaction(s): Unknown    Tramadol      Other reaction(s): red ears,high blood press.    Tramadol-Acetaminophen      Other reaction(s): Tachycardia, Unknown    Triamterene Other (See Comments)     Greatly bothered with sun-took medication for three years  Greatly bothered with sun      Diatrizoate Rash    Telmisartan Palpitations       FAMILY HISTORY:  Family History   Problem Relation Age of Onset    Hypertension Mother     Cancer Mother         gallbladder cancer    Myocardial Infarction Father     Hypertension Sister     Alzheimer Disease Sister     Heart Disease Sister     Cancer Brother     Brain Cancer Brother     Pacemaker Mother     Heart Disease Mother     Coronary Artery Disease Father     Heart Disease Father     Heart Failure Sister        SOCIAL HISTORY:   Social History     Socioeconomic History    Marital status:      Spouse name: None    Number of children: None    Years of education: None    Highest education level: None   Tobacco Use    Smoking status: Former     Packs/day: 3.00     Years: 35.00     Additional pack years: 0.00     Total pack years: 105.00     Types: Cigarettes    Smokeless tobacco: Never    Tobacco comments:     quit 1968   Substance and Sexual Activity    Alcohol use: Not Currently    Drug use: Never    Sexual activity: Not Currently   Other Topics Concern    Parent/sibling  "w/ CABG, MI or angioplasty before 65F 55M? No       PHYSICAL EXAM    VITAL SIGNS: BP (!) 164/74   Pulse 75   Temp 98.3  F (36.8  C) (Oral)   Resp 15   Ht 1.53 m (5' 0.25\")   Wt 77.8 kg (171 lb 9.6 oz)   LMP  (LMP Unknown)   SpO2 96%   BMI 33.24 kg/m     GENERAL: Awake, alert, answering questions appropriately  SPEECH:  Easy to understand speech, Normal volume and prabhu  PULMONARY: No respiratory distress, Lungs clear to auscultation bilaterally  CARDIOVASCULAR: Regular rate and rhythm, Distal pulses present and normal.  ABDOMINAL: Soft, Nondistended, Nontender, No rebound or guarding, No palpable masses  : no masses, lesions, or rash on the external genitalia or perirectal area.   EXTREMITIES: No lower extremity edema.  PSYCH: Normal mood and affect     LAB:  All pertinent labs reviewed and interpreted.  Results for orders placed or performed during the hospital encounter of 11/11/23   Extra Blue Top Tube   Result Value Ref Range    Hold Specimen JIC    Extra Green Top (Lithium Heparin) Tube   Result Value Ref Range    Hold Specimen JIC    Extra Purple Top Tube   Result Value Ref Range    Hold Specimen JIC    Basic metabolic panel   Result Value Ref Range    Sodium 139 135 - 145 mmol/L    Potassium 4.2 3.4 - 5.3 mmol/L    Chloride 107 98 - 107 mmol/L    Carbon Dioxide (CO2) 22 22 - 29 mmol/L    Anion Gap 10 7 - 15 mmol/L    Urea Nitrogen 26.9 (H) 8.0 - 23.0 mg/dL    Creatinine 1.41 (H) 0.51 - 0.95 mg/dL    GFR Estimate 36 (L) >60 mL/min/1.73m2    Calcium 10.0 8.8 - 10.2 mg/dL    Glucose 116 (H) 70 - 99 mg/dL   Hepatic function panel   Result Value Ref Range    Protein Total 7.0 6.4 - 8.3 g/dL    Albumin 3.8 3.5 - 5.2 g/dL    Bilirubin Total 0.2 <=1.2 mg/dL    Alkaline Phosphatase 66 35 - 104 U/L    AST 14 0 - 45 U/L    ALT 6 0 - 50 U/L    Bilirubin Direct <0.20 0.00 - 0.30 mg/dL   Result Value Ref Range    Lipase 24 13 - 60 U/L   Lactic acid whole blood   Result Value Ref Range    Lactic Acid 1.0 0.7 - " 2.0 mmol/L   Result Value Ref Range    Magnesium 1.7 1.7 - 2.3 mg/dL   CBC with platelets and differential   Result Value Ref Range    WBC Count 7.9 4.0 - 11.0 10e3/uL    RBC Count 3.44 (L) 3.80 - 5.20 10e6/uL    Hemoglobin 10.0 (L) 11.7 - 15.7 g/dL    Hematocrit 30.7 (L) 35.0 - 47.0 %    MCV 89 78 - 100 fL    MCH 29.1 26.5 - 33.0 pg    MCHC 32.6 31.5 - 36.5 g/dL    RDW 14.4 10.0 - 15.0 %    Platelet Count 377 150 - 450 10e3/uL    % Neutrophils 68 %    % Lymphocytes 17 %    % Monocytes 12 %    % Eosinophils 2 %    % Basophils 0 %    % Immature Granulocytes 1 %    NRBCs per 100 WBC 0 <1 /100    Absolute Neutrophils 5.3 1.6 - 8.3 10e3/uL    Absolute Lymphocytes 1.3 0.8 - 5.3 10e3/uL    Absolute Monocytes 1.0 0.0 - 1.3 10e3/uL    Absolute Eosinophils 0.2 0.0 - 0.7 10e3/uL    Absolute Basophils 0.0 0.0 - 0.2 10e3/uL    Absolute Immature Granulocytes 0.0 <=0.4 10e3/uL    Absolute NRBCs 0.0 10e3/uL   C. difficile Toxin B PCR with reflex to C. difficile Antigen and Toxins A/B EIA    Specimen: Per Rectum; Stool   Result Value Ref Range    C Difficile Toxin B by PCR Negative Negative       EKG:    November 11, 2023 at 1708  Rate: 76 bpm  Rhythm: Sinus rhythm  UT interval: 188 ms  QRS interval: 70 ms  QT/QTc: 352/396 ms  ST changes or T wave changes: No acute ST or T wave abnormalities  Change from prior ECG: No significant change from prior  I have independently reviewed and interpreted this EKG.       I, Lily Pal, am serving as a scribe to document services personally performed by Dr. Dowd based on my observation and the provider's statements to me. I, Tila Dowd MD attest that Lily Pal is acting in a scribe capacity, has observed my performance of the services and has documented them in accordance with my direction.    Tila Dowd M.D.  Emergency Medicine  [facility] EMERGENCY DEPARTMENT  [facility] [facility] [address]  [phone]  Dept:  [phone]       Tila Dowd MD  11/11/23 3863

## 2023-11-12 LAB
ADV 40+41 DNA STL QL NAA+NON-PROBE: NEGATIVE
ASTRO TYP 1-8 RNA STL QL NAA+NON-PROBE: NEGATIVE
ATRIAL RATE - MUSE: 76 BPM
C CAYETANENSIS DNA STL QL NAA+NON-PROBE: NEGATIVE
CAMPYLOBACTER DNA SPEC NAA+PROBE: NEGATIVE
CRYPTOSP DNA STL QL NAA+NON-PROBE: NEGATIVE
DIASTOLIC BLOOD PRESSURE - MUSE: 73 MMHG
E COLI O157 DNA STL QL NAA+NON-PROBE: NORMAL
E HISTOLYT DNA STL QL NAA+NON-PROBE: NEGATIVE
EAEC ASTA GENE ISLT QL NAA+PROBE: NEGATIVE
EC STX1+STX2 GENES STL QL NAA+NON-PROBE: NEGATIVE
EPEC EAE GENE STL QL NAA+NON-PROBE: NEGATIVE
ETEC LTA+ST1A+ST1B TOX ST NAA+NON-PROBE: NEGATIVE
G LAMBLIA DNA STL QL NAA+NON-PROBE: NEGATIVE
INTERPRETATION ECG - MUSE: NORMAL
NOROVIRUS GI+II RNA STL QL NAA+NON-PROBE: NEGATIVE
P AXIS - MUSE: 34 DEGREES
P SHIGELLOIDES DNA STL QL NAA+NON-PROBE: NEGATIVE
PR INTERVAL - MUSE: 188 MS
QRS DURATION - MUSE: 70 MS
QT - MUSE: 352 MS
QTC - MUSE: 396 MS
R AXIS - MUSE: 27 DEGREES
RVA RNA STL QL NAA+NON-PROBE: NEGATIVE
SALMONELLA SP RPOD STL QL NAA+PROBE: NEGATIVE
SAPO I+II+IV+V RNA STL QL NAA+NON-PROBE: NEGATIVE
SHIGELLA SP+EIEC IPAH ST NAA+NON-PROBE: NEGATIVE
SYSTOLIC BLOOD PRESSURE - MUSE: 143 MMHG
T AXIS - MUSE: 37 DEGREES
V CHOLERAE DNA SPEC QL NAA+PROBE: NEGATIVE
VENTRICULAR RATE- MUSE: 76 BPM
VIBRIO DNA SPEC NAA+PROBE: NEGATIVE
Y ENTEROCOL DNA STL QL NAA+PROBE: NEGATIVE

## 2023-11-12 NOTE — DISCHARGE INSTRUCTIONS
You were seen in the Emergency Department today for diarrhea and abdominal pain.  Your lab work showed no cause of your symptoms.  Your stool studies are pending at this time.  Your imaging studies showed no significant cause of your symptoms. You were prescribed sucralfate to help with your abdominal discomfort. You should take all medications as prescribed.  Follow up with your primary care physician to ensure resolution of symptoms. Return if you have new or worsening symptoms.

## 2023-11-19 ENCOUNTER — ANESTHESIA EVENT (OUTPATIENT)
Dept: SURGERY | Facility: CLINIC | Age: 88
End: 2023-11-19
Payer: MEDICARE

## 2023-11-20 ENCOUNTER — ANESTHESIA (OUTPATIENT)
Dept: SURGERY | Facility: CLINIC | Age: 88
End: 2023-11-20
Payer: MEDICARE

## 2023-11-20 ENCOUNTER — HOSPITAL ENCOUNTER (OUTPATIENT)
Facility: CLINIC | Age: 88
Discharge: HOME | End: 2023-11-20
Attending: INTERNAL MEDICINE | Admitting: INTERNAL MEDICINE
Payer: MEDICARE

## 2023-11-20 VITALS
RESPIRATION RATE: 16 BRPM | TEMPERATURE: 96.9 F | DIASTOLIC BLOOD PRESSURE: 60 MMHG | OXYGEN SATURATION: 99 % | HEART RATE: 72 BPM | SYSTOLIC BLOOD PRESSURE: 134 MMHG

## 2023-11-20 LAB
ATRIAL RATE - MUSE: 79 BPM
COLONOSCOPY: NORMAL
DIASTOLIC BLOOD PRESSURE - MUSE: NORMAL MMHG
INTERPRETATION ECG - MUSE: NORMAL
P AXIS - MUSE: 70 DEGREES
PR INTERVAL - MUSE: 188 MS
QRS DURATION - MUSE: 76 MS
QT - MUSE: 350 MS
QTC - MUSE: 401 MS
R AXIS - MUSE: 24 DEGREES
SYSTOLIC BLOOD PRESSURE - MUSE: NORMAL MMHG
T AXIS - MUSE: 26 DEGREES
VENTRICULAR RATE- MUSE: 79 BPM

## 2023-11-20 PROCEDURE — 272N000001 HC OR GENERAL SUPPLY STERILE: Performed by: INTERNAL MEDICINE

## 2023-11-20 PROCEDURE — 250N000011 HC RX IP 250 OP 636

## 2023-11-20 PROCEDURE — 999N000141 HC STATISTIC PRE-PROCEDURE NURSING ASSESSMENT: Performed by: INTERNAL MEDICINE

## 2023-11-20 PROCEDURE — 370N000017 HC ANESTHESIA TECHNICAL FEE, PER MIN: Performed by: INTERNAL MEDICINE

## 2023-11-20 PROCEDURE — 258N000003 HC RX IP 258 OP 636: Performed by: ANESTHESIOLOGY

## 2023-11-20 PROCEDURE — 250N000009 HC RX 250

## 2023-11-20 PROCEDURE — 710N000012 HC RECOVERY PHASE 2, PER MINUTE: Performed by: INTERNAL MEDICINE

## 2023-11-20 PROCEDURE — 88305 TISSUE EXAM BY PATHOLOGIST: CPT | Mod: TC | Performed by: INTERNAL MEDICINE

## 2023-11-20 PROCEDURE — 360N000075 HC SURGERY LEVEL 2, PER MIN: Performed by: INTERNAL MEDICINE

## 2023-11-20 RX ORDER — NALOXONE HYDROCHLORIDE 0.4 MG/ML
0.2 INJECTION, SOLUTION INTRAMUSCULAR; INTRAVENOUS; SUBCUTANEOUS
Status: DISCONTINUED | OUTPATIENT
Start: 2023-11-20 | End: 2023-11-20 | Stop reason: HOSPADM

## 2023-11-20 RX ORDER — LIDOCAINE 40 MG/G
CREAM TOPICAL
Status: DISCONTINUED | OUTPATIENT
Start: 2023-11-20 | End: 2023-11-20 | Stop reason: HOSPADM

## 2023-11-20 RX ORDER — HALOPERIDOL 5 MG/ML
1 INJECTION INTRAMUSCULAR
Status: DISCONTINUED | OUTPATIENT
Start: 2023-11-20 | End: 2023-11-20 | Stop reason: HOSPADM

## 2023-11-20 RX ORDER — LIDOCAINE HYDROCHLORIDE 10 MG/ML
INJECTION, SOLUTION INFILTRATION; PERINEURAL PRN
Status: DISCONTINUED | OUTPATIENT
Start: 2023-11-20 | End: 2023-11-20

## 2023-11-20 RX ORDER — MEPERIDINE HYDROCHLORIDE 25 MG/ML
12.5 INJECTION INTRAMUSCULAR; INTRAVENOUS; SUBCUTANEOUS EVERY 5 MIN PRN
Status: DISCONTINUED | OUTPATIENT
Start: 2023-11-20 | End: 2023-11-20 | Stop reason: HOSPADM

## 2023-11-20 RX ORDER — PROPOFOL 10 MG/ML
INJECTION, EMULSION INTRAVENOUS CONTINUOUS PRN
Status: DISCONTINUED | OUTPATIENT
Start: 2023-11-20 | End: 2023-11-20

## 2023-11-20 RX ORDER — FENTANYL CITRATE 50 UG/ML
25 INJECTION, SOLUTION INTRAMUSCULAR; INTRAVENOUS EVERY 5 MIN PRN
Status: DISCONTINUED | OUTPATIENT
Start: 2023-11-20 | End: 2023-11-20 | Stop reason: HOSPADM

## 2023-11-20 RX ORDER — PROPOFOL 10 MG/ML
INJECTION, EMULSION INTRAVENOUS PRN
Status: DISCONTINUED | OUTPATIENT
Start: 2023-11-20 | End: 2023-11-20

## 2023-11-20 RX ORDER — SODIUM CHLORIDE, SODIUM LACTATE, POTASSIUM CHLORIDE, CALCIUM CHLORIDE 600; 310; 30; 20 MG/100ML; MG/100ML; MG/100ML; MG/100ML
INJECTION, SOLUTION INTRAVENOUS CONTINUOUS
Status: DISCONTINUED | OUTPATIENT
Start: 2023-11-20 | End: 2023-11-20 | Stop reason: HOSPADM

## 2023-11-20 RX ORDER — NALOXONE HYDROCHLORIDE 0.4 MG/ML
0.4 INJECTION, SOLUTION INTRAMUSCULAR; INTRAVENOUS; SUBCUTANEOUS
Status: DISCONTINUED | OUTPATIENT
Start: 2023-11-20 | End: 2023-11-20 | Stop reason: HOSPADM

## 2023-11-20 RX ORDER — ALBUTEROL SULFATE 0.83 MG/ML
2.5 SOLUTION RESPIRATORY (INHALATION) EVERY 4 HOURS PRN
Status: DISCONTINUED | OUTPATIENT
Start: 2023-11-20 | End: 2023-11-20 | Stop reason: HOSPADM

## 2023-11-20 RX ORDER — FLUMAZENIL 0.1 MG/ML
0.2 INJECTION, SOLUTION INTRAVENOUS
Status: DISCONTINUED | OUTPATIENT
Start: 2023-11-20 | End: 2023-11-20 | Stop reason: HOSPADM

## 2023-11-20 RX ORDER — LABETALOL HYDROCHLORIDE 5 MG/ML
10 INJECTION, SOLUTION INTRAVENOUS
Status: DISCONTINUED | OUTPATIENT
Start: 2023-11-20 | End: 2023-11-20 | Stop reason: HOSPADM

## 2023-11-20 RX ORDER — FENTANYL CITRATE 50 UG/ML
50 INJECTION, SOLUTION INTRAMUSCULAR; INTRAVENOUS EVERY 5 MIN PRN
Status: DISCONTINUED | OUTPATIENT
Start: 2023-11-20 | End: 2023-11-20 | Stop reason: HOSPADM

## 2023-11-20 RX ORDER — ONDANSETRON 4 MG/1
4 TABLET, ORALLY DISINTEGRATING ORAL EVERY 6 HOURS PRN
Status: DISCONTINUED | OUTPATIENT
Start: 2023-11-20 | End: 2023-11-20 | Stop reason: HOSPADM

## 2023-11-20 RX ORDER — ONDANSETRON 2 MG/ML
4 INJECTION INTRAMUSCULAR; INTRAVENOUS EVERY 6 HOURS PRN
Status: DISCONTINUED | OUTPATIENT
Start: 2023-11-20 | End: 2023-11-20 | Stop reason: HOSPADM

## 2023-11-20 RX ORDER — PROCHLORPERAZINE MALEATE 5 MG
5 TABLET ORAL EVERY 6 HOURS PRN
Status: DISCONTINUED | OUTPATIENT
Start: 2023-11-20 | End: 2023-11-20 | Stop reason: HOSPADM

## 2023-11-20 RX ADMIN — PROPOFOL 40 MG: 10 INJECTION, EMULSION INTRAVENOUS at 09:52

## 2023-11-20 RX ADMIN — PROPOFOL 160 MCG/KG/MIN: 10 INJECTION, EMULSION INTRAVENOUS at 09:53

## 2023-11-20 RX ADMIN — LIDOCAINE HYDROCHLORIDE 3 ML: 10 INJECTION, SOLUTION INFILTRATION; PERINEURAL at 09:49

## 2023-11-20 RX ADMIN — SODIUM CHLORIDE, POTASSIUM CHLORIDE, SODIUM LACTATE AND CALCIUM CHLORIDE: 600; 310; 30; 20 INJECTION, SOLUTION INTRAVENOUS at 09:46

## 2023-11-20 ASSESSMENT — ACTIVITIES OF DAILY LIVING (ADL): ADLS_ACUITY_SCORE: 33

## 2023-11-20 ASSESSMENT — ENCOUNTER SYMPTOMS: SEIZURES: 0

## 2023-11-20 NOTE — INTERVAL H&P NOTE
"I have reviewed the surgical (or preoperative) H&P that is linked to this encounter, and examined the patient. There are no significant changes    Clinical Conditions Present on Arrival:  Clinically Significant Risk Factors Present on Admission         # Hyponatremia: Lowest Na = 135 mmol/L in last 30 days, will monitor as appropriate  # Hypercalcemia: Highest Ca = 10.2 mg/dL in last 30 days, will monitor as appropriate        # Drug Induced Platelet Defect: home medication list includes an antiplatelet medication  # Obesity: Estimated body mass index is 33.24 kg/m  as calculated from the following:    Height as of 11/11/23: 1.53 m (5' 0.25\").    Weight as of 11/11/23: 77.8 kg (171 lb 9.6 oz).       "

## 2023-11-20 NOTE — ANESTHESIA PREPROCEDURE EVALUATION
Anesthesia Pre-Procedure Evaluation    Patient: Kerry Hoffmann   MRN: [id number] : 1935        Procedure : Procedure(s):  COLONOSCOPY          Past Medical History:   Diagnosis Date    Anxiety     takes clonazepam    Asthma     per [facility]     Chronic kidney disease     stage 3 per [facility]     CKD (chronic kidney disease)     Clostridium difficile colitis 2016    Clostridium difficile infection     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    Clostridium enterocolitis 10/27/2016    CTS (carpal tunnel syndrome)     GERD (gastroesophageal reflux disease)     per [facility]     Hiatal hernia     small per [facility]     Hyperlipidemia     Hyperlipidemia     Hyperlipidemia     Hypertension     Hypertension     Osteoarthritis of knee     per [facility]     Spinal stenosis     per [facility]     Vitamin D deficiency     per [facility]      Past Surgical History:   Procedure Laterality Date    BREAST SURGERY      breast tumor per [facility]     CATARACT EXTRACTION  2005    per [facility]     CV CORONARY ANGIOGRAM N/A 2023    Procedure: Coronary Angiogram;  Surgeon: Ap Arroyo MD;  Location: [facility] CV    CV LEFT HEART CATH N/A 2023    Procedure: Left Heart Catheterization;  Surgeon: Ap Arroyo MD;  Location: [facility] CATH LAB CV    ORIF TIBIA & FIBULA FRACTURES      per [facility]     OTHER SURGICAL HISTORY  10/2004    hole in retinaper [facility]     OTHER SURGICAL HISTORY  2015    fecal transplantper [facility]     SD ESOPHAGOGASTRODUODENOSCOPY TRANSORAL DIAGNOSTIC N/A 2020    Procedure: ESOPHAGOGASTRODUODENOSCOPY With gastric biopsies;  Surgeon: David Reyes MD;  Location: [facility];  Service: Gastroenterology    TONSILLECTOMY      TONSILLECTOMY & ADENOIDECTOMY  1963      Allergies   Allergen Reactions    Buspirone Shortness Of Breath    Ciprofloxacin Hives and Shortness Of Breath     Other reaction(s): Unknown    Cortisone      Other reaction(s): Throat Swelling/Closing, Unknown  Reaction 94       Hydrocodone-Acetaminophen Shortness Of Breath     Other reaction(s): Unknown    Lansoprazole Shortness Of Breath    Metoprolol Shortness Of Breath     Tolerates metoprolol succinate at home    Nedocromil      Other reaction(s): Throat Swelling/Closing    Niacin Shortness Of Breath     Other reaction(s): Unknown    Albuterol Other (See Comments)     Inhaler had extreme effect on nervous system      Amlodipine      Other reaction(s): Erythema, Unknown    Azithromycin      Other reaction(s): *Unknown, Unknown    Cefixime Diarrhea     Other reaction(s): Unknown    Cefprozil Nausea and Vomiting     Other reaction(s): Unknown    Cefuroxime      Other reaction(s): *Unknown    Cephalexin      Other reaction(s): *Unknown, Unknown    Contrast Dye      Other reaction(s): hives    Cyclobenzaprine      Other reaction(s): Sedation    Dextromethorphan-Guaifenesin Nausea     Other reaction(s): Headache    Diltiazem      Other reaction(s): Erythema, Unknown  Ears face arms and chest red and on fire-body tremors      Erythromycin      Other reaction(s): Unknown    Esomeprazole      Other reaction(s): Headache    Fenofibrate Muscle Pain (Myalgia)     Other reaction(s): Unknown    Fluticasone-Salmeterol Other (See Comments)     Elevated blood pressure      Levofloxacin Nausea     Other reaction(s): Headache, Unknown    Methylprednisolone     Metronidazole     Monosodium Glutamate     Moxifloxacin      Other reaction(s): Dizziness    Nifedipine      Other reaction(s): Edema  Took for 5-93 to 9-94 red and swollen leg      Nsaids      Other reaction(s): Unknown    Ofloxacin      Other reaction(s): *Unknown    Ondansetron      Other reaction(s): Constipation    Parsley Seed     Penicillins Unknown     She doesn't remember other than it occurred as a very young woman     Piper     Pirbuterol Other (See Comments)     Immediate extreme effect on nervous system      Pravastatin      Other reaction(s): Dizziness, Unknown    Pseudoephedrine       Other reaction(s): headache,nausea    Shellfish-Derived Products      Per pt 8/12/23    Simvastatin Muscle Pain (Myalgia)    Spironolactone      Other reaction(s): stomach cramps, nausea    Sulfamethoxazole-Trimethoprim      Other reaction(s): Unknown    Tramadol      Other reaction(s): red ears,high blood press.    Tramadol-Acetaminophen      Other reaction(s): Tachycardia, Unknown    Triamterene Other (See Comments)     Greatly bothered with sun-took medication for three years  Greatly bothered with sun      Diatrizoate Rash    Telmisartan Palpitations      Social History     Tobacco Use    Smoking status: Former     Packs/day: 3.00     Years: 35.00     Additional pack years: 0.00     Total pack years: 105.00     Types: Cigarettes    Smokeless tobacco: Never    Tobacco comments:     quit 1968   Substance Use Topics    Alcohol use: Not Currently      Wt Readings from Last 1 Encounters:   11/11/23 77.8 kg (171 lb 9.6 oz)        Anesthesia Evaluation            ROS/MED HX  ENT/Pulmonary:     (+)                     asthma                  Neurologic:    (-) no seizures and no CVA   Cardiovascular:     (+)  hypertension- -  CAD (nonobstructive on 8/2023 [facility]) -  - -      CHF                     valvular problems/murmurs type: AI and MR     Previous cardiac testing   Echo: Date: 8/2023 Results:  Interpretation Summary     The left ventricle is normal in size.  Left ventricular function is normal.The ejection fraction is 55-60%.  There is mild concentric left ventricular hypertrophy.  Normal right ventricle size and systolic function.  The left atrium is mildly dilated.  There is mild to moderate (1-2+) mitral regurgitation.  There is mild to moderate (1-2+) aortic regurgitation.  Compared to the prior study dated 7/21/23, there are changes as noted.  Increased aortic regurgitation.    Stress Test:  Date: Results:    ECG Reviewed:  Date: Results:    Cath:  Date: Results:      METS/Exercise Tolerance:     Hematologic:  "      Musculoskeletal:       GI/Hepatic:     (+) GERD,     hiatal hernia,              Renal/Genitourinary:     (+) renal disease, type: CRI,            Endo:       Psychiatric/Substance Use:     (+) psychiatric history anxiety       Infectious Disease:       Malignancy:       Other:            Physical Exam    Airway        Mallampati: II   TM distance: > 3 FB   Neck ROM: full   Mouth opening: > 3 cm    Respiratory Devices and Support         Dental       (+) Minor Abnormalities - some fillings, tiny chips      Cardiovascular          Rhythm and rate: regular and normal   (+) murmur       Pulmonary           breath sounds clear to auscultation           OUTSIDE LABS:  CBC:   Lab Results   Component Value Date    WBC 7.9 11/11/2023    WBC 7.6 11/07/2023    HGB 10.0 (L) 11/11/2023    HGB 10.1 (L) 11/07/2023    HCT 30.7 (L) 11/11/2023    HCT 31.6 (L) 11/07/2023     11/11/2023     11/07/2023     BMP:   Lab Results   Component Value Date     11/11/2023     11/07/2023     11/07/2023    POTASSIUM 4.2 11/11/2023    POTASSIUM 4.2 11/07/2023    POTASSIUM 4.2 11/07/2023    CHLORIDE 107 11/11/2023    CHLORIDE 105 11/07/2023    CHLORIDE 105 11/07/2023    CO2 22 11/11/2023    CO2 22 11/07/2023    CO2 22 11/07/2023    BUN 26.9 (H) 11/11/2023    BUN 26.7 (H) 11/07/2023    BUN 26.7 (H) 11/07/2023    CR 1.41 (H) 11/11/2023    CR 1.26 (H) 11/07/2023    CR 1.26 (H) 11/07/2023     (H) 11/11/2023    GLC 98 11/07/2023    GLC 98 11/07/2023     COAGS: No results found for: \"PTT\", \"INR\", \"FIBR\"  POC: No results found for: \"BGM\", \"HCG\", \"HCGS\"  HEPATIC:   Lab Results   Component Value Date    ALBUMIN 3.8 11/11/2023    PROTTOTAL 7.0 11/11/2023    ALT 6 11/11/2023    AST 14 11/11/2023    ALKPHOS 66 11/11/2023    BILITOTAL 0.2 11/11/2023     OTHER:   Lab Results   Component Value Date    LACT 1.0 11/11/2023    ISAIAH 10.0 11/11/2023    PHOS 3.0 07/21/2023    MAG 1.7 11/11/2023    LIPASE 24 11/11/2023    TSH " 2.52 06/14/2023       Anesthesia Plan    ASA Status:  3    NPO Status:  NPO Appropriate    Anesthesia Type: MAC.     - Reason for MAC: straight local not clinically adequate, chronic cardiopulmonary disease              Consents    Anesthesia Plan(s) and associated risks, benefits, and realistic alternatives discussed. Questions answered and patient/representative(s) expressed understanding.     - Discussed:     - Discussed with:  Patient      - Extended Intubation/Ventilatory Support Discussed: No.      - Patient is DNR/DNI Status: Yes             Suspend during perioperative period? Yes.             Agree to: chemical resuscitation, chest compression/defibrillation.   Use of blood products discussed: No .     Postoperative Care       PONV prophylaxis: Ondansetron (or other 5HT-3), Dexamethasone or Solumedrol     Comments:    Other Comments: Patient DNR. Wishes to be full code in the perioperative period.             Teo Morris MD

## 2023-11-20 NOTE — ANESTHESIA CARE TRANSFER NOTE
Patient: Kerry Hoffmann    Procedure: Procedure(s):  COLONOSCOPY with biopsies and polypectomy       Diagnosis: Diarrhea [R19.7]  Diagnosis Additional Information: No value filed.    Anesthesia Type:   MAC     Note:    Oropharynx: oropharynx clear of all foreign objects  Level of Consciousness: awake  Oxygen Supplementation: room air    Independent Airway: airway patency satisfactory and stable  Dentition: dentition unchanged  Vital Signs Stable: post-procedure vital signs reviewed and stable  Report to RN Given: handoff report given  Patient transferred to: Phase II    Handoff Report: Identifed the Patient, Identified the Reponsible Provider, Reviewed the pertinent medical history, Discussed the surgical course, Reviewed Intra-OP anesthesia mangement and issues during anesthesia, Set expectations for post-procedure period and Allowed opportunity for questions and acknowledgement of understanding      Vitals:  Vitals Value Taken Time   /66 11/20/23 1019   Temp 36.1  C (96.9  F) 11/20/23 1018   Pulse 71 11/20/23 1019   Resp 16 11/20/23 1018   SpO2 100 % 11/20/23 1019   Vitals shown include unfiled device data.    Electronically Signed By: RISA Raphael CRNA  November 20, 2023  10:20 AM

## 2023-11-20 NOTE — ANESTHESIA POSTPROCEDURE EVALUATION
Patient: Kerry Hoffmann    Procedure: Procedure(s):  COLONOSCOPY with biopsies and polypectomy       Anesthesia Type:  MAC    Note:  Disposition: Outpatient   Postop Pain Control: Uneventful            Sign Out: Well controlled pain   PONV: No   Neuro/Psych: Uneventful            Sign Out: Acceptable/Baseline neuro status   Airway/Respiratory: Uneventful            Sign Out: Acceptable/Baseline resp. status   CV/Hemodynamics: Uneventful            Sign Out: Acceptable CV status   Other NRE:    DID A NON-ROUTINE EVENT OCCUR?            Last vitals:  Vitals Value Taken Time   /60 11/20/23 1030   Temp 36.1  C (96.9  F) 11/20/23 1018   Pulse 73 11/20/23 1035   Resp 16 11/20/23 1018   SpO2 95 % 11/20/23 1035   Vitals shown include unfiled device data.    Electronically Signed By: Teo Morris MD  November 20, 2023  12:20 PM

## 2023-11-21 LAB
PATH REPORT.COMMENTS IMP SPEC: NORMAL
PATH REPORT.COMMENTS IMP SPEC: NORMAL
PATH REPORT.FINAL DX SPEC: NORMAL
PATH REPORT.GROSS SPEC: NORMAL
PATH REPORT.MICROSCOPIC SPEC OTHER STN: NORMAL
PATH REPORT.RELEVANT HX SPEC: NORMAL
PHOTO IMAGE: NORMAL

## 2023-11-21 PROCEDURE — 88305 TISSUE EXAM BY PATHOLOGIST: CPT | Mod: 26 | Performed by: PATHOLOGY

## 2024-02-27 ENCOUNTER — OFFICE VISIT (OUTPATIENT)
Dept: NEUROLOGY | Facility: CLINIC | Age: 89
End: 2024-02-27
Payer: MEDICARE

## 2024-02-27 VITALS
WEIGHT: 163 LBS | DIASTOLIC BLOOD PRESSURE: 78 MMHG | SYSTOLIC BLOOD PRESSURE: 144 MMHG | RESPIRATION RATE: 16 BRPM | HEART RATE: 74 BPM | BODY MASS INDEX: 31.57 KG/M2

## 2024-02-27 DIAGNOSIS — I65.22 STENOSIS OF INTRACRANIAL PORTIONS OF LEFT INTERNAL CAROTID ARTERY: ICD-10-CM

## 2024-02-27 DIAGNOSIS — I67.2 INTRACRANIAL ATHEROSCLEROSIS: Primary | ICD-10-CM

## 2024-02-27 PROCEDURE — 99214 OFFICE O/P EST MOD 30 MIN: CPT | Performed by: PSYCHIATRY & NEUROLOGY

## 2024-02-27 PROCEDURE — G2211 COMPLEX E/M VISIT ADD ON: HCPCS | Performed by: PSYCHIATRY & NEUROLOGY

## 2024-02-27 RX ORDER — ROSUVASTATIN CALCIUM 10 MG/1
10 TABLET, COATED ORAL DAILY
Qty: 90 TABLET | Refills: 3 | Status: SHIPPED | OUTPATIENT
Start: 2024-02-27

## 2024-02-27 NOTE — LETTER
2024         RE: Kerry Hoffmann  [address]  [address]  [facility] [address]        Dear Colleague,    Thank you for referring your patient, Kerry Hoffmann, to the [facility] [place]. Please see a copy of my visit note below.    NEUROLOGY FOLLOW UP VISIT  NOTE       [facility] [place]  [address] [place] [address]  Tel: [phone]  Fax: [phone]  [email or web address]     Kerry Hoffmann,  1935, MRN [id number]  PCP: Kaylyn Bolanos  Date: 2024      ASSESSMENT & PLAN     Visit Diagnosis  Generalized anxiety disorder  Intracranial atherosclerosis     Intracranial atherosclerosis  88 years old female with history of HTN, HLD, GERD, HAWA, CKD stage III, CTS and diffuse intracranial atherosclerosis who returns for follow-up.  She is intolerant to statin and Repatha was too expensive.  She was also tried on Zetia and niacin but could not tolerate.  Currently she is on aspirin and Plavix but she clearly needs more aggressive treatment for her hyperlipidemia.  She is willing to give statin another try.  I have recommended:    1.  Start Crestor 10 mg daily  2.  Continue aspirin and Plavix  3.  Check lipid panel  4.  Follow-up in 6 months    Thank you again for this referral, please feel free to contact me if you have any questions.    Mark Hummel MD  [facility], [place]     HISTORY OF PRESENT ILLNESS     Patient is 88 years old female with history of HTN, HLD, GERD, HAWA, CKD stage III, carpal tunnel and diffuse intracranial atherosclerosis last seen on 2022 who returns for follow-up.  Although patient has significant intracranial atherosclerosis she was unable to tolerate statin, niacin and Zetia.  In the past Repatha was recommended but she could not afford the cost.  During her last visit she realized that with significant atherosclerosis she is at increased for CVA and was willing to give Lipitor  another trial.  She was continued on Lipitor and also dual antiplatelet therapy.  I had recommended repeat lipid panel after 6 months.  However she could not tolerate Lipitor and discontinued it after some time.  Most recent LDL on the chart from 5/17/2022 was 178.  According to patient last year she was admitted to hospital with CHF and TIMOTHY.  She is worried about intracranial atherosclerosis and does not want to have another stroke and is willing to give statin another try.     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 (H) I13.0     Localized, primary osteoarthritis of shoulder region M19.019     Mild persistent asthma with exacerbation J45.31     Osteoarthritis of knee M17.9     Pounding noise in left ear H93.8X2     Senile osteoporosis M81.0     Stage 3a chronic kidney disease (H) N18.31     Vitamin D deficiency E55.9     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     Irritable bowel syndrome K58.9     Aortic valve disorder I35.9     Intracranial atherosclerosis I67.2     MADISON (dyspnea on exertion) R06.09     TIMOTHY (acute kidney injury) (H24) N17.9     Generalized weakness R53.1         PAST MEDICAL & SURGICAL HISTORY     Past Medical History:   Patient  has a past medical history of Anxiety, Asthma, Chronic kidney disease, CKD (chronic kidney disease), Clostridium difficile colitis (11/01/2016), Clostridium difficile infection, Clostridium enterocolitis (10/27/2016), CTS (carpal tunnel syndrome), GERD (gastroesophageal reflux disease), Hiatal hernia, Hyperlipidemia, Hyperlipidemia, Hyperlipidemia, Hypertension, Hypertension, Osteoarthritis of knee, Spinal stenosis,  and Vitamin D deficiency.    Surgical History:  She  has a past surgical history that includes Tonsillectomy; tonsillectomy & adenoidectomy (1963); Breast surgery (1982); Orif Tibia & Fibula Fractures (1988); other surgical history (10/2004); Cataract Extraction (07/2005); other surgical history (05/03/2015); Pr Esophagogastroduodenoscopy Transoral Diagnostic (N/A, 9/11/2020); Left Heart Catheterization (N/A, 8/11/2023); Coronary Angiogram (N/A, 8/11/2023); and Colonoscopy (N/A, 11/20/2023).     SOCIAL HISTORY     Reviewed, and she  reports that she has quit smoking. Her smoking use included cigarettes. She has a 105 pack-year smoking history. She has never used smokeless tobacco. She reports that she does not currently use alcohol. She reports that she does not use drugs.     FAMILY HISTORY     Reviewed, and family history includes Alzheimer Disease in her sister; Brain Cancer in her brother; Cancer in her brother and mother; Coronary Artery Disease in her father; Heart Disease in her father, mother, and sister; Heart Failure in her sister; Hypertension in her mother and sister; Myocardial Infarction in her father; Pacemaker in her mother.     ALLERGIES     Allergies   Allergen Reactions     Buspirone Shortness Of Breath     Ciprofloxacin Hives and Shortness Of Breath     Other reaction(s): Unknown     Cortisone      Other reaction(s): Throat Swelling/Closing, Unknown  Reaction 9-5-94       Hydrocodone-Acetaminophen Shortness Of Breath     Other reaction(s): Unknown     Lansoprazole Shortness Of Breath     Metoprolol Shortness Of Breath     Tolerates metoprolol succinate at home     Nedocromil      Other reaction(s): Throat Swelling/Closing     Niacin Shortness Of Breath     Other reaction(s): Unknown     Albuterol Other (See Comments)     Inhaler had extreme effect on nervous system       Amlodipine      Other reaction(s): Erythema, Unknown     Azithromycin      Other reaction(s): *Unknown, Unknown     Cefixime  Diarrhea     Other reaction(s): Unknown     Cefprozil Nausea and Vomiting     Other reaction(s): Unknown     Cefuroxime      Other reaction(s): *Unknown     Cephalexin      Other reaction(s): *Unknown, Unknown     Contrast Dye      Other reaction(s): hives     Cyclobenzaprine      Other reaction(s): Sedation     Dextromethorphan-Guaifenesin Nausea     Other reaction(s): Headache     Diltiazem      Other reaction(s): Erythema, Unknown  Ears face arms and chest red and on fire-body tremors       Erythromycin      Other reaction(s): Unknown     Esomeprazole      Other reaction(s): Headache     Fenofibrate Muscle Pain (Myalgia)     Other reaction(s): Unknown     Fluticasone-Salmeterol Other (See Comments)     Elevated blood pressure       Levofloxacin Nausea     Other reaction(s): Headache, Unknown     Methylprednisolone      Metronidazole      Monosodium Glutamate      Moxifloxacin      Other reaction(s): Dizziness     Nifedipine      Other reaction(s): Edema  Took for 5-93 to 9-94 red and swollen leg       Nsaids      Other reaction(s): Unknown     Ofloxacin      Other reaction(s): *Unknown     Ondansetron      Other reaction(s): Constipation     Parsley Seed      Penicillins Unknown     She doesn't remember other than it occurred as a very young woman      Piper      Pirbuterol Other (See Comments)     Immediate extreme effect on nervous system       Pravastatin      Other reaction(s): Dizziness, Unknown     Pseudoephedrine      Other reaction(s): headache,nausea     Shellfish-Derived Products      Per pt 8/12/23     Simvastatin Muscle Pain (Myalgia)     Spironolactone      Other reaction(s): stomach cramps, nausea     Sulfamethoxazole-Trimethoprim      Other reaction(s): Unknown     Tramadol      Other reaction(s): red ears,high blood press.     Tramadol-Acetaminophen      Other reaction(s): Tachycardia, Unknown     Triamterene Other (See Comments)     Greatly bothered with sun-took medication for three  years  Greatly bothered with sun       Diatrizoate Rash     Telmisartan Palpitations         REVIEW OF SYSTEMS     A 12 point review of system was performed and was negative except as outlined in the history of present illness.     HOME MEDICATIONS     Current Outpatient Rx   Medication Sig Dispense Refill     acetaminophen (TYLENOL) 500 MG tablet Take 1,000 mg by mouth 3 times daily       aspirin 81 MG EC tablet Take 81 mg by mouth daily       Calcium Carbonate Antacid (TUMS ULTRA 1000 PO) Take 1 chew tab by mouth nightly as needed       Cholecalciferol (VITAMIN D3) 50 MCG (2000 UT) CAPS Take 1 tablet by mouth daily       clonazePAM (KLONOPIN) 0.5 MG tablet Take 0.5 mg by mouth 3 times daily       clopidogrel (PLAVIX) 75 MG tablet TAKE 1 TABLET(75 MG) BY MOUTH DAILY 90 tablet 1     diclofenac (VOLTAREN) 1 % topical gel Apply 4 g topically 4 times daily knee pain       famotidine (PEPCID) 10 MG tablet Take 20 mg by mouth 2 times daily       losartan (COZAAR) 50 MG tablet Take 50 mg by mouth 2 times daily       metoprolol succinate ER (TOPROL XL) 25 MG 24 hr tablet Take 25 mg by mouth at bedtime       rosuvastatin (CRESTOR) 10 MG tablet Take 1 tablet (10 mg) by mouth daily 90 tablet 3         PHYSICAL EXAM     Vital signs  BP (!) 144/78   Pulse 74   Resp 16   Wt 73.9 kg (163 lb)   LMP  (LMP Unknown)   BMI 31.57 kg/m      Weight:   163 lbs 0 oz    Pleasant elderly female who is alert and oriented x3 no acute distress. Vital signs were reviewed and are documented in electronic medical record. Neck supple. Neurologically speech mentation and affect was normal cranial nerves II through XII are intact. Motor strength 5/5 reflexes 1+ she walks with cane     PERTINENT DIAGNOSTIC STUDIES     Following studies were reviewed:     MRI BRAIN 3/11/2021  No acute intracranial abnormality identified with no hemorrhage, mass or cortical-based infarct.  Background of age-related cerebral volume loss and moderate white matter  small vessel ischemic-type changes.     MRA BRAIN 2/23/2021  Anterior circulation: Tortuosity of the extracranial right distal cervical ICA segment at the skull base. Moderate to severe atherosclerotic narrowing in the left clinoid ICA segment (series 3 images 134-137). Mild atherosclerotic narrowing in the right cavernous ICA segment. Moderate focal stenosis in the left proximal A3 SCAR segment (series 3 image 204). Moderate to severe stenosis in the left distal M1 MCA segment (series 3 image 157). Scattered areas of mild to moderate stenosis in the M2/M3 MCA segments. ACOM and both PCOMs are present. No evidence of aneurysm.Posterior circulation: Severe atherosclerotic narrowing in the right proximal P1 segment, with scattered areas of mild to moderate stenosis more distally. Moderate focal narrowing in the left P2 PCA segment (series 3 image 148). Normal appearing vertebrobasilar arteries with dominant left vertebral artery.No obvious change from the prior recent MRA neck study.     CONCLUSION:  1. Extensive atherosclerotic narrowing in the anterior and posterior circulation as described above.  2. No evidence of aneurysm.     MRI LUMBAR SPINE 10/11/2005  1. There is a Grade II, 30-35%, chronic degenerative spondylolisthesis at L4-5 with advanced hypertrophic facet arthropathy. There is no evidence of synovial cyst formation or facet effusion. Overall, there is moderate central and severe left subarticular stenosis with significant chronic left traversing L5 nerve sleeve compression.  2. Mild up-down left L4-5 foraminal stenosis without ganglion compression.  3. There are no acute fractures identified.  Note: Comparison is now made with the 09/21/04 study. The overall appearance at L4-5 is stable and unchanged when compared to the prior study. No new stenosis or disc herniation is identified. Also, left L4-5 subarticular stenosis is unchanged.    EMG 3/12/2021  This study shows moderately severe  carpal tunnel  syndrome on the right.     PERTINENT LABS  Following labs were reviewed:  No visits with results within 3 Month(s) from this visit.   Latest known visit with results is:   Admission on 11/20/2023, Discharged on 11/20/2023   Component Date Value Ref Range Status     Case Report 11/20/2023    Final                    Value:Surgical Pathology Report                         Case: [id number]                                  Authorizing Provider:  David Barreto MD       Collected:           11/20/2023 10:03 AM          Ordering Location:     [facility]          Received:            11/20/2023 10:22 AM                                 [facility] OR                                                   Pathologist:           Maria E Vasquez MD                                                      Specimens:   A) - Large Intestine, Colon, Random Colon Bx                                                        B) - Rectum                                                                                 Final Diagnosis 11/20/2023    Final                    Value:This result contains rich text formatting which cannot be displayed here.     Clinical Information 11/20/2023    Final                    Value:This result contains rich text formatting which cannot be displayed here.     Gross Description 11/20/2023    Final                    Value:This result contains rich text formatting which cannot be displayed here.     Microscopic Description 11/20/2023    Final                    Value:This result contains rich text formatting which cannot be displayed here.     Performing Labs 11/20/2023    Final                    Value:This result contains rich text formatting which cannot be displayed here.     COLONOSCOPY 11/20/2023    Final                    Value:[facility]  [address] [place]  [address]  _______________________________________________________________________________  Patient Name: Kerry Hoffmann          Procedure Date: 11/20/2023 9:39 AM  MRN: [id number]                       Account Number: [id number]  YOB: 1935              Admit Type: Outpatient  Age: 88                               Room: [place] [address]  Note Status: Finalized                Attending MD: ELINA RODRIGUEZ MD,   Total Sedation Time:                  Instrument Name: Adult Colonoscope 1169  _______________________________________________________________________________     Procedure:             Colonoscopy  Indications:           Diarrhea  Providers:             ELINA RODRIGUEZ MD  Referring MD:            Medicines:             Monitored Anesthesia Care  Complications:         No immediate complications. Estimated blood loss:                          Minimal.  _________________________________________________                          ______________________________  Procedure:             Pre-Anesthesia Assessment:                         - Prior to the procedure, a History and Physical was                          performed, and patient medications, allergies and                          sensitivities were reviewed. The patient's tolerance                          of previous anesthesia was reviewed.                         - The risks and benefits of the procedure and the                          sedation options and risks were discussed with the                          patient. All questions were answered and informed                          consent was obtained.                         After obtaining informed consent, the colonoscope was                          passed under direct vision. Throughout the procedure,                          the patient's blood pressure, pulse, and oxygen                          saturations were monitored continuously. The                          colonoscope was  introduced th                          rough the anus and                          advanced to the terminal ileum. The colonoscopy was                          performed without difficulty. The patient tolerated                          the procedure well. The quality of the bowel                          preparation was good.                                                                                   Findings:       The visualized terminal ileum appeared normal.       Mild sigmoid colon diverticulosis.       2 mm rectal polyp removed with cold biopsy forceps.       The remainder of visualized colonic mucosa appeared normal. Random colon        biopsies taken.       Nonbleeding external hemorrhoids.                                                                                   Moderate Sedation:       MAC sedation.  Impression:            - One small colon polyp removed.                         - Diverticulosis.                         - External hemorrhoids.  Recommendation:        - Await pathology results.                                                                                                               David Barreto MD  ___________________  DAVID BARRETO MD  11/20/2023 10:20:22 AM  I was physically present for the entire viewing portion of the exam.  __________________________  Signature of teaching physician  Padma/Doris BARRETO MD  Number of Addenda: 0    Note Initiated On: 11/20/2023 9:39 AM  Scope In: 9:53:54 AM  Scope Out: 10:12:23 AM           Total time spent for face to face visit, reviewing labs/imaging studies, counseling and coordination of care was: 30 Minutes spent on the date of the encounter doing chart review, review of outside records, review of test results, interpretation of tests, patient visit, and documentation     The longitudinal plan of care for the diagnosis(es)/condition(s) as documented were addressed during this visit. Due to the added complexity in care, I  will continue to support Kerry in the subsequent management and with ongoing continuity of care.    This note was dictated using voice recognition software.  Any grammatical or context distortions are unintentional and inherent to the software.    Orders Placed This Encounter   Procedures     MRA Brain ([place]) w/o Contrast     Lipid Profile      New Prescriptions    ROSUVASTATIN (CRESTOR) 10 MG TABLET    Take 1 tablet (10 mg) by mouth daily     Modified Medications    No medications on file                 Again, thank you for allowing me to participate in the care of your patient.        Sincerely,        Mark Hummel MD

## 2024-02-27 NOTE — PROGRESS NOTES
NEUROLOGY FOLLOW UP VISIT  NOTE       [facility] NEUROLOGY [place]  [address] [place] [address]  Tel: [phone]  Fax: [phone]  [email or web address]     Kerry Hoffmann,  1935, MRN [id number]  PCP: Kaylyn Bolanos  Date: 2024      ASSESSMENT & PLAN     Visit Diagnosis  Generalized anxiety disorder  Intracranial atherosclerosis     Intracranial atherosclerosis  88 years old female with history of HTN, HLD, GERD, HAWA, CKD stage III, CTS and diffuse intracranial atherosclerosis who returns for follow-up.  She is intolerant to statin and Repatha was too expensive.  She was also tried on Zetia and niacin but could not tolerate.  Currently she is on aspirin and Plavix but she clearly needs more aggressive treatment for her hyperlipidemia.  She is willing to give statin another try.  I have recommended:    1.  Start Crestor 10 mg daily  2.  Continue aspirin and Plavix  3.  Check lipid panel  4.  Follow-up in 6 months    Thank you again for this referral, please feel free to contact me if you have any questions.    Mark Hummel MD  [facility] NEUROLOGY, [place]     HISTORY OF PRESENT ILLNESS     Patient is 88 years old female with history of HTN, HLD, GERD, HAWA, CKD stage III, carpal tunnel and diffuse intracranial atherosclerosis last seen on 2022 who returns for follow-up.  Although patient has significant intracranial atherosclerosis she was unable to tolerate statin, niacin and Zetia.  In the past Repatha was recommended but she could not afford the cost.  During her last visit she realized that with significant atherosclerosis she is at increased for CVA and was willing to give Lipitor another trial.  She was continued on Lipitor and also dual antiplatelet therapy.  I had recommended repeat lipid panel after 6 months.  However she could not tolerate Lipitor and discontinued it after some time.  Most recent LDL on the chart from 2022 was 178.   According to patient last year she was admitted to hospital with CHF and TIMOTHY.  She is worried about intracranial atherosclerosis and does not want to have another stroke and is willing to give statin another try.     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.0    Localized, primary osteoarthritis of shoulder region M19.019    Mild persistent asthma with exacerbation J45.31    Osteoarthritis of knee M17.9    Pounding noise in left ear H93.8X2    Senile osteoporosis M81.0    Stage 3a chronic kidney disease (H) N18.31    Vitamin D deficiency E55.9    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    Irritable bowel syndrome K58.9    Aortic valve disorder I35.9    Intracranial atherosclerosis I67.2    MADISON (dyspnea on exertion) R06.09    TIMOTHY (acute kidney injury) (H24) N17.9    Generalized weakness R53.1         PAST MEDICAL & SURGICAL HISTORY     Past Medical History:   Patient  has a past medical history of Anxiety, Asthma, Chronic kidney disease, CKD (chronic kidney disease), Clostridium difficile colitis (11/01/2016), Clostridium difficile infection, Clostridium enterocolitis (10/27/2016), CTS (carpal tunnel syndrome), GERD (gastroesophageal reflux disease), Hiatal hernia, Hyperlipidemia, Hyperlipidemia, Hyperlipidemia, Hypertension, Hypertension, Osteoarthritis of knee, Spinal stenosis, and Vitamin D deficiency.    Surgical History:  She  has a past surgical history that includes Tonsillectomy; tonsillectomy & adenoidectomy (1963); Breast surgery (1982); Orif Tibia & Fibula Fractures (1988); other surgical history (10/2004); Cataract Extraction (07/2005); other surgical  history (05/03/2015); Pr Esophagogastroduodenoscopy Transoral Diagnostic (N/A, 9/11/2020); Left Heart Catheterization (N/A, 8/11/2023); Coronary Angiogram (N/A, 8/11/2023); and Colonoscopy (N/A, 11/20/2023).     SOCIAL HISTORY     Reviewed, and she  reports that she has quit smoking. Her smoking use included cigarettes. She has a 105 pack-year smoking history. She has never used smokeless tobacco. She reports that she does not currently use alcohol. She reports that she does not use drugs.     FAMILY HISTORY     Reviewed, and family history includes Alzheimer Disease in her sister; Brain Cancer in her brother; Cancer in her brother and mother; Coronary Artery Disease in her father; Heart Disease in her father, mother, and sister; Heart Failure in her sister; Hypertension in her mother and sister; Myocardial Infarction in her father; Pacemaker in her mother.     ALLERGIES     Allergies   Allergen Reactions    Buspirone Shortness Of Breath    Ciprofloxacin Hives and Shortness Of Breath     Other reaction(s): Unknown    Cortisone      Other reaction(s): Throat Swelling/Closing, Unknown  Reaction 9-5-94      Hydrocodone-Acetaminophen Shortness Of Breath     Other reaction(s): Unknown    Lansoprazole Shortness Of Breath    Metoprolol Shortness Of Breath     Tolerates metoprolol succinate at home    Nedocromil      Other reaction(s): Throat Swelling/Closing    Niacin Shortness Of Breath     Other reaction(s): Unknown    Albuterol Other (See Comments)     Inhaler had extreme effect on nervous system      Amlodipine      Other reaction(s): Erythema, Unknown    Azithromycin      Other reaction(s): *Unknown, Unknown    Cefixime Diarrhea     Other reaction(s): Unknown    Cefprozil Nausea and Vomiting     Other reaction(s): Unknown    Cefuroxime      Other reaction(s): *Unknown    Cephalexin      Other reaction(s): *Unknown, Unknown    Contrast Dye      Other reaction(s): hives    Cyclobenzaprine      Other reaction(s):  Sedation    Dextromethorphan-Guaifenesin Nausea     Other reaction(s): Headache    Diltiazem      Other reaction(s): Erythema, Unknown  Ears face arms and chest red and on fire-body tremors      Erythromycin      Other reaction(s): Unknown    Esomeprazole      Other reaction(s): Headache    Fenofibrate Muscle Pain (Myalgia)     Other reaction(s): Unknown    Fluticasone-Salmeterol Other (See Comments)     Elevated blood pressure      Levofloxacin Nausea     Other reaction(s): Headache, Unknown    Methylprednisolone     Metronidazole     Monosodium Glutamate     Moxifloxacin      Other reaction(s): Dizziness    Nifedipine      Other reaction(s): Edema  Took for 5-93 to 9-94 red and swollen leg      Nsaids      Other reaction(s): Unknown    Ofloxacin      Other reaction(s): *Unknown    Ondansetron      Other reaction(s): Constipation    Parsley Seed     Penicillins Unknown     She doesn't remember other than it occurred as a very young woman     Piper     Pirbuterol Other (See Comments)     Immediate extreme effect on nervous system      Pravastatin      Other reaction(s): Dizziness, Unknown    Pseudoephedrine      Other reaction(s): headache,nausea    Shellfish-Derived Products      Per pt 8/12/23    Simvastatin Muscle Pain (Myalgia)    Spironolactone      Other reaction(s): stomach cramps, nausea    Sulfamethoxazole-Trimethoprim      Other reaction(s): Unknown    Tramadol      Other reaction(s): red ears,high blood press.    Tramadol-Acetaminophen      Other reaction(s): Tachycardia, Unknown    Triamterene Other (See Comments)     Greatly bothered with sun-took medication for three years  Greatly bothered with sun      Diatrizoate Rash    Telmisartan Palpitations         REVIEW OF SYSTEMS     A 12 point review of system was performed and was negative except as outlined in the history of present illness.     HOME MEDICATIONS     Current Outpatient Rx   Medication Sig Dispense Refill    acetaminophen (TYLENOL) 500 MG  tablet Take 1,000 mg by mouth 3 times daily      aspirin 81 MG EC tablet Take 81 mg by mouth daily      Calcium Carbonate Antacid (TUMS ULTRA 1000 PO) Take 1 chew tab by mouth nightly as needed      Cholecalciferol (VITAMIN D3) 50 MCG (2000 UT) CAPS Take 1 tablet by mouth daily      clonazePAM (KLONOPIN) 0.5 MG tablet Take 0.5 mg by mouth 3 times daily      clopidogrel (PLAVIX) 75 MG tablet TAKE 1 TABLET(75 MG) BY MOUTH DAILY 90 tablet 1    diclofenac (VOLTAREN) 1 % topical gel Apply 4 g topically 4 times daily knee pain      famotidine (PEPCID) 10 MG tablet Take 20 mg by mouth 2 times daily      losartan (COZAAR) 50 MG tablet Take 50 mg by mouth 2 times daily      metoprolol succinate ER (TOPROL XL) 25 MG 24 hr tablet Take 25 mg by mouth at bedtime      rosuvastatin (CRESTOR) 10 MG tablet Take 1 tablet (10 mg) by mouth daily 90 tablet 3         PHYSICAL EXAM     Vital signs  BP (!) 144/78   Pulse 74   Resp 16   Wt 73.9 kg (163 lb)   LMP  (LMP Unknown)   BMI 31.57 kg/m      Weight:   163 lbs 0 oz    Pleasant elderly female who is alert and oriented x3 no acute distress. Vital signs were reviewed and are documented in electronic medical record. Neck supple. Neurologically speech mentation and affect was normal cranial nerves II through XII are intact. Motor strength 5/5 reflexes 1+ she walks with cane     PERTINENT DIAGNOSTIC STUDIES     Following studies were reviewed:     MRI BRAIN 3/11/2021  No acute intracranial abnormality identified with no hemorrhage, mass or cortical-based infarct.  Background of age-related cerebral volume loss and moderate white matter small vessel ischemic-type changes.     MRA BRAIN 2/23/2021  Anterior circulation: Tortuosity of the extracranial right distal cervical ICA segment at the skull base. Moderate to severe atherosclerotic narrowing in the left clinoid ICA segment (series 3 images 134-137). Mild atherosclerotic narrowing in the right cavernous ICA segment. Moderate focal  stenosis in the left proximal A3 SCAR segment (series 3 image 204). Moderate to severe stenosis in the left distal M1 MCA segment (series 3 image 157). Scattered areas of mild to moderate stenosis in the M2/M3 MCA segments. ACOM and both PCOMs are present. No evidence of aneurysm.Posterior circulation: Severe atherosclerotic narrowing in the right proximal P1 segment, with scattered areas of mild to moderate stenosis more distally. Moderate focal narrowing in the left P2 PCA segment (series 3 image 148). Normal appearing vertebrobasilar arteries with dominant left vertebral artery.No obvious change from the prior recent MRA neck study.     CONCLUSION:  1. Extensive atherosclerotic narrowing in the anterior and posterior circulation as described above.  2. No evidence of aneurysm.     MRI LUMBAR SPINE 10/11/2005  1. There is a Grade II, 30-35%, chronic degenerative spondylolisthesis at L4-5 with advanced hypertrophic facet arthropathy. There is no evidence of synovial cyst formation or facet effusion. Overall, there is moderate central and severe left subarticular stenosis with significant chronic left traversing L5 nerve sleeve compression.  2. Mild up-down left L4-5 foraminal stenosis without ganglion compression.  3. There are no acute fractures identified.  Note: Comparison is now made with the 09/21/04 study. The overall appearance at L4-5 is stable and unchanged when compared to the prior study. No new stenosis or disc herniation is identified. Also, left L4-5 subarticular stenosis is unchanged.    EMG 3/12/2021  This study shows moderately severe  carpal tunnel syndrome on the right.     PERTINENT LABS  Following labs were reviewed:  No visits with results within 3 Month(s) from this visit.   Latest known visit with results is:   Admission on 11/20/2023, Discharged on 11/20/2023   Component Date Value Ref Range Status    Case Report 11/20/2023    Final                    Value:Surgical Pathology Report                          Case: [id number]                                  Authorizing Provider:  David Barreto MD       Collected:           11/20/2023 10:03 AM          Ordering Location:     [facility]          Received:            11/20/2023 10:22 AM                                 [facility] OR                                                   Pathologist:           Maria E Vasquez MD                                                      Specimens:   A) - Large Intestine, Colon, Random Colon Bx                                                        B) - Rectum                                                                                Final Diagnosis 11/20/2023    Final                    Value:This result contains rich text formatting which cannot be displayed here.    Clinical Information 11/20/2023    Final                    Value:This result contains rich text formatting which cannot be displayed here.    Gross Description 11/20/2023    Final                    Value:This result contains rich text formatting which cannot be displayed here.    Microscopic Description 11/20/2023    Final                    Value:This result contains rich text formatting which cannot be displayed here.    Performing Labs 11/20/2023    Final                    Value:This result contains rich text formatting which cannot be displayed here.    COLONOSCOPY 11/20/2023    Final                    Value:[facility]  [address] [place] [address]  _______________________________________________________________________________  Patient Name: Kerry Hoffmann          Procedure Date: 11/20/2023 9:39 AM  MRN: [id number]                       Account Number: [id number]  YOB: 1935              Admit Type: Outpatient  Age: 88                               Room: [place] [address]  Note Status: Finalized                Attending MD: DAVID BARRETO MD,   Total Sedation Time:                   Instrument Name: Adult Colonoscope 1169  _______________________________________________________________________________     Procedure:             Colonoscopy  Indications:           Diarrhea  Providers:             ELINA RODRIGUEZ MD  Referring MD:            Medicines:             Monitored Anesthesia Care  Complications:         No immediate complications. Estimated blood loss:                          Minimal.  _________________________________________________                          ______________________________  Procedure:             Pre-Anesthesia Assessment:                         - Prior to the procedure, a History and Physical was                          performed, and patient medications, allergies and                          sensitivities were reviewed. The patient's tolerance                          of previous anesthesia was reviewed.                         - The risks and benefits of the procedure and the                          sedation options and risks were discussed with the                          patient. All questions were answered and informed                          consent was obtained.                         After obtaining informed consent, the colonoscope was                          passed under direct vision. Throughout the procedure,                          the patient's blood pressure, pulse, and oxygen                          saturations were monitored continuously. The                          colonoscope was introduced th                          rough the anus and                          advanced to the terminal ileum. The colonoscopy was                          performed without difficulty. The patient tolerated                          the procedure well. The quality of the bowel                          preparation was good.                                                                                   Findings:       The visualized terminal  ileum appeared normal.       Mild sigmoid colon diverticulosis.       2 mm rectal polyp removed with cold biopsy forceps.       The remainder of visualized colonic mucosa appeared normal. Random colon        biopsies taken.       Nonbleeding external hemorrhoids.                                                                                   Moderate Sedation:       MAC sedation.  Impression:            - One small colon polyp removed.                         - Diverticulosis.                         - External hemorrhoids.  Recommendation:        - Await pathology results.                                                                                                               David Barreto MD  ___________________  DAVID BARRETO MD  11/20/2023 10:20:22 AM  I was physically present for the entire viewing portion of the exam.  __________________________  Signature of teaching physician  Padma/Doris BARRETO MD  Number of Addenda: 0    Note Initiated On: 11/20/2023 9:39 AM  Scope In: 9:53:54 AM  Scope Out: 10:12:23 AM           Total time spent for face to face visit, reviewing labs/imaging studies, counseling and coordination of care was: 30 Minutes spent on the date of the encounter doing chart review, review of outside records, review of test results, interpretation of tests, patient visit, and documentation     The longitudinal plan of care for the diagnosis(es)/condition(s) as documented were addressed during this visit. Due to the added complexity in care, I will continue to support Kerry in the subsequent management and with ongoing continuity of care.    This note was dictated using voice recognition software.  Any grammatical or context distortions are unintentional and inherent to the software.    Orders Placed This Encounter   Procedures    MRA Brain ([place]) w/o Contrast    Lipid Profile      New Prescriptions    ROSUVASTATIN (CRESTOR) 10 MG TABLET    Take 1 tablet (10 mg) by mouth  daily     Modified Medications    No medications on file

## 2024-03-04 ENCOUNTER — LAB REQUISITION (OUTPATIENT)
Dept: LAB | Facility: CLINIC | Age: 89
End: 2024-03-04
Payer: MEDICARE

## 2024-03-04 ENCOUNTER — TRANSFERRED RECORDS (OUTPATIENT)
Dept: HEALTH INFORMATION MANAGEMENT | Facility: CLINIC | Age: 89
End: 2024-03-04

## 2024-03-04 DIAGNOSIS — E78.2 MIXED HYPERLIPIDEMIA: ICD-10-CM

## 2024-03-04 DIAGNOSIS — N18.32 CHRONIC KIDNEY DISEASE, STAGE 3B (H): ICD-10-CM

## 2024-03-04 LAB
ERYTHROCYTE [DISTWIDTH] IN BLOOD BY AUTOMATED COUNT: 13.7 % (ref 10–15)
HCT VFR BLD AUTO: 34.2 % (ref 35–47)
HGB BLD-MCNC: 10.9 G/DL (ref 11.7–15.7)
MCH RBC QN AUTO: 28.4 PG (ref 26.5–33)
MCHC RBC AUTO-ENTMCNC: 31.9 G/DL (ref 31.5–36.5)
MCV RBC AUTO: 89 FL (ref 78–100)
PLATELET # BLD AUTO: 345 10E3/UL (ref 150–450)
RBC # BLD AUTO: 3.84 10E6/UL (ref 3.8–5.2)
WBC # BLD AUTO: 4.7 10E3/UL (ref 4–11)

## 2024-03-04 PROCEDURE — 85027 COMPLETE CBC AUTOMATED: CPT | Mod: ORL | Performed by: FAMILY MEDICINE

## 2024-03-04 PROCEDURE — 83735 ASSAY OF MAGNESIUM: CPT | Mod: ORL | Performed by: FAMILY MEDICINE

## 2024-03-04 PROCEDURE — 84550 ASSAY OF BLOOD/URIC ACID: CPT | Mod: ORL | Performed by: FAMILY MEDICINE

## 2024-03-04 PROCEDURE — 80061 LIPID PANEL: CPT | Mod: ORL | Performed by: FAMILY MEDICINE

## 2024-03-04 PROCEDURE — 80069 RENAL FUNCTION PANEL: CPT | Mod: ORL | Performed by: FAMILY MEDICINE

## 2024-03-04 PROCEDURE — 84156 ASSAY OF PROTEIN URINE: CPT | Mod: ORL | Performed by: FAMILY MEDICINE

## 2024-03-05 LAB
ALBUMIN MFR UR ELPH: 28.3 MG/DL
ALBUMIN SERPL BCG-MCNC: 4.3 G/DL (ref 3.5–5.2)
ANION GAP SERPL CALCULATED.3IONS-SCNC: 11 MMOL/L (ref 7–15)
BUN SERPL-MCNC: 28.9 MG/DL (ref 8–23)
CALCIUM SERPL-MCNC: 9.7 MG/DL (ref 8.8–10.2)
CHLORIDE SERPL-SCNC: 101 MMOL/L (ref 98–107)
CHOLEST SERPL-MCNC: 225 MG/DL
CREAT SERPL-MCNC: 1.36 MG/DL (ref 0.51–0.95)
CREAT UR-MCNC: 75.8 MG/DL
DEPRECATED HCO3 PLAS-SCNC: 24 MMOL/L (ref 22–29)
EGFRCR SERPLBLD CKD-EPI 2021: 38 ML/MIN/1.73M2
FASTING STATUS PATIENT QL REPORTED: ABNORMAL
GLUCOSE SERPL-MCNC: 98 MG/DL (ref 70–99)
HDLC SERPL-MCNC: 60 MG/DL
LDLC SERPL CALC-MCNC: 133 MG/DL
MAGNESIUM SERPL-MCNC: 1.7 MG/DL (ref 1.7–2.3)
NONHDLC SERPL-MCNC: 165 MG/DL
PHOSPHATE SERPL-MCNC: 3.4 MG/DL (ref 2.5–4.5)
POTASSIUM SERPL-SCNC: 3.9 MMOL/L (ref 3.4–5.3)
PROT/CREAT 24H UR: 0.37 MG/MG CR (ref 0–0.2)
SODIUM SERPL-SCNC: 136 MMOL/L (ref 135–145)
TRIGL SERPL-MCNC: 160 MG/DL
URATE SERPL-MCNC: 5 MG/DL (ref 2.4–5.7)

## 2024-03-13 ENCOUNTER — TELEPHONE (OUTPATIENT)
Dept: NEUROLOGY | Facility: CLINIC | Age: 89
End: 2024-03-13
Payer: MEDICARE

## 2024-03-13 NOTE — RESULT ENCOUNTER NOTE
MRA brain shows severe stenosis and a blood vessel on the left side of the brain that has progressed compared to previous MRA done in 2021.  Also hardening of the arteries noted in the back of the head in the left posterior cerebral artery.  This is due to your cholesterol being too high and unless we address your risk factors of high cholesterol I am afraid this will continue to progress.  I understand you are intolerant to statin and Repatha is too expensive but during her last visit you were willing to try Crestor.  I would strongly encouraged to continue with Crestor report of MRA as below    MRA BRAIN 3/11/2024  1. Severe focal stenosis of the proximal left M1 segment which is new compared to the previous study. Severe distal left M1 segment stenosis which has increased in severity over the interval. Relatively decreased flow signal in the distal left MCA branches.    2. Diffuse atherosclerotic disease involving the posterior circulation with severe focal stenosis of the left P2 segment and decreased flow signal in the distal left PCA branches.    3. Absent left A1 segment. Patent anterior communicating and posterior communicating arteries.

## 2024-03-13 NOTE — TELEPHONE ENCOUNTER
----- Message from Mark Hummel MD sent at 3/13/2024 10:55 AM CDT -----  MRA brain shows severe stenosis and a blood vessel on the left side of the brain that has progressed compared to previous MRA done in 2021.  Also hardening of the arteries noted in the back of the head in the left posterior cerebral artery.  This is due to your cholesterol being too high and unless we address your risk factors of high cholesterol I am afraid this will continue to progress.  I understand you are intolerant to statin and Repatha is too expensive but during her last visit you were willing to try Crestor.  I would strongly encouraged to continue with Crestor report of MRA as below    MRA BRAIN 3/11/2024  1. Severe focal stenosis of the proximal left M1 segment which is new compared to the previous study. Severe distal left M1 segment stenosis which has increased in severity over the interval. Relatively decreased flow signal in the distal left MCA branches.    2. Diffuse atherosclerotic disease involving the posterior circulation with severe focal stenosis of the left P2 segment and decreased flow signal in the distal left PCA branches.    3. Absent left A1 segment. Patent anterior communicating and posterior communicating arteries.

## 2024-03-14 NOTE — TELEPHONE ENCOUNTER
[facility] Call Center    Phone Message    May a detailed message be left on voicemail: yes     Reason for Call: Patient returning call from [place] with MRI results.  Please call patient back.  Available after 3:30 today.    Action Taken: [facility]    Travel Screening: Not Applicable

## 2024-03-14 NOTE — TELEPHONE ENCOUNTER
Called and left message for patient, requested a call back to clinic to discuss recent MRA imaging and provider comments related to findings and plan of care recommendations.        Mario Alberto Clancy RN, BSN  [facility] Neurology

## 2024-03-14 NOTE — TELEPHONE ENCOUNTER
Called and discussed provider recommendations with regards to recent MRA results showing progression of stenosis (see previous provider note).    Patient agrees with the plan to continue with medications as prescribed (including Crestor). Additional education provided about diet and activity to organically help with cholesterol.    They are agreeable with plan and will call back as needed.    Mario Alberto Clancy RN, BSN  [facility] Neurology and

## 2024-03-24 DIAGNOSIS — I67.2 INTRACRANIAL ATHEROSCLEROSIS: ICD-10-CM

## 2024-03-25 RX ORDER — CLOPIDOGREL BISULFATE 75 MG/1
75 TABLET ORAL DAILY
Qty: 90 TABLET | Refills: 1 | Status: SHIPPED | OUTPATIENT
Start: 2024-03-25 | End: 2024-09-24

## 2024-03-25 NOTE — TELEPHONE ENCOUNTER
Refill request for: clopidogrel (PLAVIX) 75 MG tablet    Directions: TAKE 1 TABLET(75 MG) BY MOUTH DAILY     LOV: 2/27/24  NOV: 9/3/24    90 day supply with 1 refills Medication T'd for review and signature  Lulu Mendoza CMA on 3/25/2024 at 11:43 AM  [facility]

## 2024-04-22 ENCOUNTER — TRANSFERRED RECORDS (OUTPATIENT)
Dept: HEALTH INFORMATION MANAGEMENT | Facility: CLINIC | Age: 89
End: 2024-04-22

## 2024-08-05 ENCOUNTER — LAB REQUISITION (OUTPATIENT)
Dept: LAB | Facility: CLINIC | Age: 89
End: 2024-08-05
Payer: MEDICARE

## 2024-08-05 DIAGNOSIS — N18.32 CHRONIC KIDNEY DISEASE, STAGE 3B (H): ICD-10-CM

## 2024-08-05 DIAGNOSIS — D64.9 ANEMIA, UNSPECIFIED: ICD-10-CM

## 2024-08-05 LAB
ERYTHROCYTE [DISTWIDTH] IN BLOOD BY AUTOMATED COUNT: 13.6 % (ref 10–15)
HCT VFR BLD AUTO: 34.3 % (ref 35–47)
HGB BLD-MCNC: 11 G/DL (ref 11.7–15.7)
MCH RBC QN AUTO: 29.4 PG (ref 26.5–33)
MCHC RBC AUTO-ENTMCNC: 32.1 G/DL (ref 31.5–36.5)
MCV RBC AUTO: 92 FL (ref 78–100)
PHQ9 SCORE: 2
PLATELET # BLD AUTO: 301 10E3/UL (ref 150–450)
RBC # BLD AUTO: 3.74 10E6/UL (ref 3.8–5.2)
WBC # BLD AUTO: 6 10E3/UL (ref 4–11)

## 2024-08-05 PROCEDURE — 80048 BASIC METABOLIC PNL TOTAL CA: CPT | Mod: ORL | Performed by: FAMILY MEDICINE

## 2024-08-05 PROCEDURE — 85027 COMPLETE CBC AUTOMATED: CPT | Mod: ORL | Performed by: FAMILY MEDICINE

## 2024-08-06 LAB
ANION GAP SERPL CALCULATED.3IONS-SCNC: 12 MMOL/L (ref 7–15)
BUN SERPL-MCNC: 14.6 MG/DL (ref 8–23)
CALCIUM SERPL-MCNC: 9 MG/DL (ref 8.8–10.4)
CHLORIDE SERPL-SCNC: 102 MMOL/L (ref 98–107)
CREAT SERPL-MCNC: 1.05 MG/DL (ref 0.51–0.95)
EGFRCR SERPLBLD CKD-EPI 2021: 51 ML/MIN/1.73M2
GLUCOSE SERPL-MCNC: 106 MG/DL (ref 70–99)
HCO3 SERPL-SCNC: 24 MMOL/L (ref 22–29)
POTASSIUM SERPL-SCNC: 3.7 MMOL/L (ref 3.4–5.3)
SODIUM SERPL-SCNC: 138 MMOL/L (ref 135–145)

## 2024-08-12 ENCOUNTER — LAB REQUISITION (OUTPATIENT)
Dept: LAB | Facility: CLINIC | Age: 89
End: 2024-08-12
Payer: MEDICARE

## 2024-08-12 DIAGNOSIS — R35.0 FREQUENCY OF MICTURITION: ICD-10-CM

## 2024-08-12 PROCEDURE — 87086 URINE CULTURE/COLONY COUNT: CPT | Mod: ORL | Performed by: FAMILY MEDICINE

## 2024-08-14 LAB — BACTERIA UR CULT: NORMAL

## 2024-08-25 ENCOUNTER — HOSPITAL ENCOUNTER (INPATIENT)
Facility: HOSPITAL | Age: 89
LOS: 10 days | Discharge: HOME | DRG: 291 | End: 2024-09-04
Attending: EMERGENCY MEDICINE | Admitting: INTERNAL MEDICINE
Payer: MEDICARE

## 2024-08-25 ENCOUNTER — APPOINTMENT (OUTPATIENT)
Dept: RADIOLOGY | Facility: HOSPITAL | Age: 89
DRG: 291 | End: 2024-08-25
Attending: EMERGENCY MEDICINE
Payer: MEDICARE

## 2024-08-25 ENCOUNTER — APPOINTMENT (OUTPATIENT)
Dept: CARDIOLOGY | Facility: HOSPITAL | Age: 89
DRG: 291 | End: 2024-08-25
Attending: EMERGENCY MEDICINE
Payer: MEDICARE

## 2024-08-25 DIAGNOSIS — N18.31 STAGE 3A CHRONIC KIDNEY DISEASE (H): ICD-10-CM

## 2024-08-25 DIAGNOSIS — J96.01 ACUTE RESPIRATORY FAILURE WITH HYPOXIA (H): ICD-10-CM

## 2024-08-25 DIAGNOSIS — R06.09 DOE (DYSPNEA ON EXERTION): Primary | ICD-10-CM

## 2024-08-25 DIAGNOSIS — I50.9 ACUTE ON CHRONIC CONGESTIVE HEART FAILURE, UNSPECIFIED HEART FAILURE TYPE (H): ICD-10-CM

## 2024-08-25 DIAGNOSIS — I16.0 HYPERTENSIVE URGENCY: ICD-10-CM

## 2024-08-25 LAB
ALBUMIN UR-MCNC: NEGATIVE MG/DL
ANION GAP SERPL CALCULATED.3IONS-SCNC: 14 MMOL/L (ref 7–15)
APPEARANCE UR: CLEAR
BACTERIA #/AREA URNS HPF: NORMAL /HPF
BASE EXCESS BLDV CALC-SCNC: 2.4 MMOL/L (ref -3–3)
BASOPHILS # BLD AUTO: 0 10E3/UL (ref 0–0.2)
BASOPHILS NFR BLD AUTO: 1 %
BILIRUB UR QL STRIP: NEGATIVE
BUN SERPL-MCNC: 20 MG/DL (ref 8–23)
CALCIUM SERPL-MCNC: 9.6 MG/DL (ref 8.8–10.4)
CHLORIDE SERPL-SCNC: 104 MMOL/L (ref 98–107)
COLOR UR AUTO: COLORLESS
CREAT SERPL-MCNC: 1.13 MG/DL (ref 0.51–0.95)
D DIMER PPP FEU-MCNC: 1.25 UG/ML FEU (ref 0–0.5)
EGFRCR SERPLBLD CKD-EPI 2021: 47 ML/MIN/1.73M2
EOSINOPHIL # BLD AUTO: 0.2 10E3/UL (ref 0–0.7)
EOSINOPHIL NFR BLD AUTO: 3 %
ERYTHROCYTE [DISTWIDTH] IN BLOOD BY AUTOMATED COUNT: 13.6 % (ref 10–15)
FLUAV RNA SPEC QL NAA+PROBE: NEGATIVE
FLUBV RNA RESP QL NAA+PROBE: NEGATIVE
GLUCOSE SERPL-MCNC: 97 MG/DL (ref 70–99)
GLUCOSE UR STRIP-MCNC: NEGATIVE MG/DL
HCO3 BLDV-SCNC: 28 MMOL/L (ref 21–28)
HCO3 SERPL-SCNC: 24 MMOL/L (ref 22–29)
HCT VFR BLD AUTO: 40.5 % (ref 35–47)
HGB BLD-MCNC: 12.7 G/DL (ref 11.7–15.7)
HGB UR QL STRIP: NEGATIVE
IMM GRANULOCYTES # BLD: 0 10E3/UL
IMM GRANULOCYTES NFR BLD: 0 %
KETONES UR STRIP-MCNC: NEGATIVE MG/DL
LACTATE SERPL-SCNC: 1.1 MMOL/L (ref 0.7–2)
LEUKOCYTE ESTERASE UR QL STRIP: NEGATIVE
LVEF ECHO: NORMAL
LYMPHOCYTES # BLD AUTO: 1.9 10E3/UL (ref 0.8–5.3)
LYMPHOCYTES NFR BLD AUTO: 34 %
MAGNESIUM SERPL-MCNC: 1.8 MG/DL (ref 1.7–2.3)
MCH RBC QN AUTO: 28.6 PG (ref 26.5–33)
MCHC RBC AUTO-ENTMCNC: 31.4 G/DL (ref 31.5–36.5)
MCV RBC AUTO: 91 FL (ref 78–100)
MONOCYTES # BLD AUTO: 0.5 10E3/UL (ref 0–1.3)
MONOCYTES NFR BLD AUTO: 9 %
NEUTROPHILS # BLD AUTO: 2.9 10E3/UL (ref 1.6–8.3)
NEUTROPHILS NFR BLD AUTO: 53 %
NITRATE UR QL: NEGATIVE
NRBC # BLD AUTO: 0 10E3/UL
NRBC BLD AUTO-RTO: 0 /100
NT-PROBNP SERPL-MCNC: 7336 PG/ML (ref 0–1800)
O2/TOTAL GAS SETTING VFR VENT: 21 %
OXYHGB MFR BLDV: 33 % (ref 70–75)
PCO2 BLDV: 52 MM HG (ref 40–50)
PH BLDV: 7.34 [PH] (ref 7.32–7.43)
PH UR STRIP: 5 [PH] (ref 5–7)
PLATELET # BLD AUTO: 283 10E3/UL (ref 150–450)
PO2 BLDV: 23 MM HG (ref 25–47)
POTASSIUM SERPL-SCNC: 3.7 MMOL/L (ref 3.4–5.3)
PROCALCITONIN SERPL IA-MCNC: 0.09 NG/ML
RBC # BLD AUTO: 4.44 10E6/UL (ref 3.8–5.2)
RBC URINE: 0 /HPF
RSV RNA SPEC NAA+PROBE: NEGATIVE
SAO2 % BLDV: 33.5 % (ref 70–75)
SARS-COV-2 RNA RESP QL NAA+PROBE: NEGATIVE
SODIUM SERPL-SCNC: 142 MMOL/L (ref 135–145)
SP GR UR STRIP: 1.01 (ref 1–1.03)
SQUAMOUS EPITHELIAL: <1 /HPF
TROPONIN T SERPL HS-MCNC: 20 NG/L
TROPONIN T SERPL HS-MCNC: 21 NG/L
TROPONIN T SERPL HS-MCNC: 23 NG/L
UROBILINOGEN UR STRIP-MCNC: <2 MG/DL
WBC # BLD AUTO: 5.5 10E3/UL (ref 4–11)
WBC URINE: <1 /HPF

## 2024-08-25 PROCEDURE — 250N000009 HC RX 250: Performed by: EMERGENCY MEDICINE

## 2024-08-25 PROCEDURE — 80048 BASIC METABOLIC PNL TOTAL CA: CPT | Performed by: EMERGENCY MEDICINE

## 2024-08-25 PROCEDURE — 255N000002 HC RX 255 OP 636: Performed by: EMERGENCY MEDICINE

## 2024-08-25 PROCEDURE — 71045 X-RAY EXAM CHEST 1 VIEW: CPT

## 2024-08-25 PROCEDURE — 250N000013 HC RX MED GY IP 250 OP 250 PS 637: Performed by: INTERNAL MEDICINE

## 2024-08-25 PROCEDURE — 5A09357 ASSISTANCE WITH RESPIRATORY VENTILATION, LESS THAN 24 CONSECUTIVE HOURS, CONTINUOUS POSITIVE AIRWAY PRESSURE: ICD-10-PCS | Performed by: INTERNAL MEDICINE

## 2024-08-25 PROCEDURE — 87637 SARSCOV2&INF A&B&RSV AMP PRB: CPT | Performed by: EMERGENCY MEDICINE

## 2024-08-25 PROCEDURE — 36415 COLL VENOUS BLD VENIPUNCTURE: CPT | Performed by: EMERGENCY MEDICINE

## 2024-08-25 PROCEDURE — 82805 BLOOD GASES W/O2 SATURATION: CPT | Performed by: EMERGENCY MEDICINE

## 2024-08-25 PROCEDURE — 94660 CPAP INITIATION&MGMT: CPT

## 2024-08-25 PROCEDURE — 99223 1ST HOSP IP/OBS HIGH 75: CPT | Mod: AI | Performed by: INTERNAL MEDICINE

## 2024-08-25 PROCEDURE — 85379 FIBRIN DEGRADATION QUANT: CPT | Performed by: EMERGENCY MEDICINE

## 2024-08-25 PROCEDURE — 83735 ASSAY OF MAGNESIUM: CPT | Performed by: EMERGENCY MEDICINE

## 2024-08-25 PROCEDURE — 999N000208 ECHOCARDIOGRAM COMPLETE

## 2024-08-25 PROCEDURE — 83880 ASSAY OF NATRIURETIC PEPTIDE: CPT | Performed by: EMERGENCY MEDICINE

## 2024-08-25 PROCEDURE — 250N000013 HC RX MED GY IP 250 OP 250 PS 637: Performed by: EMERGENCY MEDICINE

## 2024-08-25 PROCEDURE — 96374 THER/PROPH/DIAG INJ IV PUSH: CPT

## 2024-08-25 PROCEDURE — 93005 ELECTROCARDIOGRAM TRACING: CPT | Performed by: EMERGENCY MEDICINE

## 2024-08-25 PROCEDURE — 81001 URINALYSIS AUTO W/SCOPE: CPT | Performed by: INTERNAL MEDICINE

## 2024-08-25 PROCEDURE — 85025 COMPLETE CBC W/AUTO DIFF WBC: CPT | Performed by: EMERGENCY MEDICINE

## 2024-08-25 PROCEDURE — 999N000157 HC STATISTIC RCP TIME EA 10 MIN

## 2024-08-25 PROCEDURE — 36415 COLL VENOUS BLD VENIPUNCTURE: CPT | Performed by: INTERNAL MEDICINE

## 2024-08-25 PROCEDURE — 99291 CRITICAL CARE FIRST HOUR: CPT | Mod: 25

## 2024-08-25 PROCEDURE — 99223 1ST HOSP IP/OBS HIGH 75: CPT | Performed by: INTERNAL MEDICINE

## 2024-08-25 PROCEDURE — 84484 ASSAY OF TROPONIN QUANT: CPT | Performed by: EMERGENCY MEDICINE

## 2024-08-25 PROCEDURE — 84484 ASSAY OF TROPONIN QUANT: CPT | Performed by: INTERNAL MEDICINE

## 2024-08-25 PROCEDURE — 250N000011 HC RX IP 250 OP 636: Performed by: EMERGENCY MEDICINE

## 2024-08-25 PROCEDURE — 93306 TTE W/DOPPLER COMPLETE: CPT | Mod: 26 | Performed by: INTERNAL MEDICINE

## 2024-08-25 PROCEDURE — 999N000156 HC STATISTIC RCP CONSULT EA 30 MIN

## 2024-08-25 PROCEDURE — 84145 PROCALCITONIN (PCT): CPT | Performed by: EMERGENCY MEDICINE

## 2024-08-25 PROCEDURE — 250N000009 HC RX 250: Performed by: INTERNAL MEDICINE

## 2024-08-25 PROCEDURE — 120N000004 HC R&B MS OVERFLOW

## 2024-08-25 PROCEDURE — 87040 BLOOD CULTURE FOR BACTERIA: CPT | Performed by: EMERGENCY MEDICINE

## 2024-08-25 PROCEDURE — 83605 ASSAY OF LACTIC ACID: CPT | Performed by: EMERGENCY MEDICINE

## 2024-08-25 PROCEDURE — 250N000011 HC RX IP 250 OP 636: Performed by: INTERNAL MEDICINE

## 2024-08-25 RX ORDER — BUMETANIDE 0.25 MG/ML
2 INJECTION INTRAMUSCULAR; INTRAVENOUS ONCE
Status: COMPLETED | OUTPATIENT
Start: 2024-08-25 | End: 2024-08-25

## 2024-08-25 RX ORDER — LIDOCAINE 40 MG/G
CREAM TOPICAL
Status: DISCONTINUED | OUTPATIENT
Start: 2024-08-25 | End: 2024-09-04 | Stop reason: HOSPADM

## 2024-08-25 RX ORDER — ASPIRIN 325 MG
325 TABLET ORAL ONCE
Status: COMPLETED | OUTPATIENT
Start: 2024-08-25 | End: 2024-08-25

## 2024-08-25 RX ORDER — LOSARTAN POTASSIUM 50 MG/1
50 TABLET ORAL 2 TIMES DAILY
Status: DISCONTINUED | OUTPATIENT
Start: 2024-08-25 | End: 2024-08-29

## 2024-08-25 RX ORDER — FAMOTIDINE 10 MG
10 TABLET ORAL DAILY
Status: DISCONTINUED | OUTPATIENT
Start: 2024-08-25 | End: 2024-09-04 | Stop reason: HOSPADM

## 2024-08-25 RX ORDER — FUROSEMIDE 10 MG/ML
80 INJECTION INTRAMUSCULAR; INTRAVENOUS ONCE
Status: DISCONTINUED | OUTPATIENT
Start: 2024-08-25 | End: 2024-08-25

## 2024-08-25 RX ORDER — METOPROLOL TARTRATE 25 MG/1
25 TABLET, FILM COATED ORAL ONCE
Status: COMPLETED | OUTPATIENT
Start: 2024-08-25 | End: 2024-08-25

## 2024-08-25 RX ORDER — AMOXICILLIN 250 MG
2 CAPSULE ORAL 2 TIMES DAILY PRN
Status: DISCONTINUED | OUTPATIENT
Start: 2024-08-25 | End: 2024-09-04 | Stop reason: HOSPADM

## 2024-08-25 RX ORDER — ASPIRIN 81 MG/1
81 TABLET ORAL DAILY
Status: DISCONTINUED | OUTPATIENT
Start: 2024-08-26 | End: 2024-09-04 | Stop reason: HOSPADM

## 2024-08-25 RX ORDER — ROSUVASTATIN CALCIUM 10 MG/1
10 TABLET, COATED ORAL DAILY
Status: DISCONTINUED | OUTPATIENT
Start: 2024-08-25 | End: 2024-09-04 | Stop reason: HOSPADM

## 2024-08-25 RX ORDER — HYDRALAZINE HYDROCHLORIDE 10 MG/1
10 TABLET, FILM COATED ORAL EVERY 4 HOURS PRN
Status: DISCONTINUED | OUTPATIENT
Start: 2024-08-25 | End: 2024-09-04 | Stop reason: HOSPADM

## 2024-08-25 RX ORDER — METOPROLOL SUCCINATE 50 MG/1
50 TABLET, EXTENDED RELEASE ORAL AT BEDTIME
Status: DISCONTINUED | OUTPATIENT
Start: 2024-08-25 | End: 2024-09-04 | Stop reason: HOSPADM

## 2024-08-25 RX ORDER — CALCIUM CARBONATE 500 MG/1
1000 TABLET, CHEWABLE ORAL 4 TIMES DAILY PRN
Status: DISCONTINUED | OUTPATIENT
Start: 2024-08-25 | End: 2024-09-04 | Stop reason: HOSPADM

## 2024-08-25 RX ORDER — CLOPIDOGREL BISULFATE 75 MG/1
75 TABLET ORAL DAILY
Status: DISCONTINUED | OUTPATIENT
Start: 2024-08-25 | End: 2024-09-04 | Stop reason: HOSPADM

## 2024-08-25 RX ORDER — HYDRALAZINE HYDROCHLORIDE 20 MG/ML
10 INJECTION INTRAMUSCULAR; INTRAVENOUS EVERY 4 HOURS PRN
Status: DISCONTINUED | OUTPATIENT
Start: 2024-08-25 | End: 2024-09-04 | Stop reason: HOSPADM

## 2024-08-25 RX ORDER — FUROSEMIDE 10 MG/ML
40 INJECTION INTRAMUSCULAR; INTRAVENOUS
Status: DISCONTINUED | OUTPATIENT
Start: 2024-08-25 | End: 2024-08-25

## 2024-08-25 RX ORDER — AMOXICILLIN 250 MG
1 CAPSULE ORAL 2 TIMES DAILY PRN
Status: DISCONTINUED | OUTPATIENT
Start: 2024-08-25 | End: 2024-09-04 | Stop reason: HOSPADM

## 2024-08-25 RX ORDER — METOPROLOL TARTRATE 1 MG/ML
5 INJECTION, SOLUTION INTRAVENOUS ONCE
Status: COMPLETED | OUTPATIENT
Start: 2024-08-25 | End: 2024-08-25

## 2024-08-25 RX ORDER — ENOXAPARIN SODIUM 100 MG/ML
40 INJECTION SUBCUTANEOUS EVERY 24 HOURS
Status: DISCONTINUED | OUTPATIENT
Start: 2024-08-25 | End: 2024-08-26

## 2024-08-25 RX ORDER — FUROSEMIDE 10 MG/ML
40 INJECTION INTRAMUSCULAR; INTRAVENOUS ONCE
Status: COMPLETED | OUTPATIENT
Start: 2024-08-25 | End: 2024-08-25

## 2024-08-25 RX ORDER — CLONAZEPAM 0.5 MG/1
0.5 TABLET ORAL 3 TIMES DAILY
Status: DISCONTINUED | OUTPATIENT
Start: 2024-08-25 | End: 2024-09-04 | Stop reason: HOSPADM

## 2024-08-25 RX ORDER — ACETAMINOPHEN 500 MG
1000 TABLET ORAL 3 TIMES DAILY
Status: DISCONTINUED | OUTPATIENT
Start: 2024-08-25 | End: 2024-09-04 | Stop reason: HOSPADM

## 2024-08-25 RX ADMIN — CLONAZEPAM 0.5 MG: 0.5 TABLET ORAL at 13:32

## 2024-08-25 RX ADMIN — CLOPIDOGREL BISULFATE 75 MG: 75 TABLET ORAL at 13:33

## 2024-08-25 RX ADMIN — ACETAMINOPHEN 1000 MG: 500 TABLET ORAL at 13:32

## 2024-08-25 RX ADMIN — NITROGLYCERIN 7.5 MG: 20 OINTMENT TOPICAL at 08:53

## 2024-08-25 RX ADMIN — FUROSEMIDE 40 MG: 10 INJECTION, SOLUTION INTRAMUSCULAR; INTRAVENOUS at 08:45

## 2024-08-25 RX ADMIN — METOPROLOL SUCCINATE 50 MG: 50 TABLET, EXTENDED RELEASE ORAL at 19:58

## 2024-08-25 RX ADMIN — LOSARTAN POTASSIUM 50 MG: 50 TABLET, FILM COATED ORAL at 19:58

## 2024-08-25 RX ADMIN — CLONAZEPAM 0.5 MG: 0.5 TABLET ORAL at 19:57

## 2024-08-25 RX ADMIN — METOPROLOL TARTRATE 5 MG: 5 INJECTION INTRAVENOUS at 06:57

## 2024-08-25 RX ADMIN — BUMETANIDE 1 MG/HR: 0.25 INJECTION INTRAMUSCULAR; INTRAVENOUS at 13:54

## 2024-08-25 RX ADMIN — PERFLUTREN 2 ML: 6.52 INJECTION, SUSPENSION INTRAVENOUS at 10:21

## 2024-08-25 RX ADMIN — ACETAMINOPHEN 1000 MG: 500 TABLET ORAL at 19:58

## 2024-08-25 RX ADMIN — BUMETANIDE 2 MG: 0.25 INJECTION INTRAMUSCULAR; INTRAVENOUS at 13:33

## 2024-08-25 RX ADMIN — METOPROLOL TARTRATE 25 MG: 25 TABLET, FILM COATED ORAL at 06:57

## 2024-08-25 RX ADMIN — METOPROLOL TARTRATE 25 MG: 25 TABLET, FILM COATED ORAL at 08:48

## 2024-08-25 RX ADMIN — ASPIRIN 325 MG ORAL TABLET 325 MG: 325 PILL ORAL at 06:57

## 2024-08-25 RX ADMIN — ROSUVASTATIN 10 MG: 10 TABLET, FILM COATED ORAL at 13:32

## 2024-08-25 RX ADMIN — METOPROLOL TARTRATE 5 MG: 5 INJECTION INTRAVENOUS at 08:49

## 2024-08-25 RX ADMIN — LOSARTAN POTASSIUM 50 MG: 50 TABLET, FILM COATED ORAL at 13:35

## 2024-08-25 RX ADMIN — FAMOTIDINE 10 MG: 10 TABLET ORAL at 13:32

## 2024-08-25 ASSESSMENT — ACTIVITIES OF DAILY LIVING (ADL)
ADLS_ACUITY_SCORE: 27
ADLS_ACUITY_SCORE: 27
ADLS_ACUITY_SCORE: 38
ADLS_ACUITY_SCORE: 38
ADLS_ACUITY_SCORE: 27
ADLS_ACUITY_SCORE: 38
ADLS_ACUITY_SCORE: 27
DEPENDENT_IADLS:: CLEANING;SHOPPING
ADLS_ACUITY_SCORE: 27
ADLS_ACUITY_SCORE: 38
ADLS_ACUITY_SCORE: 38
ADLS_ACUITY_SCORE: 27

## 2024-08-25 NOTE — PHARMACY-ADMISSION MEDICATION HISTORY
Pharmacy Intern Admission Medication History    Admission medication history is complete. The information provided in this note is only as accurate as the sources available at the time of the update.    Information Source(s): Patient and CareEverywhere/SureScripts via in-person    Pertinent Information: Assessed allergies with patient; Confirmed they have been using metoprolol with no issues (This is already noted under the allergy). Patient has had sensitivities to several antibiotics since the introduction to generic versions per patient, as seen on the allergy list as well. Patient adheres to Crestor, but stated they don't feel it is very effective.     Changes made to PTA medication list:  Added: None  Deleted: Diclofenac; patient stopped it over a month ago due to it not being effective.   Changed: Famotidine, metoprolol    Allergies reviewed with patient and updates made in EHR: yes    Medication History Completed By: SHAYLA PORTILLO 8/25/2024 8:00 AM    PTA Med List   Medication Sig Last Dose    acetaminophen (TYLENOL) 500 MG tablet Take 1,000 mg by mouth 3 times daily 8/25/2024 at 0045    aspirin 81 MG EC tablet Take 81 mg by mouth daily 8/24/2024 at AM    Calcium Carbonate Antacid (TUMS ULTRA 1000 PO) Take 1 chew tab by mouth nightly as needed Unknown at prn    Cholecalciferol (VITAMIN D3) 50 MCG (2000 UT) CAPS Take 1 tablet by mouth daily 8/24/2024 at AM    clonazePAM (KLONOPIN) 0.5 MG tablet Take 0.5 mg by mouth 3 times daily 8/25/2024 at 0045    clopidogrel (PLAVIX) 75 MG tablet TAKE 1 TABLET(75 MG) BY MOUTH DAILY 8/24/2024 at AM    famotidine (PEPCID) 10 MG tablet Take 10 mg by mouth daily. 8/24/2024 at AM    losartan (COZAAR) 50 MG tablet Take 50 mg by mouth 2 times daily 8/24/2024 at PM    metoprolol succinate ER (TOPROL XL) 25 MG 24 hr tablet Take 50 mg by mouth at bedtime. 8/24/2024 at 2200    rosuvastatin (CRESTOR) 10 MG tablet Take 1 tablet (10 mg) by mouth daily 8/24/2024 at AM

## 2024-08-25 NOTE — ED TRIAGE NOTES
[place] from [place], c/o SOB this morning when she woke up. Also endorsing some chest pressure. Hx of heart disease.      Triage Assessment (Adult)       Row Name 08/25/24 0631          Triage Assessment    Airway WDL WDL        Respiratory WDL    Respiratory WDL X;rhythm/pattern     Rhythm/Pattern, Respiratory shortness of breath        Cardiac WDL    Cardiac WDL X;chest pain        Chest Pain Assessment    Chest Pain Location midsternal     Character pressure                      normal... Well appearing, awake, alert, oriented to person, place, time/situation and in no apparent distress.

## 2024-08-25 NOTE — TREATMENT PLAN
RCAT Treatment Plan    Patient Score: 10  Patient Acuity: 4     Clinical Indication for Therapy: Increased WOB    Therapy Ordered: (No Albuterol/xopenex due to Allergy alert.) Contacting attending to suggest/request orders for Bipap PRN for WOB and fluid overload.    Assessment Summary: BS clear and diminished. Upper airway forced expiratory wheezing in throat. 97% on 4 LPM nasal cannula. BNP 8,333. RR 28 and using accessory muscles. Suggest Bipap.      Venous Gas this AM 7.34, pCO2 52 , Bicarb 28, pO2 23    Radha Riddle, RT  8/25/2024

## 2024-08-25 NOTE — ED NOTES
Bed: JNED-26  Expected date: 8/25/24  Expected time: 6:18 AM  Means of arrival: Ambulance  Comments:  MPL- 89yof from [place], sob, RA 88%, 3L - 97%, 203/92

## 2024-08-25 NOTE — TREATMENT PLAN
RCAT Treatment Plan    Patient Score: 11  Patient Acuity: 3    Clinical Indication for Therapy: bronchospasm    Therapy Ordered: Currently nothing ordered.    Assessment Summary: PT on 4 LPM N/C, SP02 99%. BS diminished with crackles and wheezes, moderate dyspnea noted. Recommend Levalbuterol Q4 PRN. May change to scheduled if effective.     Demarco Church, RT  8/25/2024

## 2024-08-25 NOTE — PROGRESS NOTES
RESPIRATORY CARE NOTE     1620 removed pt from BiPAP and placed on 2 Lpm nasal cannula.  Pt tolerating well; SpO2 97%.     Jeb Flowers, RT

## 2024-08-25 NOTE — CONSULTS
"Care Management Initial Consult    General Information  Assessment completed with: Kerry Bear  Type of CM/SW Visit: Initial Assessment    Primary Care Provider verified and updated as needed: Yes   Readmission within the last 30 days: no previous admission in last 30 days      Reason for Consult: discharge planning  Advance Care Planning: Advance Care Planning Reviewed: present on chart          Communication Assessment  Patient's communication style: spoken language (English or Bilingual)             Cognitive  Cognitive/Neuro/Behavioral:                        Living Environment:   People in home: alone     Current living Arrangements: apartment, independent living facility ([facility])      Able to return to prior arrangements: yes       Family/Social Support:  Care provided by: self  Provides care for: no one     Other (specify) (\"nephew in law Riley, grand nephew García, Norris nephew)          Description of Support System: Supportive, Involved    Support Assessment: Adequate family and caregiver support, Adequate social supports, Patient communicates needs well met    Current Resources:   Patient receiving home care services: No     Community Resources: Housekeeping/Chore Agency (\"[facility] [place] provides me with 15 minutes of housekeeping help every Thursday. They help me with changing my sheets on my bed. I also pay $220/month for 1 meal a day, but it isn't good so I normally make all my meals on my own\".)  Equipment currently used at home: wheelchair, power, cane, straight (\"I mostly use my scooter because of my bad knees. I sometimes have to use my cane around the apartment because the scooter doesn't fit into the bathroom\".)  Supplies currently used at home: Incontinence Supplies, Other (\"incontinent pad, dentures, reading glasses\")    Employment/Financial:  Employment Status: retired     Employment/ Comments: \"no  " "history\"  Financial Concerns: none   Referral to Financial Worker: No       Does the patient's insurance plan have a 3 day qualifying hospital stay waiver?  Yes     Which insurance plan 3 day waiver is available? [employer]    Will the waiver be used for post-acute placement? Undetermined at this time    Lifestyle & Psychosocial Needs:  Social Determinants of Health     Food Insecurity: Not on file   Depression: Not at risk (2/27/2024)    PHQ-2     PHQ-2 Score: 0   Housing Stability: Not on file   Tobacco Use: Medium Risk (2/27/2024)    Patient History     Smoking Tobacco Use: Former     Smokeless Tobacco Use: Never     Passive Exposure: Not on file   Financial Resource Strain: Not on file   Alcohol Use: Not on file   Transportation Needs: Not on file   Physical Activity: Not on file   Interpersonal Safety: Not on file   Stress: Not on file   Social Connections: Not on file   Health Literacy: Not on file       Functional Status:  Prior to admission patient needed assistance:   Dependent ADLs:: Wheelchair-independent, Ambulation-cane, Independent  Dependent IADLs:: Cleaning, Shopping (\"I do most of the cleaning on my own. They only help with changing my bed sheets. I do drive, but my nephew Norris can help also and he helps me sometimes with shopping. If I drive I use the drive thru window at [employer] or the [employer].\")  Assesssment of Functional Status: At functional baseline    Mental Health Status:  Mental Health Status: No Current Concerns       Chemical Dependency Status:  Chemical Dependency Status: No Current Concerns             Values/Beliefs:  Spiritual, Cultural Beliefs, Congregational Practices, Values that affect care: yes  Description of Beliefs that Will Affect Care: Yazdanism    Cultural/Congregational Practices Patient Routinely Participates In: ceremony, prayer       Additional Information:  Kerry lives at [facility] apartment alone.     She is independent with ADLs and " "most IADLs. \"[employer] [facility] [place] provides me with 15 minutes of housekeeping help every Thursday. They help me with changing my sheets on my bed. I do the rest of the cleaning on my own. I also pay $220/month for 1 meal a day, but it isn't good so I normally make all my meals on my own. I do drive, but my nephew Norris can help also and he helps me sometimes with shopping. If I drive I use the drive thru window at [employer] or the [employer].\"    \"I mostly use my scooter because of my bad knees. I sometimes have to use my cane around the apartment because the scooter doesn't fit into the bathroom\".    Unknown discharge needs at this time, but should be able to return home.     Family to transport at discharge.    CM to follow for medical progression of care, discharge recommendations, and final discharge plan.    Antonia Gardner RN    "

## 2024-08-25 NOTE — PROGRESS NOTES
"[facility] ED Handoff Report    ED Chief Complaint: Shortness of breath    ED Diagnosis:  (I50.9) Acute on chronic congestive heart failure, unspecified heart failure type (H)  Comment:   Plan: Diurese/Manage BP    (I16.0) Hypertensive urgency  Comment:   Plan:     (J96.01) Acute respiratory failure with hypoxia (H)  Comment:   Plan:        PMH:    Past Medical History:   Diagnosis Date    Anxiety     takes clonazepam    Asthma     per H & P     Chronic kidney disease     stage 3 per H & P     CKD (chronic kidney disease)     Clostridium difficile colitis 11/01/2016    Clostridium difficile infection     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    Clostridium enterocolitis 10/27/2016    CTS (carpal tunnel syndrome)     GERD (gastroesophageal reflux disease)     per H & P     Hiatal hernia     small per H & P     Hyperlipidemia     Hyperlipidemia     Hyperlipidemia     Hypertension     Hypertension     Osteoarthritis of knee     per H & P     Spinal stenosis     per H & P     Vitamin D deficiency     per H & P        Code Status:  No CPR- Do NOT Intubate     Falls Risk: Yes Band: Applied    Current Living Situation/Residence: lives alone     Elimination Status: Continent: Yes     Activity Level: 2 assist    Patients Preferred Language:  English     Needed: No    Vital Signs:  BP (!) 197/82   Pulse 73   Temp 97.8  F (36.6  C) (Oral)   Resp (!) 35   Ht 1.549 m (5' 1\")   Wt 73 kg (161 lb)   LMP  (LMP Unknown)   SpO2 97%   BMI 30.42 kg/m       Cardiac Rhythm: Sinus with PAC's     Pain Score: 1/10    Is the Patient Confused:  No    Last Food or Drink: 8/24/24     Focused Assessment:      Tests Performed: Done: Labs and Imaging    Treatments Provided:  Meds    Family Dynamics/Concerns: No    Family Updated On Visitor Policy: No    Plan of Care Communicated to Family: No          Belongings Sent with Patient: Purse and clothing, cell phone    Medications sent with patient:     Covid: symptomatic, " negative    Additional Information: Was able to stand to bedside with assistance but due to respiratory status would not get Pt out of bed.     RN: Robert Carreon RN   8/25/2024 9:39 AM

## 2024-08-25 NOTE — PLAN OF CARE
Problem: Heart Failure  Goal: Optimal Functional Ability  Intervention: Optimize Functional Ability  Recent Flowsheet Documentation  Taken 8/25/2024 1100 by Iliana Larsen, RN  Activity Management: bedrest  Goal: Effective Oxygenation and Ventilation  Intervention: Promote Airway Secretion Clearance  Recent Flowsheet Documentation  Taken 8/25/2024 1100 by Iliana Larsen, RN  Activity Management: bedrest  Goal: Effective Breathing Pattern During Sleep  Intervention: Monitor and Manage Obstructive Sleep Apnea  Recent Flowsheet Documentation  Taken 8/25/2024 1100 by Iliana Larsen, RN  Medication Review/Management: medications reviewed     Problem: Gas Exchange Impaired  Goal: Optimal Gas Exchange  Outcome: Progressing     Problem: Chest Pain  Goal: Resolution of Chest Pain Symptoms  Outcome: Progressing   Goal Outcome Evaluation:    Admit from ED at 1100.  Pt very SOB, R-32.  /106.  Pt said she is very tired.  Could not lay back. Pt could only breath sitting up 90 degrees. On NC 4L 96%. A & O x 4.  NSR.  Bipap placed.  Pt much more relaxed, sleeping.  Respirations 16. /67. Pt c/o midsternal chest pain that traveled through to her back between her shoulder blades 6/10.  Pain went away after bipap started. Uses scooter at home.  Does not walk. Will need assist of 2 with walker for pivot transfer to BSC per pt.  K+3.7.  Purwick on.  UA sent.  Bumex 0.5 mg/hr continuous started at 1400.  Output 1200 ml.  Lungs crackles on bases.  Edema +3 legs.

## 2024-08-25 NOTE — PLAN OF CARE
Goal Outcome Evaluation:      Plan of Care Reviewed With: patient          Outcome Evaluation: Unknown discharge needs at this time, but should be able to return home.

## 2024-08-25 NOTE — ED PROVIDER NOTES
"EMERGENCY DEPARTMENT ENCOUNTER      NAME: Kerry Hoffmann  AGE: 89 year old female  YOB: 1935  MRN: [id number]  EVALUATION DATE & TIME: 8/25/2024  6:27 AM    PCP: Kaylyn Bolanos    ED PROVIDER: Marilyn Franco M.D.      Chief Complaint   Patient presents with    Shortness of Breath         FINAL IMPRESSION:  1. Acute on chronic congestive heart failure, unspecified heart failure type (H)    2. Hypertensive urgency    3. Acute respiratory failure with hypoxia (H)          ED COURSE & MEDICAL DECISION MAKING:    ED Course as of 08/25/24 0810   Sun Aug 25, 2024   0637 Patient BIB EMS with chest pressure which she has had before with angiogram in 11/2023 without severe CAD/stents and on daily aSA with plavix, SOB with some mild tachypnea and retractions with leg edema but no JVD, no hepatojugular reflux and surprisingly without rales auscultated, no COPD or asthma history and no prolongation of expiratory phase appreciated or wheezing and good air entry with sat 93% on 2L NC and she does not normally wear O2 for any reason, pending stat portable CXR, denies cough and fever but coughs a bit on examination, cutlures & lactic acid pending with CBC and procalcitonin specifically to look for PNA and if so to determine ideal treatment. IVF not begun as BP is elevated with known chronic HTN on metoprolol and potential CHF, BNP pending with BMP and CXR to ensure no substantial cephalization with leg edema on lasix and given metoprolol which she does already take for HTN to bring BP down in case HTN urgency/emergency. EKG reassuring, troponin pending for ACS r/o. Patient wtih 47 listed \"allergies\" with most non-allergic responses and with her taking daily many of the things listed as allergy ie: daily ASA and metoprolol, given medications known to tolerate and will closely monitor effects and review historic effects prior to administering medications to attempt to tease out what may represent true allergy from what " may not be on allergy spectrum of responses, patietn ok with plan. No h/o DVT/PE and with bilateral chronic leg edema, no clear signs of DVT but with lungs clear to auscultation and PERC+ wells PE score low risk without risk factors known, ddimer pending and if elevated will CTA r/o PE. No murmur to suggest acute CHF decompensation due to valve pathology and heart well auscultated which is reassuring. She does report chronic urinary frequency, and on chart review it does appear this is a known phenomenon, pending UA to ensure no developing UTI   0747 Ddimer elevated, thus I attempted to order CTA to r/o PE but unfortunately she has allergy of rash listed to IV contrast and in addition allergic to desensitization protocol and its alternatives therefore VQ scan ordered to r/o PE. pH WNL and WBC normal, lactic acid reassuring.   0801 Viral PCR negative. Initial troponin = 21, repeat 2h delta troponin ordered for 0852   0801 Portable CXR with pulmonary vascular congestion consistent with CHF exacerbation with mild developing pulmonary edema and with that with hypoxia in elderly patient, and VQ scan not able to schedule  until 10am on Sunday, will begin diuresis and lasix 40mg IV ordered with NTG paste 0.5 inch to chest wall with further metoprolol with HTN urgency noted and CHF exacerbation. I chose 40mg IV lasix as she does take that only more recently with leg edema but not for CHF specifically, and echo 8/2023 with EF 55-60% , with possible interval decompensation and CHF as the yet undetermined etiology of the leg edema which is bilateral and equal. On chart review, she had elevated BNP a year ago but normal 8 months ago, repeat echo ordered by me so she can undergo PE rule out with VQ scan while admitted along with echo to deterine if EF is diminished in new way while diuresing, and NTG topical easy to remove in elderly patient and will improve CHF symptoms and addition will bring BP down. Further metoprolol oral  and IV begun also, viral PCR negative.    0808 Patient endorsed to hospitalist Dr Sharpe to cardiac tele inpatient and he will continue diuresis as needed after 40mg IV initial bolus, trend BP and f/u results of VQ scan and echo which will both occur later today with patient stabilization.       Pertinent Labs & Imaging studies reviewed. (See chart for details)      At the conclusion of the encounter I discussed the results of all of the tests and the disposition. The questions were answered. The patient or family acknowledged understanding and was agreeable with the care plan.     Critical Care time was 45 minutes for this patient excluding procedures.      MEDICATIONS GIVEN IN THE EMERGENCY:  Medications   furosemide (LASIX) injection 40 mg (has no administration in time range)   nitroGLYcerin (NITRO-BID) 2 % ointment 7.5 mg (has no administration in time range)   metoprolol tartrate (LOPRESSOR) tablet 25 mg (has no administration in time range)   metoprolol (LOPRESSOR) injection 5 mg (has no administration in time range)   aspirin (ASA) tablet 325 mg (325 mg Oral $Given 8/25/24 0657)   metoprolol tartrate (LOPRESSOR) tablet 25 mg (25 mg Oral $Given 8/25/24 0657)   metoprolol (LOPRESSOR) injection 5 mg (5 mg Intravenous $Given 8/25/24 0657)       NEW PRESCRIPTIONS STARTED AT TODAY'S ER VISIT  New Prescriptions    No medications on file          =================================================================    HPI      Kerry Hoffmann is a 89 year old female with PMHx of A-fib, HTN, heart disease, GERD, and HLD, who presents to the ED today via ambulance with shortness of breath and chest pressure.    Per EMS, reports patient woke up in the morning feeling short of breath. She was sating at 88 to 90% on room air. She was recently placed on lasix for edema in her bilateral legs.    Patient reports she noticed she was short of breath ~[phone] this morning. Mentions she was waking up every 1.5 hours to urinate and  noticed worsening shortness of breath. She endorses some chest pressure she rates a 4 out of 10. She took two 500 mg of Tylenol for the pain without relief. She didn't take aspirin or extra medications. She hasn't had shortness of breath like this in the past. Denies history of chest pressure that was related to heart issues in the past. She has no sick contacts. She denies a hx of blood clots.    Mentions back in 11/2023, she had an angiogram without signs of heart disease. Denies cough, fever, COVID-like symptoms, or abdominal pain. Reports she did check for COVID but results came back negative.    She isn't a smoker, noting she quit back in 1988.      REVIEW OF SYSTEMS   All other systems reviewed and are negative except as noted above in HPI.    PAST MEDICAL HISTORY:  Past Medical History:   Diagnosis Date    Anxiety     takes clonazepam    Asthma     per [facility]     Chronic kidney disease     stage 3 per [facility]     CKD (chronic kidney disease)     Clostridium difficile colitis 11/01/2016    Clostridium difficile infection     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    Clostridium enterocolitis 10/27/2016    CTS (carpal tunnel syndrome)     GERD (gastroesophageal reflux disease)     per [facility]     Hiatal hernia     small per [facility]     Hyperlipidemia     Hyperlipidemia     Hyperlipidemia     Hypertension     Hypertension     Osteoarthritis of knee     per [facility]     Spinal stenosis     per [facility]     Vitamin D deficiency     per [facility]       PAST SURGICAL HISTORY:  Past Surgical History:   Procedure Laterality Date    BREAST SURGERY  1982    breast tumor per [facility]     CATARACT EXTRACTION  07/2005    per [facility]     COLONOSCOPY N/A 11/20/2023    Procedure: COLONOSCOPY with biopsies and polypectomy;  Surgeon: David Barreto MD;  Location: [facility] Main OR    CV CORONARY ANGIOGRAM N/A 8/11/2023    Procedure: Coronary Angiogram;  Surgeon: Ap Arroyo MD;  Location: [facility] CATH LAB CV    CV LEFT HEART CATH N/A 8/11/2023     Procedure: Left Heart Catheterization;  Surgeon: Ap Arroyo MD;  Location: [facility] LAB CV    ORIF TIBIA & FIBULA FRACTURES  1988    per [facility]     OTHER SURGICAL HISTORY  10/2004    hole in retinaper [facility]     OTHER SURGICAL HISTORY  05/03/2015    fecal transplantper [facility]     ME ESOPHAGOGASTRODUODENOSCOPY TRANSORAL DIAGNOSTIC N/A 9/11/2020    Procedure: ESOPHAGOGASTRODUODENOSCOPY With gastric biopsies;  Surgeon: David Reyes MD;  Location: [facility] [place];  Service: Gastroenterology    TONSILLECTOMY      TONSILLECTOMY & ADENOIDECTOMY  1963       CURRENT MEDICATIONS:    acetaminophen (TYLENOL) 500 MG tablet  aspirin 81 MG EC tablet  Calcium Carbonate Antacid (TUMS ULTRA 1000 PO)  Cholecalciferol (VITAMIN D3) 50 MCG (2000 UT) CAPS  clonazePAM (KLONOPIN) 0.5 MG tablet  clopidogrel (PLAVIX) 75 MG tablet  famotidine (PEPCID) 10 MG tablet  losartan (COZAAR) 50 MG tablet  metoprolol succinate ER (TOPROL XL) 25 MG 24 hr tablet  rosuvastatin (CRESTOR) 10 MG tablet        ALLERGIES:  Allergies   Allergen Reactions    Buspirone Shortness Of Breath    Ciprofloxacin Hives and Shortness Of Breath     Other reaction(s): Unknown    Cortisone      Other reaction(s): Throat Swelling/Closing, Unknown  Reaction 9-5-94      Hydrocodone-Acetaminophen Shortness Of Breath     Other reaction(s): Unknown    Lansoprazole Shortness Of Breath    Metoprolol Shortness Of Breath     Tolerates metoprolol succinate at home    Nedocromil      Other reaction(s): Throat Swelling/Closing    Niacin Shortness Of Breath     Other reaction(s): Unknown    Albuterol Other (See Comments)     Inhaler had extreme effect on nervous system      Amlodipine      Other reaction(s): Erythema, Unknown    Azithromycin      Other reaction(s): *Unknown, Unknown    Cefixime Diarrhea     Other reaction(s): Unknown  Has tolerated ceftriaxone    Cefprozil Nausea and Vomiting     Other reaction(s): Unknown  Has tolerated ceftriaxone    Cefuroxime       Other reaction(s): *Unknown  Has tolerated ceftriaxone    Cephalexin      Other reaction(s): *Unknown, Unknown  Has tolerated ceftriaxone    Contrast Dye      Other reaction(s): hives    Cyclobenzaprine      Other reaction(s): Sedation    Dextromethorphan-Guaifenesin Nausea     Other reaction(s): Headache    Diltiazem      Other reaction(s): Erythema, Unknown  Ears face arms and chest red and on fire-body tremors      Erythromycin      Other reaction(s): Unknown    Esomeprazole      Other reaction(s): Headache    Fenofibrate Muscle Pain (Myalgia)     Other reaction(s): Unknown    Fluticasone-Salmeterol Other (See Comments)     Elevated blood pressure      Methylprednisolone     Metronidazole     Monosodium Glutamate     Moxifloxacin      Other reaction(s): Dizziness    Nifedipine      Other reaction(s): Edema  Took for 5-93 to 9-94 red and swollen leg      Nsaids      Other reaction(s): Unknown    Ofloxacin      Other reaction(s): *Unknown    Ondansetron      Other reaction(s): Constipation    Parsley Seed     Penicillins Unknown     She doesn't remember other than it occurred as a very young woman     Piper     Pirbuterol Other (See Comments)     Immediate extreme effect on nervous system      Pravastatin      Other reaction(s): Dizziness, Unknown    Pseudoephedrine      Other reaction(s): headache,nausea    Shellfish-Derived Products      Per pt 8/12/23    Simvastatin Muscle Pain (Myalgia)    Spironolactone      Other reaction(s): stomach cramps, nausea    Sulfamethoxazole-Trimethoprim      Other reaction(s): Unknown    Tramadol      Other reaction(s): red ears,high blood press.    Tramadol-Acetaminophen      Other reaction(s): Tachycardia, Unknown    Triamterene Other (See Comments)     Greatly bothered with sun-took medication for three years  Greatly bothered with sun      Diatrizoate Rash    Telmisartan Palpitations       FAMILY HISTORY:  Family History   Problem Relation Age of Onset    Hypertension Mother      Cancer Mother         gallbladder cancer    Myocardial Infarction Father     Hypertension Sister     Alzheimer Disease Sister     Heart Disease Sister     Cancer Brother     Brain Cancer Brother     Pacemaker Mother     Heart Disease Mother     Coronary Artery Disease Father     Heart Disease Father     Heart Failure Sister        SOCIAL HISTORY:   Social History     Socioeconomic History    Marital status:    Tobacco Use    Smoking status: Former     Current packs/day: 3.00     Average packs/day: 3.0 packs/day for 35.0 years (105.0 ttl pk-yrs)     Types: Cigarettes    Smokeless tobacco: Never    Tobacco comments:     quit 1968   Substance and Sexual Activity    Alcohol use: Not Currently    Drug use: Never    Sexual activity: Not Currently   Other Topics Concern    Parent/sibling w/ CABG, MI or angioplasty before 65F 55M? No       VITALS:  Patient Vitals for the past 24 hrs:   BP Temp Temp src Pulse Resp SpO2   08/25/24 0745 (!) 199/83 -- -- 73 (!) 35 100 %   08/25/24 0715 (!) 202/83 -- -- 80 24 96 %   08/25/24 0706 (!) 212/86 -- -- 73 -- 91 %   08/25/24 0702 -- 97.8  F (36.6  C) Oral -- -- --   08/25/24 0629 (!) 206/90 -- -- 87 25 93 %       PHYSICAL EXAM    GENERAL: Awake, alert.  In no acute distress.   HEENT: Normocephalic, atraumatic.  Pupils equal, round and reactive.  Conjunctiva normal.  EOMI.  NECK: No stridor or apparent deformity.  PULMONARY: Mild tachypnea. Symmetrical breath sounds without distress. Lungs clear to auscultation bilaterally without wheezes, rhonchi or rales. O2 sats 9% on 2 L of nasal cannula. Mild scaling retractions.  CARDIO: Regular rate and rhythm.  No significant murmur, rub or gallop.  Radial pulses strong and symmetrical.  ABDOMINAL: Abdomen soft, non-distended and non-tender to palpation.  No CVAT, no palpable hepatosplenomegaly.  EXTREMITIES: No lower extremity swelling. Bilateral lower extremity non-pitting edema.  NEURO: Alert and oriented to person, place and time.   Cranial nerves grossly intact.  No focal motor deficit.  PSYCH: Normal mood and affect  SKIN: No rashes      LAB:  All pertinent labs reviewed and interpreted.  Results for orders placed or performed during the hospital encounter of 08/25/24   XR Chest Port 1 View    Impression    IMPRESSION: Cardiomegaly with new pulmonary vascular congestion and increased interstitial markings suggesting pulmonary edema. No focal infiltrate or pleural effusion. Chronic rotator cuff arthropathy of the right shoulder.   Basic metabolic panel   Result Value Ref Range    Sodium 142 135 - 145 mmol/L    Potassium 3.7 3.4 - 5.3 mmol/L    Chloride 104 98 - 107 mmol/L    Carbon Dioxide (CO2) 24 22 - 29 mmol/L    Anion Gap 14 7 - 15 mmol/L    Urea Nitrogen 20.0 8.0 - 23.0 mg/dL    Creatinine 1.13 (H) 0.51 - 0.95 mg/dL    GFR Estimate 47 (L) >60 mL/min/1.73m2    Calcium 9.6 8.8 - 10.4 mg/dL    Glucose 97 70 - 99 mg/dL   Lactic acid whole blood with 1x repeat in 2 hr when >2   Result Value Ref Range    Lactic Acid, Initial 1.1 0.7 - 2.0 mmol/L   Result Value Ref Range    Procalcitonin 0.09 <0.50 ng/mL   Result Value Ref Range    Troponin T, High Sensitivity 21 (H) <=14 ng/L   Result Value Ref Range    Magnesium 1.8 1.7 - 2.3 mg/dL   Symptomatic Influenza A/B, RSV, & SARS-CoV2 PCR (COVID-19) Nasopharyngeal    Specimen: Nasopharyngeal; Swab   Result Value Ref Range    Influenza A PCR Negative Negative    Influenza B PCR Negative Negative    RSV PCR Negative Negative    SARS CoV2 PCR Negative Negative   D dimer quantitative   Result Value Ref Range    D-Dimer Quantitative 1.25 (H) 0.00 - 0.50 ug/mL FEU   Blood gas venous   Result Value Ref Range    pH Venous 7.34 7.32 - 7.43    pCO2 Venous 52 (H) 40 - 50 mm Hg    pO2 Venous 23 (L) 25 - 47 mm Hg    Bicarbonate Venous 28 21 - 28 mmol/L    Base Excess/Deficit Venous 2.4 -3.0 - 3.0 mmol/L    FIO2 21     Oxyhemoglobin Venous 33 (L) 70 - 75 %    O2 Sat, Venous 33.5 (L) 70.0 - 75.0 %   Nt probnp  inpatient (BNP)   Result Value Ref Range    N terminal Pro BNP Inpatient 7,336 (H) 0 - 1,800 pg/mL   CBC with platelets and differential   Result Value Ref Range    WBC Count 5.5 4.0 - 11.0 10e3/uL    RBC Count 4.44 3.80 - 5.20 10e6/uL    Hemoglobin 12.7 11.7 - 15.7 g/dL    Hematocrit 40.5 35.0 - 47.0 %    MCV 91 78 - 100 fL    MCH 28.6 26.5 - 33.0 pg    MCHC 31.4 (L) 31.5 - 36.5 g/dL    RDW 13.6 10.0 - 15.0 %    Platelet Count 283 150 - 450 10e3/uL    % Neutrophils 53 %    % Lymphocytes 34 %    % Monocytes 9 %    % Eosinophils 3 %    % Basophils 1 %    % Immature Granulocytes 0 %    NRBCs per 100 WBC 0 <1 /100    Absolute Neutrophils 2.9 1.6 - 8.3 10e3/uL    Absolute Lymphocytes 1.9 0.8 - 5.3 10e3/uL    Absolute Monocytes 0.5 0.0 - 1.3 10e3/uL    Absolute Eosinophils 0.2 0.0 - 0.7 10e3/uL    Absolute Basophils 0.0 0.0 - 0.2 10e3/uL    Absolute Immature Granulocytes 0.0 <=0.4 10e3/uL    Absolute NRBCs 0.0 10e3/uL       RADIOLOGY:  Reviewed all pertinent imaging. Please see official radiology report.  XR Chest Port 1 View   Final Result   IMPRESSION: Cardiomegaly with new pulmonary vascular congestion and increased interstitial markings suggesting pulmonary edema. No focal infiltrate or pleural effusion. Chronic rotator cuff arthropathy of the right shoulder.      Lung vent and perf (NM)    (Results Pending)   Echocardiogram Complete    (Results Pending)         EKG:    Reviewed and interpreted as: 8/25/2024. 06:33:12. Vent. rate: 89 BPM. Sinus rhythm with Premature atrial complexes. Otherwise normal ECG. When compared with ECG of 11/11/2023 17:08, Premature atrial complexes are now present. Minimal criteria for Anterior infarct are no longer present. QT has lengthened.      I have independently reviewed and interpreted the EKG(s) documented above.        I, Loyda Harrington, am serving as a scribe to document services personally performed by Dr. Marilyn Franco based on my observation and the provider's statements to me.  I, Marilyn Franco MD attest that Loyda Harrington is acting in a scribe capacity, has observed my performance of the services and has documented them in accordance with my direction.       Marilyn Franco MD  08/25/24 0883

## 2024-08-25 NOTE — H&P
[facility]    History and Physical - Hospitalist Service       Date of Admission:  8/25/2024    Assessment & Plan      Kerry Hoffmann is a 89 year old female admitted on 8/25/2024. She has PMHx of A-fib, HTN, heart disease, GERD, and HLD, presenting with sob, chest pressure, legs edema. C/w CHF. D-dimer elevated.    Acute pulmonary edema  Acute and new CHF exacerbation, systolic and diastolic  SOB, acute hypoxic respiratory failure  Chest pressure  Legs edema  -BNP 7336, trop 21  -ddx: CHF, AMI, PE, pericarditis, pneumonia, malignancy  -CXR: Cardiomegaly with new pulmonary vascular congestion and increased interstitial markings suggesting pulmonary edema   -chest ct not ordered due to h/o allergy  -Lasix iv  -Echo  -trend trop  -continue home BB, ARB  -cardiologist consultation    Elevation of d-dimer  -moderate risk for PE  -cannot do ct due to allergy  -V/Q pending    HTN urgency  -Hydralazine prn  -PTA BB and ARB    CKD 3a  -as per pt, one of her kidney got shrank and scared after receving lasix too fast last year  -cr at baseline  -closely monitoring on diuretics  -consult nephrologist is cr up        Diet:  cardiac, fluid restriction  DVT Prophylaxis: Enoxaparin (Lovenox) SQ  Wiley Catheter: Not present  Lines: None     Cardiac Monitoring: None  Code Status:  DNR-DNI    Clinically Significant Risk Factors Present on Admission                # Drug Induced Platelet Defect: home medication list includes an antiplatelet medication   # Hypertension: Home medication list includes antihypertensive(s)  # Chronic heart failure with preserved ejection fraction: heart failure noted on problem list and last echo with EF >50%            # Asthma: noted on problem list              Disposition Plan     Medically Ready for Discharge: Anticipated in 2-4 Days           Nelson Alberts MD  Hospitalist Service  [facility]  Securely message with Vocera (more info)  Text page via  [facility] Paging/Directory     ______________________________________________________________________    Chief Complaint   Sob, chest pressure    History is obtained from the patient and emergency department physician    History of Present Illness   Kerry Hoffmann is a 89 year old female with PMHx of A-fib, HTN, heart disease, GERD, and HLD, who presents to the ED today via ambulance with shortness of breath and chest pressure.     Per EMS, reports patient woke up in the morning feeling short of breath. She was sating at 88 to 90% on room air. She was recently placed on lasix for edema in her bilateral legs.     Patient reports she noticed she was short of breath ~[phone] this morning. Mentions she was waking up every 1.5 hours to urinate and noticed worsening shortness of breath. She endorses some chest pressure she rates a 4 out of 10. She took two 500 mg of Tylenol for the pain without relief. She didn't take aspirin or extra medications. She hasn't had shortness of breath like this in the past. Denies history of chest pressure that was related to heart issues in the past. She has no sick contacts. She denies a hx of blood clots.     Mentions back in 11/2023, she had an angiogram without signs of heart disease. Denies cough, fever, COVID-like symptoms, or abdominal pain. Reports she did check for COVID but results came back negative.     She isn't a smoker, noting she quit back in 1988.      Past Medical History    Past Medical History:   Diagnosis Date    Anxiety     takes clonazepam    Asthma     per H & P     Chronic kidney disease     stage 3 per H & P     CKD (chronic kidney disease)     Clostridium difficile colitis 11/01/2016    Clostridium difficile infection     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    Clostridium enterocolitis 10/27/2016    CTS (carpal tunnel syndrome)     GERD (gastroesophageal reflux disease)     per H & P     Hiatal hernia     small per H & P     Hyperlipidemia     Hyperlipidemia      Hyperlipidemia     Hypertension     Hypertension     Osteoarthritis of knee     per H & P     Spinal stenosis     per H & P     Vitamin D deficiency     per H & P       Past Surgical History   Past Surgical History:   Procedure Laterality Date    BREAST SURGERY  1982    breast tumor per [facility] & P     CATARACT EXTRACTION  07/2005    per [facility] & P     COLONOSCOPY N/A 11/20/2023    Procedure: COLONOSCOPY with biopsies and polypectomy;  Surgeon: David Barreto MD;  Location: [facility]    CV CORONARY ANGIOGRAM N/A 8/11/2023    Procedure: Coronary Angiogram;  Surgeon: Ap Arroyo MD;  Location: [facility] CV    CV LEFT HEART CATH N/A 8/11/2023    Procedure: Left Heart Catheterization;  Surgeon: Ap Arroyo MD;  Location: [facility] LAB CV    ORIF TIBIA & FIBULA FRACTURES  1988    per H & P     OTHER SURGICAL HISTORY  10/2004    hole in retinaper H & P     OTHER SURGICAL HISTORY  05/03/2015    fecal transplantper H & P     NM ESOPHAGOGASTRODUODENOSCOPY TRANSORAL DIAGNOSTIC N/A 9/11/2020    Procedure: ESOPHAGOGASTRODUODENOSCOPY With gastric biopsies;  Surgeon: David Reyes MD;  Location: [facility];  Service: Gastroenterology    TONSILLECTOMY      TONSILLECTOMY & ADENOIDECTOMY  1963       Prior to Admission Medications   Prior to Admission Medications   Prescriptions Last Dose Informant Patient Reported? Taking?   Calcium Carbonate Antacid (TUMS ULTRA 1000 PO) Unknown at prn  Yes Yes   Sig: Take 1 chew tab by mouth nightly as needed   Cholecalciferol (VITAMIN D3) 50 MCG (2000 UT) CAPS 8/24/2024 at AM  Yes Yes   Sig: Take 1 tablet by mouth daily   acetaminophen (TYLENOL) 500 MG tablet 8/25/2024 at 0045  Yes Yes   Sig: Take 1,000 mg by mouth 3 times daily   aspirin 81 MG EC tablet 8/24/2024 at AM  Yes Yes   Sig: Take 81 mg by mouth daily   clonazePAM (KLONOPIN) 0.5 MG tablet 8/24/2024 at PM  Yes Yes   Sig: Take 0.5 mg by mouth 3 times daily   clopidogrel (PLAVIX) 75 MG tablet 8/25/2024 at  0045  No Yes   Sig: TAKE 1 TABLET(75 MG) BY MOUTH DAILY   famotidine (PEPCID) 10 MG tablet 8/24/2024 at AM  Yes Yes   Sig: Take 10 mg by mouth daily.   losartan (COZAAR) 50 MG tablet 8/24/2024 at PM  Yes Yes   Sig: Take 50 mg by mouth 2 times daily   metoprolol succinate ER (TOPROL XL) 25 MG 24 hr tablet 8/24/2024 at 2200  Yes Yes   Sig: Take 50 mg by mouth at bedtime.   rosuvastatin (CRESTOR) 10 MG tablet 8/24/2024 at AM  No Yes   Sig: Take 1 tablet (10 mg) by mouth daily      Facility-Administered Medications: None        Allergies   Allergies   Allergen Reactions    Buspirone Shortness Of Breath    Ciprofloxacin Hives and Shortness Of Breath     Other reaction(s): Unknown    Cortisone      Other reaction(s): Throat Swelling/Closing, Unknown  Reaction 9-5-94      Hydrocodone-Acetaminophen Shortness Of Breath     Other reaction(s): Unknown    Lansoprazole Shortness Of Breath    Metoprolol Shortness Of Breath     Tolerates metoprolol succinate at home    Nedocromil      Other reaction(s): Throat Swelling/Closing    Niacin Shortness Of Breath     Other reaction(s): Unknown    Albuterol Other (See Comments)     Inhaler had extreme effect on nervous system      Amlodipine      Other reaction(s): Erythema, Unknown    Azithromycin      Other reaction(s): *Unknown, Unknown    Cefixime Diarrhea     Other reaction(s): Unknown  Has tolerated ceftriaxone    Cefprozil Nausea and Vomiting     Other reaction(s): Unknown  Has tolerated ceftriaxone    Cefuroxime      Other reaction(s): *Unknown  Has tolerated ceftriaxone    Cephalexin      Other reaction(s): *Unknown, Unknown  Has tolerated ceftriaxone    Contrast Dye      Other reaction(s): hives    Cyclobenzaprine      Other reaction(s): Sedation    Dextromethorphan-Guaifenesin Nausea     Other reaction(s): Headache    Diltiazem      Other reaction(s): Erythema, Unknown  Ears face arms and chest red and on fire-body tremors      Erythromycin      Other reaction(s): Unknown     Esomeprazole      Other reaction(s): Headache    Fenofibrate Muscle Pain (Myalgia)     Other reaction(s): Unknown    Fluticasone-Salmeterol Other (See Comments)     Elevated blood pressure      Methylprednisolone     Metronidazole     Monosodium Glutamate     Moxifloxacin      Other reaction(s): Dizziness    Nifedipine      Other reaction(s): Edema  Took for 5-93 to 9-94 red and swollen leg      Nsaids      Other reaction(s): Unknown    Ofloxacin      Other reaction(s): *Unknown    Ondansetron      Other reaction(s): Constipation    Parsley Seed     Penicillins Unknown     She doesn't remember other than it occurred as a very young woman     Piper     Pirbuterol Other (See Comments)     Immediate extreme effect on nervous system      Pravastatin      Other reaction(s): Dizziness, Unknown    Pseudoephedrine      Other reaction(s): headache,nausea    Shellfish-Derived Products      Per pt 8/12/23    Simvastatin Muscle Pain (Myalgia)    Spironolactone      Other reaction(s): stomach cramps, nausea    Sulfamethoxazole-Trimethoprim      Other reaction(s): Unknown    Tramadol      Other reaction(s): red ears,high blood press.    Tramadol-Acetaminophen      Other reaction(s): Tachycardia, Unknown    Triamterene Other (See Comments)     Greatly bothered with sun-took medication for three years  Greatly bothered with sun      Diatrizoate Rash    Telmisartan Palpitations        Physical Exam   Vital Signs: Temp: 97.8  F (36.6  C) Temp src: Oral BP: (!) 199/83 Pulse: 73   Resp: (!) 35 SpO2: 100 % O2 Device: None (Room air)    Weight: 0 lbs 0 oz    General.  Awake alert oriented in acute distress.  HEENT.  Pupils equal round react to light, anicteric, EOM intact.  Neck supple no JVD.  CVS regular rhythm no murmur gallops.  Lungs.   bilateral rales at bases. Using accessory mucles  Abdomen.  Soft nontender bowel sounds present.  Extremities.  ++ pitting edema no calf tenderness.  Neurological.  Awake and alert. No focal  deficit.  Skin no rash. No pallor.  Psych. anxious      Medical Decision Making       76 MINUTES SPENT BY ME on the date of service doing chart review, history, exam, documentation & further activities per the note.      Data     I have personally reviewed the following data over the past 24 hrs:    5.5  \   12.7   / 283     142 104 20.0 /  97   3.7 24 1.13 (H) \     Trop: 21 (H) BNP: 7,336 (H)     Procal: 0.09 CRP: N/A Lactic Acid: 1.1       INR:  N/A PTT:  N/A   D-dimer:  1.25 (H) Fibrinogen:  N/A       Imaging results reviewed over the past 24 hrs:   Recent Results (from the past 24 hour(s))   XR Chest Port 1 View    Narrative    EXAM: XR CHEST PORT 1 VIEW  LOCATION: [facility]  DATE: 8/25/2024    INDICATION: SOB respiratory failure  COMPARISON: 11/7/2023      Impression    IMPRESSION: Cardiomegaly with new pulmonary vascular congestion and increased interstitial markings suggesting pulmonary edema. No focal infiltrate or pleural effusion. Chronic rotator cuff arthropathy of the right shoulder.

## 2024-08-25 NOTE — CONSULTS
HEART CARE NOTE        Thank you, Dr. Alberts, for asking the [facility] Heart Care team to see Kerry Hoffmann to evaluate ADHF.      Assessment/Recommendations     1. HFpEF c/b severe ADHF  Assessment / Plan  Hypervolemic on physical exam; transition to bumex gtt aft bolus and continue to monitor UOP and renal function closely   Patient is high risk for adverse cardiac events 2/2 advanced age, frailty, HTN, elevated NTproBNP  GDMT as detailed below; mainstay of treatment for HFpEF includes diuretics and adequate BP control (class I) and SGLT2-I (class 2a); additional medical therapy (ARNI, MRA, ARB) demonstrated less robust evidence for indication but may be considered per guideline recommendations (2b); no indication for BBlockers     Current Pharmacotherapy AHA Guideline-Directed Medical Therapy   Losartan  50 mg daily ARNI/ARB   Spironolactone not started  MRA   SGLT2 inhibitor:not started SGLT2-I    Bumetanide gtt Loop diuretic      2. HTN Emergency  Assessment / Plan  Difficult to control; titrate oral afterload reduction as tolerated; diuresis as above    3. HLP  Assessment / Plan  Resume rosuvastatin    4. Acute hypoxic respiratory failure  Assessment / Plan  Likely 2/2 cardiogenic pulmonary edema     5. non-obstructive CAD  Assessment / Plan  Mild non-obstructive CAD - continue aggressive risk factor modification    Clinically Significant Risk Factors Present on Admission                # Drug Induced Platelet Defect: home medication list includes an antiplatelet medication   # Hypertension: Noted on problem list  # Chronic heart failure with preserved ejection fraction: heart failure noted on problem list and last echo with EF >50%            # Asthma: noted on problem list             Cardiomyopathy  Diastolic acute    Fluid overload, unspecified, Other fluid overload, and Other disorders of electrolyte and fluid balance, not elsewhere classified    CKD POA List: Stage 3 (unspecified if a or b) (GFR  30 - 59)      85 minutes spent reviewing prior records (including documentation, laboratory studies, cardiac testing/imaging), history and physical exam, planning, and subsequent documentation.      History of Present Illness/Subjective    Ms. Kerry Hoffmann is a 89 year old female with a PMHx significant for (per Epic notation) HTN, heart disease, GERD, and HLD, presenting with sob, chest pressure, legs edema. C/w CHF.    Today, Mrs. Dotson was observed on BIPAP; Management plan as detailed above    ECG: Personally reviewed. normal sinus rhythm, nonspecific ST and T waves changes, PAC's noted.    ECHO (personnaly Reviewed on 8/25/24): repeat study pending  The left ventricle is normal in size.  Left ventricular function is normal.The ejection fraction is 55-60%.  There is mild concentric left ventricular hypertrophy.  Normal right ventricle size and systolic function.  The left atrium is mildly dilated.  There is mild to moderate (1-2+) mitral regurgitation.  There is mild to moderate (1-2+) aortic regurgitation.  Compared to the prior study dated 7/21/23, there are changes as noted.  Increased aortic regurgitation.    Telemetry: personally reviewed August 25, 2024; notable for sinus rhythm     Lab results: personally reviewed August 25, 2024; notable for elevated NTproBNP    Medical history and pertinent documents reviewed in Care Everywhere please where applicable see details above          Physical Examination Review of Systems   BP (!) 199/83   Pulse 73   Temp 97.8  F (36.6  C) (Oral)   Resp (!) 35   LMP  (LMP Unknown)   SpO2 100%   There is no height or weight on file to calculate BMI.  Wt Readings from Last 3 Encounters:   02/27/24 73.9 kg (163 lb)   11/11/23 77.8 kg (171 lb 9.6 oz)   11/07/23 82.6 kg (182 lb)     General Appearance:   no distress, normal body habitus   ENT/Mouth: membranes moist, no oral lesions or bleeding gums.      EYES:  no scleral icterus, normal conjunctivae   Neck: no carotid  bruits or thyromegaly   Chest/Lungs:   lungs are clear to auscultation, no rales or wheezing, equal chest wall expansion    Cardiovascular:   Regular. Normal first and second heart sounds with no murmurs, rubs, or gallops; the carotid, radial and posterior tibial pulses are intact, + JVD and LE edema bilaterally    Abdomen:  no organomegaly, masses, bruits, or tenderness; bowel sounds are present   Extremities: no cyanosis or clubbing   Skin: no xanthelasma, warm.    Neurologic: NAD     Psychiatric: NAD    A complete 10 systems ROS was reviewed  And is negative except what is listed in the HPI.          Medical History  Surgical History Family History Social History   Past Medical History:   Diagnosis Date    Anxiety     takes clonazepam    Asthma     per H & P     Chronic kidney disease     stage 3 per H & P     CKD (chronic kidney disease)     Clostridium difficile colitis 11/01/2016    Clostridium difficile infection     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    Clostridium enterocolitis 10/27/2016    CTS (carpal tunnel syndrome)     GERD (gastroesophageal reflux disease)     per H & P     Hiatal hernia     small per H & P     Hyperlipidemia     Hyperlipidemia     Hyperlipidemia     Hypertension     Hypertension     Osteoarthritis of knee     per H & P     Spinal stenosis     per H & P     Vitamin D deficiency     per H & P    Past Surgical History:   Procedure Laterality Date    BREAST SURGERY  1982    breast tumor per [facility] & P     CATARACT EXTRACTION  07/2005    per [facility] & P     COLONOSCOPY N/A 11/20/2023    Procedure: COLONOSCOPY with biopsies and polypectomy;  Surgeon: David Barreto MD;  Location: [facility] Main OR    CV CORONARY ANGIOGRAM N/A 8/11/2023    Procedure: Coronary Angiogram;  Surgeon: Ap Arroyo MD;  Location: [facility] CV    CV LEFT HEART CATH N/A 8/11/2023    Procedure: Left Heart Catheterization;  Surgeon: Ap Arroyo MD;  Location: [facility] LAB CV    ORIF TIBIA & FIBULA  FRACTURES  1988    per H & P     OTHER SURGICAL HISTORY  10/2004    hole in retinaper H & P     OTHER SURGICAL HISTORY  05/03/2015    fecal transplantper H & P     CO ESOPHAGOGASTRODUODENOSCOPY TRANSORAL DIAGNOSTIC N/A 9/11/2020    Procedure: ESOPHAGOGASTRODUODENOSCOPY With gastric biopsies;  Surgeon: David Reyes MD;  Location: [facility];  Service: Gastroenterology    TONSILLECTOMY      TONSILLECTOMY & ADENOIDECTOMY  1963    no family history of premature coronary artery disease Social History     Socioeconomic History    Marital status:      Spouse name: Not on file    Number of children: Not on file    Years of education: Not on file    Highest education level: Not on file   Occupational History    Not on file   Tobacco Use    Smoking status: Former     Current packs/day: 3.00     Average packs/day: 3.0 packs/day for 35.0 years (105.0 ttl pk-yrs)     Types: Cigarettes    Smokeless tobacco: Never    Tobacco comments:     quit 1968   Substance and Sexual Activity    Alcohol use: Not Currently    Drug use: Never    Sexual activity: Not Currently   Other Topics Concern    Parent/sibling w/ CABG, MI or angioplasty before 65F 55M? No   Social History Narrative    Not on file     Social Determinants of Health     Financial Resource Strain: Not on file   Food Insecurity: Not on file   Transportation Needs: Not on file   Physical Activity: Not on file   Stress: Not on file   Social Connections: Not on file   Interpersonal Safety: Not on file   Housing Stability: Not on file           Lab Results    Chemistry/lipid CBC Cardiac Enzymes/BNP/TSH/INR   Lab Results   Component Value Date    CHOL 225 (H) 03/04/2024    HDL 60 03/04/2024    TRIG 160 (H) 03/04/2024    BUN 20.0 08/25/2024     08/25/2024    CO2 24 08/25/2024    Lab Results   Component Value Date    WBC 5.5 08/25/2024    HGB 12.7 08/25/2024    HCT 40.5 08/25/2024    MCV 91 08/25/2024     08/25/2024    Lab Results   Component Value Date     TROPONINI <0.01 09/04/2022     11/16/2019    TSH 2.52 06/14/2023     Lab Results   Component Value Date    TROPONINI <0.01 09/04/2022          Weight:    Wt Readings from Last 3 Encounters:   02/27/24 73.9 kg (163 lb)   11/11/23 77.8 kg (171 lb 9.6 oz)   11/07/23 82.6 kg (182 lb)       Allergies  Allergies   Allergen Reactions    Buspirone Shortness Of Breath    Ciprofloxacin Hives and Shortness Of Breath     Other reaction(s): Unknown    Cortisone      Other reaction(s): Throat Swelling/Closing, Unknown  Reaction 9-5-94      Hydrocodone-Acetaminophen Shortness Of Breath     Other reaction(s): Unknown    Lansoprazole Shortness Of Breath    Metoprolol Shortness Of Breath     Tolerates metoprolol succinate at home    Nedocromil      Other reaction(s): Throat Swelling/Closing    Niacin Shortness Of Breath     Other reaction(s): Unknown    Albuterol Other (See Comments)     Inhaler had extreme effect on nervous system      Amlodipine      Other reaction(s): Erythema, Unknown    Azithromycin      Other reaction(s): *Unknown, Unknown    Cefixime Diarrhea     Other reaction(s): Unknown  Has tolerated ceftriaxone    Cefprozil Nausea and Vomiting     Other reaction(s): Unknown  Has tolerated ceftriaxone    Cefuroxime      Other reaction(s): *Unknown  Has tolerated ceftriaxone    Cephalexin      Other reaction(s): *Unknown, Unknown  Has tolerated ceftriaxone    Contrast Dye      Other reaction(s): hives    Cyclobenzaprine      Other reaction(s): Sedation    Dextromethorphan-Guaifenesin Nausea     Other reaction(s): Headache    Diltiazem      Other reaction(s): Erythema, Unknown  Ears face arms and chest red and on fire-body tremors      Erythromycin      Other reaction(s): Unknown    Esomeprazole      Other reaction(s): Headache    Fenofibrate Muscle Pain (Myalgia)     Other reaction(s): Unknown    Fluticasone-Salmeterol Other (See Comments)     Elevated blood pressure      Methylprednisolone     Metronidazole      Monosodium Glutamate     Moxifloxacin      Other reaction(s): Dizziness    Nifedipine      Other reaction(s): Edema  Took for 5-93 to 9-94 red and swollen leg      Nsaids      Other reaction(s): Unknown    Ofloxacin      Other reaction(s): *Unknown    Ondansetron      Other reaction(s): Constipation    Parsley Seed     Penicillins Unknown     She doesn't remember other than it occurred as a very young woman     Piper     Pirbuterol Other (See Comments)     Immediate extreme effect on nervous system      Pravastatin      Other reaction(s): Dizziness, Unknown    Pseudoephedrine      Other reaction(s): headache,nausea    Shellfish-Derived Products      Per pt 8/12/23    Simvastatin Muscle Pain (Myalgia)    Spironolactone      Other reaction(s): stomach cramps, nausea    Sulfamethoxazole-Trimethoprim      Other reaction(s): Unknown    Tramadol      Other reaction(s): red ears,high blood press.    Tramadol-Acetaminophen      Other reaction(s): Tachycardia, Unknown    Triamterene Other (See Comments)     Greatly bothered with sun-took medication for three years  Greatly bothered with sun      Diatrizoate Rash    Telmisartan Palpitations         Surgical History  Past Surgical History:   Procedure Laterality Date    BREAST SURGERY  1982    breast tumor per [facility] & P     CATARACT EXTRACTION  07/2005    per [facility] & P     COLONOSCOPY N/A 11/20/2023    Procedure: COLONOSCOPY with biopsies and polypectomy;  Surgeon: David Barreto MD;  Location: [facility] OR    CV CORONARY ANGIOGRAM N/A 8/11/2023    Procedure: Coronary Angiogram;  Surgeon: Ap Arroyo MD;  Location: [facility] CATH LAB CV    CV LEFT HEART CATH N/A 8/11/2023    Procedure: Left Heart Catheterization;  Surgeon: Ap Arroyo MD;  Location: [facility] CATH LAB CV    ORIF TIBIA & FIBULA FRACTURES  1988    per H & P     OTHER SURGICAL HISTORY  10/2004    hole in retinaper H & P     OTHER SURGICAL HISTORY  05/03/2015    fecal transplantper H & P     DE  ESOPHAGOGASTRODUODENOSCOPY TRANSORAL DIAGNOSTIC N/A 9/11/2020    Procedure: ESOPHAGOGASTRODUODENOSCOPY With gastric biopsies;  Surgeon: David Reyes MD;  Location: [facility];  Service: Gastroenterology    TONSILLECTOMY      TONSILLECTOMY & ADENOIDECTOMY  1963       Social History  Tobacco:   History   Smoking Status    Former    Packs/day: 3.00    Years: 35.00    Types: Cigarettes   Smokeless Tobacco    Never    Alcohol:   Social History    Substance and Sexual Activity      Alcohol use: Not Currently   Illicit Drugs:   History   Drug Use Unknown       Family History  Family History   Problem Relation Age of Onset    Hypertension Mother     Cancer Mother         gallbladder cancer    Myocardial Infarction Father     Hypertension Sister     Alzheimer Disease Sister     Heart Disease Sister     Cancer Brother     Brain Cancer Brother     Pacemaker Mother     Heart Disease Mother     Coronary Artery Disease Father     Heart Disease Father     Heart Failure Sister           Jimmy Gonzalez MD on 8/25/2024      cc: Kaylyn Bolanos,

## 2024-08-25 NOTE — PROGRESS NOTES
RESPIRATORY CARE NOTE     Placed patient on Bipap for work of breathing support. Bipap settings, 14,12/6, 30%. Pt is now resting comfortably, RR 18-20, 94%.       Radha Riddle, RT

## 2024-08-26 ENCOUNTER — APPOINTMENT (OUTPATIENT)
Dept: OCCUPATIONAL THERAPY | Facility: HOSPITAL | Age: 89
DRG: 291 | End: 2024-08-26
Attending: INTERNAL MEDICINE
Payer: MEDICARE

## 2024-08-26 ENCOUNTER — APPOINTMENT (OUTPATIENT)
Dept: NUCLEAR MEDICINE | Facility: HOSPITAL | Age: 89
DRG: 291 | End: 2024-08-26
Attending: INTERNAL MEDICINE
Payer: MEDICARE

## 2024-08-26 ENCOUNTER — APPOINTMENT (OUTPATIENT)
Dept: RADIOLOGY | Facility: HOSPITAL | Age: 89
DRG: 291 | End: 2024-08-26
Attending: INTERNAL MEDICINE
Payer: MEDICARE

## 2024-08-26 ENCOUNTER — TELEPHONE (OUTPATIENT)
Dept: CARDIOLOGY | Facility: CLINIC | Age: 89
End: 2024-08-26
Payer: MEDICARE

## 2024-08-26 DIAGNOSIS — I50.9 ACUTE DECOMPENSATED HEART FAILURE (H): Primary | ICD-10-CM

## 2024-08-26 LAB
ANION GAP SERPL CALCULATED.3IONS-SCNC: 12 MMOL/L (ref 7–15)
BUN SERPL-MCNC: 23.7 MG/DL (ref 8–23)
CALCIUM SERPL-MCNC: 9.5 MG/DL (ref 8.8–10.4)
CHLORIDE SERPL-SCNC: 96 MMOL/L (ref 98–107)
CREAT SERPL-MCNC: 1.41 MG/DL (ref 0.51–0.95)
CYSTATIN C (ROCHE): 2.3 MG/L (ref 0.6–1)
EGFRCR SERPLBLD CKD-EPI 2021: 36 ML/MIN/1.73M2
GFR/BSA.PRED SERPLBLD CYS-BASED-ARV: 22 ML/MIN/1.73M2
GLUCOSE SERPL-MCNC: 100 MG/DL (ref 70–99)
HCO3 SERPL-SCNC: 33 MMOL/L (ref 22–29)
POTASSIUM SERPL-SCNC: 3.1 MMOL/L (ref 3.4–5.3)
POTASSIUM SERPL-SCNC: 3.2 MMOL/L (ref 3.4–5.3)
SODIUM SERPL-SCNC: 141 MMOL/L (ref 135–145)

## 2024-08-26 PROCEDURE — P9047 ALBUMIN (HUMAN), 25%, 50ML: HCPCS | Performed by: INTERNAL MEDICINE

## 2024-08-26 PROCEDURE — 250N000011 HC RX IP 250 OP 636: Performed by: INTERNAL MEDICINE

## 2024-08-26 PROCEDURE — 250N000013 HC RX MED GY IP 250 OP 250 PS 637: Performed by: INTERNAL MEDICINE

## 2024-08-26 PROCEDURE — 272N000035 NM LUNG SCAN VENTILATION AND PERFUSION

## 2024-08-26 PROCEDURE — 80048 BASIC METABOLIC PNL TOTAL CA: CPT | Performed by: INTERNAL MEDICINE

## 2024-08-26 PROCEDURE — 82610 CYSTATIN C: CPT | Performed by: INTERNAL MEDICINE

## 2024-08-26 PROCEDURE — 120N000004 HC R&B MS OVERFLOW

## 2024-08-26 PROCEDURE — A9567 TECHNETIUM TC-99M AEROSOL: HCPCS | Performed by: INTERNAL MEDICINE

## 2024-08-26 PROCEDURE — 97165 OT EVAL LOW COMPLEX 30 MIN: CPT | Mod: GO

## 2024-08-26 PROCEDURE — 71045 X-RAY EXAM CHEST 1 VIEW: CPT

## 2024-08-26 PROCEDURE — 99233 SBSQ HOSP IP/OBS HIGH 50: CPT | Performed by: INTERNAL MEDICINE

## 2024-08-26 PROCEDURE — 36415 COLL VENOUS BLD VENIPUNCTURE: CPT | Performed by: INTERNAL MEDICINE

## 2024-08-26 PROCEDURE — A9540 TC99M MAA: HCPCS | Performed by: INTERNAL MEDICINE

## 2024-08-26 PROCEDURE — 343N000001 HC RX 343: Performed by: INTERNAL MEDICINE

## 2024-08-26 PROCEDURE — 84132 ASSAY OF SERUM POTASSIUM: CPT | Performed by: INTERNAL MEDICINE

## 2024-08-26 PROCEDURE — 99223 1ST HOSP IP/OBS HIGH 75: CPT | Performed by: INTERNAL MEDICINE

## 2024-08-26 PROCEDURE — 78582 LUNG VENTILAT&PERFUS IMAGING: CPT | Mod: MA

## 2024-08-26 PROCEDURE — 97535 SELF CARE MNGMENT TRAINING: CPT | Mod: GO

## 2024-08-26 RX ORDER — POTASSIUM CHLORIDE 1500 MG/1
20 TABLET, EXTENDED RELEASE ORAL ONCE
Status: COMPLETED | OUTPATIENT
Start: 2024-08-26 | End: 2024-08-26

## 2024-08-26 RX ORDER — ALBUMIN (HUMAN) 12.5 G/50ML
12.5 SOLUTION INTRAVENOUS ONCE
Status: COMPLETED | OUTPATIENT
Start: 2024-08-26 | End: 2024-08-26

## 2024-08-26 RX ORDER — ENOXAPARIN SODIUM 100 MG/ML
30 INJECTION SUBCUTANEOUS EVERY 24 HOURS
Status: DISCONTINUED | OUTPATIENT
Start: 2024-08-27 | End: 2024-09-04 | Stop reason: HOSPADM

## 2024-08-26 RX ADMIN — FAMOTIDINE 10 MG: 10 TABLET ORAL at 08:39

## 2024-08-26 RX ADMIN — ASPIRIN 81 MG: 81 TABLET, COATED ORAL at 08:39

## 2024-08-26 RX ADMIN — METOPROLOL SUCCINATE 50 MG: 50 TABLET, EXTENDED RELEASE ORAL at 20:51

## 2024-08-26 RX ADMIN — ROSUVASTATIN 10 MG: 10 TABLET, FILM COATED ORAL at 08:39

## 2024-08-26 RX ADMIN — CLOPIDOGREL BISULFATE 75 MG: 75 TABLET ORAL at 08:39

## 2024-08-26 RX ADMIN — POTASSIUM CHLORIDE 20 MEQ: 1500 TABLET, EXTENDED RELEASE ORAL at 08:39

## 2024-08-26 RX ADMIN — KIT FOR THE PREPARATION OF TECHNETIUM TC 99M ALBUMIN AGGREGATED 8.1 MILLICURIE: 2.5 INJECTION, POWDER, FOR SOLUTION INTRAVENOUS at 12:12

## 2024-08-26 RX ADMIN — CLONAZEPAM 0.5 MG: 0.5 TABLET ORAL at 00:22

## 2024-08-26 RX ADMIN — ENOXAPARIN SODIUM 40 MG: 40 INJECTION SUBCUTANEOUS at 08:39

## 2024-08-26 RX ADMIN — CLONAZEPAM 0.5 MG: 0.5 TABLET ORAL at 19:06

## 2024-08-26 RX ADMIN — CLONAZEPAM 0.5 MG: 0.5 TABLET ORAL at 11:07

## 2024-08-26 RX ADMIN — ACETAMINOPHEN 1000 MG: 500 TABLET ORAL at 13:52

## 2024-08-26 RX ADMIN — ACETAMINOPHEN 1000 MG: 500 TABLET ORAL at 20:51

## 2024-08-26 RX ADMIN — ACETAMINOPHEN 1000 MG: 500 TABLET ORAL at 08:39

## 2024-08-26 RX ADMIN — KIT FOR THE PREPARATION OF TECHNETIUM TC 99M PENTETATE 60.9 MILLICURIE: 20 INJECTION, POWDER, LYOPHILIZED, FOR SOLUTION INTRAVENOUS; RESPIRATORY (INHALATION) at 11:46

## 2024-08-26 RX ADMIN — ALBUMIN HUMAN 12.5 G: 0.25 SOLUTION INTRAVENOUS at 06:55

## 2024-08-26 RX ADMIN — LOSARTAN POTASSIUM 50 MG: 50 TABLET, FILM COATED ORAL at 20:51

## 2024-08-26 ASSESSMENT — ACTIVITIES OF DAILY LIVING (ADL)
ADLS_ACUITY_SCORE: 28
ADLS_ACUITY_SCORE: 28
ADLS_ACUITY_SCORE: 27
ADLS_ACUITY_SCORE: 28
ADLS_ACUITY_SCORE: 27
ADLS_ACUITY_SCORE: 28
ADLS_ACUITY_SCORE: 27
ADLS_ACUITY_SCORE: 28
ADLS_ACUITY_SCORE: 28
ADLS_ACUITY_SCORE: 27
ADLS_ACUITY_SCORE: 28
ADLS_ACUITY_SCORE: 27
ADLS_ACUITY_SCORE: 28
ADLS_ACUITY_SCORE: 27
ADLS_ACUITY_SCORE: 28
ADLS_ACUITY_SCORE: 27

## 2024-08-26 NOTE — PLAN OF CARE
"  Problem: Adult Inpatient Plan of Care  Goal: Plan of Care Review  Description: The Plan of Care Review/Shift note should be completed every shift.  The Outcome Evaluation is a brief statement about your assessment that the patient is improving, declining, or no change.  This information will be displayed automatically on your shift  note.  Outcome: Progressing  Goal: Patient-Specific Goal (Individualized)  Description: You can add care plan individualizations to a care plan. Examples of Individualization might be:  \"Parent requests to be called daily at 9am for status\", \"I have a hard time hearing out of my right ear\", or \"Do not touch me to wake me up as it startles  me\".  Outcome: Progressing  Goal: Absence of Hospital-Acquired Illness or Injury  Outcome: Progressing  Intervention: Identify and Manage Fall Risk  Recent Flowsheet Documentation  Taken 8/25/2024 2000 by Tamara Geiger RN  Safety Promotion/Fall Prevention: activity supervised  Taken 8/25/2024 1600 by Tamara Geiger RN  Safety Promotion/Fall Prevention: activity supervised  Intervention: Prevent Skin Injury  Recent Flowsheet Documentation  Taken 8/25/2024 2000 by Tamara Geiger RN  Body Position: position changed independently  Taken 8/25/2024 1600 by Tamara Geiger RN  Body Position: position changed independently  Goal: Optimal Comfort and Wellbeing  Outcome: Progressing  Intervention: Monitor Pain and Promote Comfort  Recent Flowsheet Documentation  Taken 8/25/2024 1958 by Tamara Geiger RN  Pain Management Interventions: medication (see MAR)  Goal: Readiness for Transition of Care  Outcome: Progressing     Problem: Risk for Delirium  Goal: Optimal Coping  Outcome: Progressing  Goal: Improved Behavioral Control  Outcome: Progressing  Intervention: Minimize Safety Risk  Recent Flowsheet Documentation  Taken 8/25/2024 2000 by Tamara Geiger RN  Communication Enhancement Strategies: call light answered in person  Enhanced Safety Measures: pain " management  Taken 8/25/2024 1600 by Tamara Geiger RN  Communication Enhancement Strategies: call light answered in person  Enhanced Safety Measures: pain management  Goal: Improved Attention and Thought Clarity  Outcome: Progressing  Intervention: Maximize Cognitive Function  Recent Flowsheet Documentation  Taken 8/25/2024 2000 by Tamara Geiger RN  Sensory Stimulation Regulation: care clustered  Reorientation Measures: clock in view  Taken 8/25/2024 1600 by Tamara Geiger RN  Sensory Stimulation Regulation: care clustered  Reorientation Measures: clock in view  Goal: Improved Sleep  Outcome: Progressing     Problem: Skin Injury Risk Increased  Goal: Skin Health and Integrity  Outcome: Progressing  Intervention: Plan: Nurse Driven Intervention: Moisture Management  Recent Flowsheet Documentation  Taken 8/25/2024 2000 by Tamara Geiger RN  Moisture Interventions:   No brief in bed   Urinary collection device  Taken 8/25/2024 1600 by Tamara Geiger RN  Moisture Interventions:   No brief in bed   Urinary collection device  Intervention: Plan: Nurse Driven Intervention: Friction and Shear  Recent Flowsheet Documentation  Taken 8/25/2024 2000 by Tamara Geiger RN  Friction/Shear Interventions: HOB 30 degrees or less  Taken 8/25/2024 1600 by Tamara Geiger RN  Friction/Shear Interventions: HOB 30 degrees or less  Intervention: Optimize Skin Protection  Recent Flowsheet Documentation  Taken 8/25/2024 2000 by Tamara Geiger RN  Activity Management: bedrest  Head of Bed (HOB) Positioning: HOB at 30 degrees  Taken 8/25/2024 1600 by Tamara Geiger RN  Activity Management: bedrest  Head of Bed (HOB) Positioning: HOB at 30 degrees     Problem: Heart Failure  Goal: Optimal Coping  Outcome: Progressing  Goal: Optimal Cardiac Output  Outcome: Progressing  Goal: Stable Heart Rate and Rhythm  Outcome: Progressing  Goal: Optimal Functional Ability  Outcome: Progressing  Intervention: Optimize Functional Ability  Recent Flowsheet  Documentation  Taken 8/25/2024 2000 by Tamara Geiger RN  Activity Management: bedrest  Taken 8/25/2024 1600 by Tamara Geiger RN  Activity Management: bedrest  Goal: Fluid and Electrolyte Balance  Outcome: Progressing  Goal: Improved Oral Intake  Outcome: Progressing  Goal: Effective Oxygenation and Ventilation  Outcome: Progressing  Intervention: Promote Airway Secretion Clearance  Recent Flowsheet Documentation  Taken 8/25/2024 2000 by Tamara Geiger RN  Activity Management: bedrest  Taken 8/25/2024 1600 by Tamara Geiger RN  Activity Management: bedrest  Intervention: Optimize Oxygenation and Ventilation  Recent Flowsheet Documentation  Taken 8/25/2024 2000 by Tamara Geiger RN  Head of Bed (HOB) Positioning: HOB at 30 degrees  Taken 8/25/2024 1600 by Tamara Geiger RN  Head of Bed (HOB) Positioning: HOB at 30 degrees  Goal: Effective Breathing Pattern During Sleep  Outcome: Progressing  Intervention: Monitor and Manage Obstructive Sleep Apnea  Recent Flowsheet Documentation  Taken 8/25/2024 2000 by Tamara Geiger RN  Medication Review/Management: medications reviewed  Taken 8/25/2024 1600 by Tamara Geiger RN  Medication Review/Management: medications reviewed     Problem: Gas Exchange Impaired  Goal: Optimal Gas Exchange  Outcome: Progressing  Intervention: Optimize Oxygenation and Ventilation  Recent Flowsheet Documentation  Taken 8/25/2024 2000 by Tamara Geiger RN  Head of Bed (HOB) Positioning: HOB at 30 degrees  Taken 8/25/2024 1600 by Tamara Geiger RN  Head of Bed (HOB) Positioning: HOB at 30 degrees     Problem: Chest Pain  Goal: Resolution of Chest Pain Symptoms  Outcome: Progressing     Problem: Urinary Retention  Goal: Effective Urinary Elimination  Outcome: Progressing   Goal Outcome Evaluation:       Monitor shows SR with pacs. Patient off BiPaP. Oxygen on at 2 L. Tolerating well. Patient using purewick and had over 1500 ml this shift and c/o of bladder pressure. Bladder scan shows 480 ml. Straight cath for  750 ml. Patient has chronic knee pain she says it always rated a 10. States she needs both knee replaced. /79  Medicated with hs BP meds. Bp 156/72

## 2024-08-26 NOTE — PROGRESS NOTES
Occupational Therapy      08/26/24 0800   Appointment Info   Signing Clinician's Name / Credentials (OT) Yarely Dimas OTR/L   Living Environment   People in Home alone   Current Living Arrangements independent living facility   Home Accessibility no concerns   Transportation Anticipated family or friend will provide   Living Environment Comments W/I shower w/ GB   Self-Care   Usual Activity Tolerance moderate   Current Activity Tolerance fair   Regular Exercise No   Equipment Currently Used at Home other (see comments)  (cane and scooter)   Fall history within last six months no   Activity/Exercise/Self-Care Comment Ind. w/ ADL routine   Instrumental Activities of Daily Living (IADL)   IADL Comments Ind. w/ basic cleaning from scooter level, nephew grocery shops, pt gets meals at [facility] [place]   General Information   Onset of Illness/Injury or Date of Surgery 08/25/24   Referring Physician Nelson Alberts MD   Additional Occupational Profile Info/Pertinent History of Current Problem 89 year old female admitted on 8/25/2024. She has PMHx of A-fib, HTN, heart disease, GERD, and HLD, presenting with sob, chest pressure, legs edema. C/w CHF. D-dimer elevated   Existing Precautions/Restrictions fall;cardiac   Cognitive Status Examination   Orientation Status orientation to person, place and time   Range of Motion Comprehensive   General Range of Motion bilateral upper extremity ROM WNL   Bed Mobility   Bed Mobility supine-sit   Supine-Sit [place] (Bed Mobility) minimum assist (75% patient effort);moderate assist (50% patient effort)   Transfers   Transfers sit-stand transfer   Sit-Stand Transfer   Sit-Stand [place] (Transfers) moderate assist (50% patient effort);2 person assist   Assistive Device (Sit-Stand Transfers) walker, front-wheeled   Balance   Balance Assessment standing dynamic balance   Standing Balance: Dynamic minimal assist;moderate assist;2-person assist   Activities of Daily Living   BADL  Assessment/Intervention lower body dressing   Lower Body Dressing Assessment/Training   [place] Level (Lower Body Dressing) moderate assist (50% patient effort)   Clinical Impression   Criteria for Skilled Therapeutic Interventions Met (OT) Yes, treatment indicated   OT Diagnosis decreased Act. tolerance/endurance   OT Problem List-Impairments impacting ADL problems related to;activity tolerance impaired;balance;mobility;strength   Assessment of Occupational Performance 1-3 Performance Deficits   Identified Performance Deficits endurance/act. tolerance, trnf safety, ADL ind.   Planned Therapy Interventions (OT) ADL retraining;balance training;strengthening;transfer training;home program guidelines;progressive activity/exercise   Clinical Decision Making Complexity (OT) problem focused assessment/low complexity   Risk & Benefits of therapy have been explained evaluation/treatment results reviewed;risks/benefits reviewed;patient   OT Total Evaluation Time   OT Eval, Low Complexity Minutes (14293) 10   OT Goals   Therapy Frequency (OT) 5 times/week   OT Predicted Duration/Target Date for Goal Attainment 09/02/24   OT Goals Hygiene/Grooming;Lower Body Dressing;Bed Mobility;Transfers;Toilet Transfer/Toileting   OT: Hygiene/Grooming supervision/stand-by assist;from wheelchair   OT: Lower Body Dressing Supervision/stand-by assist;using adaptive equipment   OT: Bed Mobility Supervision/stand-by assist;supine to/from sitting   OT: Transfer with assistive device;Supervision/stand-by assist   OT: Toilet Transfer/Toileting Supervision/stand-by assist;using adaptive equipment   Self-Care/Home Management   Self-Care/Home Mgmt/ADL, Compensatory, Meal Prep Minutes (02519) 24   Symptoms Noted During/After Treatment (Meal Preparation/Planning Training) fatigue   Treatment Detail/Skilled Intervention Pt up in bed upon OT arrival pt agreeable to therapy session, pt on 2L of O2 via nasal cannula pt normally on RA at home, BP  slightly elevated ~160s/70s. Pt ed/trained in CHF materials (low sodium diet/daily weights/stoplight tool) pt verbalizing understanding. Pt then completed bed mobility w/ min.A w/ cues for tech and use of bed rail. Pt then completed additional STS w/ Mod.Ax2 w/ fww- pt slightly unsteady w/ pivot trnf but no LOB noted. Pt able to complete STSx3 from recliner arm/arm min/mod.Ax2 cues for hand placement. Chair alarm on at end of session, alva G/h seated in recliner SpO2 ~90% no c/o shortness of breath. Pt reporting at home she uses scooter mainly for mobility but does walk in/out of bathroom w/ cane.   OT Discharge Planning   OT Plan g/h EOB, trnf safety, toileting, CHF ed., PT ordered   OT Discharge Recommendation (DC Rec) Transitional Care Facility   OT Rationale for DC Rec Pt lives in ILF normally alone and is Ind .w/ ADL/IADL routine. OT rec. TCU upon d/c to enhance safety/ADL ind. OT rec. assistx2 for close trnfs at this time.   OT Brief overview of current status Min/mod.Ax2 pivot trnf   Total Session Time   Timed Code Treatment Minutes 24   Total Session Time (sum of timed and untimed services) 34

## 2024-08-26 NOTE — PROGRESS NOTES
[facility] was introduced to the patient today including our role in the home management of their heart failure. Pt's nephew Tone was present for most of the visit.   Heart Failure Education Materials were shared today with the patient.  Introduction to the expectations including daily weight tracking using the Daily Weight Log. Pt noted stable weight at home lately, but she also had been working on losing weight in the last year. So could have been losing muscle/fat while gaining fluid weight. Encouraged pt to closely monitor symptoms going forward.  Low Sodium diet of 2000mg or less daily, review of label reading, aim for fresh and avoid processed items. Pt lives in [facility], they manage her medications for her. She eats about 1 meal/day there, dislikes their food. Pt will often go to [employer] for their breakfast or lunch. Advised pt limit this going forward. Pt eats snacks, already prepared food in her apt like yogurt, cottage cheese, etc. Her nephew and his wife do grocery shopping for her. She does not cook any more. Does not salt food.   Daily Fluid restriction of 2000ml considerations. This is new for pt. Encouraged using a consistent water bottle/ glass going forward to ensure adequate intake.   Monitor and report s/s of heart failure. How to report these changes.  Follow up appointments and testing expectations. Pt scheduled to see Dr. Gonzalez on 10/3 for post hospital follow up. Pt is also on a wait list should sooner appt become available.     JUAN Van RN     [facility]   [phone] Nurse [facility]   [facility]   [address] [place] [address]

## 2024-08-26 NOTE — PLAN OF CARE
Problem: Adult Inpatient Plan of Care  Goal: Optimal Comfort and Wellbeing  Outcome: Progressing  Intervention: Monitor Pain and Promote Comfort  Recent Flowsheet Documentation  Taken 8/26/2024 0340 by Shea Lr RN  Pain Management Interventions:   rest   repositioned   medication offered but refused  Taken 8/26/2024 0011 by Shea Lr RN  Pain Management Interventions:   rest   repositioned   emotional support   medication offered but refused     Problem: Risk for Delirium  Goal: Optimal Coping  Outcome: Progressing  Goal: Improved Behavioral Control  Outcome: Progressing  Intervention: Minimize Safety Risk  Recent Flowsheet Documentation  Taken 8/26/2024 0012 by Shea Lr RN  Enhanced Safety Measures:   patient/family teach back on injury risk   review medications for side effects with activity  Goal: Improved Attention and Thought Clarity  Outcome: Progressing  Goal: Improved Sleep  Outcome: Progressing     Problem: Skin Injury Risk Increased  Goal: Skin Health and Integrity  Outcome: Progressing  Intervention: Plan: Nurse Driven Intervention: Moisture Management  Recent Flowsheet Documentation  Taken 8/26/2024 0012 by Shea Lr RN  Moisture Interventions:   No brief in bed   Urinary collection device  Intervention: Plan: Nurse Driven Intervention: Friction and Shear  Recent Flowsheet Documentation  Taken 8/26/2024 0012 by Shea Lr RN  Friction/Shear Interventions: HOB 30 degrees or less     Problem: Heart Failure  Goal: Effective Oxygenation and Ventilation  Outcome: Progressing  Goal: Effective Breathing Pattern During Sleep  Outcome: Progressing  Intervention: Monitor and Manage Obstructive Sleep Apnea  Recent Flowsheet Documentation  Taken 8/26/2024 0012 by Shea Lr RN  Medication Review/Management: medications reviewed     Problem: Gas Exchange Impaired  Goal: Optimal Gas Exchange  Outcome: Progressing     Problem: Urinary Retention  Goal: Effective Urinary  Elimination  Outcome: Progressing   Goal Outcome Evaluation:         Patient reports her chronic knee pain otherwise denies pain. Tele NSR with occasional PAC's. Bumex drip continuous at 2ml/hr until it was discontinued at 0613 this morning per orders. Patient voided 400ml; pink to yellow in color due to previous straight cath trauma; bladder scanned at 0620 for 450ml; Patient asymptomatic, rescan at 0820 given in report. Continues with 2L oxygen via NC.       Vitals:    08/25/24 2312 08/25/24 2315 08/26/24 0328 08/26/24 0532   BP: (!) 154/64 (!) 158/62 134/73    BP Location: Left arm Right arm Left arm    Patient Position:       Cuff Size:       Pulse: 75  68    Resp: 19  23    Temp: 97.8  F (36.6  C)  97.5  F (36.4  C)    TempSrc: Oral  Oral    SpO2: 97%  93%    Weight:    70.5 kg (155 lb 6.8 oz)   Height:             Heart Failure Care Map  GOALS TO BE MET BEFORE DISCHARGE:    1. Decrease congestion and/or edema with diuretic therapy to achieve near optimal volume status.     Dyspnea improved: No, further care required to meet this goal. Please explain MADISNO still requiring 2L oxygen at rest   Edema improved: No, further care required to meet this goal. Please explain IV diuresis on hold now due to TIMOTHY; edema remains +3 in lower extremities.        Last 24 hour I/O:   Intake/Output Summary (Last 24 hours) at 8/26/2024 0633  Last data filed at 8/26/2024 0614  Gross per 24 hour   Intake 305.9 ml   Output 4600 ml   Net -4294.1 ml           Net I/O and Weights since admission:   07/27 0700 - 08/26 0659  In: 305.9 [P.O.:270; I.V.:35.9]  Out: 4600 [Urine:4600]  Net: -4294.1     Vitals:    08/25/24 0905 08/26/24 0532   Weight: 73 kg (161 lb) 70.5 kg (155 lb 6.8 oz)       2.  O2 sats > 90% on room air, or at prior home O2 therapy level.      Able to wean O2 this shift to keep sats above 90%?: No, further care required to meet this goal. Please explain 2L required at this time   Does patient use Home O2? No          Current  oxygenation status:   SpO2: 93 %     O2 Device: Nasal cannula, Oxygen Delivery: 2 LPM    3.  Tolerates ambulation and mobility near baseline.     Ambulation: No, further care required to meet this goal. Please explain work with PT and wean oxygen as able   Times patient ambulated with staff this shift: 1    Please review the Heart Failure Care Map for additional HF goal outcomes.    Shea Lr, RN  8/26/2024

## 2024-08-26 NOTE — PROGRESS NOTES
HEART CARE NOTE          Assessment/Recommendations   1. HFpEF c/b severe ADHF  Assessment / Plan  Robust response to bumex gtt; TIMOTHY noted - hold loop diuretic and ARB; give albumin bolus; continue to monitor UOP and renal function closely   Patient is high risk for adverse cardiac events 2/2 advanced age, frailty, HTN, elevated NTproBNP  GDMT as detailed below; mainstay of treatment for HFpEF includes diuretics and adequate BP control (class I) and SGLT2-I (class 2a); additional medical therapy (ARNI, MRA, ARB) demonstrated less robust evidence for indication but may be considered per guideline recommendations (2b); no indication for BBlockers      Current Pharmacotherapy AHA Guideline-Directed Medical Therapy   Losartan  50 mg daily - held ARNI/ARB   Spironolactone not started  MRA   SGLT2 inhibitor:not started SGLT2-I    Bumetanide gtt - held Loop diuretic       2. HTN Emergency  Assessment / Plan  Difficult to control; titrate oral afterload reduction as tolerated; diuresis as above; Htn likely significant factor in cardiogenic pulmonary edema and resultant respiratory distress; will need strict BP control on discharge     3. HLP  Assessment / Plan  Resume rosuvastatin     4. Acute hypoxic respiratory failure  Assessment / Plan  Likely 2/2 cardiogenic pulmonary edema      5. non-obstructive CAD  Assessment / Plan  Mild non-obstructive CAD - continue aggressive risk factor modification     6. TIMOTHY on CKD  Assessment / Plan  CRS; progressive; good response to IV diuresis; hold loop diuretic; continue to monitor UOP and renal function closely    7. Valvular heart disease  Assessment / Plan  Progressive MR and AI - will refer to valve clinic if patient is amenable    Plan of care discussed on August 26, 2024 with patient at bedside, and primary team overseeing patient's care.      History of Present Illness/Subjective    Ms. Kerry Hoffmann is a 89 year old female with a PMHx significant for (per Epic notation)  "HTN, heart disease, GERD, and HLD, presenting with sob, chest pressure, legs edema. C/w CHF.     Today, Mrs. Dotson was observed on BIPAP; Management plan as detailed above     ECG: Personally reviewed. normal sinus rhythm, nonspecific ST and T waves changes, PAC's noted.     ECHO (personally reviewed 8/26/24):   1. The left ventricle is moderately dilated. There is mild global hypokinesia  of the left ventricle. The visual ejection fraction is estimated at 45%.  2. Normal right ventricle size and systolic function.  3. The left atrium is moderate to severely dilated.  4. There is moderate (2+) mitral regurgitation.  5. There is mod-severe to severe (3-4+) aortic regurgitation.     Compared to study on 8/12/2023, there is decline in LV systolic function and  significant increase in aortic and mitral valve regurgitation.    Telemetry: personally reviewed August 26, 2024; notable for sinus rhythm     Lab results: personally reviewed August 26, 2024; notable for TIMOTHY on CKD    Medical history and pertinent documents reviewed in Care Everywhere please where applicable see details above        Physical Examination Review of Systems   /73 (BP Location: Left arm)   Pulse 68   Temp 97.5  F (36.4  C) (Oral)   Resp 23   Ht 1.549 m (5' 1\")   Wt 70.5 kg (155 lb 6.8 oz)   LMP  (LMP Unknown)   SpO2 93%   BMI 29.37 kg/m    Body mass index is 29.37 kg/m .  Wt Readings from Last 3 Encounters:   08/26/24 70.5 kg (155 lb 6.8 oz)   02/27/24 73.9 kg (163 lb)   11/11/23 77.8 kg (171 lb 9.6 oz)     General Appearance:   no distress, normal body habitus   ENT/Mouth: membranes moist, no oral lesions or bleeding gums.      EYES:  no scleral icterus, normal conjunctivae   Neck: no carotid bruits or thyromegaly   Chest/Lungs:   lungs are clear to auscultation, no rales or wheezing, equal chest wall expansion    Cardiovascular:   Regular. Normal first and second heart sounds with no murmurs, rubs, or gallops; the carotid, radial and " posterior tibial pulses are intact, no JVD and mild LE edema bilaterally    Abdomen:  no organomegaly, masses, bruits, or tenderness; bowel sounds are present   Extremities: no cyanosis or clubbing   Skin: no xanthelasma, warm.    Neurologic: NAD     Psychiatric: alert and oriented x3, calm     A complete 10 systems ROS was reviewed  And is negative except what is listed in the HPI.          Medical History  Surgical History Family History Social History   Past Medical History:   Diagnosis Date    Anxiety     takes clonazepam    Asthma     per [facility]     Chronic kidney disease     stage 3 per [facility]     CKD (chronic kidney disease)     Clostridium difficile colitis 11/01/2016    Clostridium difficile infection     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    Clostridium enterocolitis 10/27/2016    CTS (carpal tunnel syndrome)     GERD (gastroesophageal reflux disease)     per [facility]     Hiatal hernia     small per [facility]     Hyperlipidemia     Hyperlipidemia     Hyperlipidemia     Hypertension     Hypertension     Osteoarthritis of knee     per [facility]     Spinal stenosis     per [facility]     Vitamin D deficiency     per [facility]    Past Surgical History:   Procedure Laterality Date    BREAST SURGERY  1982    breast tumor per [facility]     CATARACT EXTRACTION  07/2005    per [facility]     COLONOSCOPY N/A 11/20/2023    Procedure: COLONOSCOPY with biopsies and polypectomy;  Surgeon: David Barreto MD;  Location: [facility] Main OR    CV CORONARY ANGIOGRAM N/A 8/11/2023    Procedure: Coronary Angiogram;  Surgeon: Ap Arroyo MD;  Location: [facility] LAB CV    CV LEFT HEART CATH N/A 8/11/2023    Procedure: Left Heart Catheterization;  Surgeon: Ap Arroyo MD;  Location: [facility] CATH LAB CV    ORIF TIBIA & FIBULA FRACTURES  1988    per [facility]     OTHER SURGICAL HISTORY  10/2004    hole in retinaper [facility]     OTHER SURGICAL HISTORY  05/03/2015    fecal transplantper [facility]     HI ESOPHAGOGASTRODUODENOSCOPY TRANSORAL DIAGNOSTIC N/A  9/11/2020    Procedure: ESOPHAGOGASTRODUODENOSCOPY With gastric biopsies;  Surgeon: David Reyes MD;  [facility];  Service: Gastroenterology    TONSILLECTOMY      TONSILLECTOMY & ADENOIDECTOMY  1963    no family history of premature coronary artery disease Social History     Socioeconomic History    Marital status:      Spouse name: Not on file    Number of children: Not on file    Years of education: Not on file    Highest education level: Not on file   Occupational History    Not on file   Tobacco Use    Smoking status: Former     Current packs/day: 3.00     Average packs/day: 3.0 packs/day for 35.0 years (105.0 ttl pk-yrs)     Types: Cigarettes    Smokeless tobacco: Never    Tobacco comments:     quit 1968   Substance and Sexual Activity    Alcohol use: Not Currently    Drug use: Never    Sexual activity: Not Currently   Other Topics Concern    Parent/sibling w/ CABG, MI or angioplasty before 65F 55M? No   Social History Narrative    Not on file     Social Determinants of Health     Financial Resource Strain: Low Risk  (8/25/2024)    Financial Resource Strain     Within the past 12 months, have you or your family members you live with been unable to get utilities (heat, electricity) when it was really needed?: No   Food Insecurity: Low Risk  (8/25/2024)    Food Insecurity     Within the past 12 months, did you worry that your food would run out before you got money to buy more?: No     Within the past 12 months, did the food you bought just not last and you didn t have money to get more?: No   Transportation Needs: Low Risk  (8/25/2024)    Transportation Needs     Within the past 12 months, has lack of transportation kept you from medical appointments, getting your medicines, non-medical meetings or appointments, work, or from getting things that you need?: No   Physical Activity: Not on file   Stress: Not on file   Social Connections: Not on file   Interpersonal Safety: Low Risk   (8/25/2024)    Interpersonal Safety     Do you feel physically and emotionally safe where you currently live?: Yes     Within the past 12 months, have you been hit, slapped, kicked or otherwise physically hurt by someone?: No     Within the past 12 months, have you been humiliated or emotionally abused in other ways by your partner or ex-partner?: No   Housing Stability: Low Risk  (8/25/2024)    Housing Stability     Do you have housing? : Yes     Are you worried about losing your housing?: No           Lab Results    Chemistry/lipid CBC Cardiac Enzymes/BNP/TSH/INR   Lab Results   Component Value Date    CHOL 225 (H) 03/04/2024    HDL 60 03/04/2024    TRIG 160 (H) 03/04/2024    BUN 23.7 (H) 08/26/2024     08/26/2024    CO2 33 (H) 08/26/2024    Lab Results   Component Value Date    WBC 5.5 08/25/2024    HGB 12.7 08/25/2024    HCT 40.5 08/25/2024    MCV 91 08/25/2024     08/25/2024    Lab Results   Component Value Date    TROPONINI <0.01 09/04/2022     11/16/2019    TSH 2.52 06/14/2023     Lab Results   Component Value Date    TROPONINI <0.01 09/04/2022          Weight:    Wt Readings from Last 3 Encounters:   08/26/24 70.5 kg (155 lb 6.8 oz)   02/27/24 73.9 kg (163 lb)   11/11/23 77.8 kg (171 lb 9.6 oz)       Allergies  Allergies   Allergen Reactions    Buspirone Shortness Of Breath    Ciprofloxacin Hives and Shortness Of Breath     Other reaction(s): Unknown    Cortisone      Other reaction(s): Throat Swelling/Closing, Unknown  Reaction 9-5-94      Hydrocodone-Acetaminophen Shortness Of Breath     Other reaction(s): Unknown    Lansoprazole Shortness Of Breath    Metoprolol Shortness Of Breath     Tolerates metoprolol succinate at home    Nedocromil      Other reaction(s): Throat Swelling/Closing    Niacin Shortness Of Breath     Other reaction(s): Unknown    Albuterol Other (See Comments)     Inhaler had extreme effect on nervous system      Amlodipine      Other reaction(s): Erythema, Unknown     Azithromycin      Other reaction(s): *Unknown, Unknown    Cefixime Diarrhea     Other reaction(s): Unknown  Has tolerated ceftriaxone    Cefprozil Nausea and Vomiting     Other reaction(s): Unknown  Has tolerated ceftriaxone    Cefuroxime      Other reaction(s): *Unknown  Has tolerated ceftriaxone    Cephalexin      Other reaction(s): *Unknown, Unknown  Has tolerated ceftriaxone    Contrast Dye      Other reaction(s): hives    Cyclobenzaprine      Other reaction(s): Sedation    Dextromethorphan-Guaifenesin Nausea     Other reaction(s): Headache    Diltiazem      Other reaction(s): Erythema, Unknown  Ears face arms and chest red and on fire-body tremors      Erythromycin      Other reaction(s): Unknown    Esomeprazole      Other reaction(s): Headache    Fenofibrate Muscle Pain (Myalgia)     Other reaction(s): Unknown    Fluticasone-Salmeterol Other (See Comments)     Elevated blood pressure      Methylprednisolone     Metronidazole     Monosodium Glutamate     Moxifloxacin      Other reaction(s): Dizziness    Nifedipine      Other reaction(s): Edema  Took for 5-93 to 9-94 red and swollen leg      Nsaids      Other reaction(s): Unknown    Ofloxacin      Other reaction(s): *Unknown    Ondansetron      Other reaction(s): Constipation    Parsley Seed     Penicillins Unknown     She doesn't remember other than it occurred as a very young woman     Piper     Pirbuterol Other (See Comments)     Immediate extreme effect on nervous system      Pravastatin      Other reaction(s): Dizziness, Unknown    Pseudoephedrine      Other reaction(s): headache,nausea    Shellfish-Derived Products      Per pt 8/12/23    Simvastatin Muscle Pain (Myalgia)    Spironolactone      Other reaction(s): stomach cramps, nausea    Sulfamethoxazole-Trimethoprim      Other reaction(s): Unknown    Tramadol      Other reaction(s): red ears,high blood press.    Tramadol-Acetaminophen      Other reaction(s): Tachycardia, Unknown    Triamterene Other (See  Comments)     Greatly bothered with sun-took medication for three years  Greatly bothered with sun      Diatrizoate Rash    Telmisartan Palpitations         Surgical History  Past Surgical History:   Procedure Laterality Date    BREAST SURGERY  1982    breast tumor per [facility]     CATARACT EXTRACTION  07/2005    per [facility]     COLONOSCOPY N/A 11/20/2023    Procedure: COLONOSCOPY with biopsies and polypectomy;  Surgeon: David Barreto MD;  Location: [facility]    CV CORONARY ANGIOGRAM N/A 8/11/2023    Procedure: Coronary Angiogram;  Surgeon: Ap Arroyo MD;  Location: [facility] CATH LAB CV    CV LEFT HEART CATH N/A 8/11/2023    Procedure: Left Heart Catheterization;  Surgeon: Ap Arroyo MD;  Location: [facility] LAB CV    ORIF TIBIA & FIBULA FRACTURES  1988    per [facility]     OTHER SURGICAL HISTORY  10/2004    hole in retinaper [facility]     OTHER SURGICAL HISTORY  05/03/2015    fecal transplantper [facility]     ME ESOPHAGOGASTRODUODENOSCOPY TRANSORAL DIAGNOSTIC N/A 9/11/2020    Procedure: ESOPHAGOGASTRODUODENOSCOPY With gastric biopsies;  Surgeon: David Reyes MD;  Location: [facility];  Service: Gastroenterology    TONSILLECTOMY      TONSILLECTOMY & ADENOIDECTOMY  1963       Social History  Tobacco:   History   Smoking Status    Former    Packs/day: 3.00    Years: 35.00    Types: Cigarettes   Smokeless Tobacco    Never    Alcohol:   Social History    Substance and Sexual Activity      Alcohol use: Not Currently   Illicit Drugs:   History   Drug Use Unknown       Family History  Family History   Problem Relation Age of Onset    Hypertension Mother     Cancer Mother         gallbladder cancer    Myocardial Infarction Father     Hypertension Sister     Alzheimer Disease Sister     Heart Disease Sister     Cancer Brother     Brain Cancer Brother     Pacemaker Mother     Heart Disease Mother     Coronary Artery Disease Father     Heart Disease Father     Heart Failure Sister           Jimmy  MD Carlos on 8/26/2024      cc: Kaylyn Bolanos

## 2024-08-26 NOTE — PLAN OF CARE
Heart Failure Care Map  GOALS TO BE MET BEFORE DISCHARGE:    1. Decrease congestion and/or edema with diuretic therapy to achieve near optimal volume status.     Dyspnea improved: Yes, satisfactory for discharge.   Edema improved: No, further care required to meet this goal. Please explain   Lower extremity edema improving. Lasix on hold due to TIMOTHY.        Last 24 hour I/O:   Intake/Output Summary (Last 24 hours) at 8/26/2024 1611  Last data filed at 8/26/2024 1030  Gross per 24 hour   Intake 305.9 ml   Output 3050 ml   Net -2744.1 ml           Net I/O and Weights since admission:   07/27 2300 - 08/26 2259  In: 305.9 [P.O.:270; I.V.:35.9]  Out: 4800 [Urine:4800]  Net: -4494.1     Vitals:    08/25/24 0905 08/26/24 0532   Weight: 73 kg (161 lb) 70.5 kg (155 lb 6.8 oz)       2.  O2 sats > 90% on room air, or at prior home O2 therapy level.      Able to wean O2 this shift to keep sats above 90%?: No, further care required to meet this goal. Please explain   Attempted to wean oxygen. Sat's 86-89% on room air.   Does patient use Home O2? No          Current oxygenation status:   SpO2: 90 %     O2 Device: Nasal cannula, Oxygen Delivery: 2 LPM    3.  Tolerates ambulation and mobility near baseline.     Ambulation: No, further care required to meet this goal. Please explain   Patient unable to ambulate   Times patient ambulated with staff this shift: 0    Patient endorses feeling better today. Patient denies chest pain. No shortness reported at rest. Weaning oxygen as tolerated.  V-Q scan and CXR done this afternoon. Patient tolerated well.     Please review the Heart Failure Care Map for additional HF goal outcomes.    Margo Sorenson RN  8/26/2024

## 2024-08-26 NOTE — PROGRESS NOTES
Care Management Follow Up    Length of Stay (days): 1    Expected Discharge Date: 08/27/2024    Anticipated Discharge Plan:  Home    Transportation: Anticipate medical transport    PT Recommendations:    OT Recommendations:  Transitional Care Facility     Barriers to Discharge: medical stability    Prior Living Situation: apartment, independent living [facility] with alone     Patient/Spokesperson Updated: Yes. Who? Patient at bedside    Additional Information:  SW met with patient at bedside to review recommendations- OT is recommending TCU. Pt declining TCU at this time, states she is able to care for self in her independent senior apartment. She has a walker and uses a scooter - states she is able to do most things for herself from her scooter. Pt is not on oxygen at home.   Cm will continue to follow for discharge planning needs.    Dara Rojo, ALEXANDERSW

## 2024-08-26 NOTE — PROGRESS NOTES
[facility]    Medicine Progress Note - Hospitalist Service    Date of Admission:  8/25/2024    Assessment & Plan      Kerry JOAN Hoffmann is a 89 year old female admitted on 8/25/2024. She has PMHx of A-fib, HTN, heart disease, CKD 3, GERD, and HLD, presenting with sob, chest pressure, legs edema. C/w CHF. D-dimer elevated.    Acute pulmonary edema  Acute and new CHF exacerbation, systolic and diastolic  SOB, acute hypoxic respiratory failure, improved  Chest pressure  Legs edema -improved  -BNP 7336, trop 21  -ddx: CHF, AMI, PE, pericarditis, pneumonia, malignancy  -CXR: Cardiomegaly with new pulmonary vascular congestion and increased interstitial markings suggesting pulmonary edema   -chest ct not ordered due to h/o allergy  -Lasix iv, which is on hold due to TIMOTHY  -Echo:   -trend trop, not higher  -continue home BB, ARB  -cardiologist consultation: Lasix iv on hold and albumin ordered    Elevation of d-dimer  -moderate risk for PE  -cannot do ct due to allergy  -V/Q pending    HTN urgency  -Hydralazine prn  -PTA BB and ARB    TIMOTHY on CKD 3a  -as per pt, one of her kidney got shrank and scared after receving lasix too fast in August of last year. But I checked the renal US (07/2023) and ct (11/2023), no report of shrank kidney. Just benign cysts.   -cr trending up  -closely monitoring on diuretics  -consult nephrologist since cr up    Hypokalemia  -replace as protocol          Diet: Combination Diet 2 gm NA Diet; No Caffeine Diet (and additional linked orders)  Fluid restriction 2000 ML FLUID (and additional linked orders)    DVT Prophylaxis: Enoxaparin (Lovenox) SQ  Wiley Catheter: Not present  Lines: None     Cardiac Monitoring: None  Code Status: No CPR- Do NOT Intubate      Clinically Significant Risk Factors Present on Admission        # Hypokalemia: Lowest K = 3.2 mmol/L in last 2 days, will replace as needed         # Drug Induced Platelet Defect: home medication list includes an  "antiplatelet medication   # Hypertension: Noted on problem list  # Heart failure, NOS: heart failure noted on the problem list and last echo with EF 40-50%        # Overweight: Estimated body mass index is 29.37 kg/m  as calculated from the following:    Height as of this encounter: 1.549 m (5' 1\").    Weight as of this encounter: 70.5 kg (155 lb 6.8 oz).       # Financial/Environmental Concerns: none  # Asthma: noted on problem list              Disposition Plan     Medically Ready for Discharge: Anticipated in 2-4 Days    Barrier: chf, mara         Nelson Alberts MD  Hospitalist Service  [facility]  Securely message with [employer] (more info)  Text page via [facility] Paging/Directory   ______________________________________________________________________    Interval History   Was on Bipap briefly yesterday for sob, now off biPAP and stable on NC o2, feeling better with breathing and legs edema, no cp, no f/c, no n/v    Physical Exam   Vital Signs: Temp: 97.5  F (36.4  C) Temp src: Oral BP: 134/73 Pulse: 68   Resp: 23 SpO2: 93 % O2 Device: Nasal cannula Oxygen Delivery: 2 LPM  Weight: 155 lbs 6.79 oz    General.  Awake alert oriented in mild resp distress.  HEENT.  Pupils equal round react to light, anicteric, EOM intact.  Neck supple no JVD.  CVS regular rhythm no murmur gallops.  Lungs.  auscultation bilateral rales.  Abdomen.  Soft nontender bowel sounds present.  Extremities.  trace edema no calf tenderness.  Neurological.  Awake and alert. No focal deficit.  Skin no rash. No pallor.  Psych. Normal mood.      Medical Decision Making       55 MINUTES SPENT BY ME on the date of service doing chart review, history, exam, documentation & further activities per the note.      Data     I have personally reviewed the following data over the past 24 hrs:    N/A  \   N/A   / N/A     141 96 (L) 23.7 (H) /  100 (H)   3.2 (L) 33 (H) 1.41 (H) \     Trop: 20 (H) BNP: N/A       Imaging results reviewed over " the past 24 hrs:   Recent Results (from the past 24 hour(s))   Echocardiogram Complete   Result Value    LVEF  45%    Narrative    [id number]  [id number]  [id number]  [id number]^[place] [address]     Name: DEVON REESE  MRN: [id number]  : 1935  Study Date: 2024 09:28 AM  Age: 89 yrs  Gender: Female  Patient Location: [facility]  Reason For Study: CHF  Ordering Physician: SANAM GREENE  Performed By: MARINA     BSA: 1.7 m2  Height: 60 in  Weight: 163 lb  HR: 74  BP: 197/82 mmHg  ______________________________________________________________________________  Procedure  Complete Portable Echo Adult. Definity (NDC #84667-673) given intravenously.  Technically difficult study.  ______________________________________________________________________________  Interpretation Summary     1. The left ventricle is moderately dilated. There is mild global hypokinesia  of the left ventricle. The visual ejection fraction is estimated at 45%.  2. Normal right ventricle size and systolic function.  3. The left atrium is moderate to severely dilated.  4. There is moderate (2+) mitral regurgitation.  5. There is mod-severe to severe (3-4+) aortic regurgitation.     Compared to study on 2023, there is decline in LV systolic function and  significant increase in aortic and mitral valve regurgitation.  ______________________________________________________________________________  Left Ventricle  The left ventricle is moderately dilated. There is normal left ventricular  wall thickness. The visual ejection fraction is estimated at 45%. There is  mild global hypokinesia of the left ventricle.     Right Ventricle  Normal right ventricle size and systolic function.     Atria  The left atrium is moderate to severely dilated. The right atrium is  moderately dilated. There is no color Doppler evidence of an atrial shunt.     Mitral Valve  The mitral valve leaflets are mildly  thickened. There is moderate (2+) mitral  regurgitation. There is no mitral valve stenosis.     Tricuspid Valve  There is moderate (2+) tricuspid regurgitation. There is no tricuspid  stenosis.     Aortic Valve  There is mild trileaflet aortic sclerosis. There is mod-severe to severe (3-  4+) aortic regurgitation. No aortic stenosis is present.     Pulmonic Valve  The pulmonic valve is not well seen, but is grossly normal. This degree of  valvular regurgitation is within normal limits. There is trace pulmonic  valvular regurgitation.     Vessels  The aorta root is normal. IVC diameter and respiratory changes fall into an  intermediate range suggesting an RA pressure of 8 mmHg.     Pericardium  There is no pericardial effusion.     ______________________________________________________________________________  MMode/2D Measurements & Calculations  IVSd: 1.1 cm  LVIDd: 6.3 cm  LVIDs: 4.5 cm  LVPWd: 0.83 cm  FS: 28.2 %     LV mass(C)d: 249.9 grams  LV mass(C)dI: 146.0 grams/m2  Ao root diam: 3.0 cm  asc Aorta Diam: 3.1 cm  LVOT diam: 2.0 cm  LVOT area: 3.1 cm2  Ao root diam index Ht(cm/m): 2.0  Ao root diam index BSA (cm/m2): 1.8  Asc Ao diam index BSA (cm/m2): 1.8  Asc Ao diam index Ht(cm/m): 2.0  EF Biplane: 49.6 %  LA Volume (BP): 66.9 ml     LA Volume Index (BP): 39.1 ml/m2  LA Volume Indexed (AL/bp): 41.3 ml/m2  RV Base: 3.5 cm  RWT: 0.26  TAPSE: 2.1 cm     Time Measurements  Aortic HR: 79.0 BPM  MM HR: 73.0 BPM     Doppler Measurements & Calculations  MV E max mary anne: 120.3 cm/sec  MV A max mary anne: 70.8 cm/sec  MV E/A: 1.7  MV max P.3 mmHg  MV mean PG: 3.0 mmHg  MV V2 VTI: 29.0 cm  MVA(VTI): 1.7 cm2  MV dec slope: 937.0 cm/sec2  MV dec time: 0.13 sec  Ao V2 max: 135.7 cm/sec  Ao max P.0 mmHg  Ao V2 mean: 92.8 cm/sec  Ao mean P.0 mmHg  Ao V2 VTI: 27.8 cm  AGA(I,D): 1.8 cm2  AGA(V,D): 1.7 cm2  AI P1/2t: 270.7 msec  LV V1 max P.3 mmHg  LV V1 max: 74.9 cm/sec  LV V1 VTI: 16.1 cm  MR PISA: 1.2 cm2  MR ERO:  0.06 cm2  MR volume: 15.0 ml  CO(LVOT): 4.0 l/min  CI(LVOT): 2.3 l/min/m2  SV(LVOT): 50.6 ml  SI(LVOT): 29.6 ml/m2  PA V2 max: 59.0 cm/sec  PA max P.4 mmHg  PA acc time: 0.09 sec  TR max ben: 301.0 cm/sec  TR max P.2 mmHg  AV Ben Ratio (DI): 0.55  AGA Index (cm2/m2): 1.1  E/E' av.6  Lateral E/e': 16.8     Medial E/e': 18.4  RV S Ben: 14.4 cm/sec     ______________________________________________________________________________  Report approved by: Pavel Naylor 2024 10:49 AM

## 2024-08-26 NOTE — CONSULTS
NEPHROLOGY CONSULTATION      ASSESSMENT/PLAN:  89 year old female with past medical history of hypertension severe intracranial atherosclerotic disease has been on Plavix history of stage III CKD PAF anxiety and multiple drug allergies establish cares with us after initial presentation in July 2023 for CKD 3 with noted left renal atrophy and multiple cortical cysts there have been seen in past imaging initially had a robust response to Lasix and developed an TIMOTHY with improvement after some IV fluids.  Now readmitted on 8/25/2020 for with likely CHF exacerbation with chest pressure shortness of breath and leg edema  Initially started on IV Lasix and with mild elevation in creatinine Lasix stopped and received albumin; losartan held  Nephrology consulted for TIMOTHY/CKD 3    TIMOTHY/creatinine elevation nonoliguric  CKD 3 baseline creatinine 1.1-1.4 with GFR around ~35-40,   UA bland  Urine protein negative quantification 0.08 g/g  Renal imaging significant for left renal atrophy with about 7.8 cm left kidney and about 9.9 cm right kidney with multiple cortical cysts that have been seen in the past imaging as well, (CT in 2022 in 2021)  Revealing mild CKD likely secondary to renal atrophy as well has some acquired renal cysts in the light of renovascular disease since he does have evidence of significant atherosclerotic disease elsewhere  Current creatinine is near her baseline; fluctuation in creatinine is likely related to hemodynamics  Patient is clinically euvolemic to mild hypervolemic  --> Hold off any further diuretics as well as volume expansion, conservative management for the creatinine would likely plateau and improve  --> Noted albumin  --> Peak creatinine be close to her baseline and currently blood pressure is elevated with hypertensive heart disease will resume ARB  --> Strict DARLEEN Daily weights and renal panel daily  --> will schedule for OP visits      Hyponatremia improved  Chronic runs between  129-135  Hypochloremia and elevated bicarb  Likely related to diuresis  --> Diuretics held     Hypomagnesemia   Replace per protocols     History of hypertension  Prior to admission on metoprolol 50mg losartan 50 mg twice a day  Has multiple antihypertensive intolerance and allergies  Blood pressure borderline controlled with elevated blood pressures and CHF exacerbation with history of hypertension heart disease  --> Losartan was held with mild uptrend in creatinine which was likely related to her overdiuresis she has been very sensitive to IV Lasix in the past as well  Hold off diuresis  --> Losartan resumed  --> Counseled on low-sodium diet, less than 2 g/day     Hypervolemia   Improved significantly with diuresis  No indication for scheduled diuretics at this time  No indication for IV fluids/albumin volume expansion unless hypotensive  --> will plans for PO diuresis as needed     Hb stable ~12     Chest pressure  Improved with better blood pressure  Likely secondary to hypertensive urgency  Improved    Dyspnea and SOB  Improved with diuresisD-Dimer elevated  VQ scan wo evidence of PE     History of GERD with reflux  Chronically on famotidine     History of severe atherosclerosis  Severe intracranial atherosclerosis on Plavix/ ASA  Evidence of atherosclerosis on imaging  Likely has acquired bilateral small renal cystic disease secondary to renal artery disease  --> intolerance to statins      Hepatic cyst around 4 cm  May need to consider evaluation with her prior GI team  Consult to follow as outpatient     Anxiety on clonazepam    Thank you for the consultation  We will follow    Shorty Bautista MD  [facility]  [phone]       CC: TIMOTHY, CKD 3, volume management     REASON FOR CONSULTATION: We are asked to see pt by Dr Alberts    HISTORY OF PRESENT ILLNESS:89 year old female  89 year old female with past medical history of hypertension severe intracranial atherosclerotic disease has been on  Plavix history of stage III CKD PAF anxiety and multiple drug allergies establish cares with us after initial presentation in July 2023 for CKD 3 with noted left renal atrophy and multiple cortical cysts there have been seen in past imaging initially had a robust response to Lasix and developed an TIMOTHY with improvement after some IV fluids.  Now readmitted on 8/25/2020 for with likely CHF exacerbation with chest pressure shortness of breath and leg edema  Initially started on IV Lasix and with mild elevation in creatinine Lasix stopped and received albumin; losartan held  Nephrology consulted for TIMOTHY/CKD 3    She feels her breathing is a bit better, she is leaving for VQ scan  Had a robust response to lasix IV in last 24hrs and now stopped. She reports she had been watching her wts at home and was stable ~150s, she was changed to as needed lasix when I last saw her so she never took any, but she thinks she may have lost some wt and that's why she had more swelling and edema      REVIEW OF SYSTEMS:  ROS was completely reviewed and otherwise negative and non-contributory    Past Medical History:   Diagnosis Date    Anxiety     takes clonazepam    Asthma     per H & P     Chronic kidney disease     stage 3 per H & P     CKD (chronic kidney disease)     Clostridium difficile colitis 11/01/2016    Clostridium difficile infection     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    Clostridium enterocolitis 10/27/2016    CTS (carpal tunnel syndrome)     GERD (gastroesophageal reflux disease)     per H & P     Hiatal hernia     small per H & P     Hyperlipidemia     Hyperlipidemia     Hyperlipidemia     Hypertension     Hypertension     Osteoarthritis of knee     per H & P     Spinal stenosis     per H & P     Vitamin D deficiency     per H & P       Social History     Socioeconomic History    Marital status:      Spouse name: Not on file    Number of children: Not on file    Years of education: Not on file    Highest education  level: Not on file   Occupational History    Not on file   Tobacco Use    Smoking status: Former     Current packs/day: 3.00     Average packs/day: 3.0 packs/day for 35.0 years (105.0 ttl pk-yrs)     Types: Cigarettes    Smokeless tobacco: Never    Tobacco comments:     quit 1968   Substance and Sexual Activity    Alcohol use: Not Currently    Drug use: Never    Sexual activity: Not Currently   Other Topics Concern    Parent/sibling w/ CABG, MI or angioplasty before 65F 55M? No   Social History Narrative    Not on file     Social Determinants of Health     Financial Resource Strain: Low Risk  (8/25/2024)    Financial Resource Strain     Within the past 12 months, have you or your family members you live with been unable to get utilities (heat, electricity) when it was really needed?: No   Food Insecurity: Low Risk  (8/25/2024)    Food Insecurity     Within the past 12 months, did you worry that your food would run out before you got money to buy more?: No     Within the past 12 months, did the food you bought just not last and you didn t have money to get more?: No   Transportation Needs: Low Risk  (8/25/2024)    Transportation Needs     Within the past 12 months, has lack of transportation kept you from medical appointments, getting your medicines, non-medical meetings or appointments, work, or from getting things that you need?: No   Physical Activity: Not on file   Stress: Not on file   Social Connections: Not on file   Interpersonal Safety: Low Risk  (8/25/2024)    Interpersonal Safety     Do you feel physically and emotionally safe where you currently live?: Yes     Within the past 12 months, have you been hit, slapped, kicked or otherwise physically hurt by someone?: No     Within the past 12 months, have you been humiliated or emotionally abused in other ways by your partner or ex-partner?: No   Housing Stability: Low Risk  (8/25/2024)    Housing Stability     Do you have housing? : Yes     Are you worried  about losing your housing?: No       Family History   Problem Relation Age of Onset    Hypertension Mother     Cancer Mother         gallbladder cancer    Myocardial Infarction Father     Hypertension Sister     Alzheimer Disease Sister     Heart Disease Sister     Cancer Brother     Brain Cancer Brother     Pacemaker Mother     Heart Disease Mother     Coronary Artery Disease Father     Heart Disease Father     Heart Failure Sister        Allergies   Allergen Reactions    Buspirone Shortness Of Breath    Ciprofloxacin Hives and Shortness Of Breath     Other reaction(s): Unknown    Cortisone      Other reaction(s): Throat Swelling/Closing, Unknown  Reaction 9-5-94      Hydrocodone-Acetaminophen Shortness Of Breath     Other reaction(s): Unknown    Lansoprazole Shortness Of Breath    Metoprolol Shortness Of Breath     Tolerates metoprolol succinate at home    Nedocromil      Other reaction(s): Throat Swelling/Closing    Niacin Shortness Of Breath     Other reaction(s): Unknown    Albuterol Other (See Comments)     Inhaler had extreme effect on nervous system      Amlodipine      Other reaction(s): Erythema, Unknown    Azithromycin      Other reaction(s): *Unknown, Unknown    Cefixime Diarrhea     Other reaction(s): Unknown  Has tolerated ceftriaxone    Cefprozil Nausea and Vomiting     Other reaction(s): Unknown  Has tolerated ceftriaxone    Cefuroxime      Other reaction(s): *Unknown  Has tolerated ceftriaxone    Cephalexin      Other reaction(s): *Unknown, Unknown  Has tolerated ceftriaxone    Contrast Dye      Other reaction(s): hives    Cyclobenzaprine      Other reaction(s): Sedation    Dextromethorphan-Guaifenesin Nausea     Other reaction(s): Headache    Diltiazem      Other reaction(s): Erythema, Unknown  Ears face arms and chest red and on fire-body tremors      Erythromycin      Other reaction(s): Unknown    Esomeprazole      Other reaction(s): Headache    Fenofibrate Muscle Pain (Myalgia)     Other  reaction(s): Unknown    Fluticasone-Salmeterol Other (See Comments)     Elevated blood pressure      Methylprednisolone     Metronidazole     Monosodium Glutamate     Moxifloxacin      Other reaction(s): Dizziness    Nifedipine      Other reaction(s): Edema  Took for 5-93 to 9-94 red and swollen leg      Nsaids      Other reaction(s): Unknown    Ofloxacin      Other reaction(s): *Unknown    Ondansetron      Other reaction(s): Constipation    Parsley Seed     Penicillins Unknown     She doesn't remember other than it occurred as a very young woman     Piper     Pirbuterol Other (See Comments)     Immediate extreme effect on nervous system      Pravastatin      Other reaction(s): Dizziness, Unknown    Pseudoephedrine      Other reaction(s): headache,nausea    Shellfish-Derived Products      Per pt 8/12/23    Simvastatin Muscle Pain (Myalgia)    Spironolactone      Other reaction(s): stomach cramps, nausea    Sulfamethoxazole-Trimethoprim      Other reaction(s): Unknown    Tramadol      Other reaction(s): red ears,high blood press.    Tramadol-Acetaminophen      Other reaction(s): Tachycardia, Unknown    Triamterene Other (See Comments)     Greatly bothered with sun-took medication for three years  Greatly bothered with sun      Diatrizoate Rash    Telmisartan Palpitations       MEDICATIONS:  Current Facility-Administered Medications   Medication Dose Route Frequency Provider Last Rate Last Admin    acetaminophen (TYLENOL) tablet 1,000 mg  1,000 mg Oral TID Nelson Alberts MD   1,000 mg at 08/26/24 0839    aspirin EC tablet 81 mg  81 mg Oral Daily Nelson Alberts MD   81 mg at 08/26/24 0839    clonazePAM (klonoPIN) tablet 0.5 mg  0.5 mg Oral TID Nelson Alberts MD   0.5 mg at 08/26/24 0022    clopidogrel (PLAVIX) tablet 75 mg  75 mg Oral Daily Nelson Alberts MD   75 mg at 08/26/24 0839    enoxaparin ANTICOAGULANT (LOVENOX) injection 40 mg  40 mg Subcutaneous Q24H Nelson Alberts MD   40 mg at 08/26/24 0839     "famotidine (PEPCID) tablet 10 mg  10 mg Oral Daily Nelson Alberts MD   10 mg at 08/26/24 0839    [Held by provider] losartan (COZAAR) tablet 50 mg  50 mg Oral BID Nelson Alberts MD   50 mg at 08/25/24 1958    metoprolol succinate ER (TOPROL XL) 24 hr tablet 50 mg  50 mg Oral At Bedtime Nelson Alberts MD   50 mg at 08/25/24 1958    rosuvastatin (CRESTOR) tablet 10 mg  10 mg Oral Daily Nelson Alberts MD   10 mg at 08/26/24 0839    sodium chloride (PF) 0.9% PF flush 3 mL  3 mL Intracatheter Q8H Nelson Alberts MD   3 mL at 08/26/24 0839         PHYSICAL EXAM    BP (!) (P) 151/67 (BP Location: Left arm)   Pulse 81   Temp (P) 97.7  F (36.5  C) (Oral)   Resp (P) 20   Ht 1.549 m (5' 1\")   Wt 70.5 kg (155 lb 6.8 oz)   LMP  (LMP Unknown)   SpO2 97%   BMI 29.37 kg/m        Intake/Output Summary (Last 24 hours) at 8/26/2024 0945  Last data filed at 8/26/2024 0614  Gross per 24 hour   Intake 305.9 ml   Output 4600 ml   Net -4294.1 ml       Alert/awake and NAD  HEENT NC/AT; perrla; OP clear without lesions; mmm  Neck supple without LAD, TM  CV; RRR without rub + murmur  Lung: clear and equal; no extra sounds  Ab: soft and NT; not distended; normal bs  Ext: no edema and well perfused  Skin; no rash  Neuro; grossly intact    LABORATORIES    Recent Labs   Lab 08/25/24  0652   WBC 5.5   HGB 12.7   HCT 40.5        Recent Labs   Lab 08/26/24  0427 08/25/24  0652    142   CO2 33* 24   BUN 23.7* 20.0     No results for input(s): \"INR\", \"PTT\" in the last 168 hours.    Invalid input(s): \"APTT\"  Invalid input(s): \"FERRITIN\"  No results for input(s): \"IRON\" in the last 168 hours.    Invalid input(s): \"TIBC\"    I reviewed all labs and imaging    Thank you for the consultation we will follow    Shorty Bautista MD  [facility]  [phone]     "

## 2024-08-27 ENCOUNTER — APPOINTMENT (OUTPATIENT)
Dept: OCCUPATIONAL THERAPY | Facility: HOSPITAL | Age: 89
DRG: 291 | End: 2024-08-27
Payer: MEDICARE

## 2024-08-27 ENCOUNTER — APPOINTMENT (OUTPATIENT)
Dept: PHYSICAL THERAPY | Facility: HOSPITAL | Age: 89
DRG: 291 | End: 2024-08-27
Attending: INTERNAL MEDICINE
Payer: MEDICARE

## 2024-08-27 LAB
ANION GAP SERPL CALCULATED.3IONS-SCNC: 13 MMOL/L (ref 7–15)
BUN SERPL-MCNC: 31.9 MG/DL (ref 8–23)
CALCIUM SERPL-MCNC: 9.6 MG/DL (ref 8.8–10.4)
CHLORIDE SERPL-SCNC: 95 MMOL/L (ref 98–107)
CREAT SERPL-MCNC: 1.83 MG/DL (ref 0.51–0.95)
EGFRCR SERPLBLD CKD-EPI 2021: 26 ML/MIN/1.73M2
GLUCOSE SERPL-MCNC: 104 MG/DL (ref 70–99)
HCO3 SERPL-SCNC: 31 MMOL/L (ref 22–29)
POTASSIUM SERPL-SCNC: 3.4 MMOL/L (ref 3.4–5.3)
POTASSIUM SERPL-SCNC: 3.7 MMOL/L (ref 3.4–5.3)
SODIUM SERPL-SCNC: 139 MMOL/L (ref 135–145)

## 2024-08-27 PROCEDURE — 250N000013 HC RX MED GY IP 250 OP 250 PS 637: Performed by: INTERNAL MEDICINE

## 2024-08-27 PROCEDURE — 84132 ASSAY OF SERUM POTASSIUM: CPT | Performed by: INTERNAL MEDICINE

## 2024-08-27 PROCEDURE — 99232 SBSQ HOSP IP/OBS MODERATE 35: CPT | Performed by: INTERNAL MEDICINE

## 2024-08-27 PROCEDURE — 120N000004 HC R&B MS OVERFLOW

## 2024-08-27 PROCEDURE — 36415 COLL VENOUS BLD VENIPUNCTURE: CPT | Performed by: INTERNAL MEDICINE

## 2024-08-27 PROCEDURE — 99233 SBSQ HOSP IP/OBS HIGH 50: CPT | Performed by: INTERNAL MEDICINE

## 2024-08-27 PROCEDURE — 97530 THERAPEUTIC ACTIVITIES: CPT | Mod: GP

## 2024-08-27 PROCEDURE — 97116 GAIT TRAINING THERAPY: CPT | Mod: GP

## 2024-08-27 PROCEDURE — 250N000011 HC RX IP 250 OP 636: Performed by: INTERNAL MEDICINE

## 2024-08-27 PROCEDURE — 97162 PT EVAL MOD COMPLEX 30 MIN: CPT | Mod: GP

## 2024-08-27 PROCEDURE — 80048 BASIC METABOLIC PNL TOTAL CA: CPT | Performed by: INTERNAL MEDICINE

## 2024-08-27 PROCEDURE — 97535 SELF CARE MNGMENT TRAINING: CPT | Mod: GO

## 2024-08-27 RX ORDER — POTASSIUM CHLORIDE 1500 MG/1
20 TABLET, EXTENDED RELEASE ORAL ONCE
Status: COMPLETED | OUTPATIENT
Start: 2024-08-27 | End: 2024-08-27

## 2024-08-27 RX ADMIN — METOPROLOL SUCCINATE 50 MG: 50 TABLET, EXTENDED RELEASE ORAL at 20:17

## 2024-08-27 RX ADMIN — CLONAZEPAM 0.5 MG: 0.5 TABLET ORAL at 19:25

## 2024-08-27 RX ADMIN — ACETAMINOPHEN 1000 MG: 500 TABLET ORAL at 20:17

## 2024-08-27 RX ADMIN — ACETAMINOPHEN 1000 MG: 500 TABLET ORAL at 14:14

## 2024-08-27 RX ADMIN — ENOXAPARIN SODIUM 30 MG: 30 INJECTION SUBCUTANEOUS at 08:39

## 2024-08-27 RX ADMIN — ASPIRIN 81 MG: 81 TABLET, COATED ORAL at 08:40

## 2024-08-27 RX ADMIN — CLOPIDOGREL BISULFATE 75 MG: 75 TABLET ORAL at 08:40

## 2024-08-27 RX ADMIN — ROSUVASTATIN 10 MG: 10 TABLET, FILM COATED ORAL at 08:40

## 2024-08-27 RX ADMIN — CLONAZEPAM 0.5 MG: 0.5 TABLET ORAL at 11:37

## 2024-08-27 RX ADMIN — POTASSIUM CHLORIDE 20 MEQ: 1500 TABLET, EXTENDED RELEASE ORAL at 04:58

## 2024-08-27 RX ADMIN — FAMOTIDINE 10 MG: 10 TABLET ORAL at 08:40

## 2024-08-27 RX ADMIN — CLONAZEPAM 0.5 MG: 0.5 TABLET ORAL at 03:36

## 2024-08-27 RX ADMIN — ACETAMINOPHEN 1000 MG: 500 TABLET ORAL at 06:47

## 2024-08-27 ASSESSMENT — ACTIVITIES OF DAILY LIVING (ADL)
ADLS_ACUITY_SCORE: 30
ADLS_ACUITY_SCORE: 28
ADLS_ACUITY_SCORE: 27
ADLS_ACUITY_SCORE: 28
ADLS_ACUITY_SCORE: 27
ADLS_ACUITY_SCORE: 30
ADLS_ACUITY_SCORE: 28
ADLS_ACUITY_SCORE: 27
ADLS_ACUITY_SCORE: 28
ADLS_ACUITY_SCORE: 30
ADLS_ACUITY_SCORE: 28
ADLS_ACUITY_SCORE: 27
ADLS_ACUITY_SCORE: 28
ADLS_ACUITY_SCORE: 27
ADLS_ACUITY_SCORE: 27
ADLS_ACUITY_SCORE: 28
ADLS_ACUITY_SCORE: 27
ADLS_ACUITY_SCORE: 28

## 2024-08-27 NOTE — PROGRESS NOTES
[facility]    Medicine Progress Note - Hospitalist Service    Date of Admission:  8/25/2024    Assessment & Plan   Kerrykelsie Hoffmann is a 89 year old female admitted on 8/25/2024. She has PMHx of A-fib, HTN, heart disease, CKD 3, GERD, and HLD, presenting with sob, chest pressure, legs edema. C/w CHF. D-dimer elevated.    Acute pulmonary edema  Acute and new CHF exacerbation, systolic and diastolic  SOB, acute hypoxic respiratory failure, improved  Chest pressure  Legs edema -improved  -BNP 7336, trop 21  -ddx: CHF, AMI, PE, pericarditis, pneumonia, malignancy  -CXR: Cardiomegaly with new pulmonary vascular congestion and increased interstitial markings suggesting pulmonary edema   -chest ct not ordered due to h/o allergy  -Lasix iv, which is on hold due to TIMOTHY  -Echo: EF 45%  -trend trop, not higher  -continue home BB. ARB on hold due to TIMOTHY  -cardiologist consultation: Lasix iv on hold and albumin given  -pt/ot: recommended TCU. SW consultation pending.    Elevation of d-dimer  -moderate risk for PE  -cannot do ct due to allergy  -V/Q pending    HTN urgency  -Hydralazine prn  -PTA BB and ARB    TIMOTHY on CKD 3a  -as per pt, one of her kidney got shrank and scared after receving lasix too fast in August of last year. But I checked the renal US (07/2023) and ct (11/2023), no report of shrank kidney. Just benign cysts.   -cr trending up  -closely monitoring on diuretics  -consult nephrologist since cr up: monitor BMP     Hypokalemia  -replace as protocol  -improving        Diet: Combination Diet 2 gm NA Diet; No Caffeine Diet (and additional linked orders)  Fluid restriction 2000 ML FLUID (and additional linked orders)    DVT Prophylaxis: Enoxaparin (Lovenox) SQ  Wiley Catheter: Not present  Lines: None     Cardiac Monitoring: None  Code Status: No CPR- Do NOT Intubate      Clinically Significant Risk Factors        # Hypokalemia: Lowest K = 3.1 mmol/L in last 2 days, will replace as needed        "   # Acute Kidney Injury, unspecified: based on a >150% or 0.3 mg/dL increase in last creatinine compared to past 90 day average, will monitor renal function  # Hypertension: Noted on problem list  # Heart failure, NOS: heart failure noted on the problem list and last echo with EF 40-50%          # Overweight: Estimated body mass index is 29.08 kg/m  as calculated from the following:    Height as of this encounter: 1.549 m (5' 1\").    Weight as of this encounter: 69.8 kg (153 lb 14.1 oz)., PRESENT ON ADMISSION     # Financial/Environmental Concerns: none  # Asthma: noted on problem list              Disposition Plan     Medically Ready for Discharge: Anticipated in 2-4 Days    Barrier: Cr up.   Await TCU placement         Nelson Alberts MD  Hospitalist Service  [facility]  Securely message with [employer] (more info)  Text page via [employer] Paging/Directory   ______________________________________________________________________    Interval History   Feeling better with breathing, legs edema down, no cp/sob now, no f/c    Physical Exam   Vital Signs: Temp: 98  F (36.7  C) Temp src: Oral BP: 134/58 Pulse: 69   Resp: 18 SpO2: 96 % O2 Device: Nasal cannula Oxygen Delivery: 2 LPM  Weight: 153 lbs 14.1 oz    General.  Awake alert oriented not in acute distress.  HEENT.  Pupils equal round react to light, anicteric, EOM intact.  Neck supple no JVD.  CVS regular rhythm no murmur gallops.  Lungs.  Clear to auscultation bilateral no wheezing or rales.  Abdomen.  Soft nontender bowel sounds present.  Extremities.  + edema no calf tenderness.  Neurological.  Awake and alert. No focal deficit. General weakness  Skin no rash. No pallor.  Psych. Normal mood.      Medical Decision Making       52 MINUTES SPENT BY ME on the date of service doing chart review, history, exam, documentation & further activities per the note.      Data     I have personally reviewed the following data over the past 24 hrs:    N/A  \   " N/A   / N/A     139 95 (L) 31.9 (H) /  104 (H)   3.7 31 (H) 1.83 (H) \       Imaging results reviewed over the past 24 hrs:   No results found for this or any previous visit (from the past 24 hour(s)).

## 2024-08-27 NOTE — CONSULTS
Care Management Follow Up    Length of Stay (days): 2    Expected Discharge Date: 08/28/2024    Anticipated Discharge Plan:  Home    Transportation: Anticipate medical transport    PT Recommendations: Transitional Care Facility  OT Recommendations:  Transitional Care Facility     Barriers to Discharge: medical stability    Prior Living Situation: apartment, independent living facility ([facility]) with alone    Discussed  Partnership in Safe Discharge Planning  document with patient/family: No     Handoff Completed: No, handoff not indicated or clinically appropriate    Patient/Spokesperson Updated: yes, patient at bedside and nephew Tone by phone    Additional Information:  Chart reviewed.   SW spoke to patient's nephew on the phone to review discharge plan. He reports pt does spend most of her time in her electric scooter and does most of her ADLs from the scooter.   He reports goal would be to have patient return to her apartment with home PT, RN. KAI also discussed with nephew that patient may benefit from additional services from [facility] where she lives in the independent living building.  SW met with patient at bedside to continue review of discharge plan. She continues to decline TCU services and her plan is to return to her independent apartment.     Next Steps: monitor progression    Dara Rojo, ALEXANDERSW

## 2024-08-27 NOTE — PLAN OF CARE
Problem: Adult Inpatient Plan of Care  Goal: Optimal Comfort and Wellbeing  Outcome: Progressing     Problem: Heart Failure  Goal: Stable Heart Rate and Rhythm  Outcome: Progressing   Goal Outcome Evaluation:    Pt restful, comfortable over noc. Potassium replacement per protocol. O2 sats stable on 2L nasal cannula. NSR, pac on tele. Falls precautions in place. C/o generalized aches this am, tylenol given.

## 2024-08-27 NOTE — PROGRESS NOTES
Renal progress note  CC:TIMOTHY, hypervolemia  Assessment and Plan:  89 year old female with past medical history of hypertension severe intracranial atherosclerotic disease has been on Plavix history of stage III CKD PAF anxiety and multiple drug allergies establish cares with us after initial presentation in July 2023 for CKD 3 with noted left renal atrophy and multiple cortical cysts there have been seen in past imaging initially had a robust response to Lasix and developed an TIMOTHY with improvement after some IV fluids.  Now readmitted on 8/25/2020 for with likely CHF exacerbation with chest pressure shortness of breath and leg edema  Initially started on IV Lasix and with mild elevation in creatinine Lasix stopped and received albumin; losartan held  Nephrology consulted for TIMOTHY/CKD 3     TIMOTHY/creatinine elevation nonoliguric  CKD 3 baseline creatinine 1.1-1.4 with GFR around ~35-40,   UA bland  Urine protein negative quantification 0.4g/g  Renal imaging significant for left renal atrophy with about 7.8 cm left kidney and about 9.9 cm right kidney with multiple cortical cysts that have been seen in the past imaging as well, (CT in 2022 in 2021)  Revealing mild CKD likely secondary to renal atrophy as well has some acquired renal cysts in the light of renovascular disease since he does have evidence of significant atherosclerotic disease elsewhere  Current creatinine reflects a mild intra vasc volume depletion with aggressive response to diuresis , will hold off Losartan and diuretics for today and resume as indicated in next 24-48hrs  With lower nephron mass likely will trend up some before any improvement  Cystatin C 2.3 with creat 1.4 , GFR 22 based on Csyt, likely true GFR is close to 30 , will repeat as OP  No nephrotoxic meds noted  Patient is clinically euvolemic to mild hypervolemic  --> Hold off any further diuretics as well as volume expansion, conservative management for the creatinine would likely  plateau and improve  --> Noted losartan held , got 1 dose last evening, BP better this am , hold off for now , will likely resume once creatinine stabilizes  --> Strict DARLEEN Daily weights and renal panel daily  --> will schedule for OP visits      Hyponatremia improved  Chronic runs between 129-135  Hypochloremia and elevated bicarb  Likely related to diuresis  --> Diuretics held     Hypomagnesemia   Replace per protocols     History of hypertension  Prior to admission on metoprolol 50mg losartan 50 mg twice a day  Has multiple antihypertensive intolerance and allergies  Blood pressure borderline controlled with elevated blood pressures and CHF exacerbation with history of hypertension heart disease  --> Losartan was held with mild uptrend in creatinine which was likely related to her overdiuresis she has been very sensitive to IV diuresis in the past as well  Hold off diuresis  --> Losartan resumed  --> Counseled on low-sodium diet, less than 2 g/day  --> discussed with cardiology may need a low dose bumex may be alternate day since with as needed plan she still retained fluid and had a CHF exacerbation     Hypervolemia   Improved significantly with diuresis  No indication for scheduled diuretics at this time  No indication for IV fluids/albumin volume expansion unless hypotensive  --> will plans for PO diuresis as needed     Hb stable ~12     Chest pressure  Improved with better blood pressure  Likely secondary to hypertensive urgency  Improved     Dyspnea and SOB  Improved with diuresisD-Dimer elevated  VQ scan wo evidence of PE     History of GERD with reflux  Chronically on famotidine     History of severe atherosclerosis  Severe intracranial atherosclerosis on Plavix/ ASA  Evidence of atherosclerosis on imaging  Likely has acquired bilateral small renal cystic disease secondary to renal artery disease  --> intolerance to statins      Hepatic cyst around 4 cm  May need to consider evaluation with her prior GI  team  Consult to follow as outpatient     Anxiety on clonazepam  Thank you for the consultation we will follow  Shorty Bautista MD  [facility]  [phone]      Subjective  8/25 bumex 2mg and lasix 40mg with 4L in urine OP, creatinine with slow uptrend  BP elevated with losartan resumed 8/26 received 1 dose at 8pm , creatinine with uptrend today , somewhat expected from the prior 24hrs, BP better  Will hold off diuresis and Losartan for today and then reconsider a lower dose Losartan   Pt tolerating Losartan with stable creat PTA    Discussed with her and cardiology   Will follow on labs in am , no diuretics/ARB today  Follow BP and IO  PT evaluating and recs TCU  Oxygenation better today    Objective    Vital signs in last 24 hours  Temp:  [97.7  F (36.5  C)-98.1  F (36.7  C)] 98  F (36.7  C)  Pulse:  [64-79] 69  Resp:  [18-20] 18  BP: (125-146)/(58-63) 134/58  SpO2:  [90 %-96 %] 96 %  Weight:   [unfilled]    Intake/Output last 3 shifts  I/O last 3 completed shifts:  In: 360 [P.O.:360]  Out: 400 [Urine:400]  Intake/Output this shift:  No intake/output data recorded.    Physical Exam  Alert/, awake, NAD  CV: RRR without murmur or rub  Lung: clear and equal; no extra sounds  Ab: soft and NT; not distended; normal bs  Ext: no edema and well perfused  Skin; no rash    Pertinent Labs   Lab Results   Component Value Date    WBC 5.5 08/25/2024    HGB 12.7 08/25/2024    HCT 40.5 08/25/2024    MCV 91 08/25/2024     08/25/2024     Lab Results   Component Value Date    BUN 31.9 (H) 08/27/2024     08/27/2024    CO2 31 (H) 08/27/2024       Lab Results   Component Value Date    ALBUMIN 4.3 03/04/2024     Lab Results   Component Value Date    PHOS 3.4 03/04/2024     I reviewed all lab results  Shorty Bautista MD

## 2024-08-27 NOTE — PROGRESS NOTES
HEART CARE NOTE          Assessment/Recommendations     1. HFpEF c/b severe ADHF  Assessment / Plan  Progressive TIMOTHY - hold ARB; continue to monitor UOP and renal function closely   Patient is high risk for adverse cardiac events 2/2 advanced age, frailty, HTN, elevated NTproBNP  GDMT as detailed below; mainstay of treatment for HFpEF includes diuretics and adequate BP control (class I) and SGLT2-I (class 2a); additional medical therapy (ARNI, MRA, ARB) demonstrated less robust evidence for indication but may be considered per guideline recommendations (2b); no indication for BBlockers      Current Pharmacotherapy AHA Guideline-Directed Medical Therapy   Losartan  50 mg BID - held due to TIMOTHY ARNI/ARB   Spironolactone not started  MRA   SGLT2 inhibitor:not started SGLT2-I    Bumetanide gtt - held Loop diuretic       2. HTN Emergency  Assessment / Plan  Difficult to control; titrate oral afterload reduction as tolerated; diuresis as above; Htn likely significant factor in cardiogenic pulmonary edema and resultant respiratory distress; will need strict BP control on discharge     3. HLP  Assessment / Plan  Resume rosuvastatin     4. Acute hypoxic respiratory failure  Assessment / Plan  Likely 2/2 cardiogenic pulmonary edema      5. non-obstructive CAD  Assessment / Plan  Mild non-obstructive CAD - continue aggressive risk factor modification     6. TIMOTHY on CKD  Assessment / Plan  CRS; progressive; hold loop diuretic and ARB; continue to monitor UOP and renal function closely  Nephrology following - appreciate recs     7. Valvular heart disease  Assessment / Plan  Progressive MR and AI - will refer to valve clinic if patient is amenable    Plan of care discussed on August 27, 2024 with patient at bedside, and primary team overseeing patient's care    History of Present Illness/Subjective    Ms. Kerry Hoffmann is a 89 year old female with a PMHx significant for (per Epic notation) HTN, heart disease, GERD, and HLD,  "presenting with sob, chest pressure, legs edema. C/w CHF.     Today, Mrs. Dotson was observed on BIPAP; Management plan as detailed above     ECG: Personally reviewed. normal sinus rhythm, nonspecific ST and T waves changes, PAC's noted.     ECHO (personally reviewed 8/26/24):   1. The left ventricle is moderately dilated. There is mild global hypokinesia  of the left ventricle. The visual ejection fraction is estimated at 45%.  2. Normal right ventricle size and systolic function.  3. The left atrium is moderate to severely dilated.  4. There is moderate (2+) mitral regurgitation.  5. There is mod-severe to severe (3-4+) aortic regurgitation.     Compared to study on 8/12/2023, there is decline in LV systolic function and  significant increase in aortic and mitral valve regurgitation.    Telemetry: personally reviewed August 27, 2024; notable for sinus     Lab results: personally reviewed August 27, 2024; notable for progressive TIMOTHY    Medical history and pertinent documents reviewed in Care Everywhere please where applicable see details above        Physical Examination Review of Systems   /58 (BP Location: Right arm)   Pulse 69   Temp 98  F (36.7  C) (Oral)   Resp 18   Ht 1.549 m (5' 1\")   Wt 69.8 kg (153 lb 14.1 oz)   LMP  (LMP Unknown)   SpO2 96%   BMI 29.08 kg/m    Body mass index is 29.08 kg/m .  Wt Readings from Last 3 Encounters:   08/27/24 69.8 kg (153 lb 14.1 oz)   02/27/24 73.9 kg (163 lb)   11/11/23 77.8 kg (171 lb 9.6 oz)     General Appearance:   no distress, normal body habitus   ENT/Mouth: membranes moist, no oral lesions or bleeding gums.      EYES:  no scleral icterus, normal conjunctivae   Neck: no carotid bruits or thyromegaly   Chest/Lungs:   lungs are clear to auscultation, no rales or wheezing, equal chest wall expansion    Cardiovascular:   Regular. Normal first and second heart sounds with no murmurs, rubs, or gallops; the carotid, radial and posterior tibial pulses are intact, " no JVD or LE edema bilaterally    Abdomen:  no organomegaly, masses, bruits, or tenderness; bowel sounds are present   Extremities: no cyanosis or clubbing   Skin: no xanthelasma, warm.    Neurologic: NAD     Psychiatric: alert and oriented x3, calm     A complete 10 systems ROS was reviewed  And is negative except what is listed in the HPI.          Medical History  Surgical History Family History Social History   Past Medical History:   Diagnosis Date    Anxiety     takes clonazepam    Asthma     per [facility]     Chronic kidney disease     stage 3 per [facility]     CKD (chronic kidney disease)     Clostridium difficile colitis 11/01/2016    Clostridium difficile infection     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    Clostridium enterocolitis 10/27/2016    CTS (carpal tunnel syndrome)     GERD (gastroesophageal reflux disease)     per [facility]     Hiatal hernia     small per [facility]     Hyperlipidemia     Hyperlipidemia     Hyperlipidemia     Hypertension     Hypertension     Osteoarthritis of knee     per [facility]     Spinal stenosis     per [facility]     Vitamin D deficiency     per [facility]    Past Surgical History:   Procedure Laterality Date    BREAST SURGERY  1982    breast tumor per [facility]     CATARACT EXTRACTION  07/2005    per [facility]     COLONOSCOPY N/A 11/20/2023    Procedure: COLONOSCOPY with biopsies and polypectomy;  Surgeon: David Barreto MD;  Location: [facility] Main OR    CV CORONARY ANGIOGRAM N/A 8/11/2023    Procedure: Coronary Angiogram;  Surgeon: Ap Arroyo MD;  Location: [facility] LAB CV    CV LEFT HEART CATH N/A 8/11/2023    Procedure: Left Heart Catheterization;  Surgeon: Ap Arroyo MD;  Location: [facility] LAB CV    ORIF TIBIA & FIBULA FRACTURES  1988    per [facility]     OTHER SURGICAL HISTORY  10/2004    hole in retinaper [facility]     OTHER SURGICAL HISTORY  05/03/2015    fecal transplantper [facility]     DC ESOPHAGOGASTRODUODENOSCOPY TRANSORAL DIAGNOSTIC N/A 9/11/2020    Procedure:  ESOPHAGOGASTRODUODENOSCOPY With gastric biopsies;  Surgeon: David Reyes MD;  Location: [facility];  Service: Gastroenterology    TONSILLECTOMY      TONSILLECTOMY & ADENOIDECTOMY  1963    no family history of premature coronary artery disease Social History     Socioeconomic History    Marital status:      Spouse name: Not on file    Number of children: Not on file    Years of education: Not on file    Highest education level: Not on file   Occupational History    Not on file   Tobacco Use    Smoking status: Former     Current packs/day: 3.00     Average packs/day: 3.0 packs/day for 35.0 years (105.0 ttl pk-yrs)     Types: Cigarettes    Smokeless tobacco: Never    Tobacco comments:     quit 1968   Substance and Sexual Activity    Alcohol use: Not Currently    Drug use: Never    Sexual activity: Not Currently   Other Topics Concern    Parent/sibling w/ CABG, MI or angioplasty before 65F 55M? No   Social History Narrative    Not on file     Social Determinants of Health     Financial Resource Strain: Low Risk  (8/25/2024)    Financial Resource Strain     Within the past 12 months, have you or your family members you live with been unable to get utilities (heat, electricity) when it was really needed?: No   Food Insecurity: Low Risk  (8/25/2024)    Food Insecurity     Within the past 12 months, did you worry that your food would run out before you got money to buy more?: No     Within the past 12 months, did the food you bought just not last and you didn t have money to get more?: No   Transportation Needs: Low Risk  (8/25/2024)    Transportation Needs     Within the past 12 months, has lack of transportation kept you from medical appointments, getting your medicines, non-medical meetings or appointments, work, or from getting things that you need?: No   Physical Activity: Not on file   Stress: Not on file   Social Connections: Not on file   Interpersonal Safety: Low Risk  (8/25/2024)    Interpersonal  Safety     Do you feel physically and emotionally safe where you currently live?: Yes     Within the past 12 months, have you been hit, slapped, kicked or otherwise physically hurt by someone?: No     Within the past 12 months, have you been humiliated or emotionally abused in other ways by your partner or ex-partner?: No   Housing Stability: Low Risk  (8/25/2024)    Housing Stability     Do you have housing? : Yes     Are you worried about losing your housing?: No           Lab Results    Chemistry/lipid CBC Cardiac Enzymes/BNP/TSH/INR   Lab Results   Component Value Date    CHOL 225 (H) 03/04/2024    HDL 60 03/04/2024    TRIG 160 (H) 03/04/2024    BUN 31.9 (H) 08/27/2024     08/27/2024    CO2 31 (H) 08/27/2024    Lab Results   Component Value Date    WBC 5.5 08/25/2024    HGB 12.7 08/25/2024    HCT 40.5 08/25/2024    MCV 91 08/25/2024     08/25/2024    Lab Results   Component Value Date    TROPONINI <0.01 09/04/2022     11/16/2019    TSH 2.52 06/14/2023     Lab Results   Component Value Date    TROPONINI <0.01 09/04/2022          Weight:    Wt Readings from Last 3 Encounters:   08/27/24 69.8 kg (153 lb 14.1 oz)   02/27/24 73.9 kg (163 lb)   11/11/23 77.8 kg (171 lb 9.6 oz)       Allergies  Allergies   Allergen Reactions    Buspirone Shortness Of Breath    Ciprofloxacin Hives and Shortness Of Breath     Other reaction(s): Unknown    Cortisone      Other reaction(s): Throat Swelling/Closing, Unknown  Reaction 9-5-94      Hydrocodone-Acetaminophen Shortness Of Breath     Other reaction(s): Unknown    Lansoprazole Shortness Of Breath    Metoprolol Shortness Of Breath     Tolerates metoprolol succinate at home    Nedocromil      Other reaction(s): Throat Swelling/Closing    Niacin Shortness Of Breath     Other reaction(s): Unknown    Albuterol Other (See Comments)     Inhaler had extreme effect on nervous system      Amlodipine      Other reaction(s): Erythema, Unknown    Azithromycin      Other  reaction(s): *Unknown, Unknown    Cefixime Diarrhea     Other reaction(s): Unknown  Has tolerated ceftriaxone    Cefprozil Nausea and Vomiting     Other reaction(s): Unknown  Has tolerated ceftriaxone    Cefuroxime      Other reaction(s): *Unknown  Has tolerated ceftriaxone    Cephalexin      Other reaction(s): *Unknown, Unknown  Has tolerated ceftriaxone    Contrast Dye      Other reaction(s): hives    Cyclobenzaprine      Other reaction(s): Sedation    Dextromethorphan-Guaifenesin Nausea     Other reaction(s): Headache    Diltiazem      Other reaction(s): Erythema, Unknown  Ears face arms and chest red and on fire-body tremors      Erythromycin      Other reaction(s): Unknown    Esomeprazole      Other reaction(s): Headache    Fenofibrate Muscle Pain (Myalgia)     Other reaction(s): Unknown    Fluticasone-Salmeterol Other (See Comments)     Elevated blood pressure      Methylprednisolone     Metronidazole     Monosodium Glutamate     Moxifloxacin      Other reaction(s): Dizziness    Nifedipine      Other reaction(s): Edema  Took for 5-93 to 9-94 red and swollen leg      Nsaids      Other reaction(s): Unknown    Ofloxacin      Other reaction(s): *Unknown    Ondansetron      Other reaction(s): Constipation    Parsley Seed     Penicillins Unknown     She doesn't remember other than it occurred as a very young woman     Piper     Pirbuterol Other (See Comments)     Immediate extreme effect on nervous system      Pravastatin      Other reaction(s): Dizziness, Unknown    Pseudoephedrine      Other reaction(s): headache,nausea    Shellfish-Derived Products      Per pt 8/12/23    Simvastatin Muscle Pain (Myalgia)    Spironolactone      Other reaction(s): stomach cramps, nausea    Sulfamethoxazole-Trimethoprim      Other reaction(s): Unknown    Tramadol      Other reaction(s): red ears,high blood press.    Tramadol-Acetaminophen      Other reaction(s): Tachycardia, Unknown    Triamterene Other (See Comments)     Greatly  bothered with sun-took medication for three years  Greatly bothered with sun      Diatrizoate Rash    Telmisartan Palpitations         Surgical History  Past Surgical History:   Procedure Laterality Date    BREAST SURGERY  1982    breast tumor per [facility]     CATARACT EXTRACTION  07/2005    per [facility]     COLONOSCOPY N/A 11/20/2023    Procedure: COLONOSCOPY with biopsies and polypectomy;  Surgeon: David Barreto MD;  Location: [facility]    CV CORONARY ANGIOGRAM N/A 8/11/2023    Procedure: Coronary Angiogram;  Surgeon: Ap Arroyo MD;  Location: [facility] LAB CV    CV LEFT HEART CATH N/A 8/11/2023    Procedure: Left Heart Catheterization;  Surgeon: Ap Arroyo MD;  Location[facility] CV    ORIF TIBIA & FIBULA FRACTURES  1988    per [facility]     OTHER SURGICAL HISTORY  10/2004    hole in retinaper [facility]     OTHER SURGICAL HISTORY  05/03/2015    fecal transplantper [facility]     WA ESOPHAGOGASTRODUODENOSCOPY TRANSORAL DIAGNOSTIC N/A 9/11/2020    Procedure: ESOPHAGOGASTRODUODENOSCOPY With gastric biopsies;  Surgeon: David Reyes MD;  Location: [facility] [place];  Service: Gastroenterology    TONSILLECTOMY      TONSILLECTOMY & ADENOIDECTOMY  1963       Social History  Tobacco:   History   Smoking Status    Former    Packs/day: 3.00    Years: 35.00    Types: Cigarettes   Smokeless Tobacco    Never    Alcohol:   Social History    Substance and Sexual Activity      Alcohol use: Not Currently   Illicit Drugs:   History   Drug Use Unknown       Family History  Family History   Problem Relation Age of Onset    Hypertension Mother     Cancer Mother         gallbladder cancer    Myocardial Infarction Father     Hypertension Sister     Alzheimer Disease Sister     Heart Disease Sister     Cancer Brother     Brain Cancer Brother     Pacemaker Mother     Heart Disease Mother     Coronary Artery Disease Father     Heart Disease Father     Heart Failure Sister           Jimmy Gonzalez MD on  8/27/2024      cc: Kaylyn Bolanos

## 2024-08-27 NOTE — PROGRESS NOTES
08/27/24 0900   Appointment Info   Signing Clinician's Name / Credentials (PT) Kelsi Lopez PT   Living Environment   People in Home alone   Current Living Arrangements independent living facility  (2nd floor)   Home Accessibility no concerns   Transportation Anticipated family or friend will provide   Self-Care   Current Activity Tolerance moderate   Regular Exercise No   Equipment Currently Used at Home walker, rolling;cane, straight;other (see comments)  (scooter in the halls)   Fall history within last six months no   Activity/Exercise/Self-Care Comment Pt uses cane to transfer from scooter to car. Neighbor or nephew brings groceries. Pt walks very short distances within the apartment, would be able to walk at least 8' at home.  (Indp with bed mobility, Indep toileting. indep transfer w the SEC and toileting and with car transfers.)   General Information   Onset of Illness/Injury or Date of Surgery 08/25/24   Referring Physician Nelson Alberts MD   Patient/Family Therapy Goals Statement (PT) Pt wants to go home.   Pertinent History of Current Problem (include personal factors and/or comorbidities that impact the POC) 89 year old female admitted on 8/25/2024. She has PMHx of A-fib, HTN, heart disease, GERD, and HLD, presenting with sob, chest pressure, legs edema. C/w CHF. D-dimer elevated   Existing Precautions/Restrictions fall  (bed alarm on and call light by the pt.)   Weight-Bearing Status - LLE weight-bearing as tolerated   Weight-Bearing Status - RLE weight-bearing as tolerated   Cognition   Affect/Mental Status (Cognition) WNL   Orientation Status (Cognition) oriented x 4   Pain Assessment   Patient Currently in Pain   (Pt has bilat knee pain, chronic)   Posture    Posture Comments Cues for posture   Range of Motion (ROM)   ROM Comment WFL bilat LEs with left knee ext limited.   Strength (Manual Muscle Testing)   Strength Comments Pt was able to WB bilat LEs.  Increased knee pain.  Pt c/o weakness in  the LEs with gait.   Bed Mobility   Comment, (Bed Mobility) Supine>sit with SBA.   Transfers   Comment, (Transfers) Sit>stand with FWW with min A x 1.   Gait/Stairs (Locomotion)   [place] Level (Gait) verbal cues;minimum assist (75% patient effort);1 person assist;moderate assist (50% patient effort)   Assistive Device (Gait) walker, front-wheeled   Distance in Feet (Gait) 5'   Pattern (Gait) step-through;swing-through   Deviations/Abnormal Patterns (Gait) gait speed decreased  (Pt needed cues to  her feet.)   Balance   Balance Comments Min/mod A x 1 with FWW   Sensory Examination   Sensory Perception WNL   Sensory Perception Comments Bilat LEs   Clinical Impression   Criteria for Skilled Therapeutic Intervention Yes, treatment indicated   PT Diagnosis (PT) Impaired functional mobility.   Influenced by the following impairments weakness, pain, dec bal, dec endurance and she is not at her PLOF.   Functional limitations due to impairments bed mobility, transfers, gait.   Clinical Presentation (PT Evaluation Complexity) evolving   Clinical Presentation Rationale Pt presents medically diagnosed.   Clinical Decision Making (Complexity) moderate complexity   Planned Therapy Interventions (PT) balance training;bed mobility training;gait training;strengthening;transfer training   Risk & Benefits of therapy have been explained evaluation/treatment results reviewed;care plan/treatment goals reviewed;risks/benefits reviewed;participants voiced agreement with care plan   PT Total Evaluation Time   PT Eval, Moderate Complexity Minutes (86035) 15   Physical Therapy Goals   PT Frequency Daily   PT Predicted Duration/Target Date for Goal Attainment 09/03/24   PT Goals Bed Mobility;Transfers;Gait   PT: Bed Mobility Modified independent;Supine to/from sit;Rolling   PT: Transfers Supervision/stand-by assist;Sit to/from stand;Bed to/from chair;Assistive device   PT: Gait Minimal assist;Rolling walker;50 feet   Interventions    Interventions Quick Adds Gait Training;Therapeutic Activity   Therapeutic Activity   Therapeutic Activities: dynamic activities to improve functional performance Minutes (53690) 10   Symptoms Noted During/After Treatment Fatigue   Treatment Detail/Skilled Intervention Nursing did have her O2 off and wanted to see how she does Resting O2 SATs 91% and HR 76 and O2 SATs after being in the bathroom and short walks 90% with HR of 89.  Supine>sit with HOB elevated with SBA and bed <>stand x 2 reps with min A x 1 wirh cues for hand placement.  Toilet transfers with min A x 1 with assist with donning depend and some assist with cares.  Si>supine with SBA.   Gait Training   Gait Training Minutes (64590) 8   Symptoms Noted During/After Treatment (Gait Training) fatigue   Treatment Detail/Skilled Intervention Pt walked  10' and 3' and 7' with FWW with min/mod A x 1.  Pt needed cues to  her feet and some cues for safety.  Pt does take smaller steps  (Pt refused to have  socks on with gait.)   Distance in Feet 10', 3', standing and 7' more.  Cues for posture, direction and to  her feet more   [place] Level (Gait Training) minimum assist (75% patient effort)  (min/mod A x 1 with fWW)   Physical Assistance Level (Gait Training) verbal cues;1 person assist   Weight Bearing (Gait Training) weight-bearing as tolerated   Assistive Device (Gait Training) rolling walker  (FWW)   Pattern Analysis (Gait Training) swing-through gait   Gait Analysis Deviations decreased prabhu;decreased step length;decreased toe-to-floor clearance   Impairments (Gait Analysis/Training) pain   PT Discharge Planning   PT Plan Gait w FWW, transfers, gait. LE ex for knee flex/ex.  AP and ankle circles   PT Discharge Recommendation (DC Rec) Transitional Care Facility   PT Rationale for DC Rec The pt does need assist with mobility and is risk of falling at home at this time.  TCU is recommended.   PT Brief overview of current status PT  eval, bed mobility with SBA , transfers with min A and ambulated with min/mod A with the FWW Toilet transfers with min A x 1.  Increased time needed.   PT Equipment Needed at Discharge walker, rolling  (FWW)   Total Session Time   Timed Code Treatment Minutes 18   Total Session Time (sum of timed and untimed services) 33

## 2024-08-27 NOTE — PLAN OF CARE
Problem: Heart Failure  Goal: Effective Oxygenation and Ventilation  Outcome: Progressing     Problem: Heart Failure  Goal: Stable Heart Rate and Rhythm  Outcome: Progressing   Goal Outcome Evaluation:       VSS. Monitor shows NSR. Lungs diminished. Started on IS every 2 hrs. Able to get to 1500-1600ml. Weaned off Oxygen. O2 sats above 90% on RA. Home Oxygen assessment done, pt did not qualify for home O2 at this time. Dr. Alberts notified in am of results. Up with PT this am, still weak on legs, using walker and 1 assist. Purewick in place. MD wanted purewick kept in place for more accurate I&O. Remains on 2000cc Fluid Restriction.

## 2024-08-28 ENCOUNTER — APPOINTMENT (OUTPATIENT)
Dept: OCCUPATIONAL THERAPY | Facility: HOSPITAL | Age: 89
DRG: 291 | End: 2024-08-28
Payer: MEDICARE

## 2024-08-28 ENCOUNTER — APPOINTMENT (OUTPATIENT)
Dept: PHYSICAL THERAPY | Facility: HOSPITAL | Age: 89
DRG: 291 | End: 2024-08-28
Payer: MEDICARE

## 2024-08-28 LAB
ANION GAP SERPL CALCULATED.3IONS-SCNC: 9 MMOL/L (ref 7–15)
BUN SERPL-MCNC: 37.8 MG/DL (ref 8–23)
CALCIUM SERPL-MCNC: 9 MG/DL (ref 8.8–10.4)
CHLORIDE SERPL-SCNC: 97 MMOL/L (ref 98–107)
CREAT SERPL-MCNC: 1.7 MG/DL (ref 0.51–0.95)
EGFRCR SERPLBLD CKD-EPI 2021: 29 ML/MIN/1.73M2
GLUCOSE SERPL-MCNC: 95 MG/DL (ref 70–99)
HCO3 SERPL-SCNC: 30 MMOL/L (ref 22–29)
POTASSIUM SERPL-SCNC: 3.6 MMOL/L (ref 3.4–5.3)
SODIUM SERPL-SCNC: 136 MMOL/L (ref 135–145)

## 2024-08-28 PROCEDURE — 250N000013 HC RX MED GY IP 250 OP 250 PS 637: Performed by: INTERNAL MEDICINE

## 2024-08-28 PROCEDURE — 99232 SBSQ HOSP IP/OBS MODERATE 35: CPT | Performed by: INTERNAL MEDICINE

## 2024-08-28 PROCEDURE — 120N000004 HC R&B MS OVERFLOW

## 2024-08-28 PROCEDURE — 97116 GAIT TRAINING THERAPY: CPT | Mod: GP

## 2024-08-28 PROCEDURE — 99233 SBSQ HOSP IP/OBS HIGH 50: CPT | Performed by: INTERNAL MEDICINE

## 2024-08-28 PROCEDURE — 36415 COLL VENOUS BLD VENIPUNCTURE: CPT | Performed by: INTERNAL MEDICINE

## 2024-08-28 PROCEDURE — 99233 SBSQ HOSP IP/OBS HIGH 50: CPT | Performed by: STUDENT IN AN ORGANIZED HEALTH CARE EDUCATION/TRAINING PROGRAM

## 2024-08-28 PROCEDURE — 97535 SELF CARE MNGMENT TRAINING: CPT | Mod: GO

## 2024-08-28 PROCEDURE — 97110 THERAPEUTIC EXERCISES: CPT | Mod: GP

## 2024-08-28 PROCEDURE — 250N000011 HC RX IP 250 OP 636: Performed by: INTERNAL MEDICINE

## 2024-08-28 PROCEDURE — 80048 BASIC METABOLIC PNL TOTAL CA: CPT | Performed by: INTERNAL MEDICINE

## 2024-08-28 RX ORDER — BUMETANIDE 0.5 MG/1
0.5 TABLET ORAL ONCE
Status: COMPLETED | OUTPATIENT
Start: 2024-08-28 | End: 2024-08-28

## 2024-08-28 RX ADMIN — ACETAMINOPHEN 1000 MG: 500 TABLET ORAL at 20:00

## 2024-08-28 RX ADMIN — ENOXAPARIN SODIUM 30 MG: 30 INJECTION SUBCUTANEOUS at 08:45

## 2024-08-28 RX ADMIN — CLOPIDOGREL BISULFATE 75 MG: 75 TABLET ORAL at 08:45

## 2024-08-28 RX ADMIN — METOPROLOL SUCCINATE 50 MG: 50 TABLET, EXTENDED RELEASE ORAL at 20:01

## 2024-08-28 RX ADMIN — ASPIRIN 81 MG: 81 TABLET, COATED ORAL at 08:46

## 2024-08-28 RX ADMIN — CLONAZEPAM 0.5 MG: 0.5 TABLET ORAL at 00:26

## 2024-08-28 RX ADMIN — CLONAZEPAM 0.5 MG: 0.5 TABLET ORAL at 20:01

## 2024-08-28 RX ADMIN — ACETAMINOPHEN 1000 MG: 500 TABLET ORAL at 08:45

## 2024-08-28 RX ADMIN — ACETAMINOPHEN 1000 MG: 500 TABLET ORAL at 13:35

## 2024-08-28 RX ADMIN — FAMOTIDINE 10 MG: 10 TABLET ORAL at 08:46

## 2024-08-28 RX ADMIN — BUMETANIDE 0.5 MG: 0.5 TABLET ORAL at 13:35

## 2024-08-28 RX ADMIN — ROSUVASTATIN 10 MG: 10 TABLET, FILM COATED ORAL at 08:46

## 2024-08-28 RX ADMIN — CLONAZEPAM 0.5 MG: 0.5 TABLET ORAL at 11:59

## 2024-08-28 ASSESSMENT — ACTIVITIES OF DAILY LIVING (ADL)
ADLS_ACUITY_SCORE: 38
ADLS_ACUITY_SCORE: 36
ADLS_ACUITY_SCORE: 30
ADLS_ACUITY_SCORE: 30
ADLS_ACUITY_SCORE: 36
ADLS_ACUITY_SCORE: 30
ADLS_ACUITY_SCORE: 36
ADLS_ACUITY_SCORE: 30
ADLS_ACUITY_SCORE: 30
ADLS_ACUITY_SCORE: 36
ADLS_ACUITY_SCORE: 32
ADLS_ACUITY_SCORE: 32
ADLS_ACUITY_SCORE: 30
ADLS_ACUITY_SCORE: 32
ADLS_ACUITY_SCORE: 30
ADLS_ACUITY_SCORE: 32
ADLS_ACUITY_SCORE: 36
ADLS_ACUITY_SCORE: 38
ADLS_ACUITY_SCORE: 36

## 2024-08-28 NOTE — PROGRESS NOTES
Heart Failure Care Map  GOALS TO BE MET BEFORE DISCHARGE:    1. Decrease congestion and/or edema with diuretic therapy to achieve near optimal volume status.     Dyspnea improved: Yes, satisfactory for discharge.   Edema improved: Yes, satisfactory for discharge.        Last 24 hour I/O:   Intake/Output Summary (Last 24 hours) at 8/28/2024 0640  Last data filed at 8/28/2024 0607  Gross per 24 hour   Intake 1023 ml   Output 500 ml   Net 523 ml           Net I/O and Weights since admission:   07/29 0700 - 08/28 0659  In: 1688.9 [P.O.:1650; I.V.:38.9]  Out: 5500 [Urine:5500]  Net: -3811.1     Vitals:    08/25/24 0905 08/26/24 0532 08/27/24 0333 08/28/24 0318   Weight: 73 kg (161 lb) 70.5 kg (155 lb 6.8 oz) 69.8 kg (153 lb 14.1 oz) 70.7 kg (155 lb 13.8 oz)       2.  O2 sats > 90% on room air, or at prior home O2 therapy level.      Able to wean O2 this shift to keep sats above 90%?: Yes, satisfactory for discharge.   Does patient use Home O2? No          Current oxygenation status:   SpO2: 97 %     O2 Device: Nasal cannula, Oxygen Delivery: 1 LPM    3.  Tolerates ambulation and mobility near baseline.     Ambulation: Yes, satisfactory for discharge.   Times patient ambulated with staff this shift: 1    Please review the Heart Failure Care Map for additional HF goal outcomes.    Vicente Zuniga RN  8/28/2024

## 2024-08-28 NOTE — CARE PLAN
Heart Failure Care Map  GOALS TO BE MET BEFORE DISCHARGE:    1. Decrease congestion and/or edema with diuretic therapy to achieve near optimal volume status.     Dyspnea improved: Yes, satisfactory for discharge.   Edema improved: Yes, satisfactory for discharge.        Last 24 hour I/O:   Intake/Output Summary (Last 24 hours) at 8/27/2024 2254  Last data filed at 8/27/2024 2046  Gross per 24 hour   Intake 1023 ml   Output 600 ml   Net 423 ml           Net I/O and Weights since admission:   07/28 2300 - 08/27 2259  In: 1688.9 [P.O.:1650; I.V.:38.9]  Out: 5400 [Urine:5400]  Net: -3711.1     Vitals:    08/25/24 0905 08/26/24 0532 08/27/24 0333   Weight: 73 kg (161 lb) 70.5 kg (155 lb 6.8 oz) 69.8 kg (153 lb 14.1 oz)       2.  O2 sats > 90% on room air, or at prior home O2 therapy level.      Able to wean O2 this shift to keep sats above 90%?: No, further care required to meet this goal. Please explain O2 sats desaturates when pt was sleeping   Does patient use Home O2? No          Current oxygenation status:   SpO2: 96 %     O2 Device: None (Room air), Oxygen Delivery: 1 LPM    3.  Tolerates ambulation and mobility near baseline.     Ambulation: No, further care required to meet this goal. Please explain pt c/o knee pain when ambulating to the bathroom   Times patient ambulated with staff this shift: 0    Please review the Heart Failure Care Map for additional HF goal outcomes.    Holly Kinney RN  8/27/2024

## 2024-08-28 NOTE — PROGRESS NOTES
[facility]    Medicine Progress Note - Hospitalist Service    Date of Admission:  8/25/2024    Assessment & Plan   Kerry Hoffmann is a 89 year old female admitted on 8/25/2024. She has PMHx of A-fib, HTN, heart disease, CKD 3, GERD, and HLD, presenting with sob, chest pressure, legs edema. C/w CHF. D-dimer elevated.    Acute pulmonary edema  HFmrEF, acute on chronic  Acute hypoxic respiratory failure-resolved  -- ProBNP 7336, trop 21  -- CXR: Cardiomegaly with new pulmonary vascular congestion and increased interstitial markings suggesting pulmonary edema   -- Chest CT not ordered due to h/o allergy  -- Echo: EF 45%  -- Continue PTA Metoprolol. Lasix and Losartan on hold due to TIMOTHY  -- Cardiology s/o  -- Volume management per Nephrology: Bumex 0.5mg once on 08/28.   --PT/OT: recommended TCU. Pt declined TCU and HC PT/OT.    Elevated troponin  -- Troponins flat. Chest pressure resolved (likely 2/2 acute pulmonary edema)  -- Likely 2/2 demand ischemia    Elevation of d-dimer  -- Moderate risk for PE  -- Cannot do ct due to allergy  -- V/Q no e/o PE    HTN urgency  -- Hydralazine prn  -- C/w PTA Metoprolol. Holding Losartan due to TIMOTHY.  -- Nephrology following    TIMOTHY on CKD IIIb  -- Closely monitoring on diuretics  -- Nephrology consult appreciated    Hypokalemia  -- replace as protocol    Nonobstructive CAD  Hyperlipidemia  -- C/w PTA Crestor, Plavix, ASA    Valvular heart disease  Progressive MR and AI- Cardiology to refer to valve clinic              Diet: Combination Diet 2 gm NA Diet; No Caffeine Diet (and additional linked orders)  Fluid restriction 2000 ML FLUID (and additional linked orders)    DVT Prophylaxis: Enoxaparin (Lovenox) SQ  Wiley Catheter: Not present  Lines: None     Cardiac Monitoring: ACTIVE order. Indication: Acute decompensated heart failure (48 hours)  Code Status: No CPR- Do NOT Intubate      Clinically Significant Risk Factors                  # Hypertension: Noted  "on problem list  # Heart failure, NOS: heart failure noted on the problem list and last echo with EF 40-50%          # Overweight: Estimated body mass index is 29.45 kg/m  as calculated from the following:    Height as of this encounter: 1.549 m (5' 1\").    Weight as of this encounter: 70.7 kg (155 lb 13.8 oz)., PRESENT ON ADMISSION     # Financial/Environmental Concerns: none  # Asthma: noted on problem list              Disposition Plan     Medically Ready for Discharge: Anticipated 1-2 days.             Latoya Leslie MD  Hospitalist Service  [facility]  Securely message with [employer] (more info)  Text page via [facility] Paging/Directory   ______________________________________________________________________    Interval History   Patient is new to me today. Chart reviewed.  Patient is seen and examined at bedside.   Weaned off of oxygen. Declined TCU and HC PT/OT.  Plan of care discussed with patient. All questions answered. Pt verbalized understanding.     Physical Exam   Vital Signs: Temp: 97.6  F (36.4  C) Temp src: Oral BP: 125/60 Pulse: 78   Resp: 18 SpO2: 96 % O2 Device: None (Room air) Oxygen Delivery: 1 LPM  Weight: 155 lbs 13.84 oz    GEN: Alert and oriented. Not in acute distress.  HEENT: Atraumatic, mucous membrane- moist and pink.  Chest: Bilateral air entry.  CVS: S1S2 regular.   Abdomen: Soft. Non-tender, non-distended. No organomegaly. No guarding or rigidity. Bowel sounds active.   Extremities: No pedal edema.  CNS: No involuntary movements.  Skin: no cyanosis or clubbing.     Medical Decision Making       52 MINUTES SPENT BY ME on the date of service doing chart review, history, exam, documentation & further activities per the note.      Data     "

## 2024-08-28 NOTE — PROGRESS NOTES
HEART CARE NOTE          Assessment/Recommendations   1. HFpEF c/b severe ADHF  Assessment / Plan  Renal function stable to improved - continue to hold loop diuretic and ARB; continue to monitor UOP and renal function closely   Patient is high risk for adverse cardiac events 2/2 advanced age, frailty, HTN, elevated NTproBNP  GDMT as detailed below; mainstay of treatment for HFpEF includes diuretics and adequate BP control (class I) and SGLT2-I (class 2a); additional medical therapy (ARNI, MRA, ARB) demonstrated less robust evidence for indication but may be considered per guideline recommendations (2b); no indication for BBlockers      Current Pharmacotherapy AHA Guideline-Directed Medical Therapy   Losartan  50 mg BID - held due to TIMOTHY ARNI/ARB   Spironolactone not started  MRA   SGLT2 inhibitor:not started SGLT2-I    Bumetanide gtt - held Loop diuretic       2. HTN Emergency  Assessment / Plan  Difficult to control; titrate oral afterload reduction as tolerated; diuresis as above; Htn likely significant factor in cardiogenic pulmonary edema and resultant respiratory distress; will need strict BP control on discharge     3. HLP  Assessment / Plan  Resume rosuvastatin     4. Acute hypoxic respiratory failure  Assessment / Plan  Likely 2/2 cardiogenic pulmonary edema      5. non-obstructive CAD  Assessment / Plan  Mild non-obstructive CAD - continue aggressive risk factor modification     6. TIMOTHY on CKD  Assessment / Plan  CRS; progressive; hold loop diuretic and ARB; continue to monitor UOP and renal function closely  Nephrology following - appreciate recs     7. Valvular heart disease  Assessment / Plan  Progressive MR and AI - will refer to valve clinic if patient is amenable    Plan of care discussed on August 28, 2024 with patient at bedside, and primary team overseeing patient's care     Cardiology team will sign-off for now. Defer volume management and antihypertensive regiment o neohrology team; Please do  "not hesitate to consult us again if new questions or concerns arise. Follow-up appointment will be arranged by CORE/HF clinic.       History of Present Illness/Subjective    Ms. Kerry Hoffmann is a 89 year old female with a PMHx significant for (per Epic notation) HTN, heart disease, GERD, and HLD, presenting with sob, chest pressure, legs edema. C/w CHF.     Today, Mrs. Dotson was observed on BIPAP; Management plan as detailed above     ECG: Personally reviewed. normal sinus rhythm, nonspecific ST and T waves changes, PAC's noted.     ECHO (personally reviewed 8/26/24):   1. The left ventricle is moderately dilated. There is mild global hypokinesia  of the left ventricle. The visual ejection fraction is estimated at 45%.  2. Normal right ventricle size and systolic function.  3. The left atrium is moderate to severely dilated.  4. There is moderate (2+) mitral regurgitation.  5. There is mod-severe to severe (3-4+) aortic regurgitation.     Compared to study on 8/12/2023, there is decline in LV systolic function and  significant increase in aortic and mitral valve regurgitation.    Telemetry: personally reviewed August 28, 2024; notable for sinus rhythm     Lab results: personally reviewed August 28, 2024; notable for resolving TIMOTHY    Medical history and pertinent documents reviewed in Care Everywhere please where applicable see details above        Physical Examination Review of Systems   /60 (BP Location: Left arm)   Pulse 65   Temp 97.5  F (36.4  C) (Oral)   Resp 18   Ht 1.549 m (5' 1\")   Wt 70.7 kg (155 lb 13.8 oz)   LMP  (LMP Unknown)   SpO2 97%   BMI 29.45 kg/m    Body mass index is 29.45 kg/m .  Wt Readings from Last 3 Encounters:   08/28/24 70.7 kg (155 lb 13.8 oz)   02/27/24 73.9 kg (163 lb)   11/11/23 77.8 kg (171 lb 9.6 oz)     General Appearance:   no distress, normal body habitus   ENT/Mouth: membranes moist, no oral lesions or bleeding gums.      EYES:  no scleral icterus, normal " conjunctivae   Neck: no carotid bruits or thyromegaly   Chest/Lungs:   lungs are clear to auscultation, no rales or wheezing, equal chest wall expansion    Cardiovascular:   Regular. Normal first and second heart sounds with no murmurs, rubs, or gallops; the carotid, radial and posterior tibial pulses are intact, no JVD or LE edema bilaterally    Abdomen:  no organomegaly, masses, bruits, or tenderness; bowel sounds are present   Extremities: no cyanosis or clubbing   Skin: no xanthelasma, warm.    Neurologic: NAD     Psychiatric: alert and oriented x3, calm     A complete 10 systems ROS was reviewed  And is negative except what is listed in the HPI.          Medical History  Surgical History Family History Social History   Past Medical History:   Diagnosis Date    Anxiety     takes clonazepam    Asthma     per [facility] & P     Chronic kidney disease     stage 3 per H & P     CKD (chronic kidney disease)     Clostridium difficile colitis 11/01/2016    Clostridium difficile infection     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    Clostridium enterocolitis 10/27/2016    CTS (carpal tunnel syndrome)     GERD (gastroesophageal reflux disease)     per [facility]     Hiatal hernia     small per [facility] & P     Hyperlipidemia     Hyperlipidemia     Hyperlipidemia     Hypertension     Hypertension     Osteoarthritis of knee     per [facility] & [facility]     Spinal stenosis     per [facility] & [facility]     Vitamin D deficiency     per [facility] & [facility]    Past Surgical History:   Procedure Laterality Date    BREAST SURGERY  1982    breast tumor per [facility] & [facility]     CATARACT EXTRACTION  07/2005    per [facility]     COLONOSCOPY N/A 11/20/2023    Procedure: COLONOSCOPY with biopsies and polypectomy;  Surgeon: David Barreto MD;  Location: [facility] Main OR    CV CORONARY ANGIOGRAM N/A 8/11/2023    Procedure: Coronary Angiogram;  Surgeon: Ap Arroyo MD;  Location: [facility] LAB CV    CV LEFT HEART CATH N/A 8/11/2023    Procedure: Left Heart Catheterization;  Surgeon: Ap Arroyo MD;   Location: [facility] LAB CV    ORIF TIBIA & FIBULA FRACTURES  1988    per [facility] P     OTHER SURGICAL HISTORY  10/2004    hole in retinaper [facility] & P     OTHER SURGICAL HISTORY  05/03/2015    fecal transplantper [facility] & P     SC ESOPHAGOGASTRODUODENOSCOPY TRANSORAL DIAGNOSTIC N/A 9/11/2020    Procedure: ESOPHAGOGASTRODUODENOSCOPY With gastric biopsies;  Surgeon: David Reyes MD;  Location: [facility];  Service: Gastroenterology    TONSILLECTOMY      TONSILLECTOMY & ADENOIDECTOMY  1963    no family history of premature coronary artery disease Social History     Socioeconomic History    Marital status:      Spouse name: Not on file    Number of children: Not on file    Years of education: Not on file    Highest education level: Not on file   Occupational History    Not on file   Tobacco Use    Smoking status: Former     Current packs/day: 3.00     Average packs/day: 3.0 packs/day for 35.0 years (105.0 ttl pk-yrs)     Types: Cigarettes    Smokeless tobacco: Never    Tobacco comments:     quit 1968   Substance and Sexual Activity    Alcohol use: Not Currently    Drug use: Never    Sexual activity: Not Currently   Other Topics Concern    Parent/sibling w/ CABG, MI or angioplasty before 65F 55M? No   Social History Narrative    Not on file     Social Determinants of Health     Financial Resource Strain: Low Risk  (8/25/2024)    Financial Resource Strain     Within the past 12 months, have you or your family members you live with been unable to get utilities (heat, electricity) when it was really needed?: No   Food Insecurity: Low Risk  (8/25/2024)    Food Insecurity     Within the past 12 months, did you worry that your food would run out before you got money to buy more?: No     Within the past 12 months, did the food you bought just not last and you didn t have money to get more?: No   Transportation Needs: Low Risk  (8/25/2024)    Transportation Needs     Within the past 12 months, has lack of transportation  kept you from medical appointments, getting your medicines, non-medical meetings or appointments, work, or from getting things that you need?: No   Physical Activity: Not on file   Stress: Not on file   Social Connections: Not on file   Interpersonal Safety: Low Risk  (8/25/2024)    Interpersonal Safety     Do you feel physically and emotionally safe where you currently live?: Yes     Within the past 12 months, have you been hit, slapped, kicked or otherwise physically hurt by someone?: No     Within the past 12 months, have you been humiliated or emotionally abused in other ways by your partner or ex-partner?: No   Housing Stability: Low Risk  (8/25/2024)    Housing Stability     Do you have housing? : Yes     Are you worried about losing your housing?: No           Lab Results    Chemistry/lipid CBC Cardiac Enzymes/BNP/TSH/INR   Lab Results   Component Value Date    CHOL 225 (H) 03/04/2024    HDL 60 03/04/2024    TRIG 160 (H) 03/04/2024    BUN 37.8 (H) 08/28/2024     08/28/2024    CO2 30 (H) 08/28/2024    Lab Results   Component Value Date    WBC 5.5 08/25/2024    HGB 12.7 08/25/2024    HCT 40.5 08/25/2024    MCV 91 08/25/2024     08/25/2024    Lab Results   Component Value Date    TROPONINI <0.01 09/04/2022     11/16/2019    TSH 2.52 06/14/2023     Lab Results   Component Value Date    TROPONINI <0.01 09/04/2022          Weight:    Wt Readings from Last 3 Encounters:   08/28/24 70.7 kg (155 lb 13.8 oz)   02/27/24 73.9 kg (163 lb)   11/11/23 77.8 kg (171 lb 9.6 oz)       Allergies  Allergies   Allergen Reactions    Buspirone Shortness Of Breath    Ciprofloxacin Hives and Shortness Of Breath     Other reaction(s): Unknown    Cortisone      Other reaction(s): Throat Swelling/Closing, Unknown  Reaction 9-5-94      Hydrocodone-Acetaminophen Shortness Of Breath     Other reaction(s): Unknown    Lansoprazole Shortness Of Breath    Metoprolol Shortness Of Breath     Tolerates metoprolol succinate at  home    Nedocromil      Other reaction(s): Throat Swelling/Closing    Niacin Shortness Of Breath     Other reaction(s): Unknown    Albuterol Other (See Comments)     Inhaler had extreme effect on nervous system      Amlodipine      Other reaction(s): Erythema, Unknown    Azithromycin      Other reaction(s): *Unknown, Unknown    Cefixime Diarrhea     Other reaction(s): Unknown  Has tolerated ceftriaxone    Cefprozil Nausea and Vomiting     Other reaction(s): Unknown  Has tolerated ceftriaxone    Cefuroxime      Other reaction(s): *Unknown  Has tolerated ceftriaxone    Cephalexin      Other reaction(s): *Unknown, Unknown  Has tolerated ceftriaxone    Contrast Dye      Other reaction(s): hives    Cyclobenzaprine      Other reaction(s): Sedation    Dextromethorphan-Guaifenesin Nausea     Other reaction(s): Headache    Diltiazem      Other reaction(s): Erythema, Unknown  Ears face arms and chest red and on fire-body tremors      Erythromycin      Other reaction(s): Unknown    Esomeprazole      Other reaction(s): Headache    Fenofibrate Muscle Pain (Myalgia)     Other reaction(s): Unknown    Fluticasone-Salmeterol Other (See Comments)     Elevated blood pressure      Methylprednisolone     Metronidazole     Monosodium Glutamate     Moxifloxacin      Other reaction(s): Dizziness    Nifedipine      Other reaction(s): Edema  Took for 5-93 to 9-94 red and swollen leg      Nsaids      Other reaction(s): Unknown    Ofloxacin      Other reaction(s): *Unknown    Ondansetron      Other reaction(s): Constipation    Parsley Seed     Penicillins Unknown     She doesn't remember other than it occurred as a very young woman     Piper     Pirbuterol Other (See Comments)     Immediate extreme effect on nervous system      Pravastatin      Other reaction(s): Dizziness, Unknown    Pseudoephedrine      Other reaction(s): headache,nausea    Shellfish-Derived Products      Per pt 8/12/23    Simvastatin Muscle Pain (Myalgia)    Spironolactone       Other reaction(s): stomach cramps, nausea    Sulfamethoxazole-Trimethoprim      Other reaction(s): Unknown    Tramadol      Other reaction(s): red ears,high blood press.    Tramadol-Acetaminophen      Other reaction(s): Tachycardia, Unknown    Triamterene Other (See Comments)     Greatly bothered with sun-took medication for three years  Greatly bothered with sun      Diatrizoate Rash    Telmisartan Palpitations         Surgical History  Past Surgical History:   Procedure Laterality Date    BREAST SURGERY  1982    breast tumor per [facility]     CATARACT EXTRACTION  07/2005    per [facility]     COLONOSCOPY N/A 11/20/2023    Procedure: COLONOSCOPY with biopsies and polypectomy;  Surgeon: David Barreto MD;  Location: [facility]    CV CORONARY ANGIOGRAM N/A 8/11/2023    Procedure: Coronary Angiogram;  Surgeon: Ap Arroyo MD;  Location: [facility] CATH LAB CV    CV LEFT HEART CATH N/A 8/11/2023    Procedure: Left Heart Catheterization;  Surgeon: Ap Arroyo MD;  Location: [facility] CATH LAB CV    ORIF TIBIA & FIBULA FRACTURES  1988    per [facility]     OTHER SURGICAL HISTORY  10/2004    hole in retinaper [facility] & P     OTHER SURGICAL HISTORY  05/03/2015    fecal transplantper [facility] & P     CT ESOPHAGOGASTRODUODENOSCOPY TRANSORAL DIAGNOSTIC N/A 9/11/2020    Procedure: ESOPHAGOGASTRODUODENOSCOPY With gastric biopsies;  Surgeon: David Reyes MD;  Location: [facility] [place];  Service: Gastroenterology    TONSILLECTOMY      TONSILLECTOMY & ADENOIDECTOMY  1963       Social History  Tobacco:   History   Smoking Status    Former    Packs/day: 3.00    Years: 35.00    Types: Cigarettes   Smokeless Tobacco    Never    Alcohol:   Social History    Substance and Sexual Activity      Alcohol use: Not Currently   Illicit Drugs:   History   Drug Use Unknown       Family History  Family History   Problem Relation Age of Onset    Hypertension Mother     Cancer Mother         gallbladder cancer    Myocardial Infarction Father      Hypertension Sister     Alzheimer Disease Sister     Heart Disease Sister     Cancer Brother     Brain Cancer Brother     Pacemaker Mother     Heart Disease Mother     Coronary Artery Disease Father     Heart Disease Father     Heart Failure Sister           Jimmy Gonzalez MD on 8/28/2024      cc: Kaylyn Bolanos

## 2024-08-28 NOTE — PROGRESS NOTES
Heart Failure Care Map  GOALS TO BE MET BEFORE DISCHARGE:    1. Decrease congestion and/or edema with diuretic therapy to achieve near optimal volume status.     Dyspnea improved: Yes, satisfactory for discharge.   Edema improved: Yes, satisfactory for discharge.        Last 24 hour I/O:   Intake/Output Summary (Last 24 hours) at 8/28/2024 1403  Last data filed at 8/28/2024 1356  Gross per 24 hour   Intake 973 ml   Output 850 ml   Net 123 ml           Net I/O and Weights since admission:   07/29 1500 - 08/28 1459  In: 2178.9 [P.O.:2140; I.V.:38.9]  Out: 5850 [Urine:5850]  Net: -3671.1     Vitals:    08/25/24 0905 08/26/24 0532 08/27/24 0333 08/28/24 0318   Weight: 73 kg (161 lb) 70.5 kg (155 lb 6.8 oz) 69.8 kg (153 lb 14.1 oz) 70.7 kg (155 lb 13.8 oz)       2.  O2 sats > 90% on room air, or at prior home O2 therapy level.      Able to wean O2 this shift to keep sats above 90%?: Yes, satisfactory for discharge.   Does patient use Home O2? No          Current oxygenation status:   SpO2: 96 %     O2 Device: None (Room air),     3.  Tolerates ambulation and mobility near baseline.     Ambulation: No, further care required to meet this goal. Please explain x2 to BR, hard time walking with her knee pain. PT/OT this afternoon.   Times patient ambulated with staff this shift: 2    Please review the Heart Failure Care Map for additional HF goal outcomes.    Gissell Espinoza, KG  8/28/2024

## 2024-08-28 NOTE — PLAN OF CARE
Problem: Adult Inpatient Plan of Care  Goal: Absence of Hospital-Acquired Illness or Injury  Intervention: Prevent Skin Injury  Recent Flowsheet Documentation  Taken 8/27/2024 1930 by Holly Kinney RN  Body Position:   right   turned  Taken 8/27/2024 1730 by Holly Kinney RN  Body Position:   left   turned  Taken 8/27/2024 1630 by Holly Kinney RN  Body Position: position changed independently     Problem: Heart Failure  Goal: Optimal Coping  Outcome: Progressing  Intervention: Support Psychosocial Response  Recent Flowsheet Documentation  Taken 8/27/2024 1630 by Holly Kinney RN  Supportive Measures:   active listening utilized   decision-making supported  Goal: Optimal Cardiac Output  Outcome: Progressing  Intervention: Optimize Cardiac Output  Recent Flowsheet Documentation  Taken 8/27/2024 1630 by Holly Kinney RN  Environmental Support: calm environment promoted  Goal: Stable Heart Rate and Rhythm  Outcome: Progressing  Goal: Optimal Functional Ability  Outcome: Progressing  Intervention: Optimize Functional Ability  Recent Flowsheet Documentation  Taken 8/27/2024 1630 by Holly Kinney RN  Activity Management: activity adjusted per tolerance  Goal: Fluid and Electrolyte Balance  Outcome: Progressing  Intervention: Monitor and Manage Fluid and Electrolyte Balance  Recent Flowsheet Documentation  Taken 8/27/2024 1630 by Holly Kinney RN  Fluid/Electrolyte Management: fluids restricted  Goal: Improved Oral Intake  Outcome: Progressing  Goal: Effective Oxygenation and Ventilation  Outcome: Progressing  Intervention: Promote Airway Secretion Clearance  Recent Flowsheet Documentation  Taken 8/27/2024 1630 by Holly Kinney RN  Cough And Deep Breathing: done independently per patient  Activity Management: activity adjusted per tolerance  Intervention: Optimize Oxygenation and Ventilation  Recent Flowsheet Documentation  Taken 8/27/2024  1930 by Holly Kinney RN  Head of Bed (HOB) Positioning: HOB at 30 degrees  Taken 8/27/2024 1730 by Holly Kinney RN  Head of Bed (HOB) Positioning: HOB at 30 degrees  Taken 8/27/2024 1630 by Holly Kinney RN  Head of Bed (HOB) Positioning: HOB at 30 degrees  Goal: Effective Breathing Pattern During Sleep  Outcome: Progressing  Intervention: Monitor and Manage Obstructive Sleep Apnea  Recent Flowsheet Documentation  Taken 8/27/2024 1630 by Holly Kinney RN  Medication Review/Management: medications reviewed   Goal Outcome Evaluation:       Pt is alert and oriented x 4, c/o knee pain with activity, on scheduled Tylenol. Lung sounds clear, on RA SpO2 92 to 95%. Has SOB with activity. HR in the 70's, NSR with frequent PAC's. Assist of 1 with transfer using walker and gait belt. Pt's O2 saturation dropped to 87 to 88% when she was sleeping. O2  at 1 LPM per nasal cannula applied. SpO2 ranges 95 to 97%.  Purewick in place. On Fluid restriction 2000 ml per day, pt is cooperative.

## 2024-08-28 NOTE — PROGRESS NOTES
Renal progress note  CC:TIMOTHY, hypervolemia  Assessment and Plan:  89 year old female with past medical history of hypertension severe intracranial atherosclerotic disease has been on Plavix history of stage III CKD PAF anxiety and multiple drug allergies establish cares with us after initial presentation in July 2023 for CKD 3 with noted left renal atrophy and multiple cortical cysts there have been seen in past imaging initially had a robust response to Lasix and developed an TIMOTHY with improvement after some IV fluids.  Now readmitted on 8/25/2020 for with likely CHF exacerbation with chest pressure shortness of breath and leg edema  Initially started on IV Lasix and with mild elevation in creatinine Lasix stopped and received albumin; losartan held  Nephrology consulted for TIMOTHY/CKD 3     TIMOTHY/creatinine elevation nonoliguric  CKD 3 baseline creatinine 1.1-1.4 with GFR around ~35-40,   UA bland  Urine protein negative quantification 0.4g/g  Renal imaging significant for left renal atrophy with about 7.8 cm left kidney and about 9.9 cm right kidney with multiple cortical cysts that have been seen in the past imaging as well, (CT in 2022 in 2021)  Revealing mild CKD likely secondary to renal atrophy as well has some acquired renal cysts in the light of renovascular disease since he does have evidence of significant atherosclerotic disease elsewhere  Peak creat 1.83 8/27/24  Creatinine stabilizing 1.7 today , mild improvement  Wt uptrend and UO very low in last 48hrs  Resume on PO Bumex 0.5mg, added one time dose  Hold Losartan for now  Remove purwick , encourage mobility  With lower nephron mass likely will trend up some before any improvement  Cystatin C 2.3 with creat 1.4 , GFR 22 based on Csyt, likely true GFR is close to 30 , will repeat as OP  No nephrotoxic meds noted  Patient is mild hypervolemic  --> Strict DARLEEN Daily weights and renal panel daily  --> will schedule for OP visits      Hyponatremia  Spoke with pt's daughter to confirm appointment. Appointment confirmed   improved  Chronic runs between 129-135  Hypochloremia and elevated bicarb  Likely related to diuresis  --> Diuretics held     Hypomagnesemia   Replace per protocols     History of hypertension  Prior to admission on metoprolol 50mg losartan 50 mg twice a day  Has multiple antihypertensive intolerance and allergies  Blood pressure borderline controlled with elevated blood pressures and CHF exacerbation with history of hypertension heart disease  Hold off diuresis  --> Losartan held  --> Counseled on low-sodium diet, less than 2 g/day  --> may need a low dose bumex may be alternate day since with as needed plan she still retained fluid and had a CHF exacerbation     Hypervolemia   Improved significantly with diuresis  No indication for scheduled diuretics at this time  No indication for IV fluids/albumin volume expansion unless hypotensive  --> will plans for PO diuresis as needed     Hb stable ~12     Chest pressure  Improved with better blood pressure  Likely secondary to hypertensive urgency  Improved     Dyspnea and SOB  Improved with diuresisD-Dimer elevated  VQ scan wo evidence of PE     History of GERD with reflux  Chronically on famotidine     History of severe atherosclerosis  Severe intracranial atherosclerosis on Plavix/ ASA  Evidence of atherosclerosis on imaging  Likely has acquired bilateral small renal cystic disease secondary to renal artery disease  --> intolerance to statins      Hepatic cyst around 4 cm  May need to consider evaluation with her prior GI team  Consult to follow as outpatient     Anxiety on clonazepam  Thank you for the consultation we will follow  Shorty Bautista MD  [facility]  [phone]      Subjective  Feels better today  Moved around a bit  Doesnot feel good about TCU idea  Discussed diuresis plans and need for more PT evaluations  Will follow on labs in am , no diuretics/ARB today  Follow BP and IO  PT evaluating and recs TCU  Oxygenation better  today    Objective    Vital signs in last 24 hours  Temp:  [97.4  F (36.3  C)-97.9  F (36.6  C)] 97.4  F (36.3  C)  Pulse:  [63-78] 73  Resp:  [18-23] 18  BP: (133-159)/(60-79) 159/65  SpO2:  [88 %-97 %] 96 %  Weight:   [unfilled]    Intake/Output last 3 shifts  I/O last 3 completed shifts:  In: 1023 [P.O.:1020; I.V.:3]  Out: 500 [Urine:500]  Intake/Output this shift:  I/O this shift:  In: 120 [P.O.:120]  Out: -     Physical Exam  Alert/, awake, NAD  CV: RRR without murmur or rub  Lung: clear and equal; no extra sounds  Ab: soft and NT; not distended; normal bs  Ext: no edema and well perfused  Skin; no rash    Pertinent Labs   Lab Results   Component Value Date    WBC 5.5 08/25/2024    HGB 12.7 08/25/2024    HCT 40.5 08/25/2024    MCV 91 08/25/2024     08/25/2024     Lab Results   Component Value Date    BUN 37.8 (H) 08/28/2024     08/28/2024    CO2 30 (H) 08/28/2024       Lab Results   Component Value Date    ALBUMIN 4.3 03/04/2024     Lab Results   Component Value Date    PHOS 3.4 03/04/2024     I reviewed all lab results  Shorty Bautista MD

## 2024-08-28 NOTE — PLAN OF CARE
Problem: Risk for Delirium  Goal: Improved Sleep  Outcome: Progressing  Intervention: Promote Sleep  Recent Flowsheet Documentation  Taken 8/28/2024 0028 by Vicente Zuniga RN  Sleep/Rest Enhancement:   noise level reduced   regular sleep/rest pattern promoted   room darkened     Problem: Heart Failure  Goal: Effective Breathing Pattern During Sleep  Outcome: Progressing  Intervention: Monitor and Manage Obstructive Sleep Apnea  Recent Flowsheet Documentation  Taken 8/28/2024 0028 by Vicente Zuniga RN  Medication Review/Management: medications reviewed   Goal Outcome Evaluation:             A&Ox4. Tele SR w/freq pac's,. 1 Liter N.C during the night, RA when awake. Purewick present, low urine output, B.S at 0620 and got 301. 1 assist w/gait belt and walker. Denies pain but states back was sore, repositioning helped.

## 2024-08-29 ENCOUNTER — APPOINTMENT (OUTPATIENT)
Dept: OCCUPATIONAL THERAPY | Facility: HOSPITAL | Age: 89
DRG: 291 | End: 2024-08-29
Payer: MEDICARE

## 2024-08-29 ENCOUNTER — APPOINTMENT (OUTPATIENT)
Dept: PHYSICAL THERAPY | Facility: HOSPITAL | Age: 89
DRG: 291 | End: 2024-08-29
Payer: MEDICARE

## 2024-08-29 LAB
ANION GAP SERPL CALCULATED.3IONS-SCNC: 10 MMOL/L (ref 7–15)
BUN SERPL-MCNC: 39.9 MG/DL (ref 8–23)
CALCIUM SERPL-MCNC: 9.2 MG/DL (ref 8.8–10.4)
CHLORIDE SERPL-SCNC: 96 MMOL/L (ref 98–107)
CREAT SERPL-MCNC: 1.46 MG/DL (ref 0.51–0.95)
EGFRCR SERPLBLD CKD-EPI 2021: 34 ML/MIN/1.73M2
GLUCOSE SERPL-MCNC: 99 MG/DL (ref 70–99)
HCO3 SERPL-SCNC: 29 MMOL/L (ref 22–29)
POTASSIUM SERPL-SCNC: 3.6 MMOL/L (ref 3.4–5.3)
SODIUM SERPL-SCNC: 135 MMOL/L (ref 135–145)

## 2024-08-29 PROCEDURE — 97535 SELF CARE MNGMENT TRAINING: CPT | Mod: GO

## 2024-08-29 PROCEDURE — 82565 ASSAY OF CREATININE: CPT | Performed by: STUDENT IN AN ORGANIZED HEALTH CARE EDUCATION/TRAINING PROGRAM

## 2024-08-29 PROCEDURE — 99232 SBSQ HOSP IP/OBS MODERATE 35: CPT | Performed by: INTERNAL MEDICINE

## 2024-08-29 PROCEDURE — 82374 ASSAY BLOOD CARBON DIOXIDE: CPT | Performed by: STUDENT IN AN ORGANIZED HEALTH CARE EDUCATION/TRAINING PROGRAM

## 2024-08-29 PROCEDURE — 99232 SBSQ HOSP IP/OBS MODERATE 35: CPT | Performed by: STUDENT IN AN ORGANIZED HEALTH CARE EDUCATION/TRAINING PROGRAM

## 2024-08-29 PROCEDURE — 120N000004 HC R&B MS OVERFLOW

## 2024-08-29 PROCEDURE — 97110 THERAPEUTIC EXERCISES: CPT | Mod: GP

## 2024-08-29 PROCEDURE — 250N000013 HC RX MED GY IP 250 OP 250 PS 637: Performed by: INTERNAL MEDICINE

## 2024-08-29 PROCEDURE — 250N000011 HC RX IP 250 OP 636: Performed by: INTERNAL MEDICINE

## 2024-08-29 PROCEDURE — 36415 COLL VENOUS BLD VENIPUNCTURE: CPT | Performed by: STUDENT IN AN ORGANIZED HEALTH CARE EDUCATION/TRAINING PROGRAM

## 2024-08-29 PROCEDURE — 97116 GAIT TRAINING THERAPY: CPT | Mod: GP

## 2024-08-29 RX ORDER — BUMETANIDE 0.5 MG/1
0.5 TABLET ORAL
Qty: 30 TABLET | Refills: 0 | Status: CANCELLED | OUTPATIENT
Start: 2024-08-30

## 2024-08-29 RX ORDER — LOSARTAN POTASSIUM 25 MG/1
25 TABLET ORAL 2 TIMES DAILY
Status: DISCONTINUED | OUTPATIENT
Start: 2024-08-29 | End: 2024-08-30

## 2024-08-29 RX ORDER — BUMETANIDE 0.5 MG/1
0.5 TABLET ORAL
Status: DISCONTINUED | OUTPATIENT
Start: 2024-08-30 | End: 2024-08-29

## 2024-08-29 RX ADMIN — ACETAMINOPHEN 1000 MG: 500 TABLET ORAL at 20:14

## 2024-08-29 RX ADMIN — METOPROLOL SUCCINATE 50 MG: 50 TABLET, EXTENDED RELEASE ORAL at 20:14

## 2024-08-29 RX ADMIN — ROSUVASTATIN 10 MG: 10 TABLET, FILM COATED ORAL at 08:23

## 2024-08-29 RX ADMIN — ASPIRIN 81 MG: 81 TABLET, COATED ORAL at 08:24

## 2024-08-29 RX ADMIN — CLOPIDOGREL BISULFATE 75 MG: 75 TABLET ORAL at 08:24

## 2024-08-29 RX ADMIN — CLONAZEPAM 0.5 MG: 0.5 TABLET ORAL at 13:41

## 2024-08-29 RX ADMIN — CLONAZEPAM 0.5 MG: 0.5 TABLET ORAL at 18:36

## 2024-08-29 RX ADMIN — ACETAMINOPHEN 1000 MG: 500 TABLET ORAL at 08:23

## 2024-08-29 RX ADMIN — ACETAMINOPHEN 1000 MG: 500 TABLET ORAL at 13:41

## 2024-08-29 RX ADMIN — FAMOTIDINE 10 MG: 10 TABLET ORAL at 08:24

## 2024-08-29 RX ADMIN — ENOXAPARIN SODIUM 30 MG: 30 INJECTION SUBCUTANEOUS at 08:22

## 2024-08-29 RX ADMIN — CLONAZEPAM 0.5 MG: 0.5 TABLET ORAL at 02:22

## 2024-08-29 RX ADMIN — LOSARTAN POTASSIUM 25 MG: 25 TABLET, FILM COATED ORAL at 18:36

## 2024-08-29 ASSESSMENT — ACTIVITIES OF DAILY LIVING (ADL)
ADLS_ACUITY_SCORE: 37
ADLS_ACUITY_SCORE: 38
ADLS_ACUITY_SCORE: 37
ADLS_ACUITY_SCORE: 38
ADLS_ACUITY_SCORE: 37
ADLS_ACUITY_SCORE: 38
ADLS_ACUITY_SCORE: 37

## 2024-08-29 NOTE — PROGRESS NOTES
Renal progress note  CC:TIMOTHY, hypervolemia  Assessment and Plan:  89 year old female with past medical history of hypertension severe intracranial atherosclerotic disease has been on Plavix history of stage III CKD PAF anxiety and multiple drug allergies establish cares with us after initial presentation in July 2023 for CKD 3 with noted left renal atrophy and multiple cortical cysts there have been seen in past imaging initially had a robust response to Lasix and developed an TIMOTHY with improvement after some IV fluids.  Now readmitted on 8/25/2020 for with likely CHF exacerbation with chest pressure shortness of breath and leg edema  Initially started on IV Lasix and with mild elevation in creatinine Lasix stopped and received albumin; losartan held  Nephrology consulted for TIMOTHY/CKD 3     TIMOTHY/creatinine elevation nonoliguric  CKD 3 baseline creatinine 1.1-1.4 with GFR around ~35-40,   UA bland  Urine protein negative quantification 0.4g/g  Renal imaging significant for left renal atrophy with about 7.8 cm left kidney and about 9.9 cm right kidney with multiple cortical cysts that have been seen in the past imaging as well, (CT in 2022 in 2021)  Revealing mild CKD likely secondary to renal atrophy as well has some acquired renal cysts in the light of renovascular disease since he does have evidence of significant atherosclerotic disease elsewhere  Peak creat 1.83 8/27/24  Creatinine stabilizing 1.46 today , near baseline now  Wt an UO good response to PO bumex  continue on PO Bumex 0.25mg, every other day; she feels she urinated a lot even with 0.5mg   Resume Losartan at 25mg bid  Remove purwick , encourage mobility  With lower nephron mass likely will trend up some before any improvement  Cystatin C 2.3 with creat 1.4 , GFR 22 based on Csyt, likely true GFR is close to 30 , will repeat as OP  No nephrotoxic meds noted  Patient is mild hypervolemic  --> Strict DARLEEN Daily weights and renal panel daily  -->  scheduled for OP visits on 10/14/2024 clinic with me , will finalize before discharge  Needs to work with PT to clear disposition again     Hyponatremia improved  Chronic runs between 129-135  Hypochloremia and elevated bicarb  Likely related to diuresis  --> Diuretics held     Hypomagnesemia   Replace per protocols     History of hypertension  Prior to admission on metoprolol 50mg losartan 50 mg twice a day  Has multiple antihypertensive intolerance and allergies  Blood pressure borderline controlled with elevated blood pressures and CHF exacerbation with history of hypertension heart disease  Hold off diuresis  --> Losartan start at lower dose 25mg x2  --> Counseled on low-sodium diet, less than 2 g/day  --> will keep on bumex 0.25mg x every other day      Hypervolemia   Improved significantly with diuresis  No indication for scheduled diuretics at this time  No indication for IV fluids/albumin volume expansion unless hypotensive  --> will plans for PO diuresis as needed     Hb stable ~12     Chest pressure  Improved with better blood pressure  Likely secondary to hypertensive urgency  Improved     Dyspnea and SOB  Improved with diuresisD-Dimer elevated  VQ scan wo evidence of PE     History of GERD with reflux  Chronically on famotidine     History of severe atherosclerosis  Severe intracranial atherosclerosis on Plavix/ ASA  Evidence of atherosclerosis on imaging  Likely has acquired bilateral small renal cystic disease secondary to renal artery disease  --> intolerance to statins      Hepatic cyst around 4 cm  May need to consider evaluation with her prior GI team  Consult to follow as outpatient     Anxiety on clonazepam  Thank you for the consultation we will follow  Shorty Bautista MD  [facility]  [phone]      Subjective  Feels more tired , did not sleep well with UO   Not upto moving, feels too tired to do any PT  Still doesnot want to go to TCU  Wants her current level of cares    Worried about Bumex that the dose was too much since she had too much UO  Breathing likely related to fatigue   Exam wo any hypervolemia concerns  Likely good wt closer to 150lb    Oxygenation better today    Objective    Vital signs in last 24 hours  Temp:  [97.6  F (36.4  C)-98.2  F (36.8  C)] 98  F (36.7  C)  Pulse:  [75-85] 80  Resp:  [18-20] 20  BP: (125-166)/(60-94) 136/94  SpO2:  [92 %-96 %] 94 %  Weight:   [unfilled]    Intake/Output last 3 shifts  I/O last 3 completed shifts:  In: 493 [P.O.:490; I.V.:3]  Out: 2075 [Urine:2075]  Intake/Output this shift:  I/O this shift:  In: 240 [P.O.:240]  Out: -     Physical Exam  Alert/, awake, NAD  CV: RRR without murmur or rub  Lung: clear and equal; no extra sounds  Ab: soft and NT; not distended; normal bs  Ext: no edema and well perfused  Skin; no rash    Pertinent Labs   Lab Results   Component Value Date    WBC 5.5 08/25/2024    HGB 12.7 08/25/2024    HCT 40.5 08/25/2024    MCV 91 08/25/2024     08/25/2024     Lab Results   Component Value Date    BUN 39.9 (H) 08/29/2024     08/29/2024    CO2 29 08/29/2024       Lab Results   Component Value Date    ALBUMIN 4.3 03/04/2024     Lab Results   Component Value Date    PHOS 3.4 03/04/2024     I reviewed all lab results  Shorty Bautista MD

## 2024-08-29 NOTE — PROGRESS NOTES
[facility]    Medicine Progress Note - Hospitalist Service    Date of Admission:  8/25/2024    Assessment & Plan   Kerry Hoffmann is a 89 year old female admitted on 8/25/2024. She has PMHx of A-fib, HTN, heart disease, CKD 3, GERD, and HLD, presenting with sob, chest pressure, legs edema. C/w CHF. D-dimer elevated.    Acute pulmonary edema  HFmrEF, acute on chronic  Acute hypoxic respiratory failure-resolved  -- ProBNP 7336, trop 21  -- CXR: Cardiomegaly with new pulmonary vascular congestion and increased interstitial markings suggesting pulmonary edema   -- Chest CT not ordered due to h/o allergy  -- Echo: EF 45%  -- Continue PTA Metoprolol.   -- Cardiology s/o  -- Volume management per Nephrology: Bumex restarted at 0.5mg every 48 hrs  --PT/OT: recommended TCU. Pt declined TCU and HC PT/OT.    Elevated troponin  -- Troponins flat. Chest pressure resolved (likely 2/2 acute pulmonary edema)  -- Likely 2/2 demand ischemia    Elevation of d-dimer  -- Moderate risk for PE  -- Cannot do ct due to allergy  -- V/Q no e/o PE    HTN urgency  -- Hydralazine prn  -- C/w PTA Metoprolol. Losartan restarted at 25 mg bid.  -- Nephrology following    TIMOTHY on CKD IIIb  -- Baseline Cr 1.1-1.4. Cr near baseline  -- Closely monitoring on diuretics  -- Nephrology consult appreciated    Hypokalemia  -- Replace as protocol    Nonobstructive CAD  Hyperlipidemia  -- C/w PTA Crestor, Plavix, ASA    Valvular heart disease  -- Progressive MR and AI- Cardiology to refer to valve clinic              Diet: Combination Diet 2 gm NA Diet; No Caffeine Diet (and additional linked orders)  Fluid restriction 2000 ML FLUID (and additional linked orders)    DVT Prophylaxis: Enoxaparin (Lovenox) SQ  Wiley Catheter: Not present  Lines: None     Cardiac Monitoring: ACTIVE order. Indication: Acute decompensated heart failure (48 hours)  Code Status: No CPR- Do NOT Intubate      Clinically Significant Risk Factors                 "  # Hypertension: Noted on problem list  # Heart failure, NOS: heart failure noted on the problem list and last echo with EF 40-50%          # Overweight: Estimated body mass index is 28.55 kg/m  as calculated from the following:    Height as of this encounter: 1.549 m (5' 1\").    Weight as of this encounter: 68.5 kg (151 lb 1.6 oz).        # Financial/Environmental Concerns: none  # Asthma: noted on problem list              Disposition Plan     Medically Ready for Discharge: Anticipated 1-2 days.             Latoya Leslie MD  Hospitalist Service  [facility]  Securely message with [employer] (more info)  Text page via [facility] Paging/Directory   ______________________________________________________________________    Interval History   Patient is seen and examined at bedside.   Weaned off of oxygen. Declined TCU and HC PT/OT.  Feels weak today.  Plan of care discussed with patient. All questions answered.   Pt verbalized understanding.     Physical Exam   Vital Signs: Temp: 97.8  F (36.6  C) Temp src: Oral BP: 134/60 Pulse: 75   Resp: 18 SpO2: 94 % O2 Device: None (Room air)    Weight: 151 lbs 1.6 oz    GEN: Alert and oriented. Not in acute distress.  HEENT: Atraumatic, mucous membrane- moist and pink.  Chest: Bilateral air entry.  CVS: S1S2 regular.   Abdomen: Soft. Non-tender, non-distended. No organomegaly. No guarding or rigidity. Bowel sounds active.   Extremities: No pedal edema.  CNS: No involuntary movements.  Skin: no cyanosis or clubbing.     Medical Decision Making       46 MINUTES SPENT BY ME on the date of service doing chart review, history, exam, documentation & further activities per the note.      Data     "

## 2024-08-29 NOTE — PLAN OF CARE
Physical Therapy Discharge Summary    Reason for therapy discharge:    No further expectations of functional progress.    Progress towards therapy goal(s). See goals on Care Plan in [facility] electronic health record for goal details.  Goals partially met.  Barriers to achieving goals:   limited tolerance for therapy.,ambulation goal unrealistic     Therapy recommendation(s):    Continue home exercise program.OOB to chair and BR with assist from nursing.

## 2024-08-29 NOTE — PROGRESS NOTES
Heart Failure Care Map  GOALS TO BE MET BEFORE DISCHARGE:    1. Decrease congestion and/or edema with diuretic therapy to achieve near optimal volume status.     Dyspnea improved: Yes, satisfactory for discharge.   Edema improved: Yes, satisfactory for discharge.        Last 24 hour I/O:   Intake/Output Summary (Last 24 hours) at 8/29/2024 0320  Last data filed at 8/29/2024 0240  Gross per 24 hour   Intake 493 ml   Output 1750 ml   Net -1257 ml           Net I/O and Weights since admission:   07/30 0700 - 08/29 0659  In: 2181.9 [P.O.:2140; I.V.:41.9]  Out: 7150 [Urine:7150]  Net: -4968.1     Vitals:    08/25/24 0905 08/26/24 0532 08/27/24 0333 08/28/24 0318   Weight: 73 kg (161 lb) 70.5 kg (155 lb 6.8 oz) 69.8 kg (153 lb 14.1 oz) 70.7 kg (155 lb 13.8 oz)       2.  O2 sats > 90% on room air, or at prior home O2 therapy level.      Able to wean O2 this shift to keep sats above 90%?: Yes, satisfactory for discharge.   Does patient use Home O2? No          Current oxygenation status:   SpO2: 92 %     O2 Device: None (Room air), Oxygen Delivery: 1 LPM    3.  Tolerates ambulation and mobility near baseline.     Ambulation: No, further care required to meet this goal. Please explain . Further care is required to get patient ambulating.    Times patient ambulated with staff this shift: Pt also have knee pain that makes it difficult for her to walk.   Pt is alert and oriented, denies pain, Bumex given, and pt has been voiding ok. Waking in to the bathroom to void. RA lungs are clear with mild posterior fine crackles.     Please review the Heart Failure Care Map for additional HF goal outcomes.    Tenzin Mclean RN  8/29/2024

## 2024-08-29 NOTE — PLAN OF CARE
Problem: Adult Inpatient Plan of Care  Goal: Plan of Care Review  Description: The Plan of Care Review/Shift note should be completed every shift.  The Outcome Evaluation is a brief statement about your assessment that the patient is improving, declining, or no change.  This information will be displayed automatically on your shift  note.  Outcome: Progressing     Problem: Heart Failure  Goal: Optimal Coping  Outcome: Progressing  Intervention: Support Psychosocial Response  Recent Flowsheet Documentation  Taken 8/28/2024 2002 by Tenzin Mclean RN  Supportive Measures:   active listening utilized   decision-making supported   Goal Outcome Evaluation:         Pt is alert and oriented, denies pain, Bumex given, and pt has been voiding ok. Waking in to the bathroom to void. RA lungs are clear with mild posterior fine crackles.

## 2024-08-30 LAB
ANION GAP SERPL CALCULATED.3IONS-SCNC: 13 MMOL/L (ref 7–15)
BACTERIA BLD CULT: NO GROWTH
BUN SERPL-MCNC: 39.1 MG/DL (ref 8–23)
CALCIUM SERPL-MCNC: 9.3 MG/DL (ref 8.8–10.4)
CHLORIDE SERPL-SCNC: 96 MMOL/L (ref 98–107)
CREAT SERPL-MCNC: 1.37 MG/DL (ref 0.51–0.95)
EGFRCR SERPLBLD CKD-EPI 2021: 37 ML/MIN/1.73M2
GLUCOSE SERPL-MCNC: 90 MG/DL (ref 70–99)
HCO3 SERPL-SCNC: 26 MMOL/L (ref 22–29)
POTASSIUM SERPL-SCNC: 3.9 MMOL/L (ref 3.4–5.3)
SODIUM SERPL-SCNC: 135 MMOL/L (ref 135–145)

## 2024-08-30 PROCEDURE — 250N000013 HC RX MED GY IP 250 OP 250 PS 637: Performed by: INTERNAL MEDICINE

## 2024-08-30 PROCEDURE — 99232 SBSQ HOSP IP/OBS MODERATE 35: CPT | Performed by: STUDENT IN AN ORGANIZED HEALTH CARE EDUCATION/TRAINING PROGRAM

## 2024-08-30 PROCEDURE — 999N000127 HC STATISTIC PERIPHERAL IV START W US GUIDANCE

## 2024-08-30 PROCEDURE — 36415 COLL VENOUS BLD VENIPUNCTURE: CPT | Performed by: STUDENT IN AN ORGANIZED HEALTH CARE EDUCATION/TRAINING PROGRAM

## 2024-08-30 PROCEDURE — 250N000011 HC RX IP 250 OP 636: Performed by: INTERNAL MEDICINE

## 2024-08-30 PROCEDURE — 120N000004 HC R&B MS OVERFLOW

## 2024-08-30 PROCEDURE — 99233 SBSQ HOSP IP/OBS HIGH 50: CPT | Performed by: INTERNAL MEDICINE

## 2024-08-30 PROCEDURE — 250N000013 HC RX MED GY IP 250 OP 250 PS 637: Performed by: STUDENT IN AN ORGANIZED HEALTH CARE EDUCATION/TRAINING PROGRAM

## 2024-08-30 PROCEDURE — 80048 BASIC METABOLIC PNL TOTAL CA: CPT | Performed by: STUDENT IN AN ORGANIZED HEALTH CARE EDUCATION/TRAINING PROGRAM

## 2024-08-30 RX ORDER — AMOXICILLIN 250 MG
2 CAPSULE ORAL 2 TIMES DAILY
Status: DISCONTINUED | OUTPATIENT
Start: 2024-08-30 | End: 2024-09-04 | Stop reason: HOSPADM

## 2024-08-30 RX ORDER — LOSARTAN POTASSIUM 25 MG/1
25 TABLET ORAL 2 TIMES DAILY
Qty: 60 TABLET | Refills: 1 | OUTPATIENT
Start: 2024-08-30

## 2024-08-30 RX ORDER — LOSARTAN POTASSIUM 25 MG/1
25 TABLET ORAL ONCE
Status: COMPLETED | OUTPATIENT
Start: 2024-08-30 | End: 2024-08-30

## 2024-08-30 RX ORDER — LOSARTAN POTASSIUM 50 MG/1
50 TABLET ORAL 2 TIMES DAILY
Status: DISCONTINUED | OUTPATIENT
Start: 2024-08-30 | End: 2024-09-04 | Stop reason: HOSPADM

## 2024-08-30 RX ORDER — POLYETHYLENE GLYCOL 3350 17 G/17G
17 POWDER, FOR SOLUTION ORAL DAILY
Status: DISCONTINUED | OUTPATIENT
Start: 2024-08-30 | End: 2024-08-30

## 2024-08-30 RX ORDER — BUMETANIDE 0.5 MG/1
0.25 TABLET ORAL
Qty: 15 TABLET | Refills: 0 | OUTPATIENT
Start: 2024-09-01

## 2024-08-30 RX ADMIN — CLONAZEPAM 0.5 MG: 0.5 TABLET ORAL at 11:28

## 2024-08-30 RX ADMIN — LOSARTAN POTASSIUM 50 MG: 50 TABLET, FILM COATED ORAL at 20:02

## 2024-08-30 RX ADMIN — FAMOTIDINE 10 MG: 10 TABLET ORAL at 08:01

## 2024-08-30 RX ADMIN — CLONAZEPAM 0.5 MG: 0.5 TABLET ORAL at 19:56

## 2024-08-30 RX ADMIN — ROSUVASTATIN 10 MG: 10 TABLET, FILM COATED ORAL at 08:01

## 2024-08-30 RX ADMIN — METOPROLOL SUCCINATE 50 MG: 50 TABLET, EXTENDED RELEASE ORAL at 20:02

## 2024-08-30 RX ADMIN — LOSARTAN POTASSIUM 25 MG: 25 TABLET, FILM COATED ORAL at 08:01

## 2024-08-30 RX ADMIN — ACETAMINOPHEN 1000 MG: 500 TABLET ORAL at 20:01

## 2024-08-30 RX ADMIN — ENOXAPARIN SODIUM 30 MG: 30 INJECTION SUBCUTANEOUS at 08:02

## 2024-08-30 RX ADMIN — CLOPIDOGREL BISULFATE 75 MG: 75 TABLET ORAL at 08:01

## 2024-08-30 RX ADMIN — ASPIRIN 81 MG: 81 TABLET, COATED ORAL at 08:01

## 2024-08-30 RX ADMIN — ACETAMINOPHEN 1000 MG: 500 TABLET ORAL at 08:01

## 2024-08-30 RX ADMIN — SENNOSIDES AND DOCUSATE SODIUM 2 TABLET: 8.6; 5 TABLET ORAL at 11:28

## 2024-08-30 RX ADMIN — ACETAMINOPHEN 1000 MG: 500 TABLET ORAL at 13:58

## 2024-08-30 RX ADMIN — LOSARTAN POTASSIUM 25 MG: 25 TABLET, FILM COATED ORAL at 09:48

## 2024-08-30 RX ADMIN — CLONAZEPAM 0.5 MG: 0.5 TABLET ORAL at 01:18

## 2024-08-30 RX ADMIN — BUMETANIDE 0.25 MG: 0.5 TABLET ORAL at 08:01

## 2024-08-30 ASSESSMENT — ACTIVITIES OF DAILY LIVING (ADL)
ADLS_ACUITY_SCORE: 37
ADLS_ACUITY_SCORE: 37
ADLS_ACUITY_SCORE: 33
ADLS_ACUITY_SCORE: 37
ADLS_ACUITY_SCORE: 33
ADLS_ACUITY_SCORE: 33
ADLS_ACUITY_SCORE: 37
ADLS_ACUITY_SCORE: 37
ADLS_ACUITY_SCORE: 33
ADLS_ACUITY_SCORE: 27
ADLS_ACUITY_SCORE: 33
ADLS_ACUITY_SCORE: 37
ADLS_ACUITY_SCORE: 33
ADLS_ACUITY_SCORE: 33
ADLS_ACUITY_SCORE: 37
ADLS_ACUITY_SCORE: 33
ADLS_ACUITY_SCORE: 37

## 2024-08-30 NOTE — PROGRESS NOTES
Care Management Follow Up    Length of Stay (days): 5    Expected Discharge Date:  8/31/24      Anticipated Discharge Plan:  Home    Transportation: Anticipate medical transport    PT Recommendations: home  OT Recommendations:  Transitional Care Facility     Barriers to Discharge: medical stability    Prior Living Situation: apartment, independent living facility ([facility]) with alone    Discussed  Partnership in Safe Discharge Planning  document with patient/family: No     Handoff Completed: No, handoff not indicated or clinically appropriate    Patient/Spokesperson Updated: No    Additional Information:  Chart reviewed. [facility] She continues to decline home care and transitional care.  [facility]. Her nephew Tone is involved and supportive.    Dara Rojo, ALEXANDERSW

## 2024-08-30 NOTE — PROGRESS NOTES
[facility]    Medicine Progress Note - Hospitalist Service    Date of Admission:  8/25/2024    Assessment & Plan   Kerry Hoffmann is a 89 year old female admitted on 8/25/2024. She has PMHx of A-fib, HTN, heart disease, CKD 3, GERD, and HLD, presenting with sob, chest pressure, legs edema. C/w CHF. D-dimer elevated.    Acute pulmonary edema  HFmrEF, acute on chronic  Acute hypoxic respiratory failure-resolved  -- ProBNP 7336, trop 21  -- CXR: Cardiomegaly with new pulmonary vascular congestion and increased interstitial markings suggesting pulmonary edema   -- Chest CT not ordered due to h/o allergy  -- Echo: EF 45%  -- Continue PTA Metoprolol.   -- Cardiology s/o  -- Volume management per Nephrology: Bumex restarted at 0.25mg every 48 hrs  --PT/OT: Initially recommended TCU. Now recommending to continue home exercise program.    Elevated troponin  -- Troponins flat. Chest pressure resolved (likely 2/2 acute pulmonary edema)  -- Likely 2/2 demand ischemia    Elevation of d-dimer  -- Moderate risk for PE  -- Cannot do ct due to allergy  -- V/Q no e/o PE    HTN urgency  -- Hydralazine prn  -- C/w PTA Metoprolol. Losartan increased back to her PTA dose of 50 mg po bid.  -- Nephrology following    TIMOTHY on CKD IIIb  -- Baseline Cr 1.1-1.4. Cr at baseline  -- Closely monitoring on diuretics  -- Nephrology consult appreciated    Hypokalemia  -- Replace as protocol    Nonobstructive CAD  Hyperlipidemia  -- C/w PTA Crestor, Plavix, ASA    Valvular heart disease  -- Progressive MR and AI- Cardiology to refer to valve clinic              Diet: Combination Diet 2 gm NA Diet; No Caffeine Diet (and additional linked orders)  Fluid restriction 2000 ML FLUID (and additional linked orders)    DVT Prophylaxis: Enoxaparin (Lovenox) SQ  Wiley Catheter: Not present  Lines: None     Cardiac Monitoring: ACTIVE order. Indication: Acute decompensated heart failure (48 hours)  Code Status: No CPR- Do NOT Intubate   "    Clinically Significant Risk Factors                  # Hypertension: Noted on problem list  # Heart failure, NOS: heart failure noted on the problem list and last echo with EF 40-50%          # Overweight: Estimated body mass index is 28.87 kg/m  as calculated from the following:    Height as of this encounter: 1.549 m (5' 1\").    Weight as of this encounter: 69.3 kg (152 lb 12.8 oz).        # Financial/Environmental Concerns: none  # Asthma: noted on problem list              Disposition Plan     Medically Ready for Discharge: Anticipated 1-2 days.             Latoya Leslie MD  Hospitalist Service  [facility]  Securely message with [employer] (more info)  Text page via [facility] Paging/Directory   ______________________________________________________________________    Interval History   Patient is seen and examined at bedside.   Weaned off of oxygen. Feels shortness of breath.  Plan of care discussed with patient. All questions answered.   Pt verbalized understanding.   Discussed with Dr. Bautista    Physical Exam   Vital Signs: Temp: 98  F (36.7  C) Temp src: Oral BP: 139/63 Pulse: 74   Resp: 18 SpO2: 96 % O2 Device: None (Room air)    Weight: 152 lbs 12.8 oz    GEN: Alert and oriented. Not in acute distress.  HEENT: Atraumatic, mucous membrane- moist and pink.  Chest: Bilateral air entry.  CVS: S1S2 regular.   Abdomen: Soft. Non-tender, non-distended. No organomegaly. No guarding or rigidity. Bowel sounds active.   Extremities: No pedal edema.  CNS: No involuntary movements.  Skin: no cyanosis or clubbing.     Medical Decision Making       47 MINUTES SPENT BY ME on the date of service doing chart review, history, exam, documentation & further activities per the note.      Data     "

## 2024-08-30 NOTE — PLAN OF CARE
Problem: Adult Inpatient Plan of Care  Goal: Absence of Hospital-Acquired Illness or Injury  Intervention: Identify and Manage Fall Risk  Recent Flowsheet Documentation  Taken 8/29/2024 1731 by Arpit Salas RN  Safety Promotion/Fall Prevention:   activity supervised   clutter free environment maintained   lighting adjusted   nonskid shoes/slippers when out of bed   room door open   safety round/check completed  Patient is A & Ox 3. VSS. Lungs sound clear-on room air. Reports constant pain on Knees, hesitant to get out OOB-medication, reposition and emotional support.  NSR per tele. On cardiac rehab with plan is to discharge back to senior living facility. Monitor.

## 2024-08-30 NOTE — PROGRESS NOTES
Renal progress note  CC:TIMOTHY, hypervolemia  Assessment and Plan:  89 year old female with past medical history of hypertension severe intracranial atherosclerotic disease has been on Plavix history of stage III CKD PAF anxiety and multiple drug allergies establish cares with us after initial presentation in July 2023 for CKD 3 with noted left renal atrophy and multiple cortical cysts there have been seen in past imaging initially had a robust response to Lasix and developed an TIMOTHY with improvement after some IV fluids.  Now readmitted on 8/25/2020 for with likely CHF exacerbation with chest pressure shortness of breath and leg edema  Initially started on IV Lasix and with mild elevation in creatinine Lasix stopped and received albumin; losartan held  Nephrology consulted for TIMOTHY/CKD 3     TIMOTHY/creatinine elevation nonoliguric  CKD 3 baseline creatinine 1.1-1.4 with GFR around ~35-40,   UA bland  Urine protein negative quantification 0.4g/g  Renal imaging significant for left renal atrophy with about 7.8 cm left kidney and about 9.9 cm right kidney with multiple cortical cysts that have been seen in the past imaging as well, (CT in 2022 in 2021)  Revealing mild CKD likely secondary to renal atrophy as well has some acquired renal cysts in the light of renovascular disease since he does have evidence of significant atherosclerotic disease elsewhere  Peak creat 1.83 8/27/24  Creatinine stabilizing; baseline now  Wt an UO good response to PO bumex  continue on PO Bumex 0.25mg, every other day; she feels she urinated a lot even with 0.5mg   Increased Losartan back to PTA dose 50mg bid  Remove purwick , encourage mobility  With lower nephron mass likely will trend up some before any improvement  Cystatin C 2.3 with creat 1.4 , GFR 22 based on Csyt, likely true GFR is close to 30 , will repeat as OP  No nephrotoxic meds noted  Patient is mild hypervolemic  --> Strict DARLEEN Daily weights and renal panel daily  -->  scheduled for OP visits on 10/14/2024 clinic with me , will finalize before discharge  Needs to work with PT to clear disposition again     Hyponatremia improved  Chronic runs between 129-135  Hypochloremia and elevated bicarb  Likely related to diuresis  --> Diuretics held     Hypomagnesemia   Replace per protocols     History of hypertension  Prior to admission on metoprolol 50mg losartan 50 mg twice a day  Has multiple antihypertensive intolerance and allergies  Blood pressure borderline controlled with elevated blood pressures and CHF exacerbation with history of hypertension heart disease  Hold off diuresis  --> Losartan increased back to 50mg x2  --> Counseled on low-sodium diet, less than 2 g/day  --> will keep on bumex 0.25mg x every other day      Hypervolemia   Improved significantly with diuresis  No indication for scheduled diuretics at this time  No indication for IV fluids/albumin volume expansion unless hypotensive  --> will plans for PO diuresis as needed     Hb stable ~12     Chest pressure  Improved with better blood pressure  Likely secondary to hypertensive urgency  Improved     Dyspnea and SOB  Improved with diuresisD-Dimer elevated  VQ scan wo evidence of PE     History of GERD with reflux  Chronically on famotidine     History of severe atherosclerosis  Severe intracranial atherosclerosis on Plavix/ ASA  Evidence of atherosclerosis on imaging  Likely has acquired bilateral small renal cystic disease secondary to renal artery disease  --> intolerance to statins      Hepatic cyst around 4 cm  May need to consider evaluation with her prior GI team  Consult to follow as outpatient     Anxiety on clonazepam  Thank you for the consultation we will follow  Shorty Bautista MD  [facility]  [phone]      Subjective  Feels more tired , did not sleep well with UO   Not upto moving, feels too tired to do any PT  Discussed BP and diuresis plans  Likely good wt closer to  150lb    Oxygenation better today bur feels very puffy    Objective    Vital signs in last 24 hours  Temp:  [97.6  F (36.4  C)-97.9  F (36.6  C)] 97.8  F (36.6  C)  Pulse:  [72-81] 72  Resp:  [18-20] 18  BP: (124-161)/(58-76) 161/69  SpO2:  [92 %-97 %] 94 %  Weight:   [unfilled]    Intake/Output last 3 shifts  I/O last 3 completed shifts:  In: 540 [P.O.:540]  Out: 925 [Urine:925]  Intake/Output this shift:  No intake/output data recorded.    Physical Exam  Alert/, awake, NAD  CV: RRR without murmur or rub  Lung: clear and equal; no extra sounds  Ab: soft and NT; not distended; normal bs  Ext: no edema and well perfused  Skin; no rash    Pertinent Labs   Lab Results   Component Value Date    WBC 5.5 08/25/2024    HGB 12.7 08/25/2024    HCT 40.5 08/25/2024    MCV 91 08/25/2024     08/25/2024     Lab Results   Component Value Date    BUN 39.1 (H) 08/30/2024     08/30/2024    CO2 26 08/30/2024       Lab Results   Component Value Date    ALBUMIN 4.3 03/04/2024     Lab Results   Component Value Date    PHOS 3.4 03/04/2024     I reviewed all lab results  Shorty Bautista MD

## 2024-08-30 NOTE — PLAN OF CARE
Problem: Adult Inpatient Plan of Care  Goal: Plan of Care Review  Description: The Plan of Care Review/Shift note should be completed every shift.  The Outcome Evaluation is a brief statement about your assessment that the patient is improving, declining, or no change.  This information will be displayed automatically on your shift  note.  Outcome: Progressing     Problem: Heart Failure  Goal: Optimal Coping  Outcome: Progressing  Intervention: Support Psychosocial Response  Recent Flowsheet Documentation  Taken 8/30/2024 0003 by Tenzin Mclean RN  Supportive Measures:   active listening utilized   decision-making supported  Taken 8/29/2024 2021 by Tenzin Mclean RN  Supportive Measures:   active listening utilized   decision-making supported     Problem: Chest Pain  Goal: Resolution of Chest Pain Symptoms  Outcome: Progressing  Goal Outcome Evaluation: Pt is alert and oriented  x 4. RA, pain on her knees, Pain is being managed with tylenol and ice packs. Pt denies chest pain nor any discomfort. Pt continues to be on 2000 ml of fluid restriction. Goals met except having very limited tolerance to walking. SR on tele, RA sating 90-96%. Plan is for pt to continue home exercises on discharge.

## 2024-08-30 NOTE — PROGRESS NOTES
CLINICAL NUTRITION SERVICES    Reviewed nutrition risk factors due to LOS. Pt is tolerating diet, eating well per nursing documentation, and per pt although she says the food doesn't have much taste but, it is good. Pt given a copy of sodium content of hospital menu items to help her order; and a copy of handout Low sodium Nutrition Therapy. RD will follow peripherally at this time, unless consulted.

## 2024-08-30 NOTE — PROGRESS NOTES
Heart Failure Care Map  GOALS TO BE MET BEFORE DISCHARGE:    1. Decrease congestion and/or edema with diuretic therapy to achieve near optimal volume status.     Dyspnea improved: No, further care required to meet this goal. Please explain Pt continues to be SOB with activity.   Edema improved: No, further care required to meet this goal. Please explain Pt continues to have bilateral lower extremity edema.        Last 24 hour I/O:   Intake/Output Summary (Last 24 hours) at 8/30/2024 1617  Last data filed at 8/30/2024 1300  Gross per 24 hour   Intake 780 ml   Output 925 ml   Net -145 ml           Net I/O and Weights since admission:   07/31 2300 - 08/30 2259  In: 3201.9 [P.O.:3160; I.V.:41.9]  Out: 8700 [Urine:8700]  Net: -5498.1     Vitals:    08/25/24 0905 08/26/24 0532 08/27/24 0333 08/28/24 0318   Weight: 73 kg (161 lb) 70.5 kg (155 lb 6.8 oz) 69.8 kg (153 lb 14.1 oz) 70.7 kg (155 lb 13.8 oz)    08/29/24 0604 08/30/24 0408   Weight: 68.5 kg (151 lb 1.6 oz) 69.3 kg (152 lb 12.8 oz)       2.  O2 sats > 90% on room air, or at prior home O2 therapy level.      Able to wean O2 this shift to keep sats above 90%?: Yes   Does patient use Home O2? No          Current oxygenation status:   SpO2: 96 %     O2 Device: None (Room air),      3.  Tolerates ambulation and mobility near baseline.     Ambulation: Yes, satisfactory for discharge.   Times patient ambulated with staff this shift: 2    Please review the Heart Failure Care Map for additional HF goal outcomes.    Sil Orourke RN  8/30/2024

## 2024-08-31 LAB
ANION GAP SERPL CALCULATED.3IONS-SCNC: 12 MMOL/L (ref 7–15)
BUN SERPL-MCNC: 39.2 MG/DL (ref 8–23)
CALCIUM SERPL-MCNC: 9.4 MG/DL (ref 8.8–10.4)
CHLORIDE SERPL-SCNC: 97 MMOL/L (ref 98–107)
CREAT SERPL-MCNC: 1.38 MG/DL (ref 0.51–0.95)
EGFRCR SERPLBLD CKD-EPI 2021: 37 ML/MIN/1.73M2
GLUCOSE SERPL-MCNC: 94 MG/DL (ref 70–99)
HCO3 SERPL-SCNC: 27 MMOL/L (ref 22–29)
PLATELET # BLD AUTO: 258 10E3/UL (ref 150–450)
POTASSIUM SERPL-SCNC: 4.3 MMOL/L (ref 3.4–5.3)
SODIUM SERPL-SCNC: 136 MMOL/L (ref 135–145)

## 2024-08-31 PROCEDURE — 250N000011 HC RX IP 250 OP 636: Performed by: INTERNAL MEDICINE

## 2024-08-31 PROCEDURE — 99232 SBSQ HOSP IP/OBS MODERATE 35: CPT | Performed by: STUDENT IN AN ORGANIZED HEALTH CARE EDUCATION/TRAINING PROGRAM

## 2024-08-31 PROCEDURE — 80048 BASIC METABOLIC PNL TOTAL CA: CPT | Performed by: STUDENT IN AN ORGANIZED HEALTH CARE EDUCATION/TRAINING PROGRAM

## 2024-08-31 PROCEDURE — 85049 AUTOMATED PLATELET COUNT: CPT | Performed by: STUDENT IN AN ORGANIZED HEALTH CARE EDUCATION/TRAINING PROGRAM

## 2024-08-31 PROCEDURE — 250N000013 HC RX MED GY IP 250 OP 250 PS 637: Performed by: INTERNAL MEDICINE

## 2024-08-31 PROCEDURE — 99232 SBSQ HOSP IP/OBS MODERATE 35: CPT | Performed by: INTERNAL MEDICINE

## 2024-08-31 PROCEDURE — 120N000004 HC R&B MS OVERFLOW

## 2024-08-31 PROCEDURE — 36415 COLL VENOUS BLD VENIPUNCTURE: CPT | Performed by: STUDENT IN AN ORGANIZED HEALTH CARE EDUCATION/TRAINING PROGRAM

## 2024-08-31 PROCEDURE — 250N000013 HC RX MED GY IP 250 OP 250 PS 637: Performed by: STUDENT IN AN ORGANIZED HEALTH CARE EDUCATION/TRAINING PROGRAM

## 2024-08-31 RX ADMIN — CLOPIDOGREL BISULFATE 75 MG: 75 TABLET ORAL at 08:13

## 2024-08-31 RX ADMIN — CLONAZEPAM 0.5 MG: 0.5 TABLET ORAL at 01:38

## 2024-08-31 RX ADMIN — ACETAMINOPHEN 1000 MG: 500 TABLET ORAL at 08:13

## 2024-08-31 RX ADMIN — FAMOTIDINE 10 MG: 10 TABLET ORAL at 08:13

## 2024-08-31 RX ADMIN — CALCIUM CARBONATE (ANTACID) CHEW TAB 500 MG 1000 MG: 500 CHEW TAB at 19:40

## 2024-08-31 RX ADMIN — LOSARTAN POTASSIUM 50 MG: 50 TABLET, FILM COATED ORAL at 08:13

## 2024-08-31 RX ADMIN — CLONAZEPAM 0.5 MG: 0.5 TABLET ORAL at 10:55

## 2024-08-31 RX ADMIN — ACETAMINOPHEN 1000 MG: 500 TABLET ORAL at 20:30

## 2024-08-31 RX ADMIN — ASPIRIN 81 MG: 81 TABLET, COATED ORAL at 08:13

## 2024-08-31 RX ADMIN — ROSUVASTATIN 10 MG: 10 TABLET, FILM COATED ORAL at 08:13

## 2024-08-31 RX ADMIN — METOPROLOL SUCCINATE 50 MG: 50 TABLET, EXTENDED RELEASE ORAL at 20:30

## 2024-08-31 RX ADMIN — ACETAMINOPHEN 1000 MG: 500 TABLET ORAL at 13:06

## 2024-08-31 RX ADMIN — ENOXAPARIN SODIUM 30 MG: 30 INJECTION SUBCUTANEOUS at 08:14

## 2024-08-31 RX ADMIN — LOSARTAN POTASSIUM 50 MG: 50 TABLET, FILM COATED ORAL at 20:30

## 2024-08-31 RX ADMIN — SENNOSIDES AND DOCUSATE SODIUM 2 TABLET: 8.6; 5 TABLET ORAL at 08:13

## 2024-08-31 RX ADMIN — CLONAZEPAM 0.5 MG: 0.5 TABLET ORAL at 19:03

## 2024-08-31 ASSESSMENT — ACTIVITIES OF DAILY LIVING (ADL)
ADLS_ACUITY_SCORE: 27

## 2024-08-31 NOTE — PROGRESS NOTES
[facility]    Medicine Progress Note - Hospitalist Service    Date of Admission:  8/25/2024    Assessment & Plan   Kerrykelsie Hoffmann is a 89 year old female admitted on 8/25/2024. She has PMHx of A-fib, HTN, heart disease, CKD 3, GERD, and HLD, presenting with sob, chest pressure, legs edema. C/w CHF. D-dimer elevated.    Acute pulmonary edema  HFmrEF, acute on chronic  Acute hypoxic respiratory failure-resolved  -- ProBNP 7336, trop 21  -- CXR: Cardiomegaly with new pulmonary vascular congestion and increased interstitial markings suggesting pulmonary edema   -- Chest CT not ordered due to h/o allergy  -- Echo: EF 45%  -- Continue PTA Metoprolol.   -- Cardiology s/o  -- Volume management per Nephrology: Bumex restarted at 0.25mg every 48 hrs  -- 08/31: Pt still c/o shortness of breath. UOP slightly higher than on 08/30. Consider daily Bumex 0.25 mg? Deferred to Nephrology  -- PT/OT: Initially recommended TCU. Now recommending to continue home exercise program.    Elevated troponin  -- Troponins flat. Chest pressure resolved (likely 2/2 acute pulmonary edema)  -- Likely 2/2 demand ischemia    Elevation of d-dimer  -- Moderate risk for PE  -- Cannot do ct due to allergy  -- V/Q no e/o PE    HTN urgency  -- Hydralazine prn  -- C/w PTA Metoprolol. Losartan increased back to her PTA dose of 50 mg po bid.  -- Nephrology following    TIMOTHY on CKD IIIb  -- Baseline Cr 1.1-1.4. Cr at baseline  -- Closely monitoring on diuretics  -- Nephrology consult appreciated    Hypokalemia  -- Replace as protocol    Nonobstructive CAD  Hyperlipidemia  -- C/w PTA Crestor, Plavix, ASA    Valvular heart disease  -- Progressive MR and AI- Cardiology to refer to valve clinic              Diet: Combination Diet 2 gm NA Diet; No Caffeine Diet (and additional linked orders)  Fluid restriction 2000 ML FLUID (and additional linked orders)    DVT Prophylaxis: Enoxaparin (Lovenox) SQ  Wiley Catheter: Not present  Lines: None    "  Cardiac Monitoring: ACTIVE order. Indication: Acute decompensated heart failure (48 hours)  Code Status: No CPR- Do NOT Intubate      Clinically Significant Risk Factors                  # Hypertension: Noted on problem list  # Heart failure, NOS: heart failure noted on the problem list and last echo with EF 40-50%          # Overweight: Estimated body mass index is 28.78 kg/m  as calculated from the following:    Height as of this encounter: 1.549 m (5' 1\").    Weight as of this encounter: 69.1 kg (152 lb 4.8 oz).        # Financial/Environmental Concerns: none  # Asthma: noted on problem list              Disposition Plan     Medically Ready for Discharge: Anticipated 1-2 days.             Latoya Leslie MD  Hospitalist Service  [facility]  Securely message with [employer] (more info)  Text page via [employer] Paging/Directory   ______________________________________________________________________    Interval History   Patient is seen and examined at bedside.   Weaned off of oxygen. Feels shortness of breath when she speaks.  Plan of care discussed with patient. All questions answered.   Pt verbalized understanding.   Discussed with Dr. Bautista    Physical Exam   Vital Signs: Temp: 97.8  F (36.6  C) Temp src: Oral BP: (!) 152/88 Pulse: 73   Resp: 18 SpO2: 97 % O2 Device: None (Room air)    Weight: 152 lbs 4.8 oz    GEN: Alert and oriented. Not in acute distress.  HEENT: Atraumatic, mucous membrane- moist and pink.  Chest: Bilateral air entry. Mild right lower post chest creptitations.  CVS: S1S2 regular.   Abdomen: Soft. Non-tender, non-distended. No organomegaly. No guarding or rigidity. Bowel sounds active.   Extremities: No pedal edema.  CNS: No involuntary movements.  Skin: no cyanosis or clubbing.     Medical Decision Making       46 MINUTES SPENT BY ME on the date of service doing chart review, history, exam, documentation & further activities per the note.      Data     "

## 2024-08-31 NOTE — PLAN OF CARE
"  Problem: Adult Inpatient Plan of Care  Goal: Plan of Care Review  Description: The Plan of Care Review/Shift note should be completed every shift.  The Outcome Evaluation is a brief statement about your assessment that the patient is improving, declining, or no change.  This information will be displayed automatically on your shift  note.  Outcome: Progressing  Flowsheets (Taken 8/31/2024 1242)  Plan of Care Reviewed With: patient  Goal: Patient-Specific Goal (Individualized)  Description: You can add care plan individualizations to a care plan. Examples of Individualization might be:  \"Parent requests to be called daily at 9am for status\", \"I have a hard time hearing out of my right ear\", or \"Do not touch me to wake me up as it startles  me\".  Outcome: Progressing  Goal: Absence of Hospital-Acquired Illness or Injury  Outcome: Progressing  Intervention: Identify and Manage Fall Risk  Recent Flowsheet Documentation  Taken 8/31/2024 1121 by Iva Khan RN  Safety Promotion/Fall Prevention:   clutter free environment maintained   increased rounding and observation   increase visualization of patient   mobility aid in reach   room door open   supervised activity  Taken 8/31/2024 0800 by Iva Khan RN  Safety Promotion/Fall Prevention:   clutter free environment maintained   increased rounding and observation   increase visualization of patient   mobility aid in reach   room door open   supervised activity  Intervention: Prevent Skin Injury  Recent Flowsheet Documentation  Taken 8/31/2024 1121 by Iva Khan RN  Body Position:   position changed independently   turned   left  Taken 8/31/2024 0800 by Iva Khan RN  Body Position:   position changed independently   turned   left  Intervention: Prevent Infection  Recent Flowsheet Documentation  Taken 8/31/2024 1121 by Iva Khan RN  Infection Prevention:   hand hygiene promoted   rest/sleep promoted  Taken 8/31/2024 0800 by Iva Khan, " RN  Infection Prevention:   hand hygiene promoted   rest/sleep promoted  Goal: Optimal Comfort and Wellbeing  Outcome: Progressing  Goal: Readiness for Transition of Care  Outcome: Progressing     Problem: Risk for Delirium  Goal: Optimal Coping  Outcome: Progressing  Goal: Improved Behavioral Control  Outcome: Progressing  Intervention: Minimize Safety Risk  Recent Flowsheet Documentation  Taken 8/31/2024 1121 by Iva Khan RN  Enhanced Safety Measures:   pain management   review medications for side effects with activity  Taken 8/31/2024 0800 by Iva Khan RN  Enhanced Safety Measures:   pain management   review medications for side effects with activity  Goal: Improved Attention and Thought Clarity  Outcome: Progressing  Goal: Improved Sleep  Outcome: Progressing     Problem: Skin Injury Risk Increased  Goal: Skin Health and Integrity  Outcome: Progressing  Intervention: Optimize Skin Protection  Recent Flowsheet Documentation  Taken 8/31/2024 1121 by Iva Khan RN  Activity Management: activity adjusted per tolerance  Head of Bed (HOB) Positioning: HOB at 20 degrees  Taken 8/31/2024 0800 by Iva Khan RN  Activity Management: activity adjusted per tolerance  Head of Bed (HOB) Positioning: HOB at 20 degrees     Problem: Heart Failure  Goal: Optimal Coping  Outcome: Progressing  Goal: Optimal Cardiac Output  Outcome: Progressing  Goal: Stable Heart Rate and Rhythm  Outcome: Progressing  Goal: Optimal Functional Ability  Outcome: Progressing  Intervention: Optimize Functional Ability  Recent Flowsheet Documentation  Taken 8/31/2024 1121 by Iva Khan RN  Activity Management: activity adjusted per tolerance  Taken 8/31/2024 0800 by Iva Khan RN  Activity Management: activity adjusted per tolerance  Goal: Fluid and Electrolyte Balance  Outcome: Progressing  Goal: Improved Oral Intake  Outcome: Progressing  Goal: Effective Oxygenation and Ventilation  Outcome:  Progressing  Intervention: Promote Airway Secretion Clearance  Recent Flowsheet Documentation  Taken 8/31/2024 1121 by Iva Khan RN  Activity Management: activity adjusted per tolerance  Taken 8/31/2024 0800 by Iva Khan RN  Activity Management: activity adjusted per tolerance  Intervention: Optimize Oxygenation and Ventilation  Recent Flowsheet Documentation  Taken 8/31/2024 1121 by Iva Khan RN  Head of Bed ([facility]) Positioning: HOB at 20 degrees  Taken 8/31/2024 0800 by Iva Khan RN  Head of Bed ([facility]) Positioning: HOB at 20 degrees  Goal: Effective Breathing Pattern During Sleep  Outcome: Progressing  Intervention: Monitor and Manage Obstructive Sleep Apnea  Recent Flowsheet Documentation  Taken 8/31/2024 1121 by Iva Khan RN  Medication Review/Management: medications reviewed  Taken 8/31/2024 0800 by Iva Khan RN  Medication Review/Management: medications reviewed     Problem: Gas Exchange Impaired  Goal: Optimal Gas Exchange  Outcome: Progressing  Intervention: Optimize Oxygenation and Ventilation  Recent Flowsheet Documentation  Taken 8/31/2024 1121 by Iva Khan RN  Head of Bed ([facility]) Positioning: HOB at 20 degrees  Taken 8/31/2024 0800 by Iva Khan RN  Head of Bed ([facility]) Positioning: HOB at 20 degrees     Problem: Chest Pain  Goal: Resolution of Chest Pain Symptoms  Outcome: Progressing     Problem: Urinary Retention  Goal: Effective Urinary Elimination  Outcome: Progressing     Problem: Comorbidity Management  Goal: Blood Pressure in Desired Range  Outcome: Progressing  Intervention: Maintain Blood Pressure Management  Recent Flowsheet Documentation  Taken 8/31/2024 1121 by Iva Khan RN  Medication Review/Management: medications reviewed  Taken 8/31/2024 0800 by Iva Khan RN  Medication Review/Management: medications reviewed   Goal Outcome Evaluation:      Plan of Care Reviewed With: patient           Pt is alert and oriented x4. Pt is med  complaint. Pt denies pain. Pt's v/s is stable. Pt is up with assist of 1. No complain. Continue to monitor pt.

## 2024-08-31 NOTE — PLAN OF CARE
Goal Outcome Evaluation:      Plan of Care Reviewed With: patient    Overall Patient Progress: improvingOverall Patient Progress: improving     Heart Failure Care Map  GOALS TO BE MET BEFORE DISCHARGE:    1. Decrease congestion and/or edema with diuretic therapy to achieve near optimal volume status.     Dyspnea improved: Yes, satisfactory for discharge.   Edema improved: Yes, satisfactory for discharge.        Last 24 hour I/O:   Intake/Output Summary (Last 24 hours) at 8/31/2024 0750  Last data filed at 8/31/2024 0545  Gross per 24 hour   Intake 600 ml   Output 1125 ml   Net -525 ml           Net I/O and Weights since admission:   08/01 1500 - 08/31 1459  In: 3321.9 [P.O.:3280; I.V.:41.9]  Out: 9625 [Urine:9625]  Net: -6303.1     Vitals:    08/25/24 0905 08/26/24 0532 08/27/24 0333 08/28/24 0318   Weight: 73 kg (161 lb) 70.5 kg (155 lb 6.8 oz) 69.8 kg (153 lb 14.1 oz) 70.7 kg (155 lb 13.8 oz)    08/29/24 0604 08/30/24 0408 08/31/24 0139   Weight: 68.5 kg (151 lb 1.6 oz) 69.3 kg (152 lb 12.8 oz) 69.1 kg (152 lb 4.8 oz)       2.  O2 sats > 90% on room air, or at prior home O2 therapy level.      Able to wean O2 this shift to keep sats above 90%?: Yes, satisfactory for discharge.   Does patient use Home O2? No          Current oxygenation status:   SpO2: 96 %     O2 Device: None (Room air),      3.  Tolerates ambulation and mobility near baseline.     Ambulation: Yes, satisfactory for discharge.   Times patient ambulated with staff this shift: Ambulated in room to BR and back a few times overnight    Please review the Heart Failure Care Map for additional HF goal outcomes.    Ana Nunez RN  8/31/2024

## 2024-08-31 NOTE — PLAN OF CARE
Problem: Adult Inpatient Plan of Care  Goal: Absence of Hospital-Acquired Illness or Injury  Intervention: Identify and Manage Fall Risk  Recent Flowsheet Documentation  Taken 8/30/2024 1951 by Yaz Ramirez RN  Safety Promotion/Fall Prevention:   clutter free environment maintained   increased rounding and observation   increase visualization of patient   mobility aid in reach   room door open   supervised activity     Problem: Comorbidity Management  Goal: Blood Pressure in Desired Range  Outcome: Progressing  Intervention: Maintain Blood Pressure Management  Recent Flowsheet Documentation  Taken 8/30/2024 1951 by Yaz Ramirez RN  Medication Review/Management: medications reviewed     Goal Outcome Evaluation:  A/Ox4. 's. Denied pain. NSR. Ambulating to the bathroom to urinate. Reported Nausea, no vomiting, declined meds. BM today. Refused BM meds.

## 2024-08-31 NOTE — PLAN OF CARE
"Goal Outcome Evaluation:      Plan of Care Reviewed With: patient    Overall Patient Progress: improvingOverall Patient Progress: improving               C/o 5/10 chronic lower back pain. Declines any pain interventions, scheduled tylenol given.  Reports some SOB and MADISON, O2 sats in high 90s, IS use encouraged.  Loose stools this evening, stool softeners held.   Pt c/o of intermittent nausea, declines antiemetics; encouraging hydration and rest.   PRN TUMs requested and given, reports relief of bloating and \"stomach discomfort\".     AOx4; VSS on RA; NSR on tele; denies pain, CP, dizziness.  Refuses non-skid socks or shoes, assist x1 w/ walker and gait belt. Able to make needs known, call light in reach.    Carmela Self RN    "

## 2024-08-31 NOTE — PROGRESS NOTES
Renal progress note  CC:TIMOTHY, hypervolemia  Assessment and Plan:  89 year old female with past medical history of hypertension severe intracranial atherosclerotic disease has been on Plavix history of stage III CKD PAF anxiety and multiple drug allergies establish cares with us after initial presentation in July 2023 for CKD 3 with noted left renal atrophy and multiple cortical cysts there have been seen in past imaging initially had a robust response to Lasix and developed an TIMOTHY with improvement after some IV fluids.  Now readmitted on 8/25/2020 for with likely CHF exacerbation with chest pressure shortness of breath and leg edema  Initially started on IV Lasix and with mild elevation in creatinine Lasix stopped and received albumin; losartan held  Nephrology consulted for TIMOTHY/CKD 3     TIMOTHY/creatinine elevation nonoliguric  CKD 3 baseline creatinine 1.1-1.4 with GFR around ~35-40,   UA bland  Urine protein negative quantification 0.4g/g  Renal imaging significant for left renal atrophy with about 7.8 cm left kidney and about 9.9 cm right kidney with multiple cortical cysts that have been seen in the past imaging as well, (CT in 2022 in 2021)  Revealing mild CKD likely secondary to renal atrophy as well has some acquired renal cysts in the light of renovascular disease since he does have evidence of significant atherosclerotic disease elsewhere  Peak creat 1.83 8/27/24  Creatinine stabilizing; baseline now  Wt an UO good response to PO bumex  continue on PO Bumex 0.25mg, every other day; she feels she urinated a lot even with 0.5mg   Increased Losartan back to PTA dose 50mg bid with good results  Remove purwick , encourage mobility  With lower nephron mass likely will trend up some before any improvement  Cystatin C 2.3 with creat 1.4 , GFR 22 based on Csyt, likely true GFR is close to 30 , will repeat as OP  No nephrotoxic meds noted  Patient is mild hypervolemic  --> Strict DARLEEN Daily weights   -->  scheduled for OP visits on 10/14/2024 clinic with me , will finalize before discharge  The patient is medically okay to discharge at this point does report that she feels her energy levels are still low in order to maintain site and her prior level of living but she is not interested in TCU will defer discharge plans to primary team at this time     Hyponatremia improved  Chronic runs between 129-135  Hypochloremia and elevated bicarb  Likely related to diuresis  --> Diuretics as above     Hypomagnesemia   Replace per protocols     History of hypertension  Prior to admission on metoprolol 50mg losartan 50 mg twice a day  Has multiple antihypertensive intolerance and allergies  Blood pressure borderline controlled with elevated blood pressures and CHF exacerbation with history of hypertension heart disease  Hold off diuresis  --> Losartan increased back to 50mg x2 with good results  --> Counseled on low-sodium diet, less than 2 g/day  --> will keep on bumex 0.25mg x every other day      Hypervolemia   Improved significantly with diuresis  No indication for IV fluids/albumin volume expansion unless hypotensive  Diuresis as above     Hb stable ~12     Chest pressure  Improved with better blood pressure  Likely secondary to hypertensive urgency  Improved     Dyspnea and SOB  Improved with diuresisD-Dimer elevated  VQ scan wo evidence of PE     History of GERD with reflux  Chronically on famotidine     History of severe atherosclerosis  Severe intracranial atherosclerosis on Plavix/ ASA  Evidence of atherosclerosis on imaging  Likely has acquired bilateral small renal cystic disease secondary to renal artery disease  --> intolerance to statins      Hepatic cyst around 4 cm  May need to consider evaluation with her prior GI team  Consult to follow as outpatient     Anxiety on clonazepam  Thank you for the consultation we will follow  Shorty Bautista MD  [facility]  [phone]      Subjective  Feels  tired   Breathing is a lot better today   oxygenation better today   Still feels puffy though reports after BM no abdominal distention feels a lot better as well    She still feels she is not at her baseline although still not interested in going to a TCU would like to see another 24 hours with better blood pressures and good response to Bumex    Objective    Vital signs in last 24 hours  Temp:  [97.8  F (36.6  C)-98.2  F (36.8  C)] 97.8  F (36.6  C)  Pulse:  [66-77] 73  Resp:  [16-18] 18  BP: (111-152)/(55-88) 152/88  SpO2:  [95 %-97 %] 97 %  Weight:   [unfilled]    Intake/Output last 3 shifts  I/O last 3 completed shifts:  In: 600 [P.O.:600]  Out: 1125 [Urine:1125]  Intake/Output this shift:  No intake/output data recorded.    Physical Exam  Alert/, awake, NAD  CV: RRR without murmur or rub  Lung: clear and equal; no extra sounds  Ab: soft and NT; not distended; normal bs  Ext: no edema and well perfused  Skin; no rash    Pertinent Labs   Lab Results   Component Value Date    WBC 5.5 08/25/2024    HGB 12.7 08/25/2024    HCT 40.5 08/25/2024    MCV 91 08/25/2024     08/31/2024     Lab Results   Component Value Date    BUN 39.2 (H) 08/31/2024     08/31/2024    CO2 27 08/31/2024       Lab Results   Component Value Date    ALBUMIN 4.3 03/04/2024     Lab Results   Component Value Date    PHOS 3.4 03/04/2024     I reviewed all lab results  Shorty Bautista MD

## 2024-09-01 LAB
ALBUMIN UR-MCNC: 70 MG/DL
ANION GAP SERPL CALCULATED.3IONS-SCNC: 12 MMOL/L (ref 7–15)
APPEARANCE UR: ABNORMAL
ATRIAL RATE - MUSE: 71 BPM
ATRIAL RATE - MUSE: 89 BPM
BACTERIA #/AREA URNS HPF: ABNORMAL /HPF
BILIRUB UR QL STRIP: NEGATIVE
BUN SERPL-MCNC: 37.4 MG/DL (ref 8–23)
CALCIUM SERPL-MCNC: 9.7 MG/DL (ref 8.8–10.4)
CHLORIDE SERPL-SCNC: 98 MMOL/L (ref 98–107)
COLOR UR AUTO: YELLOW
CREAT SERPL-MCNC: 1.4 MG/DL (ref 0.51–0.95)
DIASTOLIC BLOOD PRESSURE - MUSE: 90 MMHG
DIASTOLIC BLOOD PRESSURE - MUSE: NORMAL MMHG
EGFRCR SERPLBLD CKD-EPI 2021: 36 ML/MIN/1.73M2
GLUCOSE SERPL-MCNC: 89 MG/DL (ref 70–99)
GLUCOSE UR STRIP-MCNC: NEGATIVE MG/DL
HCO3 SERPL-SCNC: 25 MMOL/L (ref 22–29)
HGB UR QL STRIP: ABNORMAL
HOLD SPECIMEN: NORMAL
INTERPRETATION ECG - MUSE: NORMAL
INTERPRETATION ECG - MUSE: NORMAL
KETONES UR STRIP-MCNC: NEGATIVE MG/DL
LEUKOCYTE ESTERASE UR QL STRIP: ABNORMAL
NITRATE UR QL: NEGATIVE
P AXIS - MUSE: 46 DEGREES
P AXIS - MUSE: 81 DEGREES
PH UR STRIP: 6 [PH] (ref 5–7)
POTASSIUM SERPL-SCNC: 4.1 MMOL/L (ref 3.4–5.3)
PR INTERVAL - MUSE: 180 MS
PR INTERVAL - MUSE: 196 MS
QRS DURATION - MUSE: 78 MS
QRS DURATION - MUSE: 82 MS
QT - MUSE: 386 MS
QT - MUSE: 400 MS
QTC - MUSE: 434 MS
QTC - MUSE: 469 MS
R AXIS - MUSE: 37 DEGREES
R AXIS - MUSE: 77 DEGREES
RBC URINE: 7 /HPF
SODIUM SERPL-SCNC: 135 MMOL/L (ref 135–145)
SP GR UR STRIP: 1.01 (ref 1–1.03)
SQUAMOUS EPITHELIAL: 3 /HPF
SYSTOLIC BLOOD PRESSURE - MUSE: 206 MMHG
SYSTOLIC BLOOD PRESSURE - MUSE: NORMAL MMHG
T AXIS - MUSE: 55 DEGREES
T AXIS - MUSE: 65 DEGREES
TROPONIN T SERPL HS-MCNC: 19 NG/L
UROBILINOGEN UR STRIP-MCNC: <2 MG/DL
VENTRICULAR RATE- MUSE: 71 BPM
VENTRICULAR RATE- MUSE: 89 BPM
WBC CLUMPS #/AREA URNS HPF: PRESENT /HPF
WBC URINE: >182 /HPF

## 2024-09-01 PROCEDURE — 81001 URINALYSIS AUTO W/SCOPE: CPT | Performed by: STUDENT IN AN ORGANIZED HEALTH CARE EDUCATION/TRAINING PROGRAM

## 2024-09-01 PROCEDURE — 99232 SBSQ HOSP IP/OBS MODERATE 35: CPT | Performed by: INTERNAL MEDICINE

## 2024-09-01 PROCEDURE — 93010 ELECTROCARDIOGRAM REPORT: CPT | Mod: HIP | Performed by: STUDENT IN AN ORGANIZED HEALTH CARE EDUCATION/TRAINING PROGRAM

## 2024-09-01 PROCEDURE — 250N000013 HC RX MED GY IP 250 OP 250 PS 637: Performed by: INTERNAL MEDICINE

## 2024-09-01 PROCEDURE — 250N000011 HC RX IP 250 OP 636: Performed by: INTERNAL MEDICINE

## 2024-09-01 PROCEDURE — 99232 SBSQ HOSP IP/OBS MODERATE 35: CPT | Performed by: STUDENT IN AN ORGANIZED HEALTH CARE EDUCATION/TRAINING PROGRAM

## 2024-09-01 PROCEDURE — 87186 SC STD MICRODIL/AGAR DIL: CPT | Performed by: STUDENT IN AN ORGANIZED HEALTH CARE EDUCATION/TRAINING PROGRAM

## 2024-09-01 PROCEDURE — 80048 BASIC METABOLIC PNL TOTAL CA: CPT | Performed by: STUDENT IN AN ORGANIZED HEALTH CARE EDUCATION/TRAINING PROGRAM

## 2024-09-01 PROCEDURE — 36415 COLL VENOUS BLD VENIPUNCTURE: CPT | Performed by: STUDENT IN AN ORGANIZED HEALTH CARE EDUCATION/TRAINING PROGRAM

## 2024-09-01 PROCEDURE — 999N000248 HC STATISTIC IV INSERT WITH US BY RN

## 2024-09-01 PROCEDURE — 87086 URINE CULTURE/COLONY COUNT: CPT | Performed by: STUDENT IN AN ORGANIZED HEALTH CARE EDUCATION/TRAINING PROGRAM

## 2024-09-01 PROCEDURE — 250N000011 HC RX IP 250 OP 636: Performed by: STUDENT IN AN ORGANIZED HEALTH CARE EDUCATION/TRAINING PROGRAM

## 2024-09-01 PROCEDURE — 93005 ELECTROCARDIOGRAM TRACING: CPT

## 2024-09-01 PROCEDURE — 84484 ASSAY OF TROPONIN QUANT: CPT

## 2024-09-01 PROCEDURE — 120N000004 HC R&B MS OVERFLOW

## 2024-09-01 RX ORDER — CEFTRIAXONE 1 G/1
1 INJECTION, POWDER, FOR SOLUTION INTRAMUSCULAR; INTRAVENOUS EVERY 24 HOURS
Status: DISCONTINUED | OUTPATIENT
Start: 2024-09-01 | End: 2024-09-04

## 2024-09-01 RX ORDER — LANOLIN ALCOHOL/MO/W.PET/CERES
3 CREAM (GRAM) TOPICAL
Status: DISCONTINUED | OUTPATIENT
Start: 2024-09-01 | End: 2024-09-04 | Stop reason: HOSPADM

## 2024-09-01 RX ADMIN — ROSUVASTATIN 10 MG: 10 TABLET, FILM COATED ORAL at 08:36

## 2024-09-01 RX ADMIN — FAMOTIDINE 10 MG: 10 TABLET ORAL at 08:36

## 2024-09-01 RX ADMIN — ACETAMINOPHEN 1000 MG: 500 TABLET ORAL at 13:04

## 2024-09-01 RX ADMIN — LOSARTAN POTASSIUM 50 MG: 50 TABLET, FILM COATED ORAL at 08:37

## 2024-09-01 RX ADMIN — ACETAMINOPHEN 1000 MG: 500 TABLET ORAL at 21:25

## 2024-09-01 RX ADMIN — LOSARTAN POTASSIUM 50 MG: 50 TABLET, FILM COATED ORAL at 21:25

## 2024-09-01 RX ADMIN — Medication 3 MG: at 21:25

## 2024-09-01 RX ADMIN — ACETAMINOPHEN 1000 MG: 500 TABLET ORAL at 08:37

## 2024-09-01 RX ADMIN — CALCIUM CARBONATE (ANTACID) CHEW TAB 500 MG 1000 MG: 500 CHEW TAB at 17:27

## 2024-09-01 RX ADMIN — CLONAZEPAM 0.5 MG: 0.5 TABLET ORAL at 13:04

## 2024-09-01 RX ADMIN — CLONAZEPAM 0.5 MG: 0.5 TABLET ORAL at 01:24

## 2024-09-01 RX ADMIN — BUMETANIDE 0.25 MG: 0.5 TABLET ORAL at 08:37

## 2024-09-01 RX ADMIN — CLONAZEPAM 0.5 MG: 0.5 TABLET ORAL at 21:25

## 2024-09-01 RX ADMIN — CLOPIDOGREL BISULFATE 75 MG: 75 TABLET ORAL at 08:36

## 2024-09-01 RX ADMIN — METOPROLOL SUCCINATE 50 MG: 50 TABLET, EXTENDED RELEASE ORAL at 21:25

## 2024-09-01 RX ADMIN — ENOXAPARIN SODIUM 30 MG: 30 INJECTION SUBCUTANEOUS at 08:37

## 2024-09-01 RX ADMIN — ASPIRIN 81 MG: 81 TABLET, COATED ORAL at 08:36

## 2024-09-01 RX ADMIN — CEFTRIAXONE SODIUM 1 G: 1 INJECTION, POWDER, FOR SOLUTION INTRAMUSCULAR; INTRAVENOUS at 13:10

## 2024-09-01 ASSESSMENT — ACTIVITIES OF DAILY LIVING (ADL)
ADLS_ACUITY_SCORE: 27
ADLS_ACUITY_SCORE: 31
ADLS_ACUITY_SCORE: 33
ADLS_ACUITY_SCORE: 31
ADLS_ACUITY_SCORE: 27
ADLS_ACUITY_SCORE: 31
ADLS_ACUITY_SCORE: 27
ADLS_ACUITY_SCORE: 31
ADLS_ACUITY_SCORE: 27
ADLS_ACUITY_SCORE: 33
ADLS_ACUITY_SCORE: 27

## 2024-09-01 NOTE — PROGRESS NOTES
Renal progress note  CC:TIMOTHY, hypervolemia  Assessment and Plan:  89 year old female with past medical history of hypertension severe intracranial atherosclerotic disease has been on Plavix history of stage III CKD PAF anxiety and multiple drug allergies establish cares with us after initial presentation in July 2023 for CKD 3 with noted left renal atrophy and multiple cortical cysts there have been seen in past imaging initially had a robust response to Lasix and developed an TIMOTHY with improvement after some IV fluids.  Now readmitted on 8/25/2020 for with likely CHF exacerbation with chest pressure shortness of breath and leg edema  Initially started on IV Lasix and with mild elevation in creatinine Lasix stopped and received albumin; losartan held; creatinine improved and now back on Losartan and bumex PO  Nephrology consulted for TIMOTHY/CKD 3     TIMOTHY/creatinine elevation nonoliguric  CKD 3 baseline creatinine 1.1-1.4 with GFR around ~35-40,   UA bland  Urine protein negative quantification 0.4g/g  Renal imaging significant for left renal atrophy with about 7.8 cm left kidney and about 9.9 cm right kidney with multiple cortical cysts that have been seen in the past imaging as well, (CT in 2022 in 2021)  Revealing mild CKD likely secondary to renal atrophy as well has some acquired renal cysts in the light of renovascular disease since he does have evidence of significant atherosclerotic disease elsewhere  Peak creat 1.83 8/27/24  Creatinine stabilizing; baseline now  Wt an UO good response to PO bumex  continue on PO Bumex 0.25mg, every other day; she feels she urinated a lot even with 0.5mg   Increased Losartan back to PTA dose 50mg bid with good results  No objection to discharge     With lower nephron mass creatinine tends to stall before improvement  Cystatin C 2.3 with creat 1.4 , GFR 22 based on Csyt, likely true GFR is close to 30 , will repeat as OP  No nephrotoxic meds noted  Patient is mild  hypervolemic  --> Strict DARLEEN Daily weights   --> scheduled for OP visits on 10/14/2024 clinic with me      Hyponatremia improved  Chronic runs between 129-135  Hypochloremia and elevated bicarb  Likely related to diuresis  --> Diuretics as above     Hypomagnesemia   Replace per protocols     History of hypertension  Prior to admission on metoprolol 50mg losartan 50 mg twice a day  Has multiple antihypertensive intolerance and allergies  Blood pressure borderline controlled with elevated blood pressures and CHF exacerbation with history of hypertension heart disease  Hold off diuresis  --> Losartan increased back to 50mg x2 with good results  --> Counseled on low-sodium diet, less than 2 g/day  --> will keep on bumex 0.25mg x every other day      Hypervolemia   Improved significantly with diuresis  No indication for IV fluids/albumin volume expansion unless hypotensive  Diuresis as above     Hb stable ~12     Chest pressure  Improved with better blood pressure  Likely secondary to hypertensive urgency  Improved     Dyspnea and SOB  Improved with diuresisD-Dimer elevated  VQ scan wo evidence of PE     History of GERD with reflux  Chronically on famotidine     History of severe atherosclerosis  Severe intracranial atherosclerosis on Plavix/ ASA  Evidence of atherosclerosis on imaging  Likely has acquired bilateral small renal cystic disease secondary to renal artery disease  --> intolerance to statins      Hepatic cyst around 4 cm  May need to consider evaluation with her prior GI team  Consult to follow as outpatient     Anxiety on clonazepam  Thank you for the consultation we will follow  Shorty Bautista MD  [facility]  [phone]      Subjective  Feels tired   Breathing is a lot better today   oxygenation better today   Still feels puffy though reports after BM no abdominal distention feels a lot better as well    She still feels she is not at her baseline although still not interested in going  to a TCU  Discussed that she will likely do better at home  She is very anxious about going home and letty to home  Will like to see a SW    Had Chest pain that improved wo intervention < EKG stable      Objective    Vital signs in last 24 hours  Temp:  [97.5  F (36.4  C)-98.3  F (36.8  C)] 97.6  F (36.4  C)  Pulse:  [65-81] 74  Resp:  [18] 18  BP: (131-148)/(58-67) 135/61  SpO2:  [92 %-98 %] 98 %  Weight:   [unfilled]    Intake/Output last 3 shifts  I/O last 3 completed shifts:  In: 120 [P.O.:120]  Out: 850 [Urine:850]  Intake/Output this shift:  No intake/output data recorded.    Physical Exam  Alert/, awake, NAD  CV: RRR without murmur or rub  Lung: clear and equal; no extra sounds  Ab: soft and NT; not distended; normal bs  Ext: no edema and well perfused  Skin; no rash    Pertinent Labs   Lab Results   Component Value Date    WBC 5.5 08/25/2024    HGB 12.7 08/25/2024    HCT 40.5 08/25/2024    MCV 91 08/25/2024     08/31/2024     Lab Results   Component Value Date    BUN 37.4 (H) 09/01/2024     09/01/2024    CO2 25 09/01/2024       Lab Results   Component Value Date    ALBUMIN 4.3 03/04/2024     Lab Results   Component Value Date    PHOS 3.4 03/04/2024     I reviewed all lab results  Shorty Bautista MD

## 2024-09-01 NOTE — PROGRESS NOTES
Brief Progress Note:     Was paged by nursing that patient was complaining of chest pain. EKG ordered.     Upon exam, patient states the chest pain has improved already. She found the pain to be positional, worse when laying on her back and improved when she is on her side. Describes the pain as mild pressure. Denies any worsened SOB, palpitations, nausea, vomiting, headache, lightheadedness. Patient states this pain is different than heartburn. EKG showed normal sinus rhythm, no ST changes.     GENERAL: healthy, alert and no distress  RESP: speaking in full sentences, normal work of breathing   CV: extremities well perfused, regular rate and rhythm, no murmurs,   ABDOMEN: soft, nontender  MS: no gross musculoskeletal defects noted, no edema  PSYCH: mentation appears normal, affect normal/bright    Assessment & Plan  Chest discomfort is positional, most likely musculoskeletal. Low concern for ACS given normal EKG and improving pain.   -troponin pending

## 2024-09-01 NOTE — PLAN OF CARE
Goal Outcome Evaluation:      Plan of Care Reviewed With: patient    Overall Patient Progress: improvingOverall Patient Progress: improving     Pt. Continues to have intermittent nausea with activity. Also c/o around f minutes episode of chest pain around 0600, which resolved on it's own. 12 lead EKG done with no change seen. Troponin lab drawn. VSS, continue to monitor.

## 2024-09-01 NOTE — PLAN OF CARE
Problem: Infection  Goal: Absence of Infection Signs and Symptoms  Outcome: Not Progressing     Problem: Adult Inpatient Plan of Care  Goal: Optimal Comfort and Wellbeing  9/1/2024 1855 by Sil Orourke RN  Outcome: Not Progressing  9/1/2024 1855 by Sil Orourke RN  Outcome: Progressing  Intervention: Monitor Pain and Promote Comfort  Recent Flowsheet Documentation  Taken 9/1/2024 1729 by Sil Orourke RN  Pain Management Interventions:   medication (see MAR)   distraction   emotional support  Taken 9/1/2024 1304 by Sil Orourke RN  Pain Management Interventions:   medication (see MAR)   distraction   emotional support  Taken 9/1/2024 0837 by Sil Orourke RN  Pain Management Interventions:   medication (see MAR)   distraction   emotional support   Goal Outcome Evaluation:      Plan of Care Reviewed With: patient      Pt complained of abdominal pain.  Gave scheduled Tylenol x 2.  UA/UC sent.  UA + and UC pending.  IV ABX ordered and given.  Will continue to monitor pt for any changes.

## 2024-09-01 NOTE — PROGRESS NOTES
[facility]    Medicine Progress Note - Hospitalist Service    Date of Admission:  8/25/2024    Assessment & Plan   Kerrykelsie Hoffmann is a 89 year old female admitted on 8/25/2024. She has PMHx of A-fib, HTN, heart disease, CKD 3, GERD, and HLD, presenting with sob, chest pressure, legs edema. Admitted for acute pulmonary edema.  -- Acute hypoxic respiratory failure resolved. On bumex 0.25 every other day. Cards s/o.  -- Course complicated by TIMOTHY on CKD. Nephrology following. TIMOTHY resolved  -- C/o urinary frequency and lower abdominal discomfort. Urinalysis with pyuria and bacteriuria. Ucx pending. Started on IV ceftriaxone.   -- Dispo: Home in 1-2 days.      Acute pulmonary edema  HFmrEF, acute on chronic  Acute hypoxic respiratory failure-resolved  -- ProBNP 7336, trop 21  -- CXR: Cardiomegaly with new pulmonary vascular congestion and increased interstitial markings suggesting pulmonary edema   -- Chest CT not ordered due to h/o allergy  -- Echo: EF 45%  -- Continue PTA Metoprolol.   -- Cardiology s/o  -- Volume management per Nephrology: Bumex restarted at 0.25mg every 48 hrs  -- 08/31: Pt still c/o shortness of breath. UOP slightly higher than on 08/30. Consider daily Bumex 0.25 mg? Deferred to Nephrology  -- PT/OT: Initially recommended TCU. Now recommending to continue home exercise program.    Abnormal UA, possible UTI  -- Lower abdominal discomfort   -- U/A: pyuria, bacteriuria. U/A on admission was clean  -- Will start ceftriaxone. Ucx pending    Elevated troponin  -- Troponins flat. Chest pressure resolved (likely 2/2 acute pulmonary edema)  -- Likely 2/2 demand ischemia    Elevation of d-dimer  -- Moderate risk for PE  -- Cannot do ct due to allergy  -- V/Q no e/o PE    HTN urgency  -- Hydralazine prn  -- C/w PTA Metoprolol. Losartan increased back to her PTA dose of 50 mg po bid.  -- Nephrology following    TIMOTHY on CKD IIIb  -- Baseline Cr 1.1-1.4. Cr at baseline  -- Closely  "monitoring on diuretics  -- Nephrology consult appreciated    Hypokalemia  -- Replace as protocol    Nonobstructive CAD  Hyperlipidemia  -- C/w PTA Crestor, Plavix, ASA    Valvular heart disease  -- Progressive MR and AI- Cardiology to refer to valve clinic              Diet: Combination Diet 2 gm NA Diet; No Caffeine Diet (and additional linked orders)  Fluid restriction 2000 ML FLUID (and additional linked orders)    DVT Prophylaxis: Enoxaparin (Lovenox) SQ  Wiley Catheter: Not present  Lines: None     Cardiac Monitoring: ACTIVE order. Indication: Acute decompensated heart failure (48 hours)  Code Status: No CPR- Do NOT Intubate      Clinically Significant Risk Factors                  # Hypertension: Noted on problem list  # Heart failure, NOS: heart failure noted on the problem list and last echo with EF 40-50%          # Overweight: Estimated body mass index is 28.78 kg/m  as calculated from the following:    Height as of this encounter: 1.549 m (5' 1\").    Weight as of this encounter: 69.1 kg (152 lb 4.8 oz).        # Financial/Environmental Concerns: none  # Asthma: noted on problem list              Disposition Plan     Medically Ready for Discharge: Anticipated 1-2 days.             Latoya Leslie MD  Hospitalist Service  [facility]  Securely message with [employer] (more info)  Text page via [email or web address] Paging/Directory   ______________________________________________________________________    Interval History   Patient is seen and examined at bedside.   Weaned off of oxygen. No chest pain. Anxious to discharge.  Plan of care discussed with patient. All questions answered.   Pt verbalized understanding.       Physical Exam   Vital Signs: Temp: 97.9  F (36.6  C) Temp src: Oral BP: 106/53 Pulse: 75   Resp: 18 SpO2: 95 % O2 Device: None (Room air)    Weight: 152 lbs 4.8 oz    GEN: Alert and oriented. Not in acute distress.  HEENT: Atraumatic, mucous membrane- moist and pink.  Chest: " Bilateral air entry. Mild right lower post chest creptitations.  CVS: S1S2 regular.   Abdomen: Soft. Non-tender, non-distended. No organomegaly. No guarding or rigidity. Bowel sounds active.   Extremities: No pedal edema.  CNS: No involuntary movements.  Skin: no cyanosis or clubbing.     Medical Decision Making       47 MINUTES SPENT BY ME on the date of service doing chart review, history, exam, documentation & further activities per the note.      Data

## 2024-09-02 LAB
ANION GAP SERPL CALCULATED.3IONS-SCNC: 10 MMOL/L (ref 7–15)
BACTERIA UR CULT: ABNORMAL
BUN SERPL-MCNC: 38.7 MG/DL (ref 8–23)
CALCIUM SERPL-MCNC: 9.8 MG/DL (ref 8.8–10.4)
CHLORIDE SERPL-SCNC: 96 MMOL/L (ref 98–107)
CREAT SERPL-MCNC: 1.57 MG/DL (ref 0.51–0.95)
EGFRCR SERPLBLD CKD-EPI 2021: 32 ML/MIN/1.73M2
GLUCOSE SERPL-MCNC: 90 MG/DL (ref 70–99)
HCO3 SERPL-SCNC: 28 MMOL/L (ref 22–29)
POTASSIUM SERPL-SCNC: 4.8 MMOL/L (ref 3.4–5.3)
SODIUM SERPL-SCNC: 134 MMOL/L (ref 135–145)

## 2024-09-02 PROCEDURE — 250N000011 HC RX IP 250 OP 636: Performed by: STUDENT IN AN ORGANIZED HEALTH CARE EDUCATION/TRAINING PROGRAM

## 2024-09-02 PROCEDURE — 99233 SBSQ HOSP IP/OBS HIGH 50: CPT | Performed by: INTERNAL MEDICINE

## 2024-09-02 PROCEDURE — 120N000004 HC R&B MS OVERFLOW

## 2024-09-02 PROCEDURE — 80048 BASIC METABOLIC PNL TOTAL CA: CPT | Performed by: STUDENT IN AN ORGANIZED HEALTH CARE EDUCATION/TRAINING PROGRAM

## 2024-09-02 PROCEDURE — 250N000013 HC RX MED GY IP 250 OP 250 PS 637: Performed by: INTERNAL MEDICINE

## 2024-09-02 PROCEDURE — 36415 COLL VENOUS BLD VENIPUNCTURE: CPT | Performed by: STUDENT IN AN ORGANIZED HEALTH CARE EDUCATION/TRAINING PROGRAM

## 2024-09-02 PROCEDURE — 999N000127 HC STATISTIC PERIPHERAL IV START W US GUIDANCE

## 2024-09-02 PROCEDURE — 99232 SBSQ HOSP IP/OBS MODERATE 35: CPT | Performed by: INTERNAL MEDICINE

## 2024-09-02 PROCEDURE — 250N000011 HC RX IP 250 OP 636: Performed by: INTERNAL MEDICINE

## 2024-09-02 RX ADMIN — CEFTRIAXONE SODIUM 1 G: 1 INJECTION, POWDER, FOR SOLUTION INTRAMUSCULAR; INTRAVENOUS at 13:06

## 2024-09-02 RX ADMIN — ACETAMINOPHEN 1000 MG: 500 TABLET ORAL at 20:06

## 2024-09-02 RX ADMIN — FAMOTIDINE 10 MG: 10 TABLET ORAL at 08:14

## 2024-09-02 RX ADMIN — ENOXAPARIN SODIUM 30 MG: 30 INJECTION SUBCUTANEOUS at 08:14

## 2024-09-02 RX ADMIN — LOSARTAN POTASSIUM 50 MG: 50 TABLET, FILM COATED ORAL at 08:15

## 2024-09-02 RX ADMIN — ACETAMINOPHEN 1000 MG: 500 TABLET ORAL at 08:15

## 2024-09-02 RX ADMIN — LOSARTAN POTASSIUM 50 MG: 50 TABLET, FILM COATED ORAL at 20:07

## 2024-09-02 RX ADMIN — CLOPIDOGREL BISULFATE 75 MG: 75 TABLET ORAL at 08:15

## 2024-09-02 RX ADMIN — Medication 3 MG: at 20:13

## 2024-09-02 RX ADMIN — ROSUVASTATIN 10 MG: 10 TABLET, FILM COATED ORAL at 08:14

## 2024-09-02 RX ADMIN — CLONAZEPAM 0.5 MG: 0.5 TABLET ORAL at 01:51

## 2024-09-02 RX ADMIN — CLONAZEPAM 0.5 MG: 0.5 TABLET ORAL at 13:05

## 2024-09-02 RX ADMIN — CLONAZEPAM 0.5 MG: 0.5 TABLET ORAL at 18:32

## 2024-09-02 RX ADMIN — METOPROLOL SUCCINATE 50 MG: 50 TABLET, EXTENDED RELEASE ORAL at 20:07

## 2024-09-02 RX ADMIN — ASPIRIN 81 MG: 81 TABLET, COATED ORAL at 08:15

## 2024-09-02 RX ADMIN — ACETAMINOPHEN 1000 MG: 500 TABLET ORAL at 13:06

## 2024-09-02 ASSESSMENT — ACTIVITIES OF DAILY LIVING (ADL)
ADLS_ACUITY_SCORE: 33
ADLS_ACUITY_SCORE: 30
ADLS_ACUITY_SCORE: 38
ADLS_ACUITY_SCORE: 33
ADLS_ACUITY_SCORE: 38
ADLS_ACUITY_SCORE: 30
ADLS_ACUITY_SCORE: 38
ADLS_ACUITY_SCORE: 30
ADLS_ACUITY_SCORE: 38
ADLS_ACUITY_SCORE: 38
ADLS_ACUITY_SCORE: 30
ADLS_ACUITY_SCORE: 38
ADLS_ACUITY_SCORE: 33
ADLS_ACUITY_SCORE: 38
ADLS_ACUITY_SCORE: 38
ADLS_ACUITY_SCORE: 30
ADLS_ACUITY_SCORE: 38
ADLS_ACUITY_SCORE: 33
ADLS_ACUITY_SCORE: 30
ADLS_ACUITY_SCORE: 33
ADLS_ACUITY_SCORE: 33

## 2024-09-02 NOTE — PLAN OF CARE
Assumed care 1900 to 0730. A&O x 4. Assist x 1 with a walker. Tele is NSR. C/O left sided back pain, scheduled Tylenol given (see MAR). Up to toilet. Room air. PRN Melatonin given to promote sleep. Call light within reach, able to make needs known. Bed alarm on for safety.    Problem: Comorbidity Management  Goal: Blood Pressure in Desired Range  Intervention: Maintain Blood Pressure Management  Recent Flowsheet Documentation  Taken 9/2/2024 0445 by Margo Can RN  Medication Review/Management: medications reviewed  Taken 9/2/2024 0151 by Margo Can, RN  Medication Review/Management: medications reviewed  Taken 9/1/2024 2125 by Margo Can RN  Medication Review/Management: medications reviewed     Problem: Infection  Goal: Absence of Infection Signs and Symptoms  Outcome: Progressing

## 2024-09-02 NOTE — PROGRESS NOTES
Renal progress note  CC:TIMOTHY, hypervolemia  Assessment and Plan:  89 year old female with past medical history of hypertension severe intracranial atherosclerotic disease has been on Plavix history of stage III CKD PAF anxiety and multiple drug allergies establish cares with us after initial presentation in July 2023 for CKD 3 with noted left renal atrophy and multiple cortical cysts there have been seen in past imaging initially had a robust response to Lasix and developed an TIMOTHY with improvement after some IV fluids.  Now readmitted on 8/25/2020 for with likely CHF exacerbation with chest pressure shortness of breath and leg edema  Initially started on IV Lasix and with mild elevation in creatinine Lasix stopped and received albumin; losartan held; creatinine improved and now back on Losartan and bumex PO  Nephrology consulted for TIMOTHY/CKD 3     TIMOTHY/creatinine elevation nonoliguric  CKD 3 baseline creatinine 1.1-1.4 with GFR around ~35-40,   UA bland  Urine protein negative quantification 0.4g/g  Renal imaging significant for left renal atrophy with about 7.8 cm left kidney and about 9.9 cm right kidney with multiple cortical cysts that have been seen in the past imaging as well, (CT in 2022 in 2021)  Revealing mild CKD likely secondary to renal atrophy as well has some acquired renal cysts in the light of renovascular disease since he does have evidence of significant atherosclerotic disease elsewhere  Peak creat 1.83 8/27/24  Creatinine stabilizing; near baseline now  Wt an UO good response to PO bumex  continue on PO Bumex 0.25mg, every other day; she feels she urinated a lot even with 0.5mg   Increased Losartan back to PTA dose 50mg bid with good results  No objection to discharge once PO Abx decided for the UTI    With lower nephron mass creatinine tends to stall before improvement  Cystatin C 2.3 with creat 1.4 , GFR 22 based on Csyt, likely true GFR is close to 30 , will repeat as OP  No nephrotoxic  meds noted  Patient is mild hypervolemic  --> Strict DARLEEN Daily weights   --> scheduled for OP visits on 10/14/2024 clinic with me      Hyponatremia improved  Chronic runs between 129-135  Hypochloremia and elevated bicarb  Likely related to diuresis  --> Diuretics as above     Hypomagnesemia   Replace per protocols     History of hypertension  Prior to admission on metoprolol 50mg losartan 50 mg twice a day  Has multiple antihypertensive intolerance and allergies  Blood pressure borderline controlled with elevated blood pressures and CHF exacerbation with history of hypertension heart disease  --> Losartan increased back to 50mg x2 with good results  --> Counseled on low-sodium diet, less than 2 g/day  --> will keep on bumex 0.25mg x every other day      Hypervolemia   Improved significantly with diuresis  No indication for IV fluids/albumin volume expansion unless hypotensive  Diuresis as above     Hb stable ~12     UTI  With frequencu and Lower abd pain  Improving with Abx  On ceftriaxone  Ecoli in urine Cx     Dyspnea and SOB  Improved with diuresisD-Dimer elevated  VQ scan wo evidence of PE     History of GERD with reflux  Chronically on famotidine     History of severe atherosclerosis  Severe intracranial atherosclerosis on Plavix/ ASA  Evidence of atherosclerosis on imaging  Likely has acquired bilateral small renal cystic disease secondary to renal artery disease  --> intolerance to statins      Hepatic cyst around 4 cm  May need to consider evaluation with her prior GI team  Consult to follow as outpatient     Anxiety on clonazepam    Thank you for the consultation we will follow  Shorty Bautista MD  [facility]  [phone]      Subjective  Feels tired but better today  Urinary frequency is better still a lot more then usual   Appetite ok    Breathing is a lot better today   oxygenation better today     Objective    Vital signs in last 24 hours  Temp:  [97.4  F (36.3  C)-97.9  F (36.6   C)] 97.4  F (36.3  C)  Pulse:  [60-75] 63  Resp:  [18] 18  BP: (106-156)/(53-65) 133/58  SpO2:  [95 %-98 %] 98 %  Weight:   [unfilled]    Intake/Output last 3 shifts  I/O last 3 completed shifts:  In: -   Out: 800 [Urine:800]  Intake/Output this shift:  I/O this shift:  In: 340 [P.O.:340]  Out: 100 [Urine:100]    Physical Exam  Alert/, awake, NAD  CV: RRR without murmur or rub  Lung: clear and equal; no extra sounds  Ab: soft and NT; not distended; normal bs  Ext: no edema and well perfused  Skin; no rash    Pertinent Labs   Lab Results   Component Value Date    WBC 5.5 08/25/2024    HGB 12.7 08/25/2024    HCT 40.5 08/25/2024    MCV 91 08/25/2024     08/31/2024     Lab Results   Component Value Date    BUN 38.7 (H) 09/02/2024     (L) 09/02/2024    CO2 28 09/02/2024       Lab Results   Component Value Date    ALBUMIN 4.3 03/04/2024     Lab Results   Component Value Date    PHOS 3.4 03/04/2024     I reviewed all lab results  Shorty Bautista MD

## 2024-09-02 NOTE — PROGRESS NOTES
[facility]    Medicine Progress Note - Hospitalist Service    Date of Admission:  8/25/2024    Assessment & Plan   Kerry Hoffmann is a 89 year old female admitted on 8/25/2024. She has PMHx of A-fib, HTN, heart disease, CKD 3, GERD, and HLD, presenting with sob, chest pressure, legs edema. Admitted for acute pulmonary edema.  -- Acute hypoxic respiratory failure resolved. On bumex 0.25 every other day. Cards s/o.  -- Course complicated by TIMOTHY on CKD. Nephrology following. TIMOTHY resolved  -- C/o urinary frequency and lower abdominal discomfort. Urinalysis with pyuria and bacteriuria. Ucx pending. Started on IV ceftriaxone.   -- Dispo: Home in 1-2 days.      Acute pulmonary edema - resolved   HFmrEF, acute on chronic   Acute hypoxic respiratory failure-resolved  -- ProBNP 7336, trop 21  -- CXR: Cardiomegaly with new pulmonary vascular congestion and increased interstitial markings suggesting pulmonary edema   -- Chest CT not ordered due to h/o allergy  -- Echo: EF 45%  -- Continue PTA Metoprolol.   -- Cardiology s/o  -- Volume management per Nephrology: Bumex restarted at 0.25mg every 48 hrs    E.coli UTI  -- 9/1 developed new lower abdominal discomfort and urinary urgency   -- Continue ceftriaxone. Ucx pending    Elevated troponin  -- Troponins flat. Chest pressure resolved (likely 2/2 acute pulmonary edema)  -- Likely 2/2 demand ischemia    Elevation of d-dimer  -- Moderate risk for PE  -- Cannot do ct due to allergy  -- V/Q no e/o PE    HTN urgency  -- Hydralazine prn  -- C/w PTA Metoprolol. Losartan increased back to her PTA dose of 50 mg po bid.  -- Nephrology following    TIMOTHY on CKD IIIb  -- Baseline Cr 1.1-1.4. Cr at baseline  -- Closely monitoring on diuretics  -- Nephrology consult appreciated    Hypokalemia  -- Replace as protocol    Nonobstructive CAD  Hyperlipidemia  -- C/w PTA Crestor, Plavix, ASA    Valvular heart disease  -- Progressive MR and AI- Cardiology to refer to valve  "clinic              Diet: Combination Diet 2 gm NA Diet; No Caffeine Diet (and additional linked orders)  Fluid restriction 2000 ML FLUID (and additional linked orders)    DVT Prophylaxis: Enoxaparin (Lovenox) SQ  Wiley Catheter: Not present  Lines: None     Cardiac Monitoring: ACTIVE order. Indication: Acute decompensated heart failure (48 hours)  Code Status: No CPR- Do NOT Intubate      Clinically Significant Risk Factors                  # Hypertension: Noted on problem list  # Heart failure, NOS: heart failure noted on the problem list and last echo with EF 40-50%          # Overweight: Estimated body mass index is 28.44 kg/m  as calculated from the following:    Height as of this encounter: 1.549 m (5' 1\").    Weight as of this encounter: 68.3 kg (150 lb 8 oz).      # Financial/Environmental Concerns: none  # Asthma: noted on problem list              Disposition Plan     Medically Ready for Discharge: Anticipated Tomorrow             Agnieszka Kemp MD  Hospitalist Service  [facility]  Securely message with [employer] (more info)  Text page via [employer] Paging/Directory   ______________________________________________________________________    Interval History   Urinary urgency and lower abdominal and flank discomfort resolved.   No dyspnea or chest pain  Lower extremity edema subsided     Physical Exam   Vital Signs: Temp: 98.2  F (36.8  C) Temp src: Oral BP: (!) 151/62 Pulse: 79   Resp: 20 SpO2: 98 % O2 Device: None (Room air)    Weight: 150 lbs 8 oz    General Appearance: NAD  Respiratory: Easy respirations, lungs are clear  Cardiovascular: RRR. Normal S1S2.  No edema   GI: Abdomen soft and nontender   Other: Alert, nonfocal      Medical Decision Making       50 MINUTES SPENT BY ME on the date of service doing chart review, history, exam, documentation & further activities per the note.  MANAGEMENT DISCUSSED with the following over the past 24 hours: patient and RN       Data     I " have personally reviewed the following data over the past 24 hrs:    N/A  \   N/A   / N/A     134 (L) 96 (L) 38.7 (H) /  90   4.8 28 1.57 (H) \       Imaging results reviewed over the past 24 hrs:   No results found for this or any previous visit (from the past 24 hour(s)).

## 2024-09-03 ENCOUNTER — APPOINTMENT (OUTPATIENT)
Dept: OCCUPATIONAL THERAPY | Facility: HOSPITAL | Age: 89
DRG: 291 | End: 2024-09-03
Payer: MEDICARE

## 2024-09-03 LAB
ANION GAP SERPL CALCULATED.3IONS-SCNC: 8 MMOL/L (ref 7–15)
BUN SERPL-MCNC: 40 MG/DL (ref 8–23)
CALCIUM SERPL-MCNC: 9.1 MG/DL (ref 8.8–10.4)
CHLORIDE SERPL-SCNC: 99 MMOL/L (ref 98–107)
CREAT SERPL-MCNC: 1.42 MG/DL (ref 0.51–0.95)
EGFRCR SERPLBLD CKD-EPI 2021: 36 ML/MIN/1.73M2
GLUCOSE SERPL-MCNC: 88 MG/DL (ref 70–99)
HCO3 SERPL-SCNC: 25 MMOL/L (ref 22–29)
POTASSIUM SERPL-SCNC: 4.7 MMOL/L (ref 3.4–5.3)
POTASSIUM SERPL-SCNC: 4.7 MMOL/L (ref 3.4–5.3)
SODIUM SERPL-SCNC: 132 MMOL/L (ref 135–145)

## 2024-09-03 PROCEDURE — 82565 ASSAY OF CREATININE: CPT | Performed by: INTERNAL MEDICINE

## 2024-09-03 PROCEDURE — 250N000013 HC RX MED GY IP 250 OP 250 PS 637: Performed by: INTERNAL MEDICINE

## 2024-09-03 PROCEDURE — 250N000011 HC RX IP 250 OP 636: Performed by: STUDENT IN AN ORGANIZED HEALTH CARE EDUCATION/TRAINING PROGRAM

## 2024-09-03 PROCEDURE — 250N000011 HC RX IP 250 OP 636: Performed by: INTERNAL MEDICINE

## 2024-09-03 PROCEDURE — 99233 SBSQ HOSP IP/OBS HIGH 50: CPT | Performed by: INTERNAL MEDICINE

## 2024-09-03 PROCEDURE — 99232 SBSQ HOSP IP/OBS MODERATE 35: CPT | Performed by: INTERNAL MEDICINE

## 2024-09-03 PROCEDURE — 97535 SELF CARE MNGMENT TRAINING: CPT | Mod: GO

## 2024-09-03 PROCEDURE — 80051 ELECTROLYTE PANEL: CPT | Performed by: INTERNAL MEDICINE

## 2024-09-03 PROCEDURE — 82374 ASSAY BLOOD CARBON DIOXIDE: CPT | Performed by: INTERNAL MEDICINE

## 2024-09-03 PROCEDURE — 120N000004 HC R&B MS OVERFLOW

## 2024-09-03 PROCEDURE — 36415 COLL VENOUS BLD VENIPUNCTURE: CPT | Performed by: INTERNAL MEDICINE

## 2024-09-03 RX ORDER — BUMETANIDE 0.5 MG/1
0.25 TABLET ORAL
Qty: 30 TABLET | Refills: 2 | Status: SHIPPED | OUTPATIENT
Start: 2024-09-03

## 2024-09-03 RX ADMIN — LOSARTAN POTASSIUM 50 MG: 50 TABLET, FILM COATED ORAL at 08:26

## 2024-09-03 RX ADMIN — CLOPIDOGREL BISULFATE 75 MG: 75 TABLET ORAL at 08:26

## 2024-09-03 RX ADMIN — CLONAZEPAM 0.5 MG: 0.5 TABLET ORAL at 01:24

## 2024-09-03 RX ADMIN — BUMETANIDE 0.25 MG: 0.5 TABLET ORAL at 08:27

## 2024-09-03 RX ADMIN — ACETAMINOPHEN 1000 MG: 500 TABLET ORAL at 13:02

## 2024-09-03 RX ADMIN — METOPROLOL SUCCINATE 50 MG: 50 TABLET, EXTENDED RELEASE ORAL at 22:03

## 2024-09-03 RX ADMIN — ROSUVASTATIN 10 MG: 10 TABLET, FILM COATED ORAL at 08:26

## 2024-09-03 RX ADMIN — LOSARTAN POTASSIUM 50 MG: 50 TABLET, FILM COATED ORAL at 22:03

## 2024-09-03 RX ADMIN — ENOXAPARIN SODIUM 30 MG: 30 INJECTION SUBCUTANEOUS at 08:28

## 2024-09-03 RX ADMIN — ACETAMINOPHEN 1000 MG: 500 TABLET ORAL at 08:26

## 2024-09-03 RX ADMIN — CLONAZEPAM 0.5 MG: 0.5 TABLET ORAL at 18:38

## 2024-09-03 RX ADMIN — ASPIRIN 81 MG: 81 TABLET, COATED ORAL at 08:26

## 2024-09-03 RX ADMIN — FAMOTIDINE 10 MG: 10 TABLET ORAL at 08:25

## 2024-09-03 RX ADMIN — ACETAMINOPHEN 1000 MG: 500 TABLET ORAL at 22:03

## 2024-09-03 RX ADMIN — CLONAZEPAM 0.5 MG: 0.5 TABLET ORAL at 11:14

## 2024-09-03 RX ADMIN — CEFTRIAXONE SODIUM 1 G: 1 INJECTION, POWDER, FOR SOLUTION INTRAMUSCULAR; INTRAVENOUS at 11:14

## 2024-09-03 ASSESSMENT — ACTIVITIES OF DAILY LIVING (ADL)
ADLS_ACUITY_SCORE: 37
ADLS_ACUITY_SCORE: 38
ADLS_ACUITY_SCORE: 37
ADLS_ACUITY_SCORE: 38
ADLS_ACUITY_SCORE: 38
ADLS_ACUITY_SCORE: 37
ADLS_ACUITY_SCORE: 38

## 2024-09-03 NOTE — PROGRESS NOTES
"Care Management Follow Up    Length of Stay (days): 9    Expected Discharge Date: 09/03/2024     Concerns to be Addressed:  Care progression - discharge planning     Patient plan of care discussed at interdisciplinary rounds: Yes    Anticipated Discharge Disposition: PT rec Home vs OT rec Transitional Care     Anticipated Discharge Services: PT rec Home vs OT rec Transitional Care  Anticipated Discharge DME:  NA    Patient/family educated on Medicare website which has current facility and service quality ratings:  NA  Education Provided on the Discharge Plan:  Yes per team  Patient/Family in Agreement with the Plan:  No    Referrals Placed by CM/SW:  NA  Private pay costs discussed: Not applicable    Discussed  Partnership in Safe Discharge Planning  document with patient/family: No     Handoff Completed: No, handoff not indicated or clinically appropriate    Additional Information:  Met with patient at bedside to discuss PT rec Home vs OT rec Transitional Care. Patient declined Transitional care and home care services. Patient declined and states, \"I live in a facility that has AL if I need it.\"     Next Steps: RNCM to follow for medical progression, recommendations, and final discharge plan.     Social Hx:  Assessment: Follow   Last Note: 9/3/24  Plan: [facility]  Needs: PT rec Home vs OT rec Transitional Care. Patient declined TCU & home care  Hand off sent: No  Transport: Medical:  Wheelchair    Kay Shahid RN     Per provider Dr. Kemp, patient maybe discharge today.  "
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
## 2024-09-03 NOTE — PROGRESS NOTES
[facility]    Medicine Progress Note - Hospitalist Service    Date of Admission:  8/25/2024    Assessment & Plan   Kerrykelsie Hoffmann is a 89 year old female admitted on 8/25/2024. She has PMHx of A-fib, HTN, heart disease, CKD 3, GERD, and HLD, presenting with sob, chest pressure, legs edema. Admitted for acute pulmonary edema which is now resolved.  Course complicated by TIMOTHY on CKD and UTI.      Acute pulmonary edema - resolved   HFmrEF, acute on chronic - weight is up by 4 pounds but does not look like fluid retention  Acute hypoxic respiratory failure-resolved  -- ProBNP 7336, trop 21  -- CXR: Cardiomegaly with new pulmonary vascular congestion and increased interstitial markings suggesting pulmonary edema   -- Chest CT not ordered due to h/o allergy  -- Echo: EF 45%  -- Continue PTA Metoprolol.   -- Continue Bumex 0.25mg every 48 hrs    E.coli UTI  -- 9/1 developed new lower abdominal discomfort and urinary urgency   -- Completed 3 days of ceftriaxone IV which is likely appropriate for simple cystitis especially given history of C. difficile in the past and multiple antibiotic allergies.    Nausea - suspect due to constipation  -- Received prune juice x 2  -- Declined stool softeners and laxatives    Elevated troponin  -- Troponins flat. Chest pressure resolved (likely 2/2 acute pulmonary edema)  -- Likely 2/2 demand ischemia    Elevation of d-dimer  -- Moderate risk for PE  -- Cannot do ct due to allergy  -- V/Q no e/o PE    HTN urgency  -- Hydralazine prn  -- C/w PTA Metoprolol. Losartan increased back to her PTA dose of 50 mg po bid.  -- Nephrology following    TIMOTHY on CKD IIIb  -- Baseline Cr 1.1-1.4. Cr at baseline  -- Closely monitoring on diuretics  -- Nephrology consult appreciated    Hypokalemia  -- Replace as protocol    Nonobstructive CAD  Hyperlipidemia  -- C/w PTA Crestor, Plavix, ASA    Valvular heart disease  -- Progressive MR and AI- Cardiology to refer to valve  "clinic              Diet: Combination Diet 2 gm NA Diet; No Caffeine Diet (and additional linked orders)  Fluid restriction 2000 ML FLUID (and additional linked orders)    DVT Prophylaxis: Enoxaparin (Lovenox) SQ  Wiley Catheter: Not present  Lines: None     Cardiac Monitoring: None  Code Status: No CPR- Do NOT Intubate      Clinically Significant Risk Factors                  # Hypertension: Noted on problem list  # Heart failure, NOS: heart failure noted on the problem list and last echo with EF 40-50%          # Overweight: Estimated body mass index is 29.06 kg/m  as calculated from the following:    Height as of this encounter: 1.549 m (5' 1\").    Weight as of this encounter: 69.8 kg (153 lb 12.8 oz).      # Financial/Environmental Concerns: none  # Asthma: noted on problem list              Disposition Plan     Medically Ready for Discharge: Anticipated Tomorrow             Agnieszka Kemp MD  Hospitalist Service  [facility]  Securely message with [employer] (more info)  Text page via [email or web address] Paging/Directory   ______________________________________________________________________    Interval History   Patient reports she felt very well last night however this morning feels \"rotten\".  Feels nauseated and has some abdominal discomfort.  No bowel movements for 4 days which is unusual for her.  Weight is up almost 4 pounds but no increased edema in the legs    Physical Exam   Vital Signs: Temp: 97.7  F (36.5  C) Temp src: Oral BP: (!) 152/68 Pulse: 73   Resp: 20 SpO2: 97 % O2 Device: None (Room air)    Weight: 153 lbs 12.8 oz    General Appearance: No acute distress, looks fatigued  Respiratory: Easy respirations, lungs are clear  Cardiovascular: Regular rate and rhythm.  Normal S1-S2.  No edema  GI: Abdomen soft, nontender, bowel sounds present  Other: Awake and alert, nonfocal    Medical Decision Making       55 MINUTES SPENT BY ME on the date of service doing chart review, history, exam, " documentation & further activities per the note.  MANAGEMENT DISCUSSED with the following over the past 24 hours: Patient, nursing staff, RN CM, pharmacy       Data     I have personally reviewed the following data over the past 24 hrs:    N/A  \   N/A   / N/A     132 (L) 99 40.0 (H) /  88   4.7; 4.7 25 1.42 (H) \       Imaging results reviewed over the past 24 hrs:   No results found for this or any previous visit (from the past 24 hour(s)).

## 2024-09-03 NOTE — PLAN OF CARE
Problem: Infection  Goal: Absence of Infection Signs and Symptoms  Outcome: Progressing   Goal Outcome Evaluation:      Plan of Care Reviewed With: patient      Pt feeling better today.  Continues on IV ABX.  Plan for discharge tomorrow.  Nephew updated and plans on providing ride home tomorrow for pt to go back to her senior living.  Will continue to monitor pt for any changes.

## 2024-09-03 NOTE — PLAN OF CARE
"  Problem: Adult Inpatient Plan of Care  Goal: Plan of Care Review  Description: The Plan of Care Review/Shift note should be completed every shift.  The Outcome Evaluation is a brief statement about your assessment that the patient is improving, declining, or no change.  This information will be displayed automatically on your shift  note.  Outcome: Progressing  Goal: Patient-Specific Goal (Individualized)  Description: You can add care plan individualizations to a care plan. Examples of Individualization might be:  \"Parent requests to be called daily at 9am for status\", \"I have a hard time hearing out of my right ear\", or \"Do not touch me to wake me up as it startles  me\".  Outcome: Progressing  Goal: Absence of Hospital-Acquired Illness or Injury  Intervention: Identify and Manage Fall Risk  Recent Flowsheet Documentation  Taken 9/3/2024 1726 by Josee Gardner RN  Safety Promotion/Fall Prevention:   assistive device/personal items within reach   clutter free environment maintained   nonskid shoes/slippers when out of bed   patient and family education   safety round/check completed  Taken 9/3/2024 0755 by Josee Gardner RN  Safety Promotion/Fall Prevention:   assistive device/personal items within reach   clutter free environment maintained   nonskid shoes/slippers when out of bed   patient and family education   safety round/check completed  Intervention: Prevent Skin Injury  Recent Flowsheet Documentation  Taken 9/3/2024 1300 by Josee Garnder, RN  Body Position: turned  Intervention: Prevent Infection  Recent Flowsheet Documentation  Taken 9/3/2024 1726 by Josee Gardner, RN  Infection Prevention: rest/sleep promoted  Taken 9/3/2024 0755 by Josee Gardner RN  Infection Prevention: rest/sleep promoted  Goal: Optimal Comfort and Wellbeing  Outcome: Progressing   Goal Outcome Evaluation:  Given 2 prune juice and  no BM so far able to walk to the bathroom with SBA and was up in the chair " .episodes of Nausea but no vomiting with orders to give Compazine but patient declined and will continue to monitor.

## 2024-09-03 NOTE — PROGRESS NOTES
[facility]  [facility]   Follow up    Kerry Hoffmann   MRN: [id number]  : 1935   DOA: 2024   CC: ARF/CKD      Assessment and Plan:  89 year old female    ARF/CKD; has underlying CKD due to renal atrophy; some ARF due to diuresis and now CR has improved back to baseline range; following  Volume status; presented with CHF exacerbation; responsive to IV lasix; now on oral diuretics (very minimal dosing); wt up today but no obvious fluid excess on exam so will not react to wt with more diuretics  HTN: on losartan and BBand diuretics and acceptable with occas spiking readings  Nausea; will give one time dose of compazine; allergy to zofran  PAF; on BB  Multiple drug allergies  UTI; on abx; allergies make PO dosing challenging  severe intracranial atherosclerotic disease: on plavix and ASA  Chronic hyponatremia; stable at 132  GERD; on H2 blocker  Hepatic cyst,  around 4 cm  Anxiety: on clonazepam      Subjective  Pt new to me; chart and meds reviewed  Pt says wt is up 4 pounds but does not feel like fluid to her; breathing ok  Having nausea now  Has not had BM for a few days  Objective    Vital signs in last 24 hours  Temp:  [97.4  F (36.3  C)-98.2  F (36.8  C)] 97.6  F (36.4  C)  Pulse:  [63-79] 66  Resp:  [20] 20  BP: (122-170)/(56-68) 170/68  SpO2:  [94 %-98 %] 97 %      Intake/Output last 3 shifts  I/O last 3 completed shifts:  In: 590 [P.O.:460; I.V.:130]  Out: 950 [Urine:950]  Intake/Output this shift:  I/O this shift:  In: -   Out: 300 [Urine:300]    Physical Exam  Alert/oriented x 3, awake, NAD  CV: RRR without murmur or rub  Lung: clear and equal; no extra sounds  Ab: soft and NT; not distended; normal bs  Ext: no edema and well perfused  Skin; no rash    Pertinent Labs     Last Renal Panel:  Sodium   Date Value Ref Range Status   2024 134 (L) 135 - 145 mmol/L Final     Potassium   Date Value Ref Range Status   2024 4.7 3.4  "- 5.3 mmol/L Final   09/04/2022 4.6 3.5 - 5.0 mmol/L Final     Chloride   Date Value Ref Range Status   09/02/2024 96 (L) 98 - 107 mmol/L Final   09/04/2022 96 (L) 98 - 107 mmol/L Final     Carbon Dioxide (CO2)   Date Value Ref Range Status   09/02/2024 28 22 - 29 mmol/L Final   09/04/2022 24 22 - 31 mmol/L Final     Anion Gap   Date Value Ref Range Status   09/02/2024 10 7 - 15 mmol/L Final   09/04/2022 7 5 - 18 mmol/L Final     Glucose   Date Value Ref Range Status   09/02/2024 90 70 - 99 mg/dL Final   09/04/2022 95 70 - 125 mg/dL Final     Urea Nitrogen   Date Value Ref Range Status   09/02/2024 38.7 (H) 8.0 - 23.0 mg/dL Final   09/04/2022 21 8 - 28 mg/dL Final     Creatinine   Date Value Ref Range Status   09/02/2024 1.57 (H) 0.51 - 0.95 mg/dL Final     GFR Estimate   Date Value Ref Range Status   09/02/2024 32 (L) >60 mL/min/1.73m2 Final     Comment:     eGFR calculated using 2021 CKD-EPI equation.   06/22/2021 46 (L) >60 mL/min/1.73m2 Final     Calcium   Date Value Ref Range Status   09/02/2024 9.8 8.8 - 10.4 mg/dL Final     Comment:     Reference intervals for this test were updated on 7/16/2024 to reflect our healthy population more accurately. There may be differences in the flagging of prior results with similar values performed with this method. Those prior results can be interpreted in the context of the updated reference intervals.     Phosphorus   Date Value Ref Range Status   03/04/2024 3.4 2.5 - 4.5 mg/dL Final     Albumin   Date Value Ref Range Status   03/04/2024 4.3 3.5 - 5.2 g/dL Final   09/04/2022 3.4 (L) 3.5 - 5.0 g/dL Final     No results for input(s): \"HGB\" in the last 168 hours.       I reviewed all lab results  Petra Herrera MD  "

## 2024-09-04 VITALS
SYSTOLIC BLOOD PRESSURE: 125 MMHG | WEIGHT: 154.4 LBS | HEART RATE: 71 BPM | BODY MASS INDEX: 29.15 KG/M2 | RESPIRATION RATE: 20 BRPM | DIASTOLIC BLOOD PRESSURE: 58 MMHG | OXYGEN SATURATION: 99 % | HEIGHT: 61 IN | TEMPERATURE: 97.8 F

## 2024-09-04 LAB — POTASSIUM SERPL-SCNC: 4.9 MMOL/L (ref 3.4–5.3)

## 2024-09-04 PROCEDURE — 250N000011 HC RX IP 250 OP 636: Performed by: INTERNAL MEDICINE

## 2024-09-04 PROCEDURE — 99239 HOSP IP/OBS DSCHRG MGMT >30: CPT | Performed by: INTERNAL MEDICINE

## 2024-09-04 PROCEDURE — 250N000013 HC RX MED GY IP 250 OP 250 PS 637: Performed by: INTERNAL MEDICINE

## 2024-09-04 PROCEDURE — 36415 COLL VENOUS BLD VENIPUNCTURE: CPT | Performed by: INTERNAL MEDICINE

## 2024-09-04 PROCEDURE — 99232 SBSQ HOSP IP/OBS MODERATE 35: CPT | Performed by: INTERNAL MEDICINE

## 2024-09-04 PROCEDURE — 84132 ASSAY OF SERUM POTASSIUM: CPT | Performed by: INTERNAL MEDICINE

## 2024-09-04 RX ORDER — SENNOSIDES 8.6 MG
2 TABLET ORAL ONCE
Status: COMPLETED | OUTPATIENT
Start: 2024-09-04 | End: 2024-09-04

## 2024-09-04 RX ADMIN — CLONAZEPAM 0.5 MG: 0.5 TABLET ORAL at 12:15

## 2024-09-04 RX ADMIN — ASPIRIN 81 MG: 81 TABLET, COATED ORAL at 08:16

## 2024-09-04 RX ADMIN — FAMOTIDINE 10 MG: 10 TABLET ORAL at 08:16

## 2024-09-04 RX ADMIN — CLOPIDOGREL BISULFATE 75 MG: 75 TABLET ORAL at 08:16

## 2024-09-04 RX ADMIN — ACETAMINOPHEN 1000 MG: 500 TABLET ORAL at 08:16

## 2024-09-04 RX ADMIN — ROSUVASTATIN 10 MG: 10 TABLET, FILM COATED ORAL at 08:16

## 2024-09-04 RX ADMIN — CLONAZEPAM 0.5 MG: 0.5 TABLET ORAL at 01:51

## 2024-09-04 RX ADMIN — LOSARTAN POTASSIUM 50 MG: 50 TABLET, FILM COATED ORAL at 08:16

## 2024-09-04 RX ADMIN — ACETAMINOPHEN 1000 MG: 500 TABLET ORAL at 14:23

## 2024-09-04 RX ADMIN — SENNOSIDES 2 TABLET: 8.6 TABLET, FILM COATED ORAL at 10:19

## 2024-09-04 RX ADMIN — ENOXAPARIN SODIUM 30 MG: 30 INJECTION SUBCUTANEOUS at 08:16

## 2024-09-04 ASSESSMENT — ACTIVITIES OF DAILY LIVING (ADL)
ADLS_ACUITY_SCORE: 34
ADLS_ACUITY_SCORE: 37
ADLS_ACUITY_SCORE: 34
ADLS_ACUITY_SCORE: 37
ADLS_ACUITY_SCORE: 37
ADLS_ACUITY_SCORE: 34
ADLS_ACUITY_SCORE: 37
ADLS_ACUITY_SCORE: 34
ADLS_ACUITY_SCORE: 37
ADLS_ACUITY_SCORE: 34
ADLS_ACUITY_SCORE: 37
ADLS_ACUITY_SCORE: 34

## 2024-09-04 NOTE — PLAN OF CARE
Assumed care 1900 to 0730. A&O x 4. Assist x 1 with a walker. C/O back pain, scheduled Tylenol given (see MAR). Room air. Up to toilet. Patient slept intermittently overnight. Call light within reach, able to make needs known. Bed alarm on for safety.    Problem: Urinary Retention  Goal: Effective Urinary Elimination  Outcome: Progressing     Problem: Gas Exchange Impaired  Goal: Optimal Gas Exchange  Intervention: Optimize Oxygenation and Ventilation  Recent Flowsheet Documentation  Taken 9/4/2024 0530 by Margo Can RN  Head of Bed (HOB) Positioning: HOB at 20-30 degrees  Taken 9/4/2024 0151 by Margo Can RN  Head of Bed (HOB) Positioning: HOB at 20-30 degrees  Taken 9/4/2024 0131 by Margo Can RN  Head of Bed (HOB) Positioning: HOB at 20 degrees  Taken 9/3/2024 2316 by Margo Can RN  Head of Bed (HOB) Positioning: HOB at 20 degrees  Taken 9/3/2024 2203 by Margo Can RN  Head of Bed (HOB) Positioning: HOB at 20-30 degrees  Taken 9/3/2024 2131 by Margo Can RN  Head of Bed (HOB) Positioning: HOB at 20-30 degrees     Problem: Heart Failure  Goal: Effective Breathing Pattern During Sleep  Intervention: Monitor and Manage Obstructive Sleep Apnea  Recent Flowsheet Documentation  Taken 9/4/2024 0530 by Margo Can RN  Medication Review/Management: medications reviewed  Taken 9/4/2024 0151 by Margo Can RN  Medication Review/Management: medications reviewed  Taken 9/3/2024 2203 by Margo Can RN  Medication Review/Management: medications reviewed

## 2024-09-04 NOTE — DISCHARGE SUMMARY
"[facility]  Hospitalist Discharge Summary      Date of Admission:  8/25/2024  Date of Discharge:  9/4/2024  Discharging Provider: Agnieszka Kemp MD  Discharge Service: Hospitalist Service    Discharge Diagnoses   Acute on chronic HFmrEF  Acute respiratory failure with hypoxia  Acute kidney injury on CKD stage IIIb  Hypokalemia  E. coli UTI    Clinically Significant Risk Factors     # Overweight: Estimated body mass index is 29.17 kg/m  as calculated from the following:    Height as of this encounter: 1.549 m (5' 1\").    Weight as of this encounter: 70 kg (154 lb 6.4 oz).       Follow-ups Needed After Discharge   Follow-up Appointments     Follow-up and recommended labs and tests       Follow up with primary care provider, Kaylyn Bolanos, within 7 days for   hospital follow- up.  The following labs/tests are recommended: BMP.            Unresulted Labs Ordered in the Past 30 Days of this Admission       No orders found from 7/26/2024 to 8/26/2024.        These results will be followed up by     Discharge Disposition   Discharged to home  Condition at discharge: Stable    Hospital Course   Kerry Hoffmann is a 89 year old female with PMHx of A-fib, HTN, nonobstructive CAD and valvular heart disease, CKD 3, GERD, HLD who presented to [facility] with dyspnea, chest pressure and lower extremity edema consistent with acute decompensated CHF.  She was diuresed to euvolemia.    Hospital course was complicated by acute kidney injury thought to be in the setting of aggressive diuresis and now resolved.  She developed E. coli UTI which was treated with IV ceftriaxone x 3 days with resolution of symptoms.    Patient is discharged home.  She has declined TCU and home care services.        Consultations This Hospital Stay   CARDIOLOGY IP CONSULT  CORE CLINIC EVALUATION IP CONSULT  OCCUPATIONAL THERAPY ADULT IP CONSULT  CARE MANAGEMENT / SOCIAL WORK IP CONSULT  NEPHROLOGY IP CONSULT  PHYSICAL " THERAPY ADULT IP CONSULT  SOCIAL WORK IP CONSULT    Code Status   No CPR- Do NOT Intubate    Time Spent on this Encounter   I, Agnieszka Kemp MD, personally saw the patient today and spent greater than 30 minutes discharging this patient.       Agnieszka Kemp MD  [facility]  [address] [place] [id number]  Phone: [phone]  Fax: [phone]  ______________________________________________________________________    Physical Exam   Vital Signs: Temp: 97.8  F (36.6  C) Temp src: Oral BP: 128/60 Pulse: 66   Resp: 20 SpO2: 97 % O2 Device: None (Room air)    Weight: 154 lbs 6.4 oz  General Appearance: No acute distress  Respiratory: Easy respirations, lungs are clear bilaterally  Cardiovascular: Regular rate and rhythm.  Normal S1-S2.  No lower extremity edema  GI: Abdomen soft, nontender, bowel sounds present  Other: Awake and alert, nonfocal       Primary Care Physician   Kaylyn Bolanos    Discharge Orders      Reason for your hospital stay    Acute pulmonary edema, TIMOTHY, UTI     Follow-up and recommended labs and tests     Follow up with primary care provider, Kaylyn Bolanos, within 7 days for hospital follow- up.  The following labs/tests are recommended: BMP.     Activity    Your activity upon discharge: activity as tolerated     Diet    Follow this diet upon discharge: Current Diet:Orders Placed This Encounter      Fluid restriction 2000 ML FLUID      Combination Diet 2 gm NA Diet       Significant Results and Procedures   Most Recent 3 CBC's:  Recent Labs   Lab Test 08/31/24  0541 08/25/24  0652 08/05/24  1430 03/04/24  1441   WBC  --  5.5 6.0 4.7   HGB  --  12.7 11.0* 10.9*   MCV  --  91 92 89    283 301 345     Most Recent 3 BMP's:  Recent Labs   Lab Test 09/04/24  0527 09/03/24  0509 09/02/24  0439 09/01/24  0502   NA  --  132* 134* 135   POTASSIUM 4.9 4.7  4.7 4.8 4.1   CHLORIDE  --  99 96* 98   CO2  --  25 28 25   BUN  --  40.0* 38.7* 37.4*   CR   --  1.42* 1.57* 1.40*   ANIONGAP  --  8 10 12   ISAIAH  --  9.1 9.8 9.7   GLC  --  88 90 89     7-Day Micro Results       Collected Updated Procedure Result Status      09/01/2024 1046 09/02/2024 2230 Urine Culture [[id number]]    (Abnormal)   Urine, Midstream    Final result Component Value   Culture >100,000 CFU/mL Escherichia coli        Susceptibility        Escherichia coli      VIVIANE      Ampicillin <=2 ug/mL Susceptible      Ampicillin/ Sulbactam <=2 ug/mL Susceptible      Cefazolin <=4 ug/mL Susceptible  [1]       Cefepime <=1 ug/mL Susceptible      Cefoxitin <=4 ug/mL Susceptible      Ceftazidime <=1 ug/mL Susceptible      Ceftriaxone <=1 ug/mL Susceptible      Ciprofloxacin <=0.25 ug/mL Susceptible      Gentamicin <=1 ug/mL Susceptible      Levofloxacin <=0.12 ug/mL Susceptible      Nitrofurantoin <=16 ug/mL Susceptible      Piperacillin/Tazobactam <=4 ug/mL Susceptible      Tobramycin <=1 ug/mL Susceptible      Trimethoprim/Sulfamethoxazole <=1/19 ug/mL Susceptible                   [1]  Cefazolin VIVIANE breakpoints are for the treatment of uncomplicated urinary tract infections. For the treatment of systemic infections, please contact the laboratory for additional testing.                        ,   Results for orders placed or performed during the hospital encounter of 08/25/24   XR Chest Port 1 View    Narrative    EXAM: XR CHEST PORT 1 VIEW  LOCATION: [facility]  DATE: 8/25/2024    INDICATION: SOB respiratory failure  COMPARISON: 11/7/2023      Impression    IMPRESSION: Cardiomegaly with new pulmonary vascular congestion and increased interstitial markings suggesting pulmonary edema. No focal infiltrate or pleural effusion. Chronic rotator cuff arthropathy of the right shoulder.   Lung vent and perf (NM)    Narrative    EXAM: NM LUNG SCAN VENTILATION AND PERFUSION  LOCATION: [facility]  DATE: 8/26/2024    INDICATION: Shortness of breath. Elevated d-dimer.  Pulmonary embolism evaluation.  COMPARISON: None.  TECHNIQUE: 60.9 mCi Tc-99m DTPA inhaled. 8.1 mCi Tc-99m MAA, IV. Standard planar imaging during perfusion and ventilation portions of exam.    FINDINGS: Normal pulmonary ventilation. Normal pulmonary perfusion. No mismatched segmental perfusion defect.      Impression    IMPRESSION:    No evidence of pulmonary embolism.   XR Chest 1 View    Narrative    EXAM: XR CHEST 1 VIEW  LOCATION: [facility]  DATE: 2024    INDICATION: vq lung protocol  COMPARISON: 2024 and older studies      Impression    IMPRESSION: Lungs are clear. Resolution of the mild interstitial edema noted yesterday. Heart and pulmonary vascularity are normal. No signs of acute disease   Echocardiogram Complete     Value    LVEF  45%    Narrative    [id number]  [id number]  [id number]  [id number]^[id number]^[place] [address]     Name: DEVON REESE  MRN: [id number]  : 1935  Study Date: 2024 09:28 AM  Age: 89 yrs  Gender: Female  Patient Location: [facility]  Reason For Study: CHF  Ordering Physician: SANAM GREENE  Performed By: MARINA     BSA: 1.7 m2  Height: 60 in  Weight: 163 lb  HR: 74  BP: 197/82 mmHg  ______________________________________________________________________________  Procedure  Complete Portable Echo Adult. Definity (NDC #63288-594) given intravenously.  Technically difficult study.  ______________________________________________________________________________  Interpretation Summary     1. The left ventricle is moderately dilated. There is mild global hypokinesia  of the left ventricle. The visual ejection fraction is estimated at 45%.  2. Normal right ventricle size and systolic function.  3. The left atrium is moderate to severely dilated.  4. There is moderate (2+) mitral regurgitation.  5. There is mod-severe to severe (3-4+) aortic regurgitation.     Compared to study on 2023, there is  decline in LV systolic function and  significant increase in aortic and mitral valve regurgitation.  ______________________________________________________________________________  Left Ventricle  The left ventricle is moderately dilated. There is normal left ventricular  wall thickness. The visual ejection fraction is estimated at 45%. There is  mild global hypokinesia of the left ventricle.     Right Ventricle  Normal right ventricle size and systolic function.     Atria  The left atrium is moderate to severely dilated. The right atrium is  moderately dilated. There is no color Doppler evidence of an atrial shunt.     Mitral Valve  The mitral valve leaflets are mildly thickened. There is moderate (2+) mitral  regurgitation. There is no mitral valve stenosis.     Tricuspid Valve  There is moderate (2+) tricuspid regurgitation. There is no tricuspid  stenosis.     Aortic Valve  There is mild trileaflet aortic sclerosis. There is mod-severe to severe (3-  4+) aortic regurgitation. No aortic stenosis is present.     Pulmonic Valve  The pulmonic valve is not well seen, but is grossly normal. This degree of  valvular regurgitation is within normal limits. There is trace pulmonic  valvular regurgitation.     Vessels  The aorta root is normal. IVC diameter and respiratory changes fall into an  intermediate range suggesting an RA pressure of 8 mmHg.     Pericardium  There is no pericardial effusion.     ______________________________________________________________________________  MMode/2D Measurements & Calculations  IVSd: 1.1 cm  LVIDd: 6.3 cm  LVIDs: 4.5 cm  LVPWd: 0.83 cm  FS: 28.2 %     LV mass(C)d: 249.9 grams  LV mass(C)dI: 146.0 grams/m2  Ao root diam: 3.0 cm  asc Aorta Diam: 3.1 cm  LVOT diam: 2.0 cm  LVOT area: 3.1 cm2  Ao root diam index Ht(cm/m): 2.0  Ao root diam index BSA (cm/m2): 1.8  Asc Ao diam index BSA (cm/m2): 1.8  Asc Ao diam index Ht(cm/m): 2.0  EF Biplane: 49.6 %  LA Volume (BP): 66.9 ml     LA  Volume Index (BP): 39.1 ml/m2  LA Volume Indexed (AL/bp): 41.3 ml/m2  RV Base: 3.5 cm  RWT: 0.26  TAPSE: 2.1 cm     Time Measurements  Aortic HR: 79.0 BPM  MM HR: 73.0 BPM     Doppler Measurements & Calculations  MV E max ben: 120.3 cm/sec  MV A max ben: 70.8 cm/sec  MV E/A: 1.7  MV max P.3 mmHg  MV mean PG: 3.0 mmHg  MV V2 VTI: 29.0 cm  MVA(VTI): 1.7 cm2  MV dec slope: 937.0 cm/sec2  MV dec time: 0.13 sec  Ao V2 max: 135.7 cm/sec  Ao max P.0 mmHg  Ao V2 mean: 92.8 cm/sec  Ao mean P.0 mmHg  Ao V2 VTI: 27.8 cm  AGA(I,D): 1.8 cm2  AGA(V,D): 1.7 cm2  AI P1/2t: 270.7 msec  LV V1 max P.3 mmHg  LV V1 max: 74.9 cm/sec  LV V1 VTI: 16.1 cm  MR PISA: 1.2 cm2  MR ERO: 0.06 cm2  MR volume: 15.0 ml  CO(LVOT): 4.0 l/min  CI(LVOT): 2.3 l/min/m2  SV(LVOT): 50.6 ml  SI(LVOT): 29.6 ml/m2  PA V2 max: 59.0 cm/sec  PA max P.4 mmHg  PA acc time: 0.09 sec  TR max ben: 301.0 cm/sec  TR max P.2 mmHg  AV Ben Ratio (DI): 0.55  AGA Index (cm2/m2): 1.1  E/E' av.6  Lateral E/e': 16.8     Medial E/e': 18.4  RV S Ben: 14.4 cm/sec     ______________________________________________________________________________  Report approved by: Pavel Naylor 2024 10:49 AM             Discharge Medications   Current Discharge Medication List        START taking these medications    Details   bumetanide (BUMEX) 0.5 MG tablet Take 0.5 tablets (0.25 mg) by mouth every 48 hours.  Qty: 30 tablet, Refills: 2    Associated Diagnoses: Acute on chronic congestive heart failure, unspecified heart failure type (H)           CONTINUE these medications which have NOT CHANGED    Details   acetaminophen (TYLENOL) 500 MG tablet Take 1,000 mg by mouth 3 times daily      aspirin 81 MG EC tablet Take 81 mg by mouth daily      Calcium Carbonate Antacid (TUMS ULTRA 1000 PO) Take 1 chew tab by mouth nightly as needed      Cholecalciferol (VITAMIN D3) 50 MCG (2000 UT) CAPS Take 1 tablet by mouth daily      clonazePAM (KLONOPIN) 0.5 MG  tablet Take 0.5 mg by mouth 3 times daily      clopidogrel (PLAVIX) 75 MG tablet TAKE 1 TABLET(75 MG) BY MOUTH DAILY  Qty: 90 tablet, Refills: 1    Associated Diagnoses: Intracranial atherosclerosis      famotidine (PEPCID) 10 MG tablet Take 10 mg by mouth daily.      losartan (COZAAR) 50 MG tablet Take 50 mg by mouth 2 times daily      metoprolol succinate ER (TOPROL XL) 25 MG 24 hr tablet Take 50 mg by mouth at bedtime.      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           Allergies   Allergies   Allergen Reactions    Buspirone Shortness Of Breath    Ciprofloxacin Hives and Shortness Of Breath     Other reaction(s): Unknown    Cortisone      Other reaction(s): Throat Swelling/Closing, Unknown  Reaction 9-5-94      Hydrocodone-Acetaminophen Shortness Of Breath     Other reaction(s): Unknown    Lansoprazole Shortness Of Breath    Metoprolol Shortness Of Breath     Tolerates metoprolol succinate at home    Nedocromil      Other reaction(s): Throat Swelling/Closing    Niacin Shortness Of Breath     Other reaction(s): Unknown    Albuterol Other (See Comments)     Inhaler had extreme effect on nervous system      Amlodipine      Other reaction(s): Erythema, Unknown    Azithromycin      Other reaction(s): *Unknown, Unknown    Cefixime Diarrhea     Other reaction(s): Unknown  Has tolerated ceftriaxone    Cefprozil Nausea and Vomiting     Other reaction(s): Unknown  Has tolerated ceftriaxone    Cefuroxime      Other reaction(s): *Unknown  Has tolerated ceftriaxone    Cephalexin      Other reaction(s): *Unknown, Unknown  Has tolerated ceftriaxone    Contrast Dye      Other reaction(s): hives    Cyclobenzaprine      Other reaction(s): Sedation    Dextromethorphan-Guaifenesin Nausea     Other reaction(s): Headache    Diltiazem      Other  reaction(s): Erythema, Unknown  Ears face arms and chest red and on fire-body tremors      Erythromycin      Other reaction(s): Unknown    Esomeprazole      Other reaction(s): Headache    Fenofibrate Muscle Pain (Myalgia)     Other reaction(s): Unknown    Fluticasone-Salmeterol Other (See Comments)     Elevated blood pressure      Methylprednisolone     Metronidazole     Monosodium Glutamate     Moxifloxacin      Other reaction(s): Dizziness    Nifedipine      Other reaction(s): Edema  Took for 5-93 to 9-94 red and swollen leg      Nsaids      Other reaction(s): Unknown    Ofloxacin      Other reaction(s): *Unknown    Ondansetron      Other reaction(s): Constipation    Parsley Seed     Penicillins Unknown     She doesn't remember other than it occurred as a very young woman     Piper     Pirbuterol Other (See Comments)     Immediate extreme effect on nervous system      Pravastatin      Other reaction(s): Dizziness, Unknown    Pseudoephedrine      Other reaction(s): headache,nausea    Shellfish-Derived Products      Per pt 8/12/23    Simvastatin Muscle Pain (Myalgia)    Spironolactone      Other reaction(s): stomach cramps, nausea    Sulfamethoxazole-Trimethoprim      Other reaction(s): Unknown    Tramadol      Other reaction(s): red ears,high blood press.    Tramadol-Acetaminophen      Other reaction(s): Tachycardia, Unknown    Triamterene Other (See Comments)     Greatly bothered with sun-took medication for three years  Greatly bothered with sun      Diatrizoate Rash    Telmisartan Palpitations

## 2024-09-04 NOTE — PROGRESS NOTES
[facility]  [facility]   Follow up    Kerry Hoffmann   MRN: [id number]  : 1935   DOA: 2024   CC: ARF/CKD      Assessment and Plan:  89 year old female    ARF/CKD; has underlying CKD due to renal atrophy; some ARF due to diuresis and now CR has improved back to baseline range; stable again today  Volume status; presented with CHF exacerbation; responsive to IV lasix; now on oral diuretics (very minimal dosing); wt continues to be up today but no obvious fluid excess on exam so will not react to wt with more diuretics; d/w pt dynamic adjustment of diuretics at home based on weights and overall status  HTN: on losartan and BB and diuretics and acceptable with occas spiking readings  PAF; on BB  Multiple drug allergies  UTI; on abx; allergies make PO dosing challenging  severe intracranial atherosclerotic disease: on plavix and ASA  Chronic hyponatremia; stable at 132  GERD; on H2 blocker  Hepatic cyst,  around 4 cm  Anxiety: on clonazepam      Subjective  Still no bm; had increase urine overnoc; breathing about the same; no nausea  Objective    Vital signs in last 24 hours  Temp:  [97.5  F (36.4  C)-97.8  F (36.6  C)] 97.8  F (36.6  C)  Pulse:  [66-73] 66  Resp:  [20] 20  BP: (128-160)/(60-72) 128/60  SpO2:  [94 %-97 %] 97 %      Intake/Output last 3 shifts  I/O last 3 completed shifts:  In: -   Out: 1050 [Urine:1050]  Intake/Output this shift:  No intake/output data recorded.    Physical Exam  Alert/oriented x 3, awake, NAD  CV: RRR without murmur or rub  Lung: clear and equal; no extra sounds  Ab: soft and NT; not distended; normal bs  Ext: no edema and well perfused  Skin; no rash    Pertinent Labs     Last Renal Panel:  Sodium   Date Value Ref Range Status   2024 132 (L) 135 - 145 mmol/L Final     Potassium   Date Value Ref Range Status   2024 4.9 3.4 - 5.3 mmol/L Final   2022 4.6 3.5 - 5.0 mmol/L Final     Chloride  "  Date Value Ref Range Status   09/03/2024 99 98 - 107 mmol/L Final   09/04/2022 96 (L) 98 - 107 mmol/L Final     Carbon Dioxide (CO2)   Date Value Ref Range Status   09/03/2024 25 22 - 29 mmol/L Final   09/04/2022 24 22 - 31 mmol/L Final     Anion Gap   Date Value Ref Range Status   09/03/2024 8 7 - 15 mmol/L Final   09/04/2022 7 5 - 18 mmol/L Final     Glucose   Date Value Ref Range Status   09/03/2024 88 70 - 99 mg/dL Final   09/04/2022 95 70 - 125 mg/dL Final     Urea Nitrogen   Date Value Ref Range Status   09/03/2024 40.0 (H) 8.0 - 23.0 mg/dL Final   09/04/2022 21 8 - 28 mg/dL Final     Creatinine   Date Value Ref Range Status   09/03/2024 1.42 (H) 0.51 - 0.95 mg/dL Final     GFR Estimate   Date Value Ref Range Status   09/03/2024 36 (L) >60 mL/min/1.73m2 Final     Comment:     eGFR calculated using 2021 CKD-EPI equation.   06/22/2021 46 (L) >60 mL/min/1.73m2 Final     Calcium   Date Value Ref Range Status   09/03/2024 9.1 8.8 - 10.4 mg/dL Final     Comment:     Reference intervals for this test were updated on 7/16/2024 to reflect our healthy population more accurately. There may be differences in the flagging of prior results with similar values performed with this method. Those prior results can be interpreted in the context of the updated reference intervals.     Phosphorus   Date Value Ref Range Status   03/04/2024 3.4 2.5 - 4.5 mg/dL Final     Albumin   Date Value Ref Range Status   03/04/2024 4.3 3.5 - 5.2 g/dL Final   09/04/2022 3.4 (L) 3.5 - 5.0 g/dL Final     No results for input(s): \"HGB\" in the last 168 hours.       I reviewed all lab results  Petra Herrera MD  "

## 2024-09-04 NOTE — PLAN OF CARE
"  Problem: Adult Inpatient Plan of Care  Goal: Plan of Care Review  Description: The Plan of Care Review/Shift note should be completed every shift.  The Outcome Evaluation is a brief statement about your assessment that the patient is improving, declining, or no change.  This information will be displayed automatically on your shift  note.  Outcome: Adequate for Care Transition  Goal: Patient-Specific Goal (Individualized)  Description: You can add care plan individualizations to a care plan. Examples of Individualization might be:  \"Parent requests to be called daily at 9am for status\", \"I have a hard time hearing out of my right ear\", or \"Do not touch me to wake me up as it startles  me\".  Outcome: Adequate for Care Transition  Goal: Absence of Hospital-Acquired Illness or Injury  Outcome: Adequate for Care Transition  Intervention: Identify and Manage Fall Risk  Recent Flowsheet Documentation  Taken 9/4/2024 1233 by Josee aGrdner, RN  Safety Promotion/Fall Prevention:   activity supervised   assistive device/personal items within reach   clutter free environment maintained   increase visualization of patient   lighting adjusted   mobility aid in reach   nonskid shoes/slippers when out of bed   patient and family education   room door open   room near nurse's station   room organization consistent   safety round/check completed  Taken 9/4/2024 0735 by Josee Gardner RN  Safety Promotion/Fall Prevention:   activity supervised   assistive device/personal items within reach   clutter free environment maintained   increase visualization of patient   lighting adjusted   mobility aid in reach   nonskid shoes/slippers when out of bed   patient and family education   room door open   room near nurse's station   room organization consistent   safety round/check completed  Intervention: Prevent Skin Injury  Recent Flowsheet Documentation  Taken 9/4/2024 1233 by Josee Gardner, RN  Body Position: position changed " independently  Skin Protection: adhesive use limited  Device Skin Pressure Protection: absorbent pad utilized/changed  Taken 9/4/2024 1000 by Joese Gardner RN  Body Position:   heels elevated   sitting up in bed  Taken 9/4/2024 0735 by Josee Gardner RN  Body Position: position changed independently  Skin Protection: adhesive use limited  Device Skin Pressure Protection: absorbent pad utilized/changed  Intervention: Prevent Infection  Recent Flowsheet Documentation  Taken 9/4/2024 1233 by Josee Gardner RN  Infection Prevention:   environmental surveillance performed   rest/sleep promoted  Taken 9/4/2024 0735 by Josee Gardner RN  Infection Prevention:   environmental surveillance performed   rest/sleep promoted  Goal: Optimal Comfort and Wellbeing  Outcome: Adequate for Care Transition  Goal: Readiness for Transition of Care  Outcome: Adequate for Care Transition   Goal Outcome Evaluation:      Will go back to Assisted Living facility discharge instructions discussed  Medication schedules and purposes discussed and OOP appointment schedules discussed and verbalized understanding IV removed By Margo CAN all questions were answered  . Nephew will come to pick her up per patient she did have a large BM today after a dose of Senna 2 tablets .

## 2024-09-04 NOTE — PROGRESS NOTES
Care Management Discharge Note    Discharge Date: 09/04/2024       Discharge Disposition: Home    Discharge Services: None    Discharge DME: None    Discharge Transportation: family or friend will provide    Private pay costs discussed: Not applicable    Does the patient's insurance plan have a 3 day qualifying hospital stay waiver?  Yes     Which insurance plan 3 day waiver is available? [employer]    Will the waiver be used for post-acute placement? No    PAS Confirmation Code: NA  Patient/family educated on Medicare website which has current facility and service quality ratings:      Education Provided on the Discharge Plan: Yes (per care team)  Persons Notified of Discharge Plans: per care team  Patient/Family in Agreement with the Plan: yes    Handoff Referral Completed: Yes, [facility] PCP: Internal handoff referral completed  Additional Information:  Patient discharging home.  Declines home care and TCU.  Returning to ILF.      No CM needs identified.     Milagro Liu RN

## 2024-09-04 NOTE — PLAN OF CARE
Occupational Therapy Discharge Summary    Reason for therapy discharge:    Discharging home.    Progress towards therapy goal(s). See goals on Care Plan in [facility] electronic health record for goal details.  Progressing towards goals.    Therapy recommendation(s):    Recommendation has been for TCU-pt declines. Home w/assist; home OT then recommended.

## 2024-09-05 ENCOUNTER — TELEPHONE (OUTPATIENT)
Dept: CARDIOLOGY | Facility: CLINIC | Age: 89
End: 2024-09-05
Payer: MEDICARE

## 2024-09-05 NOTE — TELEPHONE ENCOUNTER
[facility] Call Center    Phone Message    May a detailed message be left on voicemail: no     Reason for Call: Other: Pt's nephew Riley stated he would like to clarify dietary restrictions given inpatient.  Pt seems to have conflicted understanding from what discharge papers stated.  Please call Riley to clarify.       Action Taken: Other: cardio    Travel Screening: Not Applicable     Date of Service:

## 2024-09-05 NOTE — TELEPHONE ENCOUNTER
Patient's nephew Riley was left a detailed message on identified VM with sodium and fluid restrictions. 50-60 oz per day fluid intake goals. 600mg sodium per meal, not to exceed 2000mg per day sodium intake.  Emma ACUNA RN BSN, CHFN, PCCN-K

## 2024-09-12 ENCOUNTER — LAB REQUISITION (OUTPATIENT)
Dept: LAB | Facility: CLINIC | Age: 89
End: 2024-09-12
Payer: MEDICARE

## 2024-09-12 ENCOUNTER — TRANSFERRED RECORDS (OUTPATIENT)
Dept: HEALTH INFORMATION MANAGEMENT | Facility: CLINIC | Age: 89
End: 2024-09-12

## 2024-09-12 DIAGNOSIS — I13.0 HYPERTENSIVE HEART AND CHRONIC KIDNEY DISEASE WITH HEART FAILURE AND STAGE 1 THROUGH STAGE 4 CHRONIC KIDNEY DISEASE, OR UNSPECIFIED CHRONIC KIDNEY DISEASE (H): ICD-10-CM

## 2024-09-12 DIAGNOSIS — N18.32 CHRONIC KIDNEY DISEASE, STAGE 3B (H): ICD-10-CM

## 2024-09-12 LAB
ERYTHROCYTE [DISTWIDTH] IN BLOOD BY AUTOMATED COUNT: 12.2 % (ref 10–15)
HCT VFR BLD AUTO: 31.8 % (ref 35–47)
HGB BLD-MCNC: 10.6 G/DL (ref 11.7–15.7)
MCH RBC QN AUTO: 29.4 PG (ref 26.5–33)
MCHC RBC AUTO-ENTMCNC: 33.3 G/DL (ref 31.5–36.5)
MCV RBC AUTO: 88 FL (ref 78–100)
PLATELET # BLD AUTO: 368 10E3/UL (ref 150–450)
RBC # BLD AUTO: 3.6 10E6/UL (ref 3.8–5.2)
WBC # BLD AUTO: 5.9 10E3/UL (ref 4–11)

## 2024-09-12 PROCEDURE — 80048 BASIC METABOLIC PNL TOTAL CA: CPT | Mod: ORL | Performed by: FAMILY MEDICINE

## 2024-09-12 PROCEDURE — 85027 COMPLETE CBC AUTOMATED: CPT | Mod: ORL | Performed by: FAMILY MEDICINE

## 2024-09-13 LAB
ANION GAP SERPL CALCULATED.3IONS-SCNC: 10 MMOL/L (ref 7–15)
BUN SERPL-MCNC: 28.1 MG/DL (ref 8–23)
CALCIUM SERPL-MCNC: 9.2 MG/DL (ref 8.8–10.4)
CHLORIDE SERPL-SCNC: 88 MMOL/L (ref 98–107)
CREAT SERPL-MCNC: 1.49 MG/DL (ref 0.51–0.95)
EGFRCR SERPLBLD CKD-EPI 2021: 34 ML/MIN/1.73M2
GLUCOSE SERPL-MCNC: 117 MG/DL (ref 70–99)
HCO3 SERPL-SCNC: 23 MMOL/L (ref 22–29)
POTASSIUM SERPL-SCNC: 4.8 MMOL/L (ref 3.4–5.3)
SODIUM SERPL-SCNC: 121 MMOL/L (ref 135–145)

## 2024-09-16 ENCOUNTER — LAB REQUISITION (OUTPATIENT)
Dept: LAB | Facility: CLINIC | Age: 89
End: 2024-09-16
Payer: MEDICARE

## 2024-09-16 DIAGNOSIS — I13.0 HYPERTENSIVE HEART AND CHRONIC KIDNEY DISEASE WITH HEART FAILURE AND STAGE 1 THROUGH STAGE 4 CHRONIC KIDNEY DISEASE, OR UNSPECIFIED CHRONIC KIDNEY DISEASE (H): ICD-10-CM

## 2024-09-16 PROCEDURE — 80048 BASIC METABOLIC PNL TOTAL CA: CPT | Mod: ORL | Performed by: FAMILY MEDICINE

## 2024-09-17 LAB
ANION GAP SERPL CALCULATED.3IONS-SCNC: 8 MMOL/L (ref 7–15)
BUN SERPL-MCNC: 24.7 MG/DL (ref 8–23)
CALCIUM SERPL-MCNC: 9.2 MG/DL (ref 8.8–10.4)
CHLORIDE SERPL-SCNC: 89 MMOL/L (ref 98–107)
CREAT SERPL-MCNC: 1.24 MG/DL (ref 0.51–0.95)
EGFRCR SERPLBLD CKD-EPI 2021: 42 ML/MIN/1.73M2
GLUCOSE SERPL-MCNC: 99 MG/DL (ref 70–99)
HCO3 SERPL-SCNC: 23 MMOL/L (ref 22–29)
POTASSIUM SERPL-SCNC: 4.8 MMOL/L (ref 3.4–5.3)
SODIUM SERPL-SCNC: 120 MMOL/L (ref 135–145)

## 2024-09-23 ENCOUNTER — LAB REQUISITION (OUTPATIENT)
Dept: LAB | Facility: CLINIC | Age: 89
End: 2024-09-23
Payer: MEDICARE

## 2024-09-23 ENCOUNTER — TRANSFERRED RECORDS (OUTPATIENT)
Dept: HEALTH INFORMATION MANAGEMENT | Facility: CLINIC | Age: 89
End: 2024-09-23

## 2024-09-23 DIAGNOSIS — I13.0 HYPERTENSIVE HEART AND CHRONIC KIDNEY DISEASE WITH HEART FAILURE AND STAGE 1 THROUGH STAGE 4 CHRONIC KIDNEY DISEASE, OR UNSPECIFIED CHRONIC KIDNEY DISEASE (H): ICD-10-CM

## 2024-09-23 PROCEDURE — 80048 BASIC METABOLIC PNL TOTAL CA: CPT | Mod: ORL | Performed by: FAMILY MEDICINE

## 2024-09-24 DIAGNOSIS — I67.2 INTRACRANIAL ATHEROSCLEROSIS: ICD-10-CM

## 2024-09-24 LAB
ANION GAP SERPL CALCULATED.3IONS-SCNC: 13 MMOL/L (ref 7–15)
BUN SERPL-MCNC: 25.1 MG/DL (ref 8–23)
CALCIUM SERPL-MCNC: 9.3 MG/DL (ref 8.8–10.4)
CHLORIDE SERPL-SCNC: 90 MMOL/L (ref 98–107)
CREAT SERPL-MCNC: 1.25 MG/DL (ref 0.51–0.95)
EGFRCR SERPLBLD CKD-EPI 2021: 42 ML/MIN/1.73M2
GLUCOSE SERPL-MCNC: 90 MG/DL (ref 70–99)
HCO3 SERPL-SCNC: 21 MMOL/L (ref 22–29)
POTASSIUM SERPL-SCNC: 4.7 MMOL/L (ref 3.4–5.3)
SODIUM SERPL-SCNC: 124 MMOL/L (ref 135–145)

## 2024-09-24 RX ORDER — CLOPIDOGREL BISULFATE 75 MG/1
75 TABLET ORAL DAILY
Qty: 90 TABLET | Refills: 1 | Status: SHIPPED | OUTPATIENT
Start: 2024-09-24

## 2024-09-24 NOTE — TELEPHONE ENCOUNTER
Refill request for: clopidogrel (PLAVIX) 75 MG tablet              Directions: TAKE 1 TABLET(75 MG) BY MOUTH DAILY      LOV: 2/27/24  NOV: 1/24/25     90 day supply with 1 refills Medication T'd for review and signature  Lulu Mendoza CMA on 9/24/2024 at 9:21 AM  [facility]

## 2024-10-25 ENCOUNTER — TRANSFERRED RECORDS (OUTPATIENT)
Dept: HEALTH INFORMATION MANAGEMENT | Facility: CLINIC | Age: 89
End: 2024-10-25

## 2024-10-25 ENCOUNTER — LAB REQUISITION (OUTPATIENT)
Dept: LAB | Facility: CLINIC | Age: 89
End: 2024-10-25
Payer: MEDICARE

## 2024-10-25 DIAGNOSIS — I13.0 HYPERTENSIVE HEART AND CHRONIC KIDNEY DISEASE WITH HEART FAILURE AND STAGE 1 THROUGH STAGE 4 CHRONIC KIDNEY DISEASE, OR UNSPECIFIED CHRONIC KIDNEY DISEASE (H): ICD-10-CM

## 2024-10-25 PROCEDURE — 80048 BASIC METABOLIC PNL TOTAL CA: CPT | Mod: ORL | Performed by: FAMILY MEDICINE

## 2024-10-26 LAB
ANION GAP SERPL CALCULATED.3IONS-SCNC: 10 MMOL/L (ref 7–15)
BUN SERPL-MCNC: 19.7 MG/DL (ref 8–23)
CALCIUM SERPL-MCNC: 9.1 MG/DL (ref 8.8–10.4)
CHLORIDE SERPL-SCNC: 93 MMOL/L (ref 98–107)
CREAT SERPL-MCNC: 1.19 MG/DL (ref 0.51–0.95)
EGFRCR SERPLBLD CKD-EPI 2021: 44 ML/MIN/1.73M2
GLUCOSE SERPL-MCNC: 94 MG/DL (ref 70–99)
HCO3 SERPL-SCNC: 23 MMOL/L (ref 22–29)
POTASSIUM SERPL-SCNC: 3.8 MMOL/L (ref 3.4–5.3)
SODIUM SERPL-SCNC: 126 MMOL/L (ref 135–145)

## 2024-10-28 ENCOUNTER — OFFICE VISIT (OUTPATIENT)
Dept: CARDIOLOGY | Facility: CLINIC | Age: 89
End: 2024-10-28
Attending: INTERNAL MEDICINE
Payer: MEDICARE

## 2024-10-28 ENCOUNTER — HOSPITAL ENCOUNTER (EMERGENCY)
Facility: HOSPITAL | Age: 89
Discharge: HOME | End: 2024-10-28
Attending: STUDENT IN AN ORGANIZED HEALTH CARE EDUCATION/TRAINING PROGRAM | Admitting: STUDENT IN AN ORGANIZED HEALTH CARE EDUCATION/TRAINING PROGRAM
Payer: MEDICARE

## 2024-10-28 ENCOUNTER — TRANSFERRED RECORDS (OUTPATIENT)
Dept: HEALTH INFORMATION MANAGEMENT | Facility: CLINIC | Age: 89
End: 2024-10-28

## 2024-10-28 VITALS
DIASTOLIC BLOOD PRESSURE: 79 MMHG | HEART RATE: 69 BPM | TEMPERATURE: 98.3 F | WEIGHT: 155.2 LBS | OXYGEN SATURATION: 97 % | RESPIRATION RATE: 22 BRPM | BODY MASS INDEX: 30.47 KG/M2 | SYSTOLIC BLOOD PRESSURE: 179 MMHG | HEIGHT: 60 IN

## 2024-10-28 VITALS — HEART RATE: 70 BPM | DIASTOLIC BLOOD PRESSURE: 75 MMHG | RESPIRATION RATE: 16 BRPM | SYSTOLIC BLOOD PRESSURE: 217 MMHG

## 2024-10-28 DIAGNOSIS — E87.1 HYPONATREMIA: ICD-10-CM

## 2024-10-28 DIAGNOSIS — I10 HYPERTENSION, UNSPECIFIED TYPE: ICD-10-CM

## 2024-10-28 DIAGNOSIS — N17.9 AKI (ACUTE KIDNEY INJURY) (H): ICD-10-CM

## 2024-10-28 DIAGNOSIS — I50.9 ACUTE DECOMPENSATED HEART FAILURE (H): Primary | ICD-10-CM

## 2024-10-28 DIAGNOSIS — N18.4 CKD (CHRONIC KIDNEY DISEASE) STAGE 4, GFR 15-29 ML/MIN (H): ICD-10-CM

## 2024-10-28 DIAGNOSIS — R60.9 EDEMA, UNSPECIFIED TYPE: Primary | ICD-10-CM

## 2024-10-28 LAB
ANION GAP SERPL CALCULATED.3IONS-SCNC: 11 MMOL/L (ref 7–15)
BUN SERPL-MCNC: 17.4 MG/DL (ref 8–23)
CALCIUM SERPL-MCNC: 9.6 MG/DL (ref 8.8–10.4)
CHLORIDE SERPL-SCNC: 94 MMOL/L (ref 98–107)
CREAT SERPL-MCNC: 1.12 MG/DL (ref 0.51–0.95)
EGFRCR SERPLBLD CKD-EPI 2021: 47 ML/MIN/1.73M2
GLUCOSE SERPL-MCNC: 89 MG/DL (ref 70–99)
HCO3 SERPL-SCNC: 25 MMOL/L (ref 22–29)
HOLD SPECIMEN: NORMAL
MAGNESIUM SERPL-MCNC: 1.7 MG/DL (ref 1.7–2.3)
POTASSIUM SERPL-SCNC: 4.4 MMOL/L (ref 3.4–5.3)
SODIUM SERPL-SCNC: 130 MMOL/L (ref 135–145)
TROPONIN T SERPL HS-MCNC: 20 NG/L

## 2024-10-28 PROCEDURE — G2211 COMPLEX E/M VISIT ADD ON: HCPCS | Performed by: INTERNAL MEDICINE

## 2024-10-28 PROCEDURE — 36415 COLL VENOUS BLD VENIPUNCTURE: CPT | Performed by: FAMILY MEDICINE

## 2024-10-28 PROCEDURE — 84484 ASSAY OF TROPONIN QUANT: CPT | Performed by: STUDENT IN AN ORGANIZED HEALTH CARE EDUCATION/TRAINING PROGRAM

## 2024-10-28 PROCEDURE — 80048 BASIC METABOLIC PNL TOTAL CA: CPT | Performed by: FAMILY MEDICINE

## 2024-10-28 PROCEDURE — 83735 ASSAY OF MAGNESIUM: CPT | Performed by: FAMILY MEDICINE

## 2024-10-28 PROCEDURE — 99215 OFFICE O/P EST HI 40 MIN: CPT | Performed by: INTERNAL MEDICINE

## 2024-10-28 PROCEDURE — 99283 EMERGENCY DEPT VISIT LOW MDM: CPT

## 2024-10-28 RX ORDER — FUROSEMIDE 20 MG/1
1 TABLET ORAL
COMMUNITY
Start: 2023-11-28

## 2024-10-28 ASSESSMENT — COLUMBIA-SUICIDE SEVERITY RATING SCALE - C-SSRS
1. IN THE PAST MONTH, HAVE YOU WISHED YOU WERE DEAD OR WISHED YOU COULD GO TO SLEEP AND NOT WAKE UP?: NO
2. HAVE YOU ACTUALLY HAD ANY THOUGHTS OF KILLING YOURSELF IN THE PAST MONTH?: NO
6. HAVE YOU EVER DONE ANYTHING, STARTED TO DO ANYTHING, OR PREPARED TO DO ANYTHING TO END YOUR LIFE?: NO

## 2024-10-28 ASSESSMENT — ACTIVITIES OF DAILY LIVING (ADL)
ADLS_ACUITY_SCORE: 0
ADLS_ACUITY_SCORE: 0

## 2024-10-28 NOTE — ED NOTES
Expected Patient Referral to ED  3:09 PM    Referring Clinic/Provider:  Dr. Gonzalez, cardiologist    Reason for referral/Clinical facts:  Patient has a history of hypertension is on metoprolol and losartan.  She went to see her nephrologist this morning and blood pressures were 175.  Went to see her cardiologist and blood pressures x 4 were elevated with most recent 216/ 76; losartan this am. Metoprolol 50 to 75 mg changed today with nephrology. No symptoms.  Sent in for IV blood pressure medicines.  Cardiologist does not expect patient to be admitted but would like her blood pressure to be improved.    Recommendations provided:  Send to ED for further evaluation    Caller was informed that this institution does possess the capabilities and/or resources to provide for patient and should be transferred to our facility.    Discussed that if direct admit is sought and any hurdles are encountered, this ED would be happy to see the patient and evaluate.    Informed caller that recommendations provided are recommendations based only on the facts provided and that they responsible to accept or reject the advice, or to seek a formal in person consultation as needed and that this ED will see/treat patient should they arrive.      Gerald Still MD  [facility] EMERGENCY DEPARTMENT  [address] [place] [id number]  [phone]       Gerald Still MD  10/28/24 1069

## 2024-10-28 NOTE — ED TRIAGE NOTES
Pt presents to triage in wheelchair with her nephew. Pt was seen at nephrologist and cardiologist offices today. BP noted to be elevated there. Pt took BP meds both this morning and while at the cardiologist office at 1500.    Pt denies headache, dizziness, unilateral weakness, chest pain, or AMS.     Triage Assessment (Adult)       Row Name 10/28/24 1543          Triage Assessment    Airway WDL WDL        Respiratory WDL    Respiratory WDL WDL        Skin Circulation/Temperature WDL    Skin Circulation/Temperature WDL WDL        Cardiac WDL    Cardiac WDL WDL        Peripheral/Neurovascular WDL    Peripheral Neurovascular WDL WDL        Cognitive/Neuro/Behavioral WDL    Cognitive/Neuro/Behavioral WDL WDL

## 2024-10-28 NOTE — ED PROVIDER NOTES
EMERGENCY DEPARTMENT ENCOUNTER       ED Course & Medical Decision Making     5:43 PM I met with the patient to gather history and perform an initial exam.     Final Impression  89 year old female presents for evaluation of asymptomatic hypertension.  Patient has a longstanding history of hypertension, is on chronic losartan and metoprolol, as well as some chronic Klonopin 3 times daily for anxiety, reports taking all of these today.  Initially went to nephrology appointment this morning where her blood pressure was elevated, went to her cardiology appointments and the pressure was elevated even further, documented as 217/75, was sent to the ED for evaluation.  Patient denies any symptoms whatsoever.  Patient states that she has spent all day at doctors appointments, has had very little to eat or drink all day and her afternoon medications were little bit delayed due to the appointments.  Patient clinically nontoxic-appearing, denies any chest pain, shortness of breath headache, vision changes,, new leg swelling, or any other demonstrable symptoms of hypertensive emergency.  Sounds like her nephrologist did make some changes to her blood pressure medications today to increase them.  Patient observed in the ED, troponin at baseline of about 20, kidney function also at baseline.  Mild chronic hyponatremia though is slowly improving today.  Patient remains totally asymptomatic.  No interventions other than giving her some juice and crackers in the ED, blood pressure spontaneously improved from the 220s to the 170s, patient still without any symptoms.  Will recommend she continue to take her home blood pressure medications as prescribed at home and follow-up closely with her regular outpatient doctors.  All questions answered.  Will be discharged home with family.  Patient states she feels great, discussed and offered possible admission for her asymptomatic hypertension, though patient pretty quickly declined, states  that she feels good and would like to go home.    Prior to making a final disposition on this patient the results of patient's tests and other diagnostic studies were discussed with the patient. All questions were answered. Patient expressed understanding of the plan and was amenable to it.    Medical Decision Making  Supplemental history from: patient   External Record(s) reviewed as documented below;  10/28/2024, cardiology clinic note, seen for routine follow-up of her heart failure, medications reviewed,  Chart documentation includes differential considered and any EKGs or imaging independently interpreted by provider, where specified.  DDx considered but not limited to: Kidney dysfunction, ACS, hypertensive urgency, hypertensive emergency, asymptomatic hypertension  Considered admission / inpatient observation / escalation of cares for: Hypertension  Care impacted by chronic illness: Hyperlipidemia, congestive heart failure, stage III CKD, mental health  Disposition considerations: Discharge. No recommendations on prescription strength medication(s). See documentation for any additional details.        Medications - No data to display    Discharge Medication List as of 10/28/2024  7:44 PM        Discharge Medication List as of 10/28/2024  7:44 PM          Final Impression     1. Hypertension, unspecified type        Chief Complaint     Chief Complaint   Patient presents with    Hypertension     Pt presents to triage in wheelchair with her nephew. Pt was seen at nephrologist and cardiologist offices today. BP noted to be elevated there. Pt took BP meds both this morning and while at the cardiologist office at 1500.    Pt denies headache, dizziness, unilateral weakness, chest pain, or AMS.    HPI     Kerry Hoffmann is a 89 year old female who presents for evaluation of hypertension.     Patient reports she was seen by cardiology and nephrology today where her blood pressure was high (217/75). She was told to  "come to the emergency department due to this. She says she has been feeling great in general and that her blood pressure normally runs high in between 149-156. She has had no changes to her blood pressure medications or any missed doses. She took her morning medications (at 0930) and afternoon medications (at 1500). She has not eaten or drank anything all day and says she is on a no sodium diet.     I, Esther Merino am serving as a scribe to document services personally performed by Dr. Eddi Wilburn MD, based on my observation and the provider's statements to me. I, Dr. Eddi Wilburn MD attest that Esther Merino is acting in a scribe capacity, has observed my performance of the services and has documented them in accordance with my direction.    Physical Exam     BP (!) 179/79   Pulse 69   Temp 98.3  F (36.8  C) (Oral)   Resp 22   Ht 1.53 m (5' 0.25\")   Wt 70.4 kg (155 lb 3.2 oz)   LMP  (LMP Unknown)   SpO2 97%   BMI 30.06 kg/m    Constitutional: Awake, alert, in no acute distress.  Head: Normocephalic, atraumatic.  ENT: Mucous membranes moist.  Eyes: Conjunctiva normal.  Respiratory: Respirations even, unlabored, in no acute respiratory distress.  Lungs clear to auscultation bilaterally, no crackles at lung bases.  Cardiovascular: Regular rate and rhythm. Good peripheral perfusion.    GI: Abdomen soft, non-tender.  Musculoskeletal: Moves all 4 extremities equally.  Integument: Warm, dry.  Neurologic: Alert & oriented x 3. Normal speech. Grossly normal motor and sensory function. No focal deficits noted.  Psychiatric: Normal mood    Labs & Imaging     Results for orders placed or performed during the hospital encounter of 10/28/24   Extra Blue Top Tube   Result Value Ref Range    Hold Specimen JIC    Extra Red Top Tube   Result Value Ref Range    Hold Specimen JIC    Extra Purple Top Tube   Result Value Ref Range    Hold Specimen JIC    Basic metabolic panel   Result Value Ref Range    Sodium 130 (L) 135 - " 145 mmol/L    Potassium 4.4 3.4 - 5.3 mmol/L    Chloride 94 (L) 98 - 107 mmol/L    Carbon Dioxide (CO2) 25 22 - 29 mmol/L    Anion Gap 11 7 - 15 mmol/L    Urea Nitrogen 17.4 8.0 - 23.0 mg/dL    Creatinine 1.12 (H) 0.51 - 0.95 mg/dL    GFR Estimate 47 (L) >60 mL/min/1.73m2    Calcium 9.6 8.8 - 10.4 mg/dL    Glucose 89 70 - 99 mg/dL   Result Value Ref Range    Magnesium 1.7 1.7 - 2.3 mg/dL   Result Value Ref Range    Troponin T, High Sensitivity 20 (H) <=14 ng/L        Eddi Wilburn MD  10/28/24 2029

## 2024-10-28 NOTE — PROGRESS NOTES
HEART CARE NOTE          Assessment/Recommendations     1. HmrEF   Assessment / Plan  Renal function stable to improved ; denies HF symptoms of orthopnea, PND, fluid retention/edema - no changes to diuretic regimen this time  Patient is high risk for adverse cardiac events 2/2 advanced age, frailty, HTN, elevated NTproBNP  GDMT as detailed below; mainstay of treatment for HFpEF includes diuretics and adequate BP control (class I) and SGLT2-I (class 2a); additional medical therapy (ARNI, MRA, ARB) demonstrated less robust evidence for indication but may be considered per guideline recommendations (2b); no indication for BBlockers      Current Pharmacotherapy AHA Guideline-Directed Medical Therapy   Losartan  50 mg BID ARNI/ARB   Spironolactone not started  MRA   SGLT2 inhibitor:not started SGLT2-I    Furosemide 20 mg two times per week Loop diuretic       2. HTN Emergency  Assessment / Plan  Difficult to control; seem by nephrology today who adjusted diuretic and anti-hypotensives - BP remains significantly elevated on multiple rechecks; instructed patient to ED for further management     3. non-obstructive CAD  Assessment / Plan  Mild non-obstructive CAD - continue aggressive risk factor modification  Denies chest pain or anginal equivalents      4. TIMOTHY on CKD  Assessment / Plan  Resolving; follows with Dr. Bautista at [facility] - no changes to regimen at this time     5. Valvular heart disease  Assessment / Plan  Progressive MR and AI - will refer to valve clinic if patient is amenable    45 minutes spent reviewing prior records (including documentation, laboratory studies, cardiac testing/imaging), history and physical exam, planning, and subsequent documentation.    The longitudinal plan of care for HFmrEF was addressed during this visit. Due to the added complexity in care, I will continue to supportMs. Kerry Hoffmann   in the subsequent management of this condition(s) and with the ongoing continuity of care of this  condition(s).      History of Present Illness/Subjective    Ms. Kerry Hoffmann is a 89 year old female with a PMHx significant for (per Epic notation) HTN, heart disease, GERD, and HLD, presenting with sob, chest pressure, legs edema. C/w CHF.     Today, Mrs. Dotson denies acute cardiac events or complaints; Management plan as detailed above     ECG: Personally reviewed. normal sinus rhythm, nonspecific ST and T waves changes, PAC's noted.     ECHO (personally reviewed 8/26/24):   1. The left ventricle is moderately dilated. There is mild global hypokinesia  of the left ventricle. The visual ejection fraction is estimated at 45%.  2. Normal right ventricle size and systolic function.  3. The left atrium is moderate to severely dilated.  4. There is moderate (2+) mitral regurgitation.  5. There is mod-severe to severe (3-4+) aortic regurgitation.     Compared to study on 8/12/2023, there is decline in LV systolic function and  significant increase in aortic and mitral valve regurgitation.    Lab results: personally reviewed October 28, 2024; notable for resolving TIMOTHY    Medical history and pertinent documents reviewed in Care Everywhere please where applicable see details above        Physical Examination Review of Systems   BP (!) 217/75 (BP Location: Right arm, Patient Position: Sitting, Cuff Size: Adult Small)   Pulse 70   Resp 16   LMP  (LMP Unknown)   There is no height or weight on file to calculate BMI.  Wt Readings from Last 3 Encounters:   09/04/24 70 kg (154 lb 6.4 oz)   02/27/24 73.9 kg (163 lb)   11/11/23 77.8 kg (171 lb 9.6 oz)     General Appearance:   no distress, normal body habitus   ENT/Mouth: membranes moist, no oral lesions or bleeding gums.      EYES:  no scleral icterus, normal conjunctivae   Neck: no carotid bruits or thyromegaly   Chest/Lungs:   lungs are clear to auscultation, no rales or wheezing, equal chest wall expansion    Cardiovascular:   Regular. Normal first and second heart  sounds with no murmurs, rubs, or gallops; the carotid, radial and posterior tibial pulses are intact, no JVD or LE edema bilaterally    Abdomen:  no organomegaly, masses, bruits, or tenderness; bowel sounds are present   Extremities: no cyanosis or clubbing   Skin: no xanthelasma, warm.    Neurologic: NAD     Psychiatric: alert and oriented x3, calm     A complete 10 systems ROS was reviewed  And is negative except what is listed in the HPI.          Medical History  Surgical History Family History Social History   Past Medical History:   Diagnosis Date    Anxiety     takes clonazepam    Asthma     per [facility]     Chronic kidney disease     stage 3 per [facility]     CKD (chronic kidney disease)     Clostridium difficile colitis 11/01/2016    Clostridium difficile infection     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    Clostridium enterocolitis 10/27/2016    CTS (carpal tunnel syndrome)     GERD (gastroesophageal reflux disease)     per [facility]     Hiatal hernia     small per [facility]     Hyperlipidemia     Hyperlipidemia     Hyperlipidemia     Hypertension     Hypertension     Osteoarthritis of knee     per [facility]     Spinal stenosis     per [facility]     Vitamin D deficiency     per [facility]    Past Surgical History:   Procedure Laterality Date    BREAST SURGERY  1982    breast tumor per [facility]     CATARACT EXTRACTION  07/2005    per [facility]     COLONOSCOPY N/A 11/20/2023    Procedure: COLONOSCOPY with biopsies and polypectomy;  Surgeon: David Barreto MD;  Location: [facility] Main OR    CV CORONARY ANGIOGRAM N/A 8/11/2023    Procedure: Coronary Angiogram;  Surgeon: Ap Arroyo MD;  Location: [facility] LAB CV    CV LEFT HEART CATH N/A 8/11/2023    Procedure: Left Heart Catheterization;  Surgeon: Ap Arroyo MD;  Location: [facility] LAB CV    ORIF TIBIA & FIBULA FRACTURES  1988    per [facility]     OTHER SURGICAL HISTORY  10/2004    hole in retinaper [facility]     OTHER SURGICAL HISTORY  05/03/2015    fecal transplantper [facility]      [place] ESOPHAGOGASTRODUODENOSCOPY TRANSORAL DIAGNOSTIC N/A 9/11/2020    Procedure: ESOPHAGOGASTRODUODENOSCOPY With gastric biopsies;  Surgeon: David Reyes MD;  [place] [facility] [place]    TONSILLECTOMY      TONSILLECTOMY & ADENOIDECTOMY  1963    no family history of premature coronary artery disease Social History     Socioeconomic History    Marital status:      Spouse name: Not on file    Number of children: Not on file    Years of education: Not on file    Highest education level: Not on file   Occupational History    Not on file   Tobacco Use    Smoking status: Former     Current packs/day: 3.00     Average packs/day: 3.0 packs/day for 35.0 years (105.0 ttl pk-yrs)     Types: Cigarettes    Smokeless tobacco: Never    Tobacco comments:     quit 1968   Substance and Sexual Activity    Alcohol use: Not Currently    Drug use: Never    Sexual activity: Not Currently   Other Topics Concern    Parent/sibling w/ CABG, MI or angioplasty before 65F 55M? No   Social History Narrative    Not on file     Social Drivers of Health     Financial Resource Strain: Low Risk  (8/25/2024)    Financial Resource Strain     Within the past 12 months, have you or your family members you live with been unable to get utilities (heat, electricity) when it was really needed?: No   Food Insecurity: Low Risk  (8/25/2024)    Food Insecurity     Within the past 12 months, did you worry that your food would run out before you got money to buy more?: No     Within the past 12 months, did the food you bought just not last and you didn t have money to get more?: No   Transportation Needs: Low Risk  (8/25/2024)    Transportation Needs     Within the past 12 months, has lack of transportation kept you from medical appointments, getting your medicines, non-medical meetings or appointments, work, or from getting things that you need?: No   Physical Activity: Not on file   Stress: Not on file   Social  Connections: Not on file   Interpersonal Safety: Low Risk  (8/25/2024)    Interpersonal Safety     Do you feel physically and emotionally safe where you currently live?: Yes     Within the past 12 months, have you been hit, slapped, kicked or otherwise physically hurt by someone?: No     Within the past 12 months, have you been humiliated or emotionally abused in other ways by your partner or ex-partner?: No   Housing Stability: Low Risk  (8/25/2024)    Housing Stability     Do you have housing? : Yes     Are you worried about losing your housing?: No           Lab Results    Chemistry/lipid CBC Cardiac Enzymes/BNP/TSH/INR   Lab Results   Component Value Date    CHOL 225 (H) 03/04/2024    HDL 60 03/04/2024    TRIG 160 (H) 03/04/2024    BUN 19.7 10/25/2024     (L) 10/25/2024    CO2 23 10/25/2024    Lab Results   Component Value Date    WBC 5.9 09/12/2024    HGB 10.6 (L) 09/12/2024    HCT 31.8 (L) 09/12/2024    MCV 88 09/12/2024     09/12/2024    Lab Results   Component Value Date    TROPONINI <0.01 09/04/2022     11/16/2019    TSH 2.52 06/14/2023     Lab Results   Component Value Date    TROPONINI <0.01 09/04/2022          Weight:    Wt Readings from Last 3 Encounters:   09/04/24 70 kg (154 lb 6.4 oz)   02/27/24 73.9 kg (163 lb)   11/11/23 77.8 kg (171 lb 9.6 oz)       Allergies  Allergies   Allergen Reactions    Buspirone Shortness Of Breath    Ciprofloxacin Hives and Shortness Of Breath     Other reaction(s): Unknown    Cortisone      Other reaction(s): Throat Swelling/Closing, Unknown  Reaction 9-5-94      Hydrocodone-Acetaminophen Shortness Of Breath     Other reaction(s): Unknown    Lansoprazole Shortness Of Breath    Metoprolol Shortness Of Breath     Tolerates metoprolol succinate at home    Nedocromil      Other reaction(s): Throat Swelling/Closing    Niacin Shortness Of Breath     Other reaction(s): Unknown    Albuterol Other (See Comments)     Inhaler had extreme effect on nervous  system      Amlodipine      Other reaction(s): Erythema, Unknown    Azithromycin      Other reaction(s): *Unknown, Unknown    Cefixime Diarrhea     Other reaction(s): Unknown  Has tolerated ceftriaxone    Cefprozil Nausea and Vomiting     Other reaction(s): Unknown  Has tolerated ceftriaxone    Cefuroxime      Other reaction(s): *Unknown  Has tolerated ceftriaxone    Cephalexin      Other reaction(s): *Unknown, Unknown  Has tolerated ceftriaxone    Contrast Dye      Other reaction(s): hives    Cyclobenzaprine      Other reaction(s): Sedation    Dextromethorphan-Guaifenesin Nausea     Other reaction(s): Headache    Diltiazem      Other reaction(s): Erythema, Unknown  Ears face arms and chest red and on fire-body tremors      Erythromycin      Other reaction(s): Unknown    Esomeprazole      Other reaction(s): Headache    Fenofibrate Muscle Pain (Myalgia)     Other reaction(s): Unknown    Fluticasone-Salmeterol Other (See Comments)     Elevated blood pressure      Methylprednisolone     Metronidazole     Monosodium Glutamate     Moxifloxacin      Other reaction(s): Dizziness    Nifedipine      Other reaction(s): Edema  Took for 5-93 to 9-94 red and swollen leg      Nsaids      Other reaction(s): Unknown    Ofloxacin      Other reaction(s): *Unknown    Ondansetron      Other reaction(s): Constipation    Parsley Seed     Penicillins Unknown     She doesn't remember other than it occurred as a very young woman     Piper     Pirbuterol Other (See Comments)     Immediate extreme effect on nervous system      Pravastatin      Other reaction(s): Dizziness, Unknown    Pseudoephedrine      Other reaction(s): headache,nausea    Shellfish-Derived Products      Per pt 8/12/23    Simvastatin Muscle Pain (Myalgia)    Spironolactone      Other reaction(s): stomach cramps, nausea    Sulfamethoxazole-Trimethoprim      Other reaction(s): Unknown    Tramadol      Other reaction(s): red ears,high blood press.    Tramadol-Acetaminophen       Other reaction(s): Tachycardia, Unknown    Triamterene Other (See Comments)     Greatly bothered with sun-took medication for three years  Greatly bothered with sun      Diatrizoate Rash    Telmisartan Palpitations         Surgical History  Past Surgical History:   Procedure Laterality Date    BREAST SURGERY  1982    breast tumor per [facility]     CATARACT EXTRACTION  07/2005    per [facility]     COLONOSCOPY N/A 11/20/2023    Procedure: COLONOSCOPY with biopsies and polypectomy;  Surgeon: David Barreto MD;  Location: [facility]    CV CORONARY ANGIOGRAM N/A 8/11/2023    Procedure: Coronary Angiogram;  Surgeon: Ap Arroyo MD;  Location: [facility] CATH LAB CV    CV LEFT HEART CATH N/A 8/11/2023    Procedure: Left Heart Catheterization;  Surgeon: Ap Arroyo MD;  Location: [facility] CATH LAB CV    ORIF TIBIA & FIBULA FRACTURES  1988    per [facility]     OTHER SURGICAL HISTORY  10/2004    hole in retinaper [facility]     OTHER SURGICAL HISTORY  05/03/2015    fecal transplantper [facility]     [place] ESOPHAGOGASTRODUODENOSCOPY TRANSORAL DIAGNOSTIC N/A 9/11/2020    Procedure: ESOPHAGOGASTRODUODENOSCOPY With gastric biopsies;  Surgeon: David Reyes MD;  [place] [facility] [place]    TONSILLECTOMY      TONSILLECTOMY & ADENOIDECTOMY  1963       Social History  Tobacco:   History   Smoking Status    Former    Packs/day: 3.00    Years: 35.00    Types: Cigarettes   Smokeless Tobacco    Never    Alcohol:   Social History    Substance and Sexual Activity      Alcohol use: Not Currently   Illicit Drugs:   History   Drug Use Unknown       Family History  Family History   Problem Relation Age of Onset    Hypertension Mother     Cancer Mother         gallbladder cancer    Myocardial Infarction Father     Hypertension Sister     Alzheimer Disease Sister     Heart Disease Sister     Cancer Brother     Brain Cancer Brother     Pacemaker Mother     Heart Disease Mother     Coronary Artery Disease Father      Heart Disease Father     Heart Failure Sister           Jimmy Gonzalez MD on 10/28/2024      cc: Kaylyn Bolanos

## 2024-10-28 NOTE — LETTER
10/28/2024    Kaylyn Bolanos MD  [address]  [place] [address]    RE: Kerry Hoffmann       Dear Colleague,     I had the pleasure of seeing Kerry Hoffmann in the [facility].    HEART CARE NOTE          Assessment/Recommendations     1. HmrEF   Assessment / Plan  Renal function stable to improved ; denies HF symptoms of orthopnea, PND, fluid retention/edema - no changes to diuretic regimen this time  Patient is high risk for adverse cardiac events 2/2 advanced age, frailty, HTN, elevated NTproBNP  GDMT as detailed below; mainstay of treatment for HFpEF includes diuretics and adequate BP control (class I) and SGLT2-I (class 2a); additional medical therapy (ARNI, MRA, ARB) demonstrated less robust evidence for indication but may be considered per guideline recommendations (2b); no indication for BBlockers      Current Pharmacotherapy AHA Guideline-Directed Medical Therapy   Losartan  50 mg BID ARNI/ARB   Spironolactone not started  MRA   SGLT2 inhibitor:not started SGLT2-I    Furosemide 20 mg two times per week Loop diuretic       2. HTN Emergency  Assessment / Plan  Difficult to control; seem by nephrology today who adjusted diuretic and anti-hypotensives - BP remains significantly elevated on multiple rechecks; instructed patient to ED for further management     3. non-obstructive CAD  Assessment / Plan  Mild non-obstructive CAD - continue aggressive risk factor modification  Denies chest pain or anginal equivalents      4. TIMOTHY on CKD  Assessment / Plan  Resolving; follows with [facility] Bautista [facility] - no changes to regimen at this time     5. Valvular heart disease  Assessment / Plan  Progressive MR and AI - will refer to valve clinic if patient is amenable    45 minutes spent reviewing prior records (including documentation, laboratory studies, cardiac testing/imaging), history and physical exam, planning, and subsequent documentation.    The longitudinal plan of care for HFmrEF was  addressed during this visit. Due to the added complexity in care, I will continue to supportMs. Kerry Hoffmann   in the subsequent management of this condition(s) and with the ongoing continuity of care of this condition(s).      History of Present Illness/Subjective    Ms. Kerry Hoffmann is a 89 year old female with a PMHx significant for (per Epic notation) HTN, heart disease, GERD, and HLD, presenting with sob, chest pressure, legs edema. C/w CHF.     Today, Mrs. Dotson denies acute cardiac events or complaints; Management plan as detailed above     ECG: Personally reviewed. normal sinus rhythm, nonspecific ST and T waves changes, PAC's noted.     ECHO (personally reviewed 8/26/24):   1. The left ventricle is moderately dilated. There is mild global hypokinesia  of the left ventricle. The visual ejection fraction is estimated at 45%.  2. Normal right ventricle size and systolic function.  3. The left atrium is moderate to severely dilated.  4. There is moderate (2+) mitral regurgitation.  5. There is mod-severe to severe (3-4+) aortic regurgitation.     Compared to study on 8/12/2023, there is decline in LV systolic function and  significant increase in aortic and mitral valve regurgitation.    Lab results: personally reviewed October 28, 2024; notable for resolving TIMOTHY    Medical history and pertinent documents reviewed in Care Everywhere please where applicable see details above        Physical Examination Review of Systems   BP (!) 217/75 (BP Location: Right arm, Patient Position: Sitting, Cuff Size: Adult Small)   Pulse 70   Resp 16   LMP  (LMP Unknown)   There is no height or weight on file to calculate BMI.  Wt Readings from Last 3 Encounters:   09/04/24 70 kg (154 lb 6.4 oz)   02/27/24 73.9 kg (163 lb)   11/11/23 77.8 kg (171 lb 9.6 oz)     General Appearance:   no distress, normal body habitus   ENT/Mouth: membranes moist, no oral lesions or bleeding gums.      EYES:  no scleral icterus, normal  conjunctivae   Neck: no carotid bruits or thyromegaly   Chest/Lungs:   lungs are clear to auscultation, no rales or wheezing, equal chest wall expansion    Cardiovascular:   Regular. Normal first and second heart sounds with no murmurs, rubs, or gallops; the carotid, radial and posterior tibial pulses are intact, no JVD or LE edema bilaterally    Abdomen:  no organomegaly, masses, bruits, or tenderness; bowel sounds are present   Extremities: no cyanosis or clubbing   Skin: no xanthelasma, warm.    Neurologic: NAD     Psychiatric: alert and oriented x3, calm     A complete 10 systems ROS was reviewed  And is negative except what is listed in the HPI.          Medical History  Surgical History Family History Social History   Past Medical History:   Diagnosis Date     Anxiety     takes clonazepam     Asthma     per [facility]      Chronic kidney disease     stage 3 per [facility]      CKD (chronic kidney disease)      Clostridium difficile colitis 11/01/2016     Clostridium difficile infection     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     Clostridium enterocolitis 10/27/2016     CTS (carpal tunnel syndrome)      GERD (gastroesophageal reflux disease)     per [facility]      Hiatal hernia     small per [facility]      Hyperlipidemia      Hyperlipidemia      Hyperlipidemia      Hypertension      Hypertension      Osteoarthritis of knee     per [facility]      Spinal stenosis     per [facility]      Vitamin D deficiency     per [facility]    Past Surgical History:   Procedure Laterality Date     BREAST SURGERY  1982    breast tumor per [facility]      CATARACT EXTRACTION  07/2005    per [facility]      COLONOSCOPY N/A 11/20/2023    Procedure: COLONOSCOPY with biopsies and polypectomy;  Surgeon: David Barreto MD;  Location: [facility] Main OR     CV CORONARY ANGIOGRAM N/A 8/11/2023    Procedure: Coronary Angiogram;  Surgeon: Ap Arroyo MD;  Location: [facility] CATH LAB CV     CV LEFT HEART CATH N/A 8/11/2023    Procedure: Left Heart Catheterization;  Surgeon:  Ap Arroyo MD;  Location: [facility] LAB CV     ORIF TIBIA & FIBULA FRACTURES  1988    per [facility]      OTHER SURGICAL HISTORY  10/2004    hole in retinaper [facility]      OTHER SURGICAL HISTORY  05/03/2015    fecal transplantper [facility]      FL ESOPHAGOGASTRODUODENOSCOPY TRANSORAL DIAGNOSTIC N/A 9/11/2020    Procedure: ESOPHAGOGASTRODUODENOSCOPY With gastric biopsies;  Surgeon: David Reyes MD;  Location: [facility] OR;  Service: Gastroenterology     TONSILLECTOMY       TONSILLECTOMY & ADENOIDECTOMY  1963    no family history of premature coronary artery disease Social History     Socioeconomic History     Marital status:      Spouse name: Not on file     Number of children: Not on file     Years of education: Not on file     Highest education level: Not on file   Occupational History     Not on file   Tobacco Use     Smoking status: Former     Current packs/day: 3.00     Average packs/day: 3.0 packs/day for 35.0 years (105.0 ttl pk-yrs)     Types: Cigarettes     Smokeless tobacco: Never     Tobacco comments:     quit 1968   Substance and Sexual Activity     Alcohol use: Not Currently     Drug use: Never     Sexual activity: Not Currently   Other Topics Concern     Parent/sibling w/ CABG, MI or angioplasty before 65F 55M? No   Social History Narrative     Not on file     Social Drivers of Health     Financial Resource Strain: Low Risk  (8/25/2024)    Financial Resource Strain      Within the past 12 months, have you or your family members you live with been unable to get utilities (heat, electricity) when it was really needed?: No   Food Insecurity: Low Risk  (8/25/2024)    Food Insecurity      Within the past 12 months, did you worry that your food would run out before you got money to buy more?: No      Within the past 12 months, did the food you bought just not last and you didn t have money to get more?: No   Transportation Needs: Low Risk  (8/25/2024)    Transportation Needs      Within the  past 12 months, has lack of transportation kept you from medical appointments, getting your medicines, non-medical meetings or appointments, work, or from getting things that you need?: No   Physical Activity: Not on file   Stress: Not on file   Social Connections: Not on file   Interpersonal Safety: Low Risk  (8/25/2024)    Interpersonal Safety      Do you feel physically and emotionally safe where you currently live?: Yes      Within the past 12 months, have you been hit, slapped, kicked or otherwise physically hurt by someone?: No      Within the past 12 months, have you been humiliated or emotionally abused in other ways by your partner or ex-partner?: No   Housing Stability: Low Risk  (8/25/2024)    Housing Stability      Do you have housing? : Yes      Are you worried about losing your housing?: No           Lab Results    Chemistry/lipid CBC Cardiac Enzymes/BNP/TSH/INR   Lab Results   Component Value Date    CHOL 225 (H) 03/04/2024    HDL 60 03/04/2024    TRIG 160 (H) 03/04/2024    BUN 19.7 10/25/2024     (L) 10/25/2024    CO2 23 10/25/2024    Lab Results   Component Value Date    WBC 5.9 09/12/2024    HGB 10.6 (L) 09/12/2024    HCT 31.8 (L) 09/12/2024    MCV 88 09/12/2024     09/12/2024    Lab Results   Component Value Date    TROPONINI <0.01 09/04/2022     11/16/2019    TSH 2.52 06/14/2023     Lab Results   Component Value Date    TROPONINI <0.01 09/04/2022          Weight:    Wt Readings from Last 3 Encounters:   09/04/24 70 kg (154 lb 6.4 oz)   02/27/24 73.9 kg (163 lb)   11/11/23 77.8 kg (171 lb 9.6 oz)       Allergies  Allergies   Allergen Reactions     Buspirone Shortness Of Breath     Ciprofloxacin Hives and Shortness Of Breath     Other reaction(s): Unknown     Cortisone      Other reaction(s): Throat Swelling/Closing, Unknown  Reaction 9-5-94       Hydrocodone-Acetaminophen Shortness Of Breath     Other reaction(s): Unknown     Lansoprazole Shortness Of Breath     Metoprolol  Shortness Of Breath     Tolerates metoprolol succinate at home     Nedocromil      Other reaction(s): Throat Swelling/Closing     Niacin Shortness Of Breath     Other reaction(s): Unknown     Albuterol Other (See Comments)     Inhaler had extreme effect on nervous system       Amlodipine      Other reaction(s): Erythema, Unknown     Azithromycin      Other reaction(s): *Unknown, Unknown     Cefixime Diarrhea     Other reaction(s): Unknown  Has tolerated ceftriaxone     Cefprozil Nausea and Vomiting     Other reaction(s): Unknown  Has tolerated ceftriaxone     Cefuroxime      Other reaction(s): *Unknown  Has tolerated ceftriaxone     Cephalexin      Other reaction(s): *Unknown, Unknown  Has tolerated ceftriaxone     Contrast Dye      Other reaction(s): hives     Cyclobenzaprine      Other reaction(s): Sedation     Dextromethorphan-Guaifenesin Nausea     Other reaction(s): Headache     Diltiazem      Other reaction(s): Erythema, Unknown  Ears face arms and chest red and on fire-body tremors       Erythromycin      Other reaction(s): Unknown     Esomeprazole      Other reaction(s): Headache     Fenofibrate Muscle Pain (Myalgia)     Other reaction(s): Unknown     Fluticasone-Salmeterol Other (See Comments)     Elevated blood pressure       Methylprednisolone      Metronidazole      Monosodium Glutamate      Moxifloxacin      Other reaction(s): Dizziness     Nifedipine      Other reaction(s): Edema  Took for 5-93 to 9-94 red and swollen leg       Nsaids      Other reaction(s): Unknown     Ofloxacin      Other reaction(s): *Unknown     Ondansetron      Other reaction(s): Constipation     Parsley Seed      Penicillins Unknown     She doesn't remember other than it occurred as a very young woman      Piper      Pirbuterol Other (See Comments)     Immediate extreme effect on nervous system       Pravastatin      Other reaction(s): Dizziness, Unknown     Pseudoephedrine      Other reaction(s): headache,nausea      Shellfish-Derived Products      Per pt 8/12/23     Simvastatin Muscle Pain (Myalgia)     Spironolactone      Other reaction(s): stomach cramps, nausea     Sulfamethoxazole-Trimethoprim      Other reaction(s): Unknown     Tramadol      Other reaction(s): red ears,high blood press.     Tramadol-Acetaminophen      Other reaction(s): Tachycardia, Unknown     Triamterene Other (See Comments)     Greatly bothered with sun-took medication for three years  Greatly bothered with sun       Diatrizoate Rash     Telmisartan Palpitations         Surgical History  Past Surgical History:   Procedure Laterality Date     BREAST SURGERY  1982    breast tumor per [facility]      CATARACT EXTRACTION  07/2005    per [facility]      COLONOSCOPY N/A 11/20/2023    Procedure: COLONOSCOPY with biopsies and polypectomy;  Surgeon: David Barreto MD;  Location: [facility] CORONARY ANGIOGRAM N/A 8/11/2023    Procedure: Coronary Angiogram;  Surgeon: Ap Arroyo MD;  Location: [facility] CATH LAB CV     CV LEFT HEART CATH N/A 8/11/2023    Procedure: Left Heart Catheterization;  Surgeon: Ap Arroyo MD;  Location: [facility] CATH LAB CV     ORIF TIBIA & FIBULA FRACTURES  1988    per [facility]      OTHER SURGICAL HISTORY  10/2004    hole in retinaper [facility]      OTHER SURGICAL HISTORY  05/03/2015    fecal transplantper [facility]      MD ESOPHAGOGASTRODUODENOSCOPY TRANSORAL DIAGNOSTIC N/A 9/11/2020    Procedure: ESOPHAGOGASTRODUODENOSCOPY With gastric biopsies;  Surgeon: David Reyes MD;  Location[facility];  Service: Gastroenterology     TONSILLECTOMY       TONSILLECTOMY & ADENOIDECTOMY  1963       Social History  Tobacco:   History   Smoking Status     Former     Packs/day: 3.00     Years: 35.00     Types: Cigarettes   Smokeless Tobacco     Never    Alcohol:   Social History    Substance and Sexual Activity      Alcohol use: Not Currently   Illicit Drugs:   History   Drug Use Unknown       Family History  Family History    Problem Relation Age of Onset     Hypertension Mother      Cancer Mother         gallbladder cancer     Myocardial Infarction Father      Hypertension Sister      Alzheimer Disease Sister      Heart Disease Sister      Cancer Brother      Brain Cancer Brother      Pacemaker Mother      Heart Disease Mother      Coronary Artery Disease Father      Heart Disease Father      Heart Failure Sister           Jimmy Gonzalez MD on 10/28/2024      cc: Kaylyn Bolanos      Thank you for allowing me to participate in the care of your patient.      Sincerely,     Jimmy Gonzalez MD     [facility]  cc:   Jimmy Gonzalez MD  [address] [phone]

## 2024-11-06 ENCOUNTER — APPOINTMENT (OUTPATIENT)
Dept: ULTRASOUND IMAGING | Facility: HOSPITAL | Age: 89
DRG: 417 | End: 2024-11-06
Attending: EMERGENCY MEDICINE
Payer: MEDICARE

## 2024-11-06 ENCOUNTER — ANESTHESIA (OUTPATIENT)
Dept: SURGERY | Facility: HOSPITAL | Age: 89
DRG: 417 | End: 2024-11-06
Payer: MEDICARE

## 2024-11-06 ENCOUNTER — ANESTHESIA EVENT (OUTPATIENT)
Dept: SURGERY | Facility: HOSPITAL | Age: 89
DRG: 417 | End: 2024-11-06
Payer: MEDICARE

## 2024-11-06 ENCOUNTER — HOSPITAL ENCOUNTER (INPATIENT)
Facility: HOSPITAL | Age: 89
LOS: 4 days | Discharge: HOME HEALTH | DRG: 417 | End: 2024-11-10
Attending: STUDENT IN AN ORGANIZED HEALTH CARE EDUCATION/TRAINING PROGRAM | Admitting: STUDENT IN AN ORGANIZED HEALTH CARE EDUCATION/TRAINING PROGRAM
Payer: MEDICARE

## 2024-11-06 ENCOUNTER — HOSPITAL ENCOUNTER (OUTPATIENT)
Facility: HOSPITAL | Age: 89
End: 2024-11-06
Attending: SURGERY | Admitting: SURGERY
Payer: MEDICARE

## 2024-11-06 ENCOUNTER — APPOINTMENT (OUTPATIENT)
Dept: CT IMAGING | Facility: HOSPITAL | Age: 89
DRG: 417 | End: 2024-11-06
Attending: STUDENT IN AN ORGANIZED HEALTH CARE EDUCATION/TRAINING PROGRAM
Payer: MEDICARE

## 2024-11-06 DIAGNOSIS — I50.9 ACUTE ON CHRONIC CONGESTIVE HEART FAILURE, UNSPECIFIED HEART FAILURE TYPE (H): ICD-10-CM

## 2024-11-06 DIAGNOSIS — R10.13 EPIGASTRIC PAIN: ICD-10-CM

## 2024-11-06 DIAGNOSIS — K81.0 ACUTE CHOLECYSTITIS: Primary | ICD-10-CM

## 2024-11-06 LAB
ALBUMIN SERPL BCG-MCNC: 4.2 G/DL (ref 3.5–5.2)
ALP SERPL-CCNC: 85 U/L (ref 40–150)
ALT SERPL W P-5'-P-CCNC: 9 U/L (ref 0–50)
ANION GAP SERPL CALCULATED.3IONS-SCNC: 12 MMOL/L (ref 7–15)
AST SERPL W P-5'-P-CCNC: 18 U/L (ref 0–45)
BASOPHILS # BLD AUTO: 0.1 10E3/UL (ref 0–0.2)
BASOPHILS NFR BLD AUTO: 1 %
BILIRUB DIRECT SERPL-MCNC: <0.2 MG/DL (ref 0–0.3)
BILIRUB SERPL-MCNC: 0.2 MG/DL
BUN SERPL-MCNC: 21.8 MG/DL (ref 8–23)
CALCIUM SERPL-MCNC: 9.3 MG/DL (ref 8.8–10.4)
CHLORIDE SERPL-SCNC: 89 MMOL/L (ref 98–107)
CREAT SERPL-MCNC: 1.24 MG/DL (ref 0.51–0.95)
EGFRCR SERPLBLD CKD-EPI 2021: 42 ML/MIN/1.73M2
EOSINOPHIL # BLD AUTO: 0.1 10E3/UL (ref 0–0.7)
EOSINOPHIL NFR BLD AUTO: 2 %
ERYTHROCYTE [DISTWIDTH] IN BLOOD BY AUTOMATED COUNT: 13.8 % (ref 10–15)
GLUCOSE BLDC GLUCOMTR-MCNC: 142 MG/DL (ref 70–99)
GLUCOSE BLDC GLUCOMTR-MCNC: 144 MG/DL (ref 70–99)
GLUCOSE SERPL-MCNC: 101 MG/DL (ref 70–99)
HCO3 SERPL-SCNC: 26 MMOL/L (ref 22–29)
HCT VFR BLD AUTO: 31.4 % (ref 35–47)
HGB BLD-MCNC: 10.3 G/DL (ref 11.7–15.7)
IMM GRANULOCYTES # BLD: 0 10E3/UL
IMM GRANULOCYTES NFR BLD: 0 %
LIPASE SERPL-CCNC: 58 U/L (ref 13–60)
LYMPHOCYTES # BLD AUTO: 1.3 10E3/UL (ref 0.8–5.3)
LYMPHOCYTES NFR BLD AUTO: 21 %
MAGNESIUM SERPL-MCNC: 1.5 MG/DL (ref 1.7–2.3)
MCH RBC QN AUTO: 29.2 PG (ref 26.5–33)
MCHC RBC AUTO-ENTMCNC: 32.8 G/DL (ref 31.5–36.5)
MCV RBC AUTO: 89 FL (ref 78–100)
MONOCYTES # BLD AUTO: 0.8 10E3/UL (ref 0–1.3)
MONOCYTES NFR BLD AUTO: 13 %
NEUTROPHILS # BLD AUTO: 4 10E3/UL (ref 1.6–8.3)
NEUTROPHILS NFR BLD AUTO: 63 %
NRBC # BLD AUTO: 0 10E3/UL
NRBC BLD AUTO-RTO: 0 /100
PHOSPHATE SERPL-MCNC: 3.2 MG/DL (ref 2.5–4.5)
PLATELET # BLD AUTO: 321 10E3/UL (ref 150–450)
POTASSIUM SERPL-SCNC: 4.3 MMOL/L (ref 3.4–5.3)
PROT SERPL-MCNC: 7.2 G/DL (ref 6.4–8.3)
RBC # BLD AUTO: 3.53 10E6/UL (ref 3.8–5.2)
SODIUM SERPL-SCNC: 127 MMOL/L (ref 135–145)
TROPONIN T SERPL HS-MCNC: 17 NG/L
TROPONIN T SERPL HS-MCNC: 19 NG/L
WBC # BLD AUTO: 6.3 10E3/UL (ref 4–11)

## 2024-11-06 PROCEDURE — 250N000025 HC SEVOFLURANE, PER MIN: Performed by: SURGERY

## 2024-11-06 PROCEDURE — 47562 LAPAROSCOPIC CHOLECYSTECTOMY: CPT | Performed by: NURSE ANESTHETIST, CERTIFIED REGISTERED

## 2024-11-06 PROCEDURE — 250N000013 HC RX MED GY IP 250 OP 250 PS 637: Performed by: STUDENT IN AN ORGANIZED HEALTH CARE EDUCATION/TRAINING PROGRAM

## 2024-11-06 PROCEDURE — 47562 LAPAROSCOPIC CHOLECYSTECTOMY: CPT | Mod: AS

## 2024-11-06 PROCEDURE — 250N000013 HC RX MED GY IP 250 OP 250 PS 637: Performed by: SURGERY

## 2024-11-06 PROCEDURE — 258N000003 HC RX IP 258 OP 636: Performed by: NURSE ANESTHETIST, CERTIFIED REGISTERED

## 2024-11-06 PROCEDURE — 99100 ANES PT EXTEME AGE<1 YR&>70: CPT | Performed by: NURSE ANESTHETIST, CERTIFIED REGISTERED

## 2024-11-06 PROCEDURE — 84484 ASSAY OF TROPONIN QUANT: CPT | Performed by: STUDENT IN AN ORGANIZED HEALTH CARE EDUCATION/TRAINING PROGRAM

## 2024-11-06 PROCEDURE — 84100 ASSAY OF PHOSPHORUS: CPT | Performed by: STUDENT IN AN ORGANIZED HEALTH CARE EDUCATION/TRAINING PROGRAM

## 2024-11-06 PROCEDURE — 99223 1ST HOSP IP/OBS HIGH 75: CPT | Mod: AI | Performed by: STUDENT IN AN ORGANIZED HEALTH CARE EDUCATION/TRAINING PROGRAM

## 2024-11-06 PROCEDURE — 250N000011 HC RX IP 250 OP 636: Performed by: STUDENT IN AN ORGANIZED HEALTH CARE EDUCATION/TRAINING PROGRAM

## 2024-11-06 PROCEDURE — 250N000013 HC RX MED GY IP 250 OP 250 PS 637: Performed by: ANESTHESIOLOGY

## 2024-11-06 PROCEDURE — 250N000009 HC RX 250: Performed by: NURSE ANESTHETIST, CERTIFIED REGISTERED

## 2024-11-06 PROCEDURE — 99285 EMERGENCY DEPT VISIT HI MDM: CPT | Mod: 25

## 2024-11-06 PROCEDURE — 76705 ECHO EXAM OF ABDOMEN: CPT

## 2024-11-06 PROCEDURE — 82435 ASSAY OF BLOOD CHLORIDE: CPT | Performed by: STUDENT IN AN ORGANIZED HEALTH CARE EDUCATION/TRAINING PROGRAM

## 2024-11-06 PROCEDURE — 84075 ASSAY ALKALINE PHOSPHATASE: CPT | Performed by: STUDENT IN AN ORGANIZED HEALTH CARE EDUCATION/TRAINING PROGRAM

## 2024-11-06 PROCEDURE — 999N000285 HC STATISTIC VASC ACCESS LAB DRAW WITH PIV START

## 2024-11-06 PROCEDURE — 82565 ASSAY OF CREATININE: CPT | Performed by: STUDENT IN AN ORGANIZED HEALTH CARE EDUCATION/TRAINING PROGRAM

## 2024-11-06 PROCEDURE — 88304 TISSUE EXAM BY PATHOLOGIST: CPT | Mod: TC | Performed by: SURGERY

## 2024-11-06 PROCEDURE — 120N000001 HC R&B MED SURG/OB

## 2024-11-06 PROCEDURE — 370N000017 HC ANESTHESIA TECHNICAL FEE, PER MIN: Performed by: SURGERY

## 2024-11-06 PROCEDURE — 80048 BASIC METABOLIC PNL TOTAL CA: CPT | Performed by: STUDENT IN AN ORGANIZED HEALTH CARE EDUCATION/TRAINING PROGRAM

## 2024-11-06 PROCEDURE — 71275 CT ANGIOGRAPHY CHEST: CPT | Mod: MA

## 2024-11-06 PROCEDURE — 74174 CTA ABD&PLVS W/CONTRAST: CPT | Mod: MA

## 2024-11-06 PROCEDURE — 99207 PR APP CREDIT; MD BILLING SHARED VISIT: CPT

## 2024-11-06 PROCEDURE — 999N000141 HC STATISTIC PRE-PROCEDURE NURSING ASSESSMENT: Performed by: SURGERY

## 2024-11-06 PROCEDURE — 250N000011 HC RX IP 250 OP 636: Performed by: NURSE ANESTHETIST, CERTIFIED REGISTERED

## 2024-11-06 PROCEDURE — 0FT44ZZ RESECTION OF GALLBLADDER, PERCUTANEOUS ENDOSCOPIC APPROACH: ICD-10-PCS | Performed by: SURGERY

## 2024-11-06 PROCEDURE — 93005 ELECTROCARDIOGRAM TRACING: CPT | Performed by: STUDENT IN AN ORGANIZED HEALTH CARE EDUCATION/TRAINING PROGRAM

## 2024-11-06 PROCEDURE — 99222 1ST HOSP IP/OBS MODERATE 55: CPT | Mod: FS | Performed by: SURGERY

## 2024-11-06 PROCEDURE — 85018 HEMOGLOBIN: CPT | Performed by: STUDENT IN AN ORGANIZED HEALTH CARE EDUCATION/TRAINING PROGRAM

## 2024-11-06 PROCEDURE — 360N000076 HC SURGERY LEVEL 3, PER MIN: Performed by: SURGERY

## 2024-11-06 PROCEDURE — 47562 LAPAROSCOPIC CHOLECYSTECTOMY: CPT | Performed by: ANESTHESIOLOGY

## 2024-11-06 PROCEDURE — 84155 ASSAY OF PROTEIN SERUM: CPT | Performed by: STUDENT IN AN ORGANIZED HEALTH CARE EDUCATION/TRAINING PROGRAM

## 2024-11-06 PROCEDURE — 85025 COMPLETE CBC W/AUTO DIFF WBC: CPT | Performed by: STUDENT IN AN ORGANIZED HEALTH CARE EDUCATION/TRAINING PROGRAM

## 2024-11-06 PROCEDURE — 250N000011 HC RX IP 250 OP 636: Performed by: SURGERY

## 2024-11-06 PROCEDURE — 710N000009 HC RECOVERY PHASE 1, LEVEL 1, PER MIN: Performed by: SURGERY

## 2024-11-06 PROCEDURE — 96375 TX/PRO/DX INJ NEW DRUG ADDON: CPT

## 2024-11-06 PROCEDURE — 272N000001 HC OR GENERAL SUPPLY STERILE: Performed by: SURGERY

## 2024-11-06 PROCEDURE — 83690 ASSAY OF LIPASE: CPT | Performed by: STUDENT IN AN ORGANIZED HEALTH CARE EDUCATION/TRAINING PROGRAM

## 2024-11-06 PROCEDURE — 999N000248 HC STATISTIC IV INSERT WITH US BY RN

## 2024-11-06 PROCEDURE — 96374 THER/PROPH/DIAG INJ IV PUSH: CPT

## 2024-11-06 PROCEDURE — 47562 LAPAROSCOPIC CHOLECYSTECTOMY: CPT | Performed by: SURGERY

## 2024-11-06 PROCEDURE — 83735 ASSAY OF MAGNESIUM: CPT | Performed by: STUDENT IN AN ORGANIZED HEALTH CARE EDUCATION/TRAINING PROGRAM

## 2024-11-06 PROCEDURE — 36415 COLL VENOUS BLD VENIPUNCTURE: CPT | Performed by: STUDENT IN AN ORGANIZED HEALTH CARE EDUCATION/TRAINING PROGRAM

## 2024-11-06 RX ORDER — PROCHLORPERAZINE 25 MG
12.5 SUPPOSITORY, RECTAL RECTAL EVERY 12 HOURS PRN
Status: DISCONTINUED | OUTPATIENT
Start: 2024-11-06 | End: 2024-11-10 | Stop reason: HOSPADM

## 2024-11-06 RX ORDER — PIPERACILLIN SODIUM, TAZOBACTAM SODIUM 3; .375 G/15ML; G/15ML
3.38 INJECTION, POWDER, LYOPHILIZED, FOR SOLUTION INTRAVENOUS ONCE
Status: COMPLETED | OUTPATIENT
Start: 2024-11-06 | End: 2024-11-06

## 2024-11-06 RX ORDER — NALOXONE HYDROCHLORIDE 1 MG/ML
0.2 INJECTION INTRAMUSCULAR; INTRAVENOUS; SUBCUTANEOUS
Status: DISCONTINUED | OUTPATIENT
Start: 2024-11-06 | End: 2024-11-10 | Stop reason: HOSPADM

## 2024-11-06 RX ORDER — OXYCODONE HYDROCHLORIDE 5 MG/1
5 TABLET ORAL
Status: COMPLETED | OUTPATIENT
Start: 2024-11-06 | End: 2024-11-06

## 2024-11-06 RX ORDER — FENTANYL CITRATE 50 UG/ML
INJECTION, SOLUTION INTRAMUSCULAR; INTRAVENOUS PRN
Status: DISCONTINUED | OUTPATIENT
Start: 2024-11-06 | End: 2024-11-06

## 2024-11-06 RX ORDER — FAMOTIDINE 20 MG/1
20 TABLET, FILM COATED ORAL DAILY
Status: DISCONTINUED | OUTPATIENT
Start: 2024-11-07 | End: 2024-11-06

## 2024-11-06 RX ORDER — ONDANSETRON 4 MG/1
4 TABLET, ORALLY DISINTEGRATING ORAL EVERY 30 MIN PRN
Status: DISCONTINUED | OUTPATIENT
Start: 2024-11-06 | End: 2024-11-06 | Stop reason: HOSPADM

## 2024-11-06 RX ORDER — SODIUM CHLORIDE 9 MG/ML
INJECTION, SOLUTION INTRAVENOUS CONTINUOUS PRN
Status: DISCONTINUED | OUTPATIENT
Start: 2024-11-06 | End: 2024-11-06

## 2024-11-06 RX ORDER — CALCIUM CARBONATE 500 MG/1
1000 TABLET, CHEWABLE ORAL 4 TIMES DAILY PRN
Status: DISCONTINUED | OUTPATIENT
Start: 2024-11-06 | End: 2024-11-10 | Stop reason: HOSPADM

## 2024-11-06 RX ORDER — METOPROLOL SUCCINATE 25 MG/1
75 TABLET, EXTENDED RELEASE ORAL AT BEDTIME
Status: DISCONTINUED | OUTPATIENT
Start: 2024-11-07 | End: 2024-11-10 | Stop reason: HOSPADM

## 2024-11-06 RX ORDER — HYDROMORPHONE HCL IN WATER/PF 6 MG/30 ML
0.2 PATIENT CONTROLLED ANALGESIA SYRINGE INTRAVENOUS EVERY 5 MIN PRN
Status: DISCONTINUED | OUTPATIENT
Start: 2024-11-06 | End: 2024-11-06 | Stop reason: HOSPADM

## 2024-11-06 RX ORDER — OXYCODONE HYDROCHLORIDE 5 MG/1
5 TABLET ORAL EVERY 4 HOURS PRN
Status: DISCONTINUED | OUTPATIENT
Start: 2024-11-06 | End: 2024-11-10 | Stop reason: HOSPADM

## 2024-11-06 RX ORDER — CLINDAMYCIN PHOSPHATE 900 MG/50ML
900 INJECTION, SOLUTION INTRAVENOUS
Status: COMPLETED | OUTPATIENT
Start: 2024-11-06 | End: 2024-11-06

## 2024-11-06 RX ORDER — HYDRALAZINE HYDROCHLORIDE 20 MG/ML
10 INJECTION INTRAMUSCULAR; INTRAVENOUS EVERY 4 HOURS PRN
Status: DISCONTINUED | OUTPATIENT
Start: 2024-11-06 | End: 2024-11-10 | Stop reason: HOSPADM

## 2024-11-06 RX ORDER — ACETAMINOPHEN 325 MG/1
975 TABLET ORAL EVERY 6 HOURS PRN
Status: DISCONTINUED | OUTPATIENT
Start: 2024-11-06 | End: 2024-11-06 | Stop reason: HOSPADM

## 2024-11-06 RX ORDER — PROPOFOL 10 MG/ML
INJECTION, EMULSION INTRAVENOUS CONTINUOUS PRN
Status: DISCONTINUED | OUTPATIENT
Start: 2024-11-06 | End: 2024-11-06

## 2024-11-06 RX ORDER — NALOXONE HYDROCHLORIDE 0.4 MG/ML
0.1 INJECTION, SOLUTION INTRAMUSCULAR; INTRAVENOUS; SUBCUTANEOUS
Status: DISCONTINUED | OUTPATIENT
Start: 2024-11-06 | End: 2024-11-06 | Stop reason: HOSPADM

## 2024-11-06 RX ORDER — FUROSEMIDE 20 MG/1
20 TABLET ORAL
Status: DISCONTINUED | OUTPATIENT
Start: 2024-11-07 | End: 2024-11-06

## 2024-11-06 RX ORDER — FENTANYL CITRATE 50 UG/ML
25 INJECTION, SOLUTION INTRAMUSCULAR; INTRAVENOUS EVERY 5 MIN PRN
Status: DISCONTINUED | OUTPATIENT
Start: 2024-11-06 | End: 2024-11-06 | Stop reason: HOSPADM

## 2024-11-06 RX ORDER — LIDOCAINE 40 MG/G
CREAM TOPICAL
Status: DISCONTINUED | OUTPATIENT
Start: 2024-11-06 | End: 2024-11-06 | Stop reason: HOSPADM

## 2024-11-06 RX ORDER — LOSARTAN POTASSIUM 50 MG/1
50 TABLET ORAL 2 TIMES DAILY
Status: DISCONTINUED | OUTPATIENT
Start: 2024-11-06 | End: 2024-11-09

## 2024-11-06 RX ORDER — AMOXICILLIN 250 MG
1 CAPSULE ORAL 2 TIMES DAILY PRN
Status: DISCONTINUED | OUTPATIENT
Start: 2024-11-06 | End: 2024-11-10 | Stop reason: HOSPADM

## 2024-11-06 RX ORDER — LIDOCAINE 40 MG/G
CREAM TOPICAL
Status: DISCONTINUED | OUTPATIENT
Start: 2024-11-06 | End: 2024-11-10 | Stop reason: HOSPADM

## 2024-11-06 RX ORDER — LIDOCAINE HYDROCHLORIDE 10 MG/ML
INJECTION, SOLUTION INFILTRATION; PERINEURAL PRN
Status: DISCONTINUED | OUTPATIENT
Start: 2024-11-06 | End: 2024-11-06

## 2024-11-06 RX ORDER — METOCLOPRAMIDE HYDROCHLORIDE 5 MG/ML
5 INJECTION INTRAMUSCULAR; INTRAVENOUS ONCE
Status: COMPLETED | OUTPATIENT
Start: 2024-11-06 | End: 2024-11-06

## 2024-11-06 RX ORDER — CLONAZEPAM 0.5 MG/1
0.5 TABLET ORAL 3 TIMES DAILY
Status: DISCONTINUED | OUTPATIENT
Start: 2024-11-06 | End: 2024-11-10 | Stop reason: HOSPADM

## 2024-11-06 RX ORDER — FENTANYL CITRATE 50 UG/ML
50 INJECTION, SOLUTION INTRAMUSCULAR; INTRAVENOUS EVERY 5 MIN PRN
Status: DISCONTINUED | OUTPATIENT
Start: 2024-11-06 | End: 2024-11-06 | Stop reason: HOSPADM

## 2024-11-06 RX ORDER — HYDRALAZINE HYDROCHLORIDE 10 MG/1
10 TABLET, FILM COATED ORAL EVERY 4 HOURS PRN
Status: DISCONTINUED | OUTPATIENT
Start: 2024-11-06 | End: 2024-11-10 | Stop reason: HOSPADM

## 2024-11-06 RX ORDER — PROPOFOL 10 MG/ML
INJECTION, EMULSION INTRAVENOUS PRN
Status: DISCONTINUED | OUTPATIENT
Start: 2024-11-06 | End: 2024-11-06

## 2024-11-06 RX ORDER — CLOPIDOGREL BISULFATE 75 MG/1
75 TABLET ORAL DAILY
Status: DISCONTINUED | OUTPATIENT
Start: 2024-11-06 | End: 2024-11-10 | Stop reason: HOSPADM

## 2024-11-06 RX ORDER — MAGNESIUM SULFATE 4 G/50ML
4 INJECTION INTRAVENOUS ONCE
Status: COMPLETED | OUTPATIENT
Start: 2024-11-06 | End: 2024-11-06

## 2024-11-06 RX ORDER — HYDROMORPHONE HCL IN WATER/PF 6 MG/30 ML
0.4 PATIENT CONTROLLED ANALGESIA SYRINGE INTRAVENOUS EVERY 5 MIN PRN
Status: DISCONTINUED | OUTPATIENT
Start: 2024-11-06 | End: 2024-11-06 | Stop reason: HOSPADM

## 2024-11-06 RX ORDER — NALOXONE HYDROCHLORIDE 1 MG/ML
0.4 INJECTION INTRAMUSCULAR; INTRAVENOUS; SUBCUTANEOUS
Status: DISCONTINUED | OUTPATIENT
Start: 2024-11-06 | End: 2024-11-10 | Stop reason: HOSPADM

## 2024-11-06 RX ORDER — ACETAMINOPHEN 325 MG/1
975 TABLET ORAL ONCE
Status: COMPLETED | OUTPATIENT
Start: 2024-11-06 | End: 2024-11-06

## 2024-11-06 RX ORDER — ASPIRIN 81 MG/1
81 TABLET ORAL DAILY
Status: DISCONTINUED | OUTPATIENT
Start: 2024-11-06 | End: 2024-11-10 | Stop reason: HOSPADM

## 2024-11-06 RX ORDER — AMOXICILLIN 250 MG
2 CAPSULE ORAL 2 TIMES DAILY PRN
Status: DISCONTINUED | OUTPATIENT
Start: 2024-11-06 | End: 2024-11-10 | Stop reason: HOSPADM

## 2024-11-06 RX ORDER — CLINDAMYCIN PHOSPHATE 900 MG/50ML
900 INJECTION, SOLUTION INTRAVENOUS SEE ADMIN INSTRUCTIONS
Status: DISCONTINUED | OUTPATIENT
Start: 2024-11-06 | End: 2024-11-06 | Stop reason: HOSPADM

## 2024-11-06 RX ORDER — IOPAMIDOL 755 MG/ML
75 INJECTION, SOLUTION INTRAVASCULAR ONCE
Status: COMPLETED | OUTPATIENT
Start: 2024-11-06 | End: 2024-11-06

## 2024-11-06 RX ORDER — ONDANSETRON 2 MG/ML
INJECTION INTRAMUSCULAR; INTRAVENOUS PRN
Status: DISCONTINUED | OUTPATIENT
Start: 2024-11-06 | End: 2024-11-06

## 2024-11-06 RX ORDER — PROCHLORPERAZINE MALEATE 5 MG/1
5 TABLET ORAL EVERY 6 HOURS PRN
Status: DISCONTINUED | OUTPATIENT
Start: 2024-11-06 | End: 2024-11-10 | Stop reason: HOSPADM

## 2024-11-06 RX ORDER — ONDANSETRON 2 MG/ML
4 INJECTION INTRAMUSCULAR; INTRAVENOUS EVERY 30 MIN PRN
Status: DISCONTINUED | OUTPATIENT
Start: 2024-11-06 | End: 2024-11-06 | Stop reason: HOSPADM

## 2024-11-06 RX ORDER — FAMOTIDINE 20 MG/1
20 TABLET, FILM COATED ORAL
Status: DISCONTINUED | OUTPATIENT
Start: 2024-11-07 | End: 2024-11-10 | Stop reason: HOSPADM

## 2024-11-06 RX ORDER — DIPHENHYDRAMINE HYDROCHLORIDE 50 MG/ML
25 INJECTION INTRAMUSCULAR; INTRAVENOUS ONCE
Status: COMPLETED | OUTPATIENT
Start: 2024-11-06 | End: 2024-11-06

## 2024-11-06 RX ORDER — METOPROLOL SUCCINATE 25 MG/1
25 TABLET, EXTENDED RELEASE ORAL ONCE
Status: COMPLETED | OUTPATIENT
Start: 2024-11-06 | End: 2024-11-06

## 2024-11-06 RX ORDER — BUPIVACAINE HYDROCHLORIDE 2.5 MG/ML
INJECTION, SOLUTION INFILTRATION; PERINEURAL PRN
Status: DISCONTINUED | OUTPATIENT
Start: 2024-11-06 | End: 2024-11-06 | Stop reason: HOSPADM

## 2024-11-06 RX ORDER — DEXAMETHASONE SODIUM PHOSPHATE 4 MG/ML
10 INJECTION, SOLUTION INTRA-ARTICULAR; INTRALESIONAL; INTRAMUSCULAR; INTRAVENOUS; SOFT TISSUE ONCE
Status: COMPLETED | OUTPATIENT
Start: 2024-11-06 | End: 2024-11-06

## 2024-11-06 RX ORDER — TRAMADOL HYDROCHLORIDE 50 MG/1
50 TABLET ORAL EVERY 6 HOURS PRN
Qty: 10 TABLET | Refills: 0 | Status: SHIPPED | OUTPATIENT
Start: 2024-11-06 | End: 2024-11-09

## 2024-11-06 RX ORDER — FUROSEMIDE 10 MG/ML
20 INJECTION INTRAMUSCULAR; INTRAVENOUS DAILY
Status: DISCONTINUED | OUTPATIENT
Start: 2024-11-06 | End: 2024-11-07

## 2024-11-06 RX ORDER — DEXAMETHASONE SODIUM PHOSPHATE 10 MG/ML
INJECTION, SOLUTION INTRAMUSCULAR; INTRAVENOUS PRN
Status: DISCONTINUED | OUTPATIENT
Start: 2024-11-06 | End: 2024-11-06

## 2024-11-06 RX ADMIN — PROPOFOL 100 MG: 10 INJECTION, EMULSION INTRAVENOUS at 14:01

## 2024-11-06 RX ADMIN — ACETAMINOPHEN 975 MG: 325 TABLET ORAL at 11:27

## 2024-11-06 RX ADMIN — MAGNESIUM SULFATE HEPTAHYDRATE 4 G: 80 INJECTION, SOLUTION INTRAVENOUS at 18:06

## 2024-11-06 RX ADMIN — FAMOTIDINE 20 MG: 10 INJECTION, SOLUTION INTRAVENOUS at 04:12

## 2024-11-06 RX ADMIN — FUROSEMIDE 20 MG: 10 INJECTION, SOLUTION INTRAMUSCULAR; INTRAVENOUS at 18:02

## 2024-11-06 RX ADMIN — DEXAMETHASONE SODIUM PHOSPHATE 10 MG: 10 INJECTION, SOLUTION INTRAMUSCULAR; INTRAVENOUS at 14:01

## 2024-11-06 RX ADMIN — OXYCODONE HYDROCHLORIDE 5 MG: 5 TABLET ORAL at 23:39

## 2024-11-06 RX ADMIN — DEXAMETHASONE SODIUM PHOSPHATE 10 MG: 4 INJECTION, SOLUTION INTRA-ARTICULAR; INTRALESIONAL; INTRAMUSCULAR; INTRAVENOUS; SOFT TISSUE at 05:28

## 2024-11-06 RX ADMIN — SUGAMMADEX 200 MG: 100 INJECTION, SOLUTION INTRAVENOUS at 14:30

## 2024-11-06 RX ADMIN — PROPOFOL 150 MCG/KG/MIN: 10 INJECTION, EMULSION INTRAVENOUS at 14:03

## 2024-11-06 RX ADMIN — OXYCODONE HYDROCHLORIDE 2.5 MG: 5 TABLET ORAL at 18:28

## 2024-11-06 RX ADMIN — SODIUM CHLORIDE: 9 INJECTION, SOLUTION INTRAVENOUS at 13:44

## 2024-11-06 RX ADMIN — LIDOCAINE HYDROCHLORIDE 5 ML: 10 INJECTION, SOLUTION INFILTRATION; PERINEURAL at 14:01

## 2024-11-06 RX ADMIN — METOPROLOL SUCCINATE 25 MG: 25 TABLET, EXTENDED RELEASE ORAL at 11:46

## 2024-11-06 RX ADMIN — LOSARTAN POTASSIUM 50 MG: 50 TABLET, FILM COATED ORAL at 20:12

## 2024-11-06 RX ADMIN — ROCURONIUM BROMIDE 50 MG: 50 INJECTION, SOLUTION INTRAVENOUS at 14:01

## 2024-11-06 RX ADMIN — METOCLOPRAMIDE 5 MG: 5 INJECTION, SOLUTION INTRAMUSCULAR; INTRAVENOUS at 04:12

## 2024-11-06 RX ADMIN — CLINDAMYCIN PHOSPHATE 900 MG: 900 INJECTION, SOLUTION INTRAVENOUS at 11:27

## 2024-11-06 RX ADMIN — FENTANYL CITRATE 100 MCG: 50 INJECTION, SOLUTION INTRAMUSCULAR; INTRAVENOUS at 14:01

## 2024-11-06 RX ADMIN — DIPHENHYDRAMINE HYDROCHLORIDE 25 MG: 50 INJECTION INTRAMUSCULAR; INTRAVENOUS at 04:12

## 2024-11-06 RX ADMIN — PROPOFOL 50 MG: 10 INJECTION, EMULSION INTRAVENOUS at 14:06

## 2024-11-06 RX ADMIN — IOPAMIDOL 75 ML: 755 INJECTION, SOLUTION INTRAVENOUS at 08:19

## 2024-11-06 RX ADMIN — CLONAZEPAM 0.5 MG: 0.5 TABLET ORAL at 20:12

## 2024-11-06 RX ADMIN — ASPIRIN 81 MG: 81 TABLET, COATED ORAL at 18:17

## 2024-11-06 RX ADMIN — ONDANSETRON 4 MG: 2 INJECTION INTRAMUSCULAR; INTRAVENOUS at 14:01

## 2024-11-06 ASSESSMENT — ACTIVITIES OF DAILY LIVING (ADL)
ADLS_ACUITY_SCORE: 0

## 2024-11-06 ASSESSMENT — ENCOUNTER SYMPTOMS
ORTHOPNEA: 0
DYSRHYTHMIAS: 1

## 2024-11-06 ASSESSMENT — COLUMBIA-SUICIDE SEVERITY RATING SCALE - C-SSRS
6. HAVE YOU EVER DONE ANYTHING, STARTED TO DO ANYTHING, OR PREPARED TO DO ANYTHING TO END YOUR LIFE?: NO
2. HAVE YOU ACTUALLY HAD ANY THOUGHTS OF KILLING YOURSELF IN THE PAST MONTH?: NO
1. IN THE PAST MONTH, HAVE YOU WISHED YOU WERE DEAD OR WISHED YOU COULD GO TO SLEEP AND NOT WAKE UP?: NO

## 2024-11-06 NOTE — ED NOTES
EMERGENCY DEPARTMENT SIGN OUT NOTE        ED COURSE AND MEDICAL DECISION MAKING  Patient was signed out to me by Dr Flaquito Stockton at 0530    In brief, Kerry Hoffmann is a 89 year old female who initially presented epigastric pain.  Vomiting.  History of indigestion.    At time of sign out, disposition was pending CTA, repeat troponin, if negative plan for discharge home    Unfortunately patient is IV fluid.  Nursing states PICC needs to place IV for CTA.  Previous physician talked with radiology got approval for CTA.  Patient is pending CTA delta Trop.  Suspected GERD per previous provider.  If negative plan for discharge home    Signed out to Dr. Hanson pending CTA and delt troponin.     FINAL IMPRESSION    No diagnosis found.    ED MEDS  Medications   dexAMETHasone (DECADRON) injection 10 mg (has no administration in time range)   iopamidol (ISOVUE-370) solution 75 mL (has no administration in time range)   metoclopramide (REGLAN) injection 5 mg (5 mg Intravenous $Given 11/6/24 0412)   diphenhydrAMINE (BENADRYL) injection 25 mg (25 mg Intravenous $Given 11/6/24 0412)   famotidine (PEPCID) injection 20 mg (20 mg Intravenous $Given 11/6/24 0412)       LAB  Labs Ordered and Resulted from Time of ED Arrival to Time of ED Departure   BASIC METABOLIC PANEL - Abnormal       Result Value    Sodium 127 (*)     Potassium 4.3      Chloride 89 (*)     Carbon Dioxide (CO2) 26      Anion Gap 12      Urea Nitrogen 21.8      Creatinine 1.24 (*)     GFR Estimate 42 (*)     Calcium 9.3      Glucose 101 (*)    TROPONIN T, HIGH SENSITIVITY - Abnormal    Troponin T, High Sensitivity 19 (*)    CBC WITH PLATELETS AND DIFFERENTIAL - Abnormal    WBC Count 6.3      RBC Count 3.53 (*)     Hemoglobin 10.3 (*)     Hematocrit 31.4 (*)     MCV 89      MCH 29.2      MCHC 32.8      RDW 13.8      Platelet Count 321      % Neutrophils 63      % Lymphocytes 21      % Monocytes 13      % Eosinophils 2      % Basophils 1      % Immature  Granulocytes 0      NRBCs per 100 WBC 0      Absolute Neutrophils 4.0      Absolute Lymphocytes 1.3      Absolute Monocytes 0.8      Absolute Eosinophils 0.1      Absolute Basophils 0.1      Absolute Immature Granulocytes 0.0      Absolute NRBCs 0.0     HEPATIC FUNCTION PANEL - Normal    Protein Total 7.2      Albumin 4.2      Bilirubin Total 0.2      Alkaline Phosphatase 85      AST 18      ALT 9      Bilirubin Direct <0.20     LIPASE - Normal    Lipase 58     TROPONIN T, HIGH SENSITIVITY       RADIOLOGY    CTA Chest Abdomen Pelvis w Contrast    (Results Pending)       DISCHARGE MEDS  New Prescriptions    No medications on file       Jeb Askew MD  [facility] EMERGENCY DEPARTMENT  [facility] [facility] [id number] [phone]         Jeb Askew MD  11/06/24 07

## 2024-11-06 NOTE — ED NOTES
EMERGENCY DEPARTMENT SIGN OUT NOTE        ED COURSE AND MEDICAL DECISION MAKING  Patient was signed out to me by Dr Demarco Askew at 7:24am.    In brief, Kerry Hoffmann is a 89 year old female who initially presented with epigastric pain.     At time of sign out, disposition was pending CTA and delta trop. Discharge if neg.    Update: CT with findings concerning for biliary pathology.  Right upper quadrant ultrasound obtained and reviewed and interpreted by me and radiology shows findings consistent with acute cholecystitis.  General surgery has evaluated and is planning operative management today.  They have asked for hospitalist admission due to age and comorbidities so I will discuss with hospitalist team as well.  Antibiotics ordered.    FINAL IMPRESSION    1. Epigastric pain        ED MEDS  Medications   iopamidol (ISOVUE-370) solution 75 mL (has no administration in time range)   EPINEPHrine (ADRENALIN) 1 MG/ML kit (has no administration in time range)   metoclopramide (REGLAN) injection 5 mg (5 mg Intravenous $Given 11/6/24 0412)   diphenhydrAMINE (BENADRYL) injection 25 mg (25 mg Intravenous $Given 11/6/24 0412)   famotidine (PEPCID) injection 20 mg (20 mg Intravenous $Given 11/6/24 0412)   dexAMETHasone (DECADRON) injection 10 mg (10 mg Intravenous $Given 11/6/24 0528)       LAB  Labs Ordered and Resulted from Time of ED Arrival to Time of ED Departure   BASIC METABOLIC PANEL - Abnormal       Result Value    Sodium 127 (*)     Potassium 4.3      Chloride 89 (*)     Carbon Dioxide (CO2) 26      Anion Gap 12      Urea Nitrogen 21.8      Creatinine 1.24 (*)     GFR Estimate 42 (*)     Calcium 9.3      Glucose 101 (*)    TROPONIN T, HIGH SENSITIVITY - Abnormal    Troponin T, High Sensitivity 19 (*)    CBC WITH PLATELETS AND DIFFERENTIAL - Abnormal    WBC Count 6.3      RBC Count 3.53 (*)     Hemoglobin 10.3 (*)     Hematocrit 31.4 (*)     MCV 89      MCH 29.2      MCHC 32.8      RDW 13.8      Platelet Count 321       % Neutrophils 63      % Lymphocytes 21      % Monocytes 13      % Eosinophils 2      % Basophils 1      % Immature Granulocytes 0      NRBCs per 100 WBC 0      Absolute Neutrophils 4.0      Absolute Lymphocytes 1.3      Absolute Monocytes 0.8      Absolute Eosinophils 0.1      Absolute Basophils 0.1      Absolute Immature Granulocytes 0.0      Absolute NRBCs 0.0     HEPATIC FUNCTION PANEL - Normal    Protein Total 7.2      Albumin 4.2      Bilirubin Total 0.2      Alkaline Phosphatase 85      AST 18      ALT 9      Bilirubin Direct <0.20     LIPASE - Normal    Lipase 58     TROPONIN T, HIGH SENSITIVITY       EKG      RADIOLOGY    US Abdomen Limited   Final Result   IMPRESSION:      1.  Cholelithiasis with mild gallbladder wall thickening. Minimal right upper quadrant ascites. Acute cholecystitis possible. Gallbladder is distended.      2.  No significant biliary dilatation.         CTA Chest Abdomen Pelvis w Contrast   Final Result   IMPRESSION:    1.  Interval development of gallbladder distention, diffuse mural edema, layering calcific stone material and trace pericholecystic edema. Findings would be compatible with acute cholecystitis in the proper clinical setting.   2.  No acute aortic syndrome. No pulmonary emboli.   3.  Mild biatrial cardiac enlargement and mild interstitial pulmonary edema.   4.  Mild to moderate bilateral renal artery stenosis.    5.  Infiltration of iodinated contrast material in the left upper arm related to this morning's earlier attempt. ER staff notified and contrast infiltration protocol initiated.          DISCHARGE MEDS  New Prescriptions    No medications on file       Brittani Hanson MD  [facility] EMERGENCY DEPARTMENT  [address] [place] [id number] [phone]         Brittani Hanson MD  11/06/24 2460

## 2024-11-06 NOTE — PHARMACY-ADMISSION MEDICATION HISTORY
Pharmacist Admission Medication History    Admission medication history is complete. The information provided in this note is only as accurate as the sources available at the time of the update.    Information Source(s): Patient via in-person    Pertinent Information: Patient was given a dose of Metoprolol in pre op ESPERANZA 11/6/24.     Changes made to PTA medication list:  Added: None  Deleted: Rosuvastatin (patient stopped a couple of months ago)   Changed: None    Allergies reviewed with patient and updates made in EHR: yes    Medication History Completed By: MAREK HEWITT RPH 11/6/2024 12:17 PM    PTA Med List   Medication Sig Last Dose/Taking    acetaminophen (TYLENOL) 500 MG tablet Take 1,000 mg by mouth 3 times daily 11/5/2024 Morning    aspirin 81 MG EC tablet Take 81 mg by mouth daily 11/5/2024 Morning    Calcium Carbonate Antacid (TUMS ULTRA 1000 PO) Take 1 chew tab by mouth nightly as needed More than a month    Cholecalciferol (VITAMIN D3) 50 MCG (2000 UT) CAPS Take 1 tablet by mouth daily 11/5/2024 Morning    clonazePAM (KLONOPIN) 0.5 MG tablet Take 0.5 mg by mouth 3 times daily 11/5/2024 Evening    clopidogrel (PLAVIX) 75 MG tablet TAKE 1 TABLET(75 MG) BY MOUTH DAILY 11/5/2024 Morning    famotidine (PEPCID) 10 MG tablet Take 20 mg by mouth daily. 11/5/2024 Morning    furosemide (LASIX) 20 MG tablet Take 1 tablet by mouth twice a week. Monday and Thursday Past Week Morning    losartan (COZAAR) 50 MG tablet Take 50 mg by mouth 2 times daily Past Week Morning    metoprolol succinate ER (TOPROL XL) 25 MG 24 hr tablet Take 75 mg by mouth at bedtime. 11/5/2024 Evening

## 2024-11-06 NOTE — ANESTHESIA PROCEDURE NOTES
Airway       Patient location during procedure: OR       Procedure Start/Stop Times: 11/6/2024 2:03 PM  Staff -        CRNA: Kelsi Jack APRN CRNA       Performed By: CRNAIndications and Patient Condition       Indications for airway management: tee-procedural       Induction type:intravenous       Mask difficulty assessment: 1 - vent by mask    Final Airway Details       Final airway type: endotracheal airway       Successful airway: ETT - single  Endotracheal Airway Details        ETT size (mm): 7.5       Cuffed: yes       Cuff volume (mL): 7       Successful intubation technique: direct laryngoscopy       DL Blade Type: Gray 2       Grade View of Cords: 1       Adjucts: stylet       Position: Right       Measured from: lips       Secured at (cm): 21       Bite block used: None    Post intubation assessment        Placement verified by: capnometry, equal breath sounds and chest rise        Number of attempts at approach: 1       Secured with: tape       Ease of procedure: easy       Dentition: Intact and Unchanged    Medication(s) Administered   Medication Administration Time: 11/6/2024 2:03 PM

## 2024-11-06 NOTE — ANESTHESIA POSTPROCEDURE EVALUATION
Patient: Kerry Hoffmann    Procedure: Procedure(s):  CHOLECYSTECTOMY, LAPAROSCOPIC       Anesthesia Type:  General    Note:  Disposition: Inpatient   Postop Pain Control: Uneventful            Sign Out: Well controlled pain   PONV: No   Neuro/Psych: Uneventful            Sign Out: Acceptable/Baseline neuro status   Airway/Respiratory: Uneventful            Sign Out: Acceptable/Baseline resp. status   CV/Hemodynamics: Uneventful            Sign Out: Acceptable CV status; No obvious hypovolemia; No obvious fluid overload   Other NRE: NONE   DID A NON-ROUTINE EVENT OCCUR?            Last vitals:  Vitals Value Taken Time   /70 11/06/24 1546   Temp 36.8  C (98.2  F) 11/06/24 1532   Pulse 55 11/06/24 1557   Resp 16 11/06/24 1557   SpO2 98 % 11/06/24 1557   Vitals shown include unfiled device data.    Electronically Signed By: Afua Noriega MD  November 6, 2024  4:02 PM

## 2024-11-06 NOTE — CONSULTS
General Surgery Consultation  Kerry Hoffmann MRN# [id number]   Age/Sex: 89 year old female YOB: 1935     Reason for consult: 1. Acute cholecystitis    2. Epigastric pain            Requesting physician: Flaquito Stockton MD                   Assessment and Plan:   Assessment:  Kerry Hoffmann is a 89yoF with PMHx of HTN, HFrEF (EF 45% 08/2024), NSTEMI-08/2023, CKD, HLD and GERD without previous abdominal surgical hx who presented due to acute epigastric/right upper quadrant abdominal pain that started 11 PM last night associated with nausea and vomiting. CTA obtained negative for acute cardiac abnormalities however with c/f acute cholecystitis. US with re demonstration for c/f acute cholecystitis. Labs without leukocytosis but with anemia (Hgb 10.3, baseline 10.6). She is without hyperbilirubinemia or elevated transaminases. Troponin 19 with downtrend upon repeat, 17. She is tender in the RUQ with c/f + Kahn sign. Vital signs notable for hypertension (208/89) with downtrend but persistence upon repeat during evaluation, 185/85.  Plan for laparoscopic cholecystectomy for which she is amenable to proceed.     Plan:  -NPO diet  -MIVF  -IV abx  -Multimodal pain control  -HMS consulted to assist with medical mgmt  -Plan for laparoscopic cholecystectomy          Chief Complaint:     Chief Complaint   Patient presents with    Abdominal Pain        History is obtained from the patient and EMR    HPI:   Kerry Hoffmann is a 89yoF with PMHx of HTN, HFrEF (EF 45% 08/2024), NSTEMI-08/2023, CKD, HLD and GERD without previous abdominal surgical hx who presented due to acute epigastric/right upper quadrant abdominal pain that started 11 PM last night associated with nausea and vomiting.  Reports multiple previous similar episodes that have been postprandial multiple times monthly over the past year had been brief with spontaneous resolution lasting 1 to 2 hours in time.  States current episode greatest in  severity without spontaneous resolution refractory to Tums and famotidine.  Pain is localized to mid upper abdomen as well as right upper abdomen with radiation to her back.  Prior to onset at 11 PM had experienced nausea with 2 episodes of small-volume biliary emesis.  Denies further associated symptoms including fever, chills, chest palpitations, shortness of breath.     Denies abdominal surgical hx, endorses surgical hx including breast surgery, tonsillectomy as well with tib/fib ORIF without complications under general anesthesia to knowledge. Endorses use of ASA-81 mg daily, last use reported yesterday.  Last p.o. intake at 6 PM yesterday which had been chicken tenders, tater tots and mint ice cream.           Past Medical History:     Past Medical History:   Diagnosis Date    Anxiety     takes clonazepam    Asthma     [facility]     Chronic kidney disease     stage 3 per [facility]     CKD (chronic kidney disease)     Clostridium difficile colitis 11/01/2016    Clostridium difficile infection     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    Clostridium enterocolitis 10/27/2016    CTS (carpal tunnel syndrome)     GERD (gastroesophageal reflux disease)     per [facility]     Hiatal hernia     small per [facility]     Hyperlipidemia     Hyperlipidemia     Hyperlipidemia     Hypertension     Hypertension     Osteoarthritis of knee     per [facility]     Spinal stenosis     per [facility]     Vitamin D deficiency     per [facility]              Past Surgical History:     Past Surgical History:   Procedure Laterality Date    BREAST SURGERY  1982    breast tumor per [facility]     CATARACT EXTRACTION  07/2005    per [facility]     COLONOSCOPY N/A 11/20/2023    Procedure: COLONOSCOPY with biopsies and polypectomy;  Surgeon: David Barreto MD;  Location: [facility] Main OR    CV CORONARY ANGIOGRAM N/A 8/11/2023    Procedure: Coronary Angiogram;  Surgeon: Ap Arroyo MD;  Location: [facility] CATH LAB CV    CV LEFT HEART CATH N/A 8/11/2023    Procedure: Left  Heart Catheterization;  Surgeon: Ap Arroyo MD;  Location: [facility] LAB CV    ORIF TIBIA & FIBULA FRACTURES  1988    per [facility]     OTHER SURGICAL HISTORY  10/2004    hole in retinaper [facility]     OTHER SURGICAL HISTORY  05/03/2015    fecal transplantper [facility]     WY ESOPHAGOGASTRODUODENOSCOPY TRANSORAL DIAGNOSTIC N/A 9/11/2020    Procedure: ESOPHAGOGASTRODUODENOSCOPY With gastric biopsies;  Surgeon: David Reyes MD;  Location: [facility] [place];  Service: Gastroenterology    TONSILLECTOMY      TONSILLECTOMY & ADENOIDECTOMY  1963             Social History:    reports that she has quit smoking. Her smoking use included cigarettes. She has a 105 pack-year smoking history. She has never used smokeless tobacco. She reports that she does not currently use alcohol. She reports that she does not use drugs.           Family History:     Family History   Problem Relation Age of Onset    Hypertension Mother     Cancer Mother         gallbladder cancer    Myocardial Infarction Father     Hypertension Sister     Alzheimer Disease Sister     Heart Disease Sister     Cancer Brother     Brain Cancer Brother     Pacemaker Mother     Heart Disease Mother     Coronary Artery Disease Father     Heart Disease Father     Heart Failure Sister               Allergies:     Allergies   Allergen Reactions    Buspirone Shortness Of Breath    Ciprofloxacin Hives and Shortness Of Breath     Other reaction(s): Unknown    Cortisone      Other reaction(s): Throat Swelling/Closing, Unknown  Reaction 9-5-94      Hydrocodone-Acetaminophen Shortness Of Breath     Other reaction(s): Unknown    Lansoprazole Shortness Of Breath    Metoprolol Shortness Of Breath     Tolerates metoprolol succinate at home    Nedocromil      Other reaction(s): Throat Swelling/Closing    Niacin Shortness Of Breath     Other reaction(s): Unknown    Albuterol Other (See Comments)     Inhaler had extreme effect on nervous system      Amlodipine      Other  reaction(s): Erythema, Unknown    Ampicillin Unknown    Azithromycin      Other reaction(s): *Unknown, Unknown    Cefixime Diarrhea     Other reaction(s): Unknown  Has tolerated ceftriaxone    Cefprozil Nausea and Vomiting     Other reaction(s): Unknown  Has tolerated ceftriaxone    Cefuroxime      Other reaction(s): *Unknown  Has tolerated ceftriaxone    Cephalexin      Other reaction(s): *Unknown, Unknown  Has tolerated ceftriaxone    Contrast Dye      Other reaction(s): hives    Cyclobenzaprine      Other reaction(s): Sedation    Dextromethorphan-Guaifenesin Nausea     Other reaction(s): Headache    Diltiazem      Other reaction(s): Erythema, Unknown  Ears face arms and chest red and on fire-body tremors      Erythromycin      Other reaction(s): Unknown    Esomeprazole      Other reaction(s): Headache    Ezetimibe Unknown    Fenofibrate Muscle Pain (Myalgia)     Other reaction(s): Unknown    Fluticasone-Salmeterol Other (See Comments)     Elevated blood pressure      Methylprednisolone     Metronidazole     Monosodium Glutamate     Moxifloxacin      Other reaction(s): Dizziness    Nifedipine      Other reaction(s): Edema  Took for 5-93 to 9-94 red and swollen leg      Nsaids      Other reaction(s): Unknown    Ofloxacin      Other reaction(s): *Unknown    Ondansetron      Other reaction(s): Constipation    Parsley Seed     Penicillins Unknown     She doesn't remember other than it occurred as a very young woman     Piper     Pirbuterol Other (See Comments)     Immediate extreme effect on nervous system      Pravastatin      Other reaction(s): Dizziness, Unknown    Pseudoephedrine      Other reaction(s): headache,nausea    Shellfish-Derived Products      Per pt 8/12/23    Simvastatin Muscle Pain (Myalgia)    Spironolactone      Other reaction(s): stomach cramps, nausea    Sulfamethoxazole-Trimethoprim      Other reaction(s): Unknown    Tramadol      Other reaction(s): red ears,high blood press.     Tramadol-Acetaminophen      Other reaction(s): Tachycardia, Unknown    Triamterene Other (See Comments)     Greatly bothered with sun-took medication for three years  Greatly bothered with sun      Diatrizoate Rash    Telmisartan Palpitations              Medications:     Prior to Admission medications    Medication Sig Start Date End Date Taking? Authorizing Provider   acetaminophen (TYLENOL) 500 MG tablet Take 1,000 mg by mouth 3 times daily    Reported, Patient   aspirin 81 MG EC tablet Take 81 mg by mouth daily    Reported, Patient   Calcium Carbonate Antacid (TUMS ULTRA 1000 PO) Take 1 chew tab by mouth nightly as needed    Reported, Patient   Cholecalciferol (VITAMIN D3) 50 MCG (2000 UT) CAPS Take 1 tablet by mouth daily 1/12/21   Reported, Patient   clonazePAM (KLONOPIN) 0.5 MG tablet Take 0.5 mg by mouth 3 times daily    Reported, Patient   clopidogrel (PLAVIX) 75 MG tablet TAKE 1 TABLET(75 MG) BY MOUTH DAILY 9/24/24   Mark Hummel MD   famotidine (PEPCID) 10 MG tablet Take 20 mg by mouth daily. 12/3/21   Reported, Patient   furosemide (LASIX) 20 MG tablet Take 1 tablet by mouth twice a week. Monday and Thursday 11/28/23   Reported, Patient   losartan (COZAAR) 50 MG tablet Take 50 mg by mouth 2 times daily 8/22/20   Reported, Patient   metoprolol succinate ER (TOPROL XL) 25 MG 24 hr tablet Take 75 mg by mouth at bedtime.    Reported, Patient   rosuvastatin (CRESTOR) 10 MG tablet Take 1 tablet (10 mg) by mouth daily  Patient not taking: Reported on 10/28/2024 2/27/24   Mark Hummel MD              Review of Systems:   The Review of Systems is negative other than noted in the HPI            Physical Exam:   Patient Vitals for the past 24 hrs:   BP Temp Temp src Pulse Resp SpO2   11/06/24 0821 (!) 208/89 -- -- 69 19 96 %   11/06/24 0815 -- -- -- 71 -- 96 %   11/06/24 0753 (!) 201/81 -- -- 69 -- 94 %   11/06/24 0530 (!) 206/88 -- -- 76 24 93 %   11/06/24 0515 (!) 187/80 -- -- 72 20 93 %   11/06/24 0094 (!)  200/79 -- -- 71 29 92 %   11/06/24 0445 (!) 204/87 -- -- 74 25 94 %   11/06/24 0430 (!) 176/77 -- -- 71 22 93 %   11/06/24 0415 (!) 182/77 -- -- 84 28 --   11/06/24 0330 (!) 184/77 -- -- 68 23 98 %   11/06/24 0315 (!) 181/77 -- -- 76 19 95 %   11/06/24 0300 (!) 180/90 98.3  F (36.8  C) Oral 80 20 97 %        No intake or output data in the 24 hours ending 11/06/24 1002   Constitutional:   awake, alert, cooperative, no apparent distress, and appears stated age       Eyes:   PERRL, conjunctiva/corneas clear, EOM's intact; no scleral edema or icterus noted        ENT:   Normocephalic, without obvious abnormality, atraumatic, Lips, mucosa, and tongue normal      Lungs:   Normal respiratory effort, no accessory muscle use       Cardiovascular:   Regular rate and rhythm       Abdomen:   Right upper quadrant tenderness to palpation with focal involuntary guarding concerning for positive Kahn sign.       Musculoskeletal:   No obvious swelling, bruising or deformity       Skin:   Skin color and texture normal for patient, no rashes or lesions              Data:         All imaging studies reviewed by me.    Results for orders placed or performed during the hospital encounter of 11/06/24 (from the past 24 hours)   CBC with platelets differential    Narrative    The following orders were created for panel order CBC with platelets differential.  Procedure                               Abnormality         Status                     ---------                               -----------         ------                     CBC with platelets and d...[[id number]]  Abnormal            Final result                 Please view results for these tests on the individual orders.   Basic metabolic panel   Result Value Ref Range    Sodium 127 (L) 135 - 145 mmol/L    Potassium 4.3 3.4 - 5.3 mmol/L    Chloride 89 (L) 98 - 107 mmol/L    Carbon Dioxide (CO2) 26 22 - 29 mmol/L    Anion Gap 12 7 - 15 mmol/L    Urea Nitrogen 21.8 8.0 - 23.0 mg/dL     Creatinine 1.24 (H) 0.51 - 0.95 mg/dL    GFR Estimate 42 (L) >60 mL/min/1.73m2    Calcium 9.3 8.8 - 10.4 mg/dL    Glucose 101 (H) 70 - 99 mg/dL   Hepatic function panel   Result Value Ref Range    Protein Total 7.2 6.4 - 8.3 g/dL    Albumin 4.2 3.5 - 5.2 g/dL    Bilirubin Total 0.2 <=1.2 mg/dL    Alkaline Phosphatase 85 40 - 150 U/L    AST 18 0 - 45 U/L    ALT 9 0 - 50 U/L    Bilirubin Direct <0.20 0.00 - 0.30 mg/dL   Lipase   Result Value Ref Range    Lipase 58 13 - 60 U/L   Troponin T, High Sensitivity   Result Value Ref Range    Troponin T, High Sensitivity 19 (H) <=14 ng/L   CBC with platelets and differential   Result Value Ref Range    WBC Count 6.3 4.0 - 11.0 10e3/uL    RBC Count 3.53 (L) 3.80 - 5.20 10e6/uL    Hemoglobin 10.3 (L) 11.7 - 15.7 g/dL    Hematocrit 31.4 (L) 35.0 - 47.0 %    MCV 89 78 - 100 fL    MCH 29.2 26.5 - 33.0 pg    MCHC 32.8 31.5 - 36.5 g/dL    RDW 13.8 10.0 - 15.0 %    Platelet Count 321 150 - 450 10e3/uL    % Neutrophils 63 %    % Lymphocytes 21 %    % Monocytes 13 %    % Eosinophils 2 %    % Basophils 1 %    % Immature Granulocytes 0 %    NRBCs per 100 WBC 0 <1 /100    Absolute Neutrophils 4.0 1.6 - 8.3 10e3/uL    Absolute Lymphocytes 1.3 0.8 - 5.3 10e3/uL    Absolute Monocytes 0.8 0.0 - 1.3 10e3/uL    Absolute Eosinophils 0.1 0.0 - 0.7 10e3/uL    Absolute Basophils 0.1 0.0 - 0.2 10e3/uL    Absolute Immature Granulocytes 0.0 <=0.4 10e3/uL    Absolute NRBCs 0.0 10e3/uL   Troponin T, High Sensitivity   Result Value Ref Range    Troponin T, High Sensitivity 17 (H) <=14 ng/L   CTA Chest Abdomen Pelvis w Contrast    Narrative    EXAM: CTA CHEST ABDOMEN PELVIS W CONTRAST  LOCATION: [facility]  DATE: 11/6/2024    INDICATION: abd and chest pain radiating to back  COMPARISON: 8/26/2024 CXR, 11/7/2023 CT AP and 4/27/2009 CT chest  TECHNIQUE: CT angiogram chest abdomen pelvis during arterial phase of injection of IV contrast as well as noncontrast imaging of the chest.   Multiplanar reformats and MIP reconstructions were performed. Dose reduction techniques were used.   CONTRAST: IsoVue 370 75mL    FINDINGS:  CTA CHEST, ABDOMEN and PELVIS: Normal caliber thoracic and abdominal aorta with no acute aortic syndrome. Normal caliber pulmonary arteries with no pulmonary emboli. Mild to moderate bilateral renal artery stenosis. Brachiocephalic, mesenteric, renal and   iliofemoral arteries are otherwise unremarkable.    LUNGS AND PLEURA: Mild interstitial pulmonary edema. Lungs are clear. No pleural effusion.    MEDIASTINUM/AXILLAE: No lymphadenopathy. Mild biatrial cardiac enlargement. No pericardial effusion. Small esophageal hiatal hernia    CORONARY ARTERY CALCIFICATION: Calcified atheromatous plaque left main and proximal LAD and circumflex coronary arteries.    HEPATOBILIARY: Liver is normal.  Interval development of gallbladder distention, diffuse mural edema, layering calcific stone material and trace pericholecystic edema. No  bile duct dilatation.     PANCREAS: Normal.    SPLEEN: Spleen size normal.    ADRENAL GLANDS: Normal.    KIDNEY/BLADDER: Several benign renal cysts. No follow up is needed. Kidneys, ureters and bladder are otherwise normal.    BOWEL: Normal appendix. Colonic diverticulosis. Bowel is otherwise normal with no obstruction or inflammatory change.    LYMPH NODES: No lymphadenopathy.    PELVIC ORGANS: No pelvic mass or fluid.    MUSCULOSKELETAL: Iodinated contrast material in the soft tissue throughout the left upper arm from today's earlier attempt. Advanced multilevel degenerative disc disease and degenerative facet arthritis lumbar spine.      Impression    IMPRESSION:   1.  Interval development of gallbladder distention, diffuse mural edema, layering calcific stone material and trace pericholecystic edema. Findings would be compatible with acute cholecystitis in the proper clinical setting.  2.  No acute aortic syndrome. No pulmonary emboli.  3.  Mild biatrial  cardiac enlargement and mild interstitial pulmonary edema.  4.  Mild to moderate bilateral renal artery stenosis.   5.  Infiltration of iodinated contrast material in the left upper arm related to this morning's earlier attempt. ER staff notified and contrast infiltration protocol initiated.           Latrice Desai PA-C  [facility]  Surgery [address] [address]?  Office: [phone]

## 2024-11-06 NOTE — ED NOTES
Bed: JNED-25  Expected date: 11/6/24  Expected time: 2:55 AM  Means of arrival: Ambulance  Comments:  89 abd pain

## 2024-11-06 NOTE — ANESTHESIA PREPROCEDURE EVALUATION
Anesthesia Pre-Procedure Evaluation    Patient: Kerry Hoffmann   MRN: [id number] : 1935        Procedure : Procedure(s):  CHOLECYSTECTOMY, LAPAROSCOPIC          Past Medical History:   Diagnosis Date    Anxiety     takes clonazepam    Asthma     per [facility]     Chronic kidney disease     stage 3 per [facility]     CKD (chronic kidney disease)     Clostridium difficile colitis 2016    Clostridium difficile infection     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    Clostridium enterocolitis 10/27/2016    CTS (carpal tunnel syndrome)     GERD (gastroesophageal reflux disease)     per [facility]     Hiatal hernia     small per [facility]     Hyperlipidemia     Hyperlipidemia     Hyperlipidemia     Hypertension     Hypertension     Osteoarthritis of knee     per [facility]     Spinal stenosis     per [facility]     Vitamin D deficiency     per [facility]      Past Surgical History:   Procedure Laterality Date    BREAST SURGERY      breast tumor per [facility]     CATARACT EXTRACTION  2005    per [facility]     COLONOSCOPY N/A 2023    Procedure: COLONOSCOPY with biopsies and polypectomy;  Surgeon: David Barreto MD;  Location: [facility]    CV CORONARY ANGIOGRAM N/A 2023    Procedure: Coronary Angiogram;  Surgeon: Ap Arroyo MD;  Location: [facility]    CV LEFT HEART CATH N/A 2023    Procedure: Left Heart Catheterization;  Surgeon: Ap Arroyo MD;  Location: [facility]    ORIF TIBIA & FIBULA FRACTURES      per [facility]     OTHER SURGICAL HISTORY  10/2004    hole in retinaper [facility]     OTHER SURGICAL HISTORY  2015    fecal transplantper [facility]     CO ESOPHAGOGASTRODUODENOSCOPY TRANSORAL DIAGNOSTIC N/A 2020    Procedure: ESOPHAGOGASTRODUODENOSCOPY With gastric biopsies;  Surgeon: David Reyes MD;  [place] [facility] [place]  Service: Gastroenterology    TONSILLECTOMY      TONSILLECTOMY & ADENOIDECTOMY  1963      Allergies   Allergen Reactions    Buspirone Shortness Of Breath     Ciprofloxacin Hives and Shortness Of Breath     Other reaction(s): Unknown    Cortisone      Other reaction(s): Throat Swelling/Closing, Unknown  Reaction 9-5-94      Hydrocodone-Acetaminophen Shortness Of Breath     Other reaction(s): Unknown    Lansoprazole Shortness Of Breath    Metoprolol Shortness Of Breath     Tolerates metoprolol succinate at home    Nedocromil      Other reaction(s): Throat Swelling/Closing    Niacin Shortness Of Breath     Other reaction(s): Unknown    Albuterol Other (See Comments)     Inhaler had extreme effect on nervous system      Amlodipine      Other reaction(s): Erythema, Unknown    Ampicillin Unknown    Azithromycin      Other reaction(s): *Unknown, Unknown    Cefixime Diarrhea     Other reaction(s): Unknown  Has tolerated ceftriaxone    Cefprozil Nausea and Vomiting     Other reaction(s): Unknown  Has tolerated ceftriaxone    Cefuroxime      Other reaction(s): *Unknown  Has tolerated ceftriaxone    Cephalexin      Other reaction(s): *Unknown, Unknown  Has tolerated ceftriaxone    Contrast Dye      Other reaction(s): hives    Cyclobenzaprine      Other reaction(s): Sedation    Dextromethorphan-Guaifenesin Nausea     Other reaction(s): Headache    Diltiazem      Other reaction(s): Erythema, Unknown  Ears face arms and chest red and on fire-body tremors      Erythromycin      Other reaction(s): Unknown    Esomeprazole      Other reaction(s): Headache    Ezetimibe Unknown    Fenofibrate Muscle Pain (Myalgia)     Other reaction(s): Unknown    Fluticasone-Salmeterol Other (See Comments)     Elevated blood pressure      Methylprednisolone     Metronidazole     Monosodium Glutamate     Moxifloxacin      Other reaction(s): Dizziness    Nifedipine      Other reaction(s): Edema  Took for 5-93 to 9-94 red and swollen leg      Nsaids      Other reaction(s): Unknown    Ofloxacin      Other reaction(s): *Unknown    Ondansetron      Other reaction(s): Constipation    Parsley Seed      Penicillins Unknown     She doesn't remember other than it occurred as a very young woman     Piper     Pirbuterol Other (See Comments)     Immediate extreme effect on nervous system      Pravastatin      Other reaction(s): Dizziness, Unknown    Pseudoephedrine      Other reaction(s): headache,nausea    Shellfish-Derived Products      Per pt 8/12/23    Simvastatin Muscle Pain (Myalgia)    Spironolactone      Other reaction(s): stomach cramps, nausea    Sulfamethoxazole-Trimethoprim      Other reaction(s): Unknown    Tramadol      Other reaction(s): red ears,high blood press.    Tramadol-Acetaminophen      Other reaction(s): Tachycardia, Unknown    Triamterene Other (See Comments)     Greatly bothered with sun-took medication for three years  Greatly bothered with sun      Diatrizoate Rash    Telmisartan Palpitations      Social History     Tobacco Use    Smoking status: Former     Current packs/day: 3.00     Average packs/day: 3.0 packs/day for 35.0 years (105.0 ttl pk-yrs)     Types: Cigarettes    Smokeless tobacco: Never    Tobacco comments:     quit 1968   Substance Use Topics    Alcohol use: Not Currently      Wt Readings from Last 1 Encounters:   10/28/24 70.4 kg (155 lb 3.2 oz)        Anesthesia Evaluation   Pt has had prior anesthetic.     No history of anesthetic complications (MANY allergies)       ROS/MED HX  ENT/Pulmonary:     (+)                      asthma                  Neurologic:  - neg neurologic ROS     Cardiovascular:     (+) Dyslipidemia hypertension- -   -  - -      CHF  Last EF: 40-45%                dysrhythmias (PACs),   valvular problems/murmurs type: MR and AI mod MR, mod-severe AI.      (-) [place], orthopnea/PND and syncope   METS/Exercise Tolerance:     Hematologic:    (-) anemia   Musculoskeletal:       GI/Hepatic:     (+) GERD,     hiatal hernia,    cholecystitis/cholelithiasis (s/f lap [place]),          Renal/Genitourinary:     (+) renal disease, type: CRI,            Endo:     (+)        "        Obesity,       Psychiatric/Substance Use:     (+) psychiatric history anxiety       Infectious Disease:  - neg infectious disease ROS     Malignancy:       Other:            Physical Exam    Airway        Mallampati: II   TM distance: > 3 FB   Neck ROM: full   Mouth opening: > 3 cm    Respiratory Devices and Support         Dental       (+) Modest Abnormalities - crowns, retainers, 1 or 2 missing teeth      Cardiovascular          Rhythm and rate: regular and normal     Pulmonary   pulmonary exam normal                OUTSIDE LABS:  CBC:   Lab Results   Component Value Date    WBC 6.3 11/06/2024    WBC 5.9 09/12/2024    HGB 10.3 (L) 11/06/2024    HGB 10.6 (L) 09/12/2024    HCT 31.4 (L) 11/06/2024    HCT 31.8 (L) 09/12/2024     11/06/2024     09/12/2024     BMP:   Lab Results   Component Value Date     (L) 11/06/2024     (L) 10/28/2024    POTASSIUM 4.3 11/06/2024    POTASSIUM 4.4 10/28/2024    CHLORIDE 89 (L) 11/06/2024    CHLORIDE 94 (L) 10/28/2024    CO2 26 11/06/2024    CO2 25 10/28/2024    BUN 21.8 11/06/2024    BUN 17.4 10/28/2024    CR 1.24 (H) 11/06/2024    CR 1.12 (H) 10/28/2024     (H) 11/06/2024    GLC 89 10/28/2024     COAGS: No results found for: \"PTT\", \"INR\", \"FIBR\"  POC: No results found for: \"BGM\", \"HCG\", \"HCGS\"  HEPATIC:   Lab Results   Component Value Date    ALBUMIN 4.2 11/06/2024    PROTTOTAL 7.2 11/06/2024    ALT 9 11/06/2024    AST 18 11/06/2024    ALKPHOS 85 11/06/2024    BILITOTAL 0.2 11/06/2024     OTHER:   Lab Results   Component Value Date    LACT 1.1 08/25/2024    ISAIAH 9.3 11/06/2024    PHOS 3.4 03/04/2024    MAG 1.7 10/28/2024    LIPASE 58 11/06/2024    TSH 2.52 06/14/2023       Anesthesia Plan    ASA Status:  3    NPO Status:  NPO Appropriate    Anesthesia Type: General.     - Airway: ETT   Induction: Intravenous, Propofol.   Maintenance: Inhalation.        Consents    Anesthesia Plan(s) and associated risks, benefits, and realistic alternatives " "discussed. Questions answered and patient/representative(s) expressed understanding.     - Discussed:     - Discussed with:  Patient            Postoperative Care    Pain management: IV analgesics, Multi-modal analgesia.   PONV prophylaxis: Ondansetron (or other 5HT-3), Dexamethasone or Solumedrol, Background Propofol Infusion     Comments:    Other Comments: GETA  Decadron 10, zofran 4  Maintain baseline MAP - art line PRN  Slow gentle insufflation to avoid significant increase in PVR given severe AI           Afua Noriega MD    I have reviewed the pertinent notes and labs in the chart from the past 30 days and (re)examined the patient.  Any updates or changes from those notes are reflected in this note.     # Hyponatremia: Lowest Na = 127 mmol/L in last 2 days, will monitor as appropriate  # Hypochloremia: Lowest Cl = 89 mmol/L in last 2 days, will monitor as appropriate        # Drug Induced Platelet Defect: home medication list includes an antiplatelet medication   # Hypertension: Noted on problem list  # Heart failure, NOS: heart failure noted on the problem list and last echo with EF 40-50%     # Anemia: based on hgb <11       # Obesity: Estimated body mass index is 30.06 kg/m  as calculated from the following:    Height as of 10/28/24: 1.53 m (5' 0.25\").    Weight as of 10/28/24: 70.4 kg (155 lb 3.2 oz).       # Financial/Environmental Concerns:    # Asthma: noted on problem list       "

## 2024-11-06 NOTE — ED PROVIDER NOTES
EMERGENCY DEPARTMENT ENCOUNTER      NAME: Kerry Hoffmann  AGE: 89 year old female  YOB: 1935  MRN: [id number]  EVALUATION DATE & TIME: 11/6/2024  2:59 AM    PCP: Kaylyn Bolanos    ED PROVIDER: Flaquito Stockton MD      Chief Complaint   Patient presents with    Abdominal Pain         FINAL IMPRESSION:  1. Epigastric pain          ED COURSE & MEDICAL DECISION MAKING:    Pertinent Labs & Imaging studies reviewed. (See chart for details)  89 year old female presents to the Emergency Department for evaluation of abd pain    ED Course as of 11/06/24 0533   Wed Nov 06, 2024   0324 EKG is sinus rhythm with PACs at a rate of 72.  No ST segment changes indicative of emergent ischemia.   0343 Patient is an 89-year-old female with a past medical history significant for heart failure who presents to the emergency department with epigastric abdominal pain and chest pain that radiated to her back, and woke her from sleep approximately 4 hours prior to arrival.  She was in her usual state of health when she went to sleep.  She feels that the pain is now going down her abdomen.  She also has nausea and vomiting.  She appears in no acute distress on arrival, but is hypertensive at 180/90.  Afebrile.  Epigastric abdominal tenderness present.  Clear breath sounds.  Nonpitting edema in bilateral lower extremities.  Differential diagnosis includes aortic dissection, AAA, ACS, pancreatitis, biliary pathology, gastroenteritis, duodenal ulcer or peptic ulcer disease.   0454 Patient has chronic CKD and chronic hyponatremia, neither of which is significantly changed from her baseline.   0454 She also has chronic anemia, and this is also unchanged.  No leukocytosis.  No transaminitis or pancreatitis.  Initial troponin is 19, will trend at the 2-hour.   0516 Discussed with radiologist about her prior hx of contrast allergy (hives). He agrees if we are concerned about emergent pathology, then the scan would likely still need  to be done and there would not be time for extended pre-treatment. I discussed with her, and she states that this was      5:32 AM patient signed out to Dr. Askew with the following to do:  -Follow-up CTA (monitor for signs of allergic reaction)  -Follow-up repeat troponin  If these are reassuring, then likely discharge    Medical Decision Making  Obtained supplemental history:Supplemental history obtained?: No  Reviewed external records: External records reviewed?: No  Care impacted by chronic illness:Chronic Kidney Disease, Heart Disease, and Hypertension  Care significantly affected by social determinants of health:N/A  Did you consider but not order tests?: Work up considered but not performed and documented in chart, if applicable  Did you interpret images independently?: Independent interpretation of ECG and images noted in documentation, when applicable.  Consultation discussion with other provider:Did you involve another provider (consultant, , pharmacy, etc.)?: I discussed the care with another health care provider, see documentation for details.  Admission considered. Patient was signed out to the oncoming physician, disposition pending.  Not Applicable      At the conclusion of the encounter I discussed the results of all of the tests and the disposition. The questions were answered. The patient or family acknowledged understanding and was agreeable with the care plan.     0 minutes of critical care time     MEDICATIONS GIVEN IN THE EMERGENCY:  Medications   iopamidol (ISOVUE-370) solution 75 mL (has no administration in time range)   metoclopramide (REGLAN) injection 5 mg (5 mg Intravenous $Given 11/6/24 0412)   diphenhydrAMINE (BENADRYL) injection 25 mg (25 mg Intravenous $Given 11/6/24 0412)   famotidine (PEPCID) injection 20 mg (20 mg Intravenous $Given 11/6/24 0412)   dexAMETHasone (DECADRON) injection 10 mg (10 mg Intravenous $Given 11/6/24 8711)       NEW PRESCRIPTIONS STARTED AT TODAY'S ER  VISIT  New Prescriptions    No medications on file          =================================================================    HPI    Patient information was obtained from: patient    Use of : N/A        Kerry Hoffmann is a 89 year old female with a pertinent history of CHF, hypertensive kidney disease and heart disease, generalized anxiety disorder, hyperlipidemia, hypertension, peptic ulcer disease who presents to this ED for evaluation of chest pain, abdominal pain.  The patient was woken from sleep 4 hours prior to arrival with sudden onset epigastric abdominal pain that radiates into her chest into her back and has been constant.  She tried taking clonazepam at home without relief.  Her symptoms had started to improve when EMS arrived.  Now she feels that the pain is radiating down the center of her abdomen.  No shortness of breath.  No change in baseline leg swelling.  She also has been having nausea and vomiting since this began.  No dysuria, hematuria, black or bloody stools.  Last bowel movement was this evening, and appeared normal.      PAST MEDICAL HISTORY:  Past Medical History:   Diagnosis Date    Anxiety     takes clonazepam    Asthma     [facility]     Chronic kidney disease     stage 3 per [facility]     CKD (chronic kidney disease)     Clostridium difficile colitis 11/01/2016    Clostridium difficile infection     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    Clostridium enterocolitis 10/27/2016    CTS (carpal tunnel syndrome)     GERD (gastroesophageal reflux disease)     per [facility]     Hiatal hernia     small per [facility]     Hyperlipidemia     Hyperlipidemia     Hyperlipidemia     Hypertension     Hypertension     Osteoarthritis of knee     per [facility]     Spinal stenosis     per [facility]     Vitamin D deficiency     per [facility]       PAST SURGICAL HISTORY:  Past Surgical History:   Procedure Laterality Date    BREAST SURGERY  1982    breast tumor per [facility]     CATARACT EXTRACTION  07/2005    per [facility]      COLONOSCOPY N/A 11/20/2023    Procedure: COLONOSCOPY with biopsies and polypectomy;  Surgeon: David Barreto MD;  Location: [facility]    CV CORONARY ANGIOGRAM N/A 8/11/2023    Procedure: Coronary Angiogram;  Surgeon: Ap Arroyo MD;  Location: [facility] CATH LAB CV    CV LEFT HEART CATH N/A 8/11/2023    Procedure: Left Heart Catheterization;  Surgeon: Ap Arroyo MD;  Location: [facility] CATH LAB CV    ORIF TIBIA & FIBULA FRACTURES  1988    per [facility]     OTHER SURGICAL HISTORY  10/2004    hole in retinaper [facility]     OTHER SURGICAL HISTORY  05/03/2015    fecal transplantper [facility]     KY ESOPHAGOGASTRODUODENOSCOPY TRANSORAL DIAGNOSTIC N/A 9/11/2020    Procedure: ESOPHAGOGASTRODUODENOSCOPY With gastric biopsies;  Surgeon: David Reyes MD;  [facility];  Service: Gastroenterology    TONSILLECTOMY      TONSILLECTOMY & ADENOIDECTOMY  1963           CURRENT MEDICATIONS:    acetaminophen (TYLENOL) 500 MG tablet  aspirin 81 MG EC tablet  Calcium Carbonate Antacid (TUMS ULTRA 1000 PO)  Cholecalciferol (VITAMIN D3) 50 MCG (2000 UT) CAPS  clonazePAM (KLONOPIN) 0.5 MG tablet  clopidogrel (PLAVIX) 75 MG tablet  famotidine (PEPCID) 10 MG tablet  furosemide (LASIX) 20 MG tablet  losartan (COZAAR) 50 MG tablet  metoprolol succinate ER (TOPROL XL) 25 MG 24 hr tablet  rosuvastatin (CRESTOR) 10 MG tablet        ALLERGIES:  Allergies   Allergen Reactions    Buspirone Shortness Of Breath    Ciprofloxacin Hives and Shortness Of Breath     Other reaction(s): Unknown    Cortisone      Other reaction(s): Throat Swelling/Closing, Unknown  Reaction 9-5-94      Hydrocodone-Acetaminophen Shortness Of Breath     Other reaction(s): Unknown    Lansoprazole Shortness Of Breath    Metoprolol Shortness Of Breath     Tolerates metoprolol succinate at home    Nedocromil      Other reaction(s): Throat Swelling/Closing    Niacin Shortness Of Breath     Other reaction(s): Unknown    Albuterol Other (See Comments)      Inhaler had extreme effect on nervous system      Amlodipine      Other reaction(s): Erythema, Unknown    Ampicillin Unknown    Azithromycin      Other reaction(s): *Unknown, Unknown    Cefixime Diarrhea     Other reaction(s): Unknown  Has tolerated ceftriaxone    Cefprozil Nausea and Vomiting     Other reaction(s): Unknown  Has tolerated ceftriaxone    Cefuroxime      Other reaction(s): *Unknown  Has tolerated ceftriaxone    Cephalexin      Other reaction(s): *Unknown, Unknown  Has tolerated ceftriaxone    Contrast Dye      Other reaction(s): hives    Cyclobenzaprine      Other reaction(s): Sedation    Dextromethorphan-Guaifenesin Nausea     Other reaction(s): Headache    Diltiazem      Other reaction(s): Erythema, Unknown  Ears face arms and chest red and on fire-body tremors      Erythromycin      Other reaction(s): Unknown    Esomeprazole      Other reaction(s): Headache    Ezetimibe Unknown    Fenofibrate Muscle Pain (Myalgia)     Other reaction(s): Unknown    Fluticasone-Salmeterol Other (See Comments)     Elevated blood pressure      Methylprednisolone     Metronidazole     Monosodium Glutamate     Moxifloxacin      Other reaction(s): Dizziness    Nifedipine      Other reaction(s): Edema  Took for 5-93 to 9-94 red and swollen leg      Nsaids      Other reaction(s): Unknown    Ofloxacin      Other reaction(s): *Unknown    Ondansetron      Other reaction(s): Constipation    Parsley Seed     Penicillins Unknown     She doesn't remember other than it occurred as a very young woman     Piper     Pirbuterol Other (See Comments)     Immediate extreme effect on nervous system      Pravastatin      Other reaction(s): Dizziness, Unknown    Pseudoephedrine      Other reaction(s): headache,nausea    Shellfish-Derived Products      Per pt 8/12/23    Simvastatin Muscle Pain (Myalgia)    Spironolactone      Other reaction(s): stomach cramps, nausea    Sulfamethoxazole-Trimethoprim      Other reaction(s): Unknown     Tramadol      Other reaction(s): red ears,high blood press.    Tramadol-Acetaminophen      Other reaction(s): Tachycardia, Unknown    Triamterene Other (See Comments)     Greatly bothered with sun-took medication for three years  Greatly bothered with sun      Diatrizoate Rash    Telmisartan Palpitations       FAMILY HISTORY:  Family History   Problem Relation Age of Onset    Hypertension Mother     Cancer Mother         gallbladder cancer    Myocardial Infarction Father     Hypertension Sister     Alzheimer Disease Sister     Heart Disease Sister     Cancer Brother     Brain Cancer Brother     Pacemaker Mother     Heart Disease Mother     Coronary Artery Disease Father     Heart Disease Father     Heart Failure Sister        SOCIAL HISTORY:   Social History     Socioeconomic History    Marital status:    Tobacco Use    Smoking status: Former     Current packs/day: 3.00     Average packs/day: 3.0 packs/day for 35.0 years (105.0 ttl pk-yrs)     Types: Cigarettes    Smokeless tobacco: Never    Tobacco comments:     quit 1968   Substance and Sexual Activity    Alcohol use: Not Currently    Drug use: Never    Sexual activity: Not Currently   Other Topics Concern    Parent/sibling w/ CABG, MI or angioplasty before 65F 55M? No     Social Drivers of Health     Financial Resource Strain: Low Risk  (8/25/2024)    Financial Resource Strain     Within the past 12 months, have you or your family members you live with been unable to get utilities (heat, electricity) when it was really needed?: No   Food Insecurity: Low Risk  (8/25/2024)    Food Insecurity     Within the past 12 months, did you worry that your food would run out before you got money to buy more?: No     Within the past 12 months, did the food you bought just not last and you didn t have money to get more?: No   Transportation Needs: Low Risk  (8/25/2024)    Transportation Needs     Within the past 12 months, has lack of transportation kept you from medical  appointments, getting your medicines, non-medical meetings or appointments, work, or from getting things that you need?: No   Interpersonal Safety: Low Risk  (8/25/2024)    Interpersonal Safety     Do you feel physically and emotionally safe where you currently live?: Yes     Within the past 12 months, have you been hit, slapped, kicked or otherwise physically hurt by someone?: No     Within the past 12 months, have you been humiliated or emotionally abused in other ways by your partner or ex-partner?: No   Housing Stability: Low Risk  (8/25/2024)    Housing Stability     Do you have housing? : Yes     Are you worried about losing your housing?: No       VITALS:  BP (!) 200/79   Pulse 71   Temp 98.3  F (36.8  C) (Oral)   Resp 29   LMP  (LMP Unknown)   SpO2 92%     PHYSICAL EXAM    Physical Exam  Vitals and nursing note reviewed.   Constitutional:       General: She is not in acute distress.     Appearance: Normal appearance. She is normal weight. She is not ill-appearing.   HENT:      Head: Normocephalic and atraumatic.      Nose: Nose normal.      Mouth/Throat:      Mouth: Mucous membranes are moist.      Pharynx: Oropharynx is clear.   Eyes:      Extraocular Movements: Extraocular movements intact.      Conjunctiva/sclera: Conjunctivae normal.      Pupils: Pupils are equal, round, and reactive to light.   Cardiovascular:      Rate and Rhythm: Normal rate and regular rhythm.      Pulses: Normal pulses.      Heart sounds: Normal heart sounds. No murmur heard.  Pulmonary:      Effort: Pulmonary effort is normal. No respiratory distress.      Breath sounds: Normal breath sounds.   Abdominal:      General: Abdomen is flat. There is no distension.      Palpations: Abdomen is soft.      Tenderness: There is abdominal tenderness (epigastric). There is no guarding or rebound.   Musculoskeletal:         General: Normal range of motion.      Cervical back: Normal range of motion.      Right lower leg: Edema present.       Left lower leg: Edema present.   Skin:     General: Skin is warm and dry.      Capillary Refill: Capillary refill takes less than 2 seconds.      Findings: No rash.   Neurological:      General: No focal deficit present.      Mental Status: She is alert and oriented to person, place, and time. Mental status is at baseline.   Psychiatric:         Mood and Affect: Mood normal.         Behavior: Behavior normal.         Thought Content: Thought content normal.         Judgment: Judgment normal.            LAB:  All pertinent labs reviewed and interpreted.  Results for orders placed or performed during the hospital encounter of 11/06/24   Basic metabolic panel   Result Value Ref Range    Sodium 127 (L) 135 - 145 mmol/L    Potassium 4.3 3.4 - 5.3 mmol/L    Chloride 89 (L) 98 - 107 mmol/L    Carbon Dioxide (CO2) 26 22 - 29 mmol/L    Anion Gap 12 7 - 15 mmol/L    Urea Nitrogen 21.8 8.0 - 23.0 mg/dL    Creatinine 1.24 (H) 0.51 - 0.95 mg/dL    GFR Estimate 42 (L) >60 mL/min/1.73m2    Calcium 9.3 8.8 - 10.4 mg/dL    Glucose 101 (H) 70 - 99 mg/dL   Hepatic function panel   Result Value Ref Range    Protein Total 7.2 6.4 - 8.3 g/dL    Albumin 4.2 3.5 - 5.2 g/dL    Bilirubin Total 0.2 <=1.2 mg/dL    Alkaline Phosphatase 85 40 - 150 U/L    AST 18 0 - 45 U/L    ALT 9 0 - 50 U/L    Bilirubin Direct <0.20 0.00 - 0.30 mg/dL   Result Value Ref Range    Lipase 58 13 - 60 U/L   Result Value Ref Range    Troponin T, High Sensitivity 19 (H) <=14 ng/L   CBC with platelets and differential   Result Value Ref Range    WBC Count 6.3 4.0 - 11.0 10e3/uL    RBC Count 3.53 (L) 3.80 - 5.20 10e6/uL    Hemoglobin 10.3 (L) 11.7 - 15.7 g/dL    Hematocrit 31.4 (L) 35.0 - 47.0 %    MCV 89 78 - 100 fL    MCH 29.2 26.5 - 33.0 pg    MCHC 32.8 31.5 - 36.5 g/dL    RDW 13.8 10.0 - 15.0 %    Platelet Count 321 150 - 450 10e3/uL    % Neutrophils 63 %    % Lymphocytes 21 %    % Monocytes 13 %    % Eosinophils 2 %    % Basophils 1 %    % Immature Granulocytes 0  %    NRBCs per 100 WBC 0 <1 /100    Absolute Neutrophils 4.0 1.6 - 8.3 10e3/uL    Absolute Lymphocytes 1.3 0.8 - 5.3 10e3/uL    Absolute Monocytes 0.8 0.0 - 1.3 10e3/uL    Absolute Eosinophils 0.1 0.0 - 0.7 10e3/uL    Absolute Basophils 0.1 0.0 - 0.2 10e3/uL    Absolute Immature Granulocytes 0.0 <=0.4 10e3/uL    Absolute NRBCs 0.0 10e3/uL       RADIOLOGY:  Reviewed all pertinent imaging. Please see official radiology report.  CTA Chest Abdomen Pelvis w Contrast    (Results Pending)       PROCEDURES:   None      [facility] Documentation:   CMS Diagnoses:                Flaquito Stockton MD  [facility] EMERGENCY DEPARTMENT  [facility] [facility] [id number] [phone]       Flaquito Stockton MD  11/06/24 0593

## 2024-11-06 NOTE — ED TRIAGE NOTES
Pt arrives via EMS from [facility] with complaint of epigastric abdominal pain. Pt reports that the pain felt sharp and woke her up from sleep. Pt reports vomiting bile after the chest pain started. Hx indigestion and acid reflux     Triage Assessment (Adult)       Row Name 11/06/24 0301          Triage Assessment    Airway WDL WDL        Respiratory WDL    Respiratory WDL X;rhythm/pattern     Rhythm/Pattern, Respiratory shortness of breath  SOB when ambulating per pt report        Skin Circulation/Temperature WDL    Skin Circulation/Temperature WDL WDL        Cardiac WDL    Cardiac WDL WDL;chest pain        Chest Pain Assessment    Chest Pain Location epigastric     Chest Pain Radiation back     Character sharp        Peripheral/Neurovascular WDL    Peripheral Neurovascular WDL WDL        Cognitive/Neuro/Behavioral WDL    Cognitive/Neuro/Behavioral WDL WDL

## 2024-11-06 NOTE — ANESTHESIA CARE TRANSFER NOTE
Patient: Kerry Hoffmann    Procedure: Procedure(s):  CHOLECYSTECTOMY, LAPAROSCOPIC       Diagnosis: Acute cholecystitis [K81.0]  Diagnosis Additional Information: No value filed.    Anesthesia Type:   General     Note:    Oropharynx: oropharynx clear of all foreign objects  Level of Consciousness: drowsy  Oxygen Supplementation: face mask  Level of Supplemental Oxygen (L/min / FiO2): 8  Independent Airway: airway patency satisfactory and stable  Dentition: dentition unchanged  Vital Signs Stable: post-procedure vital signs reviewed and stable  Report to RN Given: handoff report given  Patient transferred to: PACU    Handoff Report: Identifed the Patient, Identified the Reponsible Provider, Reviewed the pertinent medical history, Discussed the surgical course, Reviewed Intra-OP anesthesia mangement and issues during anesthesia, Set expectations for post-procedure period and Allowed opportunity for questions and acknowledgement of understanding      Vitals:  Vitals Value Taken Time   /77 11/06/24 1446   Temp     Pulse 64 11/06/24 1448   Resp 18 11/06/24 1448   SpO2 98 % 11/06/24 1448   Vitals shown include unfiled device data.    Electronically Signed By: RISA Jolly CRNA  November 6, 2024  2:50 PM

## 2024-11-06 NOTE — OP NOTE
[facility]  Operative Note    Pre-operative diagnosis: Acute cholecystitis [K81.0]   Post-operative diagnosis acute cholecystitis   Procedure: Procedure(s):  CHOLECYSTECTOMY, LAPAROSCOPIC   Surgeon: Junaid Daniel MD   Assistants(s): Kaylyn Yoon PA-C.  Assistance was necessary for laparoscope operation   Anesthesia: General Anesthetic    Estimated blood loss: 20 ml                Drains: None   Specimens: Gallbladder       Findings: None.   Complications: None.           Description of procedure:      The patient was brought to the operating room where after induction of general anesthesia with endotracheal and the patient was positioned with the left arm tucked and right arm out.  The patient was then prepped and draped in standard sterile fashion.  After a procedural pause we began by injecting quarter percent Marcaine into the skin immediately adjacent to the umbilicus.  This was then sharply incised.  We then entered with a 5 mm optical trochar.  The patient was then placed in reverse Trendelenburg and right side up the body positioning.  We then proceeded to place 3 additional trochars across the right subcostal margin.      On initial evaluation the gallbladder it appeared distended and edematous, consistent with acute cholecystitis.   We began our operation by grasping the fundus the gallbladder and retracting it cephalad over the dome of the liver.  The neck of the gallbladder was then retracted lateral to the right side.  We then began by scoring the peritoneum overlying the triangle of Calot. Using primarily blunt dissection and electrocautery we proceeded to clear the fatty tissues and lymph node away from the triangle of Calot. The cystic duct and cystic artery were skeletonized. A critical view was obtained.  We then proceeded to place three clips each on the cystic artery and duct, which were then divided.  We then utilized electrocautery to dissect the remainder of the  gallbladder off the gallbladder fossa. Once this was completely removed we inspected the gallbladder fossa for hemostasis which appeared excellent. The gallbladder was then placed into an Endo Catch bag. All spilled fluid and blood were then aspirated clear from the right upper quadrant and after confirming no active bleeding from the gallbladder fossa the Endo Catch bag with the gallbladder intact was removed through the 12 mm port site. An 0 Vicryl stitch was then placed under laparoscopic guidance in the 12 mm port site.  We then desufflated the abdomen and removed our ports. The preplaced fascial tie was then tied down with excellent obliteration of the fascial defect. The remainder of the local anesthetic was then injected in the skin and fascia surrounding the port sites and the skin closed with running 4-0 subcuticular sutures      Junaid Daniel MD, FACS  Office: [phone]  [facility]   General and Bariatric Surgery

## 2024-11-06 NOTE — H&P
[facility]    History and Physical - Hospitalist Service       Date of Admission:  11/6/2024    Assessment & Plan    Assessment:  Kerry Hoffmann is a 89 year old female with a past medical history documented below presented to the ER via EMS from [facility] with complaint of epigastric abdominal pain which was sharp and woke her from sleep, patient also had nausea and vomiting after the pain started, pain was radiating up into the chest and to the back and was constant and unrelenting,  CT chest abdomen pelvis showed Interval development of gallbladder distention, diffuse mural edema, layering calcific stone material and trace pericholecystic edema. Findings would be compatible with acute cholecystitis in the proper clinical setting. Ultrasound of the abdomen showed Cholelithiasis with mild gallbladder wall thickening. Minimal right upper quadrant ascites. Acute cholecystitis possible. ED provider discussed with general surgery who took the patient to the OR, a dose of antibiotics were ordered in the ED, patient has been to be admitted for acute cholecystitis requiring operative repair     Problem list and plan:  Acute cholecystitis  Nausea and vomiting associated with abdominal pain secondary to above  Patient presented to the ER via EMS from [facility] with complaint of epigastric abdominal pain which was sharp and woke her from sleep, Patient also had nausea and vomiting after the pain started, pain was radiating up into the chest and to the back and was constant and unrelenting  CT chest abdomen pelvis showed Interval development of gallbladder distention, diffuse mural edema, layering calcific stone material and trace pericholecystic edema. Findings would be compatible with acute cholecystitis   Ultrasound of the abdomen showed Cholelithiasis with mild gallbladder wall thickening. Minimal right upper quadrant ascites. Acute  cholecystitis possible. Gallbladder is distended. No significant biliary dilatation.    ED provider discussed with general surgery who took the patient to the OR  Continue with pain management as per protocol antiemetics as appropriate  Surgery following, follow-up recommendations  A dose of Zosyn was ordered in the ED but not given, patient also received clindamycin, will hold further antibiotics for now as patient already postoperative and had cholecystectomy    Acute mild CHF exacerbation  Elevated troponin most likely secondary to heart failure exacerbation  Imaging showed Mild biatrial cardiac enlargement and mild interstitial pulmonary edema  Patient also had bilateral pitting edema in the lower extremities  Generally on Lasix 20 twice weekly  Will do 20 daily of Lasix for now   Cardiology consulted  Echocardiogram ordered  Troponin appeared to be elevated but flat and downtrending, no current complaint of chest pain, repeat EKG for symptoms, could not appreciate any acute ischemic changes on EKG  Monitor on telemetry    Electrolyte abnormality/hypomagnesemia  Replete magnesium, monitor magnesium levels    History of hypertension with high blood pressure  Continue with losartan and metoprolol  IV hydralazine as needed for elevated blood pressure  Monitor vital signs as per protocol  Mild to moderate bilateral renal artery stenosis on imaging, may require outpatient vascular follow-up postdischarge    Mild hyponatremia most likely in setting of fluid overload  Sodium level around 127 suspecting most likely in setting of fluid overload  Sodium appears to be around patient's baseline and chronically low   continue with diuresis  Monitor sodium level closely    CKD stage III  Baseline creatinine around 1.2  Current creatinine around 1.2  Monitor renal function closely    Mild normocytic anemia  Monitor CBC, consider iron panel    Contrast infiltration  Infiltration of iodinated contrast material in the left upper  "arm related to this morning's earlier attempt was seen on imaging. ER staff notified and contrast infiltration protocol initiated as per radiology    History of mood disorder  Continue with Klonopin    Miscellaneous meds  Continue with aspirin, holding Plavix for today    DVT PPx  Intermittent pneumatic compression    CODE STATUS  Full code as per discussion with the patient        Diet: Combination Diet 2 gm NA Diet; No Caffeine Diet (and additional linked orders)  Fluid restriction 2000 ML FLUID (and additional linked orders)    DVT Prophylaxis: Pneumatic Compression Devices  Wiley Catheter: Not present  Lines: None     Cardiac Monitoring: None  Code Status: Full Code  Mental status: Patient is alert awake and oriented x 3, patient is a good Historian and most of the history was obtained from the patient, some history was obtained from chart review and the rest of the history was obtained from discussion with the ER physician  Clinically Significant Risk Factors Present on Admission         # Hyponatremia: Lowest Na = 127 mmol/L in last 2 days, will monitor as appropriate  # Hypochloremia: Lowest Cl = 89 mmol/L in last 2 days, will monitor as appropriate    # Hypomagnesemia: Lowest Mg = 1.5 mg/dL in last 2 days, will replace as needed     # Drug Induced Platelet Defect: home medication list includes an antiplatelet medication   # Hypertension: Noted on problem list    # Heart failure, NOS: heart failure noted on the problem list and last echo with EF 40-50%     # Anemia: based on hgb <11       # Overweight: Estimated body mass index is 28.45 kg/m  as calculated from the following:    Height as of this encounter: 1.549 m (5' 1\").    Weight as of this encounter: 68.3 kg (150 lb 9.2 oz).         # Financial/Environmental Concerns:    # Asthma: noted on problem list        Disposition Plan     Medically Ready for Discharge: Anticipated in 2-4 Days     Saad J. Gondal, MD  Hospitalist Service  [facility]  Securely message with [employer] (more info)  Text page via [facility] Paging/Directory     ______________________________________________________________________    Chief Complaint   Abdominal pain    History is obtained from the patient and chart review    History of Present Illness   Kerry Hoffmann is a 89 year old female with a past medical history documented below presented to the ER via EMS from [facility] with complaint of epigastric abdominal pain which was sharp and woke her from sleep, patient also had nausea and vomiting after the pain started, pain was radiating up into the chest and to the back and was constant and unrelenting, in the ER vitals were significant for elevated blood pressure otherwise vitally stable, labs significant for hyponatremia, elevated creatinine which appears to be around patient's baseline of 1.2, troponin elevated but flat and downtrending, patient also had normocytic anemia, CT chest abdomen pelvis showed Interval development of gallbladder distention, diffuse mural edema, layering calcific stone material and trace pericholecystic edema. Findings would be compatible with acute cholecystitis in the proper clinical setting. No acute aortic syndrome. No pulmonary emboli. Mild biatrial cardiac enlargement and mild interstitial pulmonary edema. Mild to moderate bilateral renal artery stenosis. Infiltration of iodinated contrast material in the left upper arm related to this morning's earlier attempt. ER staff notified and contrast infiltration protocol initiated.  Ultrasound of the abdomen showed Cholelithiasis with mild gallbladder wall thickening. Minimal right upper quadrant ascites. Acute cholecystitis possible. Gallbladder is distended. No significant biliary dilatation.  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    Clostridium enterocolitis 10/27/2016    CTS (carpal tunnel syndrome)     GERD (gastroesophageal reflux disease)     per H & P     Hiatal hernia     small per H & P     Hyperlipidemia     Hyperlipidemia     Hyperlipidemia     Hypertension     Hypertension     Osteoarthritis of knee     per H & P     Spinal stenosis     per H & P     Vitamin D deficiency     per H & P       Past Surgical History   Past Surgical History:   Procedure Laterality Date    BREAST SURGERY  1982    breast tumor per H & P     CATARACT EXTRACTION  07/2005    per H & P     COLONOSCOPY N/A 11/20/2023    Procedure: COLONOSCOPY with biopsies and polypectomy;  Surgeon: David Barreto MD;  Location: [facility] CORONARY ANGIOGRAM N/A 8/11/2023    Procedure: Coronary Angiogram;  Surgeon: Ap Arroyo MD;  Location: [facility] CV    CV LEFT HEART CATH N/A 8/11/2023    Procedure: Left Heart Catheterization;  Surgeon: Ap Arroyo MD;  Location: [facility] LAB CV    ORIF TIBIA & FIBULA FRACTURES  1988    per H & P     OTHER SURGICAL HISTORY  10/2004    hole in retinaper H & P     OTHER SURGICAL HISTORY  05/03/2015    fecal transplantper H & P     NM ESOPHAGOGASTRODUODENOSCOPY TRANSORAL DIAGNOSTIC N/A 9/11/2020    Procedure: ESOPHAGOGASTRODUODENOSCOPY With gastric biopsies;  Surgeon: David Reyes MD;  Location: [facility];  Service: Gastroenterology    TONSILLECTOMY      TONSILLECTOMY & ADENOIDECTOMY  1963       Prior to Admission Medications   Prior to Admission Medications   Prescriptions Last Dose Informant Patient Reported? Taking?   Calcium Carbonate Antacid  (TUMS ULTRA 1000 PO) More than a month  Yes Yes   Sig: Take 1 chew tab by mouth nightly as needed   Cholecalciferol (VITAMIN D3) 50 MCG (2000 UT) CAPS 11/5/2024 Morning  Yes Yes   Sig: Take 1 tablet by mouth daily   acetaminophen (TYLENOL) 500 MG tablet 11/5/2024 Morning  Yes Yes   Sig: Take 1,000 mg by mouth 3 times daily   aspirin 81 MG EC tablet 11/5/2024 Morning  Yes Yes   Sig: Take 81 mg by mouth daily   clonazePAM (KLONOPIN) 0.5 MG tablet 11/5/2024 Evening  Yes Yes   Sig: Take 0.5 mg by mouth 3 times daily   clopidogrel (PLAVIX) 75 MG tablet 11/5/2024 Morning  No Yes   Sig: TAKE 1 TABLET(75 MG) BY MOUTH DAILY   famotidine (PEPCID) 10 MG tablet 11/5/2024 Morning  Yes Yes   Sig: Take 20 mg by mouth daily.   furosemide (LASIX) 20 MG tablet Past Week Morning  Yes Yes   Sig: Take 1 tablet by mouth twice a week. Monday and Thursday   losartan (COZAAR) 50 MG tablet Past Week Morning  Yes Yes   Sig: Take 50 mg by mouth 2 times daily   metoprolol succinate ER (TOPROL XL) 25 MG 24 hr tablet 11/5/2024 Evening  Yes Yes   Sig: Take 75 mg by mouth at bedtime.      Facility-Administered Medications: None        Review of Systems    The 10 point Review of Systems is negative other than noted in the HPI or here. Abdominal pain    Physical Exam   Vital Signs: Temp: 98.1  F (36.7  C) Temp src: Oral BP: (!) 172/72 Pulse: 65   Resp: 14 SpO2: 97 % O2 Device: Nasal cannula Oxygen Delivery: 1 LPM  Weight: 150 lbs 9.19 oz    GENERAL: Patient was seen and examined at bedside with no acute distress  HENT:  Head is normocephalic, atraumatic.   EYES:  Eyes have anicteric sclerae without conjunctival injection   LUNGS: Bilateral air entry, bilateral bibasilar crackles  CARDIOVASCULAR:  Regular rate and rhythm with no murmurs, gallops or rubs.  ABDOMEN:  Normal bowel sounds. Soft; tenderness to palpation in the epigastrium and right upper quadrant of the abdomen  EXT: Bilateral lower extremity pitting edema  SKIN:  No acute rashes.    NEUROLOGIC:  Grossly nonfocal.      Medical Decision Making       80 MINUTES SPENT BY ME on the date of service doing chart review, history, exam, documentation & further activities per the note.      Data     I have personally reviewed the following data over the past 24 hrs:    6.3  \   10.3 (L)   / 321     127 (L) 89 (L) 21.8 /  142 (H)   4.3 26 1.24 (H) \     ALT: 9 AST: 18 AP: 85 TBILI: 0.2   ALB: 4.2 TOT PROTEIN: 7.2 LIPASE: 58     Trop: 17 (H) BNP: N/A       Imaging results reviewed over the past 24 hrs:   Recent Results (from the past 24 hours)   CTA Chest Abdomen Pelvis w Contrast    Narrative    EXAM: CTA CHEST ABDOMEN PELVIS W CONTRAST  LOCATION: [facility]  DATE: 11/6/2024    INDICATION: abd and chest pain radiating to back  COMPARISON: 8/26/2024 CXR, 11/7/2023 CT AP and 4/27/2009 CT chest  TECHNIQUE: CT angiogram chest abdomen pelvis during arterial phase of injection of IV contrast as well as noncontrast imaging of the chest.  Multiplanar reformats and MIP reconstructions were performed. Dose reduction techniques were used.   CONTRAST: IsoVue 370 75mL    FINDINGS:  CTA CHEST, ABDOMEN and PELVIS: Normal caliber thoracic and abdominal aorta with no acute aortic syndrome. Normal caliber pulmonary arteries with no pulmonary emboli. Mild to moderate bilateral renal artery stenosis. Brachiocephalic, mesenteric, renal and   iliofemoral arteries are otherwise unremarkable.    LUNGS AND PLEURA: Mild interstitial pulmonary edema. Lungs are clear. No pleural effusion.    MEDIASTINUM/AXILLAE: No lymphadenopathy. Mild biatrial cardiac enlargement. No pericardial effusion. Small esophageal hiatal hernia    CORONARY ARTERY CALCIFICATION: Calcified atheromatous plaque left main and proximal LAD and circumflex coronary arteries.    HEPATOBILIARY: Liver is normal.  Interval development of gallbladder distention, diffuse mural edema, layering calcific stone material and trace pericholecystic  edema. No  bile duct dilatation.     PANCREAS: Normal.    SPLEEN: Spleen size normal.    ADRENAL GLANDS: Normal.    KIDNEY/BLADDER: Several benign renal cysts. No follow up is needed. Kidneys, ureters and bladder are otherwise normal.    BOWEL: Normal appendix. Colonic diverticulosis. Bowel is otherwise normal with no obstruction or inflammatory change.    LYMPH NODES: No lymphadenopathy.    PELVIC ORGANS: No pelvic mass or fluid.    MUSCULOSKELETAL: Iodinated contrast material in the soft tissue throughout the left upper arm from today's earlier attempt. Advanced multilevel degenerative disc disease and degenerative facet arthritis lumbar spine.      Impression    IMPRESSION:   1.  Interval development of gallbladder distention, diffuse mural edema, layering calcific stone material and trace pericholecystic edema. Findings would be compatible with acute cholecystitis in the proper clinical setting.  2.  No acute aortic syndrome. No pulmonary emboli.  3.  Mild biatrial cardiac enlargement and mild interstitial pulmonary edema.  4.  Mild to moderate bilateral renal artery stenosis.   5.  Infiltration of iodinated contrast material in the left upper arm related to this morning's earlier attempt. ER staff notified and contrast infiltration protocol initiated.   US Abdomen Limited    Narrative    EXAM: US ABDOMEN LIMITED  LOCATION: [facility]  DATE: 11/6/2024    INDICATION: Abdominal pain.  COMPARISON: Same-day CT.  TECHNIQUE: Limited abdominal ultrasound.    FINDINGS:    GALLBLADDER: Distended gallbladder. Cholelithiasis. Wall thickness up to 3-4 mm. Negative ultrasonic Kahn's sign.    BILE DUCTS: No biliary dilatation. The common duct measures 6 mm.    LIVER: Normal parenchyma with smooth contour. No focal mass.    RIGHT KIDNEY: No hydronephrosis.    PANCREAS: The visualized portions are normal.    Minimal ascites.      Impression    IMPRESSION:    1.  Cholelithiasis with mild gallbladder  wall thickening. Minimal right upper quadrant ascites. Acute cholecystitis possible. Gallbladder is distended.    2.  No significant biliary dilatation.

## 2024-11-07 ENCOUNTER — APPOINTMENT (OUTPATIENT)
Dept: CARDIOLOGY | Facility: HOSPITAL | Age: 89
DRG: 417 | End: 2024-11-07
Attending: STUDENT IN AN ORGANIZED HEALTH CARE EDUCATION/TRAINING PROGRAM
Payer: MEDICARE

## 2024-11-07 ENCOUNTER — TELEPHONE (OUTPATIENT)
Dept: CARDIOLOGY | Facility: CLINIC | Age: 89
End: 2024-11-07
Payer: MEDICARE

## 2024-11-07 ENCOUNTER — APPOINTMENT (OUTPATIENT)
Dept: OCCUPATIONAL THERAPY | Facility: HOSPITAL | Age: 89
DRG: 417 | End: 2024-11-07
Attending: STUDENT IN AN ORGANIZED HEALTH CARE EDUCATION/TRAINING PROGRAM
Payer: MEDICARE

## 2024-11-07 DIAGNOSIS — I50.9 ACUTE DECOMPENSATED HEART FAILURE (H): Primary | ICD-10-CM

## 2024-11-07 LAB
ALBUMIN SERPL BCG-MCNC: 3.9 G/DL (ref 3.5–5.2)
ALP SERPL-CCNC: 50 U/L (ref 40–150)
ALT SERPL W P-5'-P-CCNC: 11 U/L (ref 0–50)
ANION GAP SERPL CALCULATED.3IONS-SCNC: 10 MMOL/L (ref 7–15)
AST SERPL W P-5'-P-CCNC: 23 U/L (ref 0–45)
BILIRUB SERPL-MCNC: 0.4 MG/DL
BUN SERPL-MCNC: 21.9 MG/DL (ref 8–23)
CALCIUM SERPL-MCNC: 9.3 MG/DL (ref 8.8–10.4)
CHLORIDE SERPL-SCNC: 94 MMOL/L (ref 98–107)
CREAT SERPL-MCNC: 1.44 MG/DL (ref 0.51–0.95)
EGFRCR SERPLBLD CKD-EPI 2021: 35 ML/MIN/1.73M2
ERYTHROCYTE [DISTWIDTH] IN BLOOD BY AUTOMATED COUNT: 14 % (ref 10–15)
GLUCOSE BLDC GLUCOMTR-MCNC: 118 MG/DL (ref 70–99)
GLUCOSE BLDC GLUCOMTR-MCNC: 122 MG/DL (ref 70–99)
GLUCOSE BLDC GLUCOMTR-MCNC: 147 MG/DL (ref 70–99)
GLUCOSE SERPL-MCNC: 120 MG/DL (ref 70–99)
HCO3 SERPL-SCNC: 26 MMOL/L (ref 22–29)
HCT VFR BLD AUTO: 31.4 % (ref 35–47)
HGB BLD-MCNC: 10.3 G/DL (ref 11.7–15.7)
LVEF ECHO: NORMAL
MAGNESIUM SERPL-MCNC: 2.6 MG/DL (ref 1.7–2.3)
MCH RBC QN AUTO: 29.1 PG (ref 26.5–33)
MCHC RBC AUTO-ENTMCNC: 32.8 G/DL (ref 31.5–36.5)
MCV RBC AUTO: 89 FL (ref 78–100)
PATH REPORT.COMMENTS IMP SPEC: NORMAL
PATH REPORT.FINAL DX SPEC: NORMAL
PATH REPORT.GROSS SPEC: NORMAL
PATH REPORT.MICROSCOPIC SPEC OTHER STN: NORMAL
PATH REPORT.RELEVANT HX SPEC: NORMAL
PHOSPHATE SERPL-MCNC: 4.5 MG/DL (ref 2.5–4.5)
PHOTO IMAGE: NORMAL
PLATELET # BLD AUTO: 291 10E3/UL (ref 150–450)
POTASSIUM SERPL-SCNC: 4.3 MMOL/L (ref 3.4–5.3)
PROT SERPL-MCNC: 6.6 G/DL (ref 6.4–8.3)
RBC # BLD AUTO: 3.54 10E6/UL (ref 3.8–5.2)
SODIUM SERPL-SCNC: 130 MMOL/L (ref 135–145)
WBC # BLD AUTO: 8.5 10E3/UL (ref 4–11)

## 2024-11-07 PROCEDURE — 82040 ASSAY OF SERUM ALBUMIN: CPT | Performed by: STUDENT IN AN ORGANIZED HEALTH CARE EDUCATION/TRAINING PROGRAM

## 2024-11-07 PROCEDURE — 99223 1ST HOSP IP/OBS HIGH 75: CPT | Performed by: INTERNAL MEDICINE

## 2024-11-07 PROCEDURE — 84100 ASSAY OF PHOSPHORUS: CPT | Performed by: STUDENT IN AN ORGANIZED HEALTH CARE EDUCATION/TRAINING PROGRAM

## 2024-11-07 PROCEDURE — 93306 TTE W/DOPPLER COMPLETE: CPT

## 2024-11-07 PROCEDURE — 99233 SBSQ HOSP IP/OBS HIGH 50: CPT | Performed by: STUDENT IN AN ORGANIZED HEALTH CARE EDUCATION/TRAINING PROGRAM

## 2024-11-07 PROCEDURE — 84155 ASSAY OF PROTEIN SERUM: CPT | Performed by: STUDENT IN AN ORGANIZED HEALTH CARE EDUCATION/TRAINING PROGRAM

## 2024-11-07 PROCEDURE — 97535 SELF CARE MNGMENT TRAINING: CPT | Mod: GO

## 2024-11-07 PROCEDURE — 36415 COLL VENOUS BLD VENIPUNCTURE: CPT | Performed by: STUDENT IN AN ORGANIZED HEALTH CARE EDUCATION/TRAINING PROGRAM

## 2024-11-07 PROCEDURE — 85041 AUTOMATED RBC COUNT: CPT | Performed by: STUDENT IN AN ORGANIZED HEALTH CARE EDUCATION/TRAINING PROGRAM

## 2024-11-07 PROCEDURE — 99024 POSTOP FOLLOW-UP VISIT: CPT | Performed by: PHYSICIAN ASSISTANT

## 2024-11-07 PROCEDURE — 250N000013 HC RX MED GY IP 250 OP 250 PS 637: Performed by: SURGERY

## 2024-11-07 PROCEDURE — 88304 TISSUE EXAM BY PATHOLOGIST: CPT | Mod: 26 | Performed by: PATHOLOGY

## 2024-11-07 PROCEDURE — 85018 HEMOGLOBIN: CPT | Performed by: STUDENT IN AN ORGANIZED HEALTH CARE EDUCATION/TRAINING PROGRAM

## 2024-11-07 PROCEDURE — 83735 ASSAY OF MAGNESIUM: CPT | Performed by: STUDENT IN AN ORGANIZED HEALTH CARE EDUCATION/TRAINING PROGRAM

## 2024-11-07 PROCEDURE — 120N000001 HC R&B MED SURG/OB

## 2024-11-07 PROCEDURE — 82310 ASSAY OF CALCIUM: CPT | Performed by: STUDENT IN AN ORGANIZED HEALTH CARE EDUCATION/TRAINING PROGRAM

## 2024-11-07 PROCEDURE — 93306 TTE W/DOPPLER COMPLETE: CPT | Mod: 26 | Performed by: INTERNAL MEDICINE

## 2024-11-07 PROCEDURE — 250N000011 HC RX IP 250 OP 636: Performed by: STUDENT IN AN ORGANIZED HEALTH CARE EDUCATION/TRAINING PROGRAM

## 2024-11-07 PROCEDURE — 97166 OT EVAL MOD COMPLEX 45 MIN: CPT | Mod: GO

## 2024-11-07 PROCEDURE — 250N000013 HC RX MED GY IP 250 OP 250 PS 637: Performed by: STUDENT IN AN ORGANIZED HEALTH CARE EDUCATION/TRAINING PROGRAM

## 2024-11-07 RX ADMIN — METOPROLOL SUCCINATE 75 MG: 25 TABLET, FILM COATED, EXTENDED RELEASE ORAL at 21:08

## 2024-11-07 RX ADMIN — OXYCODONE HYDROCHLORIDE 5 MG: 5 TABLET ORAL at 10:06

## 2024-11-07 RX ADMIN — OXYCODONE HYDROCHLORIDE 5 MG: 5 TABLET ORAL at 05:55

## 2024-11-07 RX ADMIN — ASPIRIN 81 MG: 81 TABLET, COATED ORAL at 08:55

## 2024-11-07 RX ADMIN — FAMOTIDINE 20 MG: 20 TABLET ORAL at 08:56

## 2024-11-07 RX ADMIN — LOSARTAN POTASSIUM 50 MG: 50 TABLET, FILM COATED ORAL at 08:56

## 2024-11-07 RX ADMIN — FUROSEMIDE 20 MG: 10 INJECTION, SOLUTION INTRAMUSCULAR; INTRAVENOUS at 08:56

## 2024-11-07 RX ADMIN — CLONAZEPAM 0.5 MG: 0.5 TABLET ORAL at 08:56

## 2024-11-07 RX ADMIN — CLONAZEPAM 0.5 MG: 0.5 TABLET ORAL at 21:08

## 2024-11-07 RX ADMIN — CLONAZEPAM 0.5 MG: 0.5 TABLET ORAL at 13:20

## 2024-11-07 ASSESSMENT — ACTIVITIES OF DAILY LIVING (ADL)
DEPENDENT_IADLS:: SHOPPING
ADLS_ACUITY_SCORE: 0

## 2024-11-07 NOTE — PLAN OF CARE
Goal Outcome Evaluation:      Plan of Care Reviewed With: patient    Overall Patient Progress: no changeOverall Patient Progress: no change    Outcome Evaluation: Kerry hoping to discharge to home when medically stable.

## 2024-11-07 NOTE — PLAN OF CARE
"Goal Outcome Evaluation:      Plan of Care Reviewed With: patient    Overall Patient Progress: no changeOverall Patient Progress: no change    Outcome Evaluation: Pt rated mid back pain 8/10 and stated that it was tolerable. She denied pain at the surgical site. Denied nausea but needs encouragement to drink fluids. Has purewick in place and is voiding adequate amounts of clear yellow urine. Bandaids at lap sites are CDI. Blood pressure high but not enough for PRN antihypertensive. She desats when she falls asleep so she has 1.5LNC of O2. She dangled at the bedside. Otherwise she has been sleepy. Will continue to monitor.    BP (!) 161/69 (BP Location: Left arm)   Pulse 64   Temp 98.1  F (36.7  C) (Oral)   Resp 18   Ht 1.549 m (5' 1\")   Wt 68.3 kg (150 lb 9.2 oz)   LMP  (LMP Unknown)   SpO2 98%   BMI 28.45 kg/m        Problem: Surgery Nonspecified  Goal: Absence of Bleeding  Outcome: Progressing     Problem: Surgery Nonspecified  Goal: Anesthesia/Sedation Recovery  Outcome: Progressing  Intervention: Optimize Anesthesia Recovery  Recent Flowsheet Documentation  Taken 11/6/2024 2339 by Buffy Hope RN  Safety Promotion/Fall Prevention:   activity supervised   clutter free environment maintained   nonskid shoes/slippers when out of bed   room door open   room near nurse's station  Taken 11/6/2024 2012 by Buffy Hope RN  Safety Promotion/Fall Prevention:   activity supervised   clutter free environment maintained   nonskid shoes/slippers when out of bed   room door open   room near nurse's station     Problem: Surgery Nonspecified  Goal: Optimal Pain Control and Function  Outcome: Progressing  Intervention: Prevent or Manage Pain  Recent Flowsheet Documentation  Taken 11/6/2024 2339 by Buffy Hope RN  Pain Management Interventions:   medication (see MAR)   awakened for pain meds per patient request   care clustered   emotional support  Taken 11/6/2024 2012 by Buffy oHpe RN  Pain " Management Interventions:   emotional support   repositioned     Problem: Surgery Nonspecified  Goal: Effective Urinary Elimination  Outcome: Progressing     Problem: Surgery Nonspecified  Goal: Effective Oxygenation and Ventilation  Outcome: Progressing  Intervention: Optimize Oxygenation and Ventilation  Recent Flowsheet Documentation  Taken 11/6/2024 2339 by Buffy Hope, RN  Activity Management: activity adjusted per tolerance  Head of Bed (HOB) Positioning: HOB at 30 degrees  Taken 11/6/2024 2012 by Buffy Hope, RN  Activity Management: activity adjusted per tolerance  Head of Bed (HOB) Positioning: HOB at 30-45 degrees

## 2024-11-07 NOTE — PROGRESS NOTES
"   11/07/24 1300   Appointment Info   Signing Clinician's Name / Credentials (OT) DANILO Contreras   Living Environment   People in Home alone   Current Living Arrangements independent living facility   Home Accessibility no concerns   Transportation Anticipated family or friend will provide   Living Environment Comments [facility] independent [facility] facility, has transportation for wheelchair. Gets help w/ groceries, otherwise ind at home   Self-Care   Usual Activity Tolerance good   Current Activity Tolerance moderate   Equipment Currently Used at Home cane, straight;grab bar, tub/shower;wheelchair, manual;other (see comments)   Fall history within last six months no   Activity/Exercise/Self-Care Comment Pt reports being ind w/ ADLs at baseline   Instrumental Activities of Daily Living (IADL)   IADL Comments Pt reports being mostly ind w/ IADLs at baseline - gets help w/ shopping   General Information   Onset of Illness/Injury or Date of Surgery 11/06/24   Referring Physician Gondal, Saad J, MD   Patient/Family Therapy Goal Statement (OT) Return home   Additional Occupational Profile Info/Pertinent History of Current Problem Per chart review, \"89 year old female who is POD#1 s/p laparoscopic cholecystectomy on 11/6/24.  Patient doing well postoperatively.  Pain adequately controlled.  Tolerating a clear liquid diet without issues.  No return of bowel function yet.  Afebrile and hemodynamically stable.  Labs with improvement of hyponatremia, mild increase in creatinine (1.44 < 1.24), no leukocytosis and stable hemoglobin.  Cardiology consult pending.\"   Existing Precautions/Restrictions fall;abdominal   Cognitive Status Examination   Orientation Status orientation to person, place and time   Affect/Mental Status (Cognitive) agitated   Pain Assessment   Patient Currently in Pain   (Pt reported being in pain, no numerical rating. Site of incision)   Range of Motion Comprehensive   General Range of Motion no range of " motion deficits identified   Strength Comprehensive (MMT)   Comment, General Manual Muscle Testing (MMT) Assessment generalized weakness   Muscle Tone Assessment   Muscle Tone Quick Adds No deficits were identified   Bed Mobility   Bed Mobility sit-supine   Sit-Supine [place] (Bed Mobility) moderate assist (50% patient effort);verbal cues   Assistive Device (Bed Mobility) bed rails   Transfers   Transfers bed-chair transfer;sit-stand transfer   Transfer Skill: Bed to Chair/Chair to Bed   Bed-Chair [place] (Transfers) contact guard;minimum assist (75% patient effort);verbal cues   Assistive Device (Bed-Chair Transfers) rolling walker  (FWW)   Sit-Stand Transfer   Sit-Stand [place] (Transfers) contact guard;minimum assist (75% patient effort);verbal cues   Assistive Device (Sit-Stand Transfers) walker, front-wheeled   Balance   Balance Assessment sit to stand dynamic balance;standing dynamic balance;standing static balance   Sit-to-Stand Balance contact guard;1-person assist   Standing Balance: Static contact guard;1-person assist   Standing Balance: Dynamic contact guard;minimal assist;1-person assist   Position/Device Used, Standing Balance walker, front-wheeled   Activities of Daily Living   BADL Assessment/Intervention lower body dressing;grooming;toileting   Lower Body Dressing Assessment/Training   Position (Lower Body Dressing) supported sitting   [place] Level (Lower Body Dressing) moderate assist (50% patient effort);pants/bottoms;verbal cues   Grooming Assessment/Training   [place] Level (Grooming) not tested   Comment, (Grooming) Per clinical reasoning, anticipated pt will have difficulty w/ standing g/h d/t decreased activity tolerance and abdominal restrictions.   Toileting   Comment, (Toileting) Per clinical reasoning, anticipated pt will have difficulty w/ toileting trsfs and related tasksd/t decreased activity tolerance and abdominal restrictions.   [place] Level  (Toileting) not tested   Clinical Impression   Criteria for Skilled Therapeutic Interventions Met (OT) Yes, treatment indicated   OT Diagnosis Decreased safety and ind w/ ADLs, decreased activity tolerance   Influenced by the following impairments Acute cholecystitis   OT Problem List-Impairments impacting ADL problems related to;activity tolerance impaired;balance;mobility;strength;pain;post-surgical precautions   Assessment of Occupational Performance 3-5 Performance Deficits   Identified Performance Deficits trsfs, ambulation, toileting, LB dressing, g/h   Planned Therapy Interventions (OT) ADL retraining;balance training;strengthening;transfer training;home program guidelines;progressive activity/exercise;bed mobility training   Clinical Decision Making Complexity (OT) detailed assessment/moderate complexity   Risk & Benefits of therapy have been explained evaluation/treatment results reviewed;care plan/treatment goals reviewed;risks/benefits reviewed;current/potential barriers reviewed;participants voiced agreement with care plan;participants included;patient   OT Total Evaluation Time   OT Eval, Moderate Complexity Minutes (41454) 9   OT Goals   Therapy Frequency (OT) 5 times/week   OT Predicted Duration/Target Date for Goal Attainment 11/14/24   OT Goals Lower Body Dressing;Hygiene/Grooming;Bed Mobility;Transfers;Toilet Transfer/Toileting   OT: Hygiene/Grooming modified independent;using adaptive equipment;within precautions;while standing   OT: Lower Body Dressing Modified independent;using adaptive equipment;within precautions;including set-up/clothing retrieval   OT: Bed Mobility Modified independent;supine to/from sitting;rolling;within precautions   OT: Transfer Modified independent;with assistive device;within precautions   OT: Toilet Transfer/Toileting Modified independent;toilet transfer;cleaning and garment management;using adaptive equipment;within precautions   Self-Care/Home Management    Self-Care/Home Mgmt/ADL, Compensatory, Meal Prep Minutes (18330) 8   Symptoms Noted During/After Treatment (Meal Preparation/Planning Training) increased pain;fatigue   Treatment Detail/Skilled Intervention Eval completed, treatment initiated. Pt reluctant to engage in therapy. OTS encouraged and educated on the importance of mobility post-op. Pt verbalized understanding. Pt completed additional STS from recliner w/ Min.A/CGA, VC, and FWW. Pt sat EOB while OTS educated on log roll technique and abdominal precautions. Pt tolerated sitting EOB ~4min. Pt completed log roll technique, VC/TC, Min.A, and bed rails. Pt c/o increased pain w/ sit>supine, pt cued for PLB. At end of session, pt supine in bed w/ bed alarm on and call light in reach.   OT Discharge Planning   OT Plan standing g/h, increase amb in room, lb dressing, review precautions/log roll, toileting   OT Discharge Recommendation (DC Rec) Transitional Care Facility   OT Rationale for DC Rec At baseline, pt lives alone and ind w/ ADLs. Pt currently requiring assistx1 w/ ADLs, trsfs, and ambulation. Pt would benefit from TCU services to increase safety and ind w/ ADLs before transitioning home.   OT Brief overview of current status CGA/Min.A STS/trsfs - FWW, pain   Total Session Time   Timed Code Treatment Minutes 8   Total Session Time (sum of timed and untimed services) 17

## 2024-11-07 NOTE — PROGRESS NOTES
[facility]    Medicine Progress Note - Hospitalist Service    Date of Admission:  11/6/2024    Assessment & Plan   Kerry Hoffmann is a 89 year old female with a past medical history documented below presented to the ER via EMS from [facility] with complaint of epigastric abdominal pain which was sharp and woke her from sleep, patient also had nausea and vomiting after the pain started, pain was radiating up into the chest and to the back and was constant and unrelenting,  CT chest abdomen pelvis showed Interval development of gallbladder distention, diffuse mural edema, layering calcific stone material and trace pericholecystic edema. Findings would be compatible with acute cholecystitis in the proper clinical setting. Ultrasound of the abdomen showed Cholelithiasis with mild gallbladder wall thickening. Minimal right upper quadrant ascites. Acute cholecystitis possible. ED provider discussed with general surgery who took the patient to the OR, a dose of antibiotics were ordered in the ED, patient has been to be admitted for acute cholecystitis requiring operative repair     #Acute Cholecystitis  #Nausea, Vomiting, Abdominal pain 2/2 Cholecystitis  - CT C/A/P in ED showed Interval development of gallbladder distention, diffuse mural edema, layering calcific stone material and trace pericholecystic edema  - US RUQ in ED showing cholelithiasis with gallbladder wall thickening and gallbladder distension  - Underwent laparoscopic cholecystectomy on 11/6/24  Plan  - General Surgery following, appreciate recommendations  - No additional antibiotics at this time.  - Advancing diet today      #Acute Hypoxic Respiratory Failure  #Acute HFmrEF Exacerbation  #Elevated troponin 2/2 heart failure exacerbation  - Patient not on home oxygen, requiring 2L following surgery.  - Home medications for heart failure include Lasix 20mg 2x/week (Mon/Thurs), metoprolol 75mg daily, and  losartan 50mg BID  - Troponin in ED was 19 with repeat 17.  - CT Chest in ED showing Mild interstitial pulmonary edema.  - Echo TTE 11/7/24 showing EF 55-60%, no wall motion abnormalities.  Plan  - Continue supplemental oxygen as needed.  - Cardiology consulted - appreciate recommendations  - Holding further diuresis  - Monitor on telemetry  - Can likely restart home losartan 50mg BID today/tomorrow  - Continue home metoprolol  - Can repeat troponin/EKG if patient develops chest pain.    #History of CAD  - Home medications include aspirin 81 mg daily, Plavix 75 mg daily.  -Echo TTE 11/7/2024 with EF 55 to 60%.  No wall motion abnormalities.  Plan  - Cardiology following as above  - Continue home aspirin and Plavix    #History of hypertension with high blood pressure  -Home medications include losartan 50 mg twice daily and metoprolol 75 mg daily.  Plan  - Holding home losartan with recent surgery  - Continue home metoprolol   - IV hydralazine as needed for elevated blood pressure  - Mild to moderate bilateral renal artery stenosis on imaging, may require outpatient vascular follow-up postdischarge    #Mild hyponatremia most likely in setting of fluid overload  - Sodium level 127 in the ED  - Suspecting most likely in setting of fluid overload.  - Sodium appears to be around patient's baseline and chronically low   Plan  - Monitor sodium level closely    #CKD stage III  Baseline creatinine around 1.2  Current creatinine around 1.2  Monitor renal function closely    #Mild normocytic anemia  Monitor CBC,    #Contrast infiltration  Infiltration of iodinated contrast material in the left upper arm related to this morning's earlier attempt was seen on imaging. ER staff notified and contrast infiltration protocol initiated as per radiology    #History of mood disorder  Continue with Klonopin          Diet: Combination Diet 2 gm NA Diet; No Caffeine Diet (and additional linked orders)  Fluid restriction 2000 ML FLUID (and  "additional linked orders)    DVT Prophylaxis: Pneumatic Compression Devices and holding chemoprophylaxis in setting of recent surgery.  Wiley Catheter: Not present  Lines: None     Cardiac Monitoring: None  Code Status: Full Code      Clinically Significant Risk Factors Present on Admission         # Hyponatremia: Lowest Na = 127 mmol/L in last 2 days, will monitor as appropriate  # Hypochloremia: Lowest Cl = 89 mmol/L in last 2 days, will monitor as appropriate    # Hypomagnesemia: Lowest Mg = 1.5 mg/dL in last 2 days, will replace as needed     # Drug Induced Platelet Defect: home medication list includes an antiplatelet medication   # Hypertension: Noted on problem list  # Heart failure, NOS: heart failure noted on the problem list and last echo with EF 40-50%     # Anemia: based on hgb <11       # Overweight: Estimated body mass index is 28.45 kg/m  as calculated from the following:    Height as of this encounter: 1.549 m (5' 1\").    Weight as of this encounter: 68.3 kg (150 lb 9.2 oz).       # Financial/Environmental Concerns:    # Asthma: noted on problem list        Disposition Plan     Medically Ready for Discharge: Anticipated Tomorrow             Arsenio Last MD  Hospitalist Service  [facility]  Securely message with gBox (more info)  Text page via [employer] Paging/Directory   ______________________________________________________________________    Interval History   -Patient with mild shortness of breath this morning, requiring oxygen via nasal cannula.  -Patient also with mild right upper quadrant pain following surgery.  -Eager to trying additional foods with diet this morning.    Physical Exam   Vital Signs: Temp: 97.7  F (36.5  C) Temp src: Oral BP: (!) 157/67 Pulse: 60   Resp: 18 SpO2: 100 % O2 Device: Nasal cannula Oxygen Delivery: 1 LPM  Weight: 150 lbs 9.19 oz    General: Alert and Oriented x3. Answers questions appropriately. Follows commands.  Eyes:EOMI. PERRL. Normal " Conjunctivae.  HENT: Normocephalic. Atraumatic.  Resp: Breath sounds equal throughout. No crackles. No wheezing.  Cardio: Regular rate and rhythm. No murmurs. No friction rub.  Abd: Soft. Non-distended.  Mild tenderness to palpation across abdomen.  Bowel sounds normal. No rebound tenderness.  MSK: No areas of ecchymosis or erythema.  Neuro: CN 2-12 grossly intact. Strength 5/5 in all 4 extremities.  Skin:No rashes. No jaundice.       Medical Decision Making       45 MINUTES SPENT BY ME on the date of service doing chart review, history, exam, documentation & further activities per the note.  MANAGEMENT DISCUSSED with the following over the past 24 hours: RN, DORIS   Tests ORDERED & REVIEWED in the past 24 hours:  - BMP  - CBC  - Magnesium  - Phosphorus  Medical complexity over the past 24 hours:  - Prescription DRUG MANAGEMENT performed      Data     I have personally reviewed the following data over the past 24 hrs:    8.5  \   10.3 (L)   / 291     130 (L) 94 (L) 21.9 /  118 (H)   4.3 26 1.44 (H) \     ALT: 11 AST: 23 AP: 50 TBILI: 0.4   ALB: 3.9 TOT PROTEIN: 6.6 LIPASE: N/A       Imaging results reviewed over the past 24 hrs:   Recent Results (from the past 24 hours)   Echocardiogram Complete   Result Value    LVEF  55-60%    Narrative    [id number]  [id number]  [id number]  [id number]^GONDAL^SUDHIR^[place] [address]     Name: DEVON REESE  MRN: [id number]  : 1935  Study Date: 2024 10:16 AM  Age: 89 yrs  Gender: Female  Patient [facility]: [facility]  Reason For Study: Heart Failure  Ordering Physician: GONDAL, SAAD J  Performed By: LAURITA     BSA: 1.7 m2  Height: 61 in  Weight: 150 lb  HR: 60  BP: 157/67 mmHg  ______________________________________________________________________________  Procedure  Complete Portable Echo Adult.  ______________________________________________________________________________  Interpretation Summary     Left ventricular size, wall motion  and function are normal. The ejection  fraction is 55-60%.  The left atrium is mild to moderately dilated.  There is mild to moderate (1-2+) aortic regurgitation.  The aortic valve is trileaflet.  Normal size ascending aorta.  Normal right ventricle size and systolic function.  ______________________________________________________________________________  Left Ventricle  Left ventricular size, wall motion and function are normal. The ejection  fraction is 55-60%. There is normal left ventricular wall thickness. Left  ventricular diastolic function is abnormal. No regional wall motion  abnormalities noted.     Right Ventricle  Normal right ventricle size and systolic function.     Atria  The left atrium is mild to moderately dilated. Right atrial size is normal.  There is no color Doppler evidence of an atrial shunt.     Mitral Valve  Mitral valve leaflets appear normal. There is no evidence of mitral stenosis  or clinically significant mitral regurgitation. There is mild (1+) mitral  regurgitation.     Tricuspid Valve  Tricuspid valve leaflets appear normal. There is mild (1+) tricuspid  regurgitation. Right ventricular systolic pressure could not be approximated  due to inadequate tricuspid regurgitation.     Aortic Valve  The aortic valve is trileaflet. There is mild to moderate (1-2+) aortic  regurgitation. No hemodynamically significant valvular aortic stenosis.     Pulmonic Valve  The pulmonic valve is not well seen, but is grossly normal. This degree of  valvular regurgitation is within normal limits.     Vessels  The aorta root is normal. Normal size ascending aorta. IVC diameter and  respiratory changes fall into an intermediate range suggesting an RA pressure  of 8 mmHg.     Pericardium  There is no pericardial effusion.     ______________________________________________________________________________  MMode/2D Measurements & Calculations  IVSd: 1.1 cm  LVIDd: 5.2 cm  LVIDs: 3.7 cm  LVPWd: 1.2 cm  FS:  28.1 %     LV mass(C)d: 241.3 grams  LV mass(C)dI: 144.3 grams/m2  Ao root diam: 3.2 cm  asc Aorta Diam: 3.5 cm  LVOT diam: 2.1 cm  LVOT area: 3.6 cm2  Ao root diam index Ht(cm/m): 2.1  Ao root diam index BSA (cm/m2): 1.9  Asc Ao diam index BSA (cm/m2): 2.1  Asc Ao diam index Ht(cm/m): 2.3  EF Biplane: 61.7 %  LA Volume (BP): 59.7 ml     LA Volume Index (BP): 35.7 ml/m2  LA Volume Indexed (AL/bp): 38.4 ml/m2  RWT: 0.47  TAPSE: 2.1 cm     Time Measurements  MM HR: 69.0 BPM     Doppler Measurements & Calculations  MV E max ben: 106.0 cm/sec  MV A max ben: 124.2 cm/sec  MV E/A: 0.85  MV dec slope: 441.6 cm/sec2  MV dec time: 0.24 sec  Ao V2 max: 170.1 cm/sec  Ao max P.0 mmHg  Ao V2 mean: 112.7 cm/sec  Ao mean P.9 mmHg  Ao V2 VTI: 42.5 cm  AGA(I,D): 2.5 cm2  AGA(V,D): 2.4 cm2  AI P1/2t: 382.7 msec  LV V1 max P.3 mmHg  LV V1 max: 115.3 cm/sec  LV V1 VTI: 29.5 cm  SV(LVOT): 105.5 ml  SI(LVOT): 63.1 ml/m2  PA acc time: 0.09 sec  Pulm Sys Ben: 58.7 cm/sec  Pulm Giraldo Ben: 47.6 cm/sec  Pulm A Revs Ben: 24.9 cm/sec  Pulm S/D: 1.2  AV Ben Ratio (DI): 0.68  AGA Index (cm2/m2): 1.5  E/E': 28.6  E/E' av.3  Lateral E/e': 20.1  Medial E/e': 28.4  Peak E' Ben: 3.7 cm/sec  RV S Ben: 13.5 cm/sec     ______________________________________________________________________________  Report approved by: Pavel Palmer 2024 11:39 AM

## 2024-11-07 NOTE — PLAN OF CARE
Problem: Adult Inpatient Plan of Care  Goal: Plan of Care Review  Description: The Plan of Care Review/Shift note should be completed every shift.  The Outcome Evaluation is a brief statement about your assessment that the patient is improving, declining, or no change.  This information will be displayed automatically on your shift  note.  Flowsheets (Taken 11/7/2024 2617)  Plan of Care Reviewed With: patient   Goal Outcome Evaluation:      Plan of Care Reviewed With: patient        Patient up to chair.  Steady gait with walker and gait belt/assist of 1.  Tolerated well.      Patient reported tolerating regular food with some discomfort.  Will continue to monitor I & 0's.      Lap \[facility] [place] [facility]\"" CDI.

## 2024-11-07 NOTE — PLAN OF CARE
Problem: Surgery Nonspecified  Goal: Effective Urinary Elimination  Outcome: Progressing     Problem: Surgery Nonspecified  Goal: Optimal Pain Control and Function  Intervention: Prevent or Manage Pain  Recent Flowsheet Documentation  Taken 11/6/2024 1828 by Mallory Patrick RN  Pain Management Interventions: medication (see MAR)   Goal Outcome Evaluation:         Patient arrived from PACU on evening. A/O.   C/o surgical pain rated 5/10, prn oxycodone 2.5 mg given for pain control.   Denies nausea.   Sugical sites clean, dry and intact.     BP elevated. Scheduled lasix given. Continue to monitor.   Cardiac diet. 2000 ml Fluid restriction.

## 2024-11-07 NOTE — PROGRESS NOTES
General Surgery Progress Note:    Hospital Day # 1    ASSESSMENT:  1. Acute cholecystitis    2. Epigastric pain        Kerry Hoffmann is a 89 year old female who is POD#1 s/p laparoscopic cholecystectomy on 11/6/24.  Patient doing well postoperatively.  Pain adequately controlled.  Tolerating a clear liquid diet without issues.  No return of bowel function yet.  Afebrile and hemodynamically stable.  Labs with improvement of hyponatremia, mild increase in creatinine (1.44 < 1.24), no leukocytosis and stable hemoglobin.  Cardiology consult pending.    PLAN:  -Okay for diet as tolerated from surgical standpoint  -P.o. pain control  -Increase activity and encourage ambulation as able to promote bowel function  -Medical management per primary team  -Okay for discharge from surgical standpoint when deemed medically appropriate.  Discharge instructions placed in AVS    SUBJECTIVE:   She is feeling okay this morning.  Reports waking up around 4 AM but thinking that it was around 9:30 AM and requesting to order food but the kitchen was closed.  Endorses mild discomfort to her incisions and her back.  Back pain has since resolved.  Denies fever, chills, nausea, vomiting, flatus or bowel movement.  Has not been out of bed yet this morning.  Reports she lives alone and has difficulty ambulating secondary to significant bilateral knee arthritis for which she uses a scooter or canes at baseline.      Patient Vitals for the past 24 hrs:   BP Temp Temp src Pulse Resp SpO2 Height Weight   11/07/24 0718 (!) 157/67 97.7  F (36.5  C) Oral 60 18 100 % -- --   11/07/24 0355 (!) 142/85 98.4  F (36.9  C) Oral 65 19 97 % -- --   11/06/24 2323 (!) 161/69 98.1  F (36.7  C) Oral 64 18 98 % -- --   11/06/24 2012 -- -- -- 62 16 -- -- --   11/06/24 1927 (!) 172/73 -- -- 61 16 95 % -- --   11/06/24 1828 -- -- -- -- -- 94 % -- --   11/06/24 1746 (!) 183/79 98.1  F (36.7  C) Oral 60 16 98 % -- --   11/06/24 9293 (!) 175/74 98.2  F (36.8  C) Oral  "61 14 96 % -- --   11/06/24 1643 (!) 172/72 98.1  F (36.7  C) Oral 65 14 97 % 1.549 m (5' 1\") 68.3 kg (150 lb 9.2 oz)   11/06/24 1600 (!) 164/72 97  F (36.1  C) -- -- 18 98 % -- --   11/06/24 1545 (!) 156/70 -- -- 59 19 97 % -- --   11/06/24 1544 -- -- -- -- -- 96 % -- --   11/06/24 1543 -- -- -- -- -- (!) 89 % -- --   11/06/24 1532 (!) 168/73 98.2  F (36.8  C) -- 55 17 98 % -- --   11/06/24 1530 -- -- -- -- -- 98 % -- --   11/06/24 1522 (!) 168/74 -- -- 56 14 97 % -- --   11/06/24 1500 (!) 172/72 97.6  F (36.4  C) -- 62 14 98 % -- --   11/06/24 1450 -- -- -- -- -- 97 % -- --   11/06/24 1445 (!) 182/77 97  F (36.1  C) -- -- 16 97 % -- --   11/06/24 1123 (!) 210/85 97.7  F (36.5  C) Oral 77 20 97 % -- --         PHYSICAL EXAM:  General: patient seen resting in bed, no acute distress  Resp: no respiratory distress, breathing comfortably on 1L via nasal canula  Abdomen: Soft, appropriately tender to palpation over incisions.  Incisions clean/dry/intact with Steri-Strips and overlying Band-Aids.  Extremities: warm and well perfused    11/06 0700 - 11/07 0659  In: 640 [P.O.:140; I.V.:400]  Out: 850 [Urine:850]    Admission on 11/06/2024   Component Date Value    Sodium 11/06/2024 127 (L)     Potassium 11/06/2024 4.3     Chloride 11/06/2024 89 (L)     Carbon Dioxide (CO2) 11/06/2024 26     Anion Gap 11/06/2024 12     Urea Nitrogen 11/06/2024 21.8     Creatinine 11/06/2024 1.24 (H)     GFR Estimate 11/06/2024 42 (L)     Calcium 11/06/2024 9.3     Glucose 11/06/2024 101 (H)     Protein Total 11/06/2024 7.2     Albumin 11/06/2024 4.2     Bilirubin Total 11/06/2024 0.2     Alkaline Phosphatase 11/06/2024 85     AST 11/06/2024 18     ALT 11/06/2024 9     Bilirubin Direct 11/06/2024 <0.20     Lipase 11/06/2024 58     Troponin T, High Sensiti* 11/06/2024 19 (H)     WBC Count 11/06/2024 6.3     RBC Count 11/06/2024 3.53 (L)     Hemoglobin 11/06/2024 10.3 (L)     Hematocrit 11/06/2024 31.4 (L)     MCV 11/06/2024 89     MCH " 11/06/2024 29.2     MCHC 11/06/2024 32.8     RDW 11/06/2024 13.8     Platelet Count 11/06/2024 321     % Neutrophils 11/06/2024 63     % Lymphocytes 11/06/2024 21     % Monocytes 11/06/2024 13     % Eosinophils 11/06/2024 2     % Basophils 11/06/2024 1     % Immature Granulocytes 11/06/2024 0     NRBCs per 100 WBC 11/06/2024 0     Absolute Neutrophils 11/06/2024 4.0     Absolute Lymphocytes 11/06/2024 1.3     Absolute Monocytes 11/06/2024 0.8     Absolute Eosinophils 11/06/2024 0.1     Absolute Basophils 11/06/2024 0.1     Absolute Immature Granul* 11/06/2024 0.0     Absolute NRBCs 11/06/2024 0.0     Troponin T, High Sensiti* 11/06/2024 17 (H)     Magnesium 11/06/2024 1.5 (L)     Phosphorus 11/06/2024 3.2     GLUCOSE BY METER POCT 11/06/2024 142 (H)     GLUCOSE BY METER POCT 11/06/2024 144 (H)     Sodium 11/07/2024 130 (L)     Potassium 11/07/2024 4.3     Carbon Dioxide (CO2) 11/07/2024 26     Anion Gap 11/07/2024 10     Urea Nitrogen 11/07/2024 21.9     Creatinine 11/07/2024 1.44 (H)     GFR Estimate 11/07/2024 35 (L)     Calcium 11/07/2024 9.3     Chloride 11/07/2024 94 (L)     Glucose 11/07/2024 120 (H)     Alkaline Phosphatase 11/07/2024 50     AST 11/07/2024 23     ALT 11/07/2024 11     Protein Total 11/07/2024 6.6     Albumin 11/07/2024 3.9     Bilirubin Total 11/07/2024 0.4     WBC Count 11/07/2024 8.5     RBC Count 11/07/2024 3.54 (L)     Hemoglobin 11/07/2024 10.3 (L)     Hematocrit 11/07/2024 31.4 (L)     MCV 11/07/2024 89     MCH 11/07/2024 29.1     MCHC 11/07/2024 32.8     RDW 11/07/2024 14.0     Platelet Count 11/07/2024 291     Magnesium 11/07/2024 2.6 (H)     Phosphorus 11/07/2024 4.5     GLUCOSE BY METER POCT 11/07/2024 118 (H)        Samina Prajapati PA-C  [facility]  Surgery [address] [address]?  Office: [phone]

## 2024-11-07 NOTE — PROGRESS NOTES
Bladder scanned at 0600 for 435 mL. Asymptomatic. Pt to be scanned again in two hours. Will report to next shift.

## 2024-11-07 NOTE — CONSULTS
HEART CARE NOTE        Thank you, Dr. Daniel, for asking the [facility] Heart Care team to see Kerry Hoffmann to evaluate ADHF.    Assessment/Recommendations     1. HmrEF c/b ADHF  Assessment / Plan  Near euvolemia and TIMOTHY noted - hold further diuresis; hold ARB; continue to monitor UOP and renal function closely  Patient is high risk for adverse cardiac events 2/2 advanced age, frailty, HTN, elevated NTproBNP  GDMT as detailed below; mainstay of treatment for HFpEF includes diuretics and adequate BP control (class I) and SGLT2-I (class 2a); additional medical therapy (ARNI, MRA, ARB) demonstrated less robust evidence for indication but may be considered per guideline recommendations (2b); no indication for BBlockers      Current Pharmacotherapy AHA Guideline-Directed Medical Therapy   Losartan  50 mg BID - held  ARNI/ARB   Spironolactone not started  MRA   SGLT2 inhibitor:not started SGLT2-I    Furosemide IV - held Loop diuretic       2. HTN  Assessment / Plan  Difficult to control; titrate oral afterload reduction as tolerated    3. non-obstructive CAD  Assessment / Plan  Mild non-obstructive CAD - continue aggressive risk factor modification  Denies chest pain and anginal equivalents     4. TIMOTHY on CKD  Assessment / Plan  Hold loop and ARB; continue to monitor UOP and renal function closely; Follows with Dr. Bautista at [facility]     5. Valvular heart disease  Assessment / Plan  Progressive  2+ MR and severe AI; repeat echo pending; will refer to valve clinic if patient is amenable    Clinically Significant Risk Factors Present on Admission         # Hyponatremia: Lowest Na = 127 mmol/L in last 2 days, will monitor as appropriate  # Hypochloremia: Lowest Cl = 89 mmol/L in last 2 days, will monitor as appropriate    # Hypomagnesemia: Lowest Mg = 1.5 mg/dL in last 2 days, will replace as needed     # Drug Induced Platelet Defect: home medication list includes an antiplatelet medication   # Hypertension: Noted on  "problem list  # Heart failure, NOS: heart failure noted on the problem list and last echo with EF 40-50%     # Anemia: based on hgb <11       # Overweight: Estimated body mass index is 28.45 kg/m  as calculated from the following:    Height as of this encounter: 1.549 m (5' 1\").    Weight as of this encounter: 68.3 kg (150 lb 9.2 oz).       # Financial/Environmental Concerns:    # Asthma: noted on problem list       Cardiomyopathy  Non-Rheumatic Valve Disease: Aortic valve insufficiency  Mitral valve insufficiency  Systolic acute    hyponatremia and Hypo-osmolality, Mixed disorder of acid-base balance, Fluid overload, unspecified, Other fluid overload, and Other disorders of electrolyte and fluid balance, not elsewhere classified    Acute kidney failure, unspecified  CKD POA List: Stage 4 (GFR 15-29)    85 minutes spent reviewing prior records (including documentation, laboratory studies, cardiac testing/imaging), history and physical exam, planning, and subsequent documentation..    History of Present Illness/Subjective    Ms. Kerry Hoffmann is a 89 year old female with a PMHx significant for (per Epic notation) HTN, heart disease, GERD, and HLD, presenting with sob, chest pressure, legs edema. C/w CHF.     Today, Mrs. Hoffmann reports presenting with ADHF, reports symptoms have more or less resolved; Management plan as detailed above    ECG: Personally reviewed. normal sinus rhythm, nonspecific ST and T waves changes, PAC's noted.    ECHO (personnaly Reviewed on 11/7/24): repeat study pending  1. The left ventricle is moderately dilated. There is mild global hypokinesia  of the left ventricle. The visual ejection fraction is estimated at 45%.  2. Normal right ventricle size and systolic function.  3. The left atrium is moderate to severely dilated.  4. There is moderate (2+) mitral regurgitation.  5. There is mod-severe to severe (3-4+) aortic regurgitation.     Compared to study on 8/12/2023, there is decline in " "LV systolic function and  significant increase in aortic and mitral valve regurgitation.    Lab results: personally reviewed November 7, 2024; notable for TIMOTHY, hyponatremia    Medical history and pertinent documents reviewed in Care Everywhere please where applicable see details above          Physical Examination Review of Systems   BP (!) 142/85 (BP Location: Left arm)   Pulse 65   Temp 98.4  F (36.9  C) (Oral)   Resp 19   Ht 1.549 m (5' 1\")   Wt 68.3 kg (150 lb 9.2 oz)   LMP  (LMP Unknown)   SpO2 97%   BMI 28.45 kg/m    Body mass index is 28.45 kg/m .  Wt Readings from Last 3 Encounters:   11/06/24 68.3 kg (150 lb 9.2 oz)   10/28/24 70.4 kg (155 lb 3.2 oz)   09/04/24 70 kg (154 lb 6.4 oz)     General Appearance:   no distress, normal body habitus   ENT/Mouth: membranes moist, no oral lesions or bleeding gums.      EYES:  no scleral icterus, normal conjunctivae   Neck: no carotid bruits or thyromegaly   Chest/Lungs:   lungs are clear to auscultation, no rales or wheezing, equal chest wall expansion    Cardiovascular:   Regular. Normal first and second heart sounds with no murmurs, rubs, or gallops; the carotid, radial and posterior tibial pulses are intact, no JVD and trace LE edema bilaterally    Abdomen:  no organomegaly, masses, bruits, or tenderness; bowel sounds are present   Extremities: no cyanosis or clubbing   Skin: no xanthelasma, warm.    Neurologic: NAD     Psychiatric: alert and oriented x3, calm     A complete 10 systems ROS was reviewed  And is negative except what is listed in the HPI.          Medical History  Surgical History Family History Social History   Past Medical History:   Diagnosis Date    Anxiety     takes clonazepam    Asthma     [facility]     Chronic kidney disease     stage 3 per [facility]     CKD (chronic kidney disease)     Clostridium difficile colitis 11/01/2016    Clostridium difficile infection     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    Clostridium enterocolitis 10/27/2016    " CTS (carpal tunnel syndrome)     GERD (gastroesophageal reflux disease)     per [facility]     Hiatal hernia     small per [facility]     Hyperlipidemia     Hyperlipidemia     Hyperlipidemia     Hypertension     Hypertension     Osteoarthritis of knee     per [facility]     Spinal stenosis     per [facility]     Vitamin D deficiency     per [facility]    Past Surgical History:   Procedure Laterality Date    BREAST SURGERY  1982    breast tumor per [facility]     CATARACT EXTRACTION  07/2005    per [facility]     COLONOSCOPY N/A 11/20/2023    Procedure: COLONOSCOPY with biopsies and polypectomy;  Surgeon: David Barreto MD;  [facility]: [facility]    CV CORONARY ANGIOGRAM N/A 8/11/2023    Procedure: Coronary Angiogram;  Surgeon: Ap Arroyo MD;  Location: [facility] CV    CV LEFT HEART CATH N/A 8/11/2023    Procedure: Left Heart Catheterization;  Surgeon: Ap Arroyo MD;  Location: [facility] LAB CV    ORIF TIBIA & FIBULA FRACTURES  1988    per [facility]     OTHER SURGICAL HISTORY  10/2004    hole in retinaper [facility]     OTHER SURGICAL HISTORY  05/03/2015    fecal transplantper [facility]     [place] ESOPHAGOGASTRODUODENOSCOPY TRANSORAL DIAGNOSTIC N/A 9/11/2020    Procedure: ESOPHAGOGASTRODUODENOSCOPY With gastric biopsies;  Surgeon: David Reyes MD;  Location[facility];  Service: Gastroenterology    TONSILLECTOMY      TONSILLECTOMY & ADENOIDECTOMY  1963    no family history of premature coronary artery disease Social History     Socioeconomic History    Marital status:      Spouse name: Not on file    Number of children: Not on file    Years of education: Not on file    Highest education level: Not on file   Occupational History    Not on file   Tobacco Use    Smoking status: Former     Current packs/day: 3.00     Average packs/day: 3.0 packs/day for 35.0 years (105.0 ttl pk-yrs)     Types: Cigarettes    Smokeless tobacco: Never    Tobacco comments:     quit 1968   Substance and Sexual Activity    Alcohol use: Not  Currently    Drug use: Never    Sexual activity: Not Currently   Other Topics Concern    Parent/sibling w/ CABG, MI or angioplasty before 65F 55M? No   Social History Narrative    Not on file     Social Drivers of Health     Financial Resource Strain: Low Risk  (8/25/2024)    Financial Resource Strain     Within the past 12 months, have you or your family members you live with been unable to get utilities (heat, electricity) when it was really needed?: No   Food Insecurity: Low Risk  (8/25/2024)    Food Insecurity     Within the past 12 months, did you worry that your food would run out before you got money to buy more?: No     Within the past 12 months, did the food you bought just not last and you didn t have money to get more?: No   Transportation Needs: Low Risk  (8/25/2024)    Transportation Needs     Within the past 12 months, has lack of transportation kept you from medical appointments, getting your medicines, non-medical meetings or appointments, work, or from getting things that you need?: No   Physical Activity: Not on file   Stress: Not on file   Social Connections: Not on file   Interpersonal Safety: Low Risk  (11/6/2024)    Interpersonal Safety     Do you feel physically and emotionally safe where you currently live?: Yes     Within the past 12 months, have you been hit, slapped, kicked or otherwise physically hurt by someone?: No     Within the past 12 months, have you been humiliated or emotionally abused in other ways by your partner or ex-partner?: No   Housing Stability: Low Risk  (8/25/2024)    Housing Stability     Do you have housing? : Yes     Are you worried about losing your housing?: No           Lab Results    Chemistry/lipid CBC Cardiac Enzymes/BNP/TSH/INR   Lab Results   Component Value Date    CHOL 225 (H) 03/04/2024    HDL 60 03/04/2024    TRIG 160 (H) 03/04/2024    BUN 21.8 11/06/2024     (L) 11/06/2024    CO2 26 11/06/2024    Lab Results   Component Value Date    WBC 6.3  11/06/2024    HGB 10.3 (L) 11/06/2024    HCT 31.4 (L) 11/06/2024    MCV 89 11/06/2024     11/06/2024    Lab Results   Component Value Date    TROPONINI <0.01 09/04/2022     11/16/2019    TSH 2.52 06/14/2023     Lab Results   Component Value Date    TROPONINI <0.01 09/04/2022          Weight:    Wt Readings from Last 3 Encounters:   11/06/24 68.3 kg (150 lb 9.2 oz)   10/28/24 70.4 kg (155 lb 3.2 oz)   09/04/24 70 kg (154 lb 6.4 oz)       Allergies  Allergies   Allergen Reactions    Buspirone Shortness Of Breath    Ciprofloxacin Hives and Shortness Of Breath     Other reaction(s): Unknown    Cortisone      Other reaction(s): Throat Swelling/Closing, Unknown  Reaction 9-5-94      Hydrocodone-Acetaminophen Shortness Of Breath     Other reaction(s): Unknown    Lansoprazole Shortness Of Breath    Metoprolol Shortness Of Breath     Tolerates metoprolol succinate at home    Nedocromil      Other reaction(s): Throat Swelling/Closing    Niacin Shortness Of Breath     Other reaction(s): Unknown    Albuterol Other (See Comments)     Inhaler had extreme effect on nervous system      Amlodipine      Other reaction(s): Erythema, Unknown    Ampicillin Unknown    Azithromycin      Other reaction(s): *Unknown, Unknown    Cefixime Diarrhea     Other reaction(s): Unknown  Has tolerated ceftriaxone    Cefprozil Nausea and Vomiting     Other reaction(s): Unknown  Has tolerated ceftriaxone    Cefuroxime      Other reaction(s): *Unknown  Has tolerated ceftriaxone    Cephalexin      Other reaction(s): *Unknown, Unknown  Has tolerated ceftriaxone    Contrast Dye      Other reaction(s): hives    Cyclobenzaprine      Other reaction(s): Sedation    Dextromethorphan-Guaifenesin Nausea     Other reaction(s): Headache    Diltiazem      Other reaction(s): Erythema, Unknown  Ears face arms and chest red and on fire-body tremors      Erythromycin      Other reaction(s): Unknown    Esomeprazole      Other reaction(s): Headache     Ezetimibe Unknown    Fenofibrate Muscle Pain (Myalgia)     Other reaction(s): Unknown    Fluticasone-Salmeterol Other (See Comments)     Elevated blood pressure      Methylprednisolone     Metronidazole     Monosodium Glutamate     Moxifloxacin      Other reaction(s): Dizziness    Nifedipine      Other reaction(s): Edema  Took for 5-93 to 9-94 red and swollen leg      Nsaids      Other reaction(s): Unknown    Ofloxacin      Other reaction(s): *Unknown    Ondansetron      Other reaction(s): Constipation    Parsley Seed     Penicillins Unknown     She doesn't remember other than it occurred as a very young woman     Piper     Pirbuterol Other (See Comments)     Immediate extreme effect on nervous system      Pravastatin      Other reaction(s): Dizziness, Unknown    Pseudoephedrine      Other reaction(s): headache,nausea    Shellfish-Derived Products      Per pt 8/12/23    Simvastatin Muscle Pain (Myalgia)    Spironolactone      Other reaction(s): stomach cramps, nausea    Sulfamethoxazole-Trimethoprim      Other reaction(s): Unknown    Tramadol      Other reaction(s): red ears,high blood press.    Tramadol-Acetaminophen      Other reaction(s): Tachycardia, Unknown    Triamterene Other (See Comments)     Greatly bothered with sun-took medication for three years  Greatly bothered with sun      Diatrizoate Rash    Telmisartan Palpitations         Surgical History  Past Surgical History:   Procedure Laterality Date    BREAST SURGERY  1982    breast tumor per [facility]     CATARACT EXTRACTION  07/2005    per [facility]     COLONOSCOPY N/A 11/20/2023    Procedure: COLONOSCOPY with biopsies and polypectomy;  Surgeon: David Barreto MD;  Location: [facility] Main OR    CV CORONARY ANGIOGRAM N/A 8/11/2023    Procedure: Coronary Angiogram;  Surgeon: Ap Arroyo MD;  Location: [facility] CATH LAB CV    CV LEFT HEART CATH N/A 8/11/2023    Procedure: Left Heart Catheterization;  Surgeon: Ap Arroyo MD;  Location: [facility]  CATH LAB CV    ORIF TIBIA & FIBULA FRACTURES  1988    per [facility]     OTHER SURGICAL HISTORY  10/2004    hole in retinaper [facility]     OTHER SURGICAL HISTORY  05/03/2015    fecal transplantper [facility]     ID ESOPHAGOGASTRODUODENOSCOPY TRANSORAL DIAGNOSTIC N/A 9/11/2020    Procedure: ESOPHAGOGASTRODUODENOSCOPY With gastric biopsies;  Surgeon: David Reyes MD;  Location: [facility];  Service: Gastroenterology    TONSILLECTOMY      TONSILLECTOMY & ADENOIDECTOMY  1963       Social History  Tobacco:   History   Smoking Status    Former    Packs/day: 3.00    Years: 35.00    Types: Cigarettes   Smokeless Tobacco    Never    Alcohol:   Social History    Substance and Sexual Activity      Alcohol use: Not Currently   Illicit Drugs:   History   Drug Use Unknown       Family History  Family History   Problem Relation Age of Onset    Hypertension Mother     Cancer Mother         gallbladder cancer    Myocardial Infarction Father     Hypertension Sister     Alzheimer Disease Sister     Heart Disease Sister     Cancer Brother     Brain Cancer Brother     Pacemaker Mother     Heart Disease Mother     Coronary Artery Disease Father     Heart Disease Father     Heart Failure Sister           Jimmy Gonzalez MD on 11/7/2024      cc: Kaylyn Bolanos,

## 2024-11-07 NOTE — CONSULTS
Care Management Initial Consult    General Information  Assessment completed with: Patient,    Type of CM/SW Visit: Initial Assessment    Primary Care Provider verified and updated as needed: Yes   Readmission within the last 30 days: no previous admission in last 30 days      Reason for Consult: discharge planning  Advance Care Planning: Advance Care Planning Reviewed: no concerns identified          Communication Assessment  Patient's communication style: spoken language (English or Bilingual)    Hearing Difficulty or Deaf: no   Wear Glasses or Blind: no    Cognitive  Cognitive/Neuro/Behavioral: WDL  Level of Consciousness: alert (answers all questions appropriately)  Arousal Level: arouses to voice  Orientation: oriented x 4  Mood/Behavior: calm, cooperative          Living Environment:   People in home: alone     Current living Arrangements: apartment      Able to return to prior arrangements: yes       Family/Social Support:  Care provided by: self  Provides care for: no one  Marital Status:   Support system: Other (specify) (nephew and niece)          Description of Support System: Supportive         Current Resources:   Patient receiving home care services: No        Community Resources: None  Equipment currently used at home: cane, straight, grab bar, tub/shower, wheelchair, manual, other (see comments) (scooter, bed rails, elevated bed)  Supplies currently used at home: Incontinence Supplies, Chux    Employment/Financial:  Employment Status: retired        Financial Concerns:             Does the patient's insurance plan have a 3 day qualifying hospital stay waiver?  No    Lifestyle & Psychosocial Needs:  Social Drivers of Health     Food Insecurity: Low Risk  (11/7/2024)    Food Insecurity     Within the past 12 months, did you worry that your food would run out before you got money to buy more?: No     Within the past 12 months, did the food you bought just not last and you didn t have money to get  more?: No   Depression: Not at risk (2/27/2024)    PHQ-2     PHQ-2 Score: 0   Housing Stability: High Risk (11/7/2024)    Housing Stability     Do you have housing? : No     Are you worried about losing your housing?: No   Tobacco Use: Medium Risk (11/6/2024)    Patient History     Smoking Tobacco Use: Former     Smokeless Tobacco Use: Never     Passive Exposure: Not on file   Financial Resource Strain: Low Risk  (11/7/2024)    Financial Resource Strain     Within the past 12 months, have you or your family members you live with been unable to get utilities (heat, electricity) when it was really needed?: No   Alcohol Use: Not on file   Transportation Needs: Low Risk  (11/7/2024)    Transportation Needs     Within the past 12 months, has lack of transportation kept you from medical appointments, getting your medicines, non-medical meetings or appointments, work, or from getting things that you need?: No   Physical Activity: Not on file   Interpersonal Safety: Low Risk  (11/6/2024)    Interpersonal Safety     Do you feel physically and emotionally safe where you currently live?: Yes     Within the past 12 months, have you been hit, slapped, kicked or otherwise physically hurt by someone?: No     Within the past 12 months, have you been humiliated or emotionally abused in other ways by your partner or ex-partner?: No   Stress: Not on file   Social Connections: Not on file   Health Literacy: Not on file       Functional Status:  Prior to admission patient needed assistance:   Dependent ADLs:: Ambulation-cane, Wheelchair-with assist (has and uses scooter in apt independently; assist w/wheelchair when in the community)  Dependent IADLs:: Shopping       Mental Health Status:  Mental Health Status: No Current Concerns       Chemical Dependency Status:  Chemical Dependency Status: No Current Concerns             Values/Beliefs:  Spiritual, Cultural Beliefs, Confucianism Practices, Values that affect care:                  Discussed  Partnership in Safe Discharge Planning  document with patient/family: No    Additional Information:    Initial CM/SW assessment, met with Kerry in her room today.  Reviewed CM/SW role and support available for her during this hospitalization.  Kerry is able to answer all questions independently.  She lives in [facility], services available in this location but she has not used any support in 16yrs living here.  Her nephew grocery shops for her, otherwise she manages all of her needs.  Has and uses a cane and scooter in her apt for mobility needs; she has a transport wheelchair for when she goes in the community with family.      No discharge plans or recommendations at this time.  Kerry comments several times during this visit of her preference to return home vs TCU at discharge.  She states she feels safe and will do best in her home.  She does request wheelchair transportation upon discharge.      Next Steps:     Waiting medical and possibly therapy recommendations for discharge.  CM/SW to schedule wheelchair transportation when ready to discharge.      Yue Goncalves, BSW, LSW

## 2024-11-08 ENCOUNTER — APPOINTMENT (OUTPATIENT)
Dept: PHYSICAL THERAPY | Facility: HOSPITAL | Age: 89
DRG: 417 | End: 2024-11-08
Attending: STUDENT IN AN ORGANIZED HEALTH CARE EDUCATION/TRAINING PROGRAM
Payer: MEDICARE

## 2024-11-08 ENCOUNTER — APPOINTMENT (OUTPATIENT)
Dept: OCCUPATIONAL THERAPY | Facility: HOSPITAL | Age: 89
DRG: 417 | End: 2024-11-08
Payer: MEDICARE

## 2024-11-08 LAB
ALBUMIN SERPL BCG-MCNC: 3.6 G/DL (ref 3.5–5.2)
ALP SERPL-CCNC: 51 U/L (ref 40–150)
ALT SERPL W P-5'-P-CCNC: 11 U/L (ref 0–50)
ANION GAP SERPL CALCULATED.3IONS-SCNC: 9 MMOL/L (ref 7–15)
AST SERPL W P-5'-P-CCNC: 23 U/L (ref 0–45)
ATRIAL RATE - MUSE: 72 BPM
BILIRUB SERPL-MCNC: 0.3 MG/DL
BUN SERPL-MCNC: 31.4 MG/DL (ref 8–23)
CALCIUM SERPL-MCNC: 9.1 MG/DL (ref 8.8–10.4)
CHLORIDE SERPL-SCNC: 93 MMOL/L (ref 98–107)
CREAT SERPL-MCNC: 1.62 MG/DL (ref 0.51–0.95)
DIASTOLIC BLOOD PRESSURE - MUSE: NORMAL MMHG
EGFRCR SERPLBLD CKD-EPI 2021: 30 ML/MIN/1.73M2
ERYTHROCYTE [DISTWIDTH] IN BLOOD BY AUTOMATED COUNT: 14.2 % (ref 10–15)
GLUCOSE BLDC GLUCOMTR-MCNC: 112 MG/DL (ref 70–99)
GLUCOSE BLDC GLUCOMTR-MCNC: 92 MG/DL (ref 70–99)
GLUCOSE SERPL-MCNC: 98 MG/DL (ref 70–99)
HCO3 SERPL-SCNC: 26 MMOL/L (ref 22–29)
HCT VFR BLD AUTO: 29.1 % (ref 35–47)
HGB BLD-MCNC: 9.8 G/DL (ref 11.7–15.7)
INTERPRETATION ECG - MUSE: NORMAL
MAGNESIUM SERPL-MCNC: 2.3 MG/DL (ref 1.7–2.3)
MCH RBC QN AUTO: 30 PG (ref 26.5–33)
MCHC RBC AUTO-ENTMCNC: 33.7 G/DL (ref 31.5–36.5)
MCV RBC AUTO: 89 FL (ref 78–100)
P AXIS - MUSE: 50 DEGREES
PHOSPHATE SERPL-MCNC: 4.1 MG/DL (ref 2.5–4.5)
PLATELET # BLD AUTO: 287 10E3/UL (ref 150–450)
POTASSIUM SERPL-SCNC: 5 MMOL/L (ref 3.4–5.3)
PR INTERVAL - MUSE: 200 MS
PROT SERPL-MCNC: 6.5 G/DL (ref 6.4–8.3)
QRS DURATION - MUSE: 82 MS
QT - MUSE: 382 MS
QTC - MUSE: 418 MS
R AXIS - MUSE: 58 DEGREES
RBC # BLD AUTO: 3.27 10E6/UL (ref 3.8–5.2)
SODIUM SERPL-SCNC: 128 MMOL/L (ref 135–145)
SYSTOLIC BLOOD PRESSURE - MUSE: NORMAL MMHG
T AXIS - MUSE: 62 DEGREES
VENTRICULAR RATE- MUSE: 72 BPM
WBC # BLD AUTO: 8.9 10E3/UL (ref 4–11)

## 2024-11-08 PROCEDURE — 99233 SBSQ HOSP IP/OBS HIGH 50: CPT | Performed by: INTERNAL MEDICINE

## 2024-11-08 PROCEDURE — 99024 POSTOP FOLLOW-UP VISIT: CPT | Performed by: PHYSICIAN ASSISTANT

## 2024-11-08 PROCEDURE — 85014 HEMATOCRIT: CPT | Performed by: STUDENT IN AN ORGANIZED HEALTH CARE EDUCATION/TRAINING PROGRAM

## 2024-11-08 PROCEDURE — 250N000013 HC RX MED GY IP 250 OP 250 PS 637: Performed by: STUDENT IN AN ORGANIZED HEALTH CARE EDUCATION/TRAINING PROGRAM

## 2024-11-08 PROCEDURE — 99232 SBSQ HOSP IP/OBS MODERATE 35: CPT | Performed by: STUDENT IN AN ORGANIZED HEALTH CARE EDUCATION/TRAINING PROGRAM

## 2024-11-08 PROCEDURE — 97162 PT EVAL MOD COMPLEX 30 MIN: CPT | Mod: GP

## 2024-11-08 PROCEDURE — 97535 SELF CARE MNGMENT TRAINING: CPT | Mod: GO

## 2024-11-08 PROCEDURE — 84100 ASSAY OF PHOSPHORUS: CPT | Performed by: STUDENT IN AN ORGANIZED HEALTH CARE EDUCATION/TRAINING PROGRAM

## 2024-11-08 PROCEDURE — 97110 THERAPEUTIC EXERCISES: CPT | Mod: GP

## 2024-11-08 PROCEDURE — 83735 ASSAY OF MAGNESIUM: CPT | Performed by: STUDENT IN AN ORGANIZED HEALTH CARE EDUCATION/TRAINING PROGRAM

## 2024-11-08 PROCEDURE — 84155 ASSAY OF PROTEIN SERUM: CPT | Performed by: STUDENT IN AN ORGANIZED HEALTH CARE EDUCATION/TRAINING PROGRAM

## 2024-11-08 PROCEDURE — 97530 THERAPEUTIC ACTIVITIES: CPT | Mod: GP

## 2024-11-08 PROCEDURE — 120N000001 HC R&B MED SURG/OB

## 2024-11-08 PROCEDURE — 36415 COLL VENOUS BLD VENIPUNCTURE: CPT | Performed by: STUDENT IN AN ORGANIZED HEALTH CARE EDUCATION/TRAINING PROGRAM

## 2024-11-08 PROCEDURE — 250N000011 HC RX IP 250 OP 636: Performed by: STUDENT IN AN ORGANIZED HEALTH CARE EDUCATION/TRAINING PROGRAM

## 2024-11-08 PROCEDURE — 84132 ASSAY OF SERUM POTASSIUM: CPT | Performed by: STUDENT IN AN ORGANIZED HEALTH CARE EDUCATION/TRAINING PROGRAM

## 2024-11-08 RX ORDER — HEPARIN SODIUM 5000 [USP'U]/.5ML
5000 INJECTION, SOLUTION INTRAVENOUS; SUBCUTANEOUS EVERY 8 HOURS
Status: DISCONTINUED | OUTPATIENT
Start: 2024-11-08 | End: 2024-11-10 | Stop reason: HOSPADM

## 2024-11-08 RX ADMIN — ASPIRIN 81 MG: 81 TABLET, COATED ORAL at 09:16

## 2024-11-08 RX ADMIN — OXYCODONE HYDROCHLORIDE 2.5 MG: 5 TABLET ORAL at 13:30

## 2024-11-08 RX ADMIN — HEPARIN SODIUM 5000 UNITS: 5000 INJECTION, SOLUTION INTRAVENOUS; SUBCUTANEOUS at 16:58

## 2024-11-08 RX ADMIN — SENNOSIDES AND DOCUSATE SODIUM 1 TABLET: 8.6; 5 TABLET ORAL at 13:30

## 2024-11-08 RX ADMIN — CLONAZEPAM 0.5 MG: 0.5 TABLET ORAL at 20:03

## 2024-11-08 RX ADMIN — METOPROLOL SUCCINATE 75 MG: 25 TABLET, FILM COATED, EXTENDED RELEASE ORAL at 20:03

## 2024-11-08 RX ADMIN — CLONAZEPAM 0.5 MG: 0.5 TABLET ORAL at 09:16

## 2024-11-08 RX ADMIN — CLOPIDOGREL BISULFATE 75 MG: 75 TABLET ORAL at 09:16

## 2024-11-08 RX ADMIN — CLONAZEPAM 0.5 MG: 0.5 TABLET ORAL at 13:24

## 2024-11-08 NOTE — PLAN OF CARE
Problem: Surgery Nonspecified  Goal: Effective Urinary Elimination  Outcome: Progressing     Problem: Surgery Nonspecified  Goal: Optimal Pain Control and Function  Outcome: Progressing   Goal Outcome Evaluation:       Patient alert and oriented, denies pain. Slept through the night, arouses to care by voice. External catheter - Purewick, not working properly, wet pad noted. Bladder scanned 420 at 0527, later voided. Continue to monitor.

## 2024-11-08 NOTE — PLAN OF CARE
Goal Outcome Evaluation:      Plan of Care Reviewed With: patient    Overall Patient Progress: improving    Outcome Evaluation: Patient vitally stable, voiding, tolerating diet    Problem: Surgery Nonspecified  Goal: Optimal Pain Control and Function  Outcome: Not Progressing  Intervention: Prevent or Manage Pain  Recent Flowsheet Documentation  Taken 11/7/2024 1644 by Bear Han, RN  Pain Management Interventions:   emotional support   medication offered but refused   declines    Problem: Adult Inpatient Plan of Care  Goal: Readiness for Transition of Care  Outcome: Progressing          NURSING PROGRESS NOTE       Shift Summary: Patient vitally stable on room air, A+Ox4 with some intermittent forgetfulness/disorientation, patient aware of disorientation and attributes to prn oxycodone. Lap sites x 4 covered w/ Band-Aids, CDI. Encouraging IS. Tolerating diet. No BM this shift.       Pertinent Shift Updates:  Patient endorsing surgical pain up to 9/10, declines offers for prn medication, accepted warm pack. Provider notified patient not wanting prn oxycodone, writer requested alternate prn orders if appropriate. Patient intermittently endorses feeling unable to urinate, had episode of incontinence and most recent bladder scan 67 ml.      Plan:  Continue to monitor pain and urine output.       Bear Han, RN 11/7/2024   [phone]     For detailed vital signs and assessments, please see documentation flowsheets. For detailed medication administrations, please see MAR.

## 2024-11-08 NOTE — PROGRESS NOTES
General Surgery Progress Note:    Hospital Day # 2    ASSESSMENT:  1. Acute cholecystitis    2. Epigastric pain        Kerry Hoffmann is a 89 year old female who is s/p laparoscopic cholecystectomy on 11/6/24.  Patient doing well postoperatively.  Pain adequately controlled. Tolerating a regular diet and passing flatus. Afebrile and hemodynamically stable. Labs with ongoing hyponatremia, creatinine 1.62 < 1.44, no leukocytosis, hemoglobin stable.     PLAN:  -Okay for diet as tolerated from surgical standpoint  -PO pain control  -Increase activity and encourage ambulation as able to promote bowel function  -Medical management per primary team  -Okay for discharge from surgical standpoint when deemed medically appropriate.  Discharge instructions placed in AVS. General surgery will sign off at this time. Please call if further questions.    SUBJECTIVE:   She reports having a rough morning. Feeling sore and worried about her kidneys. Tolerating a regular diet without issues. Denies fever, chills, nausea, vomiting. Abdominal pain controlled. Per nursing assistant, patient was able to ambulate to and from the bathroom without assistance. Voiding and intermittent incontinent.       Patient Vitals for the past 24 hrs:   BP Temp Temp src Pulse Resp SpO2 Weight   11/08/24 0823 -- -- -- -- -- -- 69.8 kg (153 lb 12.8 oz)   11/08/24 0714 (!) 147/61 98.2  F (36.8  C) Oral 64 18 97 % --   11/07/24 2336 129/53 98.4  F (36.9  C) Oral 65 18 97 % --   11/07/24 2106 (!) 155/67 -- -- 67 -- -- --   11/07/24 1507 (!) 154/65 98  F (36.7  C) Oral 67 18 94 % --   11/07/24 1308 -- -- -- -- -- -- 69.7 kg (153 lb 9.6 oz)   11/07/24 1157 (!) 145/64 98.1  F (36.7  C) Oral 59 18 94 % --         PHYSICAL EXAM:  General: patient seen resting in bed, no acute distress  Resp: no respiratory distress, breathing comfortably on room air  Abdomen: Soft, non-tender to palpation, non-distended. Lap sites clean/dry/intact with steri strips in place.    Extremities: warm and well perfused    11/07 0700 - 11/08 0659  In: 390 [P.O.:390]  Out: 480 [Urine:480]    Admission on 11/06/2024   Component Date Value    Ventricular Rate 11/06/2024 72     Atrial Rate 11/06/2024 72     NH Interval 11/06/2024 200     QRS Duration 11/06/2024 82     QT 11/06/2024 382     QTc 11/06/2024 418     P Axis 11/06/2024 50     R AXIS 11/06/2024 58     T [place] 11/06/2024 62     Interpretation ECG 11/06/2024                      Value:Sinus rhythm with Premature atrial complexes  Cannot rule out Anterior infarct (cited on or before 01-Sep-2024)  Abnormal ECG  When compared with ECG of 06-Nov-2024 03:03,  Premature atrial complexes are now Present  Confirmed by SEE ED PROVIDER NOTE FOR, ECG INTERPRETATION ([id number]),  MICHA PERALES ([id number]) on 11/8/2024 2:32:13 AM      Sodium 11/06/2024 127 (L)     Potassium 11/06/2024 4.3     Chloride 11/06/2024 89 (L)     Carbon Dioxide (CO2) 11/06/2024 26     Anion Gap 11/06/2024 12     Urea Nitrogen 11/06/2024 21.8     Creatinine 11/06/2024 1.24 (H)     GFR Estimate 11/06/2024 42 (L)     Calcium 11/06/2024 9.3     Glucose 11/06/2024 101 (H)     Protein Total 11/06/2024 7.2     Albumin 11/06/2024 4.2     Bilirubin Total 11/06/2024 0.2     Alkaline Phosphatase 11/06/2024 85     AST 11/06/2024 18     ALT 11/06/2024 9     Bilirubin Direct 11/06/2024 <0.20     Lipase 11/06/2024 58     Troponin T, High Sensiti* 11/06/2024 19 (H)     WBC Count 11/06/2024 6.3     RBC Count 11/06/2024 3.53 (L)     Hemoglobin 11/06/2024 10.3 (L)     Hematocrit 11/06/2024 31.4 (L)     MCV 11/06/2024 89     MCH 11/06/2024 29.2     MCHC 11/06/2024 32.8     RDW 11/06/2024 13.8     Platelet Count 11/06/2024 321     % Neutrophils 11/06/2024 63     % Lymphocytes 11/06/2024 21     % Monocytes 11/06/2024 13     % Eosinophils 11/06/2024 2     % Basophils 11/06/2024 1     % Immature Granulocytes 11/06/2024 0     NRBCs per 100 WBC 11/06/2024 0     Absolute Neutrophils 11/06/2024 4.0      Absolute Lymphocytes 11/06/2024 1.3     Absolute Monocytes 11/06/2024 0.8     Absolute Eosinophils 11/06/2024 0.1     Absolute Basophils 11/06/2024 0.1     Absolute Immature Granul* 11/06/2024 0.0     Absolute NRBCs 11/06/2024 0.0     Troponin T, High Sensiti* 11/06/2024 17 (H)     Magnesium 11/06/2024 1.5 (L)     Phosphorus 11/06/2024 3.2     LVEF  11/07/2024 55-60%     Case Report 11/06/2024                      Value:Surgical Pathology Report                         Case: [id number]                                  Authorizing Provider:  Junaid Daniel MD        Collected:           11/06/2024 02:22 PM          Ordering Location:     [facility]      Received:            11/06/2024 02:35 PM                                 [facility] Main OR                                                               Pathologist:           Maria E Vasquez MD                                                      Specimen:    Gallbladder                                                                                Final Diagnosis 11/06/2024                      Value:GALLBLADDER, LAPAROSCOPIC CHOLECYSTECTOMY:  -MILD ACUTE CHOLECYSTITIS WITH CHOLELITHIASIS  -ONE LYMPH NODE NEGATIVE FOR METASTATIC TUMOR (0/1) WITH MULTINUCLEATED GIANT CELLS AND ASTEROID BODIES, SEE COMMENT      Comment 11/06/2024                      Value:The incidental finding of multinucleated giant cells with asteroid bodies is not entirely specific and could be secondary to, but not limited to a foreign body giant cell reaction or sarcoidosis in the appropriate clinical setting.  Clinical correlation recommended.      Clinical Information 11/06/2024                      Value:Procedure:  CHOLECYSTECTOMY, LAPAROSCOPIC  Pre-op Diagnosis: Acute cholecystitis [K81.0]  Post-op Diagnosis: acute cholecystitis      Gross Description 11/06/2024                      Value:A(1). Gallbladder, Gallbladder:  Received in formalin, labeled with the patient's name  and gallbladder is a grossly intact and distended 8.7 x 4.6 x 4.2 cm gallbladder with attached 0.4 cm in diameter stump of cystic duct.  There is a 0.4 cm, tan-pink periductal lymph node identified.  The serosa is tan-pink, mottled and glistening.  Opening displays a tan-pink, velvety and glistening mucosa.  The gallbladder wall averages 0.2 cm in thickness and the lumen has a 1.7 x 1.2 x 0.1 cm aggregate of minute to 0.1 cm, green-black, firm, granular choleliths.  RS-1C   GILDA Temple        Microscopic Description 11/06/2024                      Value:Performed      Performing Labs 11/06/2024                      Value:The technical component of this testing was completed at [facility].    Stain controls for all stains resulted within this report have been reviewed and show appropriate reactivity.       GLUCOSE BY METER POCT 11/06/2024 142 (H)     GLUCOSE BY METER POCT 11/06/2024 144 (H)     Sodium 11/07/2024 130 (L)     Potassium 11/07/2024 4.3     Carbon Dioxide (CO2) 11/07/2024 26     Anion Gap 11/07/2024 10     Urea Nitrogen 11/07/2024 21.9     Creatinine 11/07/2024 1.44 (H)     GFR Estimate 11/07/2024 35 (L)     Calcium 11/07/2024 9.3     Chloride 11/07/2024 94 (L)     Glucose 11/07/2024 120 (H)     Alkaline Phosphatase 11/07/2024 50     AST 11/07/2024 23     ALT 11/07/2024 11     Protein Total 11/07/2024 6.6     Albumin 11/07/2024 3.9     Bilirubin Total 11/07/2024 0.4     WBC Count 11/07/2024 8.5     RBC Count 11/07/2024 3.54 (L)     Hemoglobin 11/07/2024 10.3 (L)     Hematocrit 11/07/2024 31.4 (L)     MCV 11/07/2024 89     MCH 11/07/2024 29.1     MCHC 11/07/2024 32.8     RDW 11/07/2024 14.0     Platelet Count 11/07/2024 291     Magnesium 11/07/2024 2.6 (H)     Phosphorus 11/07/2024 4.5     GLUCOSE BY METER POCT 11/07/2024 118 (H)     GLUCOSE BY METER POCT 11/07/2024 122 (H)     GLUCOSE BY METER POCT 11/07/2024 147 (H)     WBC Count  11/08/2024 8.9     RBC Count 11/08/2024 3.27 (L)     Hemoglobin 11/08/2024 9.8 (L)     Hematocrit 11/08/2024 29.1 (L)     MCV 11/08/2024 89     MCH 11/08/2024 30.0     MCHC 11/08/2024 33.7     RDW 11/08/2024 14.2     Platelet Count 11/08/2024 287     Sodium 11/08/2024 128 (L)     Potassium 11/08/2024 5.0     Carbon Dioxide (CO2) 11/08/2024 26     Anion Gap 11/08/2024 9     Urea Nitrogen 11/08/2024 31.4 (H)     Creatinine 11/08/2024 1.62 (H)     GFR Estimate 11/08/2024 30 (L)     Calcium 11/08/2024 9.1     Chloride 11/08/2024 93 (L)     Glucose 11/08/2024 98     Alkaline Phosphatase 11/08/2024 51     AST 11/08/2024 23     ALT 11/08/2024 11     Protein Total 11/08/2024 6.5     Albumin 11/08/2024 3.6     Bilirubin Total 11/08/2024 0.3     Magnesium 11/08/2024 2.3     Phosphorus 11/08/2024 4.1     GLUCOSE BY METER POCT 11/08/2024 92        Samina Prajapati PA-C  [facility]  Surgery [address]  [place] [address]?  Office: [phone]

## 2024-11-08 NOTE — PLAN OF CARE
Problem: Surgery Nonspecified  Goal: Optimal Pain Control and Function  11/8/2024 0548 by Dayana Perez RN  Outcome: Progressing  11/8/2024 0530 by Dayana Perez RN  Outcome: Progressing     Problem: Surgery Nonspecified  Goal: Effective Urinary Elimination  11/8/2024 0548 by Dayana Perez RN  Outcome: Progressing  11/8/2024 0530 by Dayana Perez RN  Outcome: Progressing   Goal Outcome Evaluation:      Patient denies pain, slept through the night, arouses to care by voice. Urine incontinence, medium wet pad noted, bladder scanned was 420, asymptomatic. On 1800ml fluid restriction.Continue to monitor.   Vital signs:  Temp: 98.4  F (36.9  C) Temp src: Oral BP: 129/53 Pulse: 65   Resp: 18 SpO2: 97 % O2 Device: None (Room air) and stable.

## 2024-11-08 NOTE — PLAN OF CARE
"  Problem: Adult Inpatient Plan of Care  Goal: Patient-Specific Goal (Individualized)  Description: You can add care plan individualizations to a care plan. Examples of Individualization might be:  \"Parent requests to be called daily at 9am for status\", \"I have a hard time hearing out of my right ear\", or \"Do not touch me to wake me up as it startles  me\".  Outcome: Progressing   Goal Outcome Evaluation:         Patient up to chair with assist of 1, walker and gait belt.  Patient states limited activity at home.  Declined further activity here.    Patient stated surgical discomfort minimal and declined interventions at this time.    Patient reporting tolerating po intake but appetite \"is off\".                   "

## 2024-11-08 NOTE — PROGRESS NOTES
"PT Evaluation   11/08/24 0995   Appointment Info   Signing Clinician's Name / Credentials (PT) Bethany Lancaster PT, DPT   Living Environment   People in Home alone   Current Living Arrangements independent living facility   Home Accessibility no concerns   Transportation Anticipated family or friend will provide   Living Environment Comments [facility] independent [facility] facility, has transportation for wheelchair. Gets help w/ groceries, otherwise ind at home   Self-Care   Usual Activity Tolerance good   Current Activity Tolerance moderate   Regular Exercise No   Equipment Currently Used at Home cane, straight;grab bar, tub/shower;wheelchair, manual;other (see comments)   Fall history within last six months no   Activity/Exercise/Self-Care Comment Pt reports being ind w/ ADLs at baseline. 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 States her activity is limited d/t severe B  knee pain. Typically uses scooter to get around for community distances - only ambs at night with SPC to/from restroom in her apartment.   General Information   Onset of Illness/Injury or Date of Surgery 11/06/24   Referring Physician Arsenio Last MD   Patient/Family Therapy Goals Statement (PT) None stated   Pertinent History of Current Problem (include personal factors and/or comorbidities that impact the POC) Per chart: \"89 year old female with a past medical history documented below presented to the ER via EMS from [facility] with complaint of epigastric abdominal pain which was sharp and woke her from sleep, patient also had nausea and vomiting after the pain started, pain was radiating up into the chest and to the back and was constant and unrelenting,  CT chest abdomen pelvis showed Interval development of gallbladder distention, diffuse mural edema, layering calcific stone material and trace pericholecystic edema. Findings would be " "compatible with acute cholecystitis in the proper clinical setting. Ultrasound of the abdomen showed Cholelithiasis with mild gallbladder wall thickening. Minimal right upper quadrant ascites. Acute cholecystitis possible. ED provider discussed with general surgery who took the patient to the OR, a dose of antibiotics were ordered in the ED, patient has been to be admitted for acute cholecystitis requiring operative repair\"   Existing Precautions/Restrictions fall   Cognition   Affect/Mental Status (Cognition) WNL   Follows Commands (Cognition) WNL   Pain Assessment   Patient Currently in Pain Yes, see Vital Sign flowsheet  (pain in B knees in standing/amb with FWW; abdominal pain)   Posture    Posture Forward head position;Protracted shoulders   Range of Motion (ROM)   Range of Motion ROM is WFL   Strength (Manual Muscle Testing)   Strength (Manual Muscle Testing) Deficits observed during functional mobility   Bed Mobility   Comment, (Bed Mobility) Pt already sitting up in recliner when PT arrived.   Transfers   Transfers sit-stand transfer   Transfer Safety Concerns Noted losing balance backward;decreased weight-shifting ability   Impairments Contributing to Impaired Transfers impaired balance;decreased strength;pain   Sit-Stand Transfer   Sit-Stand [place] (Transfers) minimum assist (75% patient effort);1 person assist   Assistive Device (Sit-Stand Transfers) walker, front-wheeled  (FWW)   Comment, (Sit-Stand Transfer) Pt with NBOS to stand, slow & effortful, reports pain in B knees   Gait/Stairs (Locomotion)   [place] Level (Gait) verbal cues;nonverbal cues (demo/gesture);contact guard   Assistive Device (Gait) walker, front-wheeled   Distance in Feet (Gait) 5'   Pattern (Gait) step-to   Deviations/Abnormal Patterns (Gait) antalgic;prabhu decreased;gait speed decreased;stride length decreased   Comment, (Gait/Stairs) Pt does not need to use stairs at her building.   Balance   Balance other (describe) "   Balance Comments Caroline with sit<>stand FWW; CGA with FWW.   Clinical Impression   Criteria for Skilled Therapeutic Intervention Yes, treatment indicated   PT Diagnosis (PT) Impaired functional mobility   Influenced by the following impairments Impaired strength, balance, activity tolerance   Functional limitations due to impairments transfers, gait, endurance; pain   Clinical Presentation (PT Evaluation Complexity) stable   Clinical Presentation Rationale Clinical judgment   Clinical Decision Making (Complexity) moderate complexity   Planned Therapy Interventions (PT) balance training;bed mobility training;gait training;home exercise program;neuromuscular re-education;patient/family education;stair training;strengthening;stretching;transfer training;progressive activity/exercise;home program guidelines   Risk & Benefits of therapy have been explained evaluation/treatment results reviewed;participants included;patient;participants voiced agreement with care plan   PT Total Evaluation Time   PT Eval, Moderate Complexity Minutes (60802) 7   Physical Therapy Goals   PT Frequency 5x/week   PT Predicted Duration/Target Date for Goal Attainment 11/15/24   PT Goals Transfers;Gait   PT: Transfers Modified independent;Sit to/from stand;Bed to/from chair;Assistive device  (SPC)   PT: Gait 25 feet;Straight cane;Supervision/stand-by assist   Interventions   Interventions Quick Adds Therapeutic Activity;Therapeutic Procedure   Therapeutic Procedure/Exercise   Ther. Procedure: strength, endurance, ROM, flexibillity Minutes (27043) 8   Symptoms Noted During/After Treatment fatigue   Treatment Detail/Skilled Intervention instructed in seated therex for functional strength of BLEs x10 BLEs with cues for excursion, tempo, technique - hip flex, LAQ, ankle pf/df, hip abd/add. slow, effortful pace.   Therapeutic Activity   Therapeutic Activities: dynamic activities to improve functional performance Minutes (35265) 9   Symptoms Noted  During/After Treatment Fatigue;Increased pain   Treatment Detail/Skilled Intervention eval completed, tx initiated. Education provided on importance of mobility to help pt return to PLOF and prevent further functional decline. additional amb distance with FWW 15' CGA, cues for upright posture and safe management of FWW. Pt agreeable to trialing SPC next session, as that is what she uses at baseline. x4 additional sit<>stands in session CGA with maximum cueing for forward scooting, wide MARCY, and use of BUEs on armrests - pt reporting significant decrease in pain in B knees with wide MARCY. Ended session with pt sitting up in recliner, call light & all other needs in reach.   PT Discharge Planning   PT Plan Bring cane to prog transfers & gait if appropriate (what pt uses at baseline), standing ex, sit<>stands   PT Discharge Recommendation (DC Rec) Transitional Care Facility   PT Rationale for DC Rec Patient is currently needing Ax1 with FWW for all mobility, is at a fall risk. At baseline patient is IND, lives alone, and uses SPC for short distances within her apartment to use bathroom at night, otherwise uses her scooter. Recommend TCU to help patient return to her functional baseline.   PT Brief overview of current status Caroline sit<>stand FWW, 20' CGA FWW   PT Equipment Needed at Discharge walker, rolling   Physical Therapy Time and Intention   Timed Code Treatment Minutes 17   Total Session Time (sum of timed and untimed services) 24

## 2024-11-08 NOTE — PROGRESS NOTES
HEART CARE NOTE          Assessment/Recommendations   1. HmrEF c/b ADHF  Assessment / Plan  Near euvolemia and progressive TIMOTHY noted - continue to hold further diuresis; hold ARB; continue to monitor UOP and renal function closely  Patient is high risk for adverse cardiac events 2/2 advanced age, frailty, HTN, elevated NTproBNP  GDMT as detailed below; mainstay of treatment for HFpEF includes diuretics and adequate BP control (class I) and SGLT2-I (class 2a); additional medical therapy (ARNI, MRA, ARB) demonstrated less robust evidence for indication but may be considered per guideline recommendations (2b); no indication for BBlockers      Current Pharmacotherapy AHA Guideline-Directed Medical Therapy   Losartan  50 mg BID - held  ARNI/ARB   Spironolactone not started  MRA   SGLT2 inhibitor:not started SGLT2-I    Furosemide IV - held Loop diuretic       2. HTN  Assessment / Plan  Difficult to control; titrate oral afterload reduction as tolerated     3. non-obstructive CAD  Assessment / Plan  Mild non-obstructive CAD - continue aggressive risk factor modification  Denies chest pain and anginal equivalents     4. TIMOTHY on CKD  Assessment / Plan  Hold loop and ARB; continue to monitor UOP and renal function closely; [facility]aut[facility]     5. Valvular heart disease  Assessment / Plan  Progressive  2+ MR and severe AI; repeat echo pending; will refer to valve clinic if patient is amenable    Plan of care discussed on November 8, 2024 with patient at bedside, and primary team overseeing patient's care      History of Present Illness/Subjective    Ms. Kerry Hoffmann is a 89 year old female with a PMHx significant for (per Epic notation) HTN, heart disease, GERD, and HLD, presenting with sob, chest pressure, legs edema. C/w CHF.      Today, Mrs. Hoffmann denies acute cardiac events or complaints; Management plan as detailed above     ECG: Personally reviewed. normal sinus rhythm, nonspecific ST and T waves  "changes, PAC's noted.     ECHO (personally reviewed 11/8/24):   1. The left ventricle is moderately dilated. There is mild global hypokinesia  of the left ventricle. The visual ejection fraction is estimated at 45%.  2. Normal right ventricle size and systolic function.  3. The left atrium is moderate to severely dilated.  4. There is moderate (2+) mitral regurgitation.  5. There is mod-severe to severe (3-4+) aortic regurgitation.     Compared to study on 8/12/2023, there is decline in LV systolic function and  significant increase in aortic and mitral valve regurgitation.    Telemetry: personally reviewed November 8, 2024; notable for sinus      Lab results: personally reviewed November 8, 2024; notable for progressive TIMOTHY     Medical history and pertinent documents reviewed in Care Everywhere please where applicable see details above        Physical Examination Review of Systems   /53 (BP Location: Left arm)   Pulse 65   Temp 98.4  F (36.9  C) (Oral)   Resp 18   Ht 1.549 m (5' 1\")   Wt 69.7 kg (153 lb 9.6 oz)   LMP  (LMP Unknown)   SpO2 97%   BMI 29.02 kg/m    Body mass index is 29.02 kg/m .  Wt Readings from Last 3 Encounters:   11/07/24 69.7 kg (153 lb 9.6 oz)   10/28/24 70.4 kg (155 lb 3.2 oz)   09/04/24 70 kg (154 lb 6.4 oz)     General Appearance:   no distress, normal body habitus   ENT/Mouth: membranes moist, no oral lesions or bleeding gums.      EYES:  no scleral icterus, normal conjunctivae   Neck: no carotid bruits or thyromegaly   Chest/Lungs:   lungs are clear to auscultation, no rales or wheezing, equal chest wall expansion    Cardiovascular:   Regular. Normal first and second heart sounds with no murmurs, rubs, or gallops; the carotid, radial and posterior tibial pulses are intact, no JVD or LE edema bilaterally    Abdomen:  no organomegaly, masses, bruits, or tenderness; bowel sounds are present   Extremities: no cyanosis or clubbing   Skin: no xanthelasma, warm.    Neurologic: NAD   "   Psychiatric: NAD     A complete 10 systems ROS was reviewed  And is negative except what is listed in the HPI.          Medical History  Surgical History Family History Social History   Past Medical History:   Diagnosis Date    Anxiety     takes clonazepam    Asthma     per [facility]     Chronic kidney disease     stage 3 per [facility]     CKD (chronic kidney disease)     Clostridium difficile colitis 11/01/2016    Clostridium difficile infection     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    Clostridium enterocolitis 10/27/2016    CTS (carpal tunnel syndrome)     GERD (gastroesophageal reflux disease)     per [facility]     Hiatal hernia     small per [facility]     Hyperlipidemia     Hyperlipidemia     Hyperlipidemia     Hypertension     Hypertension     Osteoarthritis of knee     per [facility]     Spinal stenosis     per [facility]     Vitamin D deficiency     per [facility]    Past Surgical History:   Procedure Laterality Date    BREAST SURGERY  1982    breast tumor per [facility]     CATARACT EXTRACTION  07/2005    per [facility]     COLONOSCOPY N/A 11/20/2023    Procedure: COLONOSCOPY with biopsies and polypectomy;  Surgeon: David Barreto MD;  Location: [facility] OR    CV CORONARY ANGIOGRAM N/A 8/11/2023    Procedure: Coronary Angiogram;  Surgeon: Ap Arroyo MD;  Location: [facility] LAB CV    CV LEFT HEART CATH N/A 8/11/2023    Procedure: Left Heart Catheterization;  Surgeon: Ap Arroyo MD;  Location: [facility] LAB CV    LAPAROSCOPIC CHOLECYSTECTOMY N/A 11/6/2024    Procedure: CHOLECYSTECTOMY, LAPAROSCOPIC;  Surgeon: Junaid Daniel MD;  Location: [facility] OR    ORIF TIBIA & FIBULA FRACTURES  1988    per [facility]     OTHER SURGICAL HISTORY  10/2004    hole in retinaper [facility]     OTHER SURGICAL HISTORY  05/03/2015    fecal transplantper [facility]     MI ESOPHAGOGASTRODUODENOSCOPY TRANSORAL DIAGNOSTIC N/A 9/11/2020    Procedure: ESOPHAGOGASTRODUODENOSCOPY With gastric biopsies;  Surgeon: David Reyes MD;  Location: [facility];  Service: Gastroenterology    TONSILLECTOMY      TONSILLECTOMY & ADENOIDECTOMY  1963    no family history of premature coronary artery disease Social History     Socioeconomic History    Marital status:      Spouse name: Not on file    Number of children: Not on file    Years of education: Not on file    Highest education level: Not on file   Occupational History    Not on file   Tobacco Use    Smoking status: Former     Current packs/day: 3.00     Average packs/day: 3.0 packs/day for 35.0 years (105.0 ttl pk-yrs)     Types: Cigarettes    Smokeless tobacco: Never    Tobacco comments:     quit 1968   Substance and Sexual Activity    Alcohol use: Not Currently    Drug use: Never    Sexual activity: Not Currently   Other Topics Concern    Parent/sibling w/ CABG, MI or angioplasty before 65F 55M? No   Social History Narrative    Not on file     Social Drivers of Health     Financial Resource Strain: Low Risk  (11/7/2024)    Financial Resource Strain     Within the past 12 months, have you or your family members you live with been unable to get utilities (heat, electricity) when it was really needed?: No   Food Insecurity: Low Risk  (11/7/2024)    Food Insecurity     Within the past 12 months, did you worry that your food would run out before you got money to buy more?: No     Within the past 12 months, did the food you bought just not last and you didn t have money to get more?: No   Transportation Needs: Low Risk  (11/7/2024)    Transportation Needs     Within the past 12 months, has lack of transportation kept you from medical appointments, getting your medicines, non-medical meetings or appointments, work, or from getting things that you need?: No   Physical Activity: Not on file   Stress: Not on file   Social Connections: Not on file   Interpersonal Safety: Low Risk  (11/6/2024)    Interpersonal Safety     Do you feel physically and emotionally safe where you currently live?: Yes     Within  the past 12 months, have you been hit, slapped, kicked or otherwise physically hurt by someone?: No     Within the past 12 months, have you been humiliated or emotionally abused in other ways by your partner or ex-partner?: No   Housing Stability: High Risk (11/7/2024)    Housing Stability     Do you have housing? : No     Are you worried about losing your housing?: No           Lab Results    Chemistry/lipid CBC Cardiac Enzymes/BNP/TSH/INR   Lab Results   Component Value Date    CHOL 225 (H) 03/04/2024    HDL 60 03/04/2024    TRIG 160 (H) 03/04/2024    BUN 21.9 11/07/2024     (L) 11/07/2024    CO2 26 11/07/2024    Lab Results   Component Value Date    WBC 8.5 11/07/2024    HGB 10.3 (L) 11/07/2024    HCT 31.4 (L) 11/07/2024    MCV 89 11/07/2024     11/07/2024    Lab Results   Component Value Date    TROPONINI <0.01 09/04/2022     11/16/2019    TSH 2.52 06/14/2023     Lab Results   Component Value Date    TROPONINI <0.01 09/04/2022          Weight:    Wt Readings from Last 3 Encounters:   11/07/24 69.7 kg (153 lb 9.6 oz)   10/28/24 70.4 kg (155 lb 3.2 oz)   09/04/24 70 kg (154 lb 6.4 oz)       Allergies  Allergies   Allergen Reactions    Buspirone Shortness Of Breath    Ciprofloxacin Hives and Shortness Of Breath     Other reaction(s): Unknown    Cortisone      Other reaction(s): Throat Swelling/Closing, Unknown  Reaction 9-5-94      Hydrocodone-Acetaminophen Shortness Of Breath     Other reaction(s): Unknown    Lansoprazole Shortness Of Breath    Metoprolol Shortness Of Breath     Tolerates metoprolol succinate at home    Nedocromil      Other reaction(s): Throat Swelling/Closing    Niacin Shortness Of Breath     Other reaction(s): Unknown    Albuterol Other (See Comments)     Inhaler had extreme effect on nervous system      Amlodipine      Other reaction(s): Erythema, Unknown    Ampicillin Unknown    Azithromycin      Other reaction(s): *Unknown, Unknown    Cefixime Diarrhea     Other  reaction(s): Unknown  Has tolerated ceftriaxone    Cefprozil Nausea and Vomiting     Other reaction(s): Unknown  Has tolerated ceftriaxone    Cefuroxime      Other reaction(s): *Unknown  Has tolerated ceftriaxone    Cephalexin      Other reaction(s): *Unknown, Unknown  Has tolerated ceftriaxone    Contrast Dye      Other reaction(s): hives    Cyclobenzaprine      Other reaction(s): Sedation    Dextromethorphan-Guaifenesin Nausea     Other reaction(s): Headache    Diltiazem      Other reaction(s): Erythema, Unknown  Ears face arms and chest red and on fire-body tremors      Erythromycin      Other reaction(s): Unknown    Esomeprazole      Other reaction(s): Headache    Ezetimibe Unknown    Fenofibrate Muscle Pain (Myalgia)     Other reaction(s): Unknown    Fluticasone-Salmeterol Other (See Comments)     Elevated blood pressure      Methylprednisolone     Metronidazole     Monosodium Glutamate     Moxifloxacin      Other reaction(s): Dizziness    Nifedipine      Other reaction(s): Edema  Took for 5-93 to 9-94 red and swollen leg      Nsaids      Other reaction(s): Unknown    Ofloxacin      Other reaction(s): *Unknown    Ondansetron      Other reaction(s): Constipation    Parsley Seed     Penicillins Unknown     She doesn't remember other than it occurred as a very young woman     [place]per     Pirbuterol Other (See Comments)     Immediate extreme effect on nervous system      Pravastatin      Other reaction(s): Dizziness, Unknown    Pseudoephedrine      Other reaction(s): headache,nausea    Shellfish-Derived Products      Per pt 8/12/23    Simvastatin Muscle Pain (Myalgia)    Spironolactone      Other reaction(s): stomach cramps, nausea    Sulfamethoxazole-Trimethoprim      Other reaction(s): Unknown    Tramadol      Other reaction(s): red ears,high blood press.    Tramadol-Acetaminophen      Other reaction(s): Tachycardia, Unknown    Triamterene Other (See Comments)     Greatly bothered with sun-took medication for three  years  Greatly bothered with sun      Diatrizoate Rash    Telmisartan Palpitations         Surgical History  Past Surgical History:   Procedure Laterality Date    BREAST SURGERY  1982    breast tumor per [facility]     CATARACT EXTRACTION  07/2005    per [facility]     COLONOSCOPY N/A 11/20/2023    Procedure: COLONOSCOPY with biopsies and polypectomy;  Surgeon: David Barreto MD;  Location[facility]    CV CORONARY ANGIOGRAM N/A 8/11/2023    Procedure: Coronary Angiogram;  Surgeon: Ap Arroyo MD;  Location: [facility] CATH LAB CV    CV LEFT HEART CATH N/A 8/11/2023    Procedure: Left Heart Catheterization;  Surgeon: Ap Arroyo MD;  Location: [facility] CV    LAPAROSCOPIC CHOLECYSTECTOMY N/A 11/6/2024    Procedure: CHOLECYSTECTOMY, LAPAROSCOPIC;  Surgeon: Junaid Daniel MD;  Location: [facility] [place]    ORIF TIBIA & FIBULA FRACTURES  1988    per [facility]     OTHER SURGICAL HISTORY  10/2004    hole in retinaper [facility]     OTHER SURGICAL HISTORY  05/03/2015    fecal transplantper [facility]     TX ESOPHAGOGASTRODUODENOSCOPY TRANSORAL DIAGNOSTIC N/A 9/11/2020    Procedure: ESOPHAGOGASTRODUODENOSCOPY With gastric biopsies;  Surgeon: David Reyes MD;  Location: [facility];  Service: Gastroenterology    TONSILLECTOMY      TONSILLECTOMY & ADENOIDECTOMY  1963       Social History  Tobacco:   History   Smoking Status    Former    Packs/day: 3.00    Years: 35.00    Types: Cigarettes   Smokeless Tobacco    Never    Alcohol:   Social History    Substance and Sexual Activity      Alcohol use: Not Currently   Illicit Drugs:   History   Drug Use Unknown       Family History  Family History   Problem Relation Age of Onset    Hypertension Mother     Cancer Mother         gallbladder cancer    Myocardial Infarction Father     Hypertension Sister     Alzheimer Disease Sister     Heart Disease Sister     Cancer Brother     Brain Cancer Brother     Pacemaker Mother     Heart Disease Mother     Coronary Artery  Disease Father     Heart Disease Father     Heart Failure Sister           Jimmy Gonzalez MD on 11/8/2024      cc: Kaylyn Bolanos

## 2024-11-08 NOTE — PROGRESS NOTES
[facility]    Medicine Progress Note - Hospitalist Service    Date of Admission:  11/6/2024    Assessment & Plan   Kerry Hoffmann is a 89 year old female with a past medical history of CKD, CAD, HTN who was admitted on 11/6/24 after presenting to [facility] ED for acute cholecystitis. In the ED, CT chest abdomen pelvis showed Interval development of gallbladder distention, diffuse mural edema, layering calcific stone material and trace pericholecystic edema.  Ultrasound of the abdomen showed Cholelithiasis with mild gallbladder wall thickening. General Surgery consulted on admission and patient Underwent laparoscopic cholecystectomy on 11/6/24. Hospital stay complicated by new oxygen requirement thought to be from acute heart failure exacerbation. Cardiology consulted and managing diuresis.    #Acute Cholecystitis  #Nausea, Vomiting, Abdominal pain 2/2 Cholecystitis  - CT C/A/P in ED showed Interval development of gallbladder distention, diffuse mural edema, layering calcific stone material and trace pericholecystic edema  - US RUQ in ED showing cholelithiasis with gallbladder wall thickening and gallbladder distension  - Underwent laparoscopic cholecystectomy on 11/6/24  Plan  - General Surgery following, appreciate recommendations  - No additional antibiotics at this time.  - Advancing diet  - Oxycodone prn for pain control - tylenol allergy in chart  - PT/OT recommending TCU - patient declining TCU or Home Health      #Acute Hypoxic Respiratory Failure  #Acute HFmrEF Exacerbation  #Elevated troponin 2/2 heart failure exacerbation  - Patient not on home oxygen, requiring 2L following surgery.  - Home medications for heart failure include Lasix 20mg 2x/week (Mon/Thurs), metoprolol 75mg daily, and losartan 50mg BID  - Troponin in ED was 19 with repeat 17.  - CT Chest in ED showing Mild interstitial pulmonary edema.  - Echo TTE 11/7/24 showing EF 55-60%, no wall motion abnormalities.  Plan  -  Continue supplemental oxygen as needed.  - Cardiology consulted - appreciate recommendations  - Holding further diuresis  - Monitor on telemetry  - Holding Losartan with TIMOTHY  - Continue home metoprolol  - Can repeat troponin/EKG if patient develops chest pain.    #Acute Kidney Injury on CKD stage III  - Baseline creatinine around 1.2  - Creatinine elevated to 1.62 on 11/8.  - Likely related to over-diuresis (increased diuretics with HF exacerbation)  Plan  - Cardiology managing diuresis  - Monitor renal function closely    #History of CAD  - Home medications include aspirin 81 mg daily, Plavix 75 mg daily.  -Echo TTE 11/7/2024 with EF 55 to 60%.  No wall motion abnormalities.  Plan  - Cardiology following as above  - Continue home aspirin and Plavix    #Hypertension  -Home medications include losartan 50 mg twice daily and metoprolol 75 mg daily.  Plan  - Holding home losartan with TIMOTHY  - Continue home metoprolol   - IV hydralazine as needed for elevated blood pressure  - Mild to moderate bilateral renal artery stenosis on imaging, may require outpatient vascular follow-up postdischarge    #Acute on Chronic Hyponatremia  - Sodium level 127 in the ED  - Suspecting most likely in setting of fluid overload.  - Sodium appears to be around patient's baseline and chronically low   Plan  - Monitor sodium level closely    #Mild normocytic anemia  Monitor CBC,    #Contrast infiltration  Infiltration of iodinated contrast material in the left upper arm related to this morning's earlier attempt was seen on imaging. ER staff notified and contrast infiltration protocol initiated as per radiology    #History of mood disorder  Continue with Klonopin          Diet: Combination Diet 2 gm NA Diet; No Caffeine Diet (and additional linked orders)  Fluid restriction 1800 ML FLUID    DVT Prophylaxis: Heparin SQ and Pneumatic Compression Devices  Wiley Catheter: Not present  Lines: None     Cardiac Monitoring: None  Code Status: Full Code  "     Clinically Significant Risk Factors         # Hyponatremia: Lowest Na = 128 mmol/L in last 2 days, will monitor as appropriate  # Hypochloremia: Lowest Cl = 93 mmol/L in last 2 days, will monitor as appropriate          # Hypertension: Noted on problem list  # Acute heart failure with preserved ejection fraction: heart failure noted on problem list, last echo with EF >50%, and receiving IV diuretics           # Overweight: Estimated body mass index is 29.06 kg/m  as calculated from the following:    Height as of this encounter: 1.549 m (5' 1\").    Weight as of this encounter: 69.8 kg (153 lb 12.8 oz)., PRESENT ON ADMISSION     # Financial/Environmental Concerns:    # Asthma: noted on problem list        Disposition Plan     Medically Ready for Discharge: Anticipated Tomorrow             Arsenio Last MD  Hospitalist Service  [facility]  Securely message with [employer] (more info)  Text page via [employer] Paging/Directory   ______________________________________________________________________    Interval History   -Patient's breathing feels improved this morning.  No longer on oxygen via nasal cannula.  -Patient denies any nausea, vomiting, abdominal pain, minimal surgical site pain.  -Patient expressed disinterest with transitional care facility or home health on discharge.    Physical Exam   Vital Signs: Temp: 98.2  F (36.8  C) Temp src: Oral BP: (!) 147/61 Pulse: 64   Resp: 18 SpO2: 97 % O2 Device: None (Room air)    Weight: 153 lbs 12.8 oz    General: Alert and Oriented x3. Answers questions appropriately. Follows commands.  Eyes:EOMI. PERRL. Normal Conjunctivae.  HENT: Normocephalic. Atraumatic.  Resp: Breath sounds equal throughout. No crackles. No wheezing.  Cardio: Regular rate and rhythm. No murmurs. No friction rub.  Abd: Soft. Non-distended.  Mild tenderness to palpation diffusely across abdomen.  Bowel sounds normal. No rebound tenderness.  MSK: No areas of ecchymosis or " erythema.  Neuro: CN 2-12 grossly intact. Strength 5/5 in all 4 extremities.  Skin:No rashes. No jaundice.     Medical Decision Making       45 MINUTES SPENT BY ME on the date of service doing chart review, history, exam, documentation & further activities per the note.  MANAGEMENT DISCUSSED with the following over the past 24 hours: RN, DORIS   Tests ORDERED & REVIEWED in the past 24 hours:  - BMP  - CBC  - Magnesium  - Phosphorus  Medical complexity over the past 24 hours:  - Prescription DRUG MANAGEMENT performed  - Treatment limited by [employer]      Data     I have personally reviewed the following data over the past 24 hrs:    8.9  \   9.8 (L)   / 287     128 (L) 93 (L) 31.4 (H) /  92   5.0 26 1.62 (H) \     ALT: 11 AST: 23 AP: 51 TBILI: 0.3   ALB: 3.6 TOT PROTEIN: 6.5 LIPASE: N/A       Imaging results reviewed over the past 24 hrs:   No results found for this or any previous visit (from the past 24 hours).

## 2024-11-09 LAB
ALBUMIN SERPL BCG-MCNC: 3.1 G/DL (ref 3.5–5.2)
ALP SERPL-CCNC: 52 U/L (ref 40–150)
ALT SERPL W P-5'-P-CCNC: 9 U/L (ref 0–50)
ANION GAP SERPL CALCULATED.3IONS-SCNC: 8 MMOL/L (ref 7–15)
AST SERPL W P-5'-P-CCNC: 19 U/L (ref 0–45)
BILIRUB SERPL-MCNC: 0.2 MG/DL
BUN SERPL-MCNC: 31.8 MG/DL (ref 8–23)
CALCIUM SERPL-MCNC: 8.7 MG/DL (ref 8.8–10.4)
CHLORIDE SERPL-SCNC: 96 MMOL/L (ref 98–107)
CREAT SERPL-MCNC: 1.29 MG/DL (ref 0.51–0.95)
EGFRCR SERPLBLD CKD-EPI 2021: 40 ML/MIN/1.73M2
ERYTHROCYTE [DISTWIDTH] IN BLOOD BY AUTOMATED COUNT: 14.1 % (ref 10–15)
GLUCOSE BLDC GLUCOMTR-MCNC: 99 MG/DL (ref 70–99)
GLUCOSE BLDC GLUCOMTR-MCNC: 99 MG/DL (ref 70–99)
GLUCOSE SERPL-MCNC: 86 MG/DL (ref 70–99)
HCO3 SERPL-SCNC: 25 MMOL/L (ref 22–29)
HCT VFR BLD AUTO: 28.8 % (ref 35–47)
HGB BLD-MCNC: 9.6 G/DL (ref 11.7–15.7)
MAGNESIUM SERPL-MCNC: 2 MG/DL (ref 1.7–2.3)
MCH RBC QN AUTO: 29.8 PG (ref 26.5–33)
MCHC RBC AUTO-ENTMCNC: 33.3 G/DL (ref 31.5–36.5)
MCV RBC AUTO: 89 FL (ref 78–100)
PHOSPHATE SERPL-MCNC: 3.6 MG/DL (ref 2.5–4.5)
PLATELET # BLD AUTO: 261 10E3/UL (ref 150–450)
POTASSIUM SERPL-SCNC: 4.8 MMOL/L (ref 3.4–5.3)
PROT SERPL-MCNC: 5.7 G/DL (ref 6.4–8.3)
RBC # BLD AUTO: 3.22 10E6/UL (ref 3.8–5.2)
SODIUM SERPL-SCNC: 129 MMOL/L (ref 135–145)
WBC # BLD AUTO: 6.7 10E3/UL (ref 4–11)

## 2024-11-09 PROCEDURE — 250N000013 HC RX MED GY IP 250 OP 250 PS 637: Performed by: STUDENT IN AN ORGANIZED HEALTH CARE EDUCATION/TRAINING PROGRAM

## 2024-11-09 PROCEDURE — 85014 HEMATOCRIT: CPT | Performed by: STUDENT IN AN ORGANIZED HEALTH CARE EDUCATION/TRAINING PROGRAM

## 2024-11-09 PROCEDURE — 120N000001 HC R&B MED SURG/OB

## 2024-11-09 PROCEDURE — 84100 ASSAY OF PHOSPHORUS: CPT | Performed by: STUDENT IN AN ORGANIZED HEALTH CARE EDUCATION/TRAINING PROGRAM

## 2024-11-09 PROCEDURE — 83735 ASSAY OF MAGNESIUM: CPT | Performed by: STUDENT IN AN ORGANIZED HEALTH CARE EDUCATION/TRAINING PROGRAM

## 2024-11-09 PROCEDURE — 250N000013 HC RX MED GY IP 250 OP 250 PS 637: Performed by: SURGERY

## 2024-11-09 PROCEDURE — 250N000011 HC RX IP 250 OP 636: Performed by: STUDENT IN AN ORGANIZED HEALTH CARE EDUCATION/TRAINING PROGRAM

## 2024-11-09 PROCEDURE — 36415 COLL VENOUS BLD VENIPUNCTURE: CPT | Performed by: STUDENT IN AN ORGANIZED HEALTH CARE EDUCATION/TRAINING PROGRAM

## 2024-11-09 PROCEDURE — 82310 ASSAY OF CALCIUM: CPT | Performed by: STUDENT IN AN ORGANIZED HEALTH CARE EDUCATION/TRAINING PROGRAM

## 2024-11-09 PROCEDURE — 99232 SBSQ HOSP IP/OBS MODERATE 35: CPT | Performed by: INTERNAL MEDICINE

## 2024-11-09 PROCEDURE — 99232 SBSQ HOSP IP/OBS MODERATE 35: CPT | Performed by: STUDENT IN AN ORGANIZED HEALTH CARE EDUCATION/TRAINING PROGRAM

## 2024-11-09 PROCEDURE — 84155 ASSAY OF PROTEIN SERUM: CPT | Performed by: STUDENT IN AN ORGANIZED HEALTH CARE EDUCATION/TRAINING PROGRAM

## 2024-11-09 RX ORDER — FUROSEMIDE 20 MG/1
20 TABLET ORAL
Status: DISCONTINUED | OUTPATIENT
Start: 2024-11-11 | End: 2024-11-10 | Stop reason: HOSPADM

## 2024-11-09 RX ORDER — LOSARTAN POTASSIUM 50 MG/1
50 TABLET ORAL 2 TIMES DAILY
Status: DISCONTINUED | OUTPATIENT
Start: 2024-11-09 | End: 2024-11-10 | Stop reason: HOSPADM

## 2024-11-09 RX ADMIN — CLONAZEPAM 0.5 MG: 0.5 TABLET ORAL at 20:43

## 2024-11-09 RX ADMIN — HEPARIN SODIUM 5000 UNITS: 5000 INJECTION, SOLUTION INTRAVENOUS; SUBCUTANEOUS at 00:38

## 2024-11-09 RX ADMIN — METOPROLOL SUCCINATE 75 MG: 25 TABLET, FILM COATED, EXTENDED RELEASE ORAL at 20:42

## 2024-11-09 RX ADMIN — CLONAZEPAM 0.5 MG: 0.5 TABLET ORAL at 08:06

## 2024-11-09 RX ADMIN — OXYCODONE HYDROCHLORIDE 5 MG: 5 TABLET ORAL at 00:37

## 2024-11-09 RX ADMIN — HEPARIN SODIUM 5000 UNITS: 5000 INJECTION, SOLUTION INTRAVENOUS; SUBCUTANEOUS at 08:06

## 2024-11-09 RX ADMIN — LOSARTAN POTASSIUM 50 MG: 50 TABLET, FILM COATED ORAL at 20:43

## 2024-11-09 RX ADMIN — HEPARIN SODIUM 5000 UNITS: 5000 INJECTION, SOLUTION INTRAVENOUS; SUBCUTANEOUS at 17:24

## 2024-11-09 RX ADMIN — ASPIRIN 81 MG: 81 TABLET, COATED ORAL at 08:06

## 2024-11-09 RX ADMIN — CLONAZEPAM 0.5 MG: 0.5 TABLET ORAL at 13:57

## 2024-11-09 RX ADMIN — FAMOTIDINE 20 MG: 20 TABLET ORAL at 08:06

## 2024-11-09 RX ADMIN — OXYCODONE HYDROCHLORIDE 2.5 MG: 5 TABLET ORAL at 12:13

## 2024-11-09 RX ADMIN — CLOPIDOGREL BISULFATE 75 MG: 75 TABLET ORAL at 08:06

## 2024-11-09 NOTE — PROGRESS NOTES
[facility]    Medicine Progress Note - Hospitalist Service    Date of Admission:  11/6/2024    Assessment & Plan   Kerry Hoffmann is a 89 year old female with a past medical history of CKD, CAD, HTN who was admitted on 11/6/24 after presenting to [facility] ED for acute cholecystitis. In the ED, CT chest abdomen pelvis showed Interval development of gallbladder distention, diffuse mural edema, layering calcific stone material and trace pericholecystic edema.  Ultrasound of the abdomen showed Cholelithiasis with mild gallbladder wall thickening. General Surgery consulted on admission and patient Underwent laparoscopic cholecystectomy on 11/6/24. Hospital stay complicated by new oxygen requirement thought to be from acute heart failure exacerbation. Cardiology consulted and managing diuresis - transitioned back to home Lasix p.o. 20 mg Monday/Thursday on 11/9.    #Acute Cholecystitis  #Nausea, Vomiting, Abdominal pain 2/2 Cholecystitis  - CT C/A/P in ED showed Interval development of gallbladder distention, diffuse mural edema, layering calcific stone material and trace pericholecystic edema  - US RUQ in ED showing cholelithiasis with gallbladder wall thickening and gallbladder distension  - Underwent laparoscopic cholecystectomy on 11/6/24  Plan  - General Surgery following, appreciate recommendations  - No additional antibiotics at this time.  - Regular Diet  - Oxycodone prn for pain control - tylenol allergy in chart  - PT/OT recommending TCU - patient declining TCU or Home Health      #Acute Hypoxic Respiratory Failure  #Acute HFmrEF Exacerbation  #Elevated troponin 2/2 heart failure exacerbation  - Patient not on home oxygen, requiring 2L following surgery.  - Home medications for heart failure include Lasix 20mg 2x/week (Mon/Thurs), metoprolol 75mg daily, and losartan 50mg BID  - Troponin in ED was 19 with repeat 17.  - CT Chest in ED showing Mild interstitial pulmonary edema.  - Echo TTE  11/7/24 showing EF 55-60%, no wall motion abnormalities.  Plan  - Continue supplemental oxygen as needed.  - Cardiology consulted - appreciate recommendations  - Resume home Lasix 20 mg Monday/Thursday  - Monitor on telemetry  -Restarting home losartan 50 mg twice daily on 11/9  - Continue home metoprolol  - Can repeat troponin/EKG if patient develops chest pain.    #Acute Kidney Injury on CKD stage III  - Baseline creatinine around 1.2  - Creatinine elevated to 1.62 on 11/8. Improved to 1.29 on 11/9.  - Likely related to over-diuresis (increased diuretics with HF exacerbation)  Plan  - Restarting home losartan 50 mg twice daily on 11/9  - Monitor renal function closely    #History of CAD  - Home medications include aspirin 81 mg daily, Plavix 75 mg daily.  -Echo TTE 11/7/2024 with EF 55 to 60%.  No wall motion abnormalities.  Plan  - Cardiology following as above  - Continue home aspirin and Plavix    #Hypertension  -Home medications include losartan 50 mg twice daily and metoprolol 75 mg daily.  Plan  - Resume home Losartan 11/9  - Continue home metoprolol   - IV hydralazine as needed for elevated blood pressure  - Mild to moderate bilateral renal artery stenosis on imaging, may require outpatient vascular follow-up postdischarge    #Acute on Chronic Hyponatremia  - Sodium level 127 in the ED  - Suspecting most likely in setting of fluid overload.  - Sodium appears to be around patient's baseline and chronically low   Plan  - Monitor sodium level closely    #Mild normocytic anemia  Monitor CBC,    #Contrast infiltration  Infiltration of iodinated contrast material in the left upper arm related to this morning's earlier attempt was seen on imaging. ER staff notified and contrast infiltration protocol initiated as per radiology    #History of mood disorder  Continue with Home Klonopin          Diet: Combination Diet 2 gm NA Diet; No Caffeine Diet (and additional linked orders)  Fluid restriction 1800 ML FLUID    DVT  "Prophylaxis: Heparin SQ and Pneumatic Compression Devices  Wiley Catheter: Not present  Lines: None     Cardiac Monitoring: None  Code Status: Full Code      Clinically Significant Risk Factors         # Hyponatremia: Lowest Na = 128 mmol/L in last 2 days, will monitor as appropriate  # Hypochloremia: Lowest Cl = 93 mmol/L in last 2 days, will monitor as appropriate      # Hypoalbuminemia: Lowest albumin = 3.1 g/dL at 11/9/2024  6:46 AM, will monitor as appropriate     # Hypertension: Noted on problem list  # Acute heart failure with preserved ejection fraction: heart failure noted on problem list, last echo with EF >50%, and receiving IV diuretics           # Overweight: Estimated body mass index is 28.8 kg/m  as calculated from the following:    Height as of this encounter: 1.549 m (5' 1\").    Weight as of this encounter: 69.1 kg (152 lb 6.4 oz)., PRESENT ON ADMISSION     # Financial/Environmental Concerns:    # Asthma: noted on problem list        Disposition Plan     Medically Ready for Discharge: Anticipated Tomorrow             Arsenio Last MD  Hospitalist Service  [facility]  Securely message with [employer] (more info)  Text page via [employer] Paging/Directory   ______________________________________________________________________    Interval History   -Patient continues to have abdominal pain at the day today.  -PT/OT recommending TCU, patient declining TCU or even home health on discharge.    Physical Exam   Vital Signs: Temp: 98.2  F (36.8  C) Temp src: Oral BP: 135/59 Pulse: 65   Resp: 16 SpO2: 94 % O2 Device: None (Room air)    Weight: 152 lbs 6.4 oz    General: Alert and Oriented x3. Answers questions appropriately. Follows commands.  Eyes:EOMI. PERRL. Normal Conjunctivae.  HENT: Normocephalic. Atraumatic.  Resp: Breath sounds equal throughout. No crackles. No wheezing.  Cardio: Regular rate and rhythm. No murmurs. No friction rub.  Abd: Soft. Non-distended.  Mild tenderness to " palpation diffusely across abdomen.  Bowel sounds normal. No rebound tenderness.  MSK: No areas of ecchymosis or erythema.  Neuro: CN 2-12 grossly intact. Strength 5/5 in all 4 extremities.  Skin:No rashes. No jaundice.     Medical Decision Making       45 MINUTES SPENT BY ME on the date of service doing chart review, history, exam, documentation & further activities per the note.  MANAGEMENT DISCUSSED with the following over the past 24 hours: RN, DORIS   Tests ORDERED & REVIEWED in the past 24 hours:  - BMP  - CBC  - LFTs  - Magnesium  - Phosphorus  Medical complexity over the past 24 hours:  - Prescription DRUG MANAGEMENT performed      Data     I have personally reviewed the following data over the past 24 hrs:    6.7  \   9.6 (L)   / 261     129 (L) 96 (L) 31.8 (H) /  99   4.8 25 1.29 (H) \     ALT: 9 AST: 19 AP: 52 TBILI: 0.2   ALB: 3.1 (L) TOT PROTEIN: 5.7 (L) LIPASE: N/A       Imaging results reviewed over the past 24 hrs:   No results found for this or any previous visit (from the past 24 hours).

## 2024-11-09 NOTE — PLAN OF CARE
"  Problem: Surgery Nonspecified  Goal: Optimal Pain Control and Function  Outcome: Progressing     Problem: Surgery Nonspecified  Goal: Blood Glucose Level Within Targeted Range  Outcome: Progressing  Intervention: Optimize Glycemic Control  Recent Flowsheet Documentation  Taken 11/9/2024 0040 by Cathy Maravilla RN  Hyperglycemia Management: blood glucose monitored  Hypoglycemia Management: blood glucose monitored   Goal Outcome Evaluation:       Patient is alert and oriented and able to make needs known.  Slept most of the night. Patient had 9/10 pain last night.  Patient hesitant to take narcotics but eventually took Oxycodone per MAR.  Reports feeling much better this morning after we repositioned her to her side for the second part of the night.  States she thinks her back was causing most of the pain from laying in bed.      BP (!) 144/63 (BP Location: Left arm)   Pulse 68   Temp 97.7  F (36.5  C) (Oral)   Resp 18   Ht 1.549 m (5' 1\")   Wt 69.8 kg (153 lb 12.8 oz)   LMP  (LMP Unknown)   SpO2 93%   BMI 29.06 kg/m      Cathy Maravilla RN        "

## 2024-11-09 NOTE — PLAN OF CARE
Problem: Adult Inpatient Plan of Care  Goal: Absence of Hospital-Acquired Illness or Injury  Intervention: Identify and Manage Fall Risk  Recent Flowsheet Documentation  Taken 11/8/2024 1700 by Adamaris Flores RN  Safety Promotion/Fall Prevention:   activity supervised   assistive device/personal items within reach   nonskid shoes/slippers when out of bed   room door open   room near nurse's station   toileting scheduled     Problem: Surgery Nonspecified  Goal: Blood Glucose Level Within Targeted Range  Outcome: Progressing  Intervention: Optimize Glycemic Control  Recent Flowsheet Documentation  Taken 11/8/2024 1700 by Adamaris Flores RN  Hyperglycemia Management: blood glucose monitored  Hypoglycemia Management: blood glucose monitored   [id number]  Problem: Surgery Nonspecified  Goal: Nausea and Vomiting Relief  Outcome: Progressing   Tolerated 100% of her dinner.    Problem: Surgery Nonspecified  Goal: Fluid and Electrolyte Balance  On 2g Na and fluid restriction.  [id number]  Intervention: Monitor and Manage Fluid and Electrolyte Balance  Recent Flowsheet Documentation  Taken 11/8/2024 1700 by Adamaris Flores RN  Fluid/Electrolyte Management: fluids restricted     Problem: Surgery Nonspecified  Goal: Effective Urinary Elimination  Outcome: Progressing  Voiding freely.

## 2024-11-09 NOTE — PROGRESS NOTES
"[facility]  Cardiac Electrophysiology  [address]  [place] [address]   Office: [phone]  Fax: [phone]     Cardiology Progress Note    Patient: Kerry Hoffmann   : 1935       Assessment/Recommendations     Acute on chronic diastolic heart failure - progressive TIMOTHY now resolving.  Euvolemic on exam.  - resume lasix 20mg orally on Monday and Thursday at discharge  - cardiology will sign off - contact us with questions    Non-obstructive CAD  - continue aspirin    TIMOTHY on CKD - resolving       Interval Events   She feels well, improved since admission.  Cr 1.29 (1.62)       Physical Examination  Review of Systems   VITALS: /59 (BP Location: Right arm)   Pulse 65   Temp 98.2  F (36.8  C) (Oral)   Resp 16   Ht 1.549 m (5' 1\")   Wt 69.8 kg (153 lb 12.8 oz)   LMP  (LMP Unknown)   SpO2 94%   BMI 29.06 kg/m    Wt Readings from Last 3 Encounters:   24 69.8 kg (153 lb 12.8 oz)   10/28/24 70.4 kg (155 lb 3.2 oz)   24 70 kg (154 lb 6.4 oz)       Intake/Output Summary (Last 24 hours) at 2024 0706  Last data filed at 2024 0626  Gross per 24 hour   Intake 656 ml   Output 1725 ml   Net -1069 ml     CONSTITUTIONAL: no distress  EYES:  Conjunctivae pink, sclerae clear.    E/N/T:  Oral mucosa pink, moist mucous membranes  RESPIRATORY:  Respiratory effort is normal  CARDIOVASCULAR:  normal S1 and S2, no lower extremity edema  GASTROINTESTINAL:  Abdomen without masses or tenderness  EXTREMITIES:  No clubbing or cyanosis.    MUSCULOSKELETAL:  Overall grossly normal muscle strength  SKIN:  Overall, skin warm and dry, no lesions.  NEURO/PSYCH:  Oriented x 3 with normal affect.   Constitutional:  No weight loss or loss of appetite    Cardiovascular: As detailed above  Respiratory: No cough  Genitourinary: No trouble urinating           Medical History  Surgical History   Past Medical History:   Diagnosis Date    Anxiety     takes clonazepam    " Asthma     per [facility]     Chronic kidney disease     stage 3 per [facility]     CKD (chronic kidney disease)     Clostridium difficile colitis 11/01/2016    Clostridium difficile infection     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    Clostridium enterocolitis 10/27/2016    CTS (carpal tunnel syndrome)     GERD (gastroesophageal reflux disease)     per [facility]     Hiatal hernia     small per [facility]     Hyperlipidemia     Hyperlipidemia     Hyperlipidemia     Hypertension     Hypertension     Osteoarthritis of knee     per [facility]     Spinal stenosis     per [facility]     Vitamin D deficiency     per [facility]    Past Surgical History:   Procedure Laterality Date    BREAST SURGERY  1982    breast tumor per [facility]     CATARACT EXTRACTION  07/2005    per [facility]     COLONOSCOPY N/A 11/20/2023    Procedure: COLONOSCOPY with biopsies and polypectomy;  Surgeon: David Barreto MD;  Location: [facility]    CV CORONARY ANGIOGRAM N/A 8/11/2023    Procedure: Coronary Angiogram;  Surgeon: Ap Arroyo MD;  Location: [facility] CATH LAB CV    CV LEFT HEART CATH N/A 8/11/2023    Procedure: Left Heart Catheterization;  Surgeon: Ap Arroyo MD;  Location: [facility] CATH LAB CV    LAPAROSCOPIC CHOLECYSTECTOMY N/A 11/6/2024    Procedure: CHOLECYSTECTOMY, LAPAROSCOPIC;  Surgeon: Junaid Daniel MD;  [facility]    ORIF TIBIA & FIBULA FRACTURES  1988    per [facility]     OTHER SURGICAL HISTORY  10/2004    hole in retinaper [facility]     OTHER SURGICAL HISTORY  05/03/2015    fecal transplantper [facility]     PA ESOPHAGOGASTRODUODENOSCOPY TRANSORAL DIAGNOSTIC N/A 9/11/2020    Procedure: ESOPHAGOGASTRODUODENOSCOPY With gastric biopsies;  Surgeon: David Reyes MD;  [place] [facility] [place]  Service: Gastroenterology    TONSILLECTOMY      TONSILLECTOMY & ADENOIDECTOMY  1963         Family History Social History   Family History   Problem Relation Age of Onset    Hypertension Mother     Cancer Mother         gallbladder cancer     Myocardial Infarction Father     Hypertension Sister     Alzheimer Disease Sister     Heart Disease Sister     Cancer Brother     Brain Cancer Brother     Pacemaker Mother     Heart Disease Mother     Coronary Artery Disease Father     Heart Disease Father     Heart Failure Sister         Social History     Tobacco Use    Smoking status: Former     Current packs/day: 3.00     Average packs/day: 3.0 packs/day for 35.0 years (105.0 ttl pk-yrs)     Types: Cigarettes    Smokeless tobacco: Never    Tobacco comments:     quit 1968   Substance Use Topics    Alcohol use: Not Currently    Drug use: Never         Medications  Allergies     Current Facility-Administered Medications:     - MEDICATION INSTRUCTIONS -, , Does not apply, DOES NOT GO TO MAR, Gondal, Saad J, MD    aspirin EC tablet 81 mg, 81 mg, Oral, Daily, Gondal, Saad J, MD, 81 mg at 11/08/24 0916    calcium carbonate (TUMS) chewable tablet 1,000 mg, 1,000 mg, Oral, 4x Daily PRN, Gondal, Saad J, MD    clonazePAM (klonoPIN) tablet 0.5 mg, 0.5 mg, Oral, TID, Gondal, Saad J, MD, 0.5 mg at 11/08/24 2003    clopidogrel (PLAVIX) tablet 75 mg, 75 mg, Oral, Daily, Arsenio Last MD, 75 mg at 11/08/24 0916    Continuing ACE inhibitor/ARB/ARNI from home medication list OR ACE inhibitor/ARB/ARNI order already placed during this visit, , Does not apply, DOES NOT GO TO MAR, Gondal, Saad J, MD    Continuing beta blocker from home medication list OR beta blocker order already placed during this visit, , Does not apply, DOES NOT GO TO MAR, Gondal, Saad J, MD    famotidine (PEPCID) tablet 20 mg, 20 mg, Oral, Q48H, Junaid Daniel MD, 20 mg at 11/07/24 0856    heparin ANTICOAGULANT injection 5,000 Units, 5,000 Units, Subcutaneous, Q8H, Arsenio Last MD, 5,000 Units at 11/09/24 0038    hydrALAZINE (APRESOLINE) tablet 10 mg, 10 mg, Oral, Q4H PRN **OR** hydrALAZINE (APRESOLINE) injection 10 mg, 10 mg, Intravenous, Q4H PRN, Gondal, Saad J, MD    lidocaine (LMX4) cream, ,  Topical, Q1H PRN, Gondal, Saad J, MD    lidocaine 1 % 0.1-1 mL, 0.1-1 mL, Other, Q1H PRN, Gondal, Saad J, MD    [Held by provider] losartan (COZAAR) tablet 50 mg, 50 mg, Oral, BID, Arsenio Last MD, 50 mg at 11/07/24 0856    melatonin tablet 3 mg, 3 mg, Oral, At Bedtime PRN, Kyle Vitale MD    metoprolol succinate ER (TOPROL XL) 24 hr tablet 75 mg, 75 mg, Oral, At Bedtime, Gondal, Saad J, MD, 75 mg at 11/08/24 2003    naloxone (NARCAN) injection 0.2 mg, 0.2 mg, Intravenous, Q2 Min PRN **OR** naloxone (NARCAN) injection 0.4 mg, 0.4 mg, Intravenous, Q2 Min PRN **OR** naloxone (NARCAN) injection 0.2 mg, 0.2 mg, Intramuscular, Q2 Min PRN **OR** naloxone (NARCAN) injection 0.4 mg, 0.4 mg, Intramuscular, Q2 Min PRN, Juanid Daniel MD    oxyCODONE (ROXICODONE) tablet 5 mg, 5 mg, Oral, Q4H PRN, Gondal, Saad J, MD, 5 mg at 11/09/24 0037    oxyCODONE IR (ROXICODONE) half-tab 2.5 mg, 2.5 mg, Oral, Q4H PRN, Gondal, Saad J, MD, 2.5 mg at 11/08/24 1330    prochlorperazine (COMPAZINE) injection 5 mg, 5 mg, Intravenous, Q6H PRN **OR** prochlorperazine (COMPAZINE) tablet 5 mg, 5 mg, Oral, Q6H PRN **OR** prochlorperazine (COMPAZINE) suppository 12.5 mg, 12.5 mg, Rectal, Q12H PRN, Gondal, Saad J, MD    senna-docusate (SENOKOT-S/PERICOLACE) 8.6-50 MG per tablet 1 tablet, 1 tablet, Oral, BID PRN, 1 tablet at 11/08/24 1330 **OR** senna-docusate (SENOKOT-S/PERICOLACE) 8.6-50 MG per tablet 2 tablet, 2 tablet, Oral, BID PRN, Gondal, Saad J, MD    sodium chloride (PF) 0.9% PF flush 3 mL, 3 mL, Intracatheter, Q8H, Gondal, Saad J, MD, 3 mL at 11/09/24 0621    sodium chloride (PF) 0.9% PF flush 3 mL, 3 mL, Intracatheter, q1 min prn, Gondal, Saad J, MD     Allergies   Allergen Reactions    Buspirone Shortness Of Breath    Ciprofloxacin Hives and Shortness Of Breath     Other reaction(s): Unknown    Cortisone      Other reaction(s): Throat Swelling/Closing, Unknown  Reaction 9-5-94      Hydrocodone-Acetaminophen Shortness Of Breath      Other reaction(s): Unknown    Lansoprazole Shortness Of Breath    Metoprolol Shortness Of Breath     Tolerates metoprolol succinate at home    Nedocromil      Other reaction(s): Throat Swelling/Closing    Niacin Shortness Of Breath     Other reaction(s): Unknown    Albuterol Other (See Comments)     Inhaler had extreme effect on nervous system      Amlodipine      Other reaction(s): Erythema, Unknown    Ampicillin Unknown    Azithromycin      Other reaction(s): *Unknown, Unknown    Cefixime Diarrhea     Other reaction(s): Unknown  Has tolerated ceftriaxone    Cefprozil Nausea and Vomiting     Other reaction(s): Unknown  Has tolerated ceftriaxone    Cefuroxime      Other reaction(s): *Unknown  Has tolerated ceftriaxone    Cephalexin      Other reaction(s): *Unknown, Unknown  Has tolerated ceftriaxone    Contrast Dye      Other reaction(s): hives    Cyclobenzaprine      Other reaction(s): Sedation    Dextromethorphan-Guaifenesin Nausea     Other reaction(s): Headache    Diltiazem      Other reaction(s): Erythema, Unknown  Ears face arms and chest red and on fire-body tremors      Erythromycin      Other reaction(s): Unknown    Esomeprazole      Other reaction(s): Headache    Ezetimibe Unknown    Fenofibrate Muscle Pain (Myalgia)     Other reaction(s): Unknown    Fluticasone-Salmeterol Other (See Comments)     Elevated blood pressure      Methylprednisolone     Metronidazole     Monosodium Glutamate     Moxifloxacin      Other reaction(s): Dizziness    Nifedipine      Other reaction(s): Edema  Took for 5-93 to 9-94 red and swollen leg      Nsaids      Other reaction(s): Unknown    Ofloxacin      Other reaction(s): *Unknown    Ondansetron      Other reaction(s): Constipation    Parsley Seed     Penicillins Unknown     She doesn't remember other than it occurred as a very young woman     Piper     Pirbuterol Other (See Comments)     Immediate extreme effect on nervous system      Pravastatin      Other reaction(s):  "Dizziness, Unknown    Pseudoephedrine      Other reaction(s): headache,nausea    Shellfish-Derived Products      Per pt 8/12/23    Simvastatin Muscle Pain (Myalgia)    Spironolactone      Other reaction(s): stomach cramps, nausea    Sulfamethoxazole-Trimethoprim      Other reaction(s): Unknown    Tramadol      Other reaction(s): red ears,high blood press.    Tramadol-Acetaminophen      Other reaction(s): Tachycardia, Unknown    Triamterene Other (See Comments)     Greatly bothered with sun-took medication for three years  Greatly bothered with sun      Diatrizoate Rash    Telmisartan Palpitations          Lab Results    Chemistry CBC Cardiac Enzymes/BNP/TSH/INR   Recent Labs   Lab Test 11/08/24  1706 11/08/24  0756 11/08/24  0611   NA  --   --  128*   POTASSIUM  --   --  5.0   CHLORIDE  --   --  93*   CO2  --   --  26   *   < > 98   BUN  --   --  31.4*   CR  --   --  1.62*   GFRESTIMATED  --   --  30*   ISAIAH  --   --  9.1    < > = values in this interval not displayed.     Recent Labs   Lab Test 11/08/24  0611 11/07/24  0653 11/06/24  0411   CR 1.62* 1.44* 1.24*          Recent Labs   Lab Test 11/08/24  0611   WBC 8.9   HGB 9.8*   HCT 29.1*   MCV 89        Recent Labs   Lab Test 11/08/24  0611 11/07/24  0653 11/06/24  0411   HGB 9.8* 10.3* 10.3*    Recent Labs   Lab Test 09/04/22  1708 09/04/22  1352 07/26/18  1742   TROPONINI <0.01 <0.01 <0.01     Recent Labs   Lab Test 08/25/24  0652 11/07/23  1638 08/10/23  2205 07/20/23  1342 04/10/23  1132 11/16/19  1513   BNP  --   --   --   --   --  123   NTBNPI 7,336* 815 8,333*   < >  --   --    NTBNP  --   --   --   --  837  --     < > = values in this interval not displayed.     Recent Labs   Lab Test 06/14/23  1620   TSH 2.52     No results for input(s): \"INR\" in the last 81342 hours.         Shaheen Zaidi MD  11/9/2024  7:06 AM    "

## 2024-11-09 NOTE — PLAN OF CARE
Problem: Adult Inpatient Plan of Care  Goal: Plan of Care Review  Description: The Plan of Care Review/Shift note should be completed every shift.  The Outcome Evaluation is a brief statement about your assessment that the patient is improving, declining, or no change.  This information will be displayed automatically on your shift  note.  Outcome: Progressing  Flowsheets (Taken 11/9/2024 1115)  Plan of Care Reviewed With: patient   Goal Outcome Evaluation:      Plan of Care Reviewed With: patient      Patient reporting abdominal discomfort.  Denied need of interventions throughout the shift.  Patient encouraged to increase activity.  Up to bathroom and chair with walker, gait belt and assist of 1.  Tolerating moderately well.    Patient tolerating regular diet better today.

## 2024-11-10 VITALS
TEMPERATURE: 98.2 F | WEIGHT: 152.4 LBS | OXYGEN SATURATION: 94 % | RESPIRATION RATE: 16 BRPM | BODY MASS INDEX: 28.77 KG/M2 | HEIGHT: 61 IN | SYSTOLIC BLOOD PRESSURE: 169 MMHG | HEART RATE: 73 BPM | DIASTOLIC BLOOD PRESSURE: 73 MMHG

## 2024-11-10 PROBLEM — I21.A1 TYPE 2 MI (MYOCARDIAL INFARCTION) (H): Status: ACTIVE | Noted: 2024-11-10

## 2024-11-10 PROBLEM — I25.10 CAD (CORONARY ARTERY DISEASE): Status: ACTIVE | Noted: 2024-11-10

## 2024-11-10 LAB — GLUCOSE BLDC GLUCOMTR-MCNC: 97 MG/DL (ref 70–99)

## 2024-11-10 PROCEDURE — 250N000013 HC RX MED GY IP 250 OP 250 PS 637: Performed by: STUDENT IN AN ORGANIZED HEALTH CARE EDUCATION/TRAINING PROGRAM

## 2024-11-10 PROCEDURE — 99239 HOSP IP/OBS DSCHRG MGMT >30: CPT | Performed by: STUDENT IN AN ORGANIZED HEALTH CARE EDUCATION/TRAINING PROGRAM

## 2024-11-10 PROCEDURE — 250N000011 HC RX IP 250 OP 636: Performed by: STUDENT IN AN ORGANIZED HEALTH CARE EDUCATION/TRAINING PROGRAM

## 2024-11-10 RX ADMIN — OXYCODONE HYDROCHLORIDE 5 MG: 5 TABLET ORAL at 00:55

## 2024-11-10 RX ADMIN — HEPARIN SODIUM 5000 UNITS: 5000 INJECTION, SOLUTION INTRAVENOUS; SUBCUTANEOUS at 01:11

## 2024-11-10 RX ADMIN — CLOPIDOGREL BISULFATE 75 MG: 75 TABLET ORAL at 09:15

## 2024-11-10 RX ADMIN — PROCHLORPERAZINE EDISYLATE 5 MG: 5 INJECTION INTRAMUSCULAR; INTRAVENOUS at 01:37

## 2024-11-10 RX ADMIN — ASPIRIN 81 MG: 81 TABLET, COATED ORAL at 09:15

## 2024-11-10 RX ADMIN — HEPARIN SODIUM 5000 UNITS: 5000 INJECTION, SOLUTION INTRAVENOUS; SUBCUTANEOUS at 09:14

## 2024-11-10 RX ADMIN — CLONAZEPAM 0.5 MG: 0.5 TABLET ORAL at 09:14

## 2024-11-10 RX ADMIN — LOSARTAN POTASSIUM 50 MG: 50 TABLET, FILM COATED ORAL at 09:15

## 2024-11-10 RX ADMIN — CALCIUM CARBONATE (ANTACID) CHEW TAB 500 MG 1000 MG: 500 CHEW TAB at 01:36

## 2024-11-10 ASSESSMENT — ACTIVITIES OF DAILY LIVING (ADL)
ADLS_ACUITY_SCORE: 0

## 2024-11-10 NOTE — PLAN OF CARE
Goal Outcome Evaluation:       Patient called for abdominal pain rated 10/10, nausea, and heartburn at 0135. Prn Oxy, compazine, Tums (see MAR) was given and was effective. Continue to monitor  Vital signs:  Temp: 98  F (36.7  C) Temp src: Oral BP: 132/60 Pulse: 79   Resp: 19 SpO2: 95 % O2 Device: None (Room air)   and stable

## 2024-11-10 NOTE — DISCHARGE SUMMARY
"[facility]  Hospitalist Discharge Summary      Date of Admission:  11/6/2024  Date of Discharge:  11/10/2024  Discharging Provider: Arsenio Last MD  Discharge Service: Hospitalist Service    Discharge Diagnoses   Acute cholecystitis  Nausea, vomiting, abdominal pain  Acute hypoxic respiratory failure  Acute HFmrEF exacerbation  Type II myocardial infarction due to heart failure exacerbation  Acute kidney injury on CKD stage III  Hypertension  Acute on chronic hyponatremia    Clinically Significant Risk Factors     # Overweight: Estimated body mass index is 28.8 kg/m  as calculated from the following:    Height as of this encounter: 1.549 m (5' 1\").    Weight as of this encounter: 69.1 kg (152 lb 6.4 oz).       Follow-ups Needed After Discharge   Follow-up Appointments       Follow Up      General surgery wants you to follow-up in 2 to 3 weeks for postsurgical evaluation.  You should also see your primary care provider in 1-2 weeks for posthospitalization visit        Hospital Follow-up with Existing Primary Care Provider (PCP)      Please see details below         Schedule Primary Care visit within: 7 Days               Discharge Disposition   Discharged to home  Condition at discharge: Stable    Hospital Course   Kerry Hoffmann is a 89 year old female with a past medical history of CKD, CAD, HTN who was admitted on 11/6/24 after presenting to [facility] ED for acute cholecystitis.    In the ED, CT chest abdomen pelvis showed Interval development of gallbladder distention, diffuse mural edema, layering calcific stone material and trace pericholecystic edema.  Ultrasound of the abdomen showed Cholelithiasis with mild gallbladder wall thickening. General Surgery consulted on admission and patient Underwent laparoscopic cholecystectomy on 11/6/24.    Hospital stay complicated by new oxygen requirement thought to be from acute heart failure exacerbation. Cardiology consulted and managing diuresis " - transitioned back to home Lasix p.o. 20 mg Monday/Thursday on 11/9.  Patient stabilized with minimal abdominal pain and stable for discharge on 11/10.  PT/OT saw the patient prior to discharge and recommended TCU however patient declined TCU and declined home health care prior to discharge.  Patient discharged with tramadol as needed to take for additional pain.  Needs to follow-up with general surgery in 2-3 weeks, I placed referral order on discharge.    #Acute Cholecystitis  #Nausea, Vomiting, Abdominal pain 2/2 Cholecystitis  - CT C/A/P in ED showed Interval development of gallbladder distention, diffuse mural edema, layering calcific stone material and trace pericholecystic edema  - US RUQ in ED showing cholelithiasis with gallbladder wall thickening and gallbladder distension  - Underwent laparoscopic cholecystectomy on 11/6/24  Plan  - Regular Diet  - Tramadol on discharge per surgery  - PT/OT recommending TCU - patient declining TCU or Home Health      #Acute Hypoxic Respiratory Failure  #Acute HFmrEF Exacerbation  #Elevated troponin 2/2 heart failure exacerbation  - Patient not on home oxygen, requiring 2L following surgery.  - Home medications for heart failure include Lasix 20mg 2x/week (Mon/Thurs), metoprolol 75mg daily, and losartan 50mg BID  - Troponin in ED was 19 with repeat 17.  - CT Chest in ED showing Mild interstitial pulmonary edema.  - Echo TTE 11/7/24 showing EF 55-60%, no wall motion abnormalities.  Plan  - Continue supplemental oxygen as needed.  - Cardiology consulted - appreciate recommendations  - Resume home Lasix 20 mg Monday/Thursday  - Monitor on telemetry   - continue home losartan  - Continue home metoprolol    #Acute Kidney Injury on CKD stage III - Resolved  - Baseline creatinine around 1.2  - Creatinine elevated to 1.62 on 11/8. Improved to 1.29 on 11/9.  - Likely related to over-diuresis (increased diuretics with HF exacerbation)  Plan  - Restarting home losartan 50 mg twice  daily on 11/9  - Monitor renal function closely    #History of CAD  - Home medications include aspirin 81 mg daily, Plavix 75 mg daily.  -Echo TTE 11/7/2024 with EF 55 to 60%.  No wall motion abnormalities.  Plan  - Cardiology following as above  - Continue home aspirin and Plavix    #Hypertension  -Home medications include losartan 50 mg twice daily and metoprolol 75 mg daily.  Plan  - Resume home Losartan 11/9  - Continue home metoprolol   - Mild to moderate bilateral renal artery stenosis on imaging, may require outpatient vascular follow-up postdischarge - defer to PCP    #Acute on Chronic Hyponatremia - Improved  - Sodium level 127 in the ED  - Suspecting most likely in setting of fluid overload.  - Sodium appears to be around patient's baseline and chronically low   Plan  - Monitor sodium level closely    #Mild normocytic anemia  Monitor CBC,    #Contrast infiltration  Infiltration of iodinated contrast material in the left upper arm related to this morning's earlier attempt was seen on imaging. ER staff notified and contrast infiltration protocol initiated as per radiology    #History of mood disorder  Continue with Home Klonopin    Consultations This Hospital Stay   CORE CLINIC EVALUATION IP CONSULT  OCCUPATIONAL THERAPY ADULT IP CONSULT  CARDIOLOGY IP CONSULT  CARE MANAGEMENT / SOCIAL WORK IP CONSULT  CARE MANAGEMENT / SOCIAL WORK IP CONSULT  PHYSICAL THERAPY ADULT IP CONSULT    Code Status   Full Code    Time Spent on this Encounter   I, Arsenio Last MD, personally saw the patient today and spent greater than 30 minutes discharging this patient.       Arsenio Last MD  [address] [address]  Phone: [phone]  Fax: [phone]  ______________________________________________________________________    Physical Exam   Vital Signs: Temp: 98.2  F (36.8  C) Temp src: Oral BP: (!) 169/73 Pulse: 73   Resp: 16 SpO2: 94 % O2 Device: None (Room  air)    Weight: 152 lbs 6.4 oz    General: Alert and Oriented x3. Answers questions appropriately. Follows commands.  Eyes:EOMI. PERRL. Normal Conjunctivae.  HENT: Normocephalic. Atraumatic.  Resp: Breath sounds equal throughout. No crackles. No wheezing.  Cardio: Regular rate and rhythm. No murmurs. No friction rub.  Abd: Soft. Non-distended.  Mild tenderness to palpation diffusely across abdomen.  Bowel sounds normal. No rebound tenderness.  MSK: No areas of ecchymosis or erythema.  Neuro: CN 2-12 grossly intact. Strength 5/5 in all 4 extremities.  Skin:No rashes. No jaundice.       Primary Care Physician   Kaylyn Bolanos    Discharge Orders      Primary Care - Care Coordination Referral      Home Care Referral      Adult Gen Surg  Referral      When to call - Contact Surgeon Team    Pain is not controlled by pain medicine or suddenly increases  You develop a fever greater than 101  Foul smell or drainage from your surgery sites  A large amount of bleeding that continues despite moderate pressure  Unable to pass urine within 8-10 hours of surgery     When to call - Reach out to Urgent Care    If you are experiencing uncontrolled Nausea and Vomiting, uncontrolled pain, inability to urinate and uncomfortable, and in need of immediate care, and you are NOT able to reach your Surgeon Team, go to an Urgent Care clinic.     When to call - Reasons to Call 911    Call 911 immediately if you experience sudden-onset chest pain, arm weakness/numbness, slurred speech, or shortness of breath     Symptoms - Fever Management    A low grade fever (<101 F) can be expected after surgery.     Symptoms - Reduced Urine Output    If it has been greater than 8 hours since you have urinated despite drinking plenty of water, call your Surgeon Team.     Weight bearing status - As tolerated     No driving or operating machinery    Do NOT drive any vehicle or operate mechanical equipment for 24 hours following the end of your  surgery.  Even though you may feel normal, your reactions may be affected by Anesthesia medication you received.  Following 24 hours, you may drive as long as you are not taking narcotic medications, and can move quickly without limitations on movement due to pain.      Under no circumstances does your surgery permit you to not wear a seatbelt.     No Alcohol    Do NOT drink alcoholic beverages for 24 hours following your surgery and while taking pain medications.     Diet Instructions    Regular diet. Patients can have difficulty with constipation following surgery, due in part to the administration of narcotic medications.  If you are suffering with constipation, you should avoid foods such as hard cheeses or red meat.  Foods high in fiber are recommended.     Shower/Bathing - Restrictions: Let water run over incisions and pat dry. No tub baths until bleeding stops.    Shower/Bathing - Restrictions: You may shower 24 hours after your operation. Let water run over incisions and pat dry. No tub baths until bleeding stops. Do NOT soak in any body of water (lake, pool, bath, etc.) for 7-10 days postoperatively.     Dressing / Wound Care - Wound    You may remove your Band-aids after a period of 48 hours.  The small white strips on the incisions act like artificial scabs, and will begin to peel at the edges at around 7-10 days.  These can then be removed.     Discharge Instructions - Comfort and Pain Management    Pain after surgery is normal and expected. You will have some amount of pain after surgery. Your pain will improve with time. There are several things you can do to help reduce your pain including: rest, ice, and using pain medications as needed. Use pain interventions and don't wait until pain level is out of control. Contact your Surgeon Team if you have pain that persists or worsens after surgery despite rest, ice, and taking your medication(s) as prescribed. You may have a dry mouth, a sore throat,  muscles aches or trouble sleeping, and these symptoms should go away after 24 hours.     Discharge Instruction - Comfort and Pain Management    You may take Tylenol and Ibuprofen in their recommended over the counter strength and frequency for mild to moderate pain. Read the labels on your Over the Counter (OTC) medications carefully and with care.     Discharge Instructions - Rest    You should continue to be active at home, including ambulating frequently.  If possible try to limit the amount of time spent in bed.     Discharge Instructions - Follow-up Appointment (Weeks)    Follow up: It is our practice to have all patients follow up with us 2-3 weeks after their surgery to ensure they are recovering well.  For straightforward laparoscopic procedures, this can be done either in clinic as a scheduled follow up appointment, or over the phone.  If you would like a scheduled follow up appointment in clinic, please call us at [phone] to schedule an appointment at your convenience.  If you would prefer to follow up with us by phone please let us know so that we may contact you 2-3 weeks following your procedure.     Return to normal activity as tolerated    Return to normal activity as tolerated.  If an activity hurts, don't do it.  If it doesn't hurt, proceed cautiously for the first two weeks.     Reason for your hospital stay    You were hospitalized for acute inflammation of your gallbladder otherwise known as cholecystitis.  General surgery team saw you in the hospital and you underwent gallbladder removal.  You had some shortness of breath following procedure and briefly were managed with supplemental oxygen which resolved.  Cardiology saw you in the hospital and thought shortness of breath was likely related to an acute exacerbation of your underlying heart failure.  They did not make any changes to your home Lasix regimen.  General surgery sent you a prescription for tramadol to your pharmacy to take if  pain persist.     Activity    Your activity upon discharge: activity as tolerated     Follow Up    General surgery wants you to follow-up in 2 to 3 weeks for postsurgical evaluation.  You should also see your primary care provider in 1-2 weeks for posthospitalization visit     Diet    Follow this diet upon discharge: Current Diet:Orders Placed This Encounter      Fluid restriction 1800 ML FLUID      Combination Diet 2 gm NA Diet; No Caffeine Diet     Hospital Follow-up with Existing Primary Care Provider (PCP)    Please see details below            Significant Results and Procedures   Results for orders placed or performed during the hospital encounter of 11/06/24   CTA Chest Abdomen Pelvis w Contrast    Narrative    EXAM: CTA CHEST ABDOMEN PELVIS W CONTRAST  LOCATION: [facility]  DATE: 11/6/2024    INDICATION: abd and chest pain radiating to back  COMPARISON: 8/26/2024 CXR, 11/7/2023 CT AP and 4/27/2009 CT chest  TECHNIQUE: CT angiogram chest abdomen pelvis during arterial phase of injection of IV contrast as well as noncontrast imaging of the chest.  Multiplanar reformats and MIP reconstructions were performed. Dose reduction techniques were used.   CONTRAST: IsoVue 370 75mL    FINDINGS:  CTA CHEST, ABDOMEN and PELVIS: Normal caliber thoracic and abdominal aorta with no acute aortic syndrome. Normal caliber pulmonary arteries with no pulmonary emboli. Mild to moderate bilateral renal artery stenosis. Brachiocephalic, mesenteric, renal and   iliofemoral arteries are otherwise unremarkable.    LUNGS AND PLEURA: Mild interstitial pulmonary edema. Lungs are clear. No pleural effusion.    MEDIASTINUM/AXILLAE: No lymphadenopathy. Mild biatrial cardiac enlargement. No pericardial effusion. Small esophageal hiatal hernia    CORONARY ARTERY CALCIFICATION: Calcified atheromatous plaque left main and proximal LAD and circumflex coronary arteries.    HEPATOBILIARY: Liver is normal.  Interval  development of gallbladder distention, diffuse mural edema, layering calcific stone material and trace pericholecystic edema. No  bile duct dilatation.     PANCREAS: Normal.    SPLEEN: Spleen size normal.    ADRENAL GLANDS: Normal.    KIDNEY/BLADDER: Several benign renal cysts. No follow up is needed. Kidneys, ureters and bladder are otherwise normal.    BOWEL: Normal appendix. Colonic diverticulosis. Bowel is otherwise normal with no obstruction or inflammatory change.    LYMPH NODES: No lymphadenopathy.    PELVIC ORGANS: No pelvic mass or fluid.    MUSCULOSKELETAL: Iodinated contrast material in the soft tissue throughout the left upper arm from today's earlier attempt. Advanced multilevel degenerative disc disease and degenerative facet arthritis lumbar spine.      Impression    IMPRESSION:   1.  Interval development of gallbladder distention, diffuse mural edema, layering calcific stone material and trace pericholecystic edema. Findings would be compatible with acute cholecystitis in the proper clinical setting.  2.  No acute aortic syndrome. No pulmonary emboli.  3.  Mild biatrial cardiac enlargement and mild interstitial pulmonary edema.  4.  Mild to moderate bilateral renal artery stenosis.   5.  Infiltration of iodinated contrast material in the left upper arm related to this morning's earlier attempt. ER staff notified and contrast infiltration protocol initiated.   US Abdomen Limited    Narrative    EXAM: US ABDOMEN LIMITED  LOCATION: [facility]  DATE: 11/6/2024    INDICATION: Abdominal pain.  COMPARISON: Same-day CT.  TECHNIQUE: Limited abdominal ultrasound.    FINDINGS:    GALLBLADDER: Distended gallbladder. Cholelithiasis. Wall thickness up to 3-4 mm. Negative ultrasonic Kahn's sign.    BILE DUCTS: No biliary dilatation. The common duct measures 6 mm.    LIVER: Normal parenchyma with smooth contour. No focal mass.    RIGHT KIDNEY: No hydronephrosis.    PANCREAS: The visualized  portions are normal.    Minimal ascites.      Impression    IMPRESSION:    1.  Cholelithiasis with mild gallbladder wall thickening. Minimal right upper quadrant ascites. Acute cholecystitis possible. Gallbladder is distended.    2.  No significant biliary dilatation.     Echocardiogram Complete     Value    LVEF  55-60%    [facility]    [id number]  [id number]  [id number]  [id number]^GONDAL^SUDHIR^[place] [address]     Name: DEVON REESE  MRN: [id number]  : 1935  Study Date: 2024 10:16 AM  Age: 89 yrs  Gender: Female  Patient Location: [facility]  Reason For Study: Heart Failure  Ordering Physician: GONDAL, SAAD J  Performed By: LAURITA     BSA: 1.7 m2  Height: 61 in  Weight: 150 lb  HR: 60  BP: 157/67 mmHg  ______________________________________________________________________________  Procedure  Complete Portable Echo Adult.  ______________________________________________________________________________  Interpretation Summary     Left ventricular size, wall motion and function are normal. The ejection  fraction is 55-60%.  The left atrium is mild to moderately dilated.  There is mild to moderate (1-2+) aortic regurgitation.  The aortic valve is trileaflet.  Normal size ascending aorta.  Normal right ventricle size and systolic function.  ______________________________________________________________________________  Left Ventricle  Left ventricular size, wall motion and function are normal. The ejection  fraction is 55-60%. There is normal left ventricular wall thickness. Left  ventricular diastolic function is abnormal. No regional wall motion  abnormalities noted.     Right Ventricle  Normal right ventricle size and systolic function.     Atria  The left atrium is mild to moderately dilated. Right atrial size is normal.  There is no color Doppler evidence of an atrial shunt.     Mitral Valve  Mitral valve leaflets appear normal. There is no evidence of mitral  stenosis  or clinically significant mitral regurgitation. There is mild (1+) mitral  regurgitation.     Tricuspid Valve  Tricuspid valve leaflets appear normal. There is mild (1+) tricuspid  regurgitation. Right ventricular systolic pressure could not be approximated  due to inadequate tricuspid regurgitation.     Aortic Valve  The aortic valve is trileaflet. There is mild to moderate (1-2+) aortic  regurgitation. No hemodynamically significant valvular aortic stenosis.     Pulmonic Valve  The pulmonic valve is not well seen, but is grossly normal. This degree of  valvular regurgitation is within normal limits.     Vessels  The aorta root is normal. Normal size ascending aorta. IVC diameter and  respiratory changes fall into an intermediate range suggesting an RA pressure  of 8 mmHg.     Pericardium  There is no pericardial effusion.     ______________________________________________________________________________  MMode/2D Measurements & Calculations  IVSd: 1.1 cm  LVIDd: 5.2 cm  LVIDs: 3.7 cm  LVPWd: 1.2 cm  FS: 28.1 %     LV mass(C)d: 241.3 grams  LV mass(C)dI: 144.3 grams/m2  Ao root diam: 3.2 cm  asc Aorta Diam: 3.5 cm  LVOT diam: 2.1 cm  LVOT area: 3.6 cm2  Ao root diam index Ht(cm/m): 2.1  Ao root diam index BSA (cm/m2): 1.9  Asc Ao diam index BSA (cm/m2): 2.1  Asc Ao diam index Ht(cm/m): 2.3  EF Biplane: 61.7 %  LA Volume (BP): 59.7 ml     LA Volume Index (BP): 35.7 ml/m2  LA Volume Indexed (AL/bp): 38.4 ml/m2  RWT: 0.47  TAPSE: 2.1 cm     Time Measurements  MM HR: 69.0 BPM     Doppler Measurements & Calculations  MV E max mary anne: 106.0 cm/sec  MV A max mary anne: 124.2 cm/sec  MV E/A: 0.85  MV dec slope: 441.6 cm/sec2  MV dec time: 0.24 sec  Ao V2 max: 170.1 cm/sec  Ao max P.0 mmHg  Ao V2 mean: 112.7 cm/sec  Ao mean P.9 mmHg  Ao V2 VTI: 42.5 cm  AGA(I,D): 2.5 cm2  AGA(V,D): 2.4 cm2  AI P1/2t: 382.7 msec  LV V1 max P.3 mmHg  LV V1 max: 115.3 cm/sec  LV V1 VTI: 29.5 cm  SV(LVOT): 105.5 ml  SI(LVOT): 63.1  ml/m2  PA acc time: 0.09 sec  Pulm Sys Ben: 58.7 cm/sec  Pulm Giraldo Ben: 47.6 cm/sec  Pulm A Revs Ben: 24.9 cm/sec  Pulm S/D: 1.2  AV Ben Ratio (DI): 0.68  AGA Index (cm2/m2): 1.5  E/E': 28.6  E/E' av.3  Lateral E/e': 20.1  Medial E/e': 28.4  Peak E' Ben: 3.7 cm/sec  RV S Ben: 13.5 cm/sec     ______________________________________________________________________________  Report approved by: Pavel Palmer 2024 11:39 AM             Discharge Medications   Current Discharge Medication List        CONTINUE these medications which have NOT CHANGED    Details   acetaminophen (TYLENOL) 500 MG tablet Take 1,000 mg by mouth 3 times daily      aspirin 81 MG EC tablet Take 81 mg by mouth daily      Calcium Carbonate Antacid (TUMS ULTRA 1000 PO) Take 1 chew tab by mouth nightly as needed      Cholecalciferol (VITAMIN D3) 50 MCG (2000 UT) CAPS Take 1 tablet by mouth daily      clonazePAM (KLONOPIN) 0.5 MG tablet Take 0.5 mg by mouth 3 times daily      clopidogrel (PLAVIX) 75 MG tablet TAKE 1 TABLET(75 MG) BY MOUTH DAILY  Qty: 90 tablet, Refills: 1    Associated Diagnoses: Intracranial atherosclerosis      famotidine (PEPCID) 10 MG tablet Take 20 mg by mouth daily.      furosemide (LASIX) 20 MG tablet Take 1 tablet by mouth twice a week. Monday and Thursday      losartan (COZAAR) 50 MG tablet Take 50 mg by mouth 2 times daily      metoprolol succinate ER (TOPROL XL) 25 MG 24 hr tablet Take 75 mg by mouth at bedtime.           STOP taking these medications       traMADol (ULTRAM) 50 MG tablet Comments:   Reason for Stopping:             Allergies   Allergies   Allergen Reactions    Buspirone Shortness Of Breath    Ciprofloxacin Hives and Shortness Of Breath     Other reaction(s): Unknown    Cortisone      Other reaction(s): Throat Swelling/Closing, Unknown  Reaction 94      Hydrocodone-Acetaminophen Shortness Of Breath     Other reaction(s): Unknown    Lansoprazole Shortness Of Breath    Metoprolol Shortness  Of Breath     Tolerates metoprolol succinate at home    Nedocromil      Other reaction(s): Throat Swelling/Closing    Niacin Shortness Of Breath     Other reaction(s): Unknown    Albuterol Other (See Comments)     Inhaler had extreme effect on nervous system      Amlodipine      Other reaction(s): Erythema, Unknown    Ampicillin Unknown    Azithromycin      Other reaction(s): *Unknown, Unknown    Cefixime Diarrhea     Other reaction(s): Unknown  Has tolerated ceftriaxone    Cefprozil Nausea and Vomiting     Other reaction(s): Unknown  Has tolerated ceftriaxone    Cefuroxime      Other reaction(s): *Unknown  Has tolerated ceftriaxone    Cephalexin      Other reaction(s): *Unknown, Unknown  Has tolerated ceftriaxone    Contrast Dye      Other reaction(s): hives    Cyclobenzaprine      Other reaction(s): Sedation    Dextromethorphan-Guaifenesin Nausea     Other reaction(s): Headache    Diltiazem      Other reaction(s): Erythema, Unknown  Ears face arms and chest red and on fire-body tremors      Erythromycin      Other reaction(s): Unknown    Esomeprazole      Other reaction(s): Headache    Ezetimibe Unknown    Fenofibrate Muscle Pain (Myalgia)     Other reaction(s): Unknown    Fluticasone-Salmeterol Other (See Comments)     Elevated blood pressure      Methylprednisolone     Metronidazole     Monosodium Glutamate     Moxifloxacin      Other reaction(s): Dizziness    Nifedipine      Other reaction(s): Edema  Took for 5-93 to 9-94 red and swollen leg      Nsaids      Other reaction(s): Unknown    Ofloxacin      Other reaction(s): *Unknown    Ondansetron      Other reaction(s): Constipation    Parsley Seed     Penicillins Unknown     She doesn't remember other than it occurred as a very young woman     Piper     Pirbuterol Other (See Comments)     Immediate extreme effect on nervous system      Pravastatin      Other reaction(s): Dizziness, Unknown    Pseudoephedrine      Other reaction(s): headache,nausea     Shellfish-Derived Products      Per pt 8/12/23    Simvastatin Muscle Pain (Myalgia)    Spironolactone      Other reaction(s): stomach cramps, nausea    Sulfamethoxazole-Trimethoprim      Other reaction(s): Unknown    Tramadol      Other reaction(s): red ears,high blood press.    Tramadol-Acetaminophen      Other reaction(s): Tachycardia, Unknown    Triamterene Other (See Comments)     Greatly bothered with sun-took medication for three years  Greatly bothered with sun      Diatrizoate Rash    Telmisartan Palpitations

## 2024-11-10 NOTE — PLAN OF CARE
Problem: Adult Inpatient Plan of Care  Goal: Plan of Care Review  Description: The Plan of Care Review/Shift note should be completed every shift.  The Outcome Evaluation is a brief statement about your assessment that the patient is improving, declining, or no change.  This information will be displayed automatically on your shift  note.  Outcome: Progressing  Flowsheets (Taken 11/10/2024 8832)  Plan of Care Reviewed With: patient   Goal Outcome Evaluation:      Plan of Care Reviewed With: patient         Patient reporting abdominal pain 4-6/10.  Patient denied need of further interventions.    Tolerating po intake well without nausea this am.      Up with assist of 1.  Patient reporting generalized fatigue.  Gait steady when up.

## 2024-11-10 NOTE — PLAN OF CARE
"  Problem: Adult Inpatient Plan of Care  Goal: Patient-Specific Goal (Individualized)  Description: You can add care plan individualizations to a care plan. Examples of Individualization might be:  \"Parent requests to be called daily at 9am for status\", \"I have a hard time hearing out of my right ear\", or \"Do not touch me to wake me up as it startles  me\".  Outcome: Progressing     Problem: Adult Inpatient Plan of Care  Goal: Plan of Care Review  Description: The Plan of Care Review/Shift note should be completed every shift.  The Outcome Evaluation is a brief statement about your assessment that the patient is improving, declining, or no change.  This information will be displayed automatically on your shift  note.  Outcome: Progressing  Flowsheets (Taken 11/9/2024 1958)  Plan of Care Reviewed With: patient  Overall Patient Progress: improving     Problem: Adult Inpatient Plan of Care  Goal: Optimal Comfort and Wellbeing  Outcome: Progressing   Goal Outcome Evaluation:Patient is alert and oriented, independent but assist of one with walker to the bathroom.Made comfortable in bed with needed assessment done, due medications administered. Reassurance given to allay anxiety.      Plan of Care Reviewed With: patient    Overall Patient Progress: improvingOverall Patient Progress: improving           "

## 2024-11-10 NOTE — PLAN OF CARE
Physical Therapy Discharge Summary    Reason for therapy discharge:    Discharged to home with home therapy.    Progress towards therapy goal(s). See goals on Care Plan in [facility] electronic health record for goal details.  Goals not met.  Barriers to achieving goals:   discharge from facility.    Therapy recommendation(s):    Continued therapy is recommended.  Rationale/Recommendations:  TCU was recommended.

## 2024-11-10 NOTE — PROGRESS NOTES
Care Management Discharge Note    Discharge Date: 11/10/2024       Discharge Disposition: Home, Home Care    Discharge Services: Home Care    Discharge Transportation: family or friend will provide    Private pay costs discussed: Not applicable    Does the patient's insurance plan have a 3 day qualifying hospital stay waiver?  Yes     Which insurance plan 3 day waiver is available? [employer]    Will the waiver be used for post-acute placement? No    Patient/family educated on Medicare website which has current facility and service quality ratings: yes    Education Provided on the Discharge Plan: Yes    Persons Notified of Discharge Plans: Patient - Kerry    Patient/Family in Agreement with the Plan: yes    Handoff Referral Completed: No, handoff not indicated or clinically appropriate    Additional Information:  RNCM met with patient yesterday to finalize discharge plan. Patient stated, she does not want to go to a TCU. She lives in an independent senior building 1-2 blocks away and wants to go home. RNCM discussed home care services with patient. She is willing to accept PT/OT through home care services. Patient will contact her niece and nephew for a ride home around 1000.    Sadaf Chirinos RN    1150 RNCM met with pt to finalize discharge plan. Patient is no longer interested in any type of home care services. RNCM sent notice to accepting HC agency, Interim to c/s service.

## 2024-11-10 NOTE — PLAN OF CARE
Occupational Therapy Discharge Summary    Reason for therapy discharge:    Discharged to home with home therapy.    Progress towards therapy goal(s). See goals on Care Plan in [facility] electronic health record for goal details.  Goals not met.  Barriers to achieving goals:   discharge from facility.    Therapy recommendation(s):    Continued therapy is recommended.  Rationale/Recommendations:  OT recommended TCU, pt is returning to [facility] w/ Home Care.

## 2024-11-10 NOTE — DISCHARGE INSTRUCTIONS
You were hospitalized for acute inflammation of your gallbladder otherwise known as cholecystitis.  General surgery team saw you in the hospital and you underwent gallbladder removal.  You had some shortness of breath following procedure and briefly were managed with supplemental oxygen which resolved.  Cardiology saw you in the hospital and thought shortness of breath was likely related to an acute exacerbation of your underlying heart failure.  They did not make any changes to your home Lasix regimen.  General surgery sent you a prescription for tramadol to your pharmacy to take if pain persist.  General surgery wants you to follow-up in 2 to 3 weeks for postsurgical evaluation.  You should also see your primary care provider in 1-2 weeks for posthospitalization visit

## 2024-11-10 NOTE — PLAN OF CARE
RN discussed discharge instructions with patient.  AVS provided. Belongings returned to patient.  Family to transport to assisted living.  Patient verbalized understanding and voiced no concerns at this time.  Waiting patient's family to let us know time.

## 2024-11-11 ENCOUNTER — TELEPHONE (OUTPATIENT)
Dept: SURGERY | Facility: CLINIC | Age: 89
End: 2024-11-11
Payer: MEDICARE

## 2024-11-11 ENCOUNTER — TRANSCRIBE ORDERS (OUTPATIENT)
Dept: OTHER | Age: 89
End: 2024-11-11

## 2024-11-11 DIAGNOSIS — K81.0 ACUTE CHOLECYSTITIS: Primary | ICD-10-CM

## 2024-11-11 NOTE — TELEPHONE ENCOUNTER
Called to ask clarifying question on discharge medications. Was prescribed tramadol at discharge but stated that she had previously had a poor reaction to this medication, allergy listed in her chart. Patient stated that she had not take this medication yet and that she was not planning on picking it up from the pharmacy. Assured her that it was okay not to take with past sensitivity to it and asked if pain was managed with tylenol. She stated that it was but was still experiencing some soreness/pain but assured her that it was expected to still be experiencing some pain as long as it was manageable. Advised to call back with any additional questions.    Emily Olson RN  [facility]  General Surgery  [facility] [address]  [email or web address]  [email or web address]  Office:[phone]  Employed by [facility]

## 2024-11-11 NOTE — TELEPHONE ENCOUNTER
[facility] Post-Op Phone Call                     Surgeon: Junaid Daniel    Date of Surgery: 11/6/24  Surgery: Laparoscopic Cholecystectomy  Discharge Date: 11/10/24    Date/Time Called:   Date: 11/11/2024 Time: 10:50 AM   Attempt: First    Attempted to contact patient to check on them post-operatively. Left a voicemail to call back with any questions or concerns.      Thank you for your time. Please do not hesitate to call us with any questions or concerns.    Call completed by: Emily Olson RN

## 2024-11-14 ENCOUNTER — TRANSFERRED RECORDS (OUTPATIENT)
Dept: HEALTH INFORMATION MANAGEMENT | Facility: CLINIC | Age: 89
End: 2024-11-14
Payer: MEDICARE

## 2024-11-27 ENCOUNTER — TELEPHONE (OUTPATIENT)
Dept: CARDIOLOGY | Facility: CLINIC | Age: 89
End: 2024-11-27
Payer: MEDICARE

## 2024-11-27 NOTE — TELEPHONE ENCOUNTER
GREGORIA Gonzalez. Pt sched to see Dr. Bautista next available on 1/6/24. She also checked her BP upon returning home yesterday and it was improved to 147/57.    ----------------------------------------------------    Noted. Spoke w/ nurse Mallory at [facility] to discuss follow up appt w/ Dr. Bautista. Pt saw Dr. Bautista in October and was recommended to follow up in 6 months. Reviewed Dr. Gonzalez's recommendation for pt to be seen sooner given renal dysfunction after being seen in clinic yesterday.    Mallory noted that next available appt Dr. Bautista has is 1/6/24. She booked that appt for pt to hold the spot. I confirmed the appt w/ pt and she is agreeable to that.     Mallory requested I fax Dr. Gonzalez's note and recent labs for their reference. Will do this once note is signed. She will also fax note from Dr. Bautista's visit in October, per chart review we do not have that yet.     JUAN Van RN

## 2024-11-27 NOTE — TELEPHONE ENCOUNTER
----- Message from Jimmy Gonzalez sent at 11/26/2024  3:22 PM CST -----  Regarding: Follow-up wut Dr. Bautista in nephrlogy clinic  Can we get Kerry scheduled to see Dr. Bautista re: renal dysfunction.    Thanks;  ~ Edita

## 2024-12-18 ENCOUNTER — TRANSFERRED RECORDS (OUTPATIENT)
Dept: HEALTH INFORMATION MANAGEMENT | Facility: CLINIC | Age: 89
End: 2024-12-18

## 2024-12-18 ENCOUNTER — LAB REQUISITION (OUTPATIENT)
Dept: LAB | Facility: CLINIC | Age: 89
End: 2024-12-18
Payer: MEDICARE

## 2024-12-18 DIAGNOSIS — N18.32 CHRONIC KIDNEY DISEASE, STAGE 3B (H): ICD-10-CM

## 2024-12-18 PROCEDURE — 80053 COMPREHEN METABOLIC PANEL: CPT | Mod: ORL | Performed by: FAMILY MEDICINE

## 2024-12-19 LAB
ALBUMIN SERPL BCG-MCNC: 3.8 G/DL (ref 3.5–5.2)
ALP SERPL-CCNC: 76 U/L (ref 40–150)
ALT SERPL W P-5'-P-CCNC: <5 U/L (ref 0–50)
ANION GAP SERPL CALCULATED.3IONS-SCNC: 15 MMOL/L (ref 7–15)
AST SERPL W P-5'-P-CCNC: 26 U/L (ref 0–45)
BILIRUB SERPL-MCNC: 0.2 MG/DL
BUN SERPL-MCNC: 32.2 MG/DL (ref 8–23)
CALCIUM SERPL-MCNC: 9.2 MG/DL (ref 8.8–10.4)
CHLORIDE SERPL-SCNC: 103 MMOL/L (ref 98–107)
CREAT SERPL-MCNC: 1.36 MG/DL (ref 0.51–0.95)
EGFRCR SERPLBLD CKD-EPI 2021: 38 ML/MIN/1.73M2
GLUCOSE SERPL-MCNC: 83 MG/DL (ref 70–99)
HCO3 SERPL-SCNC: 18 MMOL/L (ref 22–29)
POTASSIUM SERPL-SCNC: 4.9 MMOL/L (ref 3.4–5.3)
PROT SERPL-MCNC: 6.9 G/DL (ref 6.4–8.3)
SODIUM SERPL-SCNC: 136 MMOL/L (ref 135–145)

## 2024-12-22 ENCOUNTER — HOSPITAL ENCOUNTER (EMERGENCY)
Facility: HOSPITAL | Age: 89
Discharge: HOME | End: 2024-12-22
Attending: EMERGENCY MEDICINE
Payer: MEDICARE

## 2024-12-22 ENCOUNTER — APPOINTMENT (OUTPATIENT)
Dept: RADIOLOGY | Facility: HOSPITAL | Age: 89
End: 2024-12-22
Attending: EMERGENCY MEDICINE
Payer: MEDICARE

## 2024-12-22 VITALS
RESPIRATION RATE: 27 BRPM | DIASTOLIC BLOOD PRESSURE: 80 MMHG | OXYGEN SATURATION: 95 % | SYSTOLIC BLOOD PRESSURE: 178 MMHG | HEART RATE: 81 BPM | TEMPERATURE: 99.5 F

## 2024-12-22 DIAGNOSIS — U07.1 COVID-19 VIRUS INFECTION: ICD-10-CM

## 2024-12-22 LAB
ANION GAP SERPL CALCULATED.3IONS-SCNC: 11 MMOL/L (ref 7–15)
BASOPHILS # BLD AUTO: 0 10E3/UL (ref 0–0.2)
BASOPHILS NFR BLD AUTO: 0 %
BUN SERPL-MCNC: 20.7 MG/DL (ref 8–23)
CALCIUM SERPL-MCNC: 9.5 MG/DL (ref 8.8–10.4)
CHLORIDE SERPL-SCNC: 100 MMOL/L (ref 98–107)
CREAT SERPL-MCNC: 1.23 MG/DL (ref 0.51–0.95)
EGFRCR SERPLBLD CKD-EPI 2021: 42 ML/MIN/1.73M2
EOSINOPHIL # BLD AUTO: 0 10E3/UL (ref 0–0.7)
EOSINOPHIL NFR BLD AUTO: 0 %
ERYTHROCYTE [DISTWIDTH] IN BLOOD BY AUTOMATED COUNT: 13.4 % (ref 10–15)
FLUAV RNA SPEC QL NAA+PROBE: NEGATIVE
FLUBV RNA RESP QL NAA+PROBE: NEGATIVE
GLUCOSE SERPL-MCNC: 106 MG/DL (ref 70–99)
HCO3 SERPL-SCNC: 22 MMOL/L (ref 22–29)
HCT VFR BLD AUTO: 28.2 % (ref 35–47)
HGB BLD-MCNC: 9.5 G/DL (ref 11.7–15.7)
IMM GRANULOCYTES # BLD: 0 10E3/UL
IMM GRANULOCYTES NFR BLD: 1 %
LYMPHOCYTES # BLD AUTO: 0.5 10E3/UL (ref 0.8–5.3)
LYMPHOCYTES NFR BLD AUTO: 10 %
MCH RBC QN AUTO: 30 PG (ref 26.5–33)
MCHC RBC AUTO-ENTMCNC: 33.7 G/DL (ref 31.5–36.5)
MCV RBC AUTO: 89 FL (ref 78–100)
MONOCYTES # BLD AUTO: 1 10E3/UL (ref 0–1.3)
MONOCYTES NFR BLD AUTO: 19 %
NEUTROPHILS # BLD AUTO: 3.6 10E3/UL (ref 1.6–8.3)
NEUTROPHILS NFR BLD AUTO: 69 %
NRBC # BLD AUTO: 0 10E3/UL
NRBC BLD AUTO-RTO: 0 /100
NT-PROBNP SERPL-MCNC: 7468 PG/ML (ref 0–1800)
PLATELET # BLD AUTO: 266 10E3/UL (ref 150–450)
POTASSIUM SERPL-SCNC: 4.2 MMOL/L (ref 3.4–5.3)
RBC # BLD AUTO: 3.17 10E6/UL (ref 3.8–5.2)
RSV RNA SPEC NAA+PROBE: NEGATIVE
SARS-COV-2 RNA RESP QL NAA+PROBE: POSITIVE
SODIUM SERPL-SCNC: 133 MMOL/L (ref 135–145)
TROPONIN T SERPL HS-MCNC: 25 NG/L
TROPONIN T SERPL HS-MCNC: 27 NG/L
WBC # BLD AUTO: 5.2 10E3/UL (ref 4–11)

## 2024-12-22 PROCEDURE — 85004 AUTOMATED DIFF WBC COUNT: CPT | Performed by: EMERGENCY MEDICINE

## 2024-12-22 PROCEDURE — 250N000011 HC RX IP 250 OP 636: Performed by: EMERGENCY MEDICINE

## 2024-12-22 PROCEDURE — 258N000003 HC RX IP 258 OP 636: Performed by: EMERGENCY MEDICINE

## 2024-12-22 PROCEDURE — 99285 EMERGENCY DEPT VISIT HI MDM: CPT | Mod: 25

## 2024-12-22 PROCEDURE — 80048 BASIC METABOLIC PNL TOTAL CA: CPT | Performed by: EMERGENCY MEDICINE

## 2024-12-22 PROCEDURE — 96374 THER/PROPH/DIAG INJ IV PUSH: CPT

## 2024-12-22 PROCEDURE — 84484 ASSAY OF TROPONIN QUANT: CPT | Performed by: EMERGENCY MEDICINE

## 2024-12-22 PROCEDURE — 87637 SARSCOV2&INF A&B&RSV AMP PRB: CPT | Performed by: EMERGENCY MEDICINE

## 2024-12-22 PROCEDURE — 71046 X-RAY EXAM CHEST 2 VIEWS: CPT

## 2024-12-22 PROCEDURE — 36415 COLL VENOUS BLD VENIPUNCTURE: CPT | Performed by: EMERGENCY MEDICINE

## 2024-12-22 PROCEDURE — 250N000013 HC RX MED GY IP 250 OP 250 PS 637: Performed by: EMERGENCY MEDICINE

## 2024-12-22 PROCEDURE — 83880 ASSAY OF NATRIURETIC PEPTIDE: CPT | Performed by: EMERGENCY MEDICINE

## 2024-12-22 PROCEDURE — 93005 ELECTROCARDIOGRAM TRACING: CPT | Performed by: EMERGENCY MEDICINE

## 2024-12-22 RX ORDER — ACETAMINOPHEN 325 MG/1
650 TABLET ORAL ONCE
Status: COMPLETED | OUTPATIENT
Start: 2024-12-22 | End: 2024-12-22

## 2024-12-22 RX ORDER — METOCLOPRAMIDE HYDROCHLORIDE 5 MG/ML
5 INJECTION INTRAMUSCULAR; INTRAVENOUS ONCE
Status: COMPLETED | OUTPATIENT
Start: 2024-12-22 | End: 2024-12-22

## 2024-12-22 RX ORDER — ACETAMINOPHEN 325 MG/1
650 TABLET ORAL ONCE
Status: DISCONTINUED | OUTPATIENT
Start: 2024-12-22 | End: 2024-12-22

## 2024-12-22 RX ORDER — METOCLOPRAMIDE 5 MG/1
5 TABLET ORAL 3 TIMES DAILY PRN
Qty: 10 TABLET | Refills: 0 | Status: SHIPPED | OUTPATIENT
Start: 2024-12-22

## 2024-12-22 RX ADMIN — ACETAMINOPHEN 650 MG: 325 TABLET ORAL at 22:37

## 2024-12-22 RX ADMIN — METOCLOPRAMIDE 5 MG: 5 INJECTION, SOLUTION INTRAMUSCULAR; INTRAVENOUS at 20:05

## 2024-12-22 RX ADMIN — SODIUM CHLORIDE 250 ML: 9 INJECTION, SOLUTION INTRAVENOUS at 20:05

## 2024-12-22 ASSESSMENT — ACTIVITIES OF DAILY LIVING (ADL)
ADLS_ACUITY_SCORE: 58

## 2024-12-23 LAB
ATRIAL RATE - MUSE: 99 BPM
DIASTOLIC BLOOD PRESSURE - MUSE: 81 MMHG
INTERPRETATION ECG - MUSE: NORMAL
P AXIS - MUSE: 71 DEGREES
PR INTERVAL - MUSE: 130 MS
QRS DURATION - MUSE: 76 MS
QT - MUSE: 332 MS
QTC - MUSE: 426 MS
R AXIS - MUSE: 56 DEGREES
SYSTOLIC BLOOD PRESSURE - MUSE: 196 MMHG
T AXIS - MUSE: 103 DEGREES
VENTRICULAR RATE- MUSE: 99 BPM

## 2024-12-23 NOTE — ED TRIAGE NOTES
Patient feeling ill since yesterday, tested covid positive today has body aches, cough and fever.

## 2024-12-23 NOTE — ED NOTES
Bed: [place] [address]  Expected date:   Expected time:   Means of arrival:   Comments:  MW 89F covid cough

## 2024-12-23 NOTE — ED PROVIDER NOTES
EMERGENCY DEPARTMENT ENCOUNTER      NAME: Kerry Hoffmann  AGE: 89 year old female  YOB: 1935  MRN: [id number]  EVALUATION DATE & TIME: 2024  7:05 PM    PCP: Kaylyn Bolanos    ED PROVIDER: Uli De Los Santos M.D.      Chief Complaint   Patient presents with    URI         FINAL IMPRESSION:  1. COVID-19 virus infection          ED COURSE & MEDICAL DECISION MAKIN:23 PM I met with the patient for the initial interview and physical examination. Discussed plan for treatment and workup in the ED.    10:32 PM repeat exam is benign.  Discussed findings, discussed Paxlovid, discharge and close follow-up.      Pertinent Labs & Imaging studies reviewed. (See chart for details)  89 year old female presents to the Emergency Department for evaluation of bodyaches, sore throat. Patient appears non toxic with stable vitals signs, patient is afebrile with no tachycardia or hypoxia.  Abdomen is benign and lung sounds are clear.  Did review the medical record and reviewed discharge summary from [facility] on 11/10/2024 patient was admitted for acute cholecystitis, history of heart failure, kidney injury, hypertension.  Patient states that she tested positive for COVID today and has had continued body aches, fever.  She has 48 allergies and can only really take Tylenol as an antipyretic, she is afebrile here but took Tylenol just prior to arrival.  Has significant cardiac history, considered ACS, CHF, less likely bacterial pneumonia or pleural effusions.  Nothing she has PE or dissection.  Considered CT imaging of the chest but again no hypoxia or tachycardia to suggest PE.  We will initiate workup with screening labs, chest x-ray, ECG.    Reassessment: Labs by my independent or potation showed no signs of acute cardiac ischemia with initial repeat troponin showing no marked elevation, ECG nonischemic.  Did note creatinine of 1.23 but nothing to suggest acute kidney injury as  this is actually improved from prior creatinine of 1.36.  Did note mild hyponatremia with a sodium of 133 but this appears to be around the patient's baseline.  Did note BNP of 7468 however compared to prior BNP on 8/25/2024 this appears stable and no concerning marked elevation.  Hemoglobin stable, no signs of new anemia with a hemoglobin of 9.5, positive here for COVID-negative for influenza and RSV.  Chest x-ray by my independent or potation showed no focal consolidation by report showed no acute concerning findings.  Following our interventions patient felt much improved.  Considered admission however with reassuring vitals, no hypoxia, no marked increased work of breathing feel she is safe for discharge and close follow-up this time.  Will discharge with a prescription for Reglan as needed for nausea and vomiting and help to encourage good oral fluid intake at home.  Will recommend continue Tylenol.  Discussed option for Paxlovid at length, patient has several other medications and she is concern for medication to medication interaction, she also has 48 allergies and is concerned about taking new medications.  At this time she defers the Paxlovid which I felt was reasonable and I will recommend just close follow-up with primary care in the next 3 to 5 days and otherwise conservative management.  Discussed all these finds recommendations with the patient felt reassured and comfortable discharge.  Return precaution provided.    Medical Decision Making  Obtained supplemental history:Supplemental history obtained?: No  Reviewed external records: External records reviewed?: Documented in chart  Care impacted by chronic illness:Chronic Kidney Disease, Heart Disease, and Hypertension  Did you consider but not order tests?: Work up considered but not performed and documented in chart, if applicable  Did you interpret images independently?: Independent interpretation of ECG and images noted in documentation, when  applicable.  Consultation discussion with other provider:Did you involve another provider (consultant, , pharmacy, etc.)?: No  Discharge. I discussed a prescription for Paxlovid, but deferred after shared decision making discussion.. See documentation for any additional details.    MIPS: Not Applicable        At the conclusion of the encounter I discussed the results of all of the tests and the disposition. The questions were answered and return precautions provided. The patient or family acknowledged understanding and was agreeable with the care plan.         MEDICATIONS GIVEN IN THE EMERGENCY:  Medications   sodium chloride 0.9% BOLUS 250 mL (0 mLs Intravenous Stopped 12/22/24 2019)   metoclopramide (REGLAN) injection 5 mg (5 mg Intravenous $Given 12/22/24 2005)   acetaminophen (TYLENOL) tablet 650 mg (650 mg Oral $Given 12/22/24 2237)       NEW PRESCRIPTIONS STARTED AT TODAY'S ER VISIT  New Prescriptions    METOCLOPRAMIDE (REGLAN) 5 MG TABLET    Take 1 tablet (5 mg) by mouth 3 times daily as needed for vomiting.            =================================================================    HPI    Patient information was obtained from: patient     Use of Intrepreter: N/A        Kerry JOAN Hoffmann is a 89 year old female with a history of intracranial atherosclerosis, coronary artery disease, congestive heart failure, myocardial infarction, hypertension, chronic kidney disease stage 3a, and osteoarthritis of the knee who presents by ambulance for evaluation of a URI.    Yesterday, patient developed a sore throat, so gargled salt water. Today, she tested positive for COVID-19 and developed body aches, fever, and nausea. She is passing urine and stool OK. Patient has been taking tylenol 500 mg X 2 TID for her knees, and last took tylenol at 1630 this afternoon. She notes many allergies to medications, including ibuprofen.    Patient denies vomiting, hematuria, and any other symptoms or complaints at this time.              VITALS:  Patient Vitals for the past 24 hrs:   BP Temp Temp src Pulse Resp SpO2   12/22/24 2249 (!) 183/80 -- -- 96 (!) 38 94 %   12/22/24 2230 (!) 182/81 -- -- 98 28 94 %   12/22/24 2217 (!) 173/74 -- -- 92 25 94 %   12/22/24 2202 (!) 192/77 -- -- 92 28 96 %   12/22/24 2147 (!) 189/82 -- -- 101 27 95 %   12/22/24 2132 (!) 193/81 -- -- 87 28 94 %   12/22/24 2117 (!) 183/81 -- -- 101 24 93 %   12/22/24 2100 (!) 189/85 -- -- 100 24 94 %   12/22/24 2045 (!) 182/79 -- -- 99 24 95 %   12/22/24 2043 -- -- -- 98 30 97 %   12/22/24 2019 -- -- -- 98 26 97 %   12/22/24 1959 (!) 195/75 -- -- 97 24 96 %   12/22/24 1949 (!) 188/79 -- -- 97 23 95 %   12/22/24 1929 (!) 196/81 -- -- 98 30 96 %   12/22/24 1914 (!) 165/75 -- -- 96 -- 94 %   12/22/24 1906 (!) 172/76 99.5  F (37.5  C) Oral 89 22 96 %        PHYSICAL EXAM    Constitutional:  Awake, alert, in no apparent distress  HENT:  Normocephalic, Atraumatic. Bilateral external ears normal. Oropharynx moist. Nose normal. Neck- Normal range of motion with no guarding, No midline cervical tenderness, Supple, No stridor.   Eyes:  PERRL, EOMI with no signs of entrapment, Conjunctiva normal, No discharge.   Respiratory:  Normal breath sounds, No respiratory distress, No wheezing.    Cardiovascular:  Normal heart rate, Normal rhythm, No appreciable rubs or gallops.   GI:  Soft, No tenderness, No distension, No palpable masses  Musculoskeletal:  Intact distal pulses, No edema. Good range of motion in all major joints. No tenderness to palpation or major deformities noted.  Integument:  Warm, Dry, No erythema, No rash.   Neurologic:  Alert & oriented, Normal motor function, Normal sensory function, No focal deficits noted.   Psychiatric:  Affect normal, Judgment normal, Mood normal.     LAB:  All pertinent labs reviewed and interpreted.  Results for orders placed or performed during the hospital encounter of 12/22/24   XR Chest 2 Views    Impression    IMPRESSION: Cardiac  mediastinal silhouette within normal limits. Slight pulmonary vascular congestion. No pneumothorax or pleural effusion. No focal consolidation. No acute osseous abnormality.   Basic metabolic panel   Result Value Ref Range    Sodium 133 (L) 135 - 145 mmol/L    Potassium 4.2 3.4 - 5.3 mmol/L    Chloride 100 98 - 107 mmol/L    Carbon Dioxide (CO2) 22 22 - 29 mmol/L    Anion Gap 11 7 - 15 mmol/L    Urea Nitrogen 20.7 8.0 - 23.0 mg/dL    Creatinine 1.23 (H) 0.51 - 0.95 mg/dL    GFR Estimate 42 (L) >60 mL/min/1.73m2    Calcium 9.5 8.8 - 10.4 mg/dL    Glucose 106 (H) 70 - 99 mg/dL   Result Value Ref Range    Troponin T, High Sensitivity 27 (H) <=14 ng/L   Nt probnp inpatient (BNP)   Result Value Ref Range    N terminal Pro BNP Inpatient 7,468 (H) 0 - 1,800 pg/mL   CBC with platelets and differential   Result Value Ref Range    WBC Count 5.2 4.0 - 11.0 10e3/uL    RBC Count 3.17 (L) 3.80 - 5.20 10e6/uL    Hemoglobin 9.5 (L) 11.7 - 15.7 g/dL    Hematocrit 28.2 (L) 35.0 - 47.0 %    MCV 89 78 - 100 fL    MCH 30.0 26.5 - 33.0 pg    MCHC 33.7 31.5 - 36.5 g/dL    RDW 13.4 10.0 - 15.0 %    Platelet Count 266 150 - 450 10e3/uL    % Neutrophils 69 %    % Lymphocytes 10 %    % Monocytes 19 %    % Eosinophils 0 %    % Basophils 0 %    % Immature Granulocytes 1 %    NRBCs per 100 WBC 0 <1 /100    Absolute Neutrophils 3.6 1.6 - 8.3 10e3/uL    Absolute Lymphocytes 0.5 (L) 0.8 - 5.3 10e3/uL    Absolute Monocytes 1.0 0.0 - 1.3 10e3/uL    Absolute Eosinophils 0.0 0.0 - 0.7 10e3/uL    Absolute Basophils 0.0 0.0 - 0.2 10e3/uL    Absolute Immature Granulocytes 0.0 <=0.4 10e3/uL    Absolute NRBCs 0.0 10e3/uL   Influenza A/B, RSV and SARS-CoV2 PCR (COVID-19) Nasopharyngeal    Specimen: Nasopharyngeal; Swab   Result Value Ref Range    Influenza A PCR Negative Negative    Influenza B PCR Negative Negative    RSV PCR Negative Negative    SARS CoV2 PCR Positive (A) Negative   Result Value Ref Range    Troponin T, High Sensitivity 25 (H) <=14 ng/L        RADIOLOGY:  XR Chest 2 Views   Final Result   IMPRESSION: Cardiac mediastinal silhouette within normal limits. Slight pulmonary vascular congestion. No pneumothorax or pleural effusion. No focal consolidation. No acute osseous abnormality.             EKG:    Sinus rhythm, no specific ST acute ischemic changes, no concerning dysrhythmias or prolongation, compared to ECG of November 6, 2024, no specific ST acute ischemic change appreciated  I have independently reviewed and interpreted the EKG(s) documented above.    PROCEDURES:        [employer] Documentation:   CMS Diagnoses:       I, Toya Downs, am serving as a scribe to document services personally performed by Uli De Los Santos MD, based on my observation and the provider's statements to me. I, Uli De Los Santos MD attest that Toya Downs is acting in a scribe capacity, has observed my performance of the services and has documented them in accordance with my direction.    Uli De Los Santos M.D.  Emergency Medicine  [facility] EMERGENCY DEPARTMENT  [facility] [facility] [address]  [phone]  Dept: [phone]      Uli De Los Santos MD  12/22/24 8212

## 2025-01-02 ENCOUNTER — TRANSFERRED RECORDS (OUTPATIENT)
Dept: HEALTH INFORMATION MANAGEMENT | Facility: CLINIC | Age: 89
End: 2025-01-02
Payer: MEDICARE

## 2025-03-04 ENCOUNTER — LAB REQUISITION (OUTPATIENT)
Dept: LAB | Facility: CLINIC | Age: 89
End: 2025-03-04
Payer: MEDICARE

## 2025-03-04 DIAGNOSIS — E78.2 MIXED HYPERLIPIDEMIA: ICD-10-CM

## 2025-03-04 DIAGNOSIS — D64.9 ANEMIA, UNSPECIFIED: ICD-10-CM

## 2025-03-04 LAB — HGB BLD-MCNC: 9.9 G/DL (ref 11.7–15.7)

## 2025-03-04 PROCEDURE — 85018 HEMOGLOBIN: CPT | Mod: ORL | Performed by: FAMILY MEDICINE

## 2025-03-04 PROCEDURE — 80048 BASIC METABOLIC PNL TOTAL CA: CPT | Mod: ORL | Performed by: FAMILY MEDICINE

## 2025-03-05 LAB
ANION GAP SERPL CALCULATED.3IONS-SCNC: 11 MMOL/L (ref 7–15)
BUN SERPL-MCNC: 24 MG/DL (ref 8–23)
CALCIUM SERPL-MCNC: 9.5 MG/DL (ref 8.8–10.4)
CHLORIDE SERPL-SCNC: 98 MMOL/L (ref 98–107)
CREAT SERPL-MCNC: 1.27 MG/DL (ref 0.51–0.95)
EGFRCR SERPLBLD CKD-EPI 2021: 41 ML/MIN/1.73M2
GLUCOSE SERPL-MCNC: 101 MG/DL (ref 70–99)
HCO3 SERPL-SCNC: 22 MMOL/L (ref 22–29)
POTASSIUM SERPL-SCNC: 4.2 MMOL/L (ref 3.4–5.3)
SODIUM SERPL-SCNC: 131 MMOL/L (ref 135–145)

## 2025-03-09 ENCOUNTER — APPOINTMENT (OUTPATIENT)
Dept: RADIOLOGY | Facility: HOSPITAL | Age: 89
End: 2025-03-09
Attending: STUDENT IN AN ORGANIZED HEALTH CARE EDUCATION/TRAINING PROGRAM
Payer: MEDICARE

## 2025-03-09 ENCOUNTER — HOSPITAL ENCOUNTER (OUTPATIENT)
Facility: HOSPITAL | Age: 89
Setting detail: OBSERVATION
Discharge: HOME | End: 2025-03-12
Attending: STUDENT IN AN ORGANIZED HEALTH CARE EDUCATION/TRAINING PROGRAM | Admitting: STUDENT IN AN ORGANIZED HEALTH CARE EDUCATION/TRAINING PROGRAM
Payer: MEDICARE

## 2025-03-09 DIAGNOSIS — F41.9 ANXIETY: ICD-10-CM

## 2025-03-09 DIAGNOSIS — M25.511 CHRONIC RIGHT SHOULDER PAIN: ICD-10-CM

## 2025-03-09 DIAGNOSIS — I25.10 CAD (CORONARY ARTERY DISEASE): Primary | ICD-10-CM

## 2025-03-09 DIAGNOSIS — G89.29 CHRONIC RIGHT SHOULDER PAIN: ICD-10-CM

## 2025-03-09 LAB
ALBUMIN SERPL BCG-MCNC: 4 G/DL (ref 3.5–5.2)
ALP SERPL-CCNC: 77 U/L (ref 40–150)
ALT SERPL W P-5'-P-CCNC: 7 U/L (ref 0–50)
ANION GAP SERPL CALCULATED.3IONS-SCNC: 9 MMOL/L (ref 7–15)
AST SERPL W P-5'-P-CCNC: 17 U/L (ref 0–45)
BASOPHILS # BLD AUTO: 0.1 10E3/UL (ref 0–0.2)
BASOPHILS NFR BLD AUTO: 1 %
BILIRUB SERPL-MCNC: 0.3 MG/DL
BUN SERPL-MCNC: 27.2 MG/DL (ref 8–23)
CALCIUM SERPL-MCNC: 9.5 MG/DL (ref 8.8–10.4)
CHLORIDE SERPL-SCNC: 99 MMOL/L (ref 98–107)
CREAT SERPL-MCNC: 1.2 MG/DL (ref 0.51–0.95)
EGFRCR SERPLBLD CKD-EPI 2021: 44 ML/MIN/1.73M2
EOSINOPHIL # BLD AUTO: 0.1 10E3/UL (ref 0–0.7)
EOSINOPHIL NFR BLD AUTO: 1 %
ERYTHROCYTE [DISTWIDTH] IN BLOOD BY AUTOMATED COUNT: 13.4 % (ref 10–15)
GLUCOSE SERPL-MCNC: 101 MG/DL (ref 70–99)
HCO3 SERPL-SCNC: 24 MMOL/L (ref 22–29)
HCT VFR BLD AUTO: 29.1 % (ref 35–47)
HGB BLD-MCNC: 9.6 G/DL (ref 11.7–15.7)
IMM GRANULOCYTES # BLD: 0 10E3/UL
IMM GRANULOCYTES NFR BLD: 0 %
LYMPHOCYTES # BLD AUTO: 1 10E3/UL (ref 0.8–5.3)
LYMPHOCYTES NFR BLD AUTO: 10 %
MCH RBC QN AUTO: 29.7 PG (ref 26.5–33)
MCHC RBC AUTO-ENTMCNC: 33 G/DL (ref 31.5–36.5)
MCV RBC AUTO: 90 FL (ref 78–100)
MONOCYTES # BLD AUTO: 1.3 10E3/UL (ref 0–1.3)
MONOCYTES NFR BLD AUTO: 14 %
NEUTROPHILS # BLD AUTO: 6.8 10E3/UL (ref 1.6–8.3)
NEUTROPHILS NFR BLD AUTO: 73 %
NRBC # BLD AUTO: 0 10E3/UL
NRBC BLD AUTO-RTO: 0 /100
PLATELET # BLD AUTO: 276 10E3/UL (ref 150–450)
POTASSIUM SERPL-SCNC: 3.9 MMOL/L (ref 3.4–5.3)
PROT SERPL-MCNC: 6.8 G/DL (ref 6.4–8.3)
RBC # BLD AUTO: 3.23 10E6/UL (ref 3.8–5.2)
SODIUM SERPL-SCNC: 132 MMOL/L (ref 135–145)
WBC # BLD AUTO: 9.3 10E3/UL (ref 4–11)

## 2025-03-09 PROCEDURE — 36415 COLL VENOUS BLD VENIPUNCTURE: CPT | Performed by: STUDENT IN AN ORGANIZED HEALTH CARE EDUCATION/TRAINING PROGRAM

## 2025-03-09 PROCEDURE — 82040 ASSAY OF SERUM ALBUMIN: CPT | Performed by: STUDENT IN AN ORGANIZED HEALTH CARE EDUCATION/TRAINING PROGRAM

## 2025-03-09 PROCEDURE — 80053 COMPREHEN METABOLIC PANEL: CPT | Performed by: STUDENT IN AN ORGANIZED HEALTH CARE EDUCATION/TRAINING PROGRAM

## 2025-03-09 PROCEDURE — 250N000013 HC RX MED GY IP 250 OP 250 PS 637: Performed by: STUDENT IN AN ORGANIZED HEALTH CARE EDUCATION/TRAINING PROGRAM

## 2025-03-09 PROCEDURE — 73030 X-RAY EXAM OF SHOULDER: CPT | Mod: RT

## 2025-03-09 PROCEDURE — 84484 ASSAY OF TROPONIN QUANT: CPT | Performed by: STUDENT IN AN ORGANIZED HEALTH CARE EDUCATION/TRAINING PROGRAM

## 2025-03-09 PROCEDURE — 85025 COMPLETE CBC W/AUTO DIFF WBC: CPT | Performed by: STUDENT IN AN ORGANIZED HEALTH CARE EDUCATION/TRAINING PROGRAM

## 2025-03-09 PROCEDURE — 99285 EMERGENCY DEPT VISIT HI MDM: CPT | Mod: 25

## 2025-03-09 PROCEDURE — 85014 HEMATOCRIT: CPT | Performed by: STUDENT IN AN ORGANIZED HEALTH CARE EDUCATION/TRAINING PROGRAM

## 2025-03-09 PROCEDURE — 82247 BILIRUBIN TOTAL: CPT | Performed by: STUDENT IN AN ORGANIZED HEALTH CARE EDUCATION/TRAINING PROGRAM

## 2025-03-09 RX ORDER — OXYCODONE HYDROCHLORIDE 5 MG/1
5 TABLET ORAL ONCE
Status: COMPLETED | OUTPATIENT
Start: 2025-03-09 | End: 2025-03-09

## 2025-03-09 RX ADMIN — OXYCODONE HYDROCHLORIDE 5 MG: 5 TABLET ORAL at 23:32

## 2025-03-09 ASSESSMENT — COLUMBIA-SUICIDE SEVERITY RATING SCALE - C-SSRS
6. HAVE YOU EVER DONE ANYTHING, STARTED TO DO ANYTHING, OR PREPARED TO DO ANYTHING TO END YOUR LIFE?: NO
1. IN THE PAST MONTH, HAVE YOU WISHED YOU WERE DEAD OR WISHED YOU COULD GO TO SLEEP AND NOT WAKE UP?: NO
2. HAVE YOU ACTUALLY HAD ANY THOUGHTS OF KILLING YOURSELF IN THE PAST MONTH?: NO

## 2025-03-09 ASSESSMENT — ACTIVITIES OF DAILY LIVING (ADL): ADLS_ACUITY_SCORE: 58

## 2025-03-10 ENCOUNTER — APPOINTMENT (OUTPATIENT)
Dept: PHYSICAL THERAPY | Facility: HOSPITAL | Age: 89
End: 2025-03-10
Attending: HOSPITALIST
Payer: MEDICARE

## 2025-03-10 PROBLEM — I50.9 CHF (CONGESTIVE HEART FAILURE) (H): Status: ACTIVE | Noted: 2024-08-25

## 2025-03-10 PROBLEM — M25.511 CHRONIC RIGHT SHOULDER PAIN: Status: ACTIVE | Noted: 2025-03-10

## 2025-03-10 PROBLEM — I25.10 CAD (CORONARY ARTERY DISEASE): Status: ACTIVE | Noted: 2024-11-10

## 2025-03-10 PROBLEM — G89.29 CHRONIC RIGHT SHOULDER PAIN: Status: ACTIVE | Noted: 2025-03-10

## 2025-03-10 LAB
TROPONIN T SERPL HS-MCNC: 13 NG/L
TROPONIN T SERPL HS-MCNC: 18 NG/L

## 2025-03-10 PROCEDURE — 250N000013 HC RX MED GY IP 250 OP 250 PS 637: Performed by: PAIN MEDICINE

## 2025-03-10 PROCEDURE — 99204 OFFICE O/P NEW MOD 45 MIN: CPT | Performed by: PAIN MEDICINE

## 2025-03-10 PROCEDURE — 84484 ASSAY OF TROPONIN QUANT: CPT | Performed by: STUDENT IN AN ORGANIZED HEALTH CARE EDUCATION/TRAINING PROGRAM

## 2025-03-10 PROCEDURE — 97161 PT EVAL LOW COMPLEX 20 MIN: CPT | Mod: GP

## 2025-03-10 PROCEDURE — 99222 1ST HOSP IP/OBS MODERATE 55: CPT | Mod: AI | Performed by: HOSPITALIST

## 2025-03-10 PROCEDURE — 96376 TX/PRO/DX INJ SAME DRUG ADON: CPT

## 2025-03-10 PROCEDURE — G0378 HOSPITAL OBSERVATION PER HR: HCPCS

## 2025-03-10 PROCEDURE — 250N000013 HC RX MED GY IP 250 OP 250 PS 637: Performed by: HOSPITALIST

## 2025-03-10 PROCEDURE — 36415 COLL VENOUS BLD VENIPUNCTURE: CPT | Performed by: STUDENT IN AN ORGANIZED HEALTH CARE EDUCATION/TRAINING PROGRAM

## 2025-03-10 PROCEDURE — 96375 TX/PRO/DX INJ NEW DRUG ADDON: CPT

## 2025-03-10 PROCEDURE — 96374 THER/PROPH/DIAG INJ IV PUSH: CPT

## 2025-03-10 PROCEDURE — 250N000011 HC RX IP 250 OP 636: Performed by: HOSPITALIST

## 2025-03-10 PROCEDURE — 250N000011 HC RX IP 250 OP 636: Performed by: STUDENT IN AN ORGANIZED HEALTH CARE EDUCATION/TRAINING PROGRAM

## 2025-03-10 RX ORDER — LIDOCAINE HYDROCHLORIDE 20 MG/ML
JELLY TOPICAL EVERY 4 HOURS PRN
Status: DISCONTINUED | OUTPATIENT
Start: 2025-03-10 | End: 2025-03-12 | Stop reason: HOSPADM

## 2025-03-10 RX ORDER — NALOXONE HYDROCHLORIDE 0.4 MG/ML
0.4 INJECTION, SOLUTION INTRAMUSCULAR; INTRAVENOUS; SUBCUTANEOUS
Status: DISCONTINUED | OUTPATIENT
Start: 2025-03-10 | End: 2025-03-12 | Stop reason: HOSPADM

## 2025-03-10 RX ORDER — OXYCODONE HYDROCHLORIDE 5 MG/1
5 TABLET ORAL EVERY 4 HOURS PRN
Status: DISCONTINUED | OUTPATIENT
Start: 2025-03-10 | End: 2025-03-10

## 2025-03-10 RX ORDER — ASPIRIN 81 MG/1
81 TABLET ORAL DAILY
Status: DISCONTINUED | OUTPATIENT
Start: 2025-03-10 | End: 2025-03-12 | Stop reason: HOSPADM

## 2025-03-10 RX ORDER — FUROSEMIDE 20 MG/1
20 TABLET ORAL
COMMUNITY

## 2025-03-10 RX ORDER — MULTIVIT WITH MINERALS/LUTEIN
250 TABLET ORAL DAILY
COMMUNITY

## 2025-03-10 RX ORDER — LIDOCAINE 4 G/G
2 PATCH TOPICAL
Status: DISCONTINUED | OUTPATIENT
Start: 2025-03-10 | End: 2025-03-12 | Stop reason: HOSPADM

## 2025-03-10 RX ORDER — MORPHINE SULFATE 4 MG/ML
4 INJECTION, SOLUTION INTRAMUSCULAR; INTRAVENOUS ONCE
Status: COMPLETED | OUTPATIENT
Start: 2025-03-10 | End: 2025-03-10

## 2025-03-10 RX ORDER — CLOPIDOGREL BISULFATE 75 MG/1
75 TABLET ORAL DAILY
Status: DISCONTINUED | OUTPATIENT
Start: 2025-03-10 | End: 2025-03-12 | Stop reason: HOSPADM

## 2025-03-10 RX ORDER — AMOXICILLIN 250 MG
2 CAPSULE ORAL 2 TIMES DAILY PRN
Status: DISCONTINUED | OUTPATIENT
Start: 2025-03-10 | End: 2025-03-12 | Stop reason: HOSPADM

## 2025-03-10 RX ORDER — LOSARTAN POTASSIUM 50 MG/1
50 TABLET ORAL 2 TIMES DAILY
Status: DISCONTINUED | OUTPATIENT
Start: 2025-03-10 | End: 2025-03-12 | Stop reason: HOSPADM

## 2025-03-10 RX ORDER — LOSARTAN POTASSIUM 50 MG/1
50 TABLET ORAL ONCE
Status: COMPLETED | OUTPATIENT
Start: 2025-03-10 | End: 2025-03-10

## 2025-03-10 RX ORDER — HYDRALAZINE HYDROCHLORIDE 20 MG/ML
10 INJECTION INTRAMUSCULAR; INTRAVENOUS EVERY 4 HOURS PRN
Status: DISCONTINUED | OUTPATIENT
Start: 2025-03-10 | End: 2025-03-12 | Stop reason: HOSPADM

## 2025-03-10 RX ORDER — PROCHLORPERAZINE MALEATE 5 MG/1
5 TABLET ORAL EVERY 6 HOURS PRN
Status: DISCONTINUED | OUTPATIENT
Start: 2025-03-10 | End: 2025-03-12 | Stop reason: HOSPADM

## 2025-03-10 RX ORDER — POLYETHYLENE GLYCOL 3350 17 G/17G
17 POWDER, FOR SOLUTION ORAL 2 TIMES DAILY PRN
Status: DISCONTINUED | OUTPATIENT
Start: 2025-03-10 | End: 2025-03-12 | Stop reason: HOSPADM

## 2025-03-10 RX ORDER — AMOXICILLIN 250 MG
1 CAPSULE ORAL 2 TIMES DAILY PRN
Status: DISCONTINUED | OUTPATIENT
Start: 2025-03-10 | End: 2025-03-12 | Stop reason: HOSPADM

## 2025-03-10 RX ORDER — ACETAMINOPHEN 325 MG/1
975 TABLET ORAL 3 TIMES DAILY
Status: DISCONTINUED | OUTPATIENT
Start: 2025-03-10 | End: 2025-03-12 | Stop reason: HOSPADM

## 2025-03-10 RX ORDER — BISACODYL 10 MG
10 SUPPOSITORY, RECTAL RECTAL DAILY PRN
Status: DISCONTINUED | OUTPATIENT
Start: 2025-03-10 | End: 2025-03-12 | Stop reason: HOSPADM

## 2025-03-10 RX ORDER — NALOXONE HYDROCHLORIDE 0.4 MG/ML
0.2 INJECTION, SOLUTION INTRAMUSCULAR; INTRAVENOUS; SUBCUTANEOUS
Status: DISCONTINUED | OUTPATIENT
Start: 2025-03-10 | End: 2025-03-12 | Stop reason: HOSPADM

## 2025-03-10 RX ORDER — MORPHINE SULFATE 2 MG/ML
2 INJECTION, SOLUTION INTRAMUSCULAR; INTRAVENOUS ONCE
Status: COMPLETED | OUTPATIENT
Start: 2025-03-10 | End: 2025-03-10

## 2025-03-10 RX ORDER — ACETAMINOPHEN 325 MG/1
325 TABLET ORAL EVERY 8 HOURS PRN
Status: DISCONTINUED | OUTPATIENT
Start: 2025-03-10 | End: 2025-03-10

## 2025-03-10 RX ORDER — FAMOTIDINE 10 MG
10 TABLET ORAL DAILY
COMMUNITY
End: 2025-03-10

## 2025-03-10 RX ADMIN — LOSARTAN POTASSIUM 50 MG: 50 TABLET, FILM COATED ORAL at 08:18

## 2025-03-10 RX ADMIN — LOSARTAN POTASSIUM 50 MG: 50 TABLET, FILM COATED ORAL at 20:34

## 2025-03-10 RX ADMIN — ACETAMINOPHEN 975 MG: 325 TABLET ORAL at 14:18

## 2025-03-10 RX ADMIN — LIDOCAINE 4% 2 PATCH: 40 PATCH TOPICAL at 08:12

## 2025-03-10 RX ADMIN — MORPHINE SULFATE 4 MG: 4 INJECTION, SOLUTION INTRAMUSCULAR; INTRAVENOUS at 01:12

## 2025-03-10 RX ADMIN — METOPROLOL SUCCINATE 75 MG: 25 TABLET, EXTENDED RELEASE ORAL at 21:37

## 2025-03-10 RX ADMIN — OXYCODONE HYDROCHLORIDE 5 MG: 5 TABLET ORAL at 06:26

## 2025-03-10 RX ADMIN — MORPHINE SULFATE 2 MG: 2 INJECTION, SOLUTION INTRAMUSCULAR; INTRAVENOUS at 06:54

## 2025-03-10 RX ADMIN — ASPIRIN 81 MG: 81 TABLET, COATED ORAL at 11:28

## 2025-03-10 RX ADMIN — CLOPIDOGREL BISULFATE 75 MG: 75 TABLET ORAL at 11:28

## 2025-03-10 RX ADMIN — ACETAMINOPHEN 975 MG: 325 TABLET ORAL at 20:34

## 2025-03-10 RX ADMIN — HYDRALAZINE HYDROCHLORIDE 10 MG: 20 INJECTION INTRAMUSCULAR; INTRAVENOUS at 09:10

## 2025-03-10 RX ADMIN — ACETAMINOPHEN 975 MG: 325 TABLET ORAL at 08:12

## 2025-03-10 ASSESSMENT — ACTIVITIES OF DAILY LIVING (ADL)
ADLS_ACUITY_SCORE: 58
ADLS_ACUITY_SCORE: 53
ADLS_ACUITY_SCORE: 58
ADLS_ACUITY_SCORE: 59
ADLS_ACUITY_SCORE: 58
ADLS_ACUITY_SCORE: 59
ADLS_ACUITY_SCORE: 53
ADLS_ACUITY_SCORE: 59
ADLS_ACUITY_SCORE: 53
ADLS_ACUITY_SCORE: 59
ADLS_ACUITY_SCORE: 53
ADLS_ACUITY_SCORE: 58
ADLS_ACUITY_SCORE: 53
ADLS_ACUITY_SCORE: 53
ADLS_ACUITY_SCORE: 59
ADLS_ACUITY_SCORE: 58
ADLS_ACUITY_SCORE: 58

## 2025-03-10 NOTE — PROGRESS NOTES
[facility]                                                                                   OUTPATIENT PHYSICAL THERAPY    PLAN OF TREATMENT FOR OUTPATIENT REHABILITATION   Patient's Last Name, First Name, Kerry Alvarado YOB: 1935   Provider's Name   [facility]   Medical Record No.  [id number]     Onset Date: 03/09/25 Start of Care Date: 03/10/25     Medical Diagnosis:  R shoulder pain               PT Diagnosis:  Impaired functional mobility Certification Dates:  From: 03/10/25  To: 03/17/25       See note for plan of treatment, functional goals, and certification details.    I CERTIFY THE NEED FOR THESE SERVICES FURNISHED UNDER        THIS PLAN OF TREATMENT AND WHILE UNDER MY CARE (Physician co-signature of this document indicates review and certification of the therapy plan).                03/10/25 1322   Appointment Info   Signing Clinician's Name / Credentials (PT) JUDY Pena   Quick Adds   Quick Adds Certification   Living Environment   People in Home alone   Current Living Arrangements independent living facility   Home Accessibility wheelchair accessible   Transportation Anticipated family or friend will provide   Living Environment Comments Pt independent in ILF, nephew is PCA. Pt reports using scooter for mobility, transfer with short distance ambulation with cane.   Self-Care   Usual Activity Tolerance fair   Current Activity Tolerance poor   Equipment Currently Used at Home wheelchair, power;cane, straight  (Scooter in apartment and halls, sometimes uses a wheelchair when out of the apartment building. SEC for transfer to scooter, reports inability to transfer currently due to cane use on R side limited by pain)   Fall history within last six months no   General Information   Onset of Illness/Injury or Date of Surgery 03/09/25   Referring Physician Kyle Vitale MD   Patient/Family Therapy Goals  "Statement (PT) Return to apartment   Pertinent History of Current Problem (include personal factors and/or comorbidities that impact the POC) \"Kerry Hoffmann is a 89 year old female admitted on 3/9/2025. She was brought to the ED by ambulance from [facility] for evaluation of right shoulder pain unable to ambulate with a cane\" per chart   Existing Precautions/Restrictions fall   Pain Assessment   Patient Currently in Pain Yes, see Vital Sign flowsheet  (Severe R shoulder pain impacting all mobility.)   Range of Motion (ROM)   Range of Motion ROM deficits secondary to pain   Strength (Manual Muscle Testing)   Strength (Manual Muscle Testing) Deficits observed during functional mobility   Bed Mobility   Bed Mobility supine-sit   Supine-Sit [place] (Bed Mobility) unable to assess   Comment, (Bed Mobility) Trialed supine to sit with patient inability to come to sitting due to severe R shoulder pain. Educated pt on benefit of upright tolerance and PT participation with pt unable to complete further treatment due to pain. Positioned pt in bed.   Transfers   Comment, (Transfers) Unable to assess due to severe R shoulder pain.   Gait/Stairs (Locomotion)   Comment, (Gait/Stairs) Unable to assess due to severe R shoulder pain.   Clinical Impression   Criteria for Skilled Therapeutic Intervention Yes, treatment indicated   PT Diagnosis (PT) Impaired functional mobility   Influenced by the following impairments Pain of R shoulder   Functional limitations due to impairments Bed mobility, transfers   Clinical Presentation (PT Evaluation Complexity) evolving   Clinical Presentation Rationale Pt presents as medically diagnosed.   Clinical Decision Making (Complexity) low complexity   Planned Therapy Interventions (PT) bed mobility training;gait training;patient/family education;strengthening;transfer training;progressive activity/exercise   Risk & Benefits of therapy have been explained evaluation/treatment " results reviewed;care plan/treatment goals reviewed;patient   PT Total Evaluation Time   PT Eval, Low Complexity Minutes (59976) 10   Therapy Certification   Start of care date 03/10/25   Certification date from 03/10/25   Certification date to 03/17/25   Medical Diagnosis R shoulder pain   Physical Therapy Goals   PT Frequency Daily   PT Predicted Duration/Target Date for Goal Attainment 03/17/25   PT Goals Bed Mobility;Transfers   PT: Bed Mobility Independent;Supine to/from sit   PT: Transfers Modified independent;Bed to/from chair;Assistive device  (Bed to scooter (using SEC))   PT Discharge Planning   PT Plan Assess ability to complete bed mobility and stand pivot transfer with SEC, severely limited by pain; trial cane on L side   PT Discharge Recommendation (DC Rec) Transitional Care Facility   PT Rationale for DC Rec Pt unable to complete any fuctional mobility assessment currently due to severe right shoulder pain.   PT Brief overview of current status Unable to tolerate any mobility due to pain   PT Total Distance Amb During Session (feet) 0   PT Equipment Needed at Discharge cane, straight;other (see comments)  (Scooter)   Physical Therapy Time and Intention   Total Session Time (sum of timed and untimed services) 10

## 2025-03-10 NOTE — CONSULTS
ORTHOPEDIC CONSULTATION    Consultation  Kerry Hoffmann,  1935, MRN [id number]    Chronic right shoulder pain    PCP: Kaylyn Bolanos, [phone]   Code status:  Full Code       Extended Emergency Contact Information  Primary Emergency Contact: Riley Padilla  Address: [address]                      [place] [address] [place]  Home Phone: [phone]  Mobile Phone: [phone]  Relation: Nephew  Secondary Emergency Contact: García Padilla (Grand-Nephew)  Address: [address] [place] [address] [id number] [place] [address]  Mobile Phone: [phone]  Relation: Other         IMPRESSION:  89-year-old female with severe right shoulder degenerative joint disease     PLAN:  This patient was discussed with Dr. Guerra, on-call surgeon for [facility] and they are in agreement with the following plan.   -No indication for urgent surgical intervention at this time.  -Inpatient pain team is on board, appreciate recommendations for pain control at this time  -Patient needs outpatient follow-up with one of our shoulder surgeons for further management, could consider outpatient corticosteroid injection versus surgical consultation  -Short-term use of sling for comfort as needed could be helpful  -Weightbearing as tolerated to right upper extremity  -Anti-inflammatories could be useful.  Topical diclofenac added.  Oral NSAIDs could be useful but contraindicated due to allergy        Thank you for including [facility] in the care of Kerry Hoffmann. It has been a pleasure participating in their care.        CHIEF COMPLAINT: <principal problem not specified>    HISTORY OF PRESENT ILLNESS:  The patient is seen in orthopedic consultation at the request of Carlos Seals MD for right shoulder pain.  The patient is a 89 year old female    The patient presents today with severe pain in her right shoulder.  She came into the emergency department yesterday on 3/9/2025 due to severe pain in  her right shoulder.  She does not note any history of chronic pain, however patient is mostly somnolent during the time of my visit and I feel that she may not be answering questions appropriately.  She feels that pain is slightly improved today.  Does not note any history of injuries.  She states that yesterday the pain started for the first time in her right shoulder and that she was unable to lift her arm up above her head..  No radicular pain.      ALLERGIES:   Review of patient's allergies indicates   Allergies   Allergen Reactions    Buspirone Shortness Of Breath    Ciprofloxacin Hives and Shortness Of Breath     Other reaction(s): Unknown    Cortisone      Other reaction(s): Throat Swelling/Closing, Unknown  Reaction 9-5-94      Hydrocodone-Acetaminophen Shortness Of Breath     Other reaction(s): Unknown    Lansoprazole Shortness Of Breath    Metoprolol Shortness Of Breath     Tolerates metoprolol succinate at home    Nedocromil      Other reaction(s): Throat Swelling/Closing    Niacin Shortness Of Breath     Other reaction(s): Unknown    Albuterol Other (See Comments)     Inhaler had extreme effect on nervous system      Amlodipine      Other reaction(s): Erythema, Unknown    Ampicillin Unknown    Azithromycin      Other reaction(s): *Unknown, Unknown    Cefixime Diarrhea     Other reaction(s): Unknown  Has tolerated ceftriaxone    Cefprozil Nausea and Vomiting     Other reaction(s): Unknown  Has tolerated ceftriaxone    Cefuroxime      Other reaction(s): *Unknown  Has tolerated ceftriaxone    Cephalexin      Other reaction(s): *Unknown, Unknown  Has tolerated ceftriaxone    Contrast Dye      Other reaction(s): hives    Cyclobenzaprine      Other reaction(s): Sedation    Dextromethorphan-Guaifenesin Nausea     Other reaction(s): Headache    Diltiazem      Other reaction(s): Erythema, Unknown  Ears face arms and chest red and on fire-body tremors      Erythromycin      Other reaction(s): Unknown     Esomeprazole      Other reaction(s): Headache    Ezetimibe Unknown    Fenofibrate Muscle Pain (Myalgia)     Other reaction(s): Unknown    Fluticasone-Salmeterol Other (See Comments)     Elevated blood pressure      Methylprednisolone     Metronidazole     Monosodium Glutamate     Moxifloxacin      Other reaction(s): Dizziness    Nifedipine      Other reaction(s): Edema  Took for 5-93 to 9-94 red and swollen leg      Nsaids      Other reaction(s): Unknown    Ofloxacin      Other reaction(s): *Unknown    Ondansetron      Other reaction(s): Constipation    Parsley Seed     Penicillins Unknown     She doesn't remember other than it occurred as a very young woman     Piper     Pirbuterol Other (See Comments)     Immediate extreme effect on nervous system      Pravastatin      Other reaction(s): Dizziness, Unknown    Pseudoephedrine      Other reaction(s): headache,nausea    Shellfish-Derived Products      Per pt 8/12/23    Simvastatin Muscle Pain (Myalgia)    Spironolactone      Other reaction(s): stomach cramps, nausea    Sulfamethoxazole-Trimethoprim      Other reaction(s): Unknown    Tramadol      Other reaction(s): red ears,high blood press.    Tramadol-Acetaminophen      Other reaction(s): Tachycardia, Unknown    Triamterene Other (See Comments)     Greatly bothered with sun-took medication for three years  Greatly bothered with sun      Diatrizoate Rash    Telmisartan Palpitations         MEDICATIONS UPON ADMISSION:  Medications were reviewed.  They include:   (Not in a hospital admission)        SOCIAL HISTORY:   she  reports that she has quit smoking. Her smoking use included cigarettes. She has a 105 pack-year smoking history. She has never used smokeless tobacco. She reports that she does not currently use alcohol. She reports that she does not use drugs.      FAMILY HISTORY:  family history includes Alzheimer Disease in her sister; Brain Cancer in her brother; Cancer in her brother and mother; Coronary Artery  "Disease in her father; Heart Disease in her father, mother, and sister; Heart Failure in her sister; Hypertension in her mother and sister; Myocardial Infarction in her father; Pacemaker in her mother.      REVIEW OF SYSTEMS:   Reviewed with patient. See HPI, otherwise negative       PHYSICAL EXAMINATION:  Vitals: BP (!) 159/70   Pulse 84   Temp 97.9  F (36.6  C) (Oral)   Resp 20   Ht 1.549 m (5' 1\")   Wt 67 kg (147 lb 9.6 oz)   LMP  (LMP Unknown)   SpO2 92%   BMI 27.89 kg/m    General: On examination, the patient is resting comfortably and sleepy and oriented to person, place, time, and, and general circumstances   SKIN: There is 1+ effusion.  No erythema.  No breaks in the skin  Pulses:  Brachial and radial pulse is intact and equal bilaterally  Sensation: intact and equal bilaterally to the distal upper extremities.  Tenderness: Tender to palpation globally about the right shoulder  ROM: Unable to demonstrate any active range of motion, due to somnolence she does not really try to demonstrate this activity.  Motor: hand  intact, wrist flexion/extension with resistance intact.  Contralateral side= Full range of motion, Negative joint instability findings, 5/5 motor groups about the joint, Non-tender.       RADIOGRAPHIC EVALUATION:  Personally reviewed  EXAM: XR SHOULDER RIGHT G/E 3 VIEWS  LOCATION: [facility]  DATE: 3/9/2025     INDICATION: ATRAUMATIC PAIN,S WELLING  COMPARISON: None.                                                                      IMPRESSION: Severe degenerative change of the glenohumeral joint with chronic remodeling of the humeral head and glenoid fossa. No displaced fracture or dislocation.    PERTINENT LABS:  Personally reviewed  Recent Labs   Lab Test 03/09/25  2338   HGB 9.6*            SALONI NOONAN PA-C  Date: 3/10/2025  Time: 11:45 AM  [facility]    CC1:   Carlos Seals MD    CC2:   Kaylyn Bolanos    "

## 2025-03-10 NOTE — ED PROVIDER NOTES
Emergency Department Encounter         FINAL IMPRESSION:    Shoulder pain        ED COURSE AND MEDICAL DECISION MAKING   11:03 PM I introduced myself to the patient, obtained patient history, performed a physical exam, and discussed plan for ED workup including potential diagnostic laboratory/imaging studies and interventions.      ED Course as of 03/09/25 2323   Sun Mar 09, 2025   2319 Patient is an 89-year-old with multiple medical problems, here from home atraumatic right shoulder pain.  Patient is right-hand dominant states she usually uses a cane to walk.  Woke up this morning increasing pain.  Reports that she is noticed increased size of the right shoulder.  No falls.  No neck pain or chest pain.  No shortness of breath.  No dizziness.  No abdominal pain.  Patient reports that she has decreased her fluid intake over the last 24 hours due to her not be able to walk around her apartment because of increased right shoulder pain.  No dysuria.  No bowel movement changes.  On examination patient does have right shoulder swelling compared to left.  There is no overlying redness.  She denies any fevers, chills, night sweats or infectious symptoms.  Normal pulses distally.  No humeral pain.  No elbow pain.  No wrist pain.  No midline cervical pain.       -Admitted to the hospitalist service.                   Medical Decision Making  Obtained supplemental history:Supplemental history obtained?: No  Reviewed external records: External records reviewed?: No  Care impacted by chronic illness:Heart Disease and Hypertension  Did you consider but not order tests?: Work up considered but not performed and documented in chart, if applicable  Did you interpret images independently?: Independent interpretation of ECG and images noted in documentation, when applicable.  Consultation discussion with other provider:Did you involve another provider (consultant, , pharmacy, etc.)?: I discussed the care with another health care  provider, see documentation for details.  Admit.    MIPS (CTPE, Dental pain, Wiley, Sinusitis, Asthma/COPD, Head Trauma): Not Applicable            At the conclusion of the encounter I discussed the results of all the tests and the disposition. The questions were answered. The patient or family acknowledged understanding and was agreeable with the care plan.        MEDICATIONS GIVEN IN THE EMERGENCY DEPARTMENT:  Medications   oxyCODONE (ROXICODONE) tablet 5 mg (has no administration in time range)       NEW PRESCRIPTIONS STARTED AT TODAY'S ED VISIT:  New Prescriptions    No medications on file       HPI     Patient information obtained from: Patient     Use of : N/A     [place] Hoffmann is a 89 year old female with a pertinent history of CAD, CKD3, HTN, HLD, who presents to this ED by EMS for evaluation of shoulder pain.    Patient reports that this morning she woke up with right sided shoulder pain and swelling that has progressivly gotten worse throughout the day. She is right hand dominant and normally ambulates with a walker but is unable to ambulate due to her shoulder pain. She also says she has pain  her arm. She denies chest pain, shortness of breath, numbness or tingling in her hands, falls, or injuries.           MEDICAL HISTORY     Past Medical History:   Diagnosis Date    Anxiety     Asthma     Chronic kidney disease     CKD (chronic kidney disease)     Clostridium difficile colitis 11/01/2016    Clostridium difficile infection     Clostridium enterocolitis 10/27/2016    CTS (carpal tunnel syndrome)     GERD (gastroesophageal reflux disease)     Hiatal hernia     Hyperlipidemia     Hyperlipidemia     Hyperlipidemia     Hypertension     Hypertension     Osteoarthritis of knee     Spinal stenosis     Vitamin D deficiency        Past Surgical History:   Procedure Laterality Date    BREAST SURGERY  1982    breast tumor per [facility]     CATARACT EXTRACTION  07/2005    per [facility]     COLONOSCOPY  "N/A 11/20/2023    Procedure: COLONOSCOPY with biopsies and polypectomy;  Surgeon: David Barreto MD;  Location: [facility] OR    CV CORONARY ANGIOGRAM N/A 8/11/2023    Procedure: Coronary Angiogram;  Surgeon: Ap Arroyo MD;  Location: [facility] CATH LAB CV    CV LEFT HEART CATH N/A 8/11/2023    Procedure: Left Heart Catheterization;  Surgeon: Ap Arroyo MD;  Location: [facility] CATH LAB CV    LAPAROSCOPIC CHOLECYSTECTOMY N/A 11/6/2024    Procedure: CHOLECYSTECTOMY, LAPAROSCOPIC;  Surgeon: Junaid Daniel MD;  Location: [facility]    ORIF TIBIA & FIBULA FRACTURES  1988    per [facility]     OTHER SURGICAL HISTORY  10/2004    hole in retinaper [facility]     OTHER SURGICAL HISTORY  05/03/2015    fecal transplant[facility]     LA ESOPHAGOGASTRODUODENOSCOPY TRANSORAL DIAGNOSTIC N/A 9/11/2020    Procedure: ESOPHAGOGASTRODUODENOSCOPY With gastric biopsies;  Surgeon: David Reyes MD;  Location: [facility];  Service: Gastroenterology    TONSILLECTOMY      TONSILLECTOMY & ADENOIDECTOMY  1963       Social History     Tobacco Use    Smoking status: Former     Current packs/day: 3.00     Average packs/day: 3.0 packs/day for 35.0 years (105.0 ttl pk-yrs)     Types: Cigarettes    Smokeless tobacco: Never    Tobacco comments:     quit 1968   Substance Use Topics    Alcohol use: Not Currently    Drug use: Never       acetaminophen (TYLENOL) 500 MG tablet  aspirin 81 MG EC tablet  Calcium Carbonate Antacid (TUMS ULTRA 1000 PO)  Cholecalciferol (VITAMIN D3) 50 MCG (2000 UT) CAPS  clonazePAM (KLONOPIN) 0.5 MG tablet  clopidogrel (PLAVIX) 75 MG tablet  famotidine (PEPCID) 10 MG tablet  losartan (COZAAR) 50 MG tablet  metoclopramide (REGLAN) 5 MG tablet  metoprolol succinate ER (TOPROL XL) 25 MG 24 hr tablet            PHYSICAL EXAM     BP (!) 207/81   Pulse 68   Temp 98.8  F (37.1  C) (Oral)   Resp 18   Ht 1.549 m (5' 1\")   Wt 67 kg (147 lb 9.6 oz)   LMP  (LMP Unknown)   SpO2 96%   BMI 27.89 kg/m  "       PHYSICAL EXAM:     General: Patient appears well, nontoxic, comfortable  HEENT: Moist mucous membranes,  No head trauma.    Cardiovascular: Normal rate, normal rhythm, no extremity edema.  No appreciable murmur.  No midline neck pain.  Respiratory: No signs of respiratory distress, lungs are clear to auscultation bilaterally with no wheezes rhonchi or rales.  Abdominal: Soft, nontender, nondistended, no palpable masses, no guarding, no rebound  Musculoskeletal: No deformities appreciated. Right shoulder swelling compared to left.  There is no overlying redness.  Normal pulses.  Soft apartments.  Neurological: Alert and oriented, grossly neurologically intact.  Psychological: Normal affect and mood.  Integument: No rashes appreciated          RESULTS       Labs Ordered and Resulted from Time of ED Arrival to Time of ED Departure - No data to display    XR Shoulder Right G/E 3 Views    (Results Pending)         PROCEDURES:  Procedures:  Procedures       I, Gerry Castillo am serving as a scribe to document services personally performed by Russ Childs DO, based on my observations and the provider's statements to me.  I, Russ Childs DO, attest that Gerry Castillo is acting in a scribe capacity, has observed my performance of the services and has documented them in accordance with my direction.    Russ Childs DO  Emergency Medicine  [facility] EMERGENCY DEPARTMENT       Russ Childs DO  03/10/25 0033       Russ Childs DO  03/10/25 0033

## 2025-03-10 NOTE — ED NOTES
Bed: JNED-06  Expected date: 3/9/25  Expected time: 10:08 PM  Means of arrival: Ambulance  Comments:  MPW- 89F shoulder pain, possible dislocation, 200/70

## 2025-03-10 NOTE — ED NOTES
Bed: [facility]-  Expected date:   Expected time:   Means of arrival:   Comments:  ED 6 when clean

## 2025-03-10 NOTE — PROGRESS NOTES
Patient admitted this morning by nocturnist.  See his note for further details.  Patient is seen and examined.  Chart reviewed.  Patient continues to have moderate to severe shoulder pain, otherwise feels sleepy, denies other complaints.  Plan continued observation, PT and OT consults and pain management.  Appreciate pain team and orthopedic consult.

## 2025-03-10 NOTE — CONSULTS
"[facility] ACUTE INPATIENT PAIN SERVICE    [facility], [facility], [facility], [facility], [place]   PAIN consult      Pain team will sign off.       ASSESSMENT/ PLAN: Likely acute on chronic shoulder pain, imaging did not indicate acute cause, glenohumeral osteoarthritis.  Add lidocaine as well as diclofenac.  Patient is very somnolent during the interview and would avoid opioids for this. Could try topical diclofenac- has an NSAID allergy.  Also would avoid opioids given ongoing use of clonazepam 3 times daily chronically.  Multimodal Medication Therapy:    Adjuvants: Add lidocaine and ice  Opioids: Hold on oxycodone due to somnolence  Non-medication interventions- Ice, PT   Constipation Prophylaxis-none needed  Diagnostics & Referrals: Could consider pain clinic referral  Interventional: Could consider intra-articular injection after discharge  Follow up /Discharge Recommendations - We recommend prescribing the following at the time of discharge: Lidocaine      Subjective:    Today met with Kerry at the bedside in the ER.  I asked her about pain and she states, \"it is better than yesterday\".  She is however somnolent and will not open her eyes for the interview or answer other questions.    HPI:  Kerry Hoffmann is a 89 year old female who was admitted on 3/9/2025.  I was asked byto see the patient for shoulder pain. Admitted for shoulder pain and unable to use walker and imaging indicated osteoarthritis..  History of HTN, CAD, CHF, CKD.   indicates ongoing refills of 90 tabs of clonazepam every month.         PDMP RESULTS:     Filled  Drug  QTY    02/07/2025 Clonazepam 0.5 Mg Tablet 90.00   01/09/2025 Clonazepam 0.5 Mg Tablet 90.00   12/10/2024 Clonazepam 0.5 Mg Tablet 90.00   11/11/2024 Clonazepam 0.5 Mg Tablet 90.00   10/06/2024 Clonazepam 0.5 Mg Tablet 90.00       History   Drug Use Unknown         Tobacco Use      Smoking status: Former        Packs/day: 3.00        Years: 3.0 packs/day for 35.0 " "years (105.0 ttl pk-yrs)        Types: Cigarettes      Smokeless tobacco: Never      Tobacco comments: quit 1968        Objective:  Vital signs in last 24 hours:  B/P: 156/65, T: 98.8, P: 73, R: 18   Blood pressure (!) 156/65, pulse 73, temperature 98.8  F (37.1  C), temperature source Oral, resp. rate 18, height 1.549 m (5' 1\"), weight 67 kg (147 lb 9.6 oz), SpO2 92%, not currently breastfeeding.        Review of Systems:   As per subjective, all others negative.    Physical Exam    General: Very sleepy  HEENT: Head normocephalic atraumatic, oral mucosa moist. Sclerae anicteric  CV: Murmur noted  Resp: No wheezes, no rales or rhonchi, no focal consolidations  GI: Active  Skin: No rashes or lesions  Extremities: No peripheral edema            Imaging:  Personally Reviewed.  Reviewed x-ray which did not indicate a fracture    Lab Results:  Personally Reviewed.   Creatinine clearance      Please see A&P for additional details of medical decision making.  MANAGEMENT DISCUSSED with the following over the past 24 hours: Reviewed with patient  NOTE(S)/MEDICAL RECORDS REVIEWED over the past 24 hours: Reviewed notes from ER as well as medicine team  Tests personally interpreted in the past 24 hours:  - Shoulder x-ray showing arthritis and no fracture  Tests ORDERED & REVIEWED in the past 24 hours:  - Creatinine clearance corrected  SUPPLEMENTAL HISTORY, in addition to the patient's history, over the past 24 hours obtained from:   - patient   Medical complexity over the past 24 hours:  -------------------------- MODERATE RISK FOR MORBIDITY --------------------------------------------------  - Prescription DRUG MANAGEMENT performed           Ingrid LORD, CNS, CNP  Acute Care Pain Management  Team  Hours of pain coverage Mon-Fri 8-1600  After hours contact the primary team  [facility] ([facility], [place], [place], RH)   Page via [employer] web console -Click for slinksetera            "

## 2025-03-10 NOTE — PLAN OF CARE
Goal Outcome Evaluation:      Plan of Care Reviewed With: patient          Outcome Evaluation: Likely home with home care help.

## 2025-03-10 NOTE — CONSULTS
"Care Management Initial Consult    General Information  Assessment completed with: PatientKerry  Type of CM/SW Visit: Initial Assessment    Primary Care Provider verified and updated as needed: Yes   Readmission within the last 30 days: no previous admission in last 30 days      Reason for Consult: discharge planning  Advance Care Planning: Advance Care Planning Reviewed: present on chart          Communication Assessment  Patient's communication style: spoken language (English or Bilingual)          Cognitive  Cognitive/Neuro/Behavioral: WDL                      Living Environment:   People in home: alone     Current living Arrangements: apartment, independent living facility ([facility]      Able to return to prior arrangements: yes       Family/Social Support:  Care provided by: self  Provides care for: no one  Marital Status:   Support system: Other (specify) (nephew, grand nephew)          Description of Support System: Supportive, Involved    Support Assessment: Adequate family and caregiver support, Adequate social supports, Patient communicates needs well met    Current Resources:   Patient receiving home care services: No        Community Resources: None  Equipment currently used at home: wheelchair, power, cane, straight (\"I mostly use my scooter. I also use a cane in my right side so that is hard right now with my shoulder pain. I sometimes use a regular wheelchair when I leave my apartment, but the scooter all the time inside my apartment and the halls\".)  Supplies currently used at home: Incontinence Supplies, Chux (\"dentures, glasses\")    Employment/Financial:  Employment Status: retired     Employment/ Comments: \"no  history\"  Financial Concerns: none   Referral to Financial Worker: No       Does the patient's insurance plan have a 3 day qualifying hospital stay waiver?  Yes     Which insurance plan 3 day waiver is available? " ACO REACH    Will the waiver be used for post-acute placement? Undetermined at this time    Lifestyle & Psychosocial Needs:  Social Drivers of Health     Food Insecurity: Low Risk  (11/7/2024)    Food Insecurity     Within the past 12 months, did you worry that your food would run out before you got money to buy more?: No     Within the past 12 months, did the food you bought just not last and you didn t have money to get more?: No   Depression: Not at risk (2/27/2024)    PHQ-2     PHQ-2 Score: 0   Housing Stability: High Risk (11/7/2024)    Housing Stability     Do you have housing? : No     Are you worried about losing your housing?: No   Tobacco Use: Medium Risk (11/6/2024)    Patient History     Smoking Tobacco Use: Former     Smokeless Tobacco Use: Never     Passive Exposure: Not on file   Financial Resource Strain: Low Risk  (11/7/2024)    Financial Resource Strain     Within the past 12 months, have you or your family members you live with been unable to get utilities (heat, electricity) when it was really needed?: No   Alcohol Use: Not on file   Transportation Needs: Low Risk  (11/7/2024)    Transportation Needs     Within the past 12 months, has lack of transportation kept you from medical appointments, getting your medicines, non-medical meetings or appointments, work, or from getting things that you need?: No   Physical Activity: Not on file   Interpersonal Safety: Low Risk  (11/6/2024)    Interpersonal Safety     Do you feel physically and emotionally safe where you currently live?: Yes     Within the past 12 months, have you been hit, slapped, kicked or otherwise physically hurt by someone?: No     Within the past 12 months, have you been humiliated or emotionally abused in other ways by your partner or ex-partner?: No   Stress: Not on file   Social Connections: Not on file   Health Literacy: Not on file       Functional Status:  Prior to admission patient needed assistance:   Dependent ADLs::  "Ambulation-cane, Wheelchair-independent, Wheelchair-with assist, Independent  Dependent IADLs:: Cleaning, Cooking, Meal Preparation, Shopping (\"I still drive. I pay per meal from my facility and I do use their meal service for all my meals. They also help with housekeeping.\")  Assesssment of Functional Status:  (unknown at this time)    Mental Health Status:  Mental Health Status: No Current Concerns       Chemical Dependency Status:  Chemical Dependency Status: No Current Concerns             Values/Beliefs:  Spiritual, Cultural Beliefs, Sabianism Practices, Values that affect care: no       Cultural/Sabianism Practices Patient Routinely Participates In: ceremony, prayer  Values/Beliefs Comment: Protestant    Discussed  Partnership in Safe Discharge Planning  document with patient/family: No    Additional Information:  Kerry lives at [facility] alone. She is independent with ADLs and gets help with some IADLs. \"I still drive. I pay per meal from my facility and I do use their meal service for all my meals. They also help with housekeeping.\"    \"I mostly use my scooter. I also use a cane in my right side so that is hard right now with my shoulder pain. I sometimes use a regular wheelchair when I leave my apartment, but the scooter all the time inside my apartment and the halls\". She may benefit from a walker instead of the cane.     Ride: Family    MOON was given and discussed. All questions were answered.    CM to follow for medical progression of care, discharge recommendations, and final discharge plan. Writer verified patient demographics and updated any changes needed in the patient chart.    Next Steps:   Medical plan/delay: Ortho and Pain consults ordered to see. Awaiting PT/OT recommendations.    Dispo: Likely home with Home Care, but still awaiting to see if PT/OT recommends TCU instead of home.    CM to do: Awaiting PT/OT recommendations for need for home care " referrals or TCU referrals to be sent.    Antonia Gardner RN

## 2025-03-10 NOTE — PHARMACY-ADMISSION MEDICATION HISTORY
Pharmacist Admission Medication History    Admission medication history is complete. The information provided in this note is only as accurate as the sources available at the time of the update.    Information Source(s): Patient, Clinic records, Hospital records, and CareEverywhere/SureScripts via in-person    Pertinent Information: she was going to start furosemide today- prescribed 3/5/25.     Changes made to PTA medication list:  Added: furosemide, vitC  Deleted: famotidine - she states she does not take  Changed: None    Allergies reviewed with patient and updates made in EHR: yes    Medication History Completed By: jE Saucedo [facility] 3/10/2025 8:30 AM    PTA Med List   Medication Sig Last Dose/Taking    acetaminophen (TYLENOL) 500 MG tablet Take 1,000 mg by mouth 3 times daily 3/9/2025 Evening    aspirin 81 MG EC tablet Take 81 mg by mouth daily 3/9/2025 Morning    Calcium Carbonate Antacid (TUMS ULTRA 1000 PO) Take 1 chew tab by mouth nightly as needed Taking As Needed    Cholecalciferol (VITAMIN D3) 50 MCG (2000 UT) CAPS Take 1 tablet by mouth daily 3/9/2025 Morning    clonazePAM (KLONOPIN) 0.5 MG tablet Take 0.5 mg by mouth 3 times daily 3/9/2025 Evening    clopidogrel (PLAVIX) 75 MG tablet TAKE 1 TABLET(75 MG) BY MOUTH DAILY 3/9/2025 Morning    furosemide (LASIX) 20 MG tablet Take 20 mg by mouth twice a week. Monday and Thursday Taking    losartan (COZAAR) 50 MG tablet Take 50 mg by mouth 2 times daily 3/9/2025 Morning    metoclopramide (REGLAN) 5 MG tablet Take 1 tablet (5 mg) by mouth 3 times daily as needed for vomiting. Taking As Needed    metoprolol succinate ER (TOPROL XL) 25 MG 24 hr tablet Take 75 mg by mouth at bedtime. 3/9/2025 Morning    vitamin C (ASCORBIC ACID) 250 MG tablet Take 250 mg by mouth daily. 3/9/2025 Morning

## 2025-03-10 NOTE — PLAN OF CARE
Goal Outcome Evaluation:      Plan of Care Reviewed With: patient    Overall Patient Progress: improving      Patient alert and oriented x 4. Patient has been very drowsy for shift. Patient is on Room Air. Patient comes from a [facility] Living Facility. Patient uses a cane at baseline. Patient has a purewick in place. Patient is on Room Air. Patient is on a regular diet.

## 2025-03-10 NOTE — ED TRIAGE NOTES
Pt BIBA f/assist living with c/o right arm pain that started this am.  Pt denies any trauma or fall.  CMS intact.  HX: CKD, gerd, HTN     Triage Assessment (Adult)       Row Name 03/09/25 8459          Triage Assessment    Airway WDL WDL        Respiratory WDL    Respiratory WDL WDL        Skin Circulation/Temperature WDL    Skin Circulation/Temperature WDL WDL        Cardiac WDL    Cardiac WDL WDL        Peripheral/Neurovascular WDL    Peripheral Neurovascular WDL WDL        Cognitive/Neuro/Behavioral WDL    Cognitive/Neuro/Behavioral WDL WDL

## 2025-03-10 NOTE — H&P
[facility]    History and Physical - Hospitalist Service       Date of Admission:  3/9/2025    Assessment & Plan      Kerry Hoffmann is a 89 year old female admitted on 3/9/2025. She was brought to the ED by ambulance from [facility] for evaluation of right shoulder pain unable to ambulate with a cane    #Acute right shoulder pain, atraumatic  -Chronic right shoulder degeneration, larger size when compared to the left side secondary to right-handed cane use for ambulation  -Right shoulder x-ray showed severe degenerative change of the glenohumeral joint with chronic remodeling of the humeral head and glenoid fossa  -Pain management  -Orthopedic consult  -PT evaluation and treatment    #Essential hypertension  -Elevated blood pressure likely secondary to acute pain  -Reconcile PTA medications  -Hydralazine as needed    #Coronary artery disease  #Abnormal high-sensitivity troponin T  -Denies angina symptoms  -Chronic mildly elevated troponin  -ECG personally reviewed: Sinus rhythm at 99 bpm, nonspecific ST wave without changes from 11/6/2024    #Drug induced platelet defect  -On PTA aspirin and clopidogrel  -Monitor for bleeding    #Chronic HFpEF  #Anemia of chronic kidney disease  #Chronic hyponatremia  #Chronic kidney disease stage IIIb  -Stable  -Reconcile PTA medications     Observation Goals: -diagnostic tests and consults completed and resulted, -vital signs normal or at patient baseline, Nurse to notify provider when observation goals have been met and patient is ready for discharge.  Diet: Regular Diet Adult    DVT Prophylaxis: Pneumatic Compression Devices  Wiley Catheter: Not present  Lines: None     Cardiac Monitoring: None  Code Status: Full Code          Disposition Plan     Medically Ready for Discharge: Anticipated Tomorrow           Kyle Vitale MD  Hospitalist Service  [facility]  Securely message with [employer] (more info)  Text  page via [facility] Paging/Directory     ______________________________________________________________________    Chief Complaint   Right arm pain    History is obtained from the patient, electronic health record, and emergency department physician    History of Present Illness   Kerry Hoffmann is a 89 year old female who was brought to the ED by ambulance from [facility] for evaluation of right arm pain.  Past medical history of CAD, HFpEF, hypertension, hyperlipidemia, mild intermittent asthma, CKD, chronic anemia, chronic hyponatremia, IBS, depression, anxiety.  Patient uses a cane for ambulation due to knee degenerative joint disease unable to get arthroplasty secondary to allergy to metal.  Patient reports that she sleeps on her left side and her right shoulder is larger than the left.  She denies sleeping on her right side, recent falls or trauma.  Earlier in the morning she had acute pain in the lower arm that subsequently migrated to the right shoulder.  She was unable to use her cane for ambulation.  She denies fevers, chills, chest pain, shortness of breath or palpitations.      Past Medical History    Past Medical History:   Diagnosis Date    Anxiety     takes clonazepam    Asthma     per [facility] & [facility]     Chronic kidney disease     stage 3 per [facility] & [facility]     CKD (chronic kidney disease)     Clostridium difficile colitis 11/01/2016    Clostridium difficile infection     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    Clostridium enterocolitis 10/27/2016    CTS (carpal tunnel syndrome)     GERD (gastroesophageal reflux disease)     per [facility]     Hiatal hernia     small per [facility] & [facility]     Hyperlipidemia     Hyperlipidemia     Hyperlipidemia     Hypertension     Hypertension     Osteoarthritis of knee     per [facility] & [facility]     Spinal stenosis     per [facility] & [facility]     Vitamin D deficiency     per [facility] & [facility]       Past Surgical History   Past Surgical History:   Procedure Laterality Date    BREAST SURGERY  1982    breast tumor per [facility]      CATARACT EXTRACTION  07/2005    per [facility]     COLONOSCOPY N/A 11/20/2023    Procedure: COLONOSCOPY with biopsies and polypectomy;  Surgeon: David Barreto MD;  Location: [facility]    CV CORONARY ANGIOGRAM N/A 8/11/2023    Procedure: Coronary Angiogram;  Surgeon: Ap Arroyo MD;  Location: [facility] CATH LAB CV    CV LEFT HEART CATH N/A 8/11/2023    Procedure: Left Heart Catheterization;  Surgeon: Ap Arroyo MD;  Location: [facility] CATH LAB CV    LAPAROSCOPIC CHOLECYSTECTOMY N/A 11/6/2024    Procedure: CHOLECYSTECTOMY, LAPAROSCOPIC;  Surgeon: Junaid Daniel MD;  Location: [facility]    ORIF TIBIA & FIBULA FRACTURES  1988    per [facility]     OTHER SURGICAL HISTORY  10/2004    hole in retinaper [facility]     OTHER SURGICAL HISTORY  05/03/2015    fecal transplantper [facility]     KY ESOPHAGOGASTRODUODENOSCOPY TRANSORAL DIAGNOSTIC N/A 9/11/2020    Procedure: ESOPHAGOGASTRODUODENOSCOPY With gastric biopsies;  Surgeon: David Reyes MD;  Location[facility];  Service: Gastroenterology    TONSILLECTOMY      TONSILLECTOMY & ADENOIDECTOMY  1963       Prior to Admission Medications   Prior to Admission Medications   Prescriptions Last Dose Informant Patient Reported? Taking?   Calcium Carbonate Antacid (TUMS ULTRA 1000 PO)   Yes No   Sig: Take 1 chew tab by mouth nightly as needed   Cholecalciferol (VITAMIN D3) 50 MCG (2000 UT) CAPS   Yes No   Sig: Take 1 tablet by mouth daily   acetaminophen (TYLENOL) 500 MG tablet   Yes No   Sig: Take 1,000 mg by mouth 3 times daily   aspirin 81 MG EC tablet   Yes No   Sig: Take 81 mg by mouth daily   clonazePAM (KLONOPIN) 0.5 MG tablet   Yes No   Sig: Take 0.5 mg by mouth 3 times daily   clopidogrel (PLAVIX) 75 MG tablet   No No   Sig: TAKE 1 TABLET(75 MG) BY MOUTH DAILY   famotidine (PEPCID) 10 MG tablet   Yes No   Sig: Take 20 mg by mouth daily.   losartan (COZAAR) 50 MG tablet   Yes No   Sig: Take 50 mg by mouth 2 times daily   metoclopramide (REGLAN) 5  MG tablet   No No   Sig: Take 1 tablet (5 mg) by mouth 3 times daily as needed for vomiting.   metoprolol succinate ER (TOPROL XL) 25 MG 24 hr tablet   Yes No   Sig: Take 75 mg by mouth at bedtime.      Facility-Administered Medications: None           Physical Exam   Vital Signs: Temp: 98.8  F (37.1  C) Temp src: Oral BP: (!) 207/81 Pulse: 68   Resp: 18 SpO2: 96 % O2 Device: None (Room air)    Weight: 147 lbs 9.6 oz    Constitutional: no apparent distress  Respiratory: no increased work of breathing and clear to auscultation  Cardiovascular: regular rate and rhythm  GI: normal bowel sounds  Skin: no bruising or bleeding  Musculoskeletal: Right shoulder larger than the left but without effusion, warmth or erythema.  Painful passive and active range of motion.  Lower extremity chronic edema    Medical Decision Making       55 MINUTES SPENT BY ME on the date of service doing chart review, history, exam, documentation & further activities per the note.  MANAGEMENT DISCUSSED with the following over the past 24 hours: ED provider and patient       Data     I have personally reviewed the following data over the past 24 hrs:    9.3  \   9.6 (L)   / 276     132 (L) 99 27.2 (H) /  101 (H)   3.9 24 1.20 (H) \     ALT: 7 AST: 17 AP: 77 TBILI: 0.3   ALB: 4.0 TOT PROTEIN: 6.8 LIPASE: N/A     Trop: 18 (H) BNP: N/A       Imaging results reviewed over the past 24 hrs:   Recent Results (from the past 24 hours)   XR Shoulder Right G/E 3 Views    Narrative    EXAM: XR SHOULDER RIGHT G/E 3 VIEWS  LOCATION: [facility]  DATE: 3/9/2025    INDICATION: ATRAUMATIC PAIN,S WELLING  COMPARISON: None.      Impression    IMPRESSION: Severe degenerative change of the glenohumeral joint with chronic remodeling of the humeral head and glenoid fossa. No displaced fracture or dislocation.      Pt brought in by EMS after family called that pt had relapsed on alcohol  after waiting on a waiting list for detox.  Denies SI, HI, hallucination, headache, nausea.  No withdrawal symptoms noted at triage

## 2025-03-10 NOTE — PROGRESS NOTES
Patient admitted to room 4 at approximately 1445 via cart from emergency room.  Reason for Admission:   Report received from:   Patient was accompanied by Self.  Discharge transportation provided by:  Patient ambulated/transferred:  with one assist. air cici.  Patient is alert and orientated x 4.  Outpatient Observation education provided to: (patient, family, friend)  MDRO Education done if applicable (MRSA, VRE, etc)  Safety risks were identified during admission:  fall.   Yellow risk/fall band applied:  Yes  Home meds sent home: No  Home meds sent to pharmacy:No   Detailed Belongings: see belongings note

## 2025-03-11 ENCOUNTER — APPOINTMENT (OUTPATIENT)
Dept: PHYSICAL THERAPY | Facility: HOSPITAL | Age: 89
End: 2025-03-11
Payer: MEDICARE

## 2025-03-11 PROCEDURE — 97110 THERAPEUTIC EXERCISES: CPT | Mod: GP

## 2025-03-11 PROCEDURE — 99418 PROLNG IP/OBS E/M EA 15 MIN: CPT | Mod: FS

## 2025-03-11 PROCEDURE — 250N000013 HC RX MED GY IP 250 OP 250 PS 637

## 2025-03-11 PROCEDURE — G0378 HOSPITAL OBSERVATION PER HR: HCPCS

## 2025-03-11 PROCEDURE — 97530 THERAPEUTIC ACTIVITIES: CPT | Mod: GP

## 2025-03-11 PROCEDURE — 99204 OFFICE O/P NEW MOD 45 MIN: CPT

## 2025-03-11 PROCEDURE — 250N000009 HC RX 250: Performed by: HOSPITALIST

## 2025-03-11 PROCEDURE — 99233 SBSQ HOSP IP/OBS HIGH 50: CPT | Mod: FS

## 2025-03-11 PROCEDURE — 250N000013 HC RX MED GY IP 250 OP 250 PS 637: Performed by: PAIN MEDICINE

## 2025-03-11 PROCEDURE — 99207 PR APP CREDIT; MD BILLING SHARED VISIT: CPT | Mod: FS | Performed by: HOSPITALIST

## 2025-03-11 PROCEDURE — 250N000013 HC RX MED GY IP 250 OP 250 PS 637: Performed by: HOSPITALIST

## 2025-03-11 PROCEDURE — 250N000011 HC RX IP 250 OP 636

## 2025-03-11 PROCEDURE — 96375 TX/PRO/DX INJ NEW DRUG ADDON: CPT

## 2025-03-11 RX ORDER — DEXAMETHASONE SODIUM PHOSPHATE 10 MG/ML
10 INJECTION, SOLUTION INTRAMUSCULAR; INTRAVENOUS ONCE
Status: COMPLETED | OUTPATIENT
Start: 2025-03-11 | End: 2025-03-11

## 2025-03-11 RX ORDER — FUROSEMIDE 20 MG/1
20 TABLET ORAL
Status: DISCONTINUED | OUTPATIENT
Start: 2025-03-11 | End: 2025-03-12 | Stop reason: HOSPADM

## 2025-03-11 RX ORDER — DIPHENHYDRAMINE HCL 25 MG
25 CAPSULE ORAL EVERY 6 HOURS PRN
Status: DISCONTINUED | OUTPATIENT
Start: 2025-03-11 | End: 2025-03-12 | Stop reason: HOSPADM

## 2025-03-11 RX ORDER — CLONAZEPAM 0.5 MG/1
0.5 TABLET ORAL 3 TIMES DAILY
Status: DISCONTINUED | OUTPATIENT
Start: 2025-03-11 | End: 2025-03-12 | Stop reason: HOSPADM

## 2025-03-11 RX ORDER — FUROSEMIDE 20 MG/1
20 TABLET ORAL
Status: DISCONTINUED | OUTPATIENT
Start: 2025-03-13 | End: 2025-03-11

## 2025-03-11 RX ORDER — METOCLOPRAMIDE 5 MG/1
5 TABLET ORAL 3 TIMES DAILY PRN
Status: DISCONTINUED | OUTPATIENT
Start: 2025-03-11 | End: 2025-03-12 | Stop reason: HOSPADM

## 2025-03-11 RX ORDER — OXYCODONE HYDROCHLORIDE 5 MG/1
5 TABLET ORAL EVERY 4 HOURS PRN
Status: DISCONTINUED | OUTPATIENT
Start: 2025-03-11 | End: 2025-03-12 | Stop reason: HOSPADM

## 2025-03-11 RX ORDER — DIPHENHYDRAMINE HYDROCHLORIDE 50 MG/ML
25 INJECTION, SOLUTION INTRAMUSCULAR; INTRAVENOUS EVERY 6 HOURS PRN
Status: DISCONTINUED | OUTPATIENT
Start: 2025-03-11 | End: 2025-03-12 | Stop reason: HOSPADM

## 2025-03-11 RX ADMIN — DEXAMETHASONE SODIUM PHOSPHATE 10 MG: 10 INJECTION INTRAMUSCULAR; INTRAVENOUS at 14:21

## 2025-03-11 RX ADMIN — CLOPIDOGREL BISULFATE 75 MG: 75 TABLET ORAL at 07:54

## 2025-03-11 RX ADMIN — DICLOFENAC SODIUM 2 G: 10 GEL TOPICAL at 20:03

## 2025-03-11 RX ADMIN — OXYCODONE HYDROCHLORIDE 5 MG: 5 TABLET ORAL at 09:32

## 2025-03-11 RX ADMIN — LOSARTAN POTASSIUM 50 MG: 50 TABLET, FILM COATED ORAL at 07:55

## 2025-03-11 RX ADMIN — ACETAMINOPHEN 975 MG: 325 TABLET ORAL at 13:35

## 2025-03-11 RX ADMIN — DICLOFENAC SODIUM 2 G: 10 GEL TOPICAL at 15:30

## 2025-03-11 RX ADMIN — FUROSEMIDE 20 MG: 20 TABLET ORAL at 09:33

## 2025-03-11 RX ADMIN — ASPIRIN 81 MG: 81 TABLET, COATED ORAL at 07:54

## 2025-03-11 RX ADMIN — DIPHENHYDRAMINE HYDROCHLORIDE 25 MG: 25 CAPSULE ORAL at 13:35

## 2025-03-11 RX ADMIN — LIDOCAINE 4% 2 PATCH: 40 PATCH TOPICAL at 07:54

## 2025-03-11 RX ADMIN — LIDOCAINE HYDROCHLORIDE: 20 JELLY TOPICAL at 02:15

## 2025-03-11 RX ADMIN — LOSARTAN POTASSIUM 50 MG: 50 TABLET, FILM COATED ORAL at 20:01

## 2025-03-11 RX ADMIN — CLONAZEPAM 0.5 MG: 0.5 TABLET ORAL at 13:35

## 2025-03-11 RX ADMIN — ACETAMINOPHEN 975 MG: 325 TABLET ORAL at 20:01

## 2025-03-11 RX ADMIN — DICLOFENAC SODIUM 2 G: 10 GEL TOPICAL at 12:09

## 2025-03-11 RX ADMIN — CLONAZEPAM 0.5 MG: 0.5 TABLET ORAL at 07:55

## 2025-03-11 RX ADMIN — METOPROLOL SUCCINATE 75 MG: 25 TABLET, EXTENDED RELEASE ORAL at 21:21

## 2025-03-11 RX ADMIN — ACETAMINOPHEN 975 MG: 325 TABLET ORAL at 07:54

## 2025-03-11 RX ADMIN — CLONAZEPAM 0.5 MG: 0.5 TABLET ORAL at 20:01

## 2025-03-11 ASSESSMENT — ACTIVITIES OF DAILY LIVING (ADL)
ADLS_ACUITY_SCORE: 53
ADLS_ACUITY_SCORE: 67
ADLS_ACUITY_SCORE: 53
ADLS_ACUITY_SCORE: 67
ADLS_ACUITY_SCORE: 66
ADLS_ACUITY_SCORE: 53
ADLS_ACUITY_SCORE: 66
ADLS_ACUITY_SCORE: 53
ADLS_ACUITY_SCORE: 67
ADLS_ACUITY_SCORE: 66
ADLS_ACUITY_SCORE: 53
ADLS_ACUITY_SCORE: 66
ADLS_ACUITY_SCORE: 66
ADLS_ACUITY_SCORE: 53
ADLS_ACUITY_SCORE: 66
ADLS_ACUITY_SCORE: 67
ADLS_ACUITY_SCORE: 53
ADLS_ACUITY_SCORE: 53
ADLS_ACUITY_SCORE: 67
ADLS_ACUITY_SCORE: 66
ADLS_ACUITY_SCORE: 66
ADLS_ACUITY_SCORE: 53
ADLS_ACUITY_SCORE: 66

## 2025-03-11 NOTE — PLAN OF CARE
PRIMARY DIAGNOSIS: ACUTE PAIN  OUTPATIENT/OBSERVATION GOALS TO BE MET BEFORE DISCHARGE:  1. Pain Status: Improved-controlled with oral pain medications.    2. Return to near baseline physical activity: No    3. Cleared for discharge by consultants (if involved): No    Discharge Planner Nurse   Safe discharge environment identified: Yes  Barriers to discharge: Yes       Entered by: Polly aHir RN 03/11/2025 12:02 AM     Please review provider order for any additional goals.   Nurse to notify provider when observation goals have been met and patient is ready for discharge.Goal Outcome Evaluation:

## 2025-03-11 NOTE — PLAN OF CARE
"PRIMARY DIAGNOSIS: \"GENERIC\" NURSING  OUTPATIENT/OBSERVATION GOALS TO BE MET BEFORE DISCHARGE:  ADLs back to baseline: No    Activity and level of assistance: Assist of 2    Pain status: Improved-controlled with oral pain medications and IV    Return to near baseline physical activity: Yes     Discharge Planner Nurse   Safe discharge environment identified: Yes  Barriers to discharge: No       Entered by: Holger Fletcher RN 03/11/2025 3:36 PM     Please review provider order for any additional goals.   Nurse to notify provider when observation goals have been met and patient is ready for discharge.Goal Outcome Evaluation:       A&O X 4 C/O pain R shoulder RA assist of 2 bladder scanned straight cath output 500 ml up in a chair uses bedpan RH PIV SL.                    "

## 2025-03-11 NOTE — PLAN OF CARE
"Goal Outcome Evaluation:                      PRIMARY DIAGNOSIS: \"GENERIC\" NURSING  OUTPATIENT/OBSERVATION GOALS TO BE MET BEFORE DISCHARGE:  ADLs back to baseline: No    Activity and level of assistance: assist of 2    Pain status: Improved-controlled with oral pain medications.    Return to near baseline physical activity: Yes     Discharge Planner Nurse   Safe discharge environment identified: Yes  Barriers to discharge: No       Entered by: Holger Fletcher RN 03/11/2025 12:07 PM     Please review provider order for any additional goals.   Nurse to notify provider when observation goals have been met and patient is ready for discharge.  "

## 2025-03-11 NOTE — TREATMENT PLAN
Spoke with Bronwyn Wayne -states he would like home care services for patient upon discharge  Patient states he spoke with: Mallory at [facility] [phone]  Tone was requesting that [facility] care be contacted upon patient's discharge for request of services

## 2025-03-11 NOTE — PROGRESS NOTES
Orthopedic Progress Note      Assessment:    89-year-old female with severe right shoulder degenerative joint disease     Plan:   -No indication for urgent surgical intervention at this time.  -Inpatient pain team is on board, appreciate recommendations for pain control at this time  -Patient needs outpatient follow-up with one of our shoulder surgeons for further management  -Short-term use of sling for comfort as needed could be helpful  - Frequent icing at least 3-4x per day  -Weightbearing as tolerated to right upper extremity  -Best option would be anti-inflammatories of some sort, unfortunately this does not seem to be a good option due to only having 1 kidney contraindicating NSAIDs, allergic to cortisone, allergic to total shoulder arthroplasty implants makes this a tough case.  Not much to do here besides rest and ice at the moment.    Ortho will sign off. Please feel free to reach out with any further questions or concerns.           Subjective:    Patient reports feeling well today.  She is much more alert and oriented today and able to provide further history.  I also had a conversation with her power of , lyla Wayne over the phone.  Treatment is difficult for the patient, she has seen Dr. Nunez as an outpatient for her bilateral knee osteoarthritis.  She has never been able to have total knee replacements done due to severe allergy to the metal that is found in implants.  For this reason likely would not be able to have a reverse total shoulder arthroplasty done for her right shoulder osteoarthritis as well.  She has had multiple severe allergic reactions to cortisone in the past.  She also only has 1 kidney so most likely would not be able to use NSAIDs.  She is not having much improvement in her pain with the sling, in fact the sling worsened her pain.  All questions/concerns answered.      Objective:  BP (!) 172/74 (BP Location: Left arm)   Pulse 75   Temp 98.4  F (36.9  C) (Oral)   Resp  "20   Ht 1.549 m (5' 1\")   Wt 67.6 kg (149 lb 0.5 oz)   LMP  (LMP Unknown)   SpO2 97%   BMI 28.16 kg/m        General: On examination, the patient is resting comfortably and sleepy and oriented to person, place, time, and, and general circumstances   SKIN: There is 1+ effusion.  No erythema.  No breaks in the skin  Pulses:  Brachial and radial pulse is intact and equal bilaterally  Sensation: intact and equal bilaterally to the distal upper extremities.  Tenderness: Tender to palpation globally about the right shoulder  ROM: Unable to demonstrate any active range of motion, due to somnolence she does not really try to demonstrate this activity.  Motor: hand  intact, wrist flexion/extension with resistance intact.  Contralateral side= Full range of motion, Negative joint instability findings, 5/5 motor groups about the joint, Non-tender.        Pertinent Labs   Lab Results: personally reviewed.   No results found for: \"INR\", \"PROTIME\"  Lab Results   Component Value Date    WBC 9.3 03/09/2025    HGB 9.6 (L) 03/09/2025    HCT 29.1 (L) 03/09/2025    MCV 90 03/09/2025     03/09/2025     Lab Results   Component Value Date     (L) 03/09/2025    CO2 24 03/09/2025         Report completed by:  SALONI NOONAN PA-C  Date: 03/11/2025  [facility]              "

## 2025-03-11 NOTE — PLAN OF CARE
PRIMARY DIAGNOSIS: ACUTE PAIN  OUTPATIENT/OBSERVATION GOALS TO BE MET BEFORE DISCHARGE:  1. Pain Status: Improved-controlled with oral pain medications.    2. Return to near baseline physical activity: No    3. Cleared for discharge by consultants (if involved): No    Discharge Planner Nurse   Safe discharge environment identified: Yes  Barriers to discharge: Yes       Entered by: Polly Hair RN 03/11/2025 12:02 AM     Please review provider order for any additional goals.   Nurse to notify provider when observation goals have been met and patient is ready for discharge.Goal Outcome Evaluation:

## 2025-03-11 NOTE — CONSULTS
RENAL CONSULT NOTE    REQUESTING PHYSICIAN: Maranda Brennan NP     REASON FOR CONSULT: need assistance with possibly starting NSAID for short term course for arthirits- with CKD3/atrophic kidney    ASSESSMENT/PLAN:    CKD3: Currently stable at BL Cr 1.2-1.4 thought 2/2 renal atrophy, +/- some acquired renal cysts in the light of renovascular disease since she does have evidence of significant ASCVD  elsewhere. Follows chronically with Dr. Bautista [facility] Neph LV 1/2025. Renal US: L atrophy 7.8 cm left kidney and about 9.9 cm R kidney with multiple cortical cysts that have been seen in the past imaging as well, (CT in 2022 in 2021)y. [facility] [place]. No known Hx of ELLY. Very minimal proteinuria, UPCR 0.08 g/g.   -She ahs an out patient Nephrology follow up appointment 4/9/25 at 12:30 PM with Dr. Bautista.     Acute on chronic Mild Hyponatremia: BL NA~ 130-135. Follow. On lasix 20 m, th (x2 QW).    Anemia: Hg chronically 10-11 2/2 ACD/Inflammatory    Acute on chronic R shoulder pain: no traumatic event associated. }+ right shoulder degeneration/R shoulder X-rays showed severe degenerative changes. Orth consulted and signed off, not a candidate for surgical intervention/allergy to arthroplasty implants. Pain management following. On oxycodone, tylenol, Diclofenac topical (added 3/11/25), and lidocaine patch. 3/11 Will trial Decadron/Benadryl, and if still having ongoing pain in the next few days, could trial short term NSAIDs but not a long term fix, and would need to HOLD ARB surrounding NSAID administration to try and avoid TIMOTHY. The other risk with NSAIDs is administration could worsening Hyponatremia.  -I discussed with pt and son in law, NSAIDS are NOT ideal with atrophic kidney/CKD3 while on ARB/ACE therapy. I may help in short term, but are not a long term solution here.     HFpEF: trops down trending. Appears euvolemic. 1/2025 lasix resumed M, Th (x2 QW) per neph team PTA. Last ECHO 8/2024 EF 45% mod-sev AR/MR and hypokinetic  LV. On BB, ACE therapy. On AC.     CAD: on AC, intolerant of statin.     HAWA: on Clonazepam     Dispo: complicated by poor pain management. TCU/Therapy recommended but complicated by insurance issue. Home with home care is plan.     HPI: Kerry is an 89 YOF w/anxiety disorder, depression, GERD, hyperlipidemia, and stage III CKD, renal atrophy, ASCVD with intracranial atherosclerosis , HLD intolerance to statins, anxiety and multiple antibiotic and blood pressure medication allergies , presenting for . Pt was admitted 3/9/25 via ambulance from [facility] assisted living facility fro R should pain/unable to ambulate.     Denies N, v, c, d, sob, fever, rash, CP, HA, or feeling dizzy  Renal function at BL, Cr 1.2, mild chronic hyponatremia  Has multiple (48) allergies which complicates pain management  Renal consulted for CKD3? Possible NSAID use for pain management. I discussed with NP Warns that we could trial NSAIDs/short term/no longer than 2 WKs, but would likely need to HOLD ARB therapy surrounding use. Okay with Topical diclofenac. Will trial Decadron/Benadryl/Topical diclofenac prior to NSAID use. IT would be optimal to trial Steroid injection but pt reports bad reaction ~ 16 yrs ago/breathing issue    REVIEW OF SYSTEMS:  Complete 12 point review of systems was negative other than those noted in the HPI      Past Medical History:   Diagnosis Date    Anxiety     takes clonazepam    Asthma     per H & P     Chronic kidney disease     stage 3 per H & P     CKD (chronic kidney disease)     Clostridium difficile colitis 11/01/2016    Clostridium difficile infection     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    Clostridium enterocolitis 10/27/2016    CTS (carpal tunnel syndrome)     GERD (gastroesophageal reflux disease)     per H & P     Hiatal hernia     small per H & P     Hyperlipidemia     Hyperlipidemia     Hyperlipidemia     Hypertension     Hypertension     Osteoarthritis of knee     per H & P     Spinal stenosis      per H & P     Vitamin D deficiency     per H & P       Current Facility-Administered Medications   Medication Dose Route Frequency Provider Last Rate Last Admin    acetaminophen (TYLENOL) tablet 975 mg  975 mg Oral TID Kyle Vitale MD   975 mg at 03/11/25 0754    aspirin EC tablet 81 mg  81 mg Oral Daily Carlos Seals MD   81 mg at 03/11/25 0754    bisacodyl (DULCOLAX) suppository 10 mg  10 mg Rectal Daily PRN Kyle Vitale MD        clonazePAM (klonoPIN) tablet 0.5 mg  0.5 mg Oral TID Maranda Brennan NP   0.5 mg at 03/11/25 0755    clopidogrel (PLAVIX) tablet 75 mg  75 mg Oral Daily Carlos Seals MD   75 mg at 03/11/25 0754    diclofenac (VOLTAREN) 1 % topical gel 2 g  2 g Topical 4x Daily Maranda Brennan NP        furosemide (LASIX) tablet 20 mg  20 mg Oral Once per day on Monday Thursday Maranda Brennan NP   20 mg at 03/11/25 0933    hydrALAZINE (APRESOLINE) injection 10 mg  10 mg Intravenous Q4H PRN Kyle Vitale MD   10 mg at 03/10/25 0910    Lidocaine (LIDOCARE) 4 % Patch 2 patch  2 patch Transdermal Q24H Ingrid Gaytan APRN CNP   2 patch at 03/11/25 0754    lidocaine (XYLOCAINE) 2 % external gel   Topical Q4H PRN Kyle Vitale MD   Given at 03/11/25 0215    losartan (COZAAR) tablet 50 mg  50 mg Oral BID Carlos Seals MD   50 mg at 03/11/25 0755    melatonin tablet 1 mg  1 mg Oral At Bedtime PRN Kyle Vitale MD        metoclopramide (REGLAN) tablet 5 mg  5 mg Oral TID PRN Maranda Brennan NP        metoprolol succinate ER (TOPROL XL) 24 hr tablet 75 mg  75 mg Oral At Bedtime Carlos Seals MD   75 mg at 03/10/25 2137    naloxone (NARCAN) injection 0.2 mg  0.2 mg Intravenous Q2 Min PRN Kyle Vitale MD        Or    naloxone (NARCAN) injection 0.4 mg  0.4 mg Intravenous Q2 Min PRN Kyle Vitale MD        Or    naloxone (NARCAN) injection 0.2 mg  0.2 mg Intramuscular Q2 Min PRN Kyle Vitale MD        Or    naloxone (NARCAN) injection 0.4 mg   0.4 mg Intramuscular Q2 Min PRN Kyle Vitale MD        oxyCODONE IR (ROXICODONE) half-tab 2.5 mg  2.5 mg Oral Q4H PRN Maranda Brennan NP        Or    oxyCODONE (ROXICODONE) tablet 5 mg  5 mg Oral Q4H PRN Maranda Brennan NP   5 mg at 03/11/25 0932    polyethylene glycol (MIRALAX) Packet 17 g  17 g Oral BID PRN Kyle Vitale MD        prochlorperazine (COMPAZINE) injection 5 mg  5 mg Intravenous Q6H PRN Kyle Vitale MD        Or    prochlorperazine (COMPAZINE) tablet 5 mg  5 mg Oral Q6H PRN Kyle Vitale MD        senna-docusate (SENOKOT-S/PERICOLACE) 8.6-50 MG per tablet 1 tablet  1 tablet Oral BID PRKyle Sánchez MD        Or    senna-docusate (SENOKOT-S/PERICOLACE) 8.6-50 MG per tablet 2 tablet  2 tablet Oral BID PRKyle Sánchez MD           No current outpatient medications on file.      ALLERGIES/SENSITIVITIES:  Allergies   Allergen Reactions    Buspirone Shortness Of Breath    Ciprofloxacin Hives and Shortness Of Breath     Other reaction(s): Unknown    Cortisone      Other reaction(s): Throat Swelling/Closing, Unknown  Reaction 9-5-94      Hydrocodone-Acetaminophen Shortness Of Breath     Other reaction(s): Unknown    Lansoprazole Shortness Of Breath    Metoprolol Shortness Of Breath     Tolerates metoprolol succinate at home    Nedocromil      Other reaction(s): Throat Swelling/Closing    Niacin Shortness Of Breath     Other reaction(s): Unknown    Albuterol Other (See Comments)     Inhaler had extreme effect on nervous system      Amlodipine      Other reaction(s): Erythema, Unknown    Ampicillin Unknown    Azithromycin      Other reaction(s): *Unknown, Unknown    Cefixime Diarrhea     Other reaction(s): Unknown  Has tolerated ceftriaxone    Cefprozil Nausea and Vomiting     Other reaction(s): Unknown  Has tolerated ceftriaxone    Cefuroxime      Other reaction(s): *Unknown  Has tolerated ceftriaxone    Cephalexin      Other reaction(s): *Unknown, Unknown  Has tolerated  ceftriaxone    Contrast Dye      Other reaction(s): hives    Cyclobenzaprine      Other reaction(s): Sedation    Dextromethorphan-Guaifenesin Nausea     Other reaction(s): Headache    Diltiazem      Other reaction(s): Erythema, Unknown  Ears face arms and chest red and on fire-body tremors      Erythromycin      Other reaction(s): Unknown    Esomeprazole      Other reaction(s): Headache    Ezetimibe Unknown    Fenofibrate Muscle Pain (Myalgia)     Other reaction(s): Unknown    Fluticasone-Salmeterol Other (See Comments)     Elevated blood pressure      Methylprednisolone     Metronidazole     Monosodium Glutamate     Moxifloxacin      Other reaction(s): Dizziness    Nifedipine      Other reaction(s): Edema  Took for 5-93 to 9-94 red and swollen leg      Nsaids      Other reaction(s): Unknown    Ofloxacin      Other reaction(s): *Unknown    Ondansetron      Other reaction(s): Constipation    Parsley Seed     Penicillins Unknown     She doesn't remember other than it occurred as a very young woman     Piper     Pirbuterol Other (See Comments)     Immediate extreme effect on nervous system      Pravastatin      Other reaction(s): Dizziness, Unknown    Pseudoephedrine      Other reaction(s): headache,nausea    Shellfish-Derived Products      Per pt 8/12/23    Simvastatin Muscle Pain (Myalgia)    Spironolactone      Other reaction(s): stomach cramps, nausea    Sulfamethoxazole-Trimethoprim      Other reaction(s): Unknown    Tramadol      Other reaction(s): red ears,high blood press.    Tramadol-Acetaminophen      Other reaction(s): Tachycardia, Unknown    Triamterene Other (See Comments)     Greatly bothered with sun-took medication for three years  Greatly bothered with sun      Diatrizoate Rash    Telmisartan Palpitations     Social History     Tobacco Use    Smoking status: Former     Current packs/day: 3.00     Average packs/day: 3.0 packs/day for 35.0 years (105.0 ttl pk-yrs)     Types: Cigarettes    Smokeless  tobacco: Never    Tobacco comments:     quit 1968   Substance Use Topics    Alcohol use: Not Currently    Drug use: Never     I have reviewed this patient's family history and updated it with pertinent information if needed.  Family History   Problem Relation Age of Onset    Hypertension Mother     Cancer Mother         gallbladder cancer    Myocardial Infarction Father     Hypertension Sister     Alzheimer Disease Sister     Heart Disease Sister     Cancer Brother     Brain Cancer Brother     Pacemaker Mother     Heart Disease Mother     Coronary Artery Disease Father     Heart Disease Father     Heart Failure Sister          PHYSICAL EXAM:  Physical Exam   Temp: 98.1  F (36.7  C) Temp src: Oral BP: (!) 168/71 Pulse: 81   Resp: 18 SpO2: 96 % O2 Device: None (Room air)    Vitals:    03/09/25 2222 03/10/25 1446   Weight: 67 kg (147 lb 9.6 oz) 67.6 kg (149 lb 0.5 oz)     Vital Signs with Ranges  Temp:  [97.5  F (36.4  C)-98.5  F (36.9  C)] 98.1  F (36.7  C)  Pulse:  [67-86] 81  Resp:  [18-20] 18  BP: (147-174)/(64-74) 168/71  SpO2:  [94 %-97 %] 96 %  I/O last 3 completed shifts:  In: -   Out: 150 [Urine:150]      Patient Vitals for the past 72 hrs:   Weight   03/10/25 1446 67.6 kg (149 lb 0.5 oz)   03/09/25 2222 67 kg (147 lb 9.6 oz)       GEN: NAD, aox3  CV: RRR, no JVD  Lung: clear and equal, on RA  Ab: soft and NT  Ext: no edema and well perfused  Skin: no rash  Neuro: NFD  Psych: cooperative      Laboratory:     Recent Labs   Lab 03/09/25 2338 03/04/25  1331   WBC 9.3  --    RBC 3.23*  --    HGB 9.6* 9.9*   HCT 29.1*  --      --        Basic Metabolic Panel:  Recent Labs   Lab 03/09/25 2338 03/04/25  1331   * 131*   POTASSIUM 3.9 4.2   CHLORIDE 99 98   CO2 24 22   BUN 27.2* 24.0*   CR 1.20* 1.27*   * 101*   ISAIAH 9.5 9.5       INRNo lab results found in last 7 days.    Recent Labs   Lab Test 03/09/25 2338 03/04/25  1331   POTASSIUM 3.9 4.2   CHLORIDE 99 98   BUN 27.2* 24.0*      Recent Labs    Lab Test 03/09/25  2338 12/18/24  1405 11/06/24  0411 09/01/24  1046 08/25/24  1322   ALBUMIN 4.0 3.8   < >  --   --    BILITOTAL 0.3 0.2   < >  --   --    ALT 7 <5   < >  --   --    AST 17 26   < >  --   --    PROTEIN  --   --   --  70* Negative    < > = values in this interval not displayed.       Personally reviewed today's laboratory studies      Thank you for involving us in the care of this patient. We will continue to follow along with you.      Antonia Weinberg [facility]  [facility]  [phone]

## 2025-03-11 NOTE — PROGRESS NOTES
Care Management Follow Up    Length of Stay (days): 0    Expected Discharge Date: 03/11/2025     Concerns to be Addressed: discharge planning     Patient plan of care discussed at interdisciplinary rounds: Yes    Anticipated Discharge Disposition: Home, Home Care    Anticipated Discharge Services: Home Care  Anticipated Discharge DME: Other (see comment) (May benefit from a walker instead of the cane. Awaiting PT/OT recommendations.)    Patient/family educated on Medicare website which has current facility and service quality ratings:  yes  Education Provided on the Discharge Plan: Yes  Patient/Family in Agreement with the Plan:      Referrals Placed by CM/KAI:    Private pay costs discussed: private room/amenity fees    Discussed  Partnership in Safe Discharge Planning  document with patient/family: No     Handoff Completed: No, handoff not indicated or clinically appropriate    Additional Information:  KAI met with pt to discuss therapy recommendations of TCU; pt agreeable and would like to stay close to home if possible. Referrals sent and pending.  8:29 AM    With pt having medicare as her primary insurance, she would require a 3 day inpt stay and would need to pay privately. Pt stated that she is not able to afford private pay cost. KAI spoke with pt and nephew via phone who was inquiring about home care in additional to family support. At this time family is not able to stay with pt for the hours that are needed as she lives in indpt living. Pt to stay and work with therapy tomorrow, and address pain. Therapy to re-evaluate 3/12. Referral sent to [facility] if pt is to discharge home with services for RN PT OT JW (pt agreeable); accepted.  10:46 AM    KAI left message for Jordon per his request to discuss home care agencies that he is interested in when pt discharges.  3:17 PM    Care management following.    Brenna Kjellberg, BSW LSW  3/11/2025

## 2025-03-11 NOTE — PLAN OF CARE
Goal Outcome Evaluation:                        Problem: Adult Inpatient Plan of Care  Goal: Absence of Hospital-Acquired Illness or Injury  Intervention: Prevent Skin Injury  Recent Flowsheet Documentation  Taken 3/11/2025 0400 by Polly Hair RN  Body Position: position changed independently  Taken 3/11/2025 0000 by Polly Hair RN  Body Position: position changed independently   ..Neuro: A&Ox4.   Cardiac: Afebrile, VSS.   Respiratory: RA  GI/: Voiding spontaneously in purewick. No BM this shift.  Diet/appetite: Tolerating regular diet. Denies nausea.  Activity: not out of bed  Pain: complains of pain so prn lidocaine and scheduled tylenol given  Skin: No new deficits noted.  Lines: piv SL  Drains: no  Replacements: no

## 2025-03-11 NOTE — PROGRESS NOTES
[facility]    Medicine Progress Note - Hospitalist Service    Date of Admission:  3/9/2025    Assessment & Plan      Kerry Hoffmann is a 89 year old female admitted on 3/9/2025. She was brought to the ED by ambulance from [facility] for evaluation of right shoulder pain unable to ambulate with a cane.  Patient rates pain 10 out of 10 this morning.  Patient was alert and awake will add back low dose oxycodone due to severe pain.  Patient denies nausea vomiting fever chills .  Orthopedic consult recommend conservative treatment, due to allergies patient unable to have surgical intervention or cortisone injections. PT OT recommend TCU.  Case management to coordinate.     #Acute right shoulder pain, atraumatic  -Chronic right shoulder degeneration, larger size when compared to the left side secondary to right-handed cane use for ambulation  -Right shoulder x-ray showed severe degenerative change of the glenohumeral joint with chronic remodeling of the humeral head and glenoid fossa  -Pain management consult: Likely acute on chronic shoulder pain, imaging did not indicate acute cause, glenohumeral osteoarthritis.  Add lidocaine as well as diclofenac.  Patient is very somnolent during the interview and would avoid opioids for this. Could try topical diclofenac- has an NSAID allergy.  Also would avoid opioids given ongoing use of clonazepam 3 times daily chronically.Multimodal Medication Therapy:  Adjuvants: Add lidocaine and ice  Opioids: Hold on oxycodone due to somnolence Non-medication interventions- Ice, PT   Constipation Prophylaxis-none needed Diagnostics & Referrals: Could consider pain clinic referral Interventional: Could consider intra-articular injection after discharge  Follow up /Discharge Recommendations - We recommend prescribing the following at the time of discharge: Lidocaine  -Orthopedic consult:-No indication for urgent surgical intervention at this  time.Patient needs outpatient follow-up with one of our shoulder surgeons for further management Short-term use of sling for comfort as needed could be helpful frequent icing at least 3-4x per day Weightbearing as tolerated to right upper extremity Best option would be anti-inflammatories of some sort, unfortunately this does not seem to be a good option due to only having 1 kidney contraindicating NSAIDs, allergic to cortisone, allergic to total shoulder arthroplasty implants makes this a tough case.  Not much to do here besides rest and ice at the moment.Ortho will sign off. Please feel free to reach out with any further questions or concerns.   -PT evaluation and treatment: Recommend TCU  -Diclofenac topical started  -Nephrology consulted for recommendations of potential NSAID for pain control due to patient's CKD and 1 working kidney: Recommendations pending  Trial IV Decadron 10 mg, chart review showed patient has received in the past without any complications    #Essential hypertension  -Elevated blood pressure likely secondary to acute pain  -Reconcile PTA medications  -Hydralazine as needed    #Coronary artery disease  #Abnormal high-sensitivity troponin T  -Denies angina symptoms  -Chronic mildly elevated troponin  -ECG personally reviewed: Sinus rhythm at 99 bpm, nonspecific ST wave without changes from 11/6/2024    #Drug induced platelet defect  -On PTA aspirin and clopidogrel  -Monitor for bleeding    #Chronic HFpEF  #Anemia of chronic kidney disease  #Chronic hyponatremia  #Chronic kidney disease stage IIIb  -Stable  -Reconcile PTA medications       Observation Goals: -diagnostic tests and consults completed and resulted, -vital signs normal or at patient baseline, Nurse to notify provider when observation goals have been met and patient is ready for discharge.  Diet: Regular Diet Adult    DVT Prophylaxis: Pneumatic Compression Devices  Wiley Catheter: Not present  Lines: None     Cardiac Monitoring:  "None  Code Status: Full Code      Clinically Significant Risk Factors Present on Admission         # Hyponatremia: Lowest Na = 132 mmol/L in last 2 days, will monitor as appropriate         # Drug Induced Platelet Defect: home medication list includes an antiplatelet medication   # Hypertension: Noted on problem list  # Chronic heart failure with preserved ejection fraction: heart failure noted on problem list and last echo with EF >50%          # Overweight: Estimated body mass index is 28.16 kg/m  as calculated from the following:    Height as of this encounter: 1.549 m (5' 1\").    Weight as of this encounter: 67.6 kg (149 lb 0.5 oz).       # Financial/Environmental Concerns: none  # Asthma: noted on problem list        Social Drivers of Health    Food Insecurity: High Risk (3/10/2025)    Food Insecurity     Within the past 12 months, did you worry that your food would run out before you got money to buy more?: Yes     Within the past 12 months, did the food you bought just not last and you didn t have money to get more?: Yes   Tobacco Use: Medium Risk (11/6/2024)    Patient History     Smoking Tobacco Use: Former     Smokeless Tobacco Use: Never   Transportation Needs: High Risk (3/10/2025)    Transportation Needs     Within the past 12 months, has lack of transportation kept you from medical appointments, getting your medicines, non-medical meetings or appointments, work, or from getting things that you need?: Yes          Disposition Plan     Medically Ready for Discharge: Anticipated in 2-4 Days         The patient's care was discussed with the Attending Physician, Dr. Koenig .    Maranda Brennan NP  Hospitalist Service  [facility]  Securely message with [employer] (more info)  Text page via [employer] Paging/Directory   ______________________________________________________________________    Interval History   Patient complaining of 10 out of 10 right shoulder pain this morning  States she is " unable to move arm due to pain  Patient denies chest pain shortness of breath or dizziness  Denies fever chills  Positive CMS right LE    Physical Exam   Vital Signs: Temp: 98.1  F (36.7  C) Temp src: Oral BP: (!) 168/71 Pulse: 81   Resp: 18 SpO2: 96 % O2 Device: None (Room air)    Weight: 149 lbs .5 oz    Constitutional: awake, alert, cooperative, no apparent distress, and appears stated age  Hematologic / Lymphatic: no cervical lymphadenopathy and no supraclavicular lymphadenopathy  Respiratory: No increased work of breathing, good air exchange, clear to auscultation bilaterally, no crackles or wheezing  Cardiovascular: Normal apical impulse, regular rate and rhythm, normal S1 and S2, no S3 or S4, and no murmur noted  GI: No scars, normal bowel sounds, soft, non-distended, non-tender, no masses palpated, no hepatosplenomegally  Musculoskeletal: Right shoulder tender, larger than the left but without effusion, warmth or erythema.  Painful passive and active range of motion.    Neurologic: Awake, alert, oriented to name, place and time.    Neuropsychiatric: General: normal, calm, and normal eye contact    Medical Decision Making       50 MINUTES SPENT BY ME on the date of service doing chart review, history, exam, documentation & further activities per the note.      Data         Imaging results reviewed over the past 24 hrs:   No results found for this or any previous visit (from the past 24 hours).

## 2025-03-12 VITALS
DIASTOLIC BLOOD PRESSURE: 68 MMHG | RESPIRATION RATE: 16 BRPM | SYSTOLIC BLOOD PRESSURE: 152 MMHG | TEMPERATURE: 97.9 F | BODY MASS INDEX: 28.14 KG/M2 | HEIGHT: 61 IN | HEART RATE: 69 BPM | OXYGEN SATURATION: 98 % | WEIGHT: 149.03 LBS

## 2025-03-12 LAB
ANION GAP SERPL CALCULATED.3IONS-SCNC: 12 MMOL/L (ref 7–15)
BUN SERPL-MCNC: 38.4 MG/DL (ref 8–23)
CALCIUM SERPL-MCNC: 9.2 MG/DL (ref 8.8–10.4)
CHLORIDE SERPL-SCNC: 95 MMOL/L (ref 98–107)
CREAT SERPL-MCNC: 1.23 MG/DL (ref 0.51–0.95)
EGFRCR SERPLBLD CKD-EPI 2021: 42 ML/MIN/1.73M2
ERYTHROCYTE [DISTWIDTH] IN BLOOD BY AUTOMATED COUNT: 13.5 % (ref 10–15)
GLUCOSE SERPL-MCNC: 129 MG/DL (ref 70–99)
HCO3 SERPL-SCNC: 21 MMOL/L (ref 22–29)
HCT VFR BLD AUTO: 27.6 % (ref 35–47)
HGB BLD-MCNC: 9.1 G/DL (ref 11.7–15.7)
MCH RBC QN AUTO: 29.5 PG (ref 26.5–33)
MCHC RBC AUTO-ENTMCNC: 33 G/DL (ref 31.5–36.5)
MCV RBC AUTO: 90 FL (ref 78–100)
OSMOLALITY SERPL: 275 MMOL/KG (ref 280–301)
OSMOLALITY UR: 424 MMOL/KG (ref 100–1200)
PLATELET # BLD AUTO: 254 10E3/UL (ref 150–450)
POTASSIUM SERPL-SCNC: 4.1 MMOL/L (ref 3.4–5.3)
RBC # BLD AUTO: 3.08 10E6/UL (ref 3.8–5.2)
SODIUM SERPL-SCNC: 128 MMOL/L (ref 135–145)
SODIUM UR-SCNC: <20 MMOL/L
TSH SERPL DL<=0.005 MIU/L-ACNC: 0.67 UIU/ML (ref 0.3–4.2)
WBC # BLD AUTO: 11 10E3/UL (ref 4–11)

## 2025-03-12 PROCEDURE — 97110 THERAPEUTIC EXERCISES: CPT | Mod: GP

## 2025-03-12 PROCEDURE — 96375 TX/PRO/DX INJ NEW DRUG ADDON: CPT

## 2025-03-12 PROCEDURE — 250N000013 HC RX MED GY IP 250 OP 250 PS 637

## 2025-03-12 PROCEDURE — 83935 ASSAY OF URINE OSMOLALITY: CPT

## 2025-03-12 PROCEDURE — 85014 HEMATOCRIT: CPT

## 2025-03-12 PROCEDURE — 80048 BASIC METABOLIC PNL TOTAL CA: CPT

## 2025-03-12 PROCEDURE — 99214 OFFICE O/P EST MOD 30 MIN: CPT

## 2025-03-12 PROCEDURE — 250N000013 HC RX MED GY IP 250 OP 250 PS 637: Performed by: HOSPITALIST

## 2025-03-12 PROCEDURE — 84300 ASSAY OF URINE SODIUM: CPT

## 2025-03-12 PROCEDURE — 36415 COLL VENOUS BLD VENIPUNCTURE: CPT

## 2025-03-12 PROCEDURE — 250N000013 HC RX MED GY IP 250 OP 250 PS 637: Performed by: PAIN MEDICINE

## 2025-03-12 PROCEDURE — 84443 ASSAY THYROID STIM HORMONE: CPT

## 2025-03-12 PROCEDURE — 96376 TX/PRO/DX INJ SAME DRUG ADON: CPT

## 2025-03-12 PROCEDURE — 97530 THERAPEUTIC ACTIVITIES: CPT | Mod: GP

## 2025-03-12 PROCEDURE — 250N000011 HC RX IP 250 OP 636

## 2025-03-12 PROCEDURE — G0378 HOSPITAL OBSERVATION PER HR: HCPCS

## 2025-03-12 PROCEDURE — 99239 HOSP IP/OBS DSCHRG MGMT >30: CPT | Mod: FS

## 2025-03-12 PROCEDURE — 83930 ASSAY OF BLOOD OSMOLALITY: CPT

## 2025-03-12 PROCEDURE — 99207 PR APP CREDIT; MD BILLING SHARED VISIT: CPT | Mod: FS | Performed by: INTERNAL MEDICINE

## 2025-03-12 RX ORDER — OXYCODONE HYDROCHLORIDE 5 MG/1
2.5 TABLET ORAL EVERY 4 HOURS PRN
Qty: 10 TABLET | Refills: 0 | Status: SHIPPED | OUTPATIENT
Start: 2025-03-12 | End: 2025-03-22

## 2025-03-12 RX ORDER — DEXAMETHASONE SODIUM PHOSPHATE 10 MG/ML
10 INJECTION, SOLUTION INTRAMUSCULAR; INTRAVENOUS ONCE
Status: COMPLETED | OUTPATIENT
Start: 2025-03-12 | End: 2025-03-12

## 2025-03-12 RX ORDER — CLONAZEPAM 0.5 MG/1
0.5 TABLET ORAL 3 TIMES DAILY
Qty: 15 TABLET | Refills: 0 | Status: SHIPPED | OUTPATIENT
Start: 2025-03-12

## 2025-03-12 RX ORDER — AMOXICILLIN 250 MG
1 CAPSULE ORAL 2 TIMES DAILY PRN
Status: SHIPPED
Start: 2025-03-12

## 2025-03-12 RX ORDER — METOPROLOL SUCCINATE 100 MG/1
100 TABLET, EXTENDED RELEASE ORAL AT BEDTIME
Status: DISCONTINUED | OUTPATIENT
Start: 2025-03-12 | End: 2025-03-12 | Stop reason: HOSPADM

## 2025-03-12 RX ORDER — LIDOCAINE 4 G/G
2 PATCH TOPICAL EVERY 24 HOURS
Status: SHIPPED
Start: 2025-03-12

## 2025-03-12 RX ORDER — POLYETHYLENE GLYCOL 3350 17 G/17G
17 POWDER, FOR SOLUTION ORAL DAILY
Status: SHIPPED
Start: 2025-03-12

## 2025-03-12 RX ADMIN — ACETAMINOPHEN 975 MG: 325 TABLET ORAL at 09:17

## 2025-03-12 RX ADMIN — CLOPIDOGREL BISULFATE 75 MG: 75 TABLET ORAL at 09:18

## 2025-03-12 RX ADMIN — LOSARTAN POTASSIUM 50 MG: 50 TABLET, FILM COATED ORAL at 09:18

## 2025-03-12 RX ADMIN — DICLOFENAC SODIUM 2 G: 10 GEL TOPICAL at 13:23

## 2025-03-12 RX ADMIN — ASPIRIN 81 MG: 81 TABLET, COATED ORAL at 09:18

## 2025-03-12 RX ADMIN — Medication 15 G: at 13:24

## 2025-03-12 RX ADMIN — ACETAMINOPHEN 975 MG: 325 TABLET ORAL at 13:24

## 2025-03-12 RX ADMIN — DEXAMETHASONE SODIUM PHOSPHATE 10 MG: 10 INJECTION INTRAMUSCULAR; INTRAVENOUS at 10:49

## 2025-03-12 RX ADMIN — LIDOCAINE 4% 2 PATCH: 40 PATCH TOPICAL at 09:19

## 2025-03-12 RX ADMIN — DIPHENHYDRAMINE HYDROCHLORIDE 25 MG: 50 INJECTION INTRAMUSCULAR; INTRAVENOUS at 10:46

## 2025-03-12 RX ADMIN — DICLOFENAC SODIUM 2 G: 10 GEL TOPICAL at 09:19

## 2025-03-12 RX ADMIN — CLONAZEPAM 0.5 MG: 0.5 TABLET ORAL at 13:24

## 2025-03-12 RX ADMIN — CLONAZEPAM 0.5 MG: 0.5 TABLET ORAL at 09:18

## 2025-03-12 ASSESSMENT — ACTIVITIES OF DAILY LIVING (ADL)
ADLS_ACUITY_SCORE: 65
ADLS_ACUITY_SCORE: 66
ADLS_ACUITY_SCORE: 66
ADLS_ACUITY_SCORE: 65
ADLS_ACUITY_SCORE: 66
ADLS_ACUITY_SCORE: 62
ADLS_ACUITY_SCORE: 65
ADLS_ACUITY_SCORE: 66
ADLS_ACUITY_SCORE: 65
ADLS_ACUITY_SCORE: 66

## 2025-03-12 NOTE — PLAN OF CARE
PRIMARY DIAGNOSIS: ACUTE PAIN  OUTPATIENT/OBSERVATION GOALS TO BE MET BEFORE DISCHARGE:  1. Pain Status: Improved-controlled with oral pain medications.    2. Return to near baseline physical activity: No    3. Cleared for discharge by consultants (if involved): No    Discharge Planner Nurse   Safe discharge environment identified: Yes  Barriers to discharge: Yes       Entered by: Kaylyn Evangelista RN 03/12/2025 12:24 PM

## 2025-03-12 NOTE — PLAN OF CARE
PRIMARY DIAGNOSIS: ACUTE PAIN  OUTPATIENT/OBSERVATION GOALS TO BE MET BEFORE DISCHARGE:  1. Pain Status: Improved-controlled with oral pain medications.    2. Return to near baseline physical activity: Yes    3. Cleared for discharge by consultants (if involved): Yes    Discharge Planner Nurse   Safe discharge environment identified: Yes  Barriers to discharge: No       Entered by: Chelsie Gurrola RN 03/12/2025 5:13 AM     Please review provider order for any additional goals.   Nurse to notify provider when observation goals have been met and patient is ready for discharge.Goal Outcome Evaluation:

## 2025-03-12 NOTE — DISCHARGE SUMMARY
"[facility]  Hospitalist Discharge Summary      Date of Admission:  3/9/2025  Date of Discharge:  3/12/2025  Discharging Provider: Maranda Brennan NP  Discharge Service: Hospitalist Service    Discharge Diagnoses   Chronic right shoulder pain    Clinically Significant Risk Factors     # Overweight: Estimated body mass index is 28.16 kg/m  as calculated from the following:    Height as of this encounter: 1.549 m (5' 1\").    Weight as of this encounter: 67.6 kg (149 lb 0.5 oz).       Follow-ups Needed After Discharge   Follow-up Appointments       Follow Up Care      Please follow-up with a shoulder surgeon after discharge at [facility]. Call our scheduling line at [phone] to make an appointment, if you do not already have one scheduled.        Follow Up Care      Please follow-up with a shoulder surgeon following discharge at [facility]. Call our scheduling line at [phone] to make an appointment, if you do not already have one scheduled.        Follow Up and recommended labs and tests      Follow up with Nursing home physician.      Follow up with Orthopedics for hospital follow up in 1 week    Follow-up with nephrology outpatient as scheduled    Follow-up in 5 to 7 days recheck BMP                Unresulted Labs Ordered in the Past 30 Days of this Admission       Date and Time Order Name Status Description    3/12/2025  9:41 AM Sodium random urine In process     3/12/2025  9:41 AM Osmolality In process     3/12/2025  9:41 AM Osmolality urine In process             Discharge Disposition   Discharged to home  Condition at discharge: Stable    Hospital Course      Kerry Hoffmann is a 89 year old female admitted on 3/9/2025. She was brought to the ED by ambulance from [facility] for evaluation of right shoulder pain unable to ambulate with a cane.  Patient rates pain 10 out of 10 this morning.  Patient was alert and awake will add back low dose oxycodone " due to severe pain.  Patient denies nausea vomiting fever chills .  Orthopedic consult recommend conservative treatment, due to allergies patient unable to have surgical intervention or cortisone injections.  IV Decadron given yesterday and today patient has much improvement, increased range of motion and decreased pain .  Patient states she feels well and would like to discharge.  Patient and  family agree with plan to discharge to TCU and follow-up with primary care doctor and orthopedics outpatient. PT and OT recommend TCU.  Case management to coordinate.      #Acute right shoulder pain, atraumatic  -Chronic right shoulder degeneration, larger size when compared to the left side secondary to right-handed cane use for ambulation  -Right shoulder x-ray showed severe degenerative change of the glenohumeral joint with chronic remodeling of the humeral head and glenoid fossa  -Pain management consult: Likely acute on chronic shoulder pain, imaging did not indicate acute cause, glenohumeral osteoarthritis.  Add lidocaine as well as diclofenac.  Patient is very somnolent during the interview and would avoid opioids for this. Could try topical diclofenac- has an NSAID allergy.  Also would avoid opioids given ongoing use of clonazepam 3 times daily chronically.Multimodal Medication Therapy:  Adjuvants: Add lidocaine and ice  Opioids: Hold on oxycodone due to somnolence Non-medication interventions- Ice, PT   Constipation Prophylaxis-none needed Diagnostics & Referrals: Could consider pain clinic referral Interventional: Could consider intra-articular injection after discharge  Follow up /Discharge Recommendations - We recommend prescribing the following at the time of discharge: Lidocaine  -Orthopedic consult:-No indication for urgent surgical intervention at this time.Patient needs outpatient follow-up with one of our shoulder surgeons for further management Short-term use of sling for comfort as needed could be helpful  frequent icing at least 3-4x per day Weightbearing as tolerated to right upper extremity Best option would be anti-inflammatories of some sort, unfortunately this does not seem to be a good option due to only having 1 kidney contraindicating NSAIDs, allergic to cortisone, allergic to total shoulder arthroplasty implants makes this a tough case.  Not much to do here besides rest and ice at the moment.Ortho will sign off. Please feel free to reach out with any further questions or concerns.   -PT evaluation and treatment: Recommend TCU  -Lidocaine patch  -Diclofenac topical started  -Nephrology consulted: Urea PO x1 today. Increase Metoprolol to 100 mg Q HS. Utilize PRN Hydralazine. With controlled pain, no plan for NSAIDs, Nephrology will sign off. Please re-consult if need arise. Renal okay with Discharge when medically cleared. She has an out patient Nephrology follow up appointment 4/9/25 at 12:30 PM with Dr. Bautista.[facility] daily  Trial IV Decadron 10 mg with Benadryl, chart review showed patient has received in the past without any complications, patient tolerated well  IV Decadron given today with Benadryl-patient states pain much improved, pain improved and mobility increased  Oxycodone prescribed for TCU     #Essential hypertension  -Elevated blood pressure likely secondary to acute pain  -Reconcile PTA medications  -Hydralazine as needed     #Coronary artery disease  #Abnormal high-sensitivity troponin T  -Denies angina symptoms  -Chronic mildly elevated troponin  -ECG personally reviewed: Sinus rhythm at 99 bpm, nonspecific ST wave without changes from 11/6/2024     #Drug induced platelet defect  -On PTA aspirin and clopidogrel  -Monitor for bleeding     #Chronic HFpEF  #Anemia of chronic kidney disease  #Chronic hyponatremia  #Chronic kidney disease stage IIIb  -Stable  -Resume PTA medications  Follow-up primary care doctor 5 to 7 days BMP          Consultations This Hospital Stay   CARE MANAGEMENT / SOCIAL WORK  IP CONSULT  PHYSICAL THERAPY ADULT IP CONSULT  ORTHOPEDIC SURGERY IP CONSULT  CARE MANAGEMENT / SOCIAL WORK IP CONSULT  PAIN MANAGEMENT ADULT IP CONSULT  NEPHROLOGY IP CONSULT  PHYSICAL THERAPY ADULT IP CONSULT  OCCUPATIONAL THERAPY ADULT IP CONSULT    Code Status   Full Code    Time Spent on this Encounter   I, Maranda Brennan NP, personally saw the patient today and spent greater than 30 minutes discharging this patient.       Maranda Brennan NP  [facility] EXTENDED RECOVERY AND SHORT STAY  [address] [place] [id number]  Phone: [phone]  Fax: [phone]  ______________________________________________________________________    Physical Exam   Vital Signs: Temp: 97.9  F (36.6  C) Temp src: Oral BP: (!) 152/68 Pulse: 69   Resp: 16 SpO2: 98 % O2 Device: None (Room air)    Weight: 149 lbs .5 oz  Constitutional: awake, alert, cooperative, no apparent distress, and appears stated age  Hematologic / Lymphatic: no cervical lymphadenopathy and no supraclavicular lymphadenopathy  Respiratory: No increased work of breathing, good air exchange, clear to auscultation bilaterally, no crackles or wheezing  Cardiovascular: Normal apical impulse, regular rate and rhythm, normal S1 and S2, no S3 or S4, and no murmur noted  GI: No scars, normal bowel sounds, soft, non-distended, non-tender, no masses palpated, no hepatosplenomegally  Skin: no bruising or bleeding, normal skin color, texture, turgor, and no redness, warmth, or swelling  Musculoskeletal: Right shoulder tender, larger than the left but without effusion, warmth or erythema.  Painful passive and active range of motion.   Neurologic: Awake, alert, oriented to name, place and time.  Cranial nerves II-XII are grossly intact.  Motor is 5 out of 5 bilaterally.  Cerebellar finger to nose, heel to shin intact.  Sensory is intact.  Babinski down going, Romberg negative, and gait is normal.  Neuropsychiatric: General: normal, calm, and  normal eye contact       Primary Care Physician   Kayyln Bolanos    Discharge Orders      Primary Care - Care Coordination Referral      Med Therapy Management Referral      Follow Up Care    Please follow-up with a shoulder surgeon after discharge at [facility]. Call our scheduling line at [phone] to make an appointment, if you do not already have one scheduled.     NO active or passive range of motion    NO active or passive range of motion of the shoulder     Elbow and Wrist range of motion    Active and passive elbow range of motion is permitted. Perform Fist pumps every hour while you are awake.     Weight bearing as tolerated    Weight bearing as tolerated on your operative extremity.     Sling WITHOUT pillow instructions    Wear sling at all times.  May remove sling only for exercises, hygiene, and skin checks twice daily.  Contact your surgeon team if you have any redness or irritation from your sling.     Follow Up Care    Please follow-up with a shoulder surgeon following discharge at [facility]. Call our scheduling line at [phone] to make an appointment, if you do not already have one scheduled.     Discharge Instructions    ROM as tolerated     Weight bearing as tolerated    Weight bearing as tolerated on your operative extremity.     Sling WITHOUT pillow instructions    Sling to be worn as needed for comfort     General info for SNF    Length of Stay Estimate: Short Term Care: Estimated # of Days <30  Condition at Discharge: Stable  Level of care:skilled   Rehabilitation Potential: Fair  Admission H&P remains valid and up-to-date: Yes  Recent Chemotherapy: N/A  Use Nursing Home Standing Orders: Yes     Mantoux instructions    Give two-step Mantoux (PPD) Per Facility Policy Yes     Follow Up and recommended labs and tests    Follow up with Nursing home physician.      Follow up with Orthopedics for hospital follow up in 1 week     Reason for your hospital stay    Chronic right  shoulder pain     Activity - Up with nursing assistance     Additional Discharge Instructions    Sling right arm for comfort     Full Code     Physical Therapy Adult Consult    Evaluate and treat as clinically indicated.    Reason: right shoulder pain- difficulty using walker- ambulation     Occupational Therapy Adult Consult    Evaluate and treat as clinically indicated.    Reason:  right shoulder pain- difficulty using walker- ambulation     Fall precautions     Diet    Follow this diet upon discharge: Current Diet:Orders Placed This Encounter      Regular Diet Adult       Significant Results and Procedures   Most Recent 3 CBC's:  Recent Labs   Lab Test 03/12/25  0636 03/09/25  2338 03/04/25  1331 12/22/24 2000   WBC 11.0 9.3  --  5.2   HGB 9.1* 9.6* 9.9* 9.5*   MCV 90 90  --  89    276  --  266     Most Recent 3 BMP's:  Recent Labs   Lab Test 03/12/25  0636 03/09/25  2338 03/04/25  1331   * 132* 131*   POTASSIUM 4.1 3.9 4.2   CHLORIDE 95* 99 98   CO2 21* 24 22   BUN 38.4* 27.2* 24.0*   CR 1.23* 1.20* 1.27*   ANIONGAP 12 9 11   ISAIAH 9.2 9.5 9.5   * 101* 101*   ,   Results for orders placed or performed during the hospital encounter of 03/09/25   XR Shoulder Right G/E 3 Views    Narrative    EXAM: XR SHOULDER RIGHT G/E 3 VIEWS  LOCATION: [facility]  DATE: 3/9/2025    INDICATION: ATRAUMATIC PAIN,S WELLING  COMPARISON: None.      Impression    IMPRESSION: Severe degenerative change of the glenohumeral joint with chronic remodeling of the humeral head and glenoid fossa. No displaced fracture or dislocation.       Discharge Medications   Current Discharge Medication List        START taking these medications    Details   diclofenac (VOLTAREN) 1 % topical gel Apply 2 g topically 4 times daily.    Associated Diagnoses: Chronic right shoulder pain      Lidocaine (LIDOCARE) 4 % Patch Place 2 patches over 12 hours onto the skin every 24 hours. To prevent lidocaine toxicity,  patient should be patch free for 12 hrs daily.    Associated Diagnoses: Chronic right shoulder pain      oxyCODONE (ROXICODONE) 5 MG tablet Take 0.5 tablets (2.5 mg) by mouth every 4 hours as needed for moderate pain.  Qty: 10 tablet, Refills: 0    Associated Diagnoses: Chronic right shoulder pain      polyethylene glycol (MIRALAX) 17 GM/Dose powder Take 17 g by mouth daily.    Associated Diagnoses: Chronic right shoulder pain      senna-docusate (SENOKOT-S/PERICOLACE) 8.6-50 MG tablet Take 1 tablet by mouth 2 times daily as needed for constipation.    Associated Diagnoses: Chronic right shoulder pain           CONTINUE these medications which have NOT CHANGED    Details   acetaminophen (TYLENOL) 500 MG tablet Take 1,000 mg by mouth 3 times daily      aspirin 81 MG EC tablet Take 81 mg by mouth daily      Calcium Carbonate Antacid (TUMS ULTRA 1000 PO) Take 1 chew tab by mouth nightly as needed      Cholecalciferol (VITAMIN D3) 50 MCG (2000 UT) CAPS Take 1 tablet by mouth daily      clonazePAM (KLONOPIN) 0.5 MG tablet Take 0.5 mg by mouth 3 times daily      clopidogrel (PLAVIX) 75 MG tablet TAKE 1 TABLET(75 MG) BY MOUTH DAILY  Qty: 90 tablet, Refills: 1    Associated Diagnoses: Intracranial atherosclerosis      furosemide (LASIX) 20 MG tablet Take 20 mg by mouth twice a week. Monday and Thursday      losartan (COZAAR) 50 MG tablet Take 50 mg by mouth 2 times daily      metoclopramide (REGLAN) 5 MG tablet Take 1 tablet (5 mg) by mouth 3 times daily as needed for vomiting.  Qty: 10 tablet, Refills: 0      metoprolol succinate ER (TOPROL XL) 25 MG 24 hr tablet Take 75 mg by mouth at bedtime.      vitamin C (ASCORBIC ACID) 250 MG tablet Take 250 mg by mouth daily.           Allergies   Allergies   Allergen Reactions    Buspirone Shortness Of Breath    Ciprofloxacin Hives and Shortness Of Breath     Other reaction(s): Unknown    Cortisone      Other reaction(s): Throat Swelling/Closing, Unknown  Reaction 9-5-94  Tolerated  dexamethasone during hospitalization 3/11/2025      Hydrocodone-Acetaminophen Shortness Of Breath     Other reaction(s): Unknown    Lansoprazole Shortness Of Breath    Metoprolol Shortness Of Breath     Tolerates metoprolol succinate at home    Nedocromil      Other reaction(s): Throat Swelling/Closing    Niacin Shortness Of Breath     Other reaction(s): Unknown    Albuterol Other (See Comments)     Inhaler had extreme effect on nervous system      Amlodipine      Other reaction(s): Erythema, Unknown    Ampicillin Unknown    Azithromycin      Other reaction(s): *Unknown, Unknown    Cefixime Diarrhea     Other reaction(s): Unknown  Has tolerated ceftriaxone    Cefprozil Nausea and Vomiting     Other reaction(s): Unknown  Has tolerated ceftriaxone    Cefuroxime      Other reaction(s): *Unknown  Has tolerated ceftriaxone    Cephalexin      Other reaction(s): *Unknown, Unknown  Has tolerated ceftriaxone    Contrast Dye      Other reaction(s): hives    Cyclobenzaprine      Other reaction(s): Sedation    Dextromethorphan-Guaifenesin Nausea     Other reaction(s): Headache    Diltiazem      Other reaction(s): Erythema, Unknown  Ears face arms and chest red and on fire-body tremors      Erythromycin      Other reaction(s): Unknown    Esomeprazole      Other reaction(s): Headache    Ezetimibe Unknown    Fenofibrate Muscle Pain (Myalgia)     Other reaction(s): Unknown    Fluticasone-Salmeterol Other (See Comments)     Elevated blood pressure      Methylprednisolone      Tolerated dexamethasone during hospitalization 3/11/2025    Metronidazole     Monosodium Glutamate     Moxifloxacin      Other reaction(s): Dizziness    Nifedipine      Other reaction(s): Edema  Took for 5-93 to 9-94 red and swollen leg      Nsaids      Other reaction(s): Unknown    Ofloxacin      Other reaction(s): *Unknown    Ondansetron      Other reaction(s): Constipation    Parsley Seed     Penicillins Unknown     She doesn't remember other than it  occurred as a very young woman     Piper     Pirbuterol Other (See Comments)     Immediate extreme effect on nervous system      Pravastatin      Other reaction(s): Dizziness, Unknown    Pseudoephedrine      Other reaction(s): headache,nausea    Shellfish-Derived Products      Per pt 8/12/23    Simvastatin Muscle Pain (Myalgia)    Spironolactone      Other reaction(s): stomach cramps, nausea    Sulfamethoxazole-Trimethoprim      Other reaction(s): Unknown    Tramadol      Other reaction(s): red ears,high blood press.    Tramadol-Acetaminophen      Other reaction(s): Tachycardia, Unknown    Triamterene Other (See Comments)     Greatly bothered with sun-took medication for three years  Greatly bothered with sun      Diatrizoate Rash    Telmisartan Palpitations

## 2025-03-12 NOTE — PROGRESS NOTES
RENAL PROGRESS NOTE      HPI: Kerry is an 89 YOF w/anxiety disorder, depression, GERD, hyperlipidemia, and stage III CKD, renal atrophy, ASCVD with intracranial atherosclerosis , HLD intolerance to statins, anxiety and multiple antibiotic and blood pressure medication allergies , presenting for . Pt was admitted 3/9/25 via ambulance from [facility] assisted living facility fro R should pain/unable to ambulate.     ASSESSMENT & PLAN:     PLAN:   -Urea PO x1 today  -Increase Metoprolol to 100 mg Q HS.   -Utilize PRN Hydralazine  -With controlled pain, no plan for NSAIDs, Nephrology will sign off. Please re-consult if need arise.   -Renal okay with Discharge when medically cleared.   -She has an out patient Nephrology follow up appointment 4/9/25 at 12:30 PM with Dr. Bautista.  -BMP daily    CKD3: Currently stable at BL Cr 1.2-1.4 thought 2/2 renal atrophy, +/- some acquired renal cysts in the light of renovascular disease since she does have evidence of significant ASCVD  elsewhere. Follows chronically with Dr. Bautista [facility] 1/2025. Renal US: L atrophy 7.8 cm left kidney and about 9.9 cm R kidney with multiple cortical cysts that have been seen in the past imaging as well, (CT in 2022 in 2021)y. UA [place]. No known Hx of ELLY. Very minimal proteinuria, UPCR 0.08 g/g.      Acute on chronic Mild Hyponatremia: BL NA~ 130-135. Follow. On lasix 20 m, th (x2 QW). Check TSH, U osm, s OSM, spot urine Na+. Urea X 1. 2/2?poor solute intake, +/- ?ADH component with uncontrolled pain. TSH WNL     Anemia: Hg chronically 10-11 2/2 ACD/Inflammatory     Acute on chronic R shoulder pain: no traumatic event associated. }+ right shoulder degeneration/R shoulder X-rays showed severe degenerative changes. Orth consulted and signed off, not a candidate for surgical intervention/allergy to arthroplasty implants. Pain management following. On oxycodone, tylenol, Diclofenac topical (added 3/11/25), and lidocaine patch. 3/11 Will trial  Decadron/Benadryl, and if still having ongoing pain in the next few days, could trial short term NSAIDs but not a long term fix, and would need to HOLD ARB surrounding NSAID administration to try and avoid TIMOTHY. The other risk with NSAIDs is administration could worsening Hyponatremia.  -I discussed with pt and son in law, NSAIDS are NOT ideal with atrophic kidney/CKD3 while on ARB/ACE therapy. I may help in short term, but are not a long term solution here.      HFpEF: trops down trending. Appears euvolemic. 1/2025 lasix resumed M, Th (x2 QW) per neph team PTA. Last ECHO 8/2024 EF 45% mod-sev AR/MR and hypokinetic LV. On BB, ACE therapy. On AC.      CAD: on AC, intolerant of statin.      HAWA: on Clonazepam      Dispo: complicated by poor pain management. TCU/Therapy recommended but complicated by insurance issue. Home with home care is plan.     SUBJECTIVE:      Pt resting in bed appears comfortable  Able to move R arm today, pain controlled.   Tolerated decadron, no need for NSAIDS  Slight drop in NA+ suspect poor solute intake, +/- ADH process with uncontrolled paion. Give urea x1, no need to continue. Acute on chronic hyponatremia, likely has reset  osmostat.   Denies HA, feeling dizzy, SOB, fever, rash or CP  Plan is to go home, with cares  Discussed renal has no objection to discharge, will sign off.   Discussed labs/plan and answered all questions.     OBJECTIVE:  Physical Exam   Temp: 97.7  F (36.5  C) Temp src: Oral BP: (!) 163/75 Pulse: 68   Resp: 16 SpO2: 96 % O2 Device: None (Room air)    Vitals:    03/09/25 2222 03/10/25 1446   Weight: 67 kg (147 lb 9.6 oz) 67.6 kg (149 lb 0.5 oz)     Vital Signs with Ranges  Temp:  [97.4  F (36.3  C)-98.1  F (36.7  C)] 97.7  F (36.5  C)  Pulse:  [65-81] 68  Resp:  [16-18] 16  BP: (147-169)/(65-75) 163/75  SpO2:  [93 %-97 %] 96 %  I/O last 3 completed shifts:  In: 240 [P.O.:240]  Out: 500 [Urine:500]        Patient Vitals for the past 72 hrs:   Weight   03/10/25 1446  67.6 kg (149 lb 0.5 oz)   03/09/25 2222 67 kg (147 lb 9.6 oz)     Intake/Output Summary (Last 24 hours) at 3/12/2025 0937  Last data filed at 3/12/2025 0613  Gross per 24 hour   Intake 240 ml   Output 500 ml   Net -260 ml       PHYSICAL EXAM:  GEN: NAD, aox3  CV: RRR, no JVD  Lung: clear and equal, on RA  Ab: soft and NT  Ext: no edema and well perfused  Skin: no rash  Neuro: NFD  Psych: cooperative    LABORATORY STUDIES:     Recent Labs   Lab 03/12/25  0636 03/09/25  2338   WBC 11.0 9.3   RBC 3.08* 3.23*   HGB 9.1* 9.6*   HCT 27.6* 29.1*    276       Basic Metabolic Panel:  Recent Labs   Lab 03/12/25  0636 03/09/25  2338   * 132*   POTASSIUM 4.1 3.9   CHLORIDE 95* 99   CO2 21* 24   BUN 38.4* 27.2*   CR 1.23* 1.20*   * 101*   ISAIAH 9.2 9.5       INRNo lab results found in last 7 days.     Recent Labs   Lab Test 03/12/25  0636 03/09/25  2338   WBC 11.0 9.3   HGB 9.1* 9.6*    276       Personally reviewed current labs    Antonia ALVAREZ-BC  [facility]  [phone]

## 2025-03-12 NOTE — PROGRESS NOTES
"PRIMARY DIAGNOSIS: \"GENERIC\" NURSING  OUTPATIENT/OBSERVATION GOALS TO BE MET BEFORE DISCHARGE:  ADLs back to baseline: No    Activity and level of assistance: Up with maximum assistance. Consider SW and/or PT evaluation.     Pain status: Improved-controlled with oral pain medications.    Return to near baseline physical activity: No     Discharge Planner Nurse   Safe discharge environment identified: Yes  Barriers to discharge: No         "

## 2025-03-12 NOTE — PLAN OF CARE
PRIMARY DIAGNOSIS: ACUTE PAIN  OUTPATIENT/OBSERVATION GOALS TO BE MET BEFORE DISCHARGE:  1. Pain Status: Improved-controlled with oral pain medications.    2. Return to near baseline physical activity: No    3. Cleared for discharge by consultants (if involved): Yes    Discharge Planner Nurse   Safe discharge environment identified: Yes  Barriers to discharge: yes       Entered by: Chelsie Gurrola RN 03/12/2025 1:54 AM     Please review provider order for any additional goals.   Nurse to notify provider when observation goals have been met and patient is ready for discharge.Goal Outcome Evaluation:

## 2025-03-12 NOTE — PLAN OF CARE
Physical Therapy Discharge Summary    Reason for therapy discharge:    Discharged to transitional care facility.    Progress towards therapy goal(s). See goals on Care Plan in [facility] electronic health record for goal details.  Goals partially met.  Barriers to achieving goals:   discharge from facility.    Therapy recommendation(s):    Continued therapy is recommended.  Rationale/Recommendations:  to improve functional mobility.

## 2025-03-12 NOTE — PROGRESS NOTES
"PRIMARY DIAGNOSIS: \"GENERIC\" NURSING  OUTPATIENT/OBSERVATION GOALS TO BE MET BEFORE DISCHARGE:  ADLs back to baseline: No    Activity and level of assistance: Up with maximum assistance. Consider SW and/or PT evaluation.     Pain status: Improved-controlled with oral pain medications.    Return to near baseline physical activity: No     Discharge Planner Nurse   Safe discharge environment identified: Yes  Barriers to discharge: No       Entered by: Mal Little RN 03/12/2025 12:38 AM     A & O x 4. VSS except hypertensive on RA. Pain addressed with PRN and scheduled medication. Denies nausea/SOB. L PIV SL. Regular diet, tolerating well. Assist x 2. Purewick in use, output nominal. Nephrology, orthopedics, hospitalist teams following. Nursing continue to monitor.  "

## 2025-03-12 NOTE — PLAN OF CARE
PRIMARY DIAGNOSIS: ACUTE PAIN  OUTPATIENT/OBSERVATION GOALS TO BE MET BEFORE DISCHARGE:  1. Pain Status: Improved-controlled with oral pain medications.    2. Return to near baseline physical activity: No    3. Cleared for discharge by consultants (if involved): No    Discharge Planner Nurse   Safe discharge environment identified: Yes  Barriers to discharge: Yes       Entered by: Kaylyn Evangelista RN 03/12/2025 12:20 PM    Patient is alert and orientated x 4.  C/O pain 8/10 right shoulder with movement.  Patient reports that her pain in her right shoulder has improved a lot since yesterday and she is able to lift her arm now, which she was not able to do yesterday.  Assist of one and transfer belt to pivot into chair in room.

## 2025-03-12 NOTE — PLAN OF CARE
Patient discharged to transitional care unit at 1601 via Private Car.  Accompanied by nephew-in-law and staff.  Discharge instructions were reviewed with patient, opportunity offered to ask questions.    Prescriptions filled and given to patient/family.  Access discontinued: Yes  Care plan and education discontinued: Yes  Home meds retrieved from pharmacy: N/A  Belongings were sent home with patient/family:   cell phone, , 4 jackets, 1 night gown, 2 pair of socks, 1 pair of shoes, bathrobe, 1 pair of underwear, 1 package of feminine pads .

## 2025-03-12 NOTE — PROGRESS NOTES
Care Management Follow Up    Length of Stay (days): 0    Expected Discharge Date: 03/12/2025    Anticipated Discharge Plan:  TCU pt has [facility] in OBS status     Transportation: Anticipate Wheelchair. Transportation costs discussed? Yes. Discussed with Pt ,and pt agreeable to cost of w/c transport.     PT Recommendations: Transitional Care Facility  OT Recommendations:        Barriers to Discharge: placement    Prior Living Situation: apartment, independent living facility ([facility] with alone.     Discussed  Partnership in Safe Discharge Planning  document with patient/family: No     Handoff Completed: No, handoff not indicated or clinically appropriate    Patient/Spokesperson Updated: Yes. Who? Pt and pt's Bronwyn Lin     Additional Information:  Therapy is still recommending TCU when they saw pt this AM. Nephtyler Lin is going out of town for 10 days starting today, and pt will not have any friends available to help pt at home at this time.       Pt has [facility] program, and so [facility] list provided to pt in order here are hers and nephews preferences for TCU.   [facility] - referral sent prior declined pt no bed, but writer will see if one is open for today.    2.  [email or web address]- referral sent prior declined pt no bed, but writer will see if one is avail for today.   3.  [facility]   4.  [facility]  5. [facility]       Referrals for retirement taken out and [facility], since they are both not [facility] reach facilities.       Next Steps: Follow up with TCU. PAS. Ride setup. CM will continue to monitor progression of care, review team recommendations and provide discharge planning assist as needed.       Gardenia Sanchez RN    Care Management Discharge Note    Discharge Date: 03/12/2025       Discharge Disposition: Transitional Care    Discharge Services: Transportation Services    Discharge DME:  None    Discharge Transportation: family or friend will provide    Private pay costs discussed: Not applicable    Does the patient's insurance plan have a 3 day qualifying hospital stay waiver?  Yes     Which insurance plan 3 day waiver is available? [facility]    Will the waiver be used for post-acute placement? Yes    PAS Confirmation Code: [id number]  Patient/family educated on Medicare website which has current facility and service quality ratings:  Yes    Education Provided on the Discharge Plan: Yes  Persons Notified of Discharge Plans: Yes, pt, pt's nephew Riley, provider, facility , and bedside RN   Patient/Family in Agreement with the Plan: yes    Handoff Referral Completed: Yes, [facility] PCP: Internal handoff referral completed    Additional Information:    Final discharge plan is for pt to go to [facility] ([address], [place] [address]) today 3/12. Pt's Nephew Tone to transport at 4:00pm.           Gardenia Sanchez RN

## 2025-03-13 ENCOUNTER — TRANSITIONAL CARE UNIT VISIT (OUTPATIENT)
Dept: GERIATRICS | Facility: CLINIC | Age: 89
End: 2025-03-13
Payer: MEDICARE

## 2025-03-13 ENCOUNTER — DOCUMENTATION ONLY (OUTPATIENT)
Dept: GERIATRICS | Facility: CLINIC | Age: 89
End: 2025-03-13
Payer: MEDICARE

## 2025-03-13 VITALS
RESPIRATION RATE: 15 BRPM | OXYGEN SATURATION: 97 % | TEMPERATURE: 98.8 F | HEIGHT: 61 IN | DIASTOLIC BLOOD PRESSURE: 52 MMHG | HEART RATE: 77 BPM | BODY MASS INDEX: 28.96 KG/M2 | WEIGHT: 153.4 LBS | SYSTOLIC BLOOD PRESSURE: 146 MMHG

## 2025-03-13 DIAGNOSIS — R53.81 PHYSICAL DECONDITIONING: Primary | ICD-10-CM

## 2025-03-13 DIAGNOSIS — R52 PAIN MANAGEMENT: ICD-10-CM

## 2025-03-13 DIAGNOSIS — I50.9 ACUTE ON CHRONIC CONGESTIVE HEART FAILURE, UNSPECIFIED HEART FAILURE TYPE (H): ICD-10-CM

## 2025-03-13 PROCEDURE — 99309 SBSQ NF CARE MODERATE MDM 30: CPT | Performed by: NURSE PRACTITIONER

## 2025-03-13 NOTE — LETTER
3/13/2025      Kerry Hoffmann  [address]  [facility] [address]        No notes on file      Sincerely,        Suellen Carranza CNP    Electronically signed   town and she will follow-up    ALLERGIES:Buspirone, Ciprofloxacin, Cortisone, Hydrocodone-acetaminophen, Lansoprazole, Metoprolol, Nedocromil, Niacin, Albuterol, Amlodipine, Ampicillin, Azithromycin, Cefixime, Cefprozil, Cefuroxime, Cephalexin, Contrast dye, Cyclobenzaprine, Dextromethorphan-guaifenesin, Diltiazem, Erythromycin, Esomeprazole, Ezetimibe, Fenofibrate, Fluticasone-salmeterol, Methylprednisolone, Metronidazole, Monosodium glutamate, Moxifloxacin, Nifedipine, Nsaids, Ofloxacin, Ondansetron, Parsley seed, Penicillins, Piper, Pirbuterol, Pravastatin, Pseudoephedrine, Shellfish-derived products, Simvastatin, Spironolactone, Sulfamethoxazole-trimethoprim, Tramadol, Tramadol-acetaminophen, Triamterene, Diatrizoate, and Telmisartan    PAST MEDICAL HISTORY:   Past Medical History:   Diagnosis Date     Anxiety     takes clonazepam     Asthma     per [facility] & P      Chronic kidney disease     stage 3 per H & P      Clostridium difficile colitis 11/01/2016     Clostridium difficile infection     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     CTS (carpal tunnel syndrome)      GERD (gastroesophageal reflux disease)     per H & P      Hiatal hernia     small per H & P      Hyperlipidemia      Hypertension      Osteoarthritis of knee     per H & P      Spinal stenosis     per H & P      Vitamin D deficiency     per H & P       PAST SURGICAL HISTORY:   has a past surgical history that includes Tonsillectomy; tonsillectomy & adenoidectomy (1963); Breast surgery (1982); Orif Tibia & Fibula Fractures (1988); other surgical history (10/2004); Cataract Extraction (07/2005); other surgical history (05/03/2015); Pr Esophagogastroduodenoscopy Transoral Diagnostic (N/A, 9/11/2020); Left Heart Catheterization (N/A, 8/11/2023); Coronary Angiogram (N/A, 8/11/2023); Colonoscopy (N/A, 11/20/2023); and Laparoscopic cholecystectomy (N/A, 11/6/2024).    FAMILY HISTORY: family history includes Alzheimer Disease in her sister; Brain Cancer in her  brother; Cancer in her brother and mother; Coronary Artery Disease in her father; Heart Disease in her father, mother, and sister; Heart Failure in her sister; Hypertension in her mother and sister; Myocardial Infarction in her father; Pacemaker in her mother.    SOCIAL HISTORY:  reports that she has quit smoking. Her smoking use included cigarettes. She has a 105 pack-year smoking history. She has never used smokeless tobacco. She reports that she does not currently use alcohol. She reports that she does not use drugs.    ROS:  Constitutional: Negative for activity change, appetite change, fatigue and fever.  Minimal ambulation with a cane mostly uses an electric scooter  HENT: Negative for congestion.    Respiratory: Negative for cough, shortness of breath and wheezing.    Cardiovascular: Negative for chest pain and leg swelling.   Gastrointestinal: Negative for abdominal distention, abdominal pain, constipation, diarrhea and nausea.   Genitourinary: Negative for dysuria.   Musculoskeletal: Negative for arthralgia. Negative for back pain.   Skin: Negative for color change and wound.   Neurological: Negative for dizziness.   Psychiatric/Behavioral: Negative for agitation, behavioral problems and confusion.     Physical Exam:  Constitutional:       Appearance: Patient is well-developed.   HENT:      Head: Normocephalic.   Eyes:      Conjunctiva/sclera: Conjunctivae normal.   Neck:      Musculoskeletal: Normal range of motion.   Cardiovascular:      Rate and Rhythm: Normal rate and regular rhythm.      Heart sounds: Normal heart sounds. No murmur.   Pulmonary:      Effort: No respiratory distress.      Breath sounds: Normal breath sounds. No wheezing or rales.   Abdominal:      General: Bowel sounds are normal. There is no distension.      Palpations: Abdomen is soft.      Tenderness: There is no abdominal tenderness.   Musculoskeletal:       Normal range of motion.   Decreased range of motion right upper  "extremity  Skin:General:        Skin is warm.   Neurological:         Mental Status: Patient is alert and oriented to person, place, and time.   Psychiatric:         Behavior: Behavior normal.     Vitals:BP (!) 146/52   Pulse 77   Temp 98.8  F (37.1  C)   Resp 15   Ht 1.549 m (5' 1\")   Wt 69.6 kg (153 lb 6.4 oz)   LMP  (LMP Unknown)   SpO2 97%   BMI 28.98 kg/m   and Body mass index is 28.98 kg/m .    Lab/Diagnostic data:   Recent Results (from the past 240 hours)   Comprehensive metabolic panel    Collection Time: 03/09/25 11:38 PM   Result Value Ref Range    Sodium 132 (L) 135 - 145 mmol/L    Potassium 3.9 3.4 - 5.3 mmol/L    Carbon Dioxide (CO2) 24 22 - 29 mmol/L    Anion Gap 9 7 - 15 mmol/L    Urea Nitrogen 27.2 (H) 8.0 - 23.0 mg/dL    Creatinine 1.20 (H) 0.51 - 0.95 mg/dL    GFR Estimate 44 (L) >60 mL/min/1.73m2    Calcium 9.5 8.8 - 10.4 mg/dL    Chloride 99 98 - 107 mmol/L    Glucose 101 (H) 70 - 99 mg/dL    Alkaline Phosphatase 77 40 - 150 U/L    AST 17 0 - 45 U/L    ALT 7 0 - 50 U/L    Protein Total 6.8 6.4 - 8.3 g/dL    Albumin 4.0 3.5 - 5.2 g/dL    Bilirubin Total 0.3 <=1.2 mg/dL   Troponin T, High Sensitivity    Collection Time: 03/09/25 11:38 PM   Result Value Ref Range    Troponin T, High Sensitivity 18 (H) <=14 ng/L   CBC with platelets and differential    Collection Time: 03/09/25 11:38 PM   Result Value Ref Range    WBC Count 9.3 4.0 - 11.0 10e3/uL    RBC Count 3.23 (L) 3.80 - 5.20 10e6/uL    Hemoglobin 9.6 (L) 11.7 - 15.7 g/dL    Hematocrit 29.1 (L) 35.0 - 47.0 %    MCV 90 78 - 100 fL    MCH 29.7 26.5 - 33.0 pg    MCHC 33.0 31.5 - 36.5 g/dL    RDW 13.4 10.0 - 15.0 %    Platelet Count 276 150 - 450 10e3/uL    % Neutrophils 73 %    % Lymphocytes 10 %    % Monocytes 14 %    % Eosinophils 1 %    % Basophils 1 %    % Immature Granulocytes 0 %    NRBCs per 100 WBC 0 <1 /100    Absolute Neutrophils 6.8 1.6 - 8.3 10e3/uL    Absolute Lymphocytes 1.0 0.8 - 5.3 10e3/uL    Absolute Monocytes 1.3 0.0 - " 1.3 10e3/uL    Absolute Eosinophils 0.1 0.0 - 0.7 10e3/uL    Absolute Basophils 0.1 0.0 - 0.2 10e3/uL    Absolute Immature Granulocytes 0.0 <=0.4 10e3/uL    Absolute NRBCs 0.0 10e3/uL   Troponin T, High Sensitivity    Collection Time: 03/10/25  1:35 AM   Result Value Ref Range    Troponin T, High Sensitivity 13 <=14 ng/L   Basic metabolic panel    Collection Time: 03/12/25  6:36 AM   Result Value Ref Range    Sodium 128 (L) 135 - 145 mmol/L    Potassium 4.1 3.4 - 5.3 mmol/L    Chloride 95 (L) 98 - 107 mmol/L    Carbon Dioxide (CO2) 21 (L) 22 - 29 mmol/L    Anion Gap 12 7 - 15 mmol/L    Urea Nitrogen 38.4 (H) 8.0 - 23.0 mg/dL    Creatinine 1.23 (H) 0.51 - 0.95 mg/dL    GFR Estimate 42 (L) >60 mL/min/1.73m2    Calcium 9.2 8.8 - 10.4 mg/dL    Glucose 129 (H) 70 - 99 mg/dL   CBC with platelets    Collection Time: 03/12/25  6:36 AM   Result Value Ref Range    WBC Count 11.0 4.0 - 11.0 10e3/uL    RBC Count 3.08 (L) 3.80 - 5.20 10e6/uL    Hemoglobin 9.1 (L) 11.7 - 15.7 g/dL    Hematocrit 27.6 (L) 35.0 - 47.0 %    MCV 90 78 - 100 fL    MCH 29.5 26.5 - 33.0 pg    MCHC 33.0 31.5 - 36.5 g/dL    RDW 13.5 10.0 - 15.0 %    Platelet Count 254 150 - 450 10e3/uL   Osmolality    Collection Time: 03/12/25  6:36 AM   Result Value Ref Range    Osmolality Blood 275 (L) 280 - 301 mmol/kg   TSH    Collection Time: 03/12/25  6:36 AM   Result Value Ref Range    TSH 0.67 0.30 - 4.20 uIU/mL   Osmolality urine    Collection Time: 03/12/25  1:16 PM   Result Value Ref Range    Osmolality Urine 424 100 - 1,200 mmol/kg   Sodium random urine    Collection Time: 03/12/25  1:16 PM   Result Value Ref Range    Sodium Urine mmol/L <20 mmol/L        MEDICATIONS:  MED REC REQUIRED  Post Medication Reconciliation Status: discharge medications reconciled, continue medications without change          Review of your medicines            Accurate as of March 13, 2025 11:59 PM. If you have any questions, ask your nurse or doctor.                CONTINUE these  medicines which have NOT CHANGED        Dose / Directions   acetaminophen 500 MG tablet  Commonly known as: TYLENOL      Dose: 1,000 mg  Take 1,000 mg by mouth 3 times daily  Refills: 0     aspirin 81 MG EC tablet      Dose: 81 mg  Take 81 mg by mouth daily  Refills: 0     clonazePAM 0.5 MG tablet  Commonly known as: klonoPIN  Used for: Anxiety      Dose: 0.5 mg  Take 1 tablet (0.5 mg) by mouth 3 times daily.  Quantity: 15 tablet  Refills: 0     clopidogrel 75 MG tablet  Commonly known as: PLAVIX  Used for: Intracranial atherosclerosis      Dose: 75 mg  TAKE 1 TABLET(75 MG) BY MOUTH DAILY  Quantity: 90 tablet  Refills: 1     diclofenac 1 % topical gel  Commonly known as: VOLTAREN  Used for: Chronic right shoulder pain      Dose: 2 g  Apply 2 g topically 4 times daily.  Refills: 0     furosemide 20 MG tablet  Commonly known as: LASIX      Dose: 20 mg  Take 20 mg by mouth twice a week. Monday and Thursday  Refills: 0     Lidocaine 4 % Patch  Commonly known as: LIDOCARE  Used for: Chronic right shoulder pain      Dose: 2 patch  Place 2 patches over 12 hours onto the skin every 24 hours. To prevent lidocaine toxicity, patient should be patch free for 12 hrs daily.  Refills: 0     losartan 50 MG tablet  Commonly known as: COZAAR      Dose: 50 mg  Take 50 mg by mouth 2 times daily  Refills: 0     metoclopramide 5 MG tablet  Commonly known as: REGLAN      Dose: 5 mg  Take 1 tablet (5 mg) by mouth 3 times daily as needed for vomiting.  Quantity: 10 tablet  Refills: 0     metoprolol succinate ER 25 MG 24 hr tablet  Commonly known as: TOPROL XL      Dose: 75 mg  Take 75 mg by mouth at bedtime.  Refills: 0     oxyCODONE 5 MG tablet  Commonly known as: ROXICODONE  Used for: Chronic right shoulder pain      Dose: 2.5 mg  Take 0.5 tablets (2.5 mg) by mouth every 4 hours as needed for moderate pain.  Quantity: 10 tablet  Refills: 0     polyethylene glycol 17 GM/Dose powder  Commonly known as: MIRALAX  Used for: Chronic right  shoulder pain      Dose: 17 g  Take 17 g by mouth daily.  Refills: 0     senna-docusate 8.6-50 MG tablet  Commonly known as: SENOKOT-S/PERICOLACE  Used for: Chronic right shoulder pain      Dose: 1 tablet  Take 1 tablet by mouth 2 times daily as needed for constipation.  Refills: 0     TUMS ULTRA 1000 PO      Dose: 1 chew tab  Take 1 chew tab by mouth nightly as needed  Refills: 0     vitamin C 250 MG tablet  Commonly known as: ASCORBIC ACID      Dose: 250 mg  Take 250 mg by mouth daily.  Refills: 0     vitamin D3 50 MCG (2000 UT) Caps      Dose: 1 tablet  Take 1 tablet by mouth daily  Refills: 0              ASSESSMENT/PLAN  Encounter Diagnoses   Name Primary?     Physical deconditioning Yes     Acute on chronic congestive heart failure, unspecified heart failure type (H)      Pain management      Physical deconditioning PT OT    Pain management scheduled extra strength Tylenol 3 times daily, lidocaine patch, as needed oxycodone    Anxiety on clonazepam 3 times daily    Congestive heart failure Daily weights continue Lasix every Monday Thursday    Hypertension on losartan, metoprolol succinate    Nausea as needed Reglan    History of urinary retention bladder scan every shift x 3 days    Right shoulder pain seen by orthopedics, nonoperative follow-up with orthopedic shoulder surgeon, pain management    Electronically signed by: Suellen Carranza CNP       Sincerely,        Suellen Carranza CNP    Electronically signed

## 2025-03-14 NOTE — PROGRESS NOTES
[facility] GERIATRIC SERVICES  Chief Complaint   Patient presents with    [facility] F/U     [facility] Medical Record Number:  [id number]  Place of Service where encounter took place:  [facility] ([facility]) [[id number]]  Code Status: Full Code     HISTORY:      HPI:  Kerry Hoffmann  is 89 year old (1935) undergoing physical and occupational therapy.  She is with past medical history anxiety, asthma, chronic kidney disease, GERD, hypertension, hyperlipidemia, excerpted from records  She  admitted on 3/9/2025. She was brought to the ED by ambulance from [facility] for evaluation of right shoulder pain unable to ambulate with a cane.  Patient rates pain 10 out of 10 this morning.  Orthopedic consult recommend conservative treatment, due to allergies patient unable to have surgical intervention or cortisone injections.  IV Decadron given yesterday and today patient has much improvement, increased range of motion and decreased pain .      -Right shoulder x-ray showed severe degenerative change of the glenohumeral joint with chronic remodeling of the humeral head and glenoid fossa      Today she was seen to review vital signs, labs, routine visit and to establish care.  She denied chest pain shortness of breath cough congestion.  She reports no BM x 4 days abdomen soft nontender bowel medications adjusted.  She does ratedher pain 5 out of 10.  She does have decreased range of motion right upper extremity however today she reports she can lift the arm in which she could not do yesterday however still with decreased range of motion.  History of urinary retention and placed on bladder scans every shift x 3 days.  History of CHF she will be on daily weights no lower extremity edema.  Per her report she lives alone.  Per her report she uses an electric scooter.  Her nephew assists with her care at times and is out of town and she will follow-up    ALLERGIES:Buspirone, Ciprofloxacin, Cortisone,  Hydrocodone-acetaminophen, Lansoprazole, Metoprolol, Nedocromil, Niacin, Albuterol, Amlodipine, Ampicillin, Azithromycin, Cefixime, Cefprozil, Cefuroxime, Cephalexin, Contrast dye, Cyclobenzaprine, Dextromethorphan-guaifenesin, Diltiazem, Erythromycin, Esomeprazole, Ezetimibe, Fenofibrate, Fluticasone-salmeterol, Methylprednisolone, Metronidazole, Monosodium glutamate, Moxifloxacin, Nifedipine, Nsaids, Ofloxacin, Ondansetron, Parsley seed, Penicillins, Piper, Pirbuterol, Pravastatin, Pseudoephedrine, Shellfish-derived products, Simvastatin, Spironolactone, Sulfamethoxazole-trimethoprim, Tramadol, Tramadol-acetaminophen, Triamterene, Diatrizoate, and Telmisartan    PAST MEDICAL HISTORY:   Past Medical History:   Diagnosis Date    Anxiety     takes clonazepam    Asthma     per [facility] & [facility]     Chronic kidney disease     stage 3 per [facility] & P     Clostridium difficile colitis 11/01/2016    Clostridium difficile infection     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    CTS (carpal tunnel syndrome)     GERD (gastroesophageal reflux disease)     per [facility] & [facility]     Hiatal hernia     small per [facility] & P     Hyperlipidemia     Hypertension     Osteoarthritis of knee     per [facility] & [facility]     Spinal stenosis     per [facility] & [facility]     Vitamin D deficiency     per [facility] & [facility]       PAST SURGICAL HISTORY:   has a past surgical history that includes Tonsillectomy; tonsillectomy & adenoidectomy (1963); Breast surgery (1982); Orif Tibia & Fibula Fractures (1988); other surgical history (10/2004); Cataract Extraction (07/2005); other surgical history (05/03/2015); Pr Esophagogastroduodenoscopy Transoral Diagnostic (N/A, 9/11/2020); Left Heart Catheterization (N/A, 8/11/2023); Coronary Angiogram (N/A, 8/11/2023); Colonoscopy (N/A, 11/20/2023); and Laparoscopic cholecystectomy (N/A, 11/6/2024).    FAMILY HISTORY: family history includes Alzheimer Disease in her sister; Brain Cancer in her brother; Cancer in her brother and mother; Coronary Artery Disease in her father; Heart Disease  in her father, mother, and sister; Heart Failure in her sister; Hypertension in her mother and sister; Myocardial Infarction in her father; Pacemaker in her mother.    SOCIAL HISTORY:  reports that she has quit smoking. Her smoking use included cigarettes. She has a 105 pack-year smoking history. She has never used smokeless tobacco. She reports that she does not currently use alcohol. She reports that she does not use drugs.    ROS:  Constitutional: Negative for activity change, appetite change, fatigue and fever.  Minimal ambulation with a cane mostly uses an electric scooter  HENT: Negative for congestion.    Respiratory: Negative for cough, shortness of breath and wheezing.    Cardiovascular: Negative for chest pain and leg swelling.   Gastrointestinal: Negative for abdominal distention, abdominal pain, constipation, diarrhea and nausea.   Genitourinary: Negative for dysuria.   Musculoskeletal: Negative for arthralgia. Negative for back pain.   Skin: Negative for color change and wound.   Neurological: Negative for dizziness.   Psychiatric/Behavioral: Negative for agitation, behavioral problems and confusion.     Physical Exam:  Constitutional:       Appearance: Patient is well-developed.   HENT:      Head: Normocephalic.   Eyes:      Conjunctiva/sclera: Conjunctivae normal.   Neck:      Musculoskeletal: Normal range of motion.   Cardiovascular:      Rate and Rhythm: Normal rate and regular rhythm.      Heart sounds: Normal heart sounds. No murmur.   Pulmonary:      Effort: No respiratory distress.      Breath sounds: Normal breath sounds. No wheezing or rales.   Abdominal:      General: Bowel sounds are normal. There is no distension.      Palpations: Abdomen is soft.      Tenderness: There is no abdominal tenderness.   Musculoskeletal:       Normal range of motion.   Decreased range of motion right upper extremity  Skin:General:        Skin is warm.   Neurological:         Mental Status: Patient is alert and  "oriented to person, place, and time.   Psychiatric:         Behavior: Behavior normal.     Vitals:BP (!) 146/52   Pulse 77   Temp 98.8  F (37.1  C)   Resp 15   Ht 1.549 m (5' 1\")   Wt 69.6 kg (153 lb 6.4 oz)   LMP  (LMP Unknown)   SpO2 97%   BMI 28.98 kg/m   and Body mass index is 28.98 kg/m .    Lab/Diagnostic data:   Recent Results (from the past 240 hours)   Comprehensive metabolic panel    Collection Time: 03/09/25 11:38 PM   Result Value Ref Range    Sodium 132 (L) 135 - 145 mmol/L    Potassium 3.9 3.4 - 5.3 mmol/L    Carbon Dioxide (CO2) 24 22 - 29 mmol/L    Anion Gap 9 7 - 15 mmol/L    Urea Nitrogen 27.2 (H) 8.0 - 23.0 mg/dL    Creatinine 1.20 (H) 0.51 - 0.95 mg/dL    GFR Estimate 44 (L) >60 mL/min/1.73m2    Calcium 9.5 8.8 - 10.4 mg/dL    Chloride 99 98 - 107 mmol/L    Glucose 101 (H) 70 - 99 mg/dL    Alkaline Phosphatase 77 40 - 150 U/L    AST 17 0 - 45 U/L    ALT 7 0 - 50 U/L    Protein Total 6.8 6.4 - 8.3 g/dL    Albumin 4.0 3.5 - 5.2 g/dL    Bilirubin Total 0.3 <=1.2 mg/dL   Troponin T, High Sensitivity    Collection Time: 03/09/25 11:38 PM   Result Value Ref Range    Troponin T, High Sensitivity 18 (H) <=14 ng/L   CBC with platelets and differential    Collection Time: 03/09/25 11:38 PM   Result Value Ref Range    WBC Count 9.3 4.0 - 11.0 10e3/uL    RBC Count 3.23 (L) 3.80 - 5.20 10e6/uL    Hemoglobin 9.6 (L) 11.7 - 15.7 g/dL    Hematocrit 29.1 (L) 35.0 - 47.0 %    MCV 90 78 - 100 fL    MCH 29.7 26.5 - 33.0 pg    MCHC 33.0 31.5 - 36.5 g/dL    RDW 13.4 10.0 - 15.0 %    Platelet Count 276 150 - 450 10e3/uL    % Neutrophils 73 %    % Lymphocytes 10 %    % Monocytes 14 %    % Eosinophils 1 %    % Basophils 1 %    % Immature Granulocytes 0 %    NRBCs per 100 WBC 0 <1 /100    Absolute Neutrophils 6.8 1.6 - 8.3 10e3/uL    Absolute Lymphocytes 1.0 0.8 - 5.3 10e3/uL    Absolute Monocytes 1.3 0.0 - 1.3 10e3/uL    Absolute Eosinophils 0.1 0.0 - 0.7 10e3/uL    Absolute Basophils 0.1 0.0 - 0.2 10e3/uL    " Absolute Immature Granulocytes 0.0 <=0.4 10e3/uL    Absolute NRBCs 0.0 10e3/uL   Troponin T, High Sensitivity    Collection Time: 03/10/25  1:35 AM   Result Value Ref Range    Troponin T, High Sensitivity 13 <=14 ng/L   Basic metabolic panel    Collection Time: 03/12/25  6:36 AM   Result Value Ref Range    Sodium 128 (L) 135 - 145 mmol/L    Potassium 4.1 3.4 - 5.3 mmol/L    Chloride 95 (L) 98 - 107 mmol/L    Carbon Dioxide (CO2) 21 (L) 22 - 29 mmol/L    Anion Gap 12 7 - 15 mmol/L    Urea Nitrogen 38.4 (H) 8.0 - 23.0 mg/dL    Creatinine 1.23 (H) 0.51 - 0.95 mg/dL    GFR Estimate 42 (L) >60 mL/min/1.73m2    Calcium 9.2 8.8 - 10.4 mg/dL    Glucose 129 (H) 70 - 99 mg/dL   CBC with platelets    Collection Time: 03/12/25  6:36 AM   Result Value Ref Range    WBC Count 11.0 4.0 - 11.0 10e3/uL    RBC Count 3.08 (L) 3.80 - 5.20 10e6/uL    Hemoglobin 9.1 (L) 11.7 - 15.7 g/dL    Hematocrit 27.6 (L) 35.0 - 47.0 %    MCV 90 78 - 100 fL    MCH 29.5 26.5 - 33.0 pg    MCHC 33.0 31.5 - 36.5 g/dL    RDW 13.5 10.0 - 15.0 %    Platelet Count 254 150 - 450 10e3/uL   Osmolality    Collection Time: 03/12/25  6:36 AM   Result Value Ref Range    Osmolality Blood 275 (L) 280 - 301 mmol/kg   TSH    Collection Time: 03/12/25  6:36 AM   Result Value Ref Range    TSH 0.67 0.30 - 4.20 uIU/mL   Osmolality urine    Collection Time: 03/12/25  1:16 PM   Result Value Ref Range    Osmolality Urine 424 100 - 1,200 mmol/kg   Sodium random urine    Collection Time: 03/12/25  1:16 PM   Result Value Ref Range    Sodium Urine mmol/L <20 mmol/L        MEDICATIONS:  MED REC REQUIRED  Post Medication Reconciliation Status: discharge medications reconciled, continue medications without change          Review of your medicines            Accurate as of March 13, 2025 11:59 PM. If you have any questions, ask your nurse or doctor.                CONTINUE these medicines which have NOT CHANGED        Dose / Directions   acetaminophen 500 MG tablet  Commonly known as:  TYLENOL      Dose: 1,000 mg  Take 1,000 mg by mouth 3 times daily  Refills: 0     aspirin 81 MG EC tablet      Dose: 81 mg  Take 81 mg by mouth daily  Refills: 0     clonazePAM 0.5 MG tablet  Commonly known as: klonoPIN  Used for: Anxiety      Dose: 0.5 mg  Take 1 tablet (0.5 mg) by mouth 3 times daily.  Quantity: 15 tablet  Refills: 0     clopidogrel 75 MG tablet  Commonly known as: PLAVIX  Used for: Intracranial atherosclerosis      Dose: 75 mg  TAKE 1 TABLET(75 MG) BY MOUTH DAILY  Quantity: 90 tablet  Refills: 1     diclofenac 1 % topical gel  Commonly known as: VOLTAREN  Used for: Chronic right shoulder pain      Dose: 2 g  Apply 2 g topically 4 times daily.  Refills: 0     furosemide 20 MG tablet  Commonly known as: LASIX      Dose: 20 mg  Take 20 mg by mouth twice a week. Monday and Thursday  Refills: 0     Lidocaine 4 % Patch  Commonly known as: LIDOCARE  Used for: Chronic right shoulder pain      Dose: 2 patch  Place 2 patches over 12 hours onto the skin every 24 hours. To prevent lidocaine toxicity, patient should be patch free for 12 hrs daily.  Refills: 0     losartan 50 MG tablet  Commonly known as: COZAAR      Dose: 50 mg  Take 50 mg by mouth 2 times daily  Refills: 0     metoclopramide 5 MG tablet  Commonly known as: REGLAN      Dose: 5 mg  Take 1 tablet (5 mg) by mouth 3 times daily as needed for vomiting.  Quantity: 10 tablet  Refills: 0     metoprolol succinate ER 25 MG 24 hr tablet  Commonly known as: TOPROL XL      Dose: 75 mg  Take 75 mg by mouth at bedtime.  Refills: 0     oxyCODONE 5 MG tablet  Commonly known as: ROXICODONE  Used for: Chronic right shoulder pain      Dose: 2.5 mg  Take 0.5 tablets (2.5 mg) by mouth every 4 hours as needed for moderate pain.  Quantity: 10 tablet  Refills: 0     polyethylene glycol 17 GM/Dose powder  Commonly known as: MIRALAX  Used for: Chronic right shoulder pain      Dose: 17 g  Take 17 g by mouth daily.  Refills: 0     senna-docusate 8.6-50 MG  tablet  Commonly known as: SENOKOT-S/PERICOLACE  Used for: Chronic right shoulder pain      Dose: 1 tablet  Take 1 tablet by mouth 2 times daily as needed for constipation.  Refills: 0     TUMS ULTRA 1000 PO      Dose: 1 chew tab  Take 1 chew tab by mouth nightly as needed  Refills: 0     vitamin C 250 MG tablet  Commonly known as: ASCORBIC ACID      Dose: 250 mg  Take 250 mg by mouth daily.  Refills: 0     vitamin D3 50 MCG (2000 UT) Caps      Dose: 1 tablet  Take 1 tablet by mouth daily  Refills: 0              ASSESSMENT/PLAN  Encounter Diagnoses   Name Primary?    Physical deconditioning Yes    Acute on chronic congestive heart failure, unspecified heart failure type (H)     Pain management      Physical deconditioning PT OT    Pain management scheduled extra strength Tylenol 3 times daily, lidocaine patch, as needed oxycodone    Anxiety on clonazepam 3 times daily    Congestive heart failure Daily weights continue Lasix every Monday Thursday    Hypertension on losartan, metoprolol succinate    Nausea as needed Reglan    History of urinary retention bladder scan every shift x 3 days    Right shoulder pain seen by orthopedics, nonoperative follow-up with orthopedic shoulder surgeon, pain management    Electronically signed by: Suellen Carranza CNP

## 2025-03-17 ENCOUNTER — LAB REQUISITION (OUTPATIENT)
Dept: LAB | Facility: CLINIC | Age: 89
End: 2025-03-17
Payer: MEDICARE

## 2025-03-17 DIAGNOSIS — I50.32 CHRONIC DIASTOLIC (CONGESTIVE) HEART FAILURE (H): ICD-10-CM

## 2025-03-17 NOTE — PROGRESS NOTES
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  CKD 3B  Chronic hyponatremia-r/c 137  Anxiety disorder  Generalized weakness  Multiple allergies including to multiple drugs and metals-patient cannot get optimum treatment because of extensive allergies  History     Patient is a very pleasant 89 year old female who is admitted to TCU  Patient was admitted with acute on chronic right shoulder pain with limited range of movement  Noted to have glenohumeral osteoarthritis  Was given oxycodone but ready to stop taking it  Discussion done regarding intra-articular injection and she has allergies to multiple drugs including cortisone and cannot get it  Orthopedic follow-up as an outpatient recommended to her  She also has metal implant allergy which makes it hard for her to get a TKA  Walking is  difficult as she has bone-on-bone arthritis in both knees  Now planning to discharge back to her assisted living apartment      Past Medical & Surgical History     PAST MEDICAL HISTORY:   Past Medical History:   Diagnosis Date    Anxiety     takes clonazepam    Asthma     per H & P     Chronic kidney disease     stage 3 per H & P     Clostridium difficile colitis 11/01/2016    Clostridium difficile infection     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    CTS (carpal tunnel syndrome)     GERD (gastroesophageal reflux disease)     per [facility] & [facility]     Hiatal hernia     small per [facility] & [facility]     Hyperlipidemia     Hypertension     Osteoarthritis of knee     per [facility] & [facility]      Spinal stenosis     per [facility]     Vitamin D deficiency     per [facility] & [facility]      PAST SURGICAL HISTORY:   has a past surgical history that includes Tonsillectomy; tonsillectomy & adenoidectomy (1963); Breast surgery (1982); Orif Tibia & Fibula Fractures (1988); other surgical history (10/2004); Cataract Extraction (07/2005); other surgical history (05/03/2015); Pr Esophagogastroduodenoscopy Transoral Diagnostic (N/A, 9/11/2020); Left Heart Catheterization (N/A, 8/11/2023); Coronary Angiogram (N/A, 8/11/2023); Colonoscopy (N/A, 11/20/2023); and Laparoscopic cholecystectomy (N/A, 11/6/2024).      Past Social History     Reviewed,  reports that she has quit smoking. Her smoking use included cigarettes. She has a 105 pack-year smoking history. She has never used smokeless tobacco. She reports that she does not currently use alcohol. She reports that she does not use drugs.    Family History     Reviewed, and family history includes Alzheimer Disease in her sister; Brain Cancer in her brother; Cancer in her brother and mother; Coronary Artery Disease in her father; Heart Disease in her father, mother, and sister; Heart Failure in her sister; Hypertension in her mother and sister; Myocardial Infarction in her father; Pacemaker in her mother.    Medication List     Current Outpatient Medications   Medication Sig Dispense Refill    acetaminophen (TYLENOL) 500 MG tablet Take 1,000 mg by mouth 3 times daily      aspirin 81 MG EC tablet Take 81 mg by mouth daily      Calcium Carbonate Antacid (TUMS ULTRA 1000 PO) Take 1 chew tab by mouth nightly as needed      Cholecalciferol (VITAMIN D3) 50 MCG (2000 UT) CAPS Take 1 tablet by mouth daily      clonazePAM (KLONOPIN) 0.5 MG tablet Take 1 tablet (0.5 mg) by mouth 3 times daily. 15 tablet 0    clopidogrel (PLAVIX) 75 MG tablet TAKE 1 TABLET(75 MG) BY MOUTH DAILY 90 tablet 1    diclofenac (VOLTAREN) 1 % topical gel Apply 2 g topically 4 times daily.      furosemide (LASIX) 20 MG tablet  Take 20 mg by mouth twice a week. Monday and Thursday      Lidocaine (LIDOCARE) 4 % Patch Place 2 patches over 12 hours onto the skin every 24 hours. To prevent lidocaine toxicity, patient should be patch free for 12 hrs daily.      losartan (COZAAR) 50 MG tablet Take 50 mg by mouth 2 times daily      metoclopramide (REGLAN) 5 MG tablet Take 1 tablet (5 mg) by mouth 3 times daily as needed for vomiting. 10 tablet 0    metoprolol succinate ER (TOPROL XL) 25 MG 24 hr tablet Take 75 mg by mouth at bedtime.      vitamin C (ASCORBIC ACID) 250 MG tablet Take 250 mg by mouth daily.       No current facility-administered medications for this visit.      MED REC REQUIRED  Post Medication Reconciliation Status:        Allergies     Allergies   Allergen Reactions    Buspirone Shortness Of Breath    Ciprofloxacin Hives and Shortness Of Breath     Other reaction(s): Unknown    Cortisone      Other reaction(s): Throat Swelling/Closing, Unknown  Reaction 9-5-94  Tolerated dexamethasone during hospitalization 3/11/2025      Hydrocodone-Acetaminophen Shortness Of Breath     Other reaction(s): Unknown    Lansoprazole Shortness Of Breath    Metoprolol Shortness Of Breath     Tolerates metoprolol succinate at home    Nedocromil      Other reaction(s): Throat Swelling/Closing    Niacin Shortness Of Breath     Other reaction(s): Unknown    Albuterol Other (See Comments)     Inhaler had extreme effect on nervous system      Amlodipine      Other reaction(s): Erythema, Unknown    Ampicillin Unknown    Azithromycin      Other reaction(s): *Unknown, Unknown    Cefixime Diarrhea     Other reaction(s): Unknown  Has tolerated ceftriaxone    Cefprozil Nausea and Vomiting     Other reaction(s): Unknown  Has tolerated ceftriaxone    Cefuroxime      Other reaction(s): *Unknown  Has tolerated ceftriaxone    Cephalexin      Other reaction(s): *Unknown, Unknown  Has tolerated ceftriaxone    Contrast Dye      Other reaction(s): hives    Cyclobenzaprine       Other reaction(s): Sedation    Dextromethorphan-Guaifenesin Nausea     Other reaction(s): Headache    Diltiazem      Other reaction(s): Erythema, Unknown  Ears face arms and chest red and on fire-body tremors      Erythromycin      Other reaction(s): Unknown    Esomeprazole      Other reaction(s): Headache    Ezetimibe Unknown    Fenofibrate Muscle Pain (Myalgia)     Other reaction(s): Unknown    Fluticasone-Salmeterol Other (See Comments)     Elevated blood pressure      Methylprednisolone      Tolerated dexamethasone during hospitalization 3/11/2025    Metronidazole     Monosodium Glutamate     Moxifloxacin      Other reaction(s): Dizziness    Nifedipine      Other reaction(s): Edema  Took for 5-93 to 9-94 red and swollen leg      Nsaids      Other reaction(s): Unknown    Ofloxacin      Other reaction(s): *Unknown    Ondansetron      Other reaction(s): Constipation    Parsley Seed     Penicillins Unknown     She doesn't remember other than it occurred as a very young woman     Piper     Pirbuterol Other (See Comments)     Immediate extreme effect on nervous system      Pravastatin      Other reaction(s): Dizziness, Unknown    Pseudoephedrine      Other reaction(s): headache,nausea    Shellfish-Derived Products      Per pt 8/12/23    Simvastatin Muscle Pain (Myalgia)    Spironolactone      Other reaction(s): stomach cramps, nausea    Sulfamethoxazole-Trimethoprim      Other reaction(s): Unknown    Tramadol      Other reaction(s): red ears,high blood press.    Tramadol-Acetaminophen      Other reaction(s): Tachycardia, Unknown    Triamterene Other (See Comments)     Greatly bothered with sun-took medication for three years  Greatly bothered with sun      Diatrizoate Rash    Telmisartan Palpitations       Review of Systems   A comprehensive review of 14 systems was done. Pertinent findings noted here and in history of present illness. All the rest negative.  Constitutional: Negative.  Negative for fever, chills,  "she has  activity change, appetite change and fatigue.   Has multiple drug allergies  HENT: Negative for congestion and facial swelling.    Eyes: Negative for photophobia, redness and visual disturbance.   Respiratory: Negative for cough and chest tightness.    Cardiovascular: Negative for chest pain, palpitations and leg swelling.   Gastrointestinal: Negative for nausea, diarrhea, constipation, blood in stool and abdominal distention.   Has frequent BM due to too much stool softeners per her  Genitourinary: Negative.    Musculoskeletal:has limited abduction of rt shoulder  Has severe OA of both knees  Walks with a cane   Skin: Negative.    Neurological: Negative for dizziness, tremors, syncope, weakness, light-headedness and headaches.   Hematological: Does not bruise/bleed easily.   Psychiatric/Behavioral: Negative.        Physical Exam   /41   Pulse 74   Temp 97.8  F (36.6  C)   Resp 18   Ht 1.549 m (5' 1\")   Wt 65.8 kg (145 lb)   LMP  (LMP Unknown)   SpO2 96%   BMI 27.40 kg/m       Constitutional: Oriented to person, place, and time and appears well-developed.   HEENT:  Normocephalic and atraumatic.  Eyes: Conjunctivae and EOM are normal. Pupils are equal, round, and reactive to light. No discharge.  No scleral icterus. Nose normal. Mouth/Throat: Oropharynx is clear and moist. No oropharyngeal exudate.    NECK: Normal range of motion. Neck supple. No JVD present. No tracheal deviation present. No thyromegaly present.   CARDIOVASCULAR: Normal rate, regular rhythm and intact distal pulses.  Exam reveals no gallop and no friction rub.  Systolic murmur present.  PULMONARY: Effort normal and breath sounds normal. No respiratory distress.No Wheezing or rales.  ABDOMEN: Soft. Bowel sounds are normal. No distension and no mass.  There is no tenderness. There is no rebound and no guarding. No HSM.  MUSCULOSKELETAL: Normal range of motion. Mild kyphosis, no tenderness.  Limited abduction of rt " shoulder  Severe OA of both knee which limit her mobility  LYMPH NODES: Has no cervical, supraclavicular, axillary and groin adenopathy.   NEUROLOGICAL: Alert and oriented to person, place, and time. No cranial nerve deficit.  Normal muscle tone. Coordination normal.   GENITOURINARY: Deferred exam.  SKIN: Skin is warm and dry. No rash noted. No erythema. No pallor.   EXTREMITIES: No cyanosis, no clubbing, no edema. No Deformity.  PSYCHIATRIC: Normal mood, affect and behavior.has anxiety and uses meds at baseline.      Lab Results     Recent Results (from the past 240 hours)   Comprehensive metabolic panel    Collection Time: 03/09/25 11:38 PM   Result Value Ref Range    Sodium 132 (L) 135 - 145 mmol/L    Potassium 3.9 3.4 - 5.3 mmol/L    Carbon Dioxide (CO2) 24 22 - 29 mmol/L    Anion Gap 9 7 - 15 mmol/L    Urea Nitrogen 27.2 (H) 8.0 - 23.0 mg/dL    Creatinine 1.20 (H) 0.51 - 0.95 mg/dL    GFR Estimate 44 (L) >60 mL/min/1.73m2    Calcium 9.5 8.8 - 10.4 mg/dL    Chloride 99 98 - 107 mmol/L    Glucose 101 (H) 70 - 99 mg/dL    Alkaline Phosphatase 77 40 - 150 U/L    AST 17 0 - 45 U/L    ALT 7 0 - 50 U/L    Protein Total 6.8 6.4 - 8.3 g/dL    Albumin 4.0 3.5 - 5.2 g/dL    Bilirubin Total 0.3 <=1.2 mg/dL   Troponin T, High Sensitivity    Collection Time: 03/09/25 11:38 PM   Result Value Ref Range    Troponin T, High Sensitivity 18 (H) <=14 ng/L   CBC with platelets and differential    Collection Time: 03/09/25 11:38 PM   Result Value Ref Range    WBC Count 9.3 4.0 - 11.0 10e3/uL    RBC Count 3.23 (L) 3.80 - 5.20 10e6/uL    Hemoglobin 9.6 (L) 11.7 - 15.7 g/dL    Hematocrit 29.1 (L) 35.0 - 47.0 %    MCV 90 78 - 100 fL    MCH 29.7 26.5 - 33.0 pg    MCHC 33.0 31.5 - 36.5 g/dL    RDW 13.4 10.0 - 15.0 %    Platelet Count 276 150 - 450 10e3/uL    % Neutrophils 73 %    % Lymphocytes 10 %    % Monocytes 14 %    % Eosinophils 1 %    % Basophils 1 %    % Immature Granulocytes 0 %    NRBCs per 100 WBC 0 <1 /100    Absolute  Neutrophils 6.8 1.6 - 8.3 10e3/uL    Absolute Lymphocytes 1.0 0.8 - 5.3 10e3/uL    Absolute Monocytes 1.3 0.0 - 1.3 10e3/uL    Absolute Eosinophils 0.1 0.0 - 0.7 10e3/uL    Absolute Basophils 0.1 0.0 - 0.2 10e3/uL    Absolute Immature Granulocytes 0.0 <=0.4 10e3/uL    Absolute NRBCs 0.0 10e3/uL   Troponin T, High Sensitivity    Collection Time: 03/10/25  1:35 AM   Result Value Ref Range    Troponin T, High Sensitivity 13 <=14 ng/L   Basic metabolic panel    Collection Time: 03/12/25  6:36 AM   Result Value Ref Range    Sodium 128 (L) 135 - 145 mmol/L    Potassium 4.1 3.4 - 5.3 mmol/L    Chloride 95 (L) 98 - 107 mmol/L    Carbon Dioxide (CO2) 21 (L) 22 - 29 mmol/L    Anion Gap 12 7 - 15 mmol/L    Urea Nitrogen 38.4 (H) 8.0 - 23.0 mg/dL    Creatinine 1.23 (H) 0.51 - 0.95 mg/dL    GFR Estimate 42 (L) >60 mL/min/1.73m2    Calcium 9.2 8.8 - 10.4 mg/dL    Glucose 129 (H) 70 - 99 mg/dL   CBC with platelets    Collection Time: 03/12/25  6:36 AM   Result Value Ref Range    WBC Count 11.0 4.0 - 11.0 10e3/uL    RBC Count 3.08 (L) 3.80 - 5.20 10e6/uL    Hemoglobin 9.1 (L) 11.7 - 15.7 g/dL    Hematocrit 27.6 (L) 35.0 - 47.0 %    MCV 90 78 - 100 fL    MCH 29.5 26.5 - 33.0 pg    MCHC 33.0 31.5 - 36.5 g/dL    RDW 13.5 10.0 - 15.0 %    Platelet Count 254 150 - 450 10e3/uL   Osmolality    Collection Time: 03/12/25  6:36 AM   Result Value Ref Range    Osmolality Blood 275 (L) 280 - 301 mmol/kg   TSH    Collection Time: 03/12/25  6:36 AM   Result Value Ref Range    TSH 0.67 0.30 - 4.20 uIU/mL   Osmolality urine    Collection Time: 03/12/25  1:16 PM   Result Value Ref Range    Osmolality Urine 424 100 - 1,200 mmol/kg   Sodium random urine    Collection Time: 03/12/25  1:16 PM   Result Value Ref Range    Sodium Urine mmol/L <20 mmol/L   CBC with platelets    Collection Time: 03/18/25  6:46 AM   Result Value Ref Range    WBC Count 8.6 4.0 - 11.0 10e3/uL    RBC Count 2.84 (L) 3.80 - 5.20 10e6/uL    Hemoglobin 8.4 (L) 11.7 - 15.7 g/dL     Hematocrit 27.2 (L) 35.0 - 47.0 %    MCV 96 78 - 100 fL    MCH 29.6 26.5 - 33.0 pg    MCHC 30.9 (L) 31.5 - 36.5 g/dL    RDW 14.3 10.0 - 15.0 %    Platelet Count 456 (H) 150 - 450 10e3/uL   Magnesium    Collection Time: 03/18/25  6:46 AM   Result Value Ref Range    Magnesium 1.7 1.7 - 2.3 mg/dL   Glucose (OUTREACH)    Collection Time: 03/18/25  6:46 AM   Result Value Ref Range    Glucose 92 70 - 99 mg/dL   Basic Metabolic Panel No Glucose (OUTREACH)    Collection Time: 03/18/25  6:46 AM   Result Value Ref Range    Sodium 137 135 - 145 mmol/L    Potassium 4.9 3.4 - 5.3 mmol/L    Chloride 102 98 - 107 mmol/L    Carbon Dioxide (CO2) 24 22 - 29 mmol/L    Anion Gap 11 7 - 15 mmol/L    Urea Nitrogen 40.6 (H) 8.0 - 23.0 mg/dL    Creatinine 1.19 (H) 0.51 - 0.95 mg/dL    GFR Estimate 44 (L) >60 mL/min/1.73m2    Calcium 9.5 8.8 - 10.4 mg/dL         MEDICAL EQUIPMENT NEEDS:  cane     DISCHARGE PLAN/FACE TO FACE:  I certify that services are/were furnished while this patient was under the care of a physician and that a physician or an allowed non-physician practitioner (NPP), had a face-to-face encounter that meets the physician face-to-face encounter requirements. The encounter was in whole, or in part, related to the primary reason for home health. The patient is confined to his/her home and needs intermittent skilled nursing, physical therapy, speech-language pathology, or the continued need for occupational therapy. A plan of care has been established by a physician and is periodically reviewed by a physician.  Date of Face-to-Face Encounter: 3/18/25     I certify that, based on my findings, the following services are medically necessary home health services: [facility] HHA/RN and PT/OT to evaluate and treat    My clinical findings support the need for the above skilled services because: patient will be discharging to home. Patient will need assistance with medication management and performing IADLs and ADLs effectively and  safely.     Patient to re-establish plan of care with their PCP within 7 days after leaving TCU.    Time spent is >35 minutes in this visit including medication adjustment and reviewing concerns with pt  She can discuss need for Lasix twice daily with her nephrologist.  Recheck kidney functions actually are stable with stable electrolytes  Creatinine is 1.1 with a GFR of 44 which appears to be close to her baseline recheck is 8.4 today      Electronically signed by    Roxy Prieto MD

## 2025-03-18 ENCOUNTER — TRANSITIONAL CARE UNIT VISIT (OUTPATIENT)
Dept: GERIATRICS | Facility: CLINIC | Age: 89
End: 2025-03-18
Payer: MEDICARE

## 2025-03-18 VITALS
RESPIRATION RATE: 18 BRPM | HEIGHT: 61 IN | BODY MASS INDEX: 27.38 KG/M2 | SYSTOLIC BLOOD PRESSURE: 111 MMHG | OXYGEN SATURATION: 96 % | TEMPERATURE: 97.8 F | WEIGHT: 145 LBS | HEART RATE: 74 BPM | DIASTOLIC BLOOD PRESSURE: 41 MMHG

## 2025-03-18 DIAGNOSIS — G89.29 CHRONIC RIGHT SHOULDER PAIN: ICD-10-CM

## 2025-03-18 DIAGNOSIS — N18.32 CHRONIC KIDNEY DISEASE, STAGE 3B (H): Primary | ICD-10-CM

## 2025-03-18 DIAGNOSIS — I10 PRIMARY HYPERTENSION: ICD-10-CM

## 2025-03-18 DIAGNOSIS — E78.00 PURE HYPERCHOLESTEROLEMIA: ICD-10-CM

## 2025-03-18 DIAGNOSIS — I50.30 DIASTOLIC CONGESTIVE HEART FAILURE, UNSPECIFIED HF CHRONICITY (H): ICD-10-CM

## 2025-03-18 DIAGNOSIS — M25.511 CHRONIC RIGHT SHOULDER PAIN: ICD-10-CM

## 2025-03-18 DIAGNOSIS — F41.1 GENERALIZED ANXIETY DISORDER: ICD-10-CM

## 2025-03-18 DIAGNOSIS — R52 PAIN MANAGEMENT: ICD-10-CM

## 2025-03-18 DIAGNOSIS — K21.00 GASTROESOPHAGEAL REFLUX DISEASE WITH ESOPHAGITIS, UNSPECIFIED WHETHER HEMORRHAGE: ICD-10-CM

## 2025-03-18 DIAGNOSIS — N18.31 STAGE 3A CHRONIC KIDNEY DISEASE (H): ICD-10-CM

## 2025-03-18 DIAGNOSIS — I13.0 HYPERTENSIVE HEART AND RENAL DISEASE WITH (CONGESTIVE) HEART FAILURE (H): ICD-10-CM

## 2025-03-18 PROBLEM — J96.01 ACUTE RESPIRATORY FAILURE WITH HYPOXIA (H): Status: RESOLVED | Noted: 2024-08-25 | Resolved: 2025-03-18

## 2025-03-18 LAB
ANION GAP SERPL CALCULATED.3IONS-SCNC: 11 MMOL/L (ref 7–15)
BUN SERPL-MCNC: 40.6 MG/DL (ref 8–23)
CALCIUM SERPL-MCNC: 9.5 MG/DL (ref 8.8–10.4)
CHLORIDE SERPL-SCNC: 102 MMOL/L (ref 98–107)
CREAT SERPL-MCNC: 1.19 MG/DL (ref 0.51–0.95)
EGFRCR SERPLBLD CKD-EPI 2021: 44 ML/MIN/1.73M2
ERYTHROCYTE [DISTWIDTH] IN BLOOD BY AUTOMATED COUNT: 14.3 % (ref 10–15)
GLUCOSE SERPL-MCNC: 92 MG/DL (ref 70–99)
HCO3 SERPL-SCNC: 24 MMOL/L (ref 22–29)
HCT VFR BLD AUTO: 27.2 % (ref 35–47)
HGB BLD-MCNC: 8.4 G/DL (ref 11.7–15.7)
MAGNESIUM SERPL-MCNC: 1.7 MG/DL (ref 1.7–2.3)
MCH RBC QN AUTO: 29.6 PG (ref 26.5–33)
MCHC RBC AUTO-ENTMCNC: 30.9 G/DL (ref 31.5–36.5)
MCV RBC AUTO: 96 FL (ref 78–100)
PLATELET # BLD AUTO: 456 10E3/UL (ref 150–450)
POTASSIUM SERPL-SCNC: 4.9 MMOL/L (ref 3.4–5.3)
RBC # BLD AUTO: 2.84 10E6/UL (ref 3.8–5.2)
SODIUM SERPL-SCNC: 137 MMOL/L (ref 135–145)
WBC # BLD AUTO: 8.6 10E3/UL (ref 4–11)

## 2025-03-18 PROCEDURE — 99316 NF DSCHRG MGMT 30 MIN+: CPT | Performed by: FAMILY MEDICINE

## 2025-03-18 PROCEDURE — 83735 ASSAY OF MAGNESIUM: CPT | Mod: ORL | Performed by: FAMILY MEDICINE

## 2025-03-18 PROCEDURE — 85027 COMPLETE CBC AUTOMATED: CPT | Mod: ORL | Performed by: FAMILY MEDICINE

## 2025-03-18 PROCEDURE — 80048 BASIC METABOLIC PNL TOTAL CA: CPT | Mod: ORL | Performed by: FAMILY MEDICINE

## 2025-03-18 PROCEDURE — P9604 ONE-WAY ALLOW PRORATED TRIP: HCPCS | Mod: ORL | Performed by: FAMILY MEDICINE

## 2025-03-18 PROCEDURE — 36415 COLL VENOUS BLD VENIPUNCTURE: CPT | Mod: ORL | Performed by: FAMILY MEDICINE

## 2025-03-18 NOTE — LETTER
3/18/2025      Kerry Hoffmann  [address]  [facility] [address]        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  CKD 3B  Chronic hyponatremia-r/c 137  Anxiety disorder  Generalized weakness  Multiple allergies including to multiple drugs and metals-patient cannot get optimum treatment because of extensive allergies  History     Patient is a very pleasant 89 year old female who is admitted to TCU  Patient was admitted with acute on chronic right shoulder pain with limited range of movement  Noted to have glenohumeral osteoarthritis  Was given oxycodone but ready to stop taking it  Discussion done regarding intra-articular injection and she has allergies to multiple drugs including cortisone and cannot get it  Orthopedic follow-up as an outpatient recommended to her  She also has metal implant allergy which makes it hard for her to get a TKA  Walking is  difficult as she has bone-on-bone arthritis in both knees  Now planning to discharge back to her assisted living apartment      Past Medical & Surgical History     PAST MEDICAL HISTORY:   Past Medical History:   Diagnosis Date     Anxiety     takes clonazepam     Asthma     per [facility] & P      Chronic kidney disease     stage 3 per H & [facility]      Clostridium difficile colitis 11/01/2016     Clostridium difficile infection     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     CTS (carpal tunnel syndrome)      GERD (gastroesophageal reflux disease)     per [facility] & [facility]      Hiatal hernia      small per [facility] & P      Hyperlipidemia      Hypertension      Osteoarthritis of knee     per [facility] & [facility]      Spinal stenosis     per [facility] & [facility]      Vitamin D deficiency     per [facility] & [facility]      PAST SURGICAL HISTORY:   has a past surgical history that includes Tonsillectomy; tonsillectomy & adenoidectomy (1963); Breast surgery (1982); Orif Tibia & Fibula Fractures (1988); other surgical history (10/2004); Cataract Extraction (07/2005); other surgical history (05/03/2015); Pr Esophagogastroduodenoscopy Transoral Diagnostic (N/A, 9/11/2020); Left Heart Catheterization (N/A, 8/11/2023); Coronary Angiogram (N/A, 8/11/2023); Colonoscopy (N/A, 11/20/2023); and Laparoscopic cholecystectomy (N/A, 11/6/2024).      Past Social History     Reviewed,  reports that she has quit smoking. Her smoking use included cigarettes. She has a 105 pack-year smoking history. She has never used smokeless tobacco. She reports that she does not currently use alcohol. She reports that she does not use drugs.    Family History     Reviewed, and family history includes Alzheimer Disease in her sister; Brain Cancer in her brother; Cancer in her brother and mother; Coronary Artery Disease in her father; Heart Disease in her father, mother, and sister; Heart Failure in her sister; Hypertension in her mother and sister; Myocardial Infarction in her father; Pacemaker in her mother.    Medication List     Current Outpatient Medications   Medication Sig Dispense Refill     acetaminophen (TYLENOL) 500 MG tablet Take 1,000 mg by mouth 3 times daily       aspirin 81 MG EC tablet Take 81 mg by mouth daily       Calcium Carbonate Antacid (TUMS ULTRA 1000 PO) Take 1 chew tab by mouth nightly as needed       Cholecalciferol (VITAMIN D3) 50 MCG (2000 UT) CAPS Take 1 tablet by mouth daily       clonazePAM (KLONOPIN) 0.5 MG tablet Take 1 tablet (0.5 mg) by mouth 3 times daily. 15 tablet 0     clopidogrel (PLAVIX) 75 MG tablet TAKE 1 TABLET(75 MG) BY MOUTH DAILY 90 tablet 1      diclofenac (VOLTAREN) 1 % topical gel Apply 2 g topically 4 times daily.       furosemide (LASIX) 20 MG tablet Take 20 mg by mouth twice a week. Monday and Thursday       Lidocaine (LIDOCARE) 4 % Patch Place 2 patches over 12 hours onto the skin every 24 hours. To prevent lidocaine toxicity, patient should be patch free for 12 hrs daily.       losartan (COZAAR) 50 MG tablet Take 50 mg by mouth 2 times daily       metoclopramide (REGLAN) 5 MG tablet Take 1 tablet (5 mg) by mouth 3 times daily as needed for vomiting. 10 tablet 0     metoprolol succinate ER (TOPROL XL) 25 MG 24 hr tablet Take 75 mg by mouth at bedtime.       vitamin C (ASCORBIC ACID) 250 MG tablet Take 250 mg by mouth daily.       No current facility-administered medications for this visit.      MED REC REQUIRED  Post Medication Reconciliation Status:        Allergies     Allergies   Allergen Reactions     Buspirone Shortness Of Breath     Ciprofloxacin Hives and Shortness Of Breath     Other reaction(s): Unknown     Cortisone      Other reaction(s): Throat Swelling/Closing, Unknown  Reaction 9-5-94  Tolerated dexamethasone during hospitalization 3/11/2025       Hydrocodone-Acetaminophen Shortness Of Breath     Other reaction(s): Unknown     Lansoprazole Shortness Of Breath     Metoprolol Shortness Of Breath     Tolerates metoprolol succinate at home     Nedocromil      Other reaction(s): Throat Swelling/Closing     Niacin Shortness Of Breath     Other reaction(s): Unknown     Albuterol Other (See Comments)     Inhaler had extreme effect on nervous system       Amlodipine      Other reaction(s): Erythema, Unknown     Ampicillin Unknown     Azithromycin      Other reaction(s): *Unknown, Unknown     Cefixime Diarrhea     Other reaction(s): Unknown  Has tolerated ceftriaxone     Cefprozil Nausea and Vomiting     Other reaction(s): Unknown  Has tolerated ceftriaxone     Cefuroxime      Other reaction(s): *Unknown  Has tolerated ceftriaxone     Cephalexin       Other reaction(s): *Unknown, Unknown  Has tolerated ceftriaxone     Contrast Dye      Other reaction(s): hives     Cyclobenzaprine      Other reaction(s): Sedation     Dextromethorphan-Guaifenesin Nausea     Other reaction(s): Headache     Diltiazem      Other reaction(s): Erythema, Unknown  Ears face arms and chest red and on fire-body tremors       Erythromycin      Other reaction(s): Unknown     Esomeprazole      Other reaction(s): Headache     Ezetimibe Unknown     Fenofibrate Muscle Pain (Myalgia)     Other reaction(s): Unknown     Fluticasone-Salmeterol Other (See Comments)     Elevated blood pressure       Methylprednisolone      Tolerated dexamethasone during hospitalization 3/11/2025     Metronidazole      Monosodium Glutamate      Moxifloxacin      Other reaction(s): Dizziness     Nifedipine      Other reaction(s): Edema  Took for 5-93 to 9-94 red and swollen leg       Nsaids      Other reaction(s): Unknown     Ofloxacin      Other reaction(s): *Unknown     Ondansetron      Other reaction(s): Constipation     Parsley Seed      Penicillins Unknown     She doesn't remember other than it occurred as a very young woman      Piper      Pirbuterol Other (See Comments)     Immediate extreme effect on nervous system       Pravastatin      Other reaction(s): Dizziness, Unknown     Pseudoephedrine      Other reaction(s): headache,nausea     Shellfish-Derived Products      Per pt 8/12/23     Simvastatin Muscle Pain (Myalgia)     Spironolactone      Other reaction(s): stomach cramps, nausea     Sulfamethoxazole-Trimethoprim      Other reaction(s): Unknown     Tramadol      Other reaction(s): red ears,high blood press.     Tramadol-Acetaminophen      Other reaction(s): Tachycardia, Unknown     Triamterene Other (See Comments)     Greatly bothered with sun-took medication for three years  Greatly bothered with sun       Diatrizoate Rash     Telmisartan Palpitations       Review of Systems   A comprehensive review of  "14 systems was done. Pertinent findings noted here and in history of present illness. All the rest negative.  Constitutional: Negative.  Negative for fever, chills, she has  activity change, appetite change and fatigue.   Has multiple drug allergies  HENT: Negative for congestion and facial swelling.    Eyes: Negative for photophobia, redness and visual disturbance.   Respiratory: Negative for cough and chest tightness.    Cardiovascular: Negative for chest pain, palpitations and leg swelling.   Gastrointestinal: Negative for nausea, diarrhea, constipation, blood in stool and abdominal distention.   Has frequent BM due to too much stool softeners per her  Genitourinary: Negative.    Musculoskeletal:has limited abduction of rt shoulder  Has severe OA of both knees  Walks with a cane   Skin: Negative.    Neurological: Negative for dizziness, tremors, syncope, weakness, light-headedness and headaches.   Hematological: Does not bruise/bleed easily.   Psychiatric/Behavioral: Negative.        Physical Exam   /41   Pulse 74   Temp 97.8  F (36.6  C)   Resp 18   Ht 1.549 m (5' 1\")   Wt 65.8 kg (145 lb)   LMP  (LMP Unknown)   SpO2 96%   BMI 27.40 kg/m       Constitutional: Oriented to person, place, and time and appears well-developed.   HEENT:  Normocephalic and atraumatic.  Eyes: Conjunctivae and EOM are normal. Pupils are equal, round, and reactive to light. No discharge.  No scleral icterus. Nose normal. Mouth/Throat: Oropharynx is clear and moist. No oropharyngeal exudate.    NECK: Normal range of motion. Neck supple. No JVD present. No tracheal deviation present. No thyromegaly present.   CARDIOVASCULAR: Normal rate, regular rhythm and intact distal pulses.  Exam reveals no gallop and no friction rub.  Systolic murmur present.  PULMONARY: Effort normal and breath sounds normal. No respiratory distress.No Wheezing or rales.  ABDOMEN: Soft. Bowel sounds are normal. No distension and no mass.  There is no " tenderness. There is no rebound and no guarding. No HSM.  MUSCULOSKELETAL: Normal range of motion. Mild kyphosis, no tenderness.  Limited abduction of rt shoulder  Severe OA of both knee which limit her mobility  LYMPH NODES: Has no cervical, supraclavicular, axillary and groin adenopathy.   NEUROLOGICAL: Alert and oriented to person, place, and time. No cranial nerve deficit.  Normal muscle tone. Coordination normal.   GENITOURINARY: Deferred exam.  SKIN: Skin is warm and dry. No rash noted. No erythema. No pallor.   EXTREMITIES: No cyanosis, no clubbing, no edema. No Deformity.  PSYCHIATRIC: Normal mood, affect and behavior.has anxiety and uses meds at baseline.      Lab Results     Recent Results (from the past 240 hours)   Comprehensive metabolic panel    Collection Time: 03/09/25 11:38 PM   Result Value Ref Range    Sodium 132 (L) 135 - 145 mmol/L    Potassium 3.9 3.4 - 5.3 mmol/L    Carbon Dioxide (CO2) 24 22 - 29 mmol/L    Anion Gap 9 7 - 15 mmol/L    Urea Nitrogen 27.2 (H) 8.0 - 23.0 mg/dL    Creatinine 1.20 (H) 0.51 - 0.95 mg/dL    GFR Estimate 44 (L) >60 mL/min/1.73m2    Calcium 9.5 8.8 - 10.4 mg/dL    Chloride 99 98 - 107 mmol/L    Glucose 101 (H) 70 - 99 mg/dL    Alkaline Phosphatase 77 40 - 150 U/L    AST 17 0 - 45 U/L    ALT 7 0 - 50 U/L    Protein Total 6.8 6.4 - 8.3 g/dL    Albumin 4.0 3.5 - 5.2 g/dL    Bilirubin Total 0.3 <=1.2 mg/dL   Troponin T, High Sensitivity    Collection Time: 03/09/25 11:38 PM   Result Value Ref Range    Troponin T, High Sensitivity 18 (H) <=14 ng/L   CBC with platelets and differential    Collection Time: 03/09/25 11:38 PM   Result Value Ref Range    WBC Count 9.3 4.0 - 11.0 10e3/uL    RBC Count 3.23 (L) 3.80 - 5.20 10e6/uL    Hemoglobin 9.6 (L) 11.7 - 15.7 g/dL    Hematocrit 29.1 (L) 35.0 - 47.0 %    MCV 90 78 - 100 fL    MCH 29.7 26.5 - 33.0 pg    MCHC 33.0 31.5 - 36.5 g/dL    RDW 13.4 10.0 - 15.0 %    Platelet Count 276 150 - 450 10e3/uL    % Neutrophils 73 %    %  Lymphocytes 10 %    % Monocytes 14 %    % Eosinophils 1 %    % Basophils 1 %    % Immature Granulocytes 0 %    NRBCs per 100 WBC 0 <1 /100    Absolute Neutrophils 6.8 1.6 - 8.3 10e3/uL    Absolute Lymphocytes 1.0 0.8 - 5.3 10e3/uL    Absolute Monocytes 1.3 0.0 - 1.3 10e3/uL    Absolute Eosinophils 0.1 0.0 - 0.7 10e3/uL    Absolute Basophils 0.1 0.0 - 0.2 10e3/uL    Absolute Immature Granulocytes 0.0 <=0.4 10e3/uL    Absolute NRBCs 0.0 10e3/uL   Troponin T, High Sensitivity    Collection Time: 03/10/25  1:35 AM   Result Value Ref Range    Troponin T, High Sensitivity 13 <=14 ng/L   Basic metabolic panel    Collection Time: 03/12/25  6:36 AM   Result Value Ref Range    Sodium 128 (L) 135 - 145 mmol/L    Potassium 4.1 3.4 - 5.3 mmol/L    Chloride 95 (L) 98 - 107 mmol/L    Carbon Dioxide (CO2) 21 (L) 22 - 29 mmol/L    Anion Gap 12 7 - 15 mmol/L    Urea Nitrogen 38.4 (H) 8.0 - 23.0 mg/dL    Creatinine 1.23 (H) 0.51 - 0.95 mg/dL    GFR Estimate 42 (L) >60 mL/min/1.73m2    Calcium 9.2 8.8 - 10.4 mg/dL    Glucose 129 (H) 70 - 99 mg/dL   CBC with platelets    Collection Time: 03/12/25  6:36 AM   Result Value Ref Range    WBC Count 11.0 4.0 - 11.0 10e3/uL    RBC Count 3.08 (L) 3.80 - 5.20 10e6/uL    Hemoglobin 9.1 (L) 11.7 - 15.7 g/dL    Hematocrit 27.6 (L) 35.0 - 47.0 %    MCV 90 78 - 100 fL    MCH 29.5 26.5 - 33.0 pg    MCHC 33.0 31.5 - 36.5 g/dL    RDW 13.5 10.0 - 15.0 %    Platelet Count 254 150 - 450 10e3/uL   Osmolality    Collection Time: 03/12/25  6:36 AM   Result Value Ref Range    Osmolality Blood 275 (L) 280 - 301 mmol/kg   TSH    Collection Time: 03/12/25  6:36 AM   Result Value Ref Range    TSH 0.67 0.30 - 4.20 uIU/mL   Osmolality urine    Collection Time: 03/12/25  1:16 PM   Result Value Ref Range    Osmolality Urine 424 100 - 1,200 mmol/kg   Sodium random urine    Collection Time: 03/12/25  1:16 PM   Result Value Ref Range    Sodium Urine mmol/L <20 mmol/L   CBC with platelets    Collection Time: 03/18/25  6:46  AM   Result Value Ref Range    WBC Count 8.6 4.0 - 11.0 10e3/uL    RBC Count 2.84 (L) 3.80 - 5.20 10e6/uL    Hemoglobin 8.4 (L) 11.7 - 15.7 g/dL    Hematocrit 27.2 (L) 35.0 - 47.0 %    MCV 96 78 - 100 fL    MCH 29.6 26.5 - 33.0 pg    MCHC 30.9 (L) 31.5 - 36.5 g/dL    RDW 14.3 10.0 - 15.0 %    Platelet Count 456 (H) 150 - 450 10e3/uL   Magnesium    Collection Time: 03/18/25  6:46 AM   Result Value Ref Range    Magnesium 1.7 1.7 - 2.3 mg/dL   Glucose (OUTREACH)    Collection Time: 03/18/25  6:46 AM   Result Value Ref Range    Glucose 92 70 - 99 mg/dL   Basic Metabolic Panel No Glucose (OUTREACH)    Collection Time: 03/18/25  6:46 AM   Result Value Ref Range    Sodium 137 135 - 145 mmol/L    Potassium 4.9 3.4 - 5.3 mmol/L    Chloride 102 98 - 107 mmol/L    Carbon Dioxide (CO2) 24 22 - 29 mmol/L    Anion Gap 11 7 - 15 mmol/L    Urea Nitrogen 40.6 (H) 8.0 - 23.0 mg/dL    Creatinine 1.19 (H) 0.51 - 0.95 mg/dL    GFR Estimate 44 (L) >60 mL/min/1.73m2    Calcium 9.5 8.8 - 10.4 mg/dL         MEDICAL EQUIPMENT NEEDS:  cane     DISCHARGE PLAN/FACE TO FACE:  I certify that services are/were furnished while this patient was under the care of a physician and that a physician or an allowed non-physician practitioner (NPP), had a face-to-face encounter that meets the physician face-to-face encounter requirements. The encounter was in whole, or in part, related to the primary reason for home health. The patient is confined to his/her home and needs intermittent skilled nursing, physical therapy, speech-language pathology, or the continued need for occupational therapy. A plan of care has been established by a physician and is periodically reviewed by a physician.  Date of Face-to-Face Encounter: 3/18/25     I certify that, based on my findings, the following services are medically necessary home health services: HHC HHA/RN and PT/OT to evaluate and treat    My clinical findings support the need for the above skilled services  because: patient will be discharging to home. Patient will need assistance with medication management and performing IADLs and ADLs effectively and safely.     Patient to re-establish plan of care with their PCP within 7 days after leaving TCU.    Time spent is >35 minutes in this visit including medication adjustment and reviewing concerns with pt  She can discuss need for Lasix twice daily with her nephrologist.  Recheck kidney functions actually are stable with stable electrolytes  Creatinine is 1.1 with a GFR of 44 which appears to be close to her baseline recheck is 8.4 today      Electronically signed by    Roxy Prieto MD                            Sincerely,        MONIKA Leonard    Electronically signed

## 2025-03-26 ENCOUNTER — TELEPHONE (OUTPATIENT)
Dept: NEUROLOGY | Facility: CLINIC | Age: 89
End: 2025-03-26
Payer: MEDICARE

## 2025-03-26 NOTE — TELEPHONE ENCOUNTER
"RN returned call to Kerry.    Kerry informed RN that she was recently discharged from the hospital d/t R shoulder problems. \"I couldn't lift my arm and thought I had a stroke.\" Was discharged to a TCU and is currently back at her home. Kerry told RN that she's been rubbing cream on her R shoulder 4 times a day and has noticed bruising on the shoulder, neck, back and chest. Kerry doesn't have any pain with the bruises. Kerry wants to know if the bruising is from \"rubbing the cream on my shoulder, or if something else is going on.\"    Per chart review, Kerry is on Plavix for intracranial atherosclerosis. RN asked if Kerry has noticed any blood in her stool or had frequent nosebleeds, Kerry denied both. RN educated Kerry on Plavix and explained that a common side effect of Plavix is bruising.     Kerry expressed understanding and told RN \"Well, the bruises aren't painful for me, so I guess that's that. I just wanted Dr. Hummel to know.\" RN encouraged Kerry to contact the clinic again should further questions or concerns come up.    Kiah Jerez RN, BSN  [facility]    "

## 2025-03-26 NOTE — TELEPHONE ENCOUNTER
[facility] Call Center    Phone Message    May a detailed message be left on voicemail: yes     Reason for Call: Symptoms or Concerns     If patient has red-flag symptoms, warm transfer to triage line    Current symptom or concern: Patient is experiencing bruising, as she has bruising on throat and down right side to chest.    Patient would like to discuss the clopidogrel (PLAVIX) 75 MG tablet medication maybe causing bruising     Symptoms have been present for:  1 day(s)    Has patient previously been seen for this? No    By Mark Hummel     Date: ASAP    Are there any new or worsening symptoms? Yes:     Action Taken: [facility]    Travel Screening: Not Applicable     Date of Service:

## 2025-03-31 ENCOUNTER — LAB REQUISITION (OUTPATIENT)
Dept: LAB | Facility: CLINIC | Age: 89
End: 2025-03-31
Payer: MEDICARE

## 2025-03-31 DIAGNOSIS — N18.32 CHRONIC KIDNEY DISEASE, STAGE 3B (H): ICD-10-CM

## 2025-03-31 LAB
ERYTHROCYTE [DISTWIDTH] IN BLOOD BY AUTOMATED COUNT: 13.8 % (ref 10–15)
HCT VFR BLD AUTO: 28.7 % (ref 35–47)
HGB BLD-MCNC: 9.1 G/DL (ref 11.7–15.7)
MCH RBC QN AUTO: 30.3 PG (ref 26.5–33)
MCHC RBC AUTO-ENTMCNC: 31.7 G/DL (ref 31.5–36.5)
MCV RBC AUTO: 96 FL (ref 78–100)
PLATELET # BLD AUTO: 346 10E3/UL (ref 150–450)
RBC # BLD AUTO: 3 10E6/UL (ref 3.8–5.2)
WBC # BLD AUTO: 4.3 10E3/UL (ref 4–11)

## 2025-03-31 PROCEDURE — 80048 BASIC METABOLIC PNL TOTAL CA: CPT | Mod: ORL | Performed by: FAMILY MEDICINE

## 2025-03-31 PROCEDURE — 85027 COMPLETE CBC AUTOMATED: CPT | Mod: ORL | Performed by: FAMILY MEDICINE

## 2025-04-01 LAB
ANION GAP SERPL CALCULATED.3IONS-SCNC: 10 MMOL/L (ref 7–15)
BUN SERPL-MCNC: 26 MG/DL (ref 8–23)
CALCIUM SERPL-MCNC: 9 MG/DL (ref 8.8–10.4)
CHLORIDE SERPL-SCNC: 98 MMOL/L (ref 98–107)
CREAT SERPL-MCNC: 1.28 MG/DL (ref 0.51–0.95)
EGFRCR SERPLBLD CKD-EPI 2021: 40 ML/MIN/1.73M2
GLUCOSE SERPL-MCNC: 118 MG/DL (ref 70–99)
HCO3 SERPL-SCNC: 25 MMOL/L (ref 22–29)
POTASSIUM SERPL-SCNC: 4.6 MMOL/L (ref 3.4–5.3)
SODIUM SERPL-SCNC: 133 MMOL/L (ref 135–145)

## 2025-04-06 DIAGNOSIS — I67.2 INTRACRANIAL ATHEROSCLEROSIS: ICD-10-CM

## 2025-04-07 RX ORDER — CLOPIDOGREL BISULFATE 75 MG/1
75 TABLET ORAL DAILY
Qty: 90 TABLET | Refills: 0 | Status: SHIPPED | OUTPATIENT
Start: 2025-04-07

## 2025-04-07 NOTE — TELEPHONE ENCOUNTER
Refill request for the following medication (s) listed below.    Pending Prescriptions:                       Disp   Refills    clopidogrel (PLAVIX) 75 MG tablet [Pharma*90 tab*1            Sig: TAKE 1 TABLET(75 MG) BY MOUTH DAILY      Last office visit provider:  2/27/24  Next appointment scheduled: Unscheduled. Pt due for follow-up.  Message sent to scheduling.       Medication T'd for review and signature  Joe MERINO ATC

## 2025-05-19 PROBLEM — H93.8X2: Status: RESOLVED | Noted: 2021-03-03 | Resolved: 2025-05-19

## 2025-05-19 PROBLEM — I16.0 HYPERTENSIVE URGENCY: Status: RESOLVED | Noted: 2024-08-25 | Resolved: 2025-05-19

## 2025-05-19 PROBLEM — K81.0 ACUTE CHOLECYSTITIS: Status: RESOLVED | Noted: 2024-11-06 | Resolved: 2025-05-19

## 2025-05-19 PROBLEM — I50.9 CHF EXACERBATION (H): Status: RESOLVED | Noted: 2024-11-06 | Resolved: 2025-05-19

## 2025-05-19 PROBLEM — N18.32 CHRONIC KIDNEY DISEASE, STAGE 3B (H): Status: RESOLVED | Noted: 2025-03-18 | Resolved: 2025-05-19

## 2025-05-19 PROBLEM — N17.9 AKI (ACUTE KIDNEY INJURY): Status: RESOLVED | Noted: 2023-07-23 | Resolved: 2025-05-19

## 2025-05-19 PROBLEM — R53.1 GENERALIZED WEAKNESS: Status: RESOLVED | Noted: 2023-08-11 | Resolved: 2025-05-19

## 2025-05-19 PROBLEM — R10.13 EPIGASTRIC PAIN: Status: RESOLVED | Noted: 2024-11-06 | Resolved: 2025-05-19

## 2025-05-19 PROBLEM — R06.09 DOE (DYSPNEA ON EXERTION): Status: RESOLVED | Noted: 2023-07-20 | Resolved: 2025-05-19

## 2025-05-20 ENCOUNTER — TELEPHONE (OUTPATIENT)
Dept: NEUROLOGY | Facility: CLINIC | Age: 89
End: 2025-05-20
Payer: MEDICARE

## 2025-05-20 NOTE — TELEPHONE ENCOUNTER
Statement Selected [facility] Call Center    Phone Message    May a detailed message be left on voicemail: yes     Reason for Call: Patient calling in requesting for information on a wheelchair. States that they are scheduled to see Dr Hummel on 05/21/2025 and since it is difficult for them to walk, they are wanting to know if the clinic has a wheelchair for them to use.     Please reach back out to patient at [phone].    Action Taken: Message routed to:  Other: [facility]

## 2025-05-21 NOTE — TELEPHONE ENCOUNTER
Patient had already cancelled their appt with Dr. Hummel for today and rescheduled to November 2025.  Closing encounter.     Benito 5/21/2025 7:48 AM

## 2025-07-13 DIAGNOSIS — I67.2 INTRACRANIAL ATHEROSCLEROSIS: ICD-10-CM

## 2025-07-14 RX ORDER — CLOPIDOGREL BISULFATE 75 MG/1
75 TABLET ORAL DAILY
Qty: 90 TABLET | Refills: 0 | Status: SHIPPED | OUTPATIENT
Start: 2025-07-14

## 2025-07-14 NOTE — TELEPHONE ENCOUNTER
Refill request for: clopidogrel (PLAVIX) 75 MG tablet    Directions: TAKE 1 TABLET(75 MG) BY MOUTH DAILY - Oral     LOV: 02/27/2024  NOV: 11/04/2025    90 day supply with 0 refills Medication T'd for review and signature    Kay Villeda CMA on 7/14/2025 at 11:46 AM

## (undated) DEVICE — CATH DIAGNOSTIC RADIAL 5FR TIG 4.0

## (undated) DEVICE — DECANTER VIAL 2006S

## (undated) DEVICE — SOL WATER IRRIG 1000ML BOTTLE 2F7114

## (undated) DEVICE — CUSTOM PACK CORONARY SAN5BCRHEA

## (undated) DEVICE — CATH DIAG 4FR JR 5.0 538423

## (undated) DEVICE — MANIFOLD KIT ANGIO AUTOMATED 014613

## (undated) DEVICE — ESU GROUND PAD ADULT REM W/15' CORD E7507DB

## (undated) DEVICE — HEMOSTAT COMPRESSION VASC REGULAR OD24 CM 3524

## (undated) DEVICE — ENDO TROCAR FIRST ENTRY KII FIOS Z-THRD 11X100MM CTF33

## (undated) DEVICE — CLIP LIGACLIP LG YELLOW LT400

## (undated) DEVICE — SHTH INTRO 0.021IN ID 6FR DIA

## (undated) DEVICE — EXCHANGE WIRE .035 260 STAR/JFC/035/260/ M001491681

## (undated) DEVICE — SYR 30ML LL W/O NDL 302832

## (undated) DEVICE — ENDO TROCAR FIRST ENTRY KII FIOS Z-THRD 05X100MM CTF03

## (undated) DEVICE — Device

## (undated) DEVICE — ENDO TROCAR SLEEVE KII Z-THREADED 05X100MM CTS02

## (undated) DEVICE — SU VICRYL+ 4-0 UNDYED PS-2 VCP496ZH

## (undated) DEVICE — ENDO SHEARS RENEW LAP ENDOCUT SCISSOR TIP 16.5MM 3142

## (undated) DEVICE — ELECTRODE DEFIB CADENCE 22550R

## (undated) DEVICE — GLOVE BIOGEL PI SZ 8.0 40880

## (undated) DEVICE — CUSTOM PACK LAP CHOLE SBA5BLCHEA

## (undated) DEVICE — FORCEP BIOPSY 2.3MM DISP COATED 000388

## (undated) DEVICE — SUCTION MANIFOLD NEPTUNE 2 SYS 1 PORT 702-025-000

## (undated) DEVICE — KIT HAND CONTROL ACIST 014644 AR-P54

## (undated) DEVICE — PREP CHLORAPREP 26ML TINTED HI-LITE ORANGE 930815

## (undated) DEVICE — TUBING SUCTION MEDI-VAC 1/4"X20' N620A

## (undated) DEVICE — SU VICRYL+ 0 27 UR6 VLT VCP603H

## (undated) DEVICE — TUBING SMOKE EVAC PNEUMOCLEAR HIGH FLOW 0620050250

## (undated) DEVICE — SYR ANGIOGRAPHY MULTIUSE KIT ACIST 014612

## (undated) RX ORDER — ASPIRIN 81 MG/1
TABLET, CHEWABLE ORAL
Status: DISPENSED
Start: 2023-08-11

## (undated) RX ORDER — FENTANYL CITRATE 50 UG/ML
INJECTION, SOLUTION INTRAMUSCULAR; INTRAVENOUS
Status: DISPENSED
Start: 2024-11-06

## (undated) RX ORDER — EPHEDRINE SULFATE 50 MG/ML
INJECTION, SOLUTION INTRAMUSCULAR; INTRAVENOUS; SUBCUTANEOUS
Status: DISPENSED
Start: 2024-11-06

## (undated) RX ORDER — BUPIVACAINE HYDROCHLORIDE 2.5 MG/ML
INJECTION, SOLUTION EPIDURAL; INFILTRATION; INTRACAUDAL
Status: DISPENSED
Start: 2024-11-06

## (undated) RX ORDER — DIPHENHYDRAMINE HYDROCHLORIDE 50 MG/ML
INJECTION INTRAMUSCULAR; INTRAVENOUS
Status: DISPENSED
Start: 2023-08-11

## (undated) RX ORDER — PROPOFOL 10 MG/ML
INJECTION, EMULSION INTRAVENOUS
Status: DISPENSED
Start: 2024-11-06

## (undated) RX ORDER — DEXAMETHASONE SODIUM PHOSPHATE 10 MG/ML
INJECTION, SOLUTION INTRAMUSCULAR; INTRAVENOUS
Status: DISPENSED
Start: 2024-11-06

## (undated) RX ORDER — FENTANYL CITRATE 50 UG/ML
INJECTION, SOLUTION INTRAMUSCULAR; INTRAVENOUS
Status: DISPENSED
Start: 2023-08-11

## (undated) RX ORDER — DEXAMETHASONE SODIUM PHOSPHATE 4 MG/ML
INJECTION, SOLUTION INTRA-ARTICULAR; INTRALESIONAL; INTRAMUSCULAR; INTRAVENOUS; SOFT TISSUE
Status: DISPENSED
Start: 2023-08-11